# Patient Record
Sex: FEMALE | Race: WHITE | NOT HISPANIC OR LATINO | Employment: OTHER | URBAN - METROPOLITAN AREA
[De-identification: names, ages, dates, MRNs, and addresses within clinical notes are randomized per-mention and may not be internally consistent; named-entity substitution may affect disease eponyms.]

---

## 2017-01-02 ENCOUNTER — MYC MEDICAL ADVICE (OUTPATIENT)
Dept: FAMILY MEDICINE | Facility: CLINIC | Age: 77
End: 2017-01-02

## 2017-02-20 DIAGNOSIS — E11.59 TYPE 2 DIABETES MELLITUS WITH OTHER CIRCULATORY COMPLICATIONS (H): ICD-10-CM

## 2017-02-20 LAB
ALBUMIN SERPL-MCNC: 3.3 G/DL (ref 3.4–5)
ALP SERPL-CCNC: 112 U/L (ref 40–150)
ALT SERPL W P-5'-P-CCNC: 20 U/L (ref 0–50)
ANION GAP SERPL CALCULATED.3IONS-SCNC: 10 MMOL/L (ref 3–14)
AST SERPL W P-5'-P-CCNC: 12 U/L (ref 0–45)
BILIRUB SERPL-MCNC: 0.5 MG/DL (ref 0.2–1.3)
BUN SERPL-MCNC: 26 MG/DL (ref 7–30)
CALCIUM SERPL-MCNC: 8.9 MG/DL (ref 8.5–10.1)
CHLORIDE SERPL-SCNC: 107 MMOL/L (ref 94–109)
CO2 SERPL-SCNC: 28 MMOL/L (ref 20–32)
CREAT SERPL-MCNC: 1.32 MG/DL (ref 0.52–1.04)
GFR SERPL CREATININE-BSD FRML MDRD: 39 ML/MIN/1.7M2
GLUCOSE SERPL-MCNC: 141 MG/DL (ref 70–99)
HBA1C MFR BLD: 6.8 % (ref 4.3–6)
POTASSIUM SERPL-SCNC: 4.3 MMOL/L (ref 3.4–5.3)
PROT SERPL-MCNC: 7 G/DL (ref 6.8–8.8)
SODIUM SERPL-SCNC: 145 MMOL/L (ref 133–144)

## 2017-02-20 PROCEDURE — 36415 COLL VENOUS BLD VENIPUNCTURE: CPT | Performed by: FAMILY MEDICINE

## 2017-02-20 PROCEDURE — 80053 COMPREHEN METABOLIC PANEL: CPT | Performed by: FAMILY MEDICINE

## 2017-02-20 PROCEDURE — 83036 HEMOGLOBIN GLYCOSYLATED A1C: CPT | Mod: QW | Performed by: FAMILY MEDICINE

## 2017-03-01 DIAGNOSIS — N18.30 CHRONIC KIDNEY DISEASE, STAGE III (MODERATE) (H): Primary | ICD-10-CM

## 2017-03-01 LAB
ALBUMIN SERPL-MCNC: 3.6 G/DL (ref 3.4–5)
ANION GAP SERPL CALCULATED.3IONS-SCNC: 6 MMOL/L (ref 3–14)
BUN SERPL-MCNC: 30 MG/DL (ref 7–30)
CALCIUM SERPL-MCNC: 9.2 MG/DL (ref 8.5–10.1)
CHLORIDE SERPL-SCNC: 108 MMOL/L (ref 94–109)
CO2 SERPL-SCNC: 31 MMOL/L (ref 20–32)
CREAT SERPL-MCNC: 1.23 MG/DL (ref 0.52–1.04)
CREAT UR-MCNC: 151 MG/DL
GFR SERPL CREATININE-BSD FRML MDRD: 42 ML/MIN/1.7M2
HGB BLD-MCNC: 13.8 G/DL (ref 11.7–15.7)
MICROALBUMIN UR-MCNC: 44 MG/L
MICROALBUMIN/CREAT UR: 29.27 MG/G CR (ref 0–25)
PHOSPHATE SERPL-MCNC: 3.8 MG/DL (ref 2.5–4.5)
POTASSIUM SERPL-SCNC: 4.3 MMOL/L (ref 3.4–5.3)
PROT UR-MCNC: 0.2 G/L
PROT/CREAT 24H UR: 0.13 G/G CR (ref 0–0.2)
PTH-INTACT SERPL-MCNC: 67 PG/ML (ref 12–72)
SODIUM SERPL-SCNC: 145 MMOL/L (ref 133–144)

## 2017-03-01 PROCEDURE — 82040 ASSAY OF SERUM ALBUMIN: CPT | Performed by: INTERNAL MEDICINE

## 2017-03-01 PROCEDURE — 82310 ASSAY OF CALCIUM: CPT | Performed by: INTERNAL MEDICINE

## 2017-03-01 PROCEDURE — 82043 UR ALBUMIN QUANTITATIVE: CPT | Performed by: INTERNAL MEDICINE

## 2017-03-01 PROCEDURE — 80051 ELECTROLYTE PANEL: CPT | Performed by: INTERNAL MEDICINE

## 2017-03-01 PROCEDURE — 83970 ASSAY OF PARATHORMONE: CPT | Performed by: INTERNAL MEDICINE

## 2017-03-01 PROCEDURE — 82565 ASSAY OF CREATININE: CPT | Performed by: INTERNAL MEDICINE

## 2017-03-01 PROCEDURE — 84520 ASSAY OF UREA NITROGEN: CPT | Performed by: INTERNAL MEDICINE

## 2017-03-01 PROCEDURE — 84100 ASSAY OF PHOSPHORUS: CPT | Performed by: INTERNAL MEDICINE

## 2017-03-01 PROCEDURE — 84156 ASSAY OF PROTEIN URINE: CPT | Performed by: INTERNAL MEDICINE

## 2017-03-01 PROCEDURE — 85018 HEMOGLOBIN: CPT | Performed by: INTERNAL MEDICINE

## 2017-03-01 PROCEDURE — 36415 COLL VENOUS BLD VENIPUNCTURE: CPT | Performed by: INTERNAL MEDICINE

## 2017-03-07 ENCOUNTER — TRANSFERRED RECORDS (OUTPATIENT)
Dept: HEALTH INFORMATION MANAGEMENT | Facility: CLINIC | Age: 77
End: 2017-03-07

## 2017-04-24 DIAGNOSIS — E11.59 TYPE 2 DIABETES MELLITUS WITH OTHER CIRCULATORY COMPLICATIONS (H): ICD-10-CM

## 2017-04-24 NOTE — TELEPHONE ENCOUNTER
metFORMIN (GLUCOPHAGE-XR) 500 MG 24 hr tablet      Last Written Prescription Date: 10/10/2016  Last Fill Quantity: 90, # refills: 1  Last Office Visit with G, P or [facility] prescribing provider:  9/7/2016        BP Readings from Last 3 Encounters:   10/03/16 122/82   09/07/16 118/68   04/04/16 122/56     Lab Results   Component Value Date    MICROL 44 03/01/2017     Lab Results   Component Value Date    UMALCR 29.27 03/01/2017     Creatinine   Date Value Ref Range Status   03/01/2017 1.23 (H) 0.52 - 1.04 mg/dL Final   ]  GFR Estimate   Date Value Ref Range Status   03/01/2017 42 (L) >60 mL/min/1.7m2 Final     Comment:     Non  GFR Calc   02/20/2017 39 (L) >60 mL/min/1.7m2 Final     Comment:     Non  GFR Calc   11/16/2016 40 (L) >60 mL/min/1.7m2 Final     Comment:     Non  GFR Calc     GFR Estimate If Black   Date Value Ref Range Status   03/01/2017 51 (L) >60 mL/min/1.7m2 Final     Comment:      GFR Calc   02/20/2017 47 (L) >60 mL/min/1.7m2 Final     Comment:      GFR Calc   11/16/2016 48 (L) >60 mL/min/1.7m2 Final     Comment:      GFR Calc     Lab Results   Component Value Date    CHOL 155 10/03/2016     Lab Results   Component Value Date    HDL 53 10/03/2016     Lab Results   Component Value Date    LDL 81 10/03/2016     Lab Results   Component Value Date    TRIG 104 10/03/2016     Lab Results   Component Value Date    CHOLHDLRATIO 3.5 10/02/2015     Lab Results   Component Value Date    AST 12 02/20/2017     Lab Results   Component Value Date    ALT 20 02/20/2017     Lab Results   Component Value Date    A1C 6.8 02/20/2017    A1C 7.3 10/03/2016    A1C 7.3 04/04/2016    A1C 7.4 10/02/2015    A1C 7.3 04/01/2015     Potassium   Date Value Ref Range Status   03/01/2017 4.3 3.4 - 5.3 mmol/L Final     Inga Joyce CMA

## 2017-04-26 ENCOUNTER — MYC MEDICAL ADVICE (OUTPATIENT)
Dept: FAMILY MEDICINE | Facility: CLINIC | Age: 77
End: 2017-04-26

## 2017-04-26 RX ORDER — METFORMIN HCL 500 MG
TABLET, EXTENDED RELEASE 24 HR ORAL
Qty: 90 TABLET | Refills: 0 | Status: SHIPPED | OUTPATIENT
Start: 2017-04-26 | End: 2017-07-17

## 2017-05-25 ENCOUNTER — TRANSFERRED RECORDS (OUTPATIENT)
Dept: HEALTH INFORMATION MANAGEMENT | Facility: CLINIC | Age: 77
End: 2017-05-25

## 2017-05-25 LAB — EJECTION FRACTION: 68

## 2017-06-19 ENCOUNTER — MYC MEDICAL ADVICE (OUTPATIENT)
Dept: FAMILY MEDICINE | Facility: CLINIC | Age: 77
End: 2017-06-19

## 2017-07-07 ENCOUNTER — OFFICE VISIT (OUTPATIENT)
Dept: FAMILY MEDICINE | Facility: CLINIC | Age: 77
End: 2017-07-07
Payer: COMMERCIAL

## 2017-07-07 VITALS
SYSTOLIC BLOOD PRESSURE: 124 MMHG | DIASTOLIC BLOOD PRESSURE: 60 MMHG | HEART RATE: 60 BPM | TEMPERATURE: 98 F | BODY MASS INDEX: 32.29 KG/M2 | WEIGHT: 167 LBS

## 2017-07-07 DIAGNOSIS — M18.12 OSTEOARTHRITIS OF LEFT THUMB: Primary | ICD-10-CM

## 2017-07-07 PROCEDURE — 99214 OFFICE O/P EST MOD 30 MIN: CPT | Performed by: NURSE PRACTITIONER

## 2017-07-07 NOTE — NURSING NOTE
"Chief Complaint   Patient presents with     Mouth Problem     right side mouth       Initial There were no vitals taken for this visit. Estimated body mass index is 32.54 kg/(m^2) as calculated from the following:    Height as of 12/5/16: 5' 0.3\" (1.532 m).    Weight as of 12/5/16: 168 lb 4.8 oz (76.3 kg).  Medication Reconciliation: complete  "

## 2017-07-07 NOTE — PROGRESS NOTES
SUBJECTIVE:                                                    Sandra Petit is a 77 year old female who presents to clinic today for the following health issues:      Concern -spots inside mouth     Onset: last night    Description:   Bump inside right side mouth    Intensity: 0/10    Progression of Symptoms:  improving    Accompanying Signs & Symptoms:  Better today, saw small white spots in same area    Previous history of similar problem:   none    Precipitating factors:   Worsened by: none    Alleviating factors:  Improved by: none    Therapies Tried and outcome: none      Patient states last night she felt something inside the right cheek with her tongue. When she looked, she saw a black spot. She had been eating popcorn, thought perhaps it was a call. She tried to remove it unsuccessfully. This morning she looked again, and she saw 2 very small white spots, no black spot. There is no tenderness, she was going to cancel her appointment. She kept the appointment however because she wanted to talk about pain in her left thumb. She has periodic episodes of pain at the base of the left thumb radiating through the soft tissues of the thumb. Does not radiate to any other fingers, does not radiate into the arm. It tends to flare after she has performed repetitive activity. She states she had been baking cookies, and the repetition but to severe pain, worsened she had experienced before. She has applied warm packs and that seems to have helped. She is unable to take NSAIDs because of kidney problems. Presently the pain is improved    Problem list and histories reviewed & adjusted, as indicated.  Additional history: as documented    BP Readings from Last 3 Encounters:   07/07/17 124/60   10/03/16 122/82   09/07/16 118/68    Wt Readings from Last 3 Encounters:   07/07/17 167 lb (75.8 kg)   12/05/16 168 lb 4.8 oz (76.3 kg)   11/22/16 168 lb 4.8 oz (76.3 kg)                    Reviewed and updated as needed this visit by  clinical staff       Reviewed and updated as needed this visit by Provider         ROS:  Constitutional, HEENT, cardiovascular, pulmonary, gi and gu systems are negative, except as otherwise noted.    OBJECTIVE:     /60  Pulse 60  Temp 98  F (36.7  C) (Tympanic)  Wt 167 lb (75.8 kg)  BMI 32.29 kg/m2  Body mass index is 32.29 kg/(m^2).   GENERAL: healthy, alert and no distress  HENT: Oral exam: Oropharynx appears normal. There are no lesions within the buccal mucosa, oropharynx, sublingually, or on the tongue area. It looks a little irritated on the right buccal mucosa. No white or black lesions are evident.  NECK: no adenopathy, no asymmetry, masses, or scars and thyroid normal to palpation  MS: Focused exam of left thumb: No deformity, edema, or discoloration. Full range of motion, normal strength. Mild tenderness to palpation at the radiocarpal joint and through the first metacarpal. Some surrounding tenderness in the soft tissues adjacent to the first metacarpal.        ASSESSMENT/PLAN:     Problem List Items Addressed This Visit        Medium    Osteoarthritis of left thumb - Primary    Relevant Orders    WRIST COCK-UP NON-MOLDED (Completed)           She is reassured at this time I see no oral lesion. Suspect there may have been something stuck that she had eaten, that was quite adherent, but has now been removed  Suspect osteoarthritis of the thumb, she does have a history of osteoarthritis of her knees. Recommend rest when it flares. Alternate applications of heat and cold. She was provided with a splint to isolate the thumb, wear this when performing repetitive activities that would put strain on the affected joints    AMARJIT Park CNP  [facility]

## 2017-07-07 NOTE — MR AVS SNAPSHOT
After Visit Summary   7/7/2017    Sandra Petit    MRN: [id number]           Patient Information     Date Of Birth          1940        Visit Information        Provider Department      7/7/2017 2:45 PM Simona Izquierdo APRN CNP [facility]         Follow-ups after your visit        Who to contact     If you have questions or need follow up information about today's clinic visit or your schedule please contact [facility] directly at [phone].  Normal or non-critical lab and imaging results will be communicated to you by Image[employer], letter or phone within 4 business days after the clinic has received the results. If you do not hear from us within 7 days, please contact the clinic through Image[employer] or phone. If you have a critical or abnormal lab result, we will notify you by phone as soon as possible.  Submit refill requests through [employer] or call your pharmacy and they will forward the refill request to us. Please allow 3 business days for your refill to be completed.          Additional Information About Your Visit        MyChart Information     [employer] gives you secure access to your electronic health record. If you see a primary care provider, you can also send messages to your care team and make appointments. If you have questions, please call your primary care clinic.  If you do not have a primary care provider, please call [phone] and they will assist you.        Care EveryWhere ID     This is your Care EveryWhere ID. This could be used by other organizations to access your [place] medical records  [id number]        Your Vitals Were     Pulse Temperature BMI (Body Mass Index)             60 98  F (36.7  C) (Tympanic) 32.29 kg/m2          Blood Pressure from Last 3 Encounters:   07/07/17 124/60   10/03/16 122/82   09/07/16 118/68    Weight from Last 3 Encounters:   07/07/17 167 lb (75.8 kg)   12/05/16 168 lb 4.8 oz (76.3 kg)   11/22/16 168 lb 4.8 oz  (76.3 kg)              Today, you had the following     No orders found for display       Primary Care Provider Office Phone # Fax #    Geovany Landry [phone]       [facility] [address] [place] [address]        Equal Access to Services     : Hadii aad ku hadasho Soomaali, waaxda luqadaha, qaybta kaalmada adeegyada, waxay idiin hayaan adeeg khayosh la'darrenn ah. So [facility] [phone].    ATENCIÓN: Si habla español, tiene a lacy disposición servicios gratuitos de asistencia lingüística. Bryanna al [phone].    We comply with applicable federal civil rights laws and [place] laws. We do not discriminate on the basis of race, color, national origin, age, disability sex, sexual orientation or gender identity.            Thank you!     Thank you for choosing [facility]  for your care. Our goal is always to provide you with excellent care. Hearing back from our patients is one way we can continue to improve our services. Please take a few minutes to complete the written survey that you may receive in the mail after your visit with us. Thank you!             Your Updated Medication List - Protect others around you: Learn how to safely use, store and throw away your medicines at www.disposemymeds.org.          This list is accurate as of: 7/7/17  3:45 PM.  Always use your most recent med list.                   Brand Name Dispense Instructions for use Diagnosis    ACIDOPHILUS PROBIOTIC BLEND Caps      Take 1 capsule by mouth 2 times daily        aspirin 81 MG tablet      Take 81 mg by mouth daily        blood glucose monitoring lancets     1 Box    Use to test blood sugars 1 times daily or as directed.    Diabetic vasculopathy (H)       blood glucose monitoring test strip    ONE TOUCH VERIO IQ    50 each    Use to test blood sugars 1 times daily or as directed.    Diabetic vasculopathy (H)       cloNIDine 0.2 MG tablet    CATAPRES    180 tablet     Take 1 tablet (0.2 mg) by mouth 2 times daily    Essential hypertension, benign       levofloxacin 750 MG tablet    LEVAQUIN    2 tablet    Take one tablet 2 hours prior to procedure    History of total left knee replacement       levothyroxine 50 MCG tablet    SYNTHROID    90 tablet    Take 1 tablet (50 mcg) by mouth daily    Hypothyroidism, unspecified type       losartan 50 MG tablet    COZAAR    90 tablet    Take 1 tablet (50 mg) by mouth daily    Essential hypertension, benign       metFORMIN 500 MG 24 hr tablet    GLUCOPHAGE-XR    90 tablet    TAKE ONE TABLET BY MOUTH ONCE DAILY WITH  DINNER    Type 2 diabetes mellitus with other circulatory complications (H)       nitroGLYcerin 0.4 MG sublingual tablet    NITROSTAT    25 tablet    Place 1 tablet (0.4 mg) under the tongue every 5 minutes as needed for chest pain    Coronary artery disease involving native coronary artery without angina pectoris       simvastatin 40 MG tablet    ZOCOR    90 tablet    Take 1 tablet (40 mg) by mouth At Bedtime    Hyperlipidemia LDL goal <100       vitamin D 400 UNITS capsule     30 capsule    Take 1 capsule by mouth 2 times daily

## 2017-07-17 DIAGNOSIS — E11.59 TYPE 2 DIABETES MELLITUS WITH OTHER CIRCULATORY COMPLICATIONS (H): ICD-10-CM

## 2017-07-17 NOTE — TELEPHONE ENCOUNTER
metFORMIN (GLUCOPHAGE-XR) 500 MG 24 hr tablet         Last Written Prescription Date: 4/26/17  Last Fill Quantity: 90, # refills: 0  Last Office Visit with G, [facility] or [facility] prescribing provider:  7/7/17        BP Readings from Last 3 Encounters:   07/07/17 124/60   10/03/16 122/82   09/07/16 118/68     Lab Results   Component Value Date    MICROL 44 03/01/2017     Lab Results   Component Value Date    UMALCR 29.27 03/01/2017     Creatinine   Date Value Ref Range Status   03/01/2017 1.23 (H) 0.52 - 1.04 mg/dL Final   ]  GFR Estimate   Date Value Ref Range Status   03/01/2017 42 (L) >60 mL/min/1.7m2 Final     Comment:     Non  GFR Calc   02/20/2017 39 (L) >60 mL/min/1.7m2 Final     Comment:     Non  GFR Calc   11/16/2016 40 (L) >60 mL/min/1.7m2 Final     Comment:     Non  GFR Calc     GFR Estimate If Black   Date Value Ref Range Status   03/01/2017 51 (L) >60 mL/min/1.7m2 Final     Comment:      GFR Calc   02/20/2017 47 (L) >60 mL/min/1.7m2 Final     Comment:      GFR Calc   11/16/2016 48 (L) >60 mL/min/1.7m2 Final     Comment:      GFR Calc     Lab Results   Component Value Date    CHOL 155 10/03/2016     Lab Results   Component Value Date    HDL 53 10/03/2016     Lab Results   Component Value Date    LDL 81 10/03/2016     Lab Results   Component Value Date    TRIG 104 10/03/2016     Lab Results   Component Value Date    CHOLHDLRATIO 3.5 10/02/2015     Lab Results   Component Value Date    AST 12 02/20/2017     Lab Results   Component Value Date    ALT 20 02/20/2017     Lab Results   Component Value Date    A1C 6.8 02/20/2017    A1C 7.3 10/03/2016    A1C 7.3 04/04/2016    A1C 7.4 10/02/2015    A1C 7.3 04/01/2015     Potassium   Date Value Ref Range Status   03/01/2017 4.3 3.4 - 5.3 mmol/L Final

## 2017-07-18 NOTE — TELEPHONE ENCOUNTER
Routing refill request to provider for review/approval because:  Labs out of range:  Creatinine.     Mahnaz Daugherty RN

## 2017-07-19 RX ORDER — METFORMIN HCL 500 MG
TABLET, EXTENDED RELEASE 24 HR ORAL
Qty: 90 TABLET | Refills: 0 | Status: SHIPPED | OUTPATIENT
Start: 2017-07-19 | End: 2017-10-11

## 2017-07-30 ENCOUNTER — ALLIED HEALTH/NURSE VISIT (OUTPATIENT)
Dept: URGENT CARE | Facility: RETAIL CLINIC | Age: 77
End: 2017-07-30
Payer: COMMERCIAL

## 2017-07-30 VITALS
TEMPERATURE: 97.4 F | SYSTOLIC BLOOD PRESSURE: 159 MMHG | HEART RATE: 54 BPM | OXYGEN SATURATION: 97 % | DIASTOLIC BLOOD PRESSURE: 75 MMHG

## 2017-07-30 DIAGNOSIS — H57.8A9 SENSATION OF FOREIGN BODY IN EYE: Primary | ICD-10-CM

## 2017-07-30 PROCEDURE — 99207 ZZC NO BILLABLE SERVICE THIS VISIT: CPT | Performed by: PHYSICIAN ASSISTANT

## 2017-07-30 NOTE — NURSING NOTE
"Chief Complaint   Patient presents with     Eye Problem     woke with feeling like something in eye, eye is pink she feels like a film is covering it       Initial /75  Pulse 54  Temp 97.4  F (36.3  C) (Tympanic)  SpO2 97% Estimated body mass index is 32.29 kg/(m^2) as calculated from the following:    Height as of 12/5/16: 5' 0.3\" (1.532 m).    Weight as of 7/7/17: 167 lb (75.8 kg).  Medication Reconciliation: complete     Jessica Sundet      "

## 2017-07-30 NOTE — MR AVS SNAPSHOT
After Visit Summary   7/30/2017    Sandra Petit    MRN: [id number]           Patient Information     Date Of Birth          1940        Visit Information        Provider Department      7/30/2017 11:50 AM Justa Argueta PA-C [facility]        Today's Diagnoses     Sensation of foreign body in eye    -  1      Care Instructions    Your blood pressure is elevated at today's visit.  You should follow up with your primary provider regarding possible hypertension if your recheck are greater than 140/90.  Check your blood pressure several times in the next week or so. You can do this at local pharmacies, grocery stores or with the float nurse at the [facility]. Record your readings and take them with you to your appointment.  Goal BP <140/90  Do not take decongestants - they can raise your BP.  If you have chest pain, unusual headaches, vision changes or any sign or symptoms of stroke seek prompt medical attention.    /75  Pulse 54  Temp 97.4  F (36.3  C) (Tympanic)  SpO2 97%    ..........................................      Please FOLLOW UP at primary care clinic if not improving, new symptoms, worse or this does not resolve.  [facility]  [phone]            Follow-ups after your visit        Who to contact     You can reach your care team any time of the day by calling [phone].  Notification of test results:  If you have an abnormal lab result, we will notify you by phone as soon as possible.         Additional Information About Your Visit        [employer] Information     [employer] gives you secure access to your electronic health record. If you see a primary care provider, you can also send messages to your care team and make appointments. If you have questions, please call your primary care clinic.  If you do not have a primary care provider, please call [phone] and they will assist you.        Care EveryWhere ID     This is your Care  EveryWhere ID. This could be used by other organizations to access your [place] medical records  [id number]        Your Vitals Were     Pulse Temperature Pulse Oximetry             54 97.4  F (36.3  C) (Tympanic) 97%          Blood Pressure from Last 3 Encounters:   07/30/17 159/75   07/07/17 124/60   10/03/16 122/82    Weight from Last 3 Encounters:   07/07/17 167 lb (75.8 kg)   12/05/16 168 lb 4.8 oz (76.3 kg)   11/22/16 168 lb 4.8 oz (76.3 kg)              Today, you had the following     No orders found for display       Primary Care Provider Office Phone # Fax #    Geovany Landry [phone]       [facility] [address] [place] [address]        Equal Access to Services     [facility] : Hadii aad ku hadasho Soomaali, waaxda luqadaha, qaybta kaalmada adeegyada, veronica dudleyin hayaan kevin mcneil . So [facility] [phone].    ATENCIÓN: Si habla español, tiene a lacy disposición servicios gratuitos de asistencia lingüística. Llame al [phone].    We comply with applicable federal civil rights laws and [place] laws. We do not discriminate on the basis of race, color, national origin, age, disability sex, sexual orientation or gender identity.            Thank you!     Thank you for choosing [facility]  for your care. Our goal is always to provide you with excellent care. Hearing back from our patients is one way we can continue to improve our services. Please take a few minutes to complete the written survey that you may receive in the mail after your visit with us. Thank you!             Your Updated Medication List - Protect others around you: Learn how to safely use, store and throw away your medicines at www.disposemymeds.org.          This list is accurate as of: 7/30/17 11:52 AM.  Always use your most recent med list.                   Brand Name Dispense Instructions for use Diagnosis    ACIDOPHILUS PROBIOTIC BLEND Caps      Take  1 capsule by mouth 2 times daily        aspirin 81 MG tablet      Take 81 mg by mouth daily        blood glucose monitoring lancets     1 Box    Use to test blood sugars 1 times daily or as directed.    Diabetic vasculopathy (H)       blood glucose monitoring test strip    ONE TOUCH VERIO IQ    50 each    Use to test blood sugars 1 times daily or as directed.    Diabetic vasculopathy (H)       cloNIDine 0.2 MG tablet    CATAPRES    180 tablet    Take 1 tablet (0.2 mg) by mouth 2 times daily    Essential hypertension, benign       levofloxacin 750 MG tablet    LEVAQUIN    2 tablet    Take one tablet 2 hours prior to procedure    History of total left knee replacement       levothyroxine 50 MCG tablet    SYNTHROID    90 tablet    Take 1 tablet (50 mcg) by mouth daily    Hypothyroidism, unspecified type       losartan 50 MG tablet    COZAAR    90 tablet    Take 1 tablet (50 mg) by mouth daily    Essential hypertension, benign       metFORMIN 500 MG 24 hr tablet    GLUCOPHAGE-XR    90 tablet    TAKE ONE TABLET BY MOUTH ONCE DAILY WITH  DINNER    Type 2 diabetes mellitus with other circulatory complications (H)       nitroGLYcerin 0.4 MG sublingual tablet    NITROSTAT    25 tablet    Place 1 tablet (0.4 mg) under the tongue every 5 minutes as needed for chest pain    Coronary artery disease involving native coronary artery without angina pectoris       simvastatin 40 MG tablet    ZOCOR    90 tablet    Take 1 tablet (40 mg) by mouth At Bedtime    Hyperlipidemia LDL goal <100       vitamin D 400 UNITS capsule     30 capsule    Take 1 capsule by mouth 2 times daily

## 2017-07-30 NOTE — PATIENT INSTRUCTIONS
Your blood pressure is elevated at today's visit.  You should follow up with your primary provider regarding possible hypertension if your recheck are greater than 140/90.  Check your blood pressure several times in the next week or so. You can do this at local pharmacies, grocery stores or with the float nurse at the [facility]. Record your readings and take them with you to your appointment.  Goal BP <140/90  Do not take decongestants - they can raise your BP.  If you have chest pain, unusual headaches, vision changes or any sign or symptoms of stroke seek prompt medical attention.    /75  Pulse 54  Temp 97.4  F (36.3  C) (Tympanic)  SpO2 97%    ..........................................      Please FOLLOW UP at primary care clinic if not improving, new symptoms, worse or this does not resolve.  [facility]  [phone]

## 2017-07-30 NOTE — PROGRESS NOTES
S: Pt present to [facility] concerned of eye problem.  She has FB sensation since this am left eye and feels vision is 'filmy'. No discharge.  She needs further eval than [facility] can offer [facility].  To ED to  today.    Patient is comfortable with this plan.  Electronically signed,  LEXIS Argueta, PAC

## 2017-09-15 DIAGNOSIS — E78.5 HYPERLIPIDEMIA LDL GOAL <100: ICD-10-CM

## 2017-09-15 NOTE — TELEPHONE ENCOUNTER
simvastatin (ZOCOR) 40 MG tablet7/7/17     Last Written Prescription Date: 10/3/16  Last Fill Quantity: 90, # refills: 3  Last Office Visit with G, P or [facility] prescribing provider: 7/7/17  Next 5 appointments (look out 90 days)     Sep 28, 2017 10:20 AM CDT   Office Visit with Dudley Swan MD   [facility] ([facility])    [address] [place] [id number] [phone]                   Lab Results   Component Value Date    CHOL 155 10/03/2016     Lab Results   Component Value Date    HDL 53 10/03/2016     Lab Results   Component Value Date    LDL 81 10/03/2016     Lab Results   Component Value Date    TRIG 104 10/03/2016     Lab Results   Component Value Date    CHOLHDLRATIO 3.5 10/02/2015

## 2017-09-18 ENCOUNTER — HOSPITAL ENCOUNTER (OUTPATIENT)
Dept: MAMMOGRAPHY | Facility: CLINIC | Age: 77
Discharge: HOME | End: 2017-09-18
Attending: FAMILY MEDICINE | Admitting: FAMILY MEDICINE
Payer: MEDICARE

## 2017-09-18 DIAGNOSIS — Z12.31 VISIT FOR SCREENING MAMMOGRAM: ICD-10-CM

## 2017-09-18 PROCEDURE — G0202 SCR MAMMO BI INCL CAD: HCPCS

## 2017-09-18 RX ORDER — SIMVASTATIN 40 MG
TABLET ORAL
Qty: 90 TABLET | Refills: 0 | Status: SHIPPED | OUTPATIENT
Start: 2017-09-18 | End: 2017-10-11

## 2017-09-28 ENCOUNTER — OFFICE VISIT (OUTPATIENT)
Dept: FAMILY MEDICINE | Facility: CLINIC | Age: 77
End: 2017-09-28
Payer: COMMERCIAL

## 2017-09-28 VITALS
DIASTOLIC BLOOD PRESSURE: 72 MMHG | RESPIRATION RATE: 12 BRPM | TEMPERATURE: 98.5 F | WEIGHT: 164 LBS | HEIGHT: 59 IN | SYSTOLIC BLOOD PRESSURE: 116 MMHG | OXYGEN SATURATION: 99 % | BODY MASS INDEX: 33.06 KG/M2 | HEART RATE: 59 BPM

## 2017-09-28 DIAGNOSIS — D17.30 LIPOMA OF SKIN AND SUBCUTANEOUS TISSUE: ICD-10-CM

## 2017-09-28 DIAGNOSIS — M79.662 BILATERAL CALF PAIN: Primary | ICD-10-CM

## 2017-09-28 DIAGNOSIS — M79.661 BILATERAL CALF PAIN: Primary | ICD-10-CM

## 2017-09-28 PROCEDURE — 99213 OFFICE O/P EST LOW 20 MIN: CPT | Performed by: FAMILY MEDICINE

## 2017-09-28 ASSESSMENT — PAIN SCALES - GENERAL: PAINLEVEL: EXTREME PAIN (8)

## 2017-09-28 NOTE — MR AVS SNAPSHOT
After Visit Summary   9/28/2017    Sandra Petit    MRN: [id number]           Patient Information     Date Of Birth          1940        Visit Information        Provider Department      9/28/2017 10:20 AM Dudley Swan MD [facility]        Today's Diagnoses     Bilateral calf pain    -  1    Lipoma of skin and subcutaneous tissue           Follow-ups after your visit        Your next 10 appointments already scheduled     Oct 11, 2017  4:30 PM CDT   PHYSICAL with Teddy Wray MD   [facility] ([facility])    [address] [place] [id number] [phone]              Who to contact     If you have questions or need follow up information about today's clinic visit or your schedule please contact [facility] directly at [phone].  Normal or non-critical lab and imaging results will be communicated to you by MyChart, letter or phone within 4 business days after the clinic has received the results. If you do not hear from us within 7 days, please contact the clinic through MyChart or phone. If you have a critical or abnormal lab result, we will notify you by phone as soon as possible.  Submit refill requests through Voltea or call your pharmacy and they will forward the refill request to us. Please allow 3 business days for your refill to be completed.          Additional Information About Your Visit        MyChart Information     Voltea gives you secure access to your electronic health record. If you see a primary care provider, you can also send messages to your care team and make appointments. If you have questions, please call your primary care clinic.  If you do not have a primary care provider, please call [phone] and they will assist you.        Care EveryWhere ID     This is your Care EveryWhere ID. This could be used by other organizations to access your [facility] medical records  [id number]       "  Your Vitals Were     Pulse Temperature Respirations Height Pulse Oximetry BMI (Body Mass Index)    59 98.5  F (36.9  C) (Tympanic) 12 4' 11.4\" (1.509 m) 99% 32.68 kg/m2       Blood Pressure from Last 3 Encounters:   09/28/17 116/72   07/30/17 159/75   07/07/17 124/60    Weight from Last 3 Encounters:   09/28/17 164 lb (74.4 kg)   07/07/17 167 lb (75.8 kg)   12/05/16 168 lb 4.8 oz (76.3 kg)              Today, you had the following     No orders found for display       Primary Care Provider Office Phone # Fax #    Geovany Landry [phone]       [facility] [address] [place] [address]        Equal Access to Services     [place] [place] campus[facility]ANE : Hadii chris toribio hadasho Soomaali, waaxda luqadaha, qaybta kaalmada adeegyada, veronica mahan hayaafang mcneil . So [facility] [phone].    ATENCIÓN: Si habla español, tiene a lacy disposición servicios gratuitos de asistencia lingüística. Llame al [phone].    We comply with applicable federal civil rights laws and [place] laws. We do not discriminate on the basis of race, color, national origin, age, disability sex, sexual orientation or gender identity.            Thank you!     Thank you for choosing [facility]  for your care. Our goal is always to provide you with excellent care. Hearing back from our patients is one way we can continue to improve our services. Please take a few minutes to complete the written survey that you may receive in the mail after your visit with us. Thank you!             Your Updated Medication List - Protect others around you: Learn how to safely use, store and throw away your medicines at www.disposemymeds.org.          This list is accurate as of: 9/28/17 11:50 AM.  Always use your most recent med list.                   Brand Name Dispense Instructions for use Diagnosis    ACIDOPHILUS PROBIOTIC BLEND Caps      Take 1 capsule by mouth 2 times daily        aspirin 81 MG tablet     "  Take 81 mg by mouth daily        blood glucose monitoring lancets     1 Box    Use to test blood sugars 1 times daily or as directed.    Diabetic vasculopathy (H)       blood glucose monitoring test strip    ONE TOUCH VERIO IQ    50 each    Use to test blood sugars 1 times daily or as directed.    Diabetic vasculopathy (H)       cloNIDine 0.2 MG tablet    CATAPRES    180 tablet    Take 1 tablet (0.2 mg) by mouth 2 times daily    Essential hypertension, benign       levofloxacin 750 MG tablet    LEVAQUIN    2 tablet    Take one tablet 2 hours prior to procedure    History of total left knee replacement       levothyroxine 50 MCG tablet    SYNTHROID    90 tablet    Take 1 tablet (50 mcg) by mouth daily    Hypothyroidism, unspecified type       losartan 50 MG tablet    COZAAR    90 tablet    Take 1 tablet (50 mg) by mouth daily    Essential hypertension, benign       metFORMIN 500 MG 24 hr tablet    GLUCOPHAGE-XR    90 tablet    TAKE ONE TABLET BY MOUTH ONCE DAILY WITH  DINNER    Type 2 diabetes mellitus with other circulatory complications (H)       nitroGLYcerin 0.4 MG sublingual tablet    NITROSTAT    25 tablet    Place 1 tablet (0.4 mg) under the tongue every 5 minutes as needed for chest pain    Coronary artery disease involving native coronary artery without angina pectoris       simvastatin 40 MG tablet    ZOCOR    90 tablet    TAKE ONE TABLET BY MOUTH AT BEDTIME    Hyperlipidemia LDL goal <100       vitamin D 400 UNITS capsule     30 capsule    Take 1 capsule by mouth 2 times daily

## 2017-09-28 NOTE — NURSING NOTE
"Chief Complaint   Patient presents with     Mass     lump on waistline of her back. Long and thin, not round approx 2\" long. She has had for many years starting to get painful     Musculoskeletal Problem     burning in both calves for 5-6 months       Initial /72 (BP Location: Left arm, Patient Position: Chair, Cuff Size: Adult Regular)  Pulse 59  Temp 98.5  F (36.9  C) (Tympanic)  Resp 12  Ht 4' 11.4\" (1.509 m)  Wt 164 lb (74.4 kg)  SpO2 99%  BMI 32.68 kg/m2 Estimated body mass index is 32.68 kg/(m^2) as calculated from the following:    Height as of this encounter: 4' 11.4\" (1.509 m).    Weight as of this encounter: 164 lb (74.4 kg).  Medication Reconciliation: complete   Health Maintenance Due   Topic Date Due     PNEUMOCOCCAL (1 of 2 - PCV13) 03/22/2006     FOOT EXAM Q1 YEAR  07/27/2016     FALL RISK ASSESSMENT  10/02/2016     A1C Q6 MO  08/20/2017     TSH Q1 YEAR  10/03/2017     LIPID MONITORING Q1 YEAR  10/03/2017     ANAID QUESTIONNAIRE 1 YEAR  10/04/2017     PHQ-9 Q1YR  10/04/2017     EYE EXAM Q1 YEAR  10/10/2017     Health Maintenance reviewed at today's visit patient asked to schedule/complete:   will set up appt for physical to get all this done      "

## 2017-09-28 NOTE — PROGRESS NOTES
Subjective:  Patient is here today quite some pain in the back. She also has a small lump in her back that she wants evaluated today. Stasis In [place] started after she was doing a lot of climbing up and down off a footstool or she was painting her kitchen cabinets. But the pain feels more like a muscle aching. She has been on high dose of simvastatin and wondered if that may be causing it. I did tell her that since she's been on the medication for some time most likely not in it most likely was increased activity level. I did review her last LDL level and stated that if she wanted she might cut back to 20 mg of simvastatin daily and see if her lipid profile stays the same. I told her would not hurt her to drop back on dosage for a couple of months but she may need to go back to 40 if her lipid panel changes. She also has the lump on her back which is been there for many years Dr. Lackey told her not to worry about it but is starting to bother her after she was doing all this lifting and painting and just wanted it checked. No other complaints    Objective:  Lower extremities: No swelling of either calf. Slight tender patient in both calves.  Back: Patient does have a small lipoma just above her buttocks on the left measuring about 2 to 3 cm. She states is not changed in size not appreciably tender to touch. No induration surrounding it.    Assessment:  Muscle aching in both calves  Probable lipoma    Plan:  Patient is some intermittent Tylenol for her discomfort. Will use heating pads on her calves at night. We'll cut her simvastatin back to 20 mg a day and follow up with Dr. Wray for  her regularly scheduled appointment coming up. He can reevaluate her lipid profile and determine if further workup is indicated for her calf discomfort no evidence of DVT at this time.       Dudley Swan MD

## 2017-10-06 ENCOUNTER — TELEPHONE (OUTPATIENT)
Dept: FAMILY MEDICINE | Facility: CLINIC | Age: 77
End: 2017-10-06

## 2017-10-06 DIAGNOSIS — E78.5 HYPERLIPIDEMIA LDL GOAL <100: ICD-10-CM

## 2017-10-06 DIAGNOSIS — I25.10 ATHEROSCLEROSIS OF NATIVE CORONARY ARTERY OF NATIVE HEART WITHOUT ANGINA PECTORIS: ICD-10-CM

## 2017-10-06 DIAGNOSIS — E03.9 HYPOTHYROIDISM, UNSPECIFIED TYPE: Primary | ICD-10-CM

## 2017-10-06 DIAGNOSIS — I25.10 CORONARY ARTERY DISEASE INVOLVING NATIVE CORONARY ARTERY OF NATIVE HEART WITHOUT ANGINA PECTORIS: ICD-10-CM

## 2017-10-06 DIAGNOSIS — E79.0 HYPERURICEMIA: ICD-10-CM

## 2017-10-06 DIAGNOSIS — Z79.899 ENCOUNTER FOR LONG-TERM CURRENT USE OF MEDICATION: ICD-10-CM

## 2017-10-06 DIAGNOSIS — E11.21 TYPE 2 DIABETES MELLITUS WITH DIABETIC NEPHROPATHY, WITHOUT LONG-TERM CURRENT USE OF INSULIN (H): ICD-10-CM

## 2017-10-06 DIAGNOSIS — N18.30 CKD (CHRONIC KIDNEY DISEASE) STAGE 3, GFR 30-59 ML/MIN (H): ICD-10-CM

## 2017-10-06 DIAGNOSIS — I10 HYPERTENSION GOAL BP (BLOOD PRESSURE) < 140/90: ICD-10-CM

## 2017-10-06 NOTE — TELEPHONE ENCOUNTER
Reason for Call: Request for an order or referral:    Order or referral being requested: physical lab orders    Date needed: as soon as possible    Has the patient been seen by the PCP for this problem? NO    Additional comments: Pt has an appt with Bue on 10/11. Can you place orders so you have results at physical.     Phone number Patient can be reached at:  Home number on file [phone] (home)    Best Time:  anytime    Can we leave a detailed message on this number?  YES    Call taken on 10/6/2017 at 10:57 AM by Janna Hanson

## 2017-10-06 NOTE — TELEPHONE ENCOUNTER
Patient notified of MD message.  Lab apt made.  No questions or concerns.  Regino Christianson MA

## 2017-10-09 DIAGNOSIS — Z79.899 ENCOUNTER FOR LONG-TERM CURRENT USE OF MEDICATION: ICD-10-CM

## 2017-10-09 DIAGNOSIS — E79.0 HYPERURICEMIA: ICD-10-CM

## 2017-10-09 DIAGNOSIS — E11.21 TYPE 2 DIABETES MELLITUS WITH DIABETIC NEPHROPATHY, WITHOUT LONG-TERM CURRENT USE OF INSULIN (H): ICD-10-CM

## 2017-10-09 DIAGNOSIS — E78.5 HYPERLIPIDEMIA LDL GOAL <100: ICD-10-CM

## 2017-10-09 DIAGNOSIS — E03.9 HYPOTHYROIDISM, UNSPECIFIED TYPE: ICD-10-CM

## 2017-10-09 LAB
ALBUMIN SERPL-MCNC: 3.2 G/DL (ref 3.4–5)
ALBUMIN UR-MCNC: NEGATIVE MG/DL
ALP SERPL-CCNC: 103 U/L (ref 40–150)
ALT SERPL W P-5'-P-CCNC: 15 U/L (ref 0–50)
APPEARANCE UR: CLEAR
AST SERPL W P-5'-P-CCNC: 12 U/L (ref 0–45)
BASOPHILS # BLD AUTO: 0 10E9/L (ref 0–0.2)
BASOPHILS NFR BLD AUTO: 0.6 %
BILIRUB DIRECT SERPL-MCNC: 0.1 MG/DL (ref 0–0.2)
BILIRUB SERPL-MCNC: 0.5 MG/DL (ref 0.2–1.3)
BILIRUB UR QL STRIP: NEGATIVE
CHOLEST SERPL-MCNC: 154 MG/DL
COLOR UR AUTO: YELLOW
DIFFERENTIAL METHOD BLD: NORMAL
EOSINOPHIL # BLD AUTO: 0.3 10E9/L (ref 0–0.7)
EOSINOPHIL NFR BLD AUTO: 4.2 %
ERYTHROCYTE [DISTWIDTH] IN BLOOD BY AUTOMATED COUNT: 13.4 % (ref 10–15)
GLUCOSE UR STRIP-MCNC: NEGATIVE MG/DL
HBA1C MFR BLD: 6.5 % (ref 4.3–6)
HCT VFR BLD AUTO: 40.7 % (ref 35–47)
HDLC SERPL-MCNC: 53 MG/DL
HGB BLD-MCNC: 12.9 G/DL (ref 11.7–15.7)
HGB UR QL STRIP: NEGATIVE
IMM GRANULOCYTES # BLD: 0 10E9/L (ref 0–0.4)
IMM GRANULOCYTES NFR BLD: 0.1 %
KETONES UR STRIP-MCNC: NEGATIVE MG/DL
LDLC SERPL CALC-MCNC: 79 MG/DL
LEUKOCYTE ESTERASE UR QL STRIP: ABNORMAL
LYMPHOCYTES # BLD AUTO: 1.4 10E9/L (ref 0.8–5.3)
LYMPHOCYTES NFR BLD AUTO: 21.2 %
MCH RBC QN AUTO: 29.8 PG (ref 26.5–33)
MCHC RBC AUTO-ENTMCNC: 31.7 G/DL (ref 31.5–36.5)
MCV RBC AUTO: 94 FL (ref 78–100)
MONOCYTES # BLD AUTO: 0.4 10E9/L (ref 0–1.3)
MONOCYTES NFR BLD AUTO: 6.5 %
NEUTROPHILS # BLD AUTO: 4.5 10E9/L (ref 1.6–8.3)
NEUTROPHILS NFR BLD AUTO: 67.4 %
NITRATE UR QL: NEGATIVE
NONHDLC SERPL-MCNC: 101 MG/DL
PH UR STRIP: 5 PH (ref 5–7)
PLATELET # BLD AUTO: 252 10E9/L (ref 150–450)
PROT SERPL-MCNC: 7.1 G/DL (ref 6.8–8.8)
RBC # BLD AUTO: 4.33 10E12/L (ref 3.8–5.2)
SOURCE: ABNORMAL
SP GR UR STRIP: 1.02 (ref 1–1.03)
T4 FREE SERPL-MCNC: 1.12 NG/DL (ref 0.76–1.46)
TRIGL SERPL-MCNC: 109 MG/DL
TSH SERPL DL<=0.005 MIU/L-ACNC: 3.36 MU/L (ref 0.4–4)
URATE SERPL-MCNC: 6.7 MG/DL (ref 2.6–6)
UROBILINOGEN UR STRIP-MCNC: 0 MG/DL (ref 0–2)
WBC # BLD AUTO: 6.7 10E9/L (ref 4–11)

## 2017-10-09 PROCEDURE — 82550 ASSAY OF CK (CPK): CPT | Performed by: FAMILY MEDICINE

## 2017-10-09 PROCEDURE — 84550 ASSAY OF BLOOD/URIC ACID: CPT | Performed by: FAMILY MEDICINE

## 2017-10-09 PROCEDURE — 80061 LIPID PANEL: CPT | Performed by: FAMILY MEDICINE

## 2017-10-09 PROCEDURE — 83036 HEMOGLOBIN GLYCOSYLATED A1C: CPT | Mod: QW | Performed by: FAMILY MEDICINE

## 2017-10-09 PROCEDURE — 80076 HEPATIC FUNCTION PANEL: CPT | Performed by: FAMILY MEDICINE

## 2017-10-09 PROCEDURE — 85025 COMPLETE CBC W/AUTO DIFF WBC: CPT | Performed by: FAMILY MEDICINE

## 2017-10-09 PROCEDURE — 84443 ASSAY THYROID STIM HORMONE: CPT | Performed by: FAMILY MEDICINE

## 2017-10-09 PROCEDURE — 36415 COLL VENOUS BLD VENIPUNCTURE: CPT | Performed by: FAMILY MEDICINE

## 2017-10-09 PROCEDURE — 84439 ASSAY OF FREE THYROXINE: CPT | Performed by: FAMILY MEDICINE

## 2017-10-09 PROCEDURE — 81003 URINALYSIS AUTO W/O SCOPE: CPT | Performed by: FAMILY MEDICINE

## 2017-10-11 ENCOUNTER — OFFICE VISIT (OUTPATIENT)
Dept: FAMILY MEDICINE | Facility: CLINIC | Age: 77
End: 2017-10-11
Payer: COMMERCIAL

## 2017-10-11 VITALS
TEMPERATURE: 96 F | WEIGHT: 165.2 LBS | RESPIRATION RATE: 16 BRPM | SYSTOLIC BLOOD PRESSURE: 186 MMHG | BODY MASS INDEX: 32.92 KG/M2 | DIASTOLIC BLOOD PRESSURE: 72 MMHG | HEART RATE: 76 BPM

## 2017-10-11 DIAGNOSIS — Z12.11 SPECIAL SCREENING FOR MALIGNANT NEOPLASMS, COLON: ICD-10-CM

## 2017-10-11 DIAGNOSIS — Z78.0 POST-MENOPAUSAL: ICD-10-CM

## 2017-10-11 DIAGNOSIS — E11.21 TYPE 2 DIABETES MELLITUS WITH DIABETIC NEPHROPATHY, WITHOUT LONG-TERM CURRENT USE OF INSULIN (H): ICD-10-CM

## 2017-10-11 DIAGNOSIS — E03.9 HYPOTHYROIDISM, UNSPECIFIED TYPE: ICD-10-CM

## 2017-10-11 DIAGNOSIS — E78.5 HYPERLIPIDEMIA LDL GOAL <100: ICD-10-CM

## 2017-10-11 DIAGNOSIS — Z00.00 ROUTINE GENERAL MEDICAL EXAMINATION AT A HEALTH CARE FACILITY: Primary | ICD-10-CM

## 2017-10-11 DIAGNOSIS — I10 ESSENTIAL HYPERTENSION, BENIGN: ICD-10-CM

## 2017-10-11 DIAGNOSIS — M79.10 MYALGIA: ICD-10-CM

## 2017-10-11 LAB — CK SERPL-CCNC: 37 U/L (ref 30–225)

## 2017-10-11 PROCEDURE — 99397 PER PM REEVAL EST PAT 65+ YR: CPT | Performed by: FAMILY MEDICINE

## 2017-10-11 RX ORDER — METFORMIN HCL 500 MG
TABLET, EXTENDED RELEASE 24 HR ORAL
Qty: 90 TABLET | Refills: 3 | Status: SHIPPED | OUTPATIENT
Start: 2017-10-11 | End: 2018-10-15

## 2017-10-11 RX ORDER — SIMVASTATIN 40 MG
20 TABLET ORAL AT BEDTIME
Qty: 90 TABLET | Refills: 0 | COMMUNITY
Start: 2017-10-11 | End: 2017-10-11

## 2017-10-11 RX ORDER — LOSARTAN POTASSIUM 50 MG/1
50 TABLET ORAL DAILY
Qty: 90 TABLET | Refills: 3 | Status: SHIPPED | OUTPATIENT
Start: 2017-10-11 | End: 2017-12-06

## 2017-10-11 RX ORDER — CLONIDINE HYDROCHLORIDE 0.2 MG/1
0.2 TABLET ORAL 2 TIMES DAILY
Qty: 180 TABLET | Refills: 3 | Status: SHIPPED | OUTPATIENT
Start: 2017-10-11 | End: 2018-10-19

## 2017-10-11 RX ORDER — SIMVASTATIN 40 MG
40 TABLET ORAL AT BEDTIME
Qty: 90 TABLET | Refills: 3 | Status: SHIPPED | OUTPATIENT
Start: 2017-10-11 | End: 2018-04-11

## 2017-10-11 RX ORDER — LEVOTHYROXINE SODIUM 50 UG/1
50 TABLET ORAL DAILY
Qty: 90 TABLET | Refills: 3 | Status: SHIPPED | OUTPATIENT
Start: 2017-10-11 | End: 2018-12-10

## 2017-10-11 ASSESSMENT — PAIN SCALES - GENERAL: PAINLEVEL: SEVERE PAIN (6)

## 2017-10-11 NOTE — NURSING NOTE
"Chief Complaint   Patient presents with     Wellness Visit     Medicare Annual Wellness       Initial /72 (BP Location: Left arm, Patient Position: Chair, Cuff Size: Adult Regular)  Pulse 76  Temp 96  F (35.6  C)  Resp 16  Wt 165 lb 3.2 oz (74.9 kg)  BMI 32.92 kg/m2 Estimated body mass index is 32.92 kg/(m^2) as calculated from the following:    Height as of 9/28/17: 4' 11.4\" (1.509 m).    Weight as of this encounter: 165 lb 3.2 oz (74.9 kg).  Medication Reconciliation: complete  "

## 2017-10-11 NOTE — MR AVS SNAPSHOT
After Visit Summary   10/11/2017    Sandra Petit    MRN: [id number]           Patient Information     Date Of Birth          1940        Visit Information        Provider Department      10/11/2017 4:30 PM Teddy Wray MD [facility]        Today's Diagnoses     Routine general medical examination at a health care facility    -  1    Hyperlipidemia LDL goal <100        Special screening for malignant neoplasms, colon        Myalgia        Essential hypertension, benign        Hypothyroidism, unspecified type        Type 2 diabetes mellitus with diabetic nephropathy, without long-term current use of insulin (H)          Care Instructions      Preventive Health Recommendations    Female Ages 65 +    Yearly exam:     See your health care provider every year in order to  o Review health changes.   o Discuss preventive care.    o Review your medicines if your doctor has prescribed any.      You no longer need a yearly Pap test unless you've had an abnormal Pap test in the past 10 years. If you have vaginal symptoms, such as bleeding or discharge, be sure to talk with your provider about a Pap test.      Every 1 to 2 years, have a mammogram.  If you are over 69, talk with your health care provider about whether or not you want to continue having screening mammograms.      Every 10 years, have a colonoscopy. Or, have a yearly FIT test (stool test). These exams will check for colon cancer.       Have a cholesterol test every 5 years, or more often if your doctor advises it.       Have a diabetes test (fasting glucose) every three years. If you are at risk for diabetes, you should have this test more often.       At age 65, have a bone density scan (DEXA) to check for osteoporosis (brittle bone disease).    Shots:    Get a flu shot each year.    Get a tetanus shot every 10 years.    Talk to your doctor about your pneumonia vaccines. There are now two you should receive - Pneumovax (PPSV  23) and Prevnar (PCV 13).    Talk to your doctor about the shingles vaccine.    Talk to your doctor about the hepatitis B vaccine.    Nutrition:     Eat at least 5 servings of fruits and vegetables each day.      Eat whole-grain bread, whole-wheat pasta and brown rice instead of white grains and rice.      Talk to your provider about Calcium and Vitamin D.     Lifestyle    Exercise at least 150 minutes a week (30 minutes a day, 5 days a week). This will help you control your weight and prevent disease.      Limit alcohol to one drink per day.      No smoking.       Wear sunscreen to prevent skin cancer.       See your dentist twice a year for an exam and cleaning.      See your eye doctor every 1 to 2 years to screen for conditions such as glaucoma, macular degeneration and cataracts.          Follow-ups after your visit        Future tests that were ordered for you today     Open Future Orders        Priority Expected Expires Ordered    Fecal colorectal cancer screen (FIT) Routine 11/1/2017 1/3/2018 10/11/2017            Who to contact     If you have questions or need follow up information about today's clinic visit or your schedule please contact [facility] directly at [phone].  Normal or non-critical lab and imaging results will be communicated to you by Cytosorbentshart, letter or phone within 4 business days after the clinic has received the results. If you do not hear from us within 7 days, please contact the clinic through ChinaNetCentert or phone. If you have a critical or abnormal lab result, we will notify you by phone as soon as possible.  Submit refill requests through TidbitDotCo or call your pharmacy and they will forward the refill request to us. Please allow 3 business days for your refill to be completed.          Additional Information About Your Visit        TidbitDotCo Information     T[employer]DotCo gives you secure access to your electronic health record. If you see a primary care provider, you can also  send messages to your care team and make appointments. If you have questions, please call your primary care clinic.  If you do not have a primary care provider, please call [phone] and they will assist you.        Care EveryWhere ID     This is your Care EveryWhere ID. This could be used by other organizations to access your [facility] medical records  [id number]        Your Vitals Were     Pulse Temperature Respirations BMI (Body Mass Index)          76 96  F (35.6  C) 16 32.92 kg/m2         Blood Pressure from Last 3 Encounters:   10/11/17 186/72   09/28/17 116/72   07/30/17 159/75    Weight from Last 3 Encounters:   10/11/17 165 lb 3.2 oz (74.9 kg)   09/28/17 164 lb (74.4 kg)   07/07/17 167 lb (75.8 kg)              We Performed the Following     CK total          Today's Medication Changes          These changes are accurate as of: 10/11/17  6:24 PM.  If you have any questions, ask your nurse or doctor.               Start taking these medicines.        Dose/Directions    simvastatin 40 MG tablet   Commonly known as:  ZOCOR   Used for:  Hyperlipidemia LDL goal <100   Started by:  Teddy Wray MD        Dose:  40 mg   Take 1 tablet (40 mg) by mouth At Bedtime   Quantity:  90 tablet   Refills:  3         These medicines have changed or have updated prescriptions.        Dose/Directions    metFORMIN 500 MG 24 hr tablet   Commonly known as:  GLUCOPHAGE-XR   This may have changed:  See the new instructions.   Used for:  Type 2 diabetes mellitus with diabetic nephropathy, without long-term current use of insulin (H)   Changed by:  Teddy Wray MD        TAKE ONE TABLET BY MOUTH ONCE DAILY WITH  DINNER   Quantity:  90 tablet   Refills:  3            Where to get your medicines      These medications were sent to [facility] [address] [place] [address] - [address]  [address] [facility] [address]     Phone:  [phone]     cloNIDine 0.2 MG tablet    levothyroxine 50 MCG tablet    losartan 50 MG  tablet    metFORMIN 500 MG 24 hr tablet    simvastatin 40 MG tablet                Primary Care Provider Office Phone # Fax #    Geovany Landry [phone]       [facility] [address] [place] [address]        Equal Access to Services     RYAN LOWERY : Hadii aad ku hadasho Soomaali, waaxda luqadaha, qaybta kaalmada adeegyada, waxay idiin hayaan adeeg kharash laKyawdelmer gen. So [facility] [phone].    ATENCIÓN: Si habla español, tiene a lacy disposición servicios gratuitos de asistencia lingüística. Llame al [phone].    We comply with applicable federal civil rights laws and [place] laws. We do not discriminate on the basis of race, color, national origin, age, disability, sex, sexual orientation, or gender identity.            Thank you!     Thank you for choosing [facility]  for your care. Our goal is always to provide you with excellent care. Hearing back from our patients is one way we can continue to improve our services. Please take a few minutes to complete the written survey that you may receive in the mail after your visit with us. Thank you!             Your Updated Medication List - Protect others around you: Learn how to safely use, store and throw away your medicines at www.disposemymeds.org.          This list is accurate as of: 10/11/17  6:24 PM.  Always use your most recent med list.                   Brand Name Dispense Instructions for use Diagnosis    ACIDOPHILUS PROBIOTIC BLEND Caps      Take 1 capsule by mouth 2 times daily        aspirin 81 MG tablet      Take 81 mg by mouth daily        blood glucose monitoring lancets     1 Box    Use to test blood sugars 1 times daily or as directed.    Diabetic vasculopathy (H)       blood glucose monitoring test strip    ONE TOUCH VERIO IQ    50 each    Use to test blood sugars 1 times daily or as directed.    Diabetic vasculopathy (H)       cloNIDine 0.2 MG tablet    CATAPRES    180 tablet     Take 1 tablet (0.2 mg) by mouth 2 times daily    Essential hypertension, benign       levofloxacin 750 MG tablet    LEVAQUIN    2 tablet    Take one tablet 2 hours prior to procedure    History of total left knee replacement       levothyroxine 50 MCG tablet    SYNTHROID    90 tablet    Take 1 tablet (50 mcg) by mouth daily    Hypothyroidism, unspecified type       losartan 50 MG tablet    COZAAR    90 tablet    Take 1 tablet (50 mg) by mouth daily    Essential hypertension, benign       metFORMIN 500 MG 24 hr tablet    GLUCOPHAGE-XR    90 tablet    TAKE ONE TABLET BY MOUTH ONCE DAILY WITH  DINNER    Type 2 diabetes mellitus with diabetic nephropathy, without long-term current use of insulin (H)       nitroGLYcerin 0.4 MG sublingual tablet    NITROSTAT    25 tablet    Place 1 tablet (0.4 mg) under the tongue every 5 minutes as needed for chest pain    Coronary artery disease involving native coronary artery without angina pectoris       simvastatin 40 MG tablet    ZOCOR    90 tablet    Take 1 tablet (40 mg) by mouth At Bedtime    Hyperlipidemia LDL goal <100       vitamin D 400 UNITS capsule     30 capsule    Take 1 capsule by mouth 2 times daily

## 2017-10-11 NOTE — PROGRESS NOTES
SUBJECTIVE:   Sandra Petit is a 77 year old female who presents for Preventive Visit.      Are you in the first 12 months of your Medicare Part B coverage?  No    Healthy Habits:    Do you get at least three servings of calcium containing foods daily (dairy, green leafy vegetables, etc.)? no    Amount of exercise or daily activities, outside of work: none    Problems taking medications regularly No    Medication side effects: No    Have you had an eye exam in the past two years? yes    Do you see a dentist twice per year? yes    Do you have sleep apnea, excessive snoring or daytime drowsiness?no    COGNITIVE SCREEN  1) Repeat 3 items (Banana, Sunrise, Chair)    2) Clock draw: NORMAL  3) 3 item recall: Recalls 2 objects   Results: NORMAL clock, 1-2 items recalled: COGNITIVE IMPAIRMENT LESS LIKELY    Mini-CogTM Copyright S Toan. Licensed by the author for use in [place] [employer]; reprinted with permission ([email or web address]@[email or web address]). All rights reserved.              Diabetes Follow-up      Patient is checking blood sugars: one times a week    Diabetic concerns: None     Symptoms of hypoglycemia (low blood sugar): none     Paresthesias (numbness or burning in feet) or sores: No     Date of last diabetic eye exam: 10/2016    Hyperlipidemia Follow-Up      Rate your low fat/cholesterol diet?: fair    Taking statin?  Yes, no muscle aches from statin    Other lipid medications/supplements?:  none    Hypertension Follow-up      Outpatient blood pressures are not being checked.    Low Salt Diet: no added salt    Hypothyroidism Follow-up      Since last visit, patient describes the following symptoms: Weight stable, no hair loss, no skin changes, no constipation, no loose stools              Reviewed and updated as needed this visit by clinical staffTobacco  Allergies  Meds  Soc Hx        Reviewed and updated as needed this visit by Provider        Social History   Substance Use Topics     Smoking status: Former Smoker      Smokeless tobacco: Never Used      Comment: quit  1987     Alcohol use No       The patient does not drink >3 drinks per day nor >7 drinks per week.    Today's PHQ-2 Score:   PHQ-2 ( 1999 [employer]) 10/11/2017 10/11/2017   Q1: Little interest or pleasure in doing things 0 0   Q2: Feeling down, depressed or hopeless 0 0   PHQ-2 Score 0 0   Q1: Little interest or pleasure in doing things Not at all -   Q2: Feeling down, depressed or hopeless Not at all -   PHQ-2 Score 0 -       Do you feel safe in your environment - Yes    Do you have a Health Care Directive?: Yes: Advance Directive has been received and scanned.    Current providers sharing in care for this patient include:   Patient Care Team:  Geovany Landry MD as PCP - General (Family Practice)      Hearing impairment: No    Ability to successfully perform activities of daily living: Yes, no assistance needed     Fall risk:  Fallen 2 or more times in the past year?: No  Any fall with injury in the past year?: No      Home safety:  none identified      The following health maintenance items are reviewed in Epic and correct as of today:Health Maintenance   Topic Date Due     PNEUMOCOCCAL (1 of 2 - PCV13) 03/22/2006     FOOT EXAM Q1 YEAR  07/27/2016     FALL RISK ASSESSMENT  10/02/2016     EYE EXAM Q1 YEAR  10/10/2017     ANAID QUESTIONNAIRE 1 YEAR  10/04/2017     PHQ-9 Q1YR  10/04/2017     BMP Q1 YR  02/20/2018     CREATININE Q1 YEAR  03/01/2018     MICROALBUMIN Q1 YEAR  03/01/2018     A1C Q6 MO  04/09/2018     TSH Q1 YEAR  10/09/2018     HEMOGLOBIN Q1 YR  10/09/2018     LIPID MONITORING Q1 YEAR  10/09/2018     TSH W/ FREE T4 REFLEX Q2 YEAR  10/09/2019     ADVANCE DIRECTIVE PLANNING Q5 YRS  08/25/2020     TETANUS IMMUNIZATION (SYSTEM ASSIGNED)  04/27/2026     INFLUENZA VACCINE (SYSTEM ASSIGNED)  Completed     DEXA SCAN SCREENING (SYSTEM ASSIGNED)  Completed     BP Readings from Last 3 Encounters:   10/11/17 186/72   09/28/17 116/72   07/30/17 159/75    Wt Readings  from Last 3 Encounters:   10/11/17 165 lb 3.2 oz (74.9 kg)   09/28/17 164 lb (74.4 kg)   07/07/17 167 lb (75.8 kg)                  Patient Active Problem List   Diagnosis     Cardiac dysrhythmia     Myalgia and myositis     Coronary atherosclerosis      MONONUCLEOSIS ebv inf 7/05     CKD (chronic kidney disease) stage 3, GFR 30-59 ml/min     Encounter for long-term current use of medication     HYPERLIPIDEMIA LDL GOAL <100     Hypertension goal BP (blood pressure) < 140/90     OA (osteoarthritis) of knee - bilateral     Advance Care Planning     History of total left knee replacement     Bunion     Acute-on-chronic kidney injury (H)     Coronary atherosclerosis of native coronary artery     Chest pain     Hypothyroidism, unspecified hypothyroidism type     Coronary artery disease involving native coronary artery without angina pectoris     Type 2 diabetes mellitus with diabetic nephropathy (H)     Type 2 diabetes mellitus with other circulatory complications     Hyperuricemia     Herpes zoster without complication     Osteoarthritis of left thumb     Past Surgical History:   Procedure Laterality Date     ARTHROPLASTY KNEE  4/29/2013    Procedure: ARTHROPLASTY KNEE;  left total knee arthroplasty;  Surgeon: Teddy Grimes MD;  Location: [facility] OR     C APPENDECTOMY       C NONSPECIFIC PROCEDURE      Knee ligament surgery     C NONSPECIFIC PROCEDURE      Kidney surgery for mechanical obstruction     C OPEN CORONARY ENDARTERECTOMY  june 2005    Angioplasty w stenting     C TOTAL KNEE ARTHROPLASTY  4/29/13    Left     COMBINED CYSTOSCOPY, INSERT CATHETER URETER Bilateral 8/10/2015    Procedure: COMBINED CYSTOSCOPY, INSERT CATHETER URETER;  Surgeon: Johnnie Madera MD;  Location: [facility]     DILATION AND CURETTAGE, HYSTEROSCOPY DIAGNOSTIC, COMBINED N/A 11/6/2014    Procedure: COMBINED DILATION AND CURETTAGE, HYSTEROSCOPY DIAGNOSTIC;  Surgeon: Edward Pardo MD;  Location: [facility]     ESOPHAGOSCOPY,  GASTROSCOPY, DUODENOSCOPY (EGD), COMBINED N/A 2015    Procedure: COMBINED ESOPHAGOSCOPY, GASTROSCOPY, DUODENOSCOPY (EGD), BIOPSY SINGLE OR MULTIPLE;  Surgeon: Carmelo Rosario MD;  Location: [facility] GI     HC REMOVAL GALLBLADDER      Cholecystectomy     HC REMOVE TONSILS/ADENOIDS,<13 Y/O      unsure of age     LAPAROSCOPIC HYSTERECTOMY TOTAL N/A 8/10/2015    Procedure: LAPAROSCOPIC HYSTERECTOMY TOTAL;  Surgeon: Edward Pardo MD;  Location: [facility]     LAPAROSCOPIC LYSIS ADHESIONS N/A 8/10/2015    Procedure: LAPAROSCOPIC LYSIS ADHESIONS;  Surgeon: Edward Pardo MD;  Location: [facility] OR       Social History   Substance Use Topics     Smoking status: Former Smoker     Smokeless tobacco: Never Used      Comment: quit       Alcohol use No     Family History   Problem Relation Age of Onset     HEART DISEASE Mother       coronary     HEART DISEASE Father       coronary     Allergies Sister      unknown     Allergies Brother      unknown     Allergies Daughter      unknown     Allergies Son      unknown     HEART DISEASE Sister      by pass surg     HEART DISEASE Brother      on medication     Hypertension Sister      Hypertension Brother      Lipids Sister      Lipids Brother      CEREBROVASCULAR DISEASE Brother      Obesity Sister      DIABETES Sister      DIABETES Sister      C.A.D. Brother      quad by pass     Neurologic Disorder Sister      parkinsons     CANCER Sister      skin cancer     Alcohol/Drug No family hx of      Alzheimer Disease No family hx of          Current Outpatient Prescriptions   Medication Sig Dispense Refill     simvastatin (ZOCOR) 40 MG tablet Take 1 tablet (40 mg) by mouth At Bedtime 90 tablet 3     metFORMIN (GLUCOPHAGE-XR) 500 MG 24 hr tablet TAKE ONE TABLET BY MOUTH ONCE DAILY WITH  DINNER 90 tablet 3     losartan (COZAAR) 50 MG tablet Take 1 tablet (50 mg) by mouth daily 90 tablet 3     levothyroxine (SYNTHROID) 50 MCG tablet Take 1 tablet (50 mcg) by  "mouth daily 90 tablet 3     cloNIDine (CATAPRES) 0.2 MG tablet Take 1 tablet (0.2 mg) by mouth 2 times daily 180 tablet 3     levofloxacin (LEVAQUIN) 750 MG tablet Take one tablet 2 hours prior to procedure 2 tablet 3     blood glucose monitoring (ONE TOUCH VERIO IQ) test strip Use to test blood sugars 1 times daily or as directed. 50 each 3     blood glucose monitoring (ONE TOUCH DELICA) lancets Use to test blood sugars 1 times daily or as directed. 1 Box 11     aspirin 81 MG tablet Take 81 mg by mouth daily       nitroglycerin (NITROSTAT) 0.4 MG SL tablet Place 1 tablet (0.4 mg) under the tongue every 5 minutes as needed for chest pain 25 tablet 0     Probiotic Product (ACIDOPHILUS PROBIOTIC BLEND) CAPS Take 1 capsule by mouth 2 times daily       Cholecalciferol (VITAMIN D) 400 UNITS capsule Take 1 capsule by mouth 2 times daily 30 capsule      [DISCONTINUED] simvastatin (ZOCOR) 40 MG tablet Take 0.5 tablets (20 mg) by mouth At Bedtime 90 tablet 0     [DISCONTINUED] simvastatin (ZOCOR) 40 MG tablet TAKE ONE TABLET BY MOUTH AT BEDTIME 90 tablet 0     [DISCONTINUED] metFORMIN (GLUCOPHAGE-XR) 500 MG 24 hr tablet TAKE ONE TABLET BY MOUTH ONCE DAILY WITH  DINNER 90 tablet 0     [DISCONTINUED] losartan (COZAAR) 50 MG tablet Take 1 tablet (50 mg) by mouth daily 90 tablet 3     [DISCONTINUED] levothyroxine (SYNTHROID) 50 MCG tablet Take 1 tablet (50 mcg) by mouth daily 90 tablet 3     [DISCONTINUED] cloNIDine (CATAPRES) 0.2 MG tablet Take 1 tablet (0.2 mg) by mouth 2 times daily 180 tablet 3     Allergies   Allergen Reactions     Penicillins      \"throat started swelling\"     Vitamin B Complex Hives     Vitamin B12 Hives     all vitamin B's     Clindamycin Hcl Rash               ROS:  Constitutional, HEENT, cardiovascular, pulmonary, gi and gu systems are negative, except as otherwise noted.      OBJECTIVE:   /72 (BP Location: Left arm, Patient Position: Chair, Cuff Size: Adult Regular)  Pulse 76  Temp 96  F (35.6 " " C)  Resp 16  Wt 165 lb 3.2 oz (74.9 kg)  BMI 32.92 kg/m2 Estimated body mass index is 32.92 kg/(m^2) as calculated from the following:    Height as of 9/28/17: 4' 11.4\" (1.509 m).    Weight as of this encounter: 165 lb 3.2 oz (74.9 kg).   Rep bp 160/76  Home bp's consistently run in the 120 range  EXAM:   GENERAL APPEARANCE: healthy, alert and no distress  EYES: Eyes grossly normal to inspection, PERRL and conjunctivae and sclerae normal  HENT: ear canals and TM's normal, nose and mouth without ulcers or lesions, oropharynx clear and oral mucous membranes moist  NECK: no adenopathy, no asymmetry, masses, or scars and thyroid normal to palpation  RESP: lungs clear to auscultation - no rales, rhonchi or wheezes  BREAST: normal without masses, tenderness or nipple discharge and no palpable axillary masses or adenopathy  CV: regular rate and rhythm, normal S1 S2, no S3 or S4, no murmur, click or rub, no peripheral edema and peripheral pulses strong  ABDOMEN: soft, nontender, no hepatosplenomegaly, no masses and bowel sounds normal  MS: no musculoskeletal defects are noted and gait is age appropriate without ataxia  SKIN: no suspicious lesions or rashes  NEURO: Normal strength and tone, sensory exam grossly normal, mentation intact and speech normal  PSYCH: mentation appears normal and affect normal    ASSESSMENT / PLAN:       ICD-10-CM    1. Routine general medical examination at a health care facility Z00.00 Fecal colorectal cancer screen (FIT)   2. Hyperlipidemia LDL goal <100 E78.5 simvastatin (ZOCOR) 40 MG tablet     DISCONTINUED: simvastatin (ZOCOR) 40 MG tablet   3. Special screening for malignant neoplasms, colon Z12.11 Fecal colorectal cancer screen (FIT)   4. Myalgia M79.1 CK total   5. Essential hypertension, benign I10 losartan (COZAAR) 50 MG tablet     cloNIDine (CATAPRES) 0.2 MG tablet   6. Hypothyroidism, unspecified type E03.9 levothyroxine (SYNTHROID) 50 MCG tablet   7. Type 2 diabetes mellitus with " "diabetic nephropathy, without long-term current use of insulin (H) E11.21 metFORMIN (GLUCOPHAGE-XR) 500 MG 24 hr tablet       End of Life Planning:  Patient currently has an advanced directive: Yes.  Practitioner is supportive of decision.    COUNSELING:  Reviewed preventive health counseling, as reflected in patient instructions       Regular exercise       Vision screening       Osteoporosis Prevention/Bone Health       Colon cancer screening       Immunizations current        Estimated body mass index is 32.92 kg/(m^2) as calculated from the following:    Height as of 9/28/17: 4' 11.4\" (1.509 m).    Weight as of this encounter: 165 lb 3.2 oz (74.9 kg).     reports that she has quit smoking. She has never used smokeless tobacco.        Appropriate preventive services were discussed with this patient, including applicable screening as appropriate for cardiovascular disease, diabetes, osteopenia/osteoporosis, and glaucoma.  As appropriate for age/gender, discussed screening for colorectal cancer, prostate cancer, breast cancer, and cervical cancer. Checklist reviewing preventive services available has been given to the patient.    Reviewed patients plan of care and provided an AVS. The Basic Care Plan (routine screening as documented in Health Maintenance) for Sandra meets the Care Plan requirement. This Care Plan has been established and reviewed with the Patient    dexa scheduled  FIT  Recent labs reviewed all okay  meds refilled  Pt to cb re rmyalgias.    Counseling Resources:  ATP IV Guidelines  Pooled Cohorts Equation Calculator  Breast Cancer Risk Calculator  FRAX Risk Assessment  ICSI Preventive Guidelines  Dietary Guidelines for Americans, 2010  USDA's MyPlate  ASA Prophylaxis  Lung CA Screening    Teddy Wray MD  [facility]  "

## 2017-10-12 PROCEDURE — G0328 FECAL BLOOD SCRN IMMUNOASSAY: HCPCS | Performed by: FAMILY MEDICINE

## 2017-10-13 DIAGNOSIS — Z12.11 SPECIAL SCREENING FOR MALIGNANT NEOPLASMS, COLON: ICD-10-CM

## 2017-10-13 DIAGNOSIS — Z00.00 ROUTINE GENERAL MEDICAL EXAMINATION AT A HEALTH CARE FACILITY: ICD-10-CM

## 2017-10-15 LAB — HEMOCCULT STL QL IA: NEGATIVE

## 2017-10-17 ENCOUNTER — HOSPITAL ENCOUNTER (OUTPATIENT)
Dept: BONE DENSITY | Facility: CLINIC | Age: 77
Discharge: HOME | End: 2017-10-17
Attending: FAMILY MEDICINE | Admitting: FAMILY MEDICINE
Payer: MEDICARE

## 2017-10-17 DIAGNOSIS — Z78.0 POST-MENOPAUSAL: ICD-10-CM

## 2017-10-17 PROCEDURE — 77080 DXA BONE DENSITY AXIAL: CPT

## 2017-10-18 ENCOUNTER — TELEPHONE (OUTPATIENT)
Dept: FAMILY MEDICINE | Facility: CLINIC | Age: 77
End: 2017-10-18

## 2017-10-18 DIAGNOSIS — E11.59 DIABETIC VASCULOPATHY (H): ICD-10-CM

## 2017-10-18 DIAGNOSIS — E78.5 HYPERLIPIDEMIA LDL GOAL <100: Primary | ICD-10-CM

## 2017-10-18 NOTE — TELEPHONE ENCOUNTER
Patient called and is wondering since her cholesterol medication was lowered, how will she know if her levels are okay, or if they are going back up? Do you want her to come in for lab work? How long should she wait? Please advise.

## 2017-10-20 DIAGNOSIS — E11.59 DIABETIC VASCULOPATHY (H): ICD-10-CM

## 2017-10-24 RX ORDER — BLOOD SUGAR DIAGNOSTIC
STRIP MISCELLANEOUS
Qty: 50 STRIP | Refills: 3 | OUTPATIENT
Start: 2017-10-24

## 2017-10-24 NOTE — TELEPHONE ENCOUNTER
Test strips and lancets  Prescription approved per [facility] Refill Protocol.    Dangelo Kemp RN, BSN

## 2017-10-25 ENCOUNTER — TRANSFERRED RECORDS (OUTPATIENT)
Dept: HEALTH INFORMATION MANAGEMENT | Facility: CLINIC | Age: 77
End: 2017-10-25

## 2017-11-02 ENCOUNTER — TELEPHONE (OUTPATIENT)
Dept: FAMILY MEDICINE | Facility: OTHER | Age: 77
End: 2017-11-02

## 2017-11-02 DIAGNOSIS — E78.5 HYPERLIPIDEMIA LDL GOAL <100: ICD-10-CM

## 2017-11-02 LAB
CHOLEST SERPL-MCNC: 216 MG/DL
HDLC SERPL-MCNC: 59 MG/DL
LDLC SERPL CALC-MCNC: 130 MG/DL
NONHDLC SERPL-MCNC: 157 MG/DL
TRIGL SERPL-MCNC: 136 MG/DL

## 2017-11-02 PROCEDURE — 36415 COLL VENOUS BLD VENIPUNCTURE: CPT | Performed by: FAMILY MEDICINE

## 2017-11-02 PROCEDURE — 80061 LIPID PANEL: CPT | Performed by: FAMILY MEDICINE

## 2017-11-02 RX ORDER — SIMVASTATIN 40 MG
40 TABLET ORAL AT BEDTIME
Qty: 90 TABLET | Refills: 3 | COMMUNITY
Start: 2017-11-02 | End: 2019-11-06

## 2017-11-02 NOTE — TELEPHONE ENCOUNTER
Called   her ldl is now 139 off statins  Her muscle pains are gone  Will have resume the simva at 20 mg and linus lipids in 2 months

## 2017-11-28 ENCOUNTER — TELEPHONE (OUTPATIENT)
Dept: FAMILY MEDICINE | Facility: CLINIC | Age: 77
End: 2017-11-28

## 2017-11-28 NOTE — TELEPHONE ENCOUNTER
Spoke to the patient. She said her BP has been running high and she is not sure what the cause is. She was hoping to get in to see Dr. Wray in [place] but his is not in until next Wednesday and does not have any appointments to schedule at this time.     Patient said last week her BP was 201/something. She said Monday morning it was 184/70. Patient said this morning it was 139/66 and now this afternoon 176/82. Patient said 82 has been the highest bottom number. Patient is wondering if something is going on or maybe it is her BP cuff. Patient takes Clonidine around 5 AM and then again after supper. She takes her Losartan around 9 AM everyday.     Patient denies: chest pain, HC thoracic/abdominal dissection, headache, blurred vision, nausea, vomiting, drowsiness, confusion, numbness, tingling, coughing up blood, trouble breathing, nosebleeds, diastolic pressure >110, weakness, BP >160/100, dizziness/lightheaded, or recent change in medication.     Patient was hoping to get some advise from Dr. Wray. She is aware he is not in clinic much. She said Dr. Wray told her he checks his messages often so if she ever needed anything to just call and get a message sent. Patient said message could also be sent to Dr. Swan (she said she has seen him once and would prefer it to go to him if Dr. Wray is not available).     Patient is scheduled with the float nurse tomorrow to come in for a BP check. She will bring her home cuff with her so it can be checked as well. She would like message sent to Dr. Wray to see if he can see her next week in [place] and what he would advise she do. She would like message sent to Dr. Swan as a back up.     Will route to providers to advise.     Daniela Villafuerte RN  [facility]

## 2017-11-29 ENCOUNTER — ALLIED HEALTH/NURSE VISIT (OUTPATIENT)
Dept: FAMILY MEDICINE | Facility: CLINIC | Age: 77
End: 2017-11-29
Payer: COMMERCIAL

## 2017-11-29 VITALS — SYSTOLIC BLOOD PRESSURE: 162 MMHG | DIASTOLIC BLOOD PRESSURE: 78 MMHG

## 2017-11-29 DIAGNOSIS — I10 HTN (HYPERTENSION): Primary | ICD-10-CM

## 2017-11-29 PROCEDURE — 99207 ZZC NO CHARGE NURSE ONLY: CPT

## 2017-11-29 NOTE — NURSING NOTE
Pt was here to BP check. This morning BP was it was 162/78. Pt was then advise dof Dr. Wray recommendations:    Per Dr. Wray:  If mikala's bp is still high tomorrow she shd increase her losartan to 50 mg bid and I will call her Friday.      Pt verbalized understanding. Pt also had concerns about a fall she had on Monday which she hit the left side of her head. RN Triage spoke with pt.

## 2017-11-29 NOTE — NURSING NOTE
RN was asked to visit with patient due to a recent fall.  Two days ago Sandra fell and hit the left side of her head. Sandra had a left knee replacement some time ago and it has been causing difficulty with balance issues.     She denies loss of consciousness, headache, vision changes. Just states that her head feels like cotton at the area.            RECOMMENDED DISPOSITION:  Home care advice - Rn advised Sandra to ice area 4 x a day for the next couple days.  She will also take tylenol for the pain.  Will report any changes.   Will comply with recommendation: Yes  If further questions/concerns or if symptoms do not improve, worsen or new symptoms develop, call your PCP or [facility] as soon as possible.      Guideline used:  Telephone Triage Protocols for Nurses, Fifth Edition, Elaine Landry RN

## 2017-11-29 NOTE — TELEPHONE ENCOUNTER
If mikala's bp is still high tomorrow she shd increase her losartan to 50 mg bid and I will call her friday

## 2017-11-29 NOTE — TELEPHONE ENCOUNTER
BP check 162/78. Pt was advised of the recommendations from DR. Wray and verbalized understanding. Routed to Dr. Wray to update and contact the pt.

## 2017-11-29 NOTE — MR AVS SNAPSHOT
After Visit Summary   11/29/2017    Sandra Petit    MRN: [id number]           Patient Information     Date Of Birth          1940        Visit Information        Provider Department      11/29/2017 10:00 AM NL FLOAT TEAM D [facility]        Today's Diagnoses     HTN (hypertension)    -  1       Follow-ups after your visit        Who to contact     If you have questions or need follow up information about today's clinic visit or your schedule please contact [facility] directly at [phone].  Normal or non-critical lab and imaging results will be communicated to you by MotorExchangehart, letter or phone within 4 business days after the clinic has received the results. If you do not hear from us within 7 days, please contact the clinic through [employer] or phone. If you have a critical or abnormal lab result, we will notify you by phone as soon as possible.  Submit refill requests through [employer]Up or call your pharmacy and they will forward the refill request to us. Please allow 3 business days for your refill to be completed.          Additional Information About Your Visit        MyChart Information     RigUp gives you secure access to your electronic health record. If you see a primary care provider, you can also send messages to your care team and make appointments. If you have questions, please call your primary care clinic.  If you do not have a primary care provider, please call [phone] and they will assist you.        Care EveryWhere ID     This is your Care EveryWhere ID. This could be used by other organizations to access your [place] medical records  [id number]         Blood Pressure from Last 3 Encounters:   11/29/17 162/78   10/11/17 186/72   09/28/17 116/72    Weight from Last 3 Encounters:   10/11/17 165 lb 3.2 oz (74.9 kg)   09/28/17 164 lb (74.4 kg)   07/07/17 167 lb (75.8 kg)              Today, you had the following     No orders found for  display       Primary Care Provider Office Phone # Fax #    Geovany Landry [phone]       [facility] [address] [place] [address]        Equal Access to Services     RYAN LOWREY : Hadmateo chris toribio carloso Somateuszali, waaxda luqadaha, qaybta kaalmada adeegyada, waxphani turnern kevin martinez tarun blevins. So [facility] [phone].    ATENCIÓN: Si habla español, tiene a lacy disposición servicios gratuitos de asistencia lingüística. Llame al [phone].    We comply with applicable federal civil rights laws and [place] laws. We do not discriminate on the basis of race, color, national origin, age, disability, sex, sexual orientation, or gender identity.            Thank you!     Thank you for choosing [facility]  for your care. Our goal is always to provide you with excellent care. Hearing back from our patients is one way we can continue to improve our services. Please take a few minutes to complete the written survey that you may receive in the mail after your visit with us. Thank you!             Your Updated Medication List - Protect others around you: Learn how to safely use, store and throw away your medicines at www.disposemymeds.org.          This list is accurate as of: 11/29/17 11:16 AM.  Always use your most recent med list.                   Brand Name Dispense Instructions for use Diagnosis    ACIDOPHILUS PROBIOTIC BLEND Caps      Take 1 capsule by mouth 2 times daily        aspirin 81 MG tablet      Take 81 mg by mouth daily        blood glucose monitoring lancets     1 each    Use to test blood sugars 1 times daily or as directed.    Diabetic vasculopathy (H)       blood glucose monitoring test strip    ONETOUCH VERIO IQ    50 each    Use to test blood sugars 1 times daily or as directed.    Diabetic vasculopathy (H)       cloNIDine 0.2 MG tablet    CATAPRES    180 tablet    Take 1 tablet (0.2 mg) by mouth 2 times daily    Essential hypertension,  benign       levofloxacin 750 MG tablet    LEVAQUIN    2 tablet    Take one tablet 2 hours prior to procedure    History of total left knee replacement       levothyroxine 50 MCG tablet    SYNTHROID    90 tablet    Take 1 tablet (50 mcg) by mouth daily    Hypothyroidism, unspecified type       losartan 50 MG tablet    COZAAR    90 tablet    Take 1 tablet (50 mg) by mouth daily    Essential hypertension, benign       metFORMIN 500 MG 24 hr tablet    GLUCOPHAGE-XR    90 tablet    TAKE ONE TABLET BY MOUTH ONCE DAILY WITH  DINNER    Type 2 diabetes mellitus with diabetic nephropathy, without long-term current use of insulin (H)       nitroGLYcerin 0.4 MG sublingual tablet    NITROSTAT    25 tablet    Place 1 tablet (0.4 mg) under the tongue every 5 minutes as needed for chest pain    Coronary artery disease involving native coronary artery without angina pectoris       * simvastatin 40 MG tablet    ZOCOR    90 tablet    Take 1 tablet (40 mg) by mouth At Bedtime    Hyperlipidemia LDL goal <100       * simvastatin 40 MG tablet    ZOCOR    90 tablet    Take 0.5 tablets (20 mg) by mouth At Bedtime    Hyperlipidemia LDL goal <100       vitamin D 400 UNITS capsule     30 capsule    Take 1 capsule by mouth 2 times daily        * Notice:  This list has 2 medication(s) that are the same as other medications prescribed for you. Read the directions carefully, and ask your doctor or other care provider to review them with you.

## 2017-12-01 ENCOUNTER — TELEPHONE (OUTPATIENT)
Dept: FAMILY MEDICINE | Facility: OTHER | Age: 77
End: 2017-12-01

## 2017-12-01 DIAGNOSIS — S29.8XXA BLUNT TRAUMA OF RIB, INITIAL ENCOUNTER: Primary | ICD-10-CM

## 2017-12-02 ENCOUNTER — HOSPITAL ENCOUNTER (OUTPATIENT)
Dept: GENERAL RADIOLOGY | Facility: CLINIC | Age: 77
Discharge: HOME | End: 2017-12-02
Attending: FAMILY MEDICINE | Admitting: FAMILY MEDICINE
Payer: COMMERCIAL

## 2017-12-02 DIAGNOSIS — S29.8XXA BLUNT TRAUMA OF RIB, INITIAL ENCOUNTER: ICD-10-CM

## 2017-12-02 PROCEDURE — 71101 X-RAY EXAM UNILAT RIBS/CHEST: CPT | Mod: TC

## 2017-12-02 NOTE — PROGRESS NOTES
Appropriate assistive devices provided during their visit. yes (Yes, No, N/A) NA (list device)    Exam table and/or cart  placed in the lowest position. NA (Yes, No, N/A)    Brakes on tables/carts/wheelchairs used at all times. NA (Yes, No, N/A)    Non slip footwear applied. NA (Yes, No, NA)    Patient was accompanied by staff throughout visit. Yes (Yes, No, N/A)    Equipment safety straps used. NA (Yes, No, N/A)    Assist with toileting. NA (Yes, No, N/A)

## 2017-12-06 ENCOUNTER — OFFICE VISIT (OUTPATIENT)
Dept: FAMILY MEDICINE | Facility: CLINIC | Age: 77
End: 2017-12-06
Payer: COMMERCIAL

## 2017-12-06 VITALS
SYSTOLIC BLOOD PRESSURE: 186 MMHG | BODY MASS INDEX: 32.82 KG/M2 | DIASTOLIC BLOOD PRESSURE: 78 MMHG | WEIGHT: 164.7 LBS | RESPIRATION RATE: 20 BRPM | TEMPERATURE: 97.1 F | HEART RATE: 72 BPM

## 2017-12-06 DIAGNOSIS — M17.0 OSTEOARTHRITIS OF BOTH KNEES, UNSPECIFIED OSTEOARTHRITIS TYPE: Primary | ICD-10-CM

## 2017-12-06 DIAGNOSIS — S20.212D CONTUSION OF LEFT CHEST WALL, SUBSEQUENT ENCOUNTER: ICD-10-CM

## 2017-12-06 DIAGNOSIS — I10 ESSENTIAL HYPERTENSION, BENIGN: ICD-10-CM

## 2017-12-06 PROCEDURE — 99213 OFFICE O/P EST LOW 20 MIN: CPT | Mod: 25 | Performed by: FAMILY MEDICINE

## 2017-12-06 PROCEDURE — 20610 DRAIN/INJ JOINT/BURSA W/O US: CPT | Performed by: FAMILY MEDICINE

## 2017-12-06 RX ORDER — LOSARTAN POTASSIUM 50 MG/1
50 TABLET ORAL
Qty: 90 TABLET | Refills: 3 | COMMUNITY
Start: 2017-12-06 | End: 2018-01-12

## 2017-12-06 ASSESSMENT — PAIN SCALES - GENERAL: PAINLEVEL: SEVERE PAIN (6)

## 2017-12-06 NOTE — MR AVS SNAPSHOT
After Visit Summary   12/6/2017    Sandra Petit    MRN: [id number]           Patient Information     Date Of Birth          1940        Visit Information        Provider Department      12/6/2017 4:00 PM Teddy Wray MD [facility]        Today's Diagnoses     Essential hypertension, benign           Follow-ups after your visit        Who to contact     If you have questions or need follow up information about today's clinic visit or your schedule please contact [facility] directly at [phone].  Normal or non-critical lab and imaging results will be communicated to you by [employer], letter or phone within 4 business days after the clinic has received the results. If you do not hear from us within 7 days, please contact the clinic through [employer] or phone. If you have a critical or abnormal lab result, we will notify you by phone as soon as possible.  Submit refill requests through [employer] or call your pharmacy and they will forward the refill request to us. Please allow 3 business days for your refill to be completed.          Additional Information About Your Visit        MyChart Information     [employer] gives you secure access to your electronic health record. If you see a primary care provider, you can also send messages to your care team and make appointments. If you have questions, please call your primary care clinic.  If you do not have a primary care provider, please call [phone] and they will assist you.        Care EveryWhere ID     This is your Care EveryWhere ID. This could be used by other organizations to access your [place] medical records  [id number]        Your Vitals Were     Pulse Temperature Respirations BMI (Body Mass Index)          72 97.1  F (36.2  C) (Temporal) 20 32.82 kg/m2         Blood Pressure from Last 3 Encounters:   12/06/17 186/78   11/29/17 162/78   10/11/17 186/72    Weight from Last 3 Encounters:   12/06/17 164 lb  11.2 oz (74.7 kg)   10/11/17 165 lb 3.2 oz (74.9 kg)   09/28/17 164 lb (74.4 kg)              Today, you had the following     No orders found for display         Today's Medication Changes          These changes are accurate as of: 12/6/17  5:35 PM.  If you have any questions, ask your nurse or doctor.               These medicines have changed or have updated prescriptions.        Dose/Directions    COZAAR 50 MG tablet   This may have changed:  when to take this   Used for:  Essential hypertension, benign   Generic drug:  losartan   Changed by:  Teddy Wray MD        Dose:  50 mg   Take 1 tablet (50 mg) by mouth 2 times daily   Quantity:  90 tablet   Refills:  3                Primary Care Provider Office Phone # Fax #    Geovany Landry [phone]       [facility] [address] [place] [address]        Equal Access to Services     [facility] AH: Hadii chris toribio hadasho Soomaali, waaxda luqadaha, qaybta kaalmada adeegyada, waxay negrito haydelmer mcneil . So [facility] [phone].    ATENCIÓN: Si habla español, tiene a lacy disposición servicios gratuitos de asistencia lingüística. Llame al [phone].    We comply with applicable federal civil rights laws and [place] laws. We do not discriminate on the basis of race, color, national origin, age, disability, sex, sexual orientation, or gender identity.            Thank you!     Thank you for choosing [facility]  for your care. Our goal is always to provide you with excellent care. Hearing back from our patients is one way we can continue to improve our services. Please take a few minutes to complete the written survey that you may receive in the mail after your visit with us. Thank you!             Your Updated Medication List - Protect others around you: Learn how to safely use, store and throw away your medicines at www.disposemymeds.org.          This list is accurate as of: 12/6/17  5:35  PM.  Always use your most recent med list.                   Brand Name Dispense Instructions for use Diagnosis    ACIDOPHILUS PROBIOTIC BLEND Caps      Take 1 capsule by mouth 2 times daily        aspirin 81 MG tablet      Take 81 mg by mouth daily        blood glucose monitoring lancets     1 each    Use to test blood sugars 1 times daily or as directed.    Diabetic vasculopathy (H)       blood glucose monitoring test strip    ONETOUCH VERIO IQ    50 each    Use to test blood sugars 1 times daily or as directed.    Diabetic vasculopathy (H)       cloNIDine 0.2 MG tablet    CATAPRES    180 tablet    Take 1 tablet (0.2 mg) by mouth 2 times daily    Essential hypertension, benign       COZAAR 50 MG tablet   Generic drug:  losartan     90 tablet    Take 1 tablet (50 mg) by mouth 2 times daily    Essential hypertension, benign       levofloxacin 750 MG tablet    LEVAQUIN    2 tablet    Take one tablet 2 hours prior to procedure    History of total left knee replacement       levothyroxine 50 MCG tablet    SYNTHROID    90 tablet    Take 1 tablet (50 mcg) by mouth daily    Hypothyroidism, unspecified type       metFORMIN 500 MG 24 hr tablet    GLUCOPHAGE-XR    90 tablet    TAKE ONE TABLET BY MOUTH ONCE DAILY WITH  DINNER    Type 2 diabetes mellitus with diabetic nephropathy, without long-term current use of insulin (H)       nitroGLYcerin 0.4 MG sublingual tablet    NITROSTAT    25 tablet    Place 1 tablet (0.4 mg) under the tongue every 5 minutes as needed for chest pain    Coronary artery disease involving native coronary artery without angina pectoris       * simvastatin 40 MG tablet    ZOCOR    90 tablet    Take 1 tablet (40 mg) by mouth At Bedtime    Hyperlipidemia LDL goal <100       * simvastatin 40 MG tablet    ZOCOR    90 tablet    Take 0.5 tablets (20 mg) by mouth At Bedtime    Hyperlipidemia LDL goal <100       vitamin D 400 UNITS capsule     30 capsule    Take 1 capsule by mouth 2 times daily        * Notice:   This list has 2 medication(s) that are the same as other medications prescribed for you. Read the directions carefully, and ask your doctor or other care provider to review them with you.

## 2017-12-06 NOTE — PROGRESS NOTES
SUBJECTIVE:   Sandra Petit is a 77 year old female who presents to clinic today for the following health issues:  Chief Complaint   Patient presents with     Knee Pain     Knee pain:she had a twisting type injury recently. It is better, did have somes welling earlier, but this seems to have resolved now. She has had previous knee issues, possible due to a less than ideal result of  Her left TKA.  EXAM:   /78 (BP Location: Left arm, Patient Position: Chair, Cuff Size: Adult Regular)  Pulse 72  Temp 97.1  F (36.2  C) (Temporal)  Resp 20  Wt 164 lb 11.2 oz (74.7 kg)  BMI 32.82 kg/m2     Rep bp 160/ 82    Knee exam shows no effusion or redness. rom near nl. Sl tenderness medially. Joint is stable.  IMP: DJD rt knee  PL:    discussed options. She wishes to try a steroid injection.  Thus:   Rightknee injected with 3 cc xylocaine and 40 mg kenalog through an anterio/medial approach under sterile conditions. Slightly difficult due to narrow joint space. Patient warned of possible complications and instructed to call back if any problems or questions.        ADD: her left upper chest is still painful p a fall one week ago.   cxr and rib detail was nl earlier  Lung and heart exam now is nl  IMP: chest contusion  PL: continue symtomatic treatment    HTN: bp remains elevated, but her home readings are satisfactory, will continue same regimen   unfortunately her spousal relationship is causing her much stress and she may need to divorce and he may need to go to a 's facility as he wd not be able to care for himself.  I do not perceive she is in any danger.    dbue

## 2017-12-07 RX ORDER — TRIAMCINOLONE ACETONIDE 40 MG/ML
40 INJECTION, SUSPENSION INTRA-ARTICULAR; INTRAMUSCULAR ONCE
Qty: 1 ML | Refills: 0 | OUTPATIENT
Start: 2017-12-07 | End: 2017-12-07

## 2017-12-20 ENCOUNTER — HOSPITAL ENCOUNTER (OUTPATIENT)
Dept: CT IMAGING | Facility: CLINIC | Age: 77
Discharge: HOME | End: 2017-12-20
Attending: FAMILY MEDICINE | Admitting: FAMILY MEDICINE
Payer: MEDICARE

## 2017-12-20 ENCOUNTER — TELEPHONE (OUTPATIENT)
Dept: FAMILY MEDICINE | Facility: OTHER | Age: 77
End: 2017-12-20

## 2017-12-20 ENCOUNTER — TELEPHONE (OUTPATIENT)
Dept: FAMILY MEDICINE | Facility: CLINIC | Age: 77
End: 2017-12-20

## 2017-12-20 DIAGNOSIS — R51.9 HEADACHE: ICD-10-CM

## 2017-12-20 DIAGNOSIS — R42 DIZZINESS: Primary | ICD-10-CM

## 2017-12-20 DIAGNOSIS — R42 DIZZINESS: ICD-10-CM
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
## 2017-12-20 DIAGNOSIS — E78.5 HYPERLIPIDEMIA LDL GOAL <100: ICD-10-CM

## 2017-12-20 LAB
CHOLEST SERPL-MCNC: 147 MG/DL
HDLC SERPL-MCNC: 61 MG/DL
LDLC SERPL CALC-MCNC: 65 MG/DL
NONHDLC SERPL-MCNC: 86 MG/DL
TRIGL SERPL-MCNC: 103 MG/DL

## 2017-12-20 PROCEDURE — 80061 LIPID PANEL: CPT | Performed by: FAMILY MEDICINE

## 2017-12-20 PROCEDURE — 36415 COLL VENOUS BLD VENIPUNCTURE: CPT | Performed by: FAMILY MEDICINE

## 2017-12-20 PROCEDURE — 70450 CT HEAD/BRAIN W/O DYE: CPT

## 2017-12-20 NOTE — PROGRESS NOTES
Appropriate assistive devices provided during their visit. y  (Yes, No, N/A) cane (list device)    Exam table and/or cart  placed in the lowest position. y (Yes, No, N/A)    Brakes on tables/carts/wheelchairs used at all times. na (Yes, No, N/A)    Non slip footwear applied. y (Yes, No, NA)    Patient was accompanied by staff throughout visit. y (Yes, No, N/A)    Equipment safety straps used. na (Yes, No, N/A)    Assist with toileting. na (Yes, No, N/A)

## 2017-12-20 NOTE — TELEPHONE ENCOUNTER
Patient returned call. Was unable to get a hold of Joyce but transferred the patient to radiology to schedule a CT.     Thank you  Toño Raymond  Patient Representative

## 2017-12-20 NOTE — TELEPHONE ENCOUNTER
----- Message from Lisa Tom CMA sent at 12/20/2017 11:07 AM CST -----      ----- Message -----     From: Teddy Wray MD     Sent: 12/20/2017  11:05 AM       To: Jose sprague schedule head CT w/o contrast  Dx HA and dizziness after a fall  [facility] dbbb

## 2017-12-20 NOTE — TELEPHONE ENCOUNTER
Called with nl /satisfactory lab results; linus chol in 6 months  Fall: she may have had a concussion   this happened a couple of weeks ago, still has some dizziness and HA  will get head CT

## 2018-01-12 DIAGNOSIS — I10 ESSENTIAL HYPERTENSION, BENIGN: ICD-10-CM

## 2018-01-12 RX ORDER — LOSARTAN POTASSIUM 50 MG/1
50 TABLET ORAL
Qty: 180 TABLET | Refills: 1 | Status: SHIPPED | OUTPATIENT
Start: 2018-01-12 | End: 2018-08-22

## 2018-01-12 NOTE — TELEPHONE ENCOUNTER
Reason for Call:  Medication or medication refill:    Do you use a [facility]?  Name of the pharmacy and phone number for the current request:  [facility] - [phone]    Name of the medication requested: Losartan    Other request: Pt states Dr. Wray has upped this med to 2. She needs a new script sent to pharmacy stating to take 2 a day with 90 day supply. Please call and advise.     Can we leave a detailed message on this number? YES    Phone number patient can be reached at: Home number on file [phone] (home)    Best Time: anytime    Call taken on 1/12/2018 at 9:20 AM by Janna Hanson

## 2018-03-07 ENCOUNTER — TELEPHONE (OUTPATIENT)
Dept: FAMILY MEDICINE | Facility: CLINIC | Age: 78
End: 2018-03-07

## 2018-03-07 DIAGNOSIS — M25.562 LEFT KNEE PAIN: Primary | ICD-10-CM

## 2018-03-07 NOTE — TELEPHONE ENCOUNTER
Reason for Call:  Other FYI    Detailed comments: she was supposed to let you know what the orthopedic place in [place] used when they last injected her knee.  It was 16 ml Synvisc.  She states it was supposed to be a series of three but they didn't tell her that and she wasn't called.  However, the one injection did last a long time.      Phone Number Patient can be reached at: Home number on file [phone] (home)    Best Time: any    Can we leave a detailed message on this number? YES    Call taken on 3/7/2018 at 12:17 PM by Jas Lemons

## 2018-03-12 DIAGNOSIS — N18.30 CHRONIC KIDNEY DISEASE, STAGE III (MODERATE) (H): Primary | ICD-10-CM

## 2018-03-12 LAB
ALBUMIN SERPL-MCNC: 3.4 G/DL (ref 3.4–5)
ANION GAP SERPL CALCULATED.3IONS-SCNC: 7 MMOL/L (ref 3–14)
BUN SERPL-MCNC: 29 MG/DL (ref 7–30)
CALCIUM SERPL-MCNC: 9.2 MG/DL (ref 8.5–10.1)
CHLORIDE SERPL-SCNC: 106 MMOL/L (ref 94–109)
CO2 SERPL-SCNC: 29 MMOL/L (ref 20–32)
CREAT SERPL-MCNC: 1.11 MG/DL (ref 0.52–1.04)
CREAT UR-MCNC: 161 MG/DL
GFR SERPL CREATININE-BSD FRML MDRD: 48 ML/MIN/1.7M2
HGB BLD-MCNC: 13 G/DL (ref 11.7–15.7)
MICROALBUMIN UR-MCNC: 42 MG/L
MICROALBUMIN/CREAT UR: 26.34 MG/G CR (ref 0–25)
PHOSPHATE SERPL-MCNC: 3.2 MG/DL (ref 2.5–4.5)
POTASSIUM SERPL-SCNC: 4.1 MMOL/L (ref 3.4–5.3)
PROT UR-MCNC: 0.2 G/L
PROT/CREAT 24H UR: 0.12 G/G CR (ref 0–0.2)
PTH-INTACT SERPL-MCNC: 26 PG/ML (ref 18–80)
SODIUM SERPL-SCNC: 142 MMOL/L (ref 133–144)

## 2018-03-12 PROCEDURE — 83970 ASSAY OF PARATHORMONE: CPT | Performed by: INTERNAL MEDICINE

## 2018-03-12 PROCEDURE — 82310 ASSAY OF CALCIUM: CPT | Performed by: INTERNAL MEDICINE

## 2018-03-12 PROCEDURE — 84100 ASSAY OF PHOSPHORUS: CPT | Performed by: INTERNAL MEDICINE

## 2018-03-12 PROCEDURE — 82043 UR ALBUMIN QUANTITATIVE: CPT | Performed by: INTERNAL MEDICINE

## 2018-03-12 PROCEDURE — 84156 ASSAY OF PROTEIN URINE: CPT | Performed by: INTERNAL MEDICINE

## 2018-03-12 PROCEDURE — 36415 COLL VENOUS BLD VENIPUNCTURE: CPT | Performed by: INTERNAL MEDICINE

## 2018-03-12 PROCEDURE — 82040 ASSAY OF SERUM ALBUMIN: CPT | Performed by: INTERNAL MEDICINE

## 2018-03-12 PROCEDURE — 82565 ASSAY OF CREATININE: CPT | Performed by: INTERNAL MEDICINE

## 2018-03-12 PROCEDURE — 80051 ELECTROLYTE PANEL: CPT | Performed by: INTERNAL MEDICINE

## 2018-03-12 PROCEDURE — 84520 ASSAY OF UREA NITROGEN: CPT | Performed by: INTERNAL MEDICINE

## 2018-03-12 PROCEDURE — 85018 HEMOGLOBIN: CPT | Performed by: INTERNAL MEDICINE

## 2018-03-14 ENCOUNTER — OFFICE VISIT (OUTPATIENT)
Dept: ORTHOPEDICS | Facility: CLINIC | Age: 78
End: 2018-03-14
Payer: MEDICARE

## 2018-03-14 ENCOUNTER — RADIANT APPOINTMENT (OUTPATIENT)
Dept: GENERAL RADIOLOGY | Facility: CLINIC | Age: 78
End: 2018-03-14
Attending: ORTHOPAEDIC SURGERY
Payer: COMMERCIAL

## 2018-03-14 VITALS — HEIGHT: 61 IN | BODY MASS INDEX: 31.72 KG/M2 | TEMPERATURE: 97.8 F | WEIGHT: 168 LBS

## 2018-03-14 DIAGNOSIS — M25.562 CHRONIC PAIN OF LEFT KNEE: ICD-10-CM

## 2018-03-14 DIAGNOSIS — Z96.652 S/P TOTAL KNEE ARTHROPLASTY, LEFT: ICD-10-CM

## 2018-03-14 DIAGNOSIS — G89.29 CHRONIC PAIN OF LEFT KNEE: ICD-10-CM

## 2018-03-14 DIAGNOSIS — M17.11 PRIMARY OSTEOARTHRITIS OF RIGHT KNEE: Primary | ICD-10-CM

## 2018-03-14 PROCEDURE — 99203 OFFICE O/P NEW LOW 30 MIN: CPT | Mod: 25 | Performed by: ORTHOPAEDIC SURGERY

## 2018-03-14 PROCEDURE — 20610 DRAIN/INJ JOINT/BURSA W/O US: CPT | Mod: RT | Performed by: ORTHOPAEDIC SURGERY

## 2018-03-14 PROCEDURE — 73564 X-RAY EXAM KNEE 4 OR MORE: CPT | Mod: TC

## 2018-03-14 ASSESSMENT — PAIN SCALES - GENERAL: PAINLEVEL: WORST PAIN (10)

## 2018-03-14 NOTE — MR AVS SNAPSHOT
"              After Visit Summary   3/14/2018    Sandra Petit    MRN: [id number]           Patient Information     Date Of Birth          1940        Visit Information        Provider Department      3/14/2018 9:40 AM Johnny Anaya MD [facility]        Today's Diagnoses     Primary osteoarthritis of right knee    -  1    Chronic pain of left knee        S/P total knee arthroplasty, left           Follow-ups after your visit        Who to contact     If you have questions or need follow up information about today's clinic visit or your schedule please contact [facility] directly at [phone].  Normal or non-critical lab and imaging results will be communicated to you by Jare[employer]art, letter or phone within 4 business days after the clinic has received the results. If you do not hear from us within 7 days, please contact the clinic through [employer] or phone. If you have a critical or abnormal lab result, we will notify you by phone as soon as possible.  Submit refill requests through [employer] or call your pharmacy and they will forward the refill request to us. Please allow 3 business days for your refill to be completed.          Additional Information About Your Visit        MyChart Information     [employer] gives you secure access to your electronic health record. If you see a primary care provider, you can also send messages to your care team and make appointments. If you have questions, please call your primary care clinic.  If you do not have a primary care provider, please call [phone] and they will assist you.        Care EveryWhere ID     This is your Care EveryWhere ID. This could be used by other organizations to access your [place] medical records  [id number]        Your Vitals Were     Temperature Height BMI (Body Mass Index)             97.8  F (36.6  C) (Temporal) 1.537 m (5' 0.5\") 32.27 kg/m2          Blood Pressure from Last 3 Encounters:   12/06/17 186/78 "   11/29/17 162/78   10/11/17 186/72    Weight from Last 3 Encounters:   03/14/18 76.2 kg (168 lb)   12/06/17 74.7 kg (164 lb 11.2 oz)   10/11/17 74.9 kg (165 lb 3.2 oz)              We Performed the Following     HC ARTHROCENTESIS ASPIR&/INJ MAJOR JT/BURSA W/US        Primary Care Provider Office Phone # Fax #    Geovany Landry [phone]       [facility] [address] [place] [address]        Equal Access to Services     [facility]: Hadii aad ku hadasho Soomaali, waaxda luqadaha, qaybta kaalmada adeegyada, waxphani mahan haydarrenn kevin mcneil . So [facility] [phone].    ATENCIÓN: Si habla español, tiene a lacy disposición servicios gratuitos de asistencia lingüística. Llame al [phone].    We comply with applicable federal civil rights laws and [place] laws. We do not discriminate on the basis of race, color, national origin, age, disability, sex, sexual orientation, or gender identity.            Thank you!     Thank you for choosing [facility]  for your care. Our goal is always to provide you with excellent care. Hearing back from our patients is one way we can continue to improve our services. Please take a few minutes to complete the written survey that you may receive in the mail after your visit with us. Thank you!             Your Updated Medication List - Protect others around you: Learn how to safely use, store and throw away your medicines at www.disposemymeds.org.          This list is accurate as of 3/14/18 11:35 AM.  Always use your most recent med list.                   Brand Name Dispense Instructions for use Diagnosis    ACIDOPHILUS PROBIOTIC BLEND Caps      Take 1 capsule by mouth 2 times daily        aspirin 81 MG tablet      Take 81 mg by mouth daily        blood glucose monitoring lancets     1 each    Use to test blood sugars 1 times daily or as directed.    Diabetic vasculopathy (H)       blood glucose monitoring test  strip    ONETOUCH VERIO IQ    50 each    Use to test blood sugars 1 times daily or as directed.    Diabetic vasculopathy (H)       cloNIDine 0.2 MG tablet    CATAPRES    180 tablet    Take 1 tablet (0.2 mg) by mouth 2 times daily    Essential hypertension, benign       levofloxacin 750 MG tablet    LEVAQUIN    2 tablet    Take one tablet 2 hours prior to procedure    History of total left knee replacement       levothyroxine 50 MCG tablet    SYNTHROID    90 tablet    Take 1 tablet (50 mcg) by mouth daily    Hypothyroidism, unspecified type       losartan 50 MG tablet    COZAAR    180 tablet    Take 1 tablet (50 mg) by mouth 2 times daily    Essential hypertension, benign       metFORMIN 500 MG 24 hr tablet    GLUCOPHAGE-XR    90 tablet    TAKE ONE TABLET BY MOUTH ONCE DAILY WITH  DINNER    Type 2 diabetes mellitus with diabetic nephropathy, without long-term current use of insulin (H)       nitroGLYcerin 0.4 MG sublingual tablet    NITROSTAT    25 tablet    Place 1 tablet (0.4 mg) under the tongue every 5 minutes as needed for chest pain    Coronary artery disease involving native coronary artery without angina pectoris       * simvastatin 40 MG tablet    ZOCOR    90 tablet    Take 1 tablet (40 mg) by mouth At Bedtime    Hyperlipidemia LDL goal <100       * simvastatin 40 MG tablet    ZOCOR    90 tablet    Take 0.5 tablets (20 mg) by mouth At Bedtime    Hyperlipidemia LDL goal <100       vitamin D 400 UNITS capsule     30 capsule    Take 1 capsule by mouth 2 times daily        * Notice:  This list has 2 medication(s) that are the same as other medications prescribed for you. Read the directions carefully, and ask your doctor or other care provider to review them with you.

## 2018-03-14 NOTE — PROGRESS NOTES
Appropriate assistive devices provided during their visit. yes (Yes, No, N/A) cane (list device)    Exam table and/or cart  placed in the lowest position. yes (Yes, No, N/A)    Brakes on tables/carts/wheelchairs used at all times. yes (Yes, No, N/A)    Non slip footwear applied. na (Yes, No, NA)    Patient was accompanied by staff throughout visit. yes (Yes, No, N/A)    Equipment safety straps used. na (Yes, No, N/A)    Assist with toileting. na (Yes, No, N/A)

## 2018-03-14 NOTE — LETTER
3/14/2018         RE: Sandra Petit  [id number] [facility] [id number]        Dear Colleague,    Thank you for referring your patient, Sandra Petit, to the [facility]. Please see a copy of my visit note below.    ORTHOPEDIC CLINIC CONSULT      Sandra Petit is a 77 year old female who is seen in consultation at the request of Self.  History of Present illness:  Sandra presents for evaluation of:   1.) Right knee pain and chronic left knee pain s/p left knee replacement.    Onset:  ongoing for a long time    Symptoms brought on by constant pain, nothing helps with pain.      Character:  Burning-left knee pinching.    Progression of symptoms:  worse.    Previous similar pain: YES.   Pain Level:  10/10.   Previous treatments:  HX of knee left knee replacement 2013-Dr. Grimes.  Patient has been told by Dr. harrison that to readdress or revise the left knee would not be feasible.  Patient states a [employer] holistic medicine that has fish oil she is taking has been helpful for her left knee pain.  Patient reports her range of motion improved with taking this medication.  Patient also had previous cortisone and right knee.  Right knee patient had a Synvisc injection done by Dr. harrison 2-3 years ago which did last about 18 months.  She was on the understanding that she was supposed to be getting a three-part Synvisc series and had only obtained 1 injection due to difficulty getting another appointment and eventual alf of the physician.  This was very effective however for her right knee.  Currently on Blood thinners? Yes, ASA 81 mg  Diagnosis of Diabetes? Yes, Type 2    Patient's past medical, surgical, social and family histories reviewed.       Past Medical History:   Diagnosis Date     Diabetic eye exam (H) 08/05/13     Endometriosis, site unspecified      Fever and other physiologic disturbances of temperature regulation 07/07/2005    Admit.  Discharged 07/13/2005.  Cause undetermined.      Myalgia and myositis, unspecified     fibromyalgia     Pure hypercholesterolemia      Unspecified essential hypertension      Unspecified hypothyroidism        Past Surgical History:   Procedure Laterality Date     ARTHROPLASTY KNEE  4/29/2013    Procedure: ARTHROPLASTY KNEE;  left total knee arthroplasty;  Surgeon: Teddy Grimes MD;  [facility]     C APPENDECTOMY       C NONSPECIFIC PROCEDURE      Knee ligament surgery     C NONSPECIFIC PROCEDURE      Kidney surgery for mechanical obstruction     C OPEN CORONARY ENDARTERECTOMY  june 2005    Angioplasty w stenting     C TOTAL KNEE ARTHROPLASTY  4/29/13    Left     COMBINED CYSTOSCOPY, INSERT CATHETER URETER Bilateral 8/10/2015    Procedure: COMBINED CYSTOSCOPY, INSERT CATHETER URETER;  Surgeon: Johnnie Madera MD;  [facility]     DILATION AND CURETTAGE, HYSTEROSCOPY DIAGNOSTIC, COMBINED N/A 11/6/2014    Procedure: COMBINED DILATION AND CURETTAGE, HYSTEROSCOPY DIAGNOSTIC;  Surgeon: Edward Pardo MD;  [facility]     ESOPHAGOSCOPY, GASTROSCOPY, DUODENOSCOPY (EGD), COMBINED N/A 1/27/2015    Procedure: COMBINED ESOPHAGOSCOPY, GASTROSCOPY, DUODENOSCOPY (EGD), BIOPSY SINGLE OR MULTIPLE;  Surgeon: Carmelo Rosario MD;  [facility]     HC REMOVAL GALLBLADDER      Cholecystectomy     HC REMOVE TONSILS/ADENOIDS,<11 Y/O      unsure of age     LAPAROSCOPIC HYSTERECTOMY TOTAL N/A 8/10/2015    Procedure: LAPAROSCOPIC HYSTERECTOMY TOTAL;  Surgeon: Edward Pardo MD;  [facility]     LAPAROSCOPIC LYSIS ADHESIONS N/A 8/10/2015    Procedure: LAPAROSCOPIC LYSIS ADHESIONS;  Surgeon: Edward Pardo MD;  [facility]       Home Medications:  Prior to Admission medications    Medication Sig Start Date End Date Taking? Authorizing Provider   losartan (COZAAR) 50 MG tablet Take 1 tablet (50 mg) by mouth 2 times daily 1/12/18  Yes Geovany Landry MD   simvastatin (ZOCOR) 40 MG tablet Take 0.5 tablets (20  "mg) by mouth At Bedtime 11/2/17  Yes Teddy Wray MD   blood glucose monitoring (ONE TOUCH DELICA) lancets Use to test blood sugars 1 times daily or as directed. 10/24/17  Yes Geovany Landry MD   blood glucose monitoring (ONE TOUCH VERIO IQ) test strip Use to test blood sugars 1 times daily or as directed. 10/24/17  Yes Geovany Landry MD   simvastatin (ZOCOR) 40 MG tablet Take 1 tablet (40 mg) by mouth At Bedtime 10/11/17  Yes Teddy Wray MD   metFORMIN (GLUCOPHAGE-XR) 500 MG 24 hr tablet TAKE ONE TABLET BY MOUTH ONCE DAILY WITH  DINNER 10/11/17  Yes Teddy Wray MD   levothyroxine (SYNTHROID) 50 MCG tablet Take 1 tablet (50 mcg) by mouth daily 10/11/17  Yes Teddy Wray MD   cloNIDine (CATAPRES) 0.2 MG tablet Take 1 tablet (0.2 mg) by mouth 2 times daily 10/11/17  Yes Teddy Wray MD   levofloxacin (LEVAQUIN) 750 MG tablet Take one tablet 2 hours prior to procedure 2/7/17  Yes Geovany Landry MD   aspirin 81 MG tablet Take 81 mg by mouth daily   Yes Reported, Patient   Probiotic Product (ACIDOPHILUS PROBIOTIC BLEND) CAPS Take 1 capsule by mouth 2 times daily 7/27/15  Yes Teddy Wray MD   Cholecalciferol (VITAMIN D) 400 UNITS capsule Take 1 capsule by mouth 2 times daily 10/6/14  Yes Teddy Wray MD   nitroglycerin (NITROSTAT) 0.4 MG SL tablet Place 1 tablet (0.4 mg) under the tongue every 5 minutes as needed for chest pain  Patient not taking: Reported on 3/14/2018 10/2/15   Teddy Wray MD       Allergies   Allergen Reactions     Penicillins      \"throat started swelling\"     Vitamin B Complex Hives     Vitamin B12 Hives     all vitamin B's     Clindamycin Hcl Rash       Social History     Occupational History     Not on file.     Social History Main Topics     Smoking status: Former Smoker     Smokeless tobacco: Never Used      Comment: quit  1987     Alcohol use No     Drug use: No     Sexual activity: Not Currently       Family History   Problem Relation Age of Onset " "    HEART DISEASE Mother       coronary     HEART DISEASE Father       coronary     Allergies Sister      unknown     Allergies Brother      unknown     Allergies Daughter      unknown     Allergies Son      unknown     HEART DISEASE Sister      by pass surg     HEART DISEASE Brother      on medication     Hypertension Sister      Hypertension Brother      Lipids Sister      Lipids Brother      CEREBROVASCULAR DISEASE Brother      Obesity Sister      DIABETES Sister      DIABETES Sister      C.A.D. Brother      quad by pass     Neurologic Disorder Sister      parkinsons     CANCER Sister      skin cancer     Alcohol/Drug No family hx of      Alzheimer Disease No family hx of        REVIEW OF SYSTEMS    General: Negative for fever or fatigue    Psych:  negative anxiety or depression     Ophthalmic:  Corrective lenses?  No    ENT:  Hearing difficulty? No    CV: negative for chest pain, venous insufficiency, no history of MI or stroke    Endocrine:  negative diabetes     Urology:  negative kidney disease    Resp:  Normal respiratory effort, no history of pulmonary disease or asthma    Skin: negative for cuts/sores or redness    Musculoskeletal: as above    Neurologic:negative for numbness/tingling    Hematologic: negative for bleeding disorder, does not use of prescription anticoagulants     Physical Exam:    Vitals: Temp 97.8  F (36.6  C) (Temporal)  Ht 1.537 m (5' 0.5\")  Wt 76.2 kg (168 lb)  BMI 32.27 kg/m2  BMI= Body mass index is 32.27 kg/(m^2).    GENERAL APPEARANCE: Healthy, alert, no distress    SKIN:  negative for suspicious lesions or rashes    NEURO: Normal strength and tone, mentation intact and speech normal    PSYCH:   Mentation appears Normal and affect normal/bright    RESPIRATORY: negative for respiratory distress.    EYES: negative for Conjunctivitis    Cardiovascular: no dependent Edema      GAIT: antalgic    JOINT/EXTREMITIES:  Right knee: Patient with significant pain and tenderness at " the medial joint line of the right knee.  She also has some tenderness at the inferior lateral pole of the patella but to a lesser degree.  Left knee: Parapatellar tenderness  Patient with full extension of bilateral knees, left knee with full extension and flexion to approximately 110 .  Right knee with full extension and flexion to 120     Radiographs: X-ray images reveal previous left knee total knee arthroplasty.  The patella does appear tight within the femoral groove.  Right knee with significant medial compartment narrowing.  Patient also with lateral compression at the patellofemoral compartment is near bone-on-bone independent visualization of the films was made.       Impression:      ICD-10-CM    1. Primary osteoarthritis of right knee M17.11 HC ARTHROCENTESIS ASPIR&/INJ MAJOR JT/BURSA W/US   2. Chronic pain of left knee M25.562 CANCELED: XR Knee Left G/E 4 Views    G89.29    3. S/P total knee arthroplasty, left Z96.652      Patient with continued pain of left knee status post total knee arthroplasty.    Patient also with right knee osteoarthritis of the significant degree.    Plan:  Patient is seeking relief for right knee at today's visit.  Synvisc 1 versus Synvisc 3 options were explained.  Patient had good results with one dose of Synvisc in her past and therefore Synvisc 1 is recommended.  Patient is agreeable.  Patient advised it may take 4-6 weeks for medication to take effect.  If injection is not effective may need to consider total joint replacement    After risks and benefits were explained and questions answered, the patient agreed to undergo the recommended procedure .     Using aseptic technique the skin was prepped with betadine swabs, then sprayed with ethyl chloride for topical anesthesia.  The right  Knee  lateral infrapatellar: joint space was then infiltrated with 5cc of 1% Lidocaine without epinephrine and 6ml of Synvisc One.  There were no complications and the patient tolerated the  procedure well.    Patient is evaluated with and plan developed in conjunction with Dr. Anaya    Scribed by  Rosanne Perdomo PA-C   3/14/2018  10:23 AM        I attest I have seen and evaluated the patient.  I agree with above impression and plan.    Johnny Anaya MD          Again, thank you for allowing me to participate in the care of your patient.        Sincerely,        Johnny Anaya MD

## 2018-03-14 NOTE — PROGRESS NOTES
ORTHOPEDIC CLINIC CONSULT      Sandra Petit is a 77 year old female who is seen in consultation at the request of Self.  History of Present illness:  Sandra presents for evaluation of:   1.) Right knee pain and chronic left knee pain s/p left knee replacement.    Onset:  ongoing for a long time    Symptoms brought on by constant pain, nothing helps with pain.      Character:  Burning-left knee pinching.    Progression of symptoms:  worse.    Previous similar pain: YES.   Pain Level:  10/10.   Previous treatments:  HX of knee left knee replacement 2013-Dr. Grimes.  Patient has been told by Dr. harrison that to readdress or revise the left knee would not be feasible.  Patient states a [employer] holistic medicine that has fish oil she is taking has been helpful for her left knee pain.  Patient reports her range of motion improved with taking this medication.  Patient also had previous cortisone and right knee.  Right knee patient had a Synvisc injection done by Dr. harrison 2-3 years ago which did last about 18 months.  She was on the understanding that she was supposed to be getting a three-part Synvisc series and had only obtained 1 injection due to difficulty getting another appointment and eventual FDC of the physician.  This was very effective however for her right knee.  Currently on Blood thinners? Yes, ASA 81 mg  Diagnosis of Diabetes? Yes, Type 2    Patient's past medical, surgical, social and family histories reviewed.       Past Medical History:   Diagnosis Date     Diabetic eye exam (H) 08/05/13     Endometriosis, site unspecified      Fever and other physiologic disturbances of temperature regulation 07/07/2005    Admit.  Discharged 07/13/2005.  Cause undetermined.     Myalgia and myositis, unspecified     fibromyalgia     Pure hypercholesterolemia      Unspecified essential hypertension      Unspecified hypothyroidism        Past Surgical History:   Procedure Laterality Date     ARTHROPLASTY KNEE   4/29/2013    Procedure: ARTHROPLASTY KNEE;  left total knee arthroplasty;  Surgeon: Teddy Grimes MD;  [facility]     C APPENDECTOMY       C NONSPECIFIC PROCEDURE      Knee ligament surgery     C NONSPECIFIC PROCEDURE      Kidney surgery for mechanical obstruction     C OPEN CORONARY ENDARTERECTOMY  june 2005    Angioplasty w stenting     C TOTAL KNEE ARTHROPLASTY  4/29/13    Left     COMBINED CYSTOSCOPY, INSERT CATHETER URETER Bilateral 8/10/2015    Procedure: COMBINED CYSTOSCOPY, INSERT CATHETER URETER;  Surgeon: Johnnie Madera MD;  [facility]     DILATION AND CURETTAGE, HYSTEROSCOPY DIAGNOSTIC, COMBINED N/A 11/6/2014    Procedure: COMBINED DILATION AND CURETTAGE, HYSTEROSCOPY DIAGNOSTIC;  Surgeon: Edward Pardo MD;  [facility]     ESOPHAGOSCOPY, GASTROSCOPY, DUODENOSCOPY (EGD), COMBINED N/A 1/27/2015    Procedure: COMBINED ESOPHAGOSCOPY, GASTROSCOPY, DUODENOSCOPY (EGD), BIOPSY SINGLE OR MULTIPLE;  Surgeon: Carmelo Rosario MD;  [facility]     HC REMOVAL GALLBLADDER      Cholecystectomy     HC REMOVE TONSILS/ADENOIDS,<11 Y/O      unsure of age     LAPAROSCOPIC HYSTERECTOMY TOTAL N/A 8/10/2015    Procedure: LAPAROSCOPIC HYSTERECTOMY TOTAL;  Surgeon: Edward Pardo MD;  [facility]     LAPAROSCOPIC LYSIS ADHESIONS N/A 8/10/2015    Procedure: LAPAROSCOPIC LYSIS ADHESIONS;  Surgeon: Edward Pardo MD;  [facility]       Home Medications:  Prior to Admission medications    Medication Sig Start Date End Date Taking? Authorizing Provider   losartan (COZAAR) 50 MG tablet Take 1 tablet (50 mg) by mouth 2 times daily 1/12/18  Yes Geovany Landry MD   simvastatin (ZOCOR) 40 MG tablet Take 0.5 tablets (20 mg) by mouth At Bedtime 11/2/17  Yes Teddy Wray MD   blood glucose monitoring (ONE TOUCH DELICA) lancets Use to test blood sugars 1 times daily or as directed. 10/24/17  Yes Geovany Landry MD   blood glucose monitoring (ONE  "TOUCH VERIO IQ) test strip Use to test blood sugars 1 times daily or as directed. 10/24/17  Yes Geovany Landry MD   simvastatin (ZOCOR) 40 MG tablet Take 1 tablet (40 mg) by mouth At Bedtime 10/11/17  Yes Teddy Wray MD   metFORMIN (GLUCOPHAGE-XR) 500 MG 24 hr tablet TAKE ONE TABLET BY MOUTH ONCE DAILY WITH  DINNER 10/11/17  Yes Teddy Wray MD   levothyroxine (SYNTHROID) 50 MCG tablet Take 1 tablet (50 mcg) by mouth daily 10/11/17  Yes Teddy Wray MD   cloNIDine (CATAPRES) 0.2 MG tablet Take 1 tablet (0.2 mg) by mouth 2 times daily 10/11/17  Yes Teddy Wray MD   levofloxacin (LEVAQUIN) 750 MG tablet Take one tablet 2 hours prior to procedure 17  Yes Geovany Landry MD   aspirin 81 MG tablet Take 81 mg by mouth daily   Yes Reported, Patient   Probiotic Product (ACIDOPHILUS PROBIOTIC BLEND) CAPS Take 1 capsule by mouth 2 times daily 7/27/15  Yes Teddy Wray MD   Cholecalciferol (VITAMIN D) 400 UNITS capsule Take 1 capsule by mouth 2 times daily 10/6/14  Yes Teddy Wray MD   nitroglycerin (NITROSTAT) 0.4 MG SL tablet Place 1 tablet (0.4 mg) under the tongue every 5 minutes as needed for chest pain  Patient not taking: Reported on 3/14/2018 10/2/15   Teddy Wray MD       Allergies   Allergen Reactions     Penicillins      \"throat started swelling\"     Vitamin B Complex Hives     Vitamin B12 Hives     all vitamin B's     Clindamycin Hcl Rash       Social History     Occupational History     Not on file.     Social History Main Topics     Smoking status: Former Smoker     Smokeless tobacco: Never Used      Comment: quit       Alcohol use No     Drug use: No     Sexual activity: Not Currently       Family History   Problem Relation Age of Onset     HEART DISEASE Mother       coronary     HEART DISEASE Father       coronary     Allergies Sister      unknown     Allergies Brother      unknown     Allergies Daughter      unknown     Allergies Son      unknown     HEART " "DISEASE Sister      by pass surg     HEART DISEASE Brother      on medication     Hypertension Sister      Hypertension Brother      Lipids Sister      Lipids Brother      CEREBROVASCULAR DISEASE Brother      Obesity Sister      DIABETES Sister      DIABETES Sister      C.A.D. Brother      quad by pass     Neurologic Disorder Sister      parkinsons     CANCER Sister      skin cancer     Alcohol/Drug No family hx of      Alzheimer Disease No family hx of        REVIEW OF SYSTEMS    General: Negative for fever or fatigue    Psych:  negative anxiety or depression     Ophthalmic:  Corrective lenses?  No    ENT:  Hearing difficulty? No    CV: negative for chest pain, venous insufficiency, no history of MI or stroke    Endocrine:  negative diabetes     Urology:  negative kidney disease    Resp:  Normal respiratory effort, no history of pulmonary disease or asthma    Skin: negative for cuts/sores or redness    Musculoskeletal: as above    Neurologic:negative for numbness/tingling    Hematologic: negative for bleeding disorder, does not use of prescription anticoagulants     Physical Exam:    Vitals: Temp 97.8  F (36.6  C) (Temporal)  Ht 1.537 m (5' 0.5\")  Wt 76.2 kg (168 lb)  BMI 32.27 kg/m2  BMI= Body mass index is 32.27 kg/(m^2).    GENERAL APPEARANCE: Healthy, alert, no distress    SKIN:  negative for suspicious lesions or rashes    NEURO: Normal strength and tone, mentation intact and speech normal    PSYCH:   Mentation appears Normal and affect normal/bright    RESPIRATORY: negative for respiratory distress.    EYES: negative for Conjunctivitis    Cardiovascular: no dependent Edema      GAIT: antalgic    JOINT/EXTREMITIES:  Right knee: Patient with significant pain and tenderness at the medial joint line of the right knee.  She also has some tenderness at the inferior lateral pole of the patella but to a lesser degree.  Left knee: Parapatellar tenderness  Patient with full extension of bilateral knees, left knee " with full extension and flexion to approximately 110 .  Right knee with full extension and flexion to 120     Radiographs: X-ray images reveal previous left knee total knee arthroplasty.  The patella does appear tight within the femoral groove.  Right knee with significant medial compartment narrowing.  Patient also with lateral compression at the patellofemoral compartment is near bone-on-bone independent visualization of the films was made.       Impression:      ICD-10-CM    1. Primary osteoarthritis of right knee M17.11 HC ARTHROCENTESIS ASPIR&/INJ MAJOR JT/BURSA W/US   2. Chronic pain of left knee M25.562 CANCELED: XR Knee Left G/E 4 Views    G89.29    3. S/P total knee arthroplasty, left Z96.652      Patient with continued pain of left knee status post total knee arthroplasty.    Patient also with right knee osteoarthritis of the significant degree.    Plan:  Patient is seeking relief for right knee at today's visit.  Synvisc 1 versus Synvisc 3 options were explained.  Patient had good results with one dose of Synvisc in her past and therefore Synvisc 1 is recommended.  Patient is agreeable.  Patient advised it may take 4-6 weeks for medication to take effect.  If injection is not effective may need to consider total joint replacement    After risks and benefits were explained and questions answered, the patient agreed to undergo the recommended procedure .     Using aseptic technique the skin was prepped with betadine swabs, then sprayed with ethyl chloride for topical anesthesia.  The right  Knee  lateral infrapatellar: joint space was then infiltrated with 5cc of 1% Lidocaine without epinephrine and 6ml of Synvisc One.  There were no complications and the patient tolerated the procedure well.    Patient is evaluated with and plan developed in conjunction with Dr. Anaya    Scribed by  Rosanne Perdomo PA-C   3/14/2018  10:23 AM        I attest I have seen and evaluated the patient.  I agree with above impression  and plan.    Johnny Anaya MD

## 2018-03-21 ENCOUNTER — TRANSFERRED RECORDS (OUTPATIENT)
Dept: HEALTH INFORMATION MANAGEMENT | Facility: CLINIC | Age: 78
End: 2018-03-21

## 2018-04-04 ENCOUNTER — TELEPHONE (OUTPATIENT)
Dept: FAMILY MEDICINE | Facility: CLINIC | Age: 78
End: 2018-04-04

## 2018-04-04 DIAGNOSIS — E78.5 HYPERLIPIDEMIA LDL GOAL <100: Primary | ICD-10-CM

## 2018-04-04 RX ORDER — ATORVASTATIN CALCIUM 40 MG/1
TABLET, FILM COATED ORAL
Qty: 90 TABLET | Refills: 3 | Status: SHIPPED | OUTPATIENT
Start: 2018-04-04 | End: 2018-04-10

## 2018-04-04 NOTE — TELEPHONE ENCOUNTER
Reason for Call:  Patient question and blood pressure update    Detailed comments: Patient calling and states she is supposed to have her cholesterol and A1C checked soon but cannot remember if she was supposed to do it in April or May. Also Dr Wray told her to let him know if her blood pressure changes and patient states her blood pressure gets really high around 4pm every day. Last week on 3.27 it was 190/103 and today 4.4 it was 163/72. Please advise if she should change the time she takes her BP medication.     Phone Number Patient can be reached at: Home number on file [phone] (home)    Best Time: any    Can we leave a detailed message on this number? YES    Call taken on 4/4/2018 at 4:40 PM by Fadumo Pereyra

## 2018-04-10 RX ORDER — ATORVASTATIN CALCIUM 40 MG/1
40 TABLET, FILM COATED ORAL DAILY
Qty: 90 TABLET | Refills: 3 | Status: SHIPPED | OUTPATIENT
Start: 2018-04-10 | End: 2018-04-11 | Stop reason: SINTOL

## 2018-04-10 NOTE — TELEPHONE ENCOUNTER
Received a fax that you have to have how many tablets a day the patient is taking in order to fill the Atorvastatin.

## 2018-04-11 ENCOUNTER — OFFICE VISIT (OUTPATIENT)
Dept: FAMILY MEDICINE | Facility: CLINIC | Age: 78
End: 2018-04-11
Payer: COMMERCIAL

## 2018-04-11 VITALS
TEMPERATURE: 96.6 F | DIASTOLIC BLOOD PRESSURE: 58 MMHG | SYSTOLIC BLOOD PRESSURE: 138 MMHG | RESPIRATION RATE: 24 BRPM | HEART RATE: 68 BPM | WEIGHT: 170.6 LBS | BODY MASS INDEX: 32.77 KG/M2

## 2018-04-11 DIAGNOSIS — I10 HYPERTENSION GOAL BP (BLOOD PRESSURE) < 140/90: ICD-10-CM

## 2018-04-11 DIAGNOSIS — E78.5 HYPERLIPIDEMIA LDL GOAL <100: ICD-10-CM

## 2018-04-11 DIAGNOSIS — E11.21 TYPE 2 DIABETES MELLITUS WITH DIABETIC NEPHROPATHY, WITHOUT LONG-TERM CURRENT USE OF INSULIN (H): ICD-10-CM

## 2018-04-11 DIAGNOSIS — Z79.899 ENCOUNTER FOR LONG-TERM CURRENT USE OF MEDICATION: Primary | ICD-10-CM

## 2018-04-11 PROCEDURE — 99214 OFFICE O/P EST MOD 30 MIN: CPT | Performed by: FAMILY MEDICINE

## 2018-04-11 ASSESSMENT — ANXIETY QUESTIONNAIRES
GAD7 TOTAL SCORE: 4
6. BECOMING EASILY ANNOYED OR IRRITABLE: MORE THAN HALF THE DAYS
IF YOU CHECKED OFF ANY PROBLEMS ON THIS QUESTIONNAIRE, HOW DIFFICULT HAVE THESE PROBLEMS MADE IT FOR YOU TO DO YOUR WORK, TAKE CARE OF THINGS AT HOME, OR GET ALONG WITH OTHER PEOPLE: NOT DIFFICULT AT ALL
1. FEELING NERVOUS, ANXIOUS, OR ON EDGE: NOT AT ALL
2. NOT BEING ABLE TO STOP OR CONTROL WORRYING: NOT AT ALL
5. BEING SO RESTLESS THAT IT IS HARD TO SIT STILL: NOT AT ALL
3. WORRYING TOO MUCH ABOUT DIFFERENT THINGS: NOT AT ALL
7. FEELING AFRAID AS IF SOMETHING AWFUL MIGHT HAPPEN: NOT AT ALL

## 2018-04-11 ASSESSMENT — PAIN SCALES - GENERAL: PAINLEVEL: MODERATE PAIN (4)

## 2018-04-11 ASSESSMENT — PATIENT HEALTH QUESTIONNAIRE - PHQ9: 5. POOR APPETITE OR OVEREATING: MORE THAN HALF THE DAYS

## 2018-04-11 NOTE — MR AVS SNAPSHOT
After Visit Summary   4/11/2018    Sandra Petit    MRN: [id number]           Patient Information     Date Of Birth          1940        Visit Information        Provider Department      4/11/2018 4:00 PM Teddy Wray MD [facility]         Follow-ups after your visit        Who to contact     If you have questions or need follow up information about today's clinic visit or your schedule please contact [facility] directly at [phone].  Normal or non-critical lab and imaging results will be communicated to you by [facility], letter or phone within 4 business days after the clinic has received the results. If you do not hear from us within 7 days, please contact the clinic through [employer] or phone. If you have a critical or abnormal lab result, we will notify you by phone as soon as possible.  Submit refill requests through [employer] or call your pharmacy and they will forward the refill request to us. Please allow 3 business days for your refill to be completed.          Additional Information About Your Visit        MyChart Information     [employer] gives you secure access to your electronic health record. If you see a primary care provider, you can also send messages to your care team and make appointments. If you have questions, please call your primary care clinic.  If you do not have a primary care provider, please call [phone] and they will assist you.        Care EveryWhere ID     This is your Care EveryWhere ID. This could be used by other organizations to access your [place] medical records  [id number]        Your Vitals Were     Pulse Temperature Respirations BMI (Body Mass Index)          68 96.6  F (35.9  C) (Temporal) 24 32.77 kg/m2         Blood Pressure from Last 3 Encounters:   04/11/18 138/58   12/06/17 186/78   11/29/17 162/78    Weight from Last 3 Encounters:   04/11/18 170 lb 9.6 oz (77.4 kg)   03/14/18 168 lb (76.2 kg)   12/06/17 164 lb  11.2 oz (74.7 kg)              Today, you had the following     No orders found for display         Today's Medication Changes          These changes are accurate as of 4/11/18  6:24 PM.  If you have any questions, ask your nurse or doctor.               Stop taking these medicines if you haven't already. Please contact your care team if you have questions.     atorvastatin 40 MG tablet   Commonly known as:  LIPITOR   Stopped by:  Teddy Wray MD                    Primary Care Provider Office Phone # Fax #    Geovany Landry [phone]       [address] [facility] [address]        Equal Access to Services     [facility]: Hadii aad ku hadasho Soomaali, waaxda luqadaha, qaybta kaalmada adeegyada, veronica mcneil . So [facility] [phone].    ATENCIÓN: Si habla español, tiene a lacy disposición servicios gratuitos de asistencia lingüística. [facility] [phone].    We comply with applicable federal civil rights laws and [place] laws. We do not discriminate on the basis of race, color, national origin, age, disability, sex, sexual orientation, or gender identity.            Thank you!     Thank you for choosing [facility]  for your care. Our goal is always to provide you with excellent care. Hearing back from our patients is one way we can continue to improve our services. Please take a few minutes to complete the written survey that you may receive in the mail after your visit with us. Thank you!             Your Updated Medication List - Protect others around you: Learn how to safely use, store and throw away your medicines at www.disposemymeds.org.          This list is accurate as of 4/11/18  6:24 PM.  Always use your most recent med list.                   Brand Name Dispense Instructions for use Diagnosis    ACIDOPHILUS PROBIOTIC BLEND Caps      Take 1 capsule by mouth 2 times daily        aspirin 81 MG tablet      Take 81 mg by mouth  daily        blood glucose monitoring lancets     1 each    Use to test blood sugars 1 times daily or as directed.    Diabetic vasculopathy (H)       blood glucose monitoring test strip    ONETOUCH VERIO IQ    50 each    Use to test blood sugars 1 times daily or as directed.    Diabetic vasculopathy (H)       cloNIDine 0.2 MG tablet    CATAPRES    180 tablet    Take 1 tablet (0.2 mg) by mouth 2 times daily    Essential hypertension, benign       levofloxacin 750 MG tablet    LEVAQUIN    2 tablet    Take one tablet 2 hours prior to procedure    History of total left knee replacement       levothyroxine 50 MCG tablet    SYNTHROID    90 tablet    Take 1 tablet (50 mcg) by mouth daily    Hypothyroidism, unspecified type       losartan 50 MG tablet    COZAAR    180 tablet    Take 1 tablet (50 mg) by mouth 2 times daily    Essential hypertension, benign       metFORMIN 500 MG 24 hr tablet    GLUCOPHAGE-XR    90 tablet    TAKE ONE TABLET BY MOUTH ONCE DAILY WITH  DINNER    Type 2 diabetes mellitus with diabetic nephropathy, without long-term current use of insulin (H)       nitroGLYcerin 0.4 MG sublingual tablet    NITROSTAT    25 tablet    Place 1 tablet (0.4 mg) under the tongue every 5 minutes as needed for chest pain    Coronary artery disease involving native coronary artery without angina pectoris       simvastatin 40 MG tablet    ZOCOR    90 tablet    Take 0.5 tablets (20 mg) by mouth At Bedtime    Hyperlipidemia LDL goal <100       vitamin D 400 units capsule     30 capsule    Take 1 capsule by mouth 2 times daily

## 2018-04-11 NOTE — NURSING NOTE
"Chief Complaint   Patient presents with     Hypertension     BP check       Initial /64  Pulse 68  Temp 96.6  F (35.9  C) (Temporal)  Resp 24  Wt 170 lb 9.6 oz (77.4 kg)  BMI 32.77 kg/m2 Estimated body mass index is 32.77 kg/(m^2) as calculated from the following:    Height as of 3/14/18: 5' 0.5\" (1.537 m).    Weight as of this encounter: 170 lb 9.6 oz (77.4 kg).  Medication Reconciliation: complete  "

## 2018-04-11 NOTE — PROGRESS NOTES
SUBJECTIVE:   Sandra Petit is a 78 year old female who presents to clinic today for the following health issues:      Diabetes Follow-up    Patient is checking blood sugars: once daily.  Results are as follows:83    Diabetic concerns: None     Symptoms of hypoglycemia (low blood sugar): none     Paresthesias (numbness or burning in feet) or sores: No     Date of last diabetic eye exam: 10/2017    Hyperlipidemia Follow-Up      Rate your low fat/cholesterol diet?: fair    Taking statin?  Yes, no muscle aches from statin    Other lipid medications/supplements?:  none    Hypertension Follow-up      Outpatient blood pressures are being checked at home.  Results are 134/64.    Low Salt Diet: no added salt    BP Readings from Last 2 Encounters:   04/11/18 158/64   12/06/17 186/78     Hemoglobin A1C (%)   Date Value   10/09/2017 6.5 (H)   02/20/2017 6.8 (H)     LDL Cholesterol Calculated (mg/dL)   Date Value   12/20/2017 65   11/02/2017 130 (H)       Amount of exercise or physical activity: None    Problems taking medications regularly: No    Medication side effects: none    Diet: low salt, low fat/cholesterol and diabetic

## 2018-04-12 ASSESSMENT — PATIENT HEALTH QUESTIONNAIRE - PHQ9: SUM OF ALL RESPONSES TO PHQ QUESTIONS 1-9: 6

## 2018-04-12 ASSESSMENT — ANXIETY QUESTIONNAIRES: GAD7 TOTAL SCORE: 4

## 2018-04-12 NOTE — PROGRESS NOTES
Visit Date:   2018      Blood pressure recheck.        SUBJECTIVE:  She brings in a number of blood pressure readings from home, perhaps 30-40 readings that she has taken several times daily.  For the most part they are quite satisfactory.  She does have a couple of readings in the 180s range, however, and a few in the low 100s.  The majority of the readings are in the satisfactory range.  She does not have any obvious side effects from her blood pressure meds per her blood pressure readings and we reviewed  her blood pressure meds and I recommended that she continue with her current regimen.      Hyperlipidemia.  She is on simvastatin and is not having any side effects regarding that.  We reviewed her lipid profile and since we had recently decreased her simvastatin dosage, we decided that she should return for another lipid profile and will do that in the next day or two.      Type 2 diabetes.  She seems to be doing satisfactorily regarding that, has no side effects from meds and no hypoglycemic episodes.  She is due for an A1c and this is ordered.  She will return for that in the next day or two and I will call her with results subsequently.      OBJECTIVE:     VITAL SIGNS:  All quite satisfactory. /58  Pulse 68  Temp 96.6  F (35.9  C) (Temporal)  Resp 24  Wt 170 lb 9.6 oz (77.4 kg)  BMI 32.77 kg/m2    NECK, HEART AND LUNGS:  All clear.   ABDOMEN:  Negative.   EXTREMITIES:  Without edema.      IMPRESSION:  Hypertension, satisfactory control; hyperlipidemia; diabetes with a satisfactory control.      PLAN:  As mentioned above, blood work will be drawn.  I will call her on that subsequently.  Return in 4-6 months, but call sooner with any questions or other difficulties.         MEAGHAN VIDES MD             D: 2018   T: 2018   MT: KAREN      Name:     LINDA MORILLO   MRN:      [id number]        Account:      [id number]   :      1940           Visit Date:   2018       Document: [id number]

## 2018-04-13 DIAGNOSIS — E11.21 TYPE 2 DIABETES MELLITUS WITH DIABETIC NEPHROPATHY, WITHOUT LONG-TERM CURRENT USE OF INSULIN (H): ICD-10-CM

## 2018-04-13 DIAGNOSIS — E78.5 HYPERLIPIDEMIA LDL GOAL <100: ICD-10-CM

## 2018-04-13 LAB
CHOLEST SERPL-MCNC: 158 MG/DL
HBA1C MFR BLD: 6.6 % (ref 0–6.4)
HDLC SERPL-MCNC: 55 MG/DL
LDLC SERPL CALC-MCNC: 77 MG/DL
NONHDLC SERPL-MCNC: 103 MG/DL
TRIGL SERPL-MCNC: 129 MG/DL

## 2018-04-13 PROCEDURE — 80061 LIPID PANEL: CPT | Performed by: FAMILY MEDICINE

## 2018-04-13 PROCEDURE — 36415 COLL VENOUS BLD VENIPUNCTURE: CPT | Performed by: FAMILY MEDICINE

## 2018-04-13 PROCEDURE — 83036 HEMOGLOBIN GLYCOSYLATED A1C: CPT | Mod: QW | Performed by: FAMILY MEDICINE

## 2018-06-04 ENCOUNTER — TRANSFERRED RECORDS (OUTPATIENT)
Dept: HEALTH INFORMATION MANAGEMENT | Facility: CLINIC | Age: 78
End: 2018-06-04

## 2018-06-06 ENCOUNTER — TELEPHONE (OUTPATIENT)
Dept: FAMILY MEDICINE | Facility: CLINIC | Age: 78
End: 2018-06-06

## 2018-06-06 DIAGNOSIS — Z96.652 HISTORY OF TOTAL LEFT KNEE REPLACEMENT: ICD-10-CM

## 2018-06-06 RX ORDER — LEVOFLOXACIN 750 MG/1
TABLET, FILM COATED ORAL
Qty: 2 TABLET | Refills: 3 | Status: SHIPPED | OUTPATIENT
Start: 2018-06-06 | End: 2019-03-04

## 2018-06-06 NOTE — TELEPHONE ENCOUNTER
Reason for Call:  Medication or medication refill:    Do you use a [facility]?  Name of the pharmacy and phone number for the current request:  [facility] - [phone] (Fax [phone])    Name of the medication requested: antibiotic for dental appointments     Other request: for dental appointment on 6/14. She will have another one after that, but she does not know the date yet.     Can we leave a detailed message on this number? YES    Phone number patient can be reached at: Home number on file [phone] (home)    Best Time: any     Call taken on 6/6/2018 at 2:43 PM by Radha Goins

## 2018-07-18 ENCOUNTER — OFFICE VISIT (OUTPATIENT)
Dept: FAMILY MEDICINE | Facility: CLINIC | Age: 78
End: 2018-07-18
Payer: COMMERCIAL

## 2018-07-18 VITALS
WEIGHT: 169.4 LBS | RESPIRATION RATE: 20 BRPM | TEMPERATURE: 96 F | BODY MASS INDEX: 32.54 KG/M2 | DIASTOLIC BLOOD PRESSURE: 82 MMHG | HEART RATE: 72 BPM | SYSTOLIC BLOOD PRESSURE: 178 MMHG

## 2018-07-18 DIAGNOSIS — M17.11 PRIMARY OSTEOARTHRITIS OF RIGHT KNEE: Primary | ICD-10-CM

## 2018-07-18 DIAGNOSIS — I10 HYPERTENSION GOAL BP (BLOOD PRESSURE) < 140/90: ICD-10-CM

## 2018-07-18 PROCEDURE — 99214 OFFICE O/P EST MOD 30 MIN: CPT | Performed by: FAMILY MEDICINE

## 2018-07-18 ASSESSMENT — PAIN SCALES - GENERAL: PAINLEVEL: SEVERE PAIN (6)

## 2018-07-18 NOTE — MR AVS SNAPSHOT
After Visit Summary   7/18/2018    Sandra Petit    MRN: [id number]           Patient Information     Date Of Birth          1940        Visit Information        Provider Department      7/18/2018 5:30 PM Teddy Wray MD [facility]        Today's Diagnoses     Type 2 diabetes mellitus with diabetic nephropathy (H)    -  1       Follow-ups after your visit        Who to contact     If you have questions or need follow up information about today's clinic visit or your schedule please contact [facility] directly at [phone].  Normal or non-critical lab and imaging results will be communicated to you by [employer], letter or phone within 4 business days after the clinic has received the results. If you do not hear from us within 7 days, please contact the clinic through [employer] or phone. If you have a critical or abnormal lab result, we will notify you by phone as soon as possible.  Submit refill requests through [employer] or call your pharmacy and they will forward the refill request to us. Please allow 3 business days for your refill to be completed.          Additional Information About Your Visit        MyChart Information     [employer] gives you secure access to your electronic health record. If you see a primary care provider, you can also send messages to your care team and make appointments. If you have questions, please call your primary care clinic.  If you do not have a primary care provider, please call [phone] and they will assist you.        Care EveryWhere ID     This is your Care EveryWhere ID. This could be used by other organizations to access your [place] medical records  [id number]        Your Vitals Were     Pulse Temperature Respirations BMI (Body Mass Index)          72 96  F (35.6  C) (Temporal) 20 32.54 kg/m2         Blood Pressure from Last 3 Encounters:   07/18/18 178/82   04/11/18 138/58   12/06/17 186/78    Weight from Last 3  Encounters:   07/18/18 169 lb 6.4 oz (76.8 kg)   04/11/18 170 lb 9.6 oz (77.4 kg)   03/14/18 168 lb (76.2 kg)              We Performed the Following     FOOT EXAM        Primary Care Provider Office Phone # Fax #    Geovany Landry [phone]       [address] [facility] [address]        Equal Access to Services     [facility] : Hadii aad ku hadasho Soomaali, waaxda luqadaha, qaybta kaalmada adeegyada, waxay idiin hayaan adeeg kharash la'delmer ah. So [facility] [phone].    ATENCIÓN: Si habla español, tiene a lacy disposición servicios gratuitos de asistencia lingüística. Llame al [phone].    We comply with applicable federal civil rights laws and [place] laws. We do not discriminate on the basis of race, color, national origin, age, disability, sex, sexual orientation, or gender identity.            Thank you!     Thank you for choosing [facility]  for your care. Our goal is always to provide you with excellent care. Hearing back from our patients is one way we can continue to improve our services. Please take a few minutes to complete the written survey that you may receive in the mail after your visit with us. Thank you!             Your Updated Medication List - Protect others around you: Learn how to safely use, store and throw away your medicines at www.disposemymeds.org.          This list is accurate as of 7/18/18  6:52 PM.  Always use your most recent med list.                   Brand Name Dispense Instructions for use Diagnosis    ACIDOPHILUS PROBIOTIC BLEND Caps      Take 1 capsule by mouth 2 times daily        aspirin 81 MG tablet      Take 81 mg by mouth daily        blood glucose monitoring lancets     1 each    Use to test blood sugars 1 times daily or as directed.    Diabetic vasculopathy (H)       blood glucose monitoring test strip    ONETOUCH VERIO IQ    50 each    Use to test blood sugars 1 times daily or as directed.    Diabetic vasculopathy (H)        cloNIDine 0.2 MG tablet    CATAPRES    180 tablet    Take 1 tablet (0.2 mg) by mouth 2 times daily    Essential hypertension, benign       levofloxacin 750 MG tablet    LEVAQUIN    2 tablet    Take one tablet 2 hours prior to procedure    History of total left knee replacement       levothyroxine 50 MCG tablet    SYNTHROID    90 tablet    Take 1 tablet (50 mcg) by mouth daily    Hypothyroidism, unspecified type       losartan 50 MG tablet    COZAAR    180 tablet    Take 1 tablet (50 mg) by mouth 2 times daily    Essential hypertension, benign       metFORMIN 500 MG 24 hr tablet    GLUCOPHAGE-XR    90 tablet    TAKE ONE TABLET BY MOUTH ONCE DAILY WITH  DINNER    Type 2 diabetes mellitus with diabetic nephropathy, without long-term current use of insulin (H)       nitroGLYcerin 0.4 MG sublingual tablet    NITROSTAT    25 tablet    Place 1 tablet (0.4 mg) under the tongue every 5 minutes as needed for chest pain    Coronary artery disease involving native coronary artery without angina pectoris       simvastatin 40 MG tablet    ZOCOR    90 tablet    Take 0.5 tablets (20 mg) by mouth At Bedtime    Hyperlipidemia LDL goal <100       vitamin D 400 units capsule     30 capsule    Take 1 capsule by mouth 2 times daily

## 2018-07-18 NOTE — PROGRESS NOTES
SUBJECTIVE:   Sandra Petit is a 78 year old female who presents to clinic today for the following health issues:      Diabetes Follow-up      Patient is checking blood sugars: rarely.  Results range from 121 to 90    Diabetic concerns: None     Symptoms of hypoglycemia (low blood sugar): none     Paresthesias (numbness or burning in feet) or sores: No     Date of last diabetic eye exam: 11/2017    Diabetes Management Resources    Hyperlipidemia Follow-Up      Rate your low fat/cholesterol diet?: good    Taking statin?  Yes, no muscle aches from statin    Other lipid medications/supplements?:  none    Hypertension Follow-up      Outpatient blood pressures are being checked at home.  Results are 153/70.    Low Salt Diet: no added salt    BP Readings from Last 2 Encounters:   07/18/18 178/82   04/11/18 138/58     Hemoglobin A1C (%)   Date Value   04/13/2018 6.6 (H)   10/09/2017 6.5 (H)     LDL Cholesterol Calculated (mg/dL)   Date Value   04/13/2018 77   12/20/2017 65     Hypothyroidism Follow-up      Since last visit, patient describes the following symptoms: hair loss      Amount of exercise or physical activity: None    Problems taking medications regularly: No    Medication side effects: none    Diet: low salt, low fat/cholesterol and diabetic      Sandra Petit is a 78 year old female who presents to the clinic today for a recheck on hypertension.    She currently has no symptoms referable to HTN or HTN medications.     Review of the patient's recent home blood pressure readings: she has been noting some elevated readings in the late afternoon.        Patient Active Problem List   Diagnosis     Cardiac dysrhythmia     Myalgia and myositis     Coronary atherosclerosis      MONONUCLEOSIS ebv inf 7/05     CKD (chronic kidney disease) stage 3, GFR 30-59 ml/min     Encounter for long-term current use of medication     HYPERLIPIDEMIA LDL GOAL <100     Hypertension goal BP (blood pressure) < 140/90     OA  (osteoarthritis) of knee - bilateral     Advance Care Planning     History of total left knee replacement     Bunion     Acute-on-chronic kidney injury (H)     Coronary atherosclerosis of native coronary artery     Chest pain     Hypothyroidism, unspecified hypothyroidism type     Coronary artery disease involving native coronary artery without angina pectoris     Type 2 diabetes mellitus with diabetic nephropathy (H)     Type 2 diabetes mellitus with other circulatory complications     Hyperuricemia     Herpes zoster without complication     Osteoarthritis of left thumb     Primary osteoarthritis of right knee     S/P total knee arthroplasty, left       Social History   Substance Use Topics     Smoking status: Former Smoker     Smokeless tobacco: Never Used      Comment: quit  1987     Alcohol use No       Current Outpatient Prescriptions   Medication Sig Dispense Refill     aspirin 81 MG tablet Take 81 mg by mouth daily       blood glucose monitoring (ONE TOUCH DELICA) lancets Use to test blood sugars 1 times daily or as directed. 1 each 1     blood glucose monitoring (ONE TOUCH VERIO IQ) test strip Use to test blood sugars 1 times daily or as directed. 50 each 2     Cholecalciferol (VITAMIN D) 400 UNITS capsule Take 1 capsule by mouth 2 times daily 30 capsule      cloNIDine (CATAPRES) 0.2 MG tablet Take 1 tablet (0.2 mg) by mouth 2 times daily 180 tablet 3     levothyroxine (SYNTHROID) 50 MCG tablet Take 1 tablet (50 mcg) by mouth daily 90 tablet 3     losartan (COZAAR) 50 MG tablet Take 1 tablet (50 mg) by mouth 2 times daily 180 tablet 1     metFORMIN (GLUCOPHAGE-XR) 500 MG 24 hr tablet TAKE ONE TABLET BY MOUTH ONCE DAILY WITH  DINNER 90 tablet 3     nitroglycerin (NITROSTAT) 0.4 MG SL tablet Place 1 tablet (0.4 mg) under the tongue every 5 minutes as needed for chest pain 25 tablet 0     simvastatin (ZOCOR) 40 MG tablet Take 0.5 tablets (20 mg) by mouth At Bedtime 90 tablet 3     levofloxacin (LEVAQUIN) 750 MG  tablet Take one tablet 2 hours prior to procedure 2 tablet 3     Probiotic Product (ACIDOPHILUS PROBIOTIC BLEND) CAPS Take 1 capsule by mouth 2 times daily           EXAM: Appears comfortable and in no apparent distress  VITALS: /82  Pulse 72  Temp 96  F (35.6  C) (Temporal)  Resp 20  Wt 169 lb 6.4 oz (76.8 kg)  BMI 32.54 kg/m2  RESP:  Normal - CTA without rales, rhonchi, or wheezing.  CARDIAC:  NORMAL - regular rate and rhythm without murmur.  EXTREMITIES:  no edema    Assessment:  Essential Hypertension, with some signs of less than adequate control    PLAN: we will have her adjust the timing of her afternoon jac andzakiya will send me her readings in 2 weeks      Orders Placed This Encounter          ORTHOPEDICS ADULT REFERRAL: regarding her left knee pain       dbue

## 2018-07-23 ENCOUNTER — OFFICE VISIT (OUTPATIENT)
Dept: ORTHOPEDICS | Facility: CLINIC | Age: 78
End: 2018-07-23
Payer: COMMERCIAL

## 2018-07-23 ENCOUNTER — TELEPHONE (OUTPATIENT)
Dept: ORTHOPEDICS | Facility: CLINIC | Age: 78
End: 2018-07-23

## 2018-07-23 VITALS
HEIGHT: 61 IN | TEMPERATURE: 96.7 F | SYSTOLIC BLOOD PRESSURE: 138 MMHG | DIASTOLIC BLOOD PRESSURE: 54 MMHG | BODY MASS INDEX: 32.47 KG/M2 | WEIGHT: 172 LBS

## 2018-07-23 DIAGNOSIS — M17.11 PRIMARY OSTEOARTHRITIS OF RIGHT KNEE: Primary | ICD-10-CM

## 2018-07-23 DIAGNOSIS — Z01.818 PRE-OP EXAM: ICD-10-CM

## 2018-07-23 PROCEDURE — 99213 OFFICE O/P EST LOW 20 MIN: CPT | Performed by: ORTHOPAEDIC SURGERY

## 2018-07-23 NOTE — LETTER
"    7/23/2018         RE: Sandra Petit  [address] [facility] [address]        Dear Colleague,    Thank you for referring your patient, Sandra Petit, to the [facility]. Please see a copy of my visit note below.    Office Visit-Follow up  HISTORY OF PRESENT ILLNESS:    Sandra Petit is a 78 year old female who is seen in follow up for   Chief Complaint   Patient presents with     RECHECK     Right knee pain       Patient presents with:  RECHECK: Right knee pain      She reports that the recent Synvisc injection done several months ago was not beneficial.  Present symptoms: She continues to primarily complain of medial joint pain with ambulation.  Treatments tried to this point: Right knee has been treated with several Synvisc injections at distant intervals.  It now appears this is not working for her.  She had a left total knee replacement done in the distant past by Dr. Grimes    REVIEW OF SYSTEMS    General: negative for, night sweats, dizziness, fatigue  Resp: No shortness of breath and no cough  CV: negative for chest pain, syncope or near-syncope  GI: negative for nausea, vomiting and diarrhea  : negative for dysuria and hematuria  Musculoskeletal: as above  Neurologic: negative for syncope   Hematologic: negative for bleeding disorder    Physical Exam:    Vitals: /54  Temp 96.7  F (35.9  C) (Temporal)  Ht 1.537 m (5' 0.5\")  Wt 78 kg (172 lb)  BMI 33.04 kg/m2  BMI= Body mass index is 33.04 kg/(m^2).  Constitutional: healthy, alert and no acute distress   Psychiatric: mentation appears normal and affect normal/bright  NEURO: no focal deficits  SKIN: no excoriation or erythema. No signs of infection.    GAIT: antalgic    JOINT/EXTREMITIES:     Right knee examination shows very easily reproducible medial joint line tenderness.  Right knee arc of motion today is essentially full extension to easily 120 .  She has pseudolaxity and valgus testing.    IMAGING INTERPRETATION: I " reviewed previous x-rays of her right knee.  She has medial joint space narrowing on the standing views although it is not bone-on-bone.  Her merchant x-ray shows that the medial patellofemoral joint appears to be extremely narrowed.       Impression:      ICD-10-CM    1. Primary osteoarthritis of right knee M17.11 Jackie-Operative Worksheet     CBC with platelets differential     MRSA MSSA Presurgical Screen     *UA reflex to Microscopic and Culture ([facility]; [facility]; [facility]; [facility]; [place]; [place])   2. Pre-op exam Z01.818 CBC with platelets differential     MRSA MSSA Presurgical Screen     *UA reflex to Microscopic and Culture ([facility]; [facility]; [facility]; [facility]; [place]; [place])       Patient has progressive arthritis of her right knee.  It appears to be involving the medial and patellofemoral joint.  Injection therapies no longer beneficial.    Plan:   The above was reviewed with Sandra and she recognizes that her only real option at this point is to proceed with joint replacement..     After reviewing some of the details of joint replacement of which she is quite familiar with she requests to proceed with scheduling.      Return to clinic 1 week prior to her surgery.    Johnny Anaya MD    Again, thank you for allowing me to participate in the care of your patient.        Sincerely,        Johnny Anaya MD

## 2018-07-23 NOTE — TELEPHONE ENCOUNTER
Type of surgery: Right total knee arthroplasty  Location of surgery: [facility]   Date of surgery: 8/27/18  Surgeon: Dr. Anaya  Pre-Op Appt Date: 8/15/18  Post-Op Appt Date: 9/10/18   Packet sent out: Surgery packet was given to patient in clinic.   Pre-cert/Authorization completed: NA  Date: 7/23/2018    *soap given  *labs ordered  *Class 8/16/18    Sharmin Henderson  Surgery Scheduler

## 2018-07-23 NOTE — MR AVS SNAPSHOT
After Visit Summary   7/23/2018    Sandra Petit    MRN: [id number]           Patient Information     Date Of Birth          1940        Visit Information        Provider Department      7/23/2018 3:10 PM Johnny Anaya MD [facility]        Today's Diagnoses     Primary osteoarthritis of right knee    -  1    Pre-op exam           Follow-ups after your visit        Your next 10 appointments already scheduled     Aug 15, 2018  4:30 PM CDT   Pre-Op physical with Teddy Wray MD   [facility]    [address] [place] [id number]   [phone]            Aug 16, 2018  9:30 AM CDT   [facility] Visit with Johnny Anaya MD   [facility]    [address] [place] [id number]   [phone]            Aug 27, 2018   Procedure with Johnny Anaya MD   [facility] ([facility])    [address] [place] [address]   [phone]           From [address]: Exit at [address] on south side of [place]. Turn right on [address]. Turn left at stoplight on [address]. [facility] will be in view two blocks ahead            Sep 10, 2018  1:10 PM CDT   Return Visit with Johnny Anaya MD   [facility]    [address] [place] [address]   [phone]              Future tests that were ordered for you today     Open Future Orders        Priority Expected Expires Ordered    CBC with platelets differential Routine  7/23/2019 7/23/2018    MRSA MSSA Presurgical Screen Routine  7/24/2019 7/23/2018    *UA reflex to Microscopic and Culture ([place]; [facility]; [facility]; [facility]; [facility]; [facility]; [place]; [place]) Routine  7/23/2019 7/23/2018            Who to contact     If you have questions or need follow up information  "about today's clinic visit or your schedule please contact [facility] directly at [phone].  Normal or non-critical lab and imaging results will be communicated to you by MyChart, letter or phone within 4 business days after the clinic has received the results. If you do not hear from us within 7 days, please contact the clinic through [employer] or phone. If you have a critical or abnormal lab result, we will notify you by phone as soon as possible.  Submit refill requests through [employer] or call your pharmacy and they will forward the refill request to us. Please allow 3 business days for your refill to be completed.          Additional Information About Your Visit        [employer] Information     [employer] gives you secure access to your electronic health record. If you see a primary care provider, you can also send messages to your care team and make appointments. If you have questions, please call your primary care clinic.  If you do not have a primary care provider, please call [phone] and they will assist you.        Care EveryWhere ID     This is your Care EveryWhere ID. This could be used by other organizations to access your [facility] medical records  [id number]        Your Vitals Were     Temperature Height BMI (Body Mass Index)             96.7  F (35.9  C) (Temporal) 1.537 m (5' 0.5\") 33.04 kg/m2          Blood Pressure from Last 3 Encounters:   07/23/18 138/54   07/18/18 178/82   04/11/18 138/58    Weight from Last 3 Encounters:   07/23/18 78 kg (172 lb)   07/18/18 76.8 kg (169 lb 6.4 oz)   04/11/18 77.4 kg (170 lb 9.6 oz)              We Performed the Following     Jackie-Operative Worksheet        Primary Care Provider Office Phone # Fax #    Geovany Landry [phone]       [address] [facility] [address]        Equal Access to Services     RYAN LOWERY AH: Krystal Retana, sae guerra, veronica patiño " keivn juanayojudit myers'aan ah. So [facility] [phone].    ATENCIÓN: Si shannen bryan, tiene a lacy disposición servicios gratuitos de asistencia lingüística. Bryanna hernandez [phone].    We comply with applicable federal civil rights laws and [place] laws. We do not discriminate on the basis of race, color, national origin, age, disability, sex, sexual orientation, or gender identity.            Thank you!     Thank you for choosing [facility]  for your care. Our goal is always to provide you with excellent care. Hearing back from our patients is one way we can continue to improve our services. Please take a few minutes to complete the written survey that you may receive in the mail after your visit with us. Thank you!             Your Updated Medication List - Protect others around you: Learn how to safely use, store and throw away your medicines at www.disposemymeds.org.          This list is accurate as of 7/23/18  3:59 PM.  Always use your most recent med list.                   Brand Name Dispense Instructions for use Diagnosis    ACIDOPHILUS PROBIOTIC BLEND Caps      Take 1 capsule by mouth 2 times daily        aspirin 81 MG tablet      Take 81 mg by mouth daily        blood glucose monitoring lancets     1 each    Use to test blood sugars 1 times daily or as directed.    Diabetic vasculopathy (H)       blood glucose monitoring test strip    ONETOUCH VERIO IQ    50 each    Use to test blood sugars 1 times daily or as directed.    Diabetic vasculopathy (H)       cloNIDine 0.2 MG tablet    CATAPRES    180 tablet    Take 1 tablet (0.2 mg) by mouth 2 times daily    Essential hypertension, benign       levofloxacin 750 MG tablet    LEVAQUIN    2 tablet    Take one tablet 2 hours prior to procedure    History of total left knee replacement       levothyroxine 50 MCG tablet    SYNTHROID    90 tablet    Take 1 tablet (50 mcg) by mouth daily    Hypothyroidism, unspecified type       losartan 50 MG tablet    COZAAR     180 tablet    Take 1 tablet (50 mg) by mouth 2 times daily    Essential hypertension, benign       metFORMIN 500 MG 24 hr tablet    GLUCOPHAGE-XR    90 tablet    TAKE ONE TABLET BY MOUTH ONCE DAILY WITH  DINNER    Type 2 diabetes mellitus with diabetic nephropathy, without long-term current use of insulin (H)       nitroGLYcerin 0.4 MG sublingual tablet    NITROSTAT    25 tablet    Place 1 tablet (0.4 mg) under the tongue every 5 minutes as needed for chest pain    Coronary artery disease involving native coronary artery without angina pectoris       simvastatin 40 MG tablet    ZOCOR    90 tablet    Take 0.5 tablets (20 mg) by mouth At Bedtime    Hyperlipidemia LDL goal <100       vitamin D 400 units capsule     30 capsule    Take 1 capsule by mouth 2 times daily

## 2018-07-23 NOTE — PROGRESS NOTES
"Office Visit-Follow up  HISTORY OF PRESENT ILLNESS:    Sandra Petit is a 78 year old female who is seen in follow up for   Chief Complaint   Patient presents with     RECHECK     Right knee pain       Patient presents with:  RECHECK: Right knee pain      She reports that the recent Synvisc injection done several months ago was not beneficial.  Present symptoms: She continues to primarily complain of medial joint pain with ambulation.  Treatments tried to this point: Right knee has been treated with several Synvisc injections at distant intervals.  It now appears this is not working for her.  She had a left total knee replacement done in the distant past by Dr. Grimes    REVIEW OF SYSTEMS    General: negative for, night sweats, dizziness, fatigue  Resp: No shortness of breath and no cough  CV: negative for chest pain, syncope or near-syncope  GI: negative for nausea, vomiting and diarrhea  : negative for dysuria and hematuria  Musculoskeletal: as above  Neurologic: negative for syncope   Hematologic: negative for bleeding disorder    Physical Exam:    Vitals: /54  Temp 96.7  F (35.9  C) (Temporal)  Ht 1.537 m (5' 0.5\")  Wt 78 kg (172 lb)  BMI 33.04 kg/m2  BMI= Body mass index is 33.04 kg/(m^2).  Constitutional: healthy, alert and no acute distress   Psychiatric: mentation appears normal and affect normal/bright  NEURO: no focal deficits  SKIN: no excoriation or erythema. No signs of infection.    GAIT: antalgic    JOINT/EXTREMITIES:     Right knee examination shows very easily reproducible medial joint line tenderness.  Right knee arc of motion today is essentially full extension to easily 120 .  She has pseudolaxity and valgus testing.    IMAGING INTERPRETATION: I reviewed previous x-rays of her right knee.  She has medial joint space narrowing on the standing views although it is not bone-on-bone.  Her merchant x-ray shows that the medial patellofemoral joint appears to be extremely narrowed.     "   Impression:      ICD-10-CM    1. Primary osteoarthritis of right knee M17.11 Jackie-Operative Worksheet     CBC with platelets differential     MRSA MSSA Presurgical Screen     *UA reflex to Microscopic and Culture ([facility]; [facility]; [facility]; [facility]; [facility]; [place]; [place])   2. Pre-op exam Z01.818 CBC with platelets differential     MRSA MSSA Presurgical Screen     *UA reflex to Microscopic and Culture ([facility]; [facility]; [facility]; [facility]; [facility]; [place]; [place])       Patient has progressive arthritis of her right knee.  It appears to be involving the medial and patellofemoral joint.  Injection therapies no longer beneficial.    Plan:   The above was reviewed with Sandra and she recognizes that her only real option at this point is to proceed with joint replacement..     After reviewing some of the details of joint replacement of which she is quite familiar with she requests to proceed with scheduling.      Return to clinic 1 week prior to her surgery.    Johnny Anaya MD

## 2018-07-24 ENCOUNTER — TELEPHONE (OUTPATIENT)
Dept: ORTHOPEDICS | Facility: CLINIC | Age: 78
End: 2018-07-24

## 2018-07-24 NOTE — TELEPHONE ENCOUNTER
Reason for Call:  Other call back    Detailed comments: patient is having knee replacement surgery on 8/27.  She is unsure if Dr. Anaya wants her to have home health care after or in home physical therapy after surgery.  [employer] states that it is not covered if it within [place].    Phone Number Patient can be reached at: Home number on file [phone] (home)    Best Time: anytime    Can we leave a detailed message on this number? YES    Call taken on 7/24/2018 at 2:33 PM by Lilian Lara

## 2018-07-24 NOTE — TELEPHONE ENCOUNTER
PT was unclear about therapy. [employer] told her they would pay if home care was from . ANURADHA Rodriguez

## 2018-08-15 ENCOUNTER — OFFICE VISIT (OUTPATIENT)
Dept: FAMILY MEDICINE | Facility: CLINIC | Age: 78
End: 2018-08-15
Payer: COMMERCIAL

## 2018-08-15 VITALS
HEART RATE: 60 BPM | TEMPERATURE: 97.9 F | SYSTOLIC BLOOD PRESSURE: 160 MMHG | BODY MASS INDEX: 32.92 KG/M2 | DIASTOLIC BLOOD PRESSURE: 72 MMHG | WEIGHT: 171.4 LBS | RESPIRATION RATE: 16 BRPM

## 2018-08-15 DIAGNOSIS — Z01.818 PREOP GENERAL PHYSICAL EXAM: Primary | ICD-10-CM

## 2018-08-15 DIAGNOSIS — E11.21 TYPE 2 DIABETES MELLITUS WITH DIABETIC NEPHROPATHY, WITHOUT LONG-TERM CURRENT USE OF INSULIN (H): ICD-10-CM

## 2018-08-15 DIAGNOSIS — Z01.818 PRE-OP EXAM: ICD-10-CM

## 2018-08-15 DIAGNOSIS — M17.11 PRIMARY OSTEOARTHRITIS OF RIGHT KNEE: ICD-10-CM

## 2018-08-15 LAB
ALBUMIN UR-MCNC: NEGATIVE MG/DL
ANION GAP SERPL CALCULATED.3IONS-SCNC: 6 MMOL/L (ref 3–14)
APPEARANCE UR: CLEAR
BASOPHILS # BLD AUTO: 0.1 10E9/L (ref 0–0.2)
BASOPHILS NFR BLD AUTO: 0.6 %
BILIRUB UR QL STRIP: NEGATIVE
BUN SERPL-MCNC: 28 MG/DL (ref 7–30)
CALCIUM SERPL-MCNC: 9.1 MG/DL (ref 8.5–10.1)
CHLORIDE SERPL-SCNC: 107 MMOL/L (ref 94–109)
CO2 SERPL-SCNC: 28 MMOL/L (ref 20–32)
COLOR UR AUTO: YELLOW
CREAT SERPL-MCNC: 1.31 MG/DL (ref 0.52–1.04)
DIFFERENTIAL METHOD BLD: NORMAL
EOSINOPHIL NFR BLD AUTO: 3 %
ERYTHROCYTE [DISTWIDTH] IN BLOOD BY AUTOMATED COUNT: 13 % (ref 10–15)
GFR SERPL CREATININE-BSD FRML MDRD: 39 ML/MIN/1.7M2
GLUCOSE SERPL-MCNC: 91 MG/DL (ref 70–99)
GLUCOSE UR STRIP-MCNC: NEGATIVE MG/DL
HBA1C MFR BLD: 6.8 % (ref 0–5.6)
HCT VFR BLD AUTO: 40.7 % (ref 35–47)
HGB BLD-MCNC: 13 G/DL (ref 11.7–15.7)
HGB UR QL STRIP: NEGATIVE
IMM GRANULOCYTES # BLD: 0 10E9/L (ref 0–0.4)
IMM GRANULOCYTES NFR BLD: 0.5 %
KETONES UR STRIP-MCNC: NEGATIVE MG/DL
LEUKOCYTE ESTERASE UR QL STRIP: ABNORMAL
LYMPHOCYTES # BLD AUTO: 2 10E9/L (ref 0.8–5.3)
LYMPHOCYTES NFR BLD AUTO: 25.4 %
MCH RBC QN AUTO: 30.2 PG (ref 26.5–33)
MCHC RBC AUTO-ENTMCNC: 31.9 G/DL (ref 31.5–36.5)
MCV RBC AUTO: 94 FL (ref 78–100)
MONOCYTES # BLD AUTO: 0.8 10E9/L (ref 0–1.3)
MONOCYTES NFR BLD AUTO: 9.7 %
NEUTROPHILS # BLD AUTO: 4.7 10E9/L (ref 1.6–8.3)
NEUTROPHILS NFR BLD AUTO: 60.8 %
NITRATE UR QL: NEGATIVE
NRBC # BLD AUTO: 0 10*3/UL
NRBC BLD AUTO-RTO: 0 /100
PH UR STRIP: 5 PH (ref 5–7)
PLATELET # BLD AUTO: 291 10E9/L (ref 150–450)
POTASSIUM SERPL-SCNC: 4.3 MMOL/L (ref 3.4–5.3)
RBC # BLD AUTO: 4.31 10E12/L (ref 3.8–5.2)
RBC #/AREA URNS AUTO: <1 /HPF (ref 0–2)
SODIUM SERPL-SCNC: 141 MMOL/L (ref 133–144)
SOURCE: ABNORMAL
SP GR UR STRIP: 1.01 (ref 1–1.03)
SQUAMOUS #/AREA URNS AUTO: <1 /HPF (ref 0–1)
UROBILINOGEN UR STRIP-MCNC: 0 MG/DL (ref 0–2)
WBC # BLD AUTO: 7.7 10E9/L (ref 4–11)
WBC #/AREA URNS AUTO: 1 /HPF (ref 0–5)

## 2018-08-15 PROCEDURE — 40000868 ZZHCL STATISTIC MRSA/MSSA PRESURGICAL SCREEN ID: Performed by: ORTHOPAEDIC SURGERY

## 2018-08-15 PROCEDURE — 80048 BASIC METABOLIC PNL TOTAL CA: CPT | Performed by: FAMILY MEDICINE

## 2018-08-15 PROCEDURE — 93000 ELECTROCARDIOGRAM COMPLETE: CPT | Performed by: FAMILY MEDICINE

## 2018-08-15 PROCEDURE — 99215 OFFICE O/P EST HI 40 MIN: CPT | Performed by: FAMILY MEDICINE

## 2018-08-15 PROCEDURE — 36415 COLL VENOUS BLD VENIPUNCTURE: CPT | Performed by: FAMILY MEDICINE

## 2018-08-15 PROCEDURE — 81001 URINALYSIS AUTO W/SCOPE: CPT | Performed by: ORTHOPAEDIC SURGERY

## 2018-08-15 PROCEDURE — 40000869 ZZHCL STATISTIC MRSA/MSSA PRESURGICAL SCREEN CULTURE: Performed by: ORTHOPAEDIC SURGERY

## 2018-08-15 PROCEDURE — 85025 COMPLETE CBC W/AUTO DIFF WBC: CPT | Performed by: FAMILY MEDICINE

## 2018-08-15 PROCEDURE — 83036 HEMOGLOBIN GLYCOSYLATED A1C: CPT | Performed by: FAMILY MEDICINE

## 2018-08-15 ASSESSMENT — PAIN SCALES - GENERAL: PAINLEVEL: SEVERE PAIN (6)

## 2018-08-15 NOTE — PROGRESS NOTES
[address] [address]  [phone]  Dept: [phone]    PRE-OP EVALUATION:  Today's date: 8/15/2018    Sandra Petit (: 1940) presents for pre-operative evaluation assessment as requested by Dr. Anaya.  She requires evaluation and anesthesia risk assessment prior to undergoing surgery/procedure for treatment of painful rt  knee.  Johnny Anaya MD Primary   Rosanne Perdomo PA-C Assisting    Procedure Laterality Anesthesia   ARTHROPLASTY KNEE Right Combined General with Block   right total knee arthroplasty          Primary Physician: Teddy Wray  Type of Anesthesia Anticipated: Combined General with Block     Patient has a Health Care Directive or Living Will:  YES     Preop Questions 8/15/2018   Who is doing your surgery? -Dr Anaya   What are you having done? -Rt knee replacement   Date of Surgery/Procedure: 18   Facility or Hospital where procedure/surgery will be performed: [facility]   1.  Do you have a history of Heart attack, stroke, stent, coronary bypass surgery, or other heart surgery? YES  - stents x's 2  No symptoms now   2.  Do you ever have any pain or discomfort in your chest? YES - but does not think it is heart related, likely msk   3.  Do you have a history of  Heart Failure? No   4.   Are you troubled by shortness of breath when:  walking on a level surface, or up a slight hill, or at night? No   5.  Do you currently have a cold, bronchitis or other respiratory infection? No   6.  Do you have a cough, shortness of breath, or wheezing? No   7.  Do you sometimes get pains in the calves of your legs when you walk? No   8. Do you or anyone in your family have previous history of blood clots? YES - mother years ago   9.  Do you or does anyone in your family have a serious bleeding problem such as prolonged bleeding following surgeries or cuts? No   10. Have you ever had problems with anemia or been told to take iron  pills? No   11. Have you had any abnormal blood loss such as black, tarry or bloody stools, or abnormal vaginal bleeding? No   12. Have you ever had a blood transfusion? No   13. Have you or any of your relatives ever had problems with anesthesia? No   14. Do you have sleep apnea, excessive snoring or daytime drowsiness? No   15. Do you have any prosthetic heart valves? No   16. Do you have prosthetic joints? YES - lt knee   17. Is there any chance that you may be pregnant? No         HPI:     HPI related to upcoming procedure: right TKA for djd      See problem list for active medical problems.  Problems all longstanding and stable, except as noted/documented.  See ROS for pertinent symptoms related to these conditions.                                                                                                                                                          .    MEDICAL HISTORY:     Patient Active Problem List    Diagnosis Date Noted     Primary osteoarthritis of right knee 03/14/2018     Priority: Medium     S/P total knee arthroplasty, left 03/14/2018     Priority: Medium     Osteoarthritis of left thumb 07/07/2017     Priority: Medium     Herpes zoster without complication 09/07/2016     Priority: Medium     Type 2 diabetes mellitus with other circulatory complications 04/04/2016     Priority: Medium     Hyperuricemia 04/04/2016     Priority: Medium     Hypothyroidism, unspecified hypothyroidism type 10/02/2015     Priority: Medium     Coronary artery disease involving native coronary artery without angina pectoris 10/02/2015     Priority: Medium     Type 2 diabetes mellitus with diabetic nephropathy (H) 10/02/2015     Priority: Medium     Acute-on-chronic kidney injury (H) 01/19/2015     Priority: Medium     Coronary atherosclerosis of native coronary artery 01/19/2015     Priority: Medium     Chest pain 01/19/2015     Priority: Medium     Bunion 07/18/2013     Priority: Medium     History of total  left knee replacement 04/29/2013     Priority: Medium     Advance Care Planning 04/15/2013     Priority: Medium     Advance Care Planning 8/11/2015: Receipt of ACP document:  Received: Health Care Directive which was witnessed or notarized on 8/4/15.  Document not previously scanned.  Validation form completed and sent with document to be scanned.  Code Status reflects choices in most recent ACP document.  Confirmed/documented designated decision maker(s).  Added by Lindsey Flor  Advance Care Planning: Receipt of ACP document:  Received: Resuscitation Guidelines order which was witnessed and signed by provider on 5-2-13.  Document previously scanned on 5-7-13.  Order reviewed and found to be valid.  Code Status needs to be updated to reflect choices in most recent ACP document-Order states DNR  Confirmed/documented designated decision maker(s). See permanent comments section of demographics in clinical tab. View document(s) and details by clicking on code status. Added by Miguelina Baez RN System ACP Coordinator on 5/23/2013.    Information given.       OA (osteoarthritis) of knee - bilateral 10/29/2012     Priority: Medium     Hypertension goal BP (blood pressure) < 140/90 09/26/2011     Priority: Medium     HYPERLIPIDEMIA LDL GOAL <100 10/31/2010     Priority: Medium     Encounter for long-term current use of medication 09/22/2010     Priority: Medium     Problem list name updated by automated process. Provider to review       CKD (chronic kidney disease) stage 3, GFR 30-59 ml/min 03/17/2009     Priority: Medium      MONONUCLEOSIS ebv inf 7/05 08/05/2005     Priority: Medium     Coronary atherosclerosis 06/06/2005     Priority: Medium     Problem list name updated by automated process. Provider to review       Cardiac dysrhythmia 07/18/2001     Priority: Medium     Problem list name updated by automated process. Provider to review       Myalgia and myositis 07/18/2001     Priority: Medium     Problem list  name updated by automated process. Provider to review        Past Medical History:   Diagnosis Date     Diabetic eye exam (H) 08/05/13     Endometriosis, site unspecified      Fever and other physiologic disturbances of temperature regulation 07/07/2005    Admit.  Discharged 07/13/2005.  Cause undetermined.     Myalgia and myositis, unspecified     fibromyalgia     Pure hypercholesterolemia      Unspecified essential hypertension      Unspecified hypothyroidism      Past Surgical History:   Procedure Laterality Date     ARTHROPLASTY KNEE  4/29/2013    Procedure: ARTHROPLASTY KNEE;  left total knee arthroplasty;  Surgeon: Teddy Grimes MD;  Location: [facility]     C APPENDECTOMY       C NONSPECIFIC PROCEDURE      Knee ligament surgery     C NONSPECIFIC PROCEDURE      Kidney surgery for mechanical obstruction     C OPEN CORONARY ENDARTERECTOMY  june 2005    Angioplasty w stenting     C TOTAL KNEE ARTHROPLASTY  4/29/13    Left     COMBINED CYSTOSCOPY, INSERT CATHETER URETER Bilateral 8/10/2015    Procedure: COMBINED CYSTOSCOPY, INSERT CATHETER URETER;  Surgeon: Johnnie Madera MD;  Location: [facility]     DILATION AND CURETTAGE, HYSTEROSCOPY DIAGNOSTIC, COMBINED N/A 11/6/2014    Procedure: COMBINED DILATION AND CURETTAGE, HYSTEROSCOPY DIAGNOSTIC;  Surgeon: Edward Pardo MD;  Location: [facility]     ESOPHAGOSCOPY, GASTROSCOPY, DUODENOSCOPY (EGD), COMBINED N/A 1/27/2015    Procedure: COMBINED ESOPHAGOSCOPY, GASTROSCOPY, DUODENOSCOPY (EGD), BIOPSY SINGLE OR MULTIPLE;  Surgeon: Carmelo Rosario MD;  Location: [facility] GI     HC REMOVAL GALLBLADDER      Cholecystectomy     HC REMOVE TONSILS/ADENOIDS,<13 Y/O      unsure of age     LAPAROSCOPIC HYSTERECTOMY TOTAL N/A 8/10/2015    Procedure: LAPAROSCOPIC HYSTERECTOMY TOTAL;  Surgeon: Edward Pardo MD;  Location: [facility]     LAPAROSCOPIC LYSIS ADHESIONS N/A 8/10/2015    Procedure: LAPAROSCOPIC LYSIS ADHESIONS;  Surgeon: Edward Pardo MD;   "Location: [facility]     Current Outpatient Prescriptions   Medication Sig Dispense Refill     aspirin 81 MG tablet Take 81 mg by mouth daily       blood glucose monitoring (ONE TOUCH DELICA) lancets Use to test blood sugars 1 times daily or as directed. 1 each 1     blood glucose monitoring (ONE TOUCH VERIO IQ) test strip Use to test blood sugars 1 times daily or as directed. 50 each 2     Cholecalciferol (VITAMIN D) 400 UNITS capsule Take 1 capsule by mouth 2 times daily 30 capsule      cloNIDine (CATAPRES) 0.2 MG tablet Take 1 tablet (0.2 mg) by mouth 2 times daily 180 tablet 3     levofloxacin (LEVAQUIN) 750 MG tablet Take one tablet 2 hours prior to procedure 2 tablet 3     levothyroxine (SYNTHROID) 50 MCG tablet Take 1 tablet (50 mcg) by mouth daily 90 tablet 3     losartan (COZAAR) 50 MG tablet Take 1 tablet (50 mg) by mouth 2 times daily 180 tablet 1     metFORMIN (GLUCOPHAGE-XR) 500 MG 24 hr tablet TAKE ONE TABLET BY MOUTH ONCE DAILY WITH  DINNER 90 tablet 3     nitroglycerin (NITROSTAT) 0.4 MG SL tablet Place 1 tablet (0.4 mg) under the tongue every 5 minutes as needed for chest pain 25 tablet 0     Probiotic Product (ACIDOPHILUS PROBIOTIC BLEND) CAPS Take 1 capsule by mouth 2 times daily       simvastatin (ZOCOR) 40 MG tablet Take 0.5 tablets (20 mg) by mouth At Bedtime 90 tablet 3     OTC products: None, except as noted above    Allergies   Allergen Reactions     Penicillins      \"throat started swelling\"     Vitamin B Complex Hives     Vitamin B12 Hives     all vitamin B's     Clindamycin Hcl Rash      Latex Allergy: NO    Social History   Substance Use Topics     Smoking status: Former Smoker     Smokeless tobacco: Never Used      Comment: quit  1987     Alcohol use No     History   Drug Use No       REVIEW OF SYSTEMS:   Constitutional, neuro, ENT, endocrine, pulmonary, cardiac, gastrointestinal, genitourinary, musculoskeletal, integument and psychiatric systems are negative, except as otherwise " noted.    EXAM:   /78  Pulse 60  Temp 97.9  F (36.6  C) (Temporal)  Resp 16  Wt 171 lb 6.4 oz (77.7 kg)  BMI 32.92 kg/m2   Rep bp 150/76    GENERAL APPEARANCE: healthy, alert and no distress     EYES: EOMI, PERRL     HENT: ear canals and TM's normal and nose and mouth without ulcers or lesions     NECK: no adenopathy, no asymmetry, masses, or scars and thyroid normal to palpation     RESP: lungs clear to auscultation - no rales, rhonchi or wheezes     CV: regular rates and rhythm, normal S1 S2, no S3 or S4 and no murmur, click or rub     ABDOMEN:  soft, nontender, no HSM or masses and bowel sounds normal     MS: extremities normal- no gross deformities noted, no evidence of inflammation in joints     SKIN: no suspicious lesions or rashes     NEURO: Normal strength and tone, sensory exam grossly normal, mentation intact and speech normal     PSYCH: mentation appears normal. and affect normal     LYMPHATICS: No cervical adenopathy    DIAGNOSTICS:   EKG: appears normal, NSR, normal axis, normal intervals, no acute ST/T changes c/w ischemia, no LVH by voltage criteria, unchanged from previous tracings    Labs drawn today all okay except creatinine has increased slightly    Recent Labs   Lab Test  04/13/18   0923  03/12/18   1002  10/09/17   0900  03/01/17   1236   12/14/15   1443   05/17/13   0700   05/10/13   0700   HGB   --   13.0  12.9  13.8   < >  12.9   < >   --    --   10.0*   PLT   --    --   252   --    --   291   < >   --    --    --    INR   --    --    --    --    --    --    --   2.46*   --   2.18*   NA   --   142   --   145*   < >  140   < >   --    --   140   POTASSIUM   --   4.1   --   4.3   < >  3.7   < >   --    --   4.1   CR   --   1.11*   --   1.23*   < >  1.32*   < >   --    < >  1.96*   A1C  6.6*   --   6.5*   --    < >   --    < >   --    --    --     < > = values in this interval not displayed.        IMPRESSION:   Reason for surgery/procedure: DJD right knee  Diagnosis/reason for  consult: anesth clearance    The proposed surgical procedure is considered INTERMEDIATE risk.    REVISED CARDIAC RISK INDEX  The patient has the following serious cardiovascular risks for perioperative complications: hx CAD with stenting,now stable without sx        No serious cardiac risks  INTERPRETATION: 0 risks: Class I (very low risk - 0.4% complication rate)    The patient has the following additional risks for perioperative complications:     T2 DM, stable, well controlled on oral meds      ICD-10-CM    1. Preop general physical exam Z01.818        RECOMMENDATIONS:         --Patient is to take all scheduled medications on the day of surgery EXCEPT for modifications listed below.    Diabetes Medication Use    -----Hold usual oral and non-insulin diabetic meds (e.g. Metformin, Actos, Glipizide) while NPO.       Anticoagulant or Antiplatelet Medication Use  ASPIRIN: Discontinue ASA 5-7 days prior to procedure to reduce bleeding risk.  It should be resumed post-operatively.        ACE Inhibitor or Angiotensin Receptor Blocker (ARB) Use  Ace inhibitor or Angiotensin Receptor Blocker (ARB) and should HOLD this medication for the 24 hours prior to surgery.      APPROVAL GIVEN to proceed with proposed procedure, without further diagnostic evaluation       Signed Electronically by: Teddy Wray MD    Copy of this evaluation report is provided to requesting physician.    [facility] Preop Guidelines    Revised Cardiac Risk Index

## 2018-08-15 NOTE — MR AVS SNAPSHOT
After Visit Summary   8/15/2018    Sandra Petit    MRN: [id number]           Patient Information     Date Of Birth          1940        Visit Information        Provider Department      8/15/2018 4:30 PM Teddy Wray MD [facility]        Today's Diagnoses     Preop general physical exam    -  1    Type 2 diabetes mellitus with diabetic nephropathy, without long-term current use of insulin (H)        Primary osteoarthritis of right knee        Pre-op exam          Care Instructions      Before Your Surgery      Call your surgeon if there is any change in your health. This includes signs of a cold or flu (such as a sore throat, runny nose, cough, rash or fever).    Do not smoke, drink alcohol or take over the counter medicine (unless your surgeon or primary care doctor tells you to) for the 24 hours before and after surgery.    If you take prescribed drugs: Follow your doctor s orders about which medicines to take and which to stop until after surgery.    Eating and drinking prior to surgery: follow the instructions from your surgeon    Take a shower or bath the night before surgery. Use the soap your surgeon gave you to gently clean your skin. If you do not have soap from your surgeon, use your regular soap. Do not shave or scrub the surgery site.  Wear clean pajamas and have clean sheets on your bed.           Follow-ups after your visit        Your next 10 appointments already scheduled     Aug 16, 2018  9:15 AM CDT   XR LEG LENGTH EVALUATION with [id number]   [facility] ([facility])    [address] [place] [address]              Please bring a list of your current medicines to your exam. (Include vitamins, minerals and over-thecounter medicines.) Leave your valuables at home.  Tell your doctor if there is a chance you may be pregnant.  You do not need to do anything special for this exam.            Aug 16, 2018  9:30 AM CDT    Return Visit with Johnny Anaya MD   [facility] ([facility])    [address] [address] [address] [phone]            Aug 27, 2018   Procedure with Johnny Anaya MD   [facility] ([facility])    [address] [place] [address]  [place] [address]   [phone]           From [address]: Exit at [address] [employer] on south side of [place]. Turn right on [address] [employer]. Turn left at stoplight on [address]. [facility] will be in view two blocks ahead            Sep 10, 2018  1:10 PM CDT   Return Visit with Johnny Anaya MD   [facility] ([facility])    [address] [place] [id number] [phone]              Future tests that were ordered for you today     Open Future Orders        Priority Expected Expires Ordered    XR Leg Length Evaluation Routine 8/7/2018 8/7/2019 8/7/2018            Who to contact     If you have questions or need follow up information about today's clinic visit or your schedule please contact [facility] directly at [phone].  Normal or non-critical lab and imaging results will be communicated to you by MyChart, letter or phone within 4 business days after the clinic has received the results. If you do not hear from us within 7 days, please contact the clinic through [employer] or phone. If you have a critical or abnormal lab result, we will notify you by phone as soon as possible.  Submit refill requests through [employer] or call your pharmacy and they will forward the refill request to us. Please allow 3 business days for your refill to be completed.          Additional Information About Your Visit        [employer] Information     [employer] gives you secure access to your electronic health record. If you see a primary care provider, you can also send messages to your care team and make appointments. If you have questions, please  call your primary care clinic.  If you do not have a primary care provider, please call [phone] and they will assist you.        Care EveryWhere ID     This is your Care EveryWhere ID. This could be used by other organizations to access your [facility] medical records  [id number]        Your Vitals Were     Pulse Temperature Respirations BMI (Body Mass Index)          60 97.9  F (36.6  C) (Temporal) 16 32.92 kg/m2         Blood Pressure from Last 3 Encounters:   08/15/18 160/72   07/23/18 138/54   07/18/18 178/82    Weight from Last 3 Encounters:   08/15/18 171 lb 6.4 oz (77.7 kg)   07/23/18 172 lb (78 kg)   07/18/18 169 lb 6.4 oz (76.8 kg)              We Performed the Following     *UA reflex to Microscopic and Culture ([place]; [facility]; [facility]; [facility]; [facility]; [facility]; [place]; [place])     Basic metabolic panel     CBC with platelets differential     EKG 12-lead complete w/read - Clinics     Hemoglobin A1c     MRSA MSSA Presurgical Screen        Primary Care Provider Office Phone # Fax #    Teddy Wray [phone]       [address] [facility] [address]        Equal Access to Services     RYAN LOWERY : Hadii aad ku hadasho Soomaali, waaxda luqadaha, qaybta kaalmada adeegyada, waxphani dudleyin haydarrenn kevin blevins. So [facility] [phone].    ATENCIÓN: Si habla español, tiene a lacy disposición servicios gratuitos de asistencia lingüística. Llame al [phone].    We comply with applicable federal civil rights laws and [place] laws. We do not discriminate on the basis of race, color, national origin, age, disability, sex, sexual orientation, or gender identity.            Thank you!     Thank you for choosing [facility]  for your care. Our goal is always to provide you with excellent care. Hearing back from our patients is one way we can continue to improve our services. Please take a few minutes to complete the  written survey that you may receive in the mail after your visit with us. Thank you!             Your Updated Medication List - Protect others around you: Learn how to safely use, store and throw away your medicines at www.disposemymeds.org.          This list is accurate as of 8/15/18  6:45 PM.  Always use your most recent med list.                   Brand Name Dispense Instructions for use Diagnosis    ACIDOPHILUS PROBIOTIC BLEND Caps      Take 1 capsule by mouth 2 times daily        aspirin 81 MG tablet      Take 81 mg by mouth daily        blood glucose monitoring lancets     1 each    Use to test blood sugars 1 times daily or as directed.    Diabetic vasculopathy (H)       blood glucose monitoring test strip    ONETOUCH VERIO IQ    50 each    Use to test blood sugars 1 times daily or as directed.    Diabetic vasculopathy (H)       cloNIDine 0.2 MG tablet    CATAPRES    180 tablet    Take 1 tablet (0.2 mg) by mouth 2 times daily    Essential hypertension, benign       levofloxacin 750 MG tablet    LEVAQUIN    2 tablet    Take one tablet 2 hours prior to procedure    History of total left knee replacement       levothyroxine 50 MCG tablet    SYNTHROID    90 tablet    Take 1 tablet (50 mcg) by mouth daily    Hypothyroidism, unspecified type       losartan 50 MG tablet    COZAAR    180 tablet    Take 1 tablet (50 mg) by mouth 2 times daily    Essential hypertension, benign       metFORMIN 500 MG 24 hr tablet    GLUCOPHAGE-XR    90 tablet    TAKE ONE TABLET BY MOUTH ONCE DAILY WITH  DINNER    Type 2 diabetes mellitus with diabetic nephropathy, without long-term current use of insulin (H)       nitroGLYcerin 0.4 MG sublingual tablet    NITROSTAT    25 tablet    Place 1 tablet (0.4 mg) under the tongue every 5 minutes as needed for chest pain    Coronary artery disease involving native coronary artery without angina pectoris       simvastatin 40 MG tablet    ZOCOR    90 tablet    Take 0.5 tablets (20 mg) by mouth At  Bedtime    Hyperlipidemia LDL goal <100       vitamin D 400 units capsule     30 capsule    Take 1 capsule by mouth 2 times daily

## 2018-08-16 ENCOUNTER — RADIANT APPOINTMENT (OUTPATIENT)
Dept: GENERAL RADIOLOGY | Facility: CLINIC | Age: 78
End: 2018-08-16
Attending: ORTHOPAEDIC SURGERY
Payer: COMMERCIAL

## 2018-08-16 ENCOUNTER — OFFICE VISIT (OUTPATIENT)
Dept: ORTHOPEDICS | Facility: CLINIC | Age: 78
End: 2018-08-16
Payer: COMMERCIAL

## 2018-08-16 VITALS
WEIGHT: 171.4 LBS | HEIGHT: 61 IN | DIASTOLIC BLOOD PRESSURE: 67 MMHG | BODY MASS INDEX: 32.36 KG/M2 | HEART RATE: 66 BPM | SYSTOLIC BLOOD PRESSURE: 142 MMHG

## 2018-08-16 DIAGNOSIS — R82.71 BACTERIA IN URINE: ICD-10-CM

## 2018-08-16 DIAGNOSIS — M17.0 PRIMARY OSTEOARTHRITIS OF BOTH KNEES: ICD-10-CM

## 2018-08-16 DIAGNOSIS — M17.0 PRIMARY OSTEOARTHRITIS OF BOTH KNEES: Primary | ICD-10-CM

## 2018-08-16 DIAGNOSIS — M17.11 PRIMARY OSTEOARTHRITIS OF RIGHT KNEE: ICD-10-CM

## 2018-08-16 LAB
BACTERIA SPEC CULT: NORMAL
SPECIMEN SOURCE: NORMAL

## 2018-08-16 PROCEDURE — 77073 BONE LENGTH STUDIES: CPT | Mod: TC

## 2018-08-16 PROCEDURE — 99207 ZZC PREOP VISIT IN GLOBAL PKG: CPT | Performed by: PHYSICIAN ASSISTANT

## 2018-08-16 RX ORDER — GABAPENTIN 100 MG/1
100 CAPSULE ORAL AT BEDTIME
Qty: 30 CAPSULE | Refills: 1 | Status: SHIPPED | OUTPATIENT
Start: 2018-08-16 | End: 2018-10-02

## 2018-08-16 RX ORDER — GABAPENTIN 300 MG/1
CAPSULE ORAL
Qty: 30 CAPSULE | Refills: 1 | Status: SHIPPED | OUTPATIENT
Start: 2018-08-16 | End: 2018-08-16

## 2018-08-16 RX ORDER — CEPHALEXIN 500 MG/1
500 CAPSULE ORAL 3 TIMES DAILY
Qty: 9 CAPSULE | Refills: 0 | Status: SHIPPED | OUTPATIENT
Start: 2018-08-16 | End: 2018-08-19

## 2018-08-16 ASSESSMENT — PAIN SCALES - GENERAL: PAINLEVEL: SEVERE PAIN (6)

## 2018-08-16 NOTE — PROGRESS NOTES
"HISTORY OF PRESENT ILLNESS:    Sandra Petit is a 78 year old female who is seen in follow up for   Chief Complaint   Patient presents with     RECHECK     1 week prior to total Right knee   Present symptoms: Patient here for visit prior to total joint.  Patient states she is due to attend her class tonight.  Patient plans to return home with her  posthospitalization after discharge.  Patient states sensitivities with many medications including aspirin.  Patient states this is the only surgery she has look forward to  Treatments tried to this point: Previous Synvisc injection, Tylenol.  Unable to take NSAIDs due to kidney disease  Patient evaluation done with Dr. Anaya    Physical Exam:  Vitals: /67 (BP Location: Left arm, Patient Position: Chair, Cuff Size: Adult Large)  Pulse 66  Ht 1.537 m (5' 0.5\")  Wt 77.7 kg (171 lb 6.4 oz)  BMI 32.92 kg/m2  BMI= Body mass index is 32.92 kg/(m^2).  Constitutional: healthy, alert and no acute distress   Psychiatric: mentation appears normal and affect normal/bright  NEURO: no focal deficits  SKIN: no excoriation or erythema. No signs of infection.  JOINT/EXTREMITIES:  Affected extremity pulses are easily palpable.  Right knee knee Exam: Inspection: No visible lesions  Patient reports her left knee does not have full range of motion.  She is unable to flex to 90  to place her foot down while sitting in a chair  GAIT: antalgic    IMAGING INTERPRETATION: Long leg x-rays are available.  Patient with significant medial joint space narrowing/loss  Independent visualization of the images was performed.     LAB: Blood, urine are free of bacteria, naris testing is currently being processed    ASSESSMENT:    Chief Complaint   Patient presents with     RECHECK     1 week prior to total Right knee       ICD-10-CM    1. Primary osteoarthritis of both knees M17.0 XR Leg Length Evaluation   2. Bacteria in urine R82.71 cephALEXin (KEFLEX) 500 MG capsule   3. Primary " osteoarthritis of right knee M17.11 gabapentin (NEURONTIN) 100 MG capsule     DISCONTINUED: gabapentin (NEURONTIN) 300 MG capsule     Patient is 1 week prior to right total knee arthroplasty.  She has completed the requirements necessary to proceed.    Plan:   Patient will still need to obtain: Will need follow-up on nares results as they were unavailable at the time of this writing.  Patient will be attending joint education class tonight  Medications needed prior to surgery include: Gabapentin 100 mg due to patient's sensitivity to medicines.  She is advised this will cause drowsiness.  Meloxicam not ordered due to kidney issues  DVT prophylaxis will be:  aspirin 325mg Only 1 time per day as patient has had previous stomach ulcers.  Patient has been informed of therapy at home for 2 weeks following hospital discharge, than will attend outpatient therapy.   Patient plans for discharge to:  Home with support      Patient was given opportunities for questions.  Next office follow up will be 10-14 days from discharge from hospital.    Rosanne Perdomo PA-C   8/16/2018  10:30 AM      I attest I have seen and evaluated the patient.  I agree with above impression and plan.    Johnny Anaya MD

## 2018-08-16 NOTE — LETTER
"    8/16/2018         RE: Sandra Petit  [address] [facility] [address]        Dear Colleague,    Thank you for referring your patient, Sandra Petit, to the [facility]. Please see a copy of my visit note below.    HISTORY OF PRESENT ILLNESS:    Sandra Petit is a 78 year old female who is seen in follow up for   Chief Complaint   Patient presents with     RECHECK     1 week prior to total Right knee   Present symptoms: Patient here for visit prior to total joint.  Patient states she is due to attend her class ton[employer].  Patient plans to return home with her  posthospitalization after discharge.  Patient states sensitivities with many medications including aspirin.  Patient states this is the only surgery she has look forward to  Treatments tried to this point: Previous Synvisc injection, Tylenol.  Unable to take NSAIDs due to kidney disease  Patient evaluation done with Dr. Anaya    Physical Exam:  Vitals: /67 (BP Location: Left arm, Patient Position: Chair, Cuff Size: Adult Large)  Pulse 66  Ht 1.537 m (5' 0.5\")  Wt 77.7 kg (171 lb 6.4 oz)  BMI 32.92 kg/m2  BMI= Body mass index is 32.92 kg/(m^2).  Constitutional: healthy, alert and no acute distress   Psychiatric: mentation appears normal and affect normal/bright  NEURO: no focal deficits  SKIN: no excoriation or erythema. No signs of infection.  JOINT/EXTREMITIES:  Affected extremity pulses are easily palpable.  Right knee knee Exam: Inspection: No visible lesions  Patient reports her left knee does not have full range of motion.  She is unable to flex to 90  to place her foot down while sitting in a chair  GAIT: antalgic    IMAGING INTERPRETATION: Long leg x-rays are available.  Patient with significant medial joint space narrowing/loss  Independent visualization of the images was performed.     LAB: Blood, urine are free of bacteria, naris testing is currently being processed    ASSESSMENT:    Chief Complaint   Patient " presents with     RECHECK     1 week prior to total Right knee       ICD-10-CM    1. Primary osteoarthritis of both knees M17.0 XR Leg Length Evaluation   2. Bacteria in urine R82.71 cephALEXin (KEFLEX) 500 MG capsule   3. Primary osteoarthritis of right knee M17.11 gabapentin (NEURONTIN) 100 MG capsule     DISCONTINUED: gabapentin (NEURONTIN) 300 MG capsule     Patient is 1 week prior to right total knee arthroplasty.  She has completed the requirements necessary to proceed.    Plan:   Patient will still need to obtain: Will need follow-up on nares results as they were unavailable at the time of this writing.  Patient will be attending joint education class tonight  Medications needed prior to surgery include: Gabapentin 100 mg due to patient's sensitivity to medicines.  She is advised this will cause drowsiness.  Meloxicam not ordered due to kidney issues  DVT prophylaxis will be:  aspirin 325mg Only 1 time per day as patient has had previous stomach ulcers.  Patient has been informed of therapy at home for 2 weeks following hospital discharge, than will attend outpatient therapy.   Patient plans for discharge to:  Home with support      Patient was given opportunities for questions.  Next office follow up will be 10-14 days from discharge from hospital.    Rosanne Perdomo PA-C   8/16/2018  10:30 AM      I attest I have seen and evaluated the patient.  I agree with above impression and plan.    Johnny Anaya MD    Again, thank you for allowing me to participate in the care of your patient.        Sincerely,        Johnny Anaya MD

## 2018-08-16 NOTE — MR AVS SNAPSHOT
After Visit Summary   8/16/2018    Sandra Petit    MRN: [id number]           Patient Information     Date Of Birth          1940        Visit Information        Provider Department      8/16/2018 9:30 AM Johnny Anaya MD [facility]        Today's Diagnoses     Primary osteoarthritis of both knees    -  1    Bacteria in urine        Primary osteoarthritis of right knee           Follow-ups after your visit        Your next 10 appointments already scheduled     Aug 27, 2018   Procedure with Johnny Anaya MD   [facility] ([facility])    [address] [facility] [id number] [phone]           From [address]: Exit at [place] on south side of [place]. Turn right on [address]. Turn left at stoplight on [address]. [facility] will be in view two blocks ahead            Sep 10, 2018  1:10 PM CDT   Return Visit with Johnny Anaya MD   [facility] ([facility])    [address] [facility] [id number] [phone]              Who to contact     If you have questions or need follow up information about today's clinic visit or your schedule please contact [facility] directly at [phone].  Normal or non-critical lab and imaging results will be communicated to you by MyChart, letter or phone within 4 business days after the clinic has received the results. If you do not hear from us within 7 days, please contact the clinic through [employer] or phone. If you have a critical or abnormal lab result, we will notify you by phone as soon as possible.  Submit refill requests through [email or web address] or call your pharmacy and they will forward the refill request to us. Please allow 3 business days for your refill to be completed.          Additional Information About Your Visit        [email or web address] Information     [email or web address] gives you secure access to your electronic  "health record. If you see a primary care provider, you can also send messages to your care team and make appointments. If you have questions, please call your primary care clinic.  If you do not have a primary care provider, please call [phone] and they will assist you.        Care EveryWhere ID     This is your Care EveryWhere ID. This could be used by other organizations to access your [facility] medical records  [id number]        Your Vitals Were     Pulse Height BMI (Body Mass Index)             66 1.537 m (5' 0.5\") 32.92 kg/m2          Blood Pressure from Last 3 Encounters:   08/16/18 142/67   08/15/18 160/72   07/23/18 138/54    Weight from Last 3 Encounters:   08/16/18 77.7 kg (171 lb 6.4 oz)   08/15/18 77.7 kg (171 lb 6.4 oz)   07/23/18 78 kg (172 lb)                 Today's Medication Changes          These changes are accurate as of 8/16/18 11:38 AM.  If you have any questions, ask your nurse or doctor.               Start taking these medicines.        Dose/Directions    cephALEXin 500 MG capsule   Commonly known as:  KEFLEX   Used for:  Bacteria in urine   Started by:  Johnny Anaya MD        Dose:  500 mg   Take 1 capsule (500 mg) by mouth 3 times daily for 3 days   Quantity:  9 capsule   Refills:  0       gabapentin 100 MG capsule   Commonly known as:  NEURONTIN   Used for:  Primary osteoarthritis of right knee   Started by:  Johnny Anaya MD        Dose:  100 mg   Take 1 capsule (100 mg) by mouth At Bedtime Begin 3-4 days prior to surgery   Quantity:  30 capsule   Refills:  1            Where to get your medicines      These medications were sent to [facility] [address] [place] [address] - [address]  [address], [facility] [address]     Phone:  [phone]     cephALEXin 500 MG capsule    gabapentin 100 MG capsule                Primary Care Provider Office Phone # Fax #    Teddy Wray [phone] [address] [facility] [address]      "   Equal Access to Services     [facility] : Hadii aad ku hadmariselabinta Mickyali, waaxda luqadaha, qaybta kaalmaquentin rivers, veronica blevins. So [facility] [phone].    ATENCIÓN: Si habla español, tiene a lacy disposición servicios gratuitos de asistencia lingüística. Llame al [phone].    We comply with applicable federal civil rights laws and [place] laws. We do not discriminate on the basis of race, color, national origin, age, disability, sex, sexual orientation, or gender identity.            Thank you!     Thank you for choosing [facility]  for your care. Our goal is always to provide you with excellent care. Hearing back from our patients is one way we can continue to improve our services. Please take a few minutes to complete the written survey that you may receive in the mail after your visit with us. Thank you!             Your Updated Medication List - Protect others around you: Learn how to safely use, store and throw away your medicines at www.disposemymeds.org.          This list is accurate as of 8/16/18 11:38 AM.  Always use your most recent med list.                   Brand Name Dispense Instructions for use Diagnosis    ACIDOPHILUS PROBIOTIC BLEND Caps      Take 1 capsule by mouth 2 times daily        aspirin 81 MG tablet      Take 81 mg by mouth daily        blood glucose monitoring lancets     1 each    Use to test blood sugars 1 times daily or as directed.    Diabetic vasculopathy (H)       blood glucose monitoring test strip    ONETOUCH VERIO IQ    50 each    Use to test blood sugars 1 times daily or as directed.    Diabetic vasculopathy (H)       cephALEXin 500 MG capsule    KEFLEX    9 capsule    Take 1 capsule (500 mg) by mouth 3 times daily for 3 days    Bacteria in urine       cloNIDine 0.2 MG tablet    CATAPRES    180 tablet    Take 1 tablet (0.2 mg) by mouth 2 times daily    Essential hypertension, benign       gabapentin 100 MG capsule     NEURONTIN    30 capsule    Take 1 capsule (100 mg) by mouth At Bedtime Begin 3-4 days prior to surgery    Primary osteoarthritis of right knee       levofloxacin 750 MG tablet    LEVAQUIN    2 tablet    Take one tablet 2 hours prior to procedure    History of total left knee replacement       levothyroxine 50 MCG tablet    SYNTHROID    90 tablet    Take 1 tablet (50 mcg) by mouth daily    Hypothyroidism, unspecified type       losartan 50 MG tablet    COZAAR    180 tablet    Take 1 tablet (50 mg) by mouth 2 times daily    Essential hypertension, benign       metFORMIN 500 MG 24 hr tablet    GLUCOPHAGE-XR    90 tablet    TAKE ONE TABLET BY MOUTH ONCE DAILY WITH  DINNER    Type 2 diabetes mellitus with diabetic nephropathy, without long-term current use of insulin (H)       nitroGLYcerin 0.4 MG sublingual tablet    NITROSTAT    25 tablet    Place 1 tablet (0.4 mg) under the tongue every 5 minutes as needed for chest pain    Coronary artery disease involving native coronary artery without angina pectoris       simvastatin 40 MG tablet    ZOCOR    90 tablet    Take 0.5 tablets (20 mg) by mouth At Bedtime    Hyperlipidemia LDL goal <100       vitamin D 400 units capsule     30 capsule    Take 1 capsule by mouth 2 times daily

## 2018-08-22 DIAGNOSIS — I10 ESSENTIAL HYPERTENSION, BENIGN: ICD-10-CM

## 2018-08-22 NOTE — TELEPHONE ENCOUNTER
"Requested Prescriptions   Pending Prescriptions Disp Refills     losartan (COZAAR) 50 MG tablet [Pharmacy Med Name: LOSARTAN 50MG TAB] 180 tablet 1    Last Written Prescription Date:  01/12/2018  Last Fill Quantity: 180,  # refills: 1   Last office visit: 8/15/2018 with prescribing provider:   Future Office Visit:   Next 5 appointments (look out 90 days)     Sep 10, 2018  1:10 PM CDT   [facility] Visit with Johnny An[facility] MD   [facility]    [address] [place] [id number] [phone]                  Sig: TAKE ONE TABLET BY MOUTH TWICE DAILY    Angiotensin-II Receptors Failed    8/22/2018 10:45 AM       Failed - Blood pressure under 140/90 in past 12 months    BP Readings from Last 3 Encounters:   08/16/18 142/67   08/15/18 160/72   07/23/18 138/54                Failed - Normal serum creatinine on file in past 12 months    Recent Labs   Lab Test  08/15/18   1718   CR  1.31*            Passed - Recent (12 mo) or future (30 days) visit within the authorizing provider's specialty    Patient had office visit in the last 12 months or has a visit in the next 30 days with authorizing provider or within the authorizing provider's specialty.  See \"Patient Info\" tab in inbasket, or \"Choose Columns\" in Meds & Orders section of the refill encounter.           Passed - Patient is age 18 or older       Passed - No active pregnancy on record       Passed - Normal serum potassium on file in past 12 months    Recent Labs   Lab Test  08/15/18   1718   POTASSIUM  4.3                   Passed - No positive pregnancy test in past 12 months          "

## 2018-08-24 RX ORDER — LOSARTAN POTASSIUM 50 MG/1
TABLET ORAL
Qty: 180 TABLET | Refills: 1 | Status: SHIPPED | OUTPATIENT
Start: 2018-08-24 | End: 2019-03-05

## 2018-08-24 NOTE — H&P (VIEW-ONLY)
[address] [address]  [phone]  Dept: [phone]    PRE-OP EVALUATION:  Today's date: 8/15/2018    Sandra Petit (: 1940) presents for pre-operative evaluation assessment as requested by Dr. Anaya.  She requires evaluation and anesthesia risk assessment prior to undergoing surgery/procedure for treatment of painful rt  knee.  Johnny Anaya MD Primary   Rosanne Perdomo PA-C Assisting    Procedure Laterality Anesthesia   ARTHROPLASTY KNEE Right Combined General with Block   right total knee arthroplasty          Primary Physician: Teddy Wray  Type of Anesthesia Anticipated: Combined General with Block     Patient has a Health Care Directive or Living Will:  YES     Preop Questions 8/15/2018   Who is doing your surgery? -Dr Anaya   What are you having done? -Rt knee replacement   Date of Surgery/Procedure: 18   Facility or Hospital where procedure/surgery will be performed: [facility]   1.  Do you have a history of Heart attack, stroke, stent, coronary bypass surgery, or other heart surgery? YES  - stents x's 2  No symptoms now   2.  Do you ever have any pain or discomfort in your chest? YES - but does not think it is heart related, likely msk   3.  Do you have a history of  Heart Failure? No   4.   Are you troubled by shortness of breath when:  walking on a level surface, or up a slight hill, or at night? No   5.  Do you currently have a cold, bronchitis or other respiratory infection? No   6.  Do you have a cough, shortness of breath, or wheezing? No   7.  Do you sometimes get pains in the calves of your legs when you walk? No   8. Do you or anyone in your family have previous history of blood clots? YES - mother years ago   9.  Do you or does anyone in your family have a serious bleeding problem such as prolonged bleeding following surgeries or cuts? No   10. Have you ever had problems with anemia or been told to take iron  pills? No   11. Have you had any abnormal blood loss such as black, tarry or bloody stools, or abnormal vaginal bleeding? No   12. Have you ever had a blood transfusion? No   13. Have you or any of your relatives ever had problems with anesthesia? No   14. Do you have sleep apnea, excessive snoring or daytime drowsiness? No   15. Do you have any prosthetic heart valves? No   16. Do you have prosthetic joints? YES - lt knee   17. Is there any chance that you may be pregnant? No         HPI:     HPI related to upcoming procedure: right TKA for djd      See problem list for active medical problems.  Problems all longstanding and stable, except as noted/documented.  See ROS for pertinent symptoms related to these conditions.                                                                                                                                                          .    MEDICAL HISTORY:     Patient Active Problem List    Diagnosis Date Noted     Primary osteoarthritis of right knee 03/14/2018     Priority: Medium     S/P total knee arthroplasty, left 03/14/2018     Priority: Medium     Osteoarthritis of left thumb 07/07/2017     Priority: Medium     Herpes zoster without complication 09/07/2016     Priority: Medium     Type 2 diabetes mellitus with other circulatory complications 04/04/2016     Priority: Medium     Hyperuricemia 04/04/2016     Priority: Medium     Hypothyroidism, unspecified hypothyroidism type 10/02/2015     Priority: Medium     Coronary artery disease involving native coronary artery without angina pectoris 10/02/2015     Priority: Medium     Type 2 diabetes mellitus with diabetic nephropathy (H) 10/02/2015     Priority: Medium     Acute-on-chronic kidney injury (H) 01/19/2015     Priority: Medium     Coronary atherosclerosis of native coronary artery 01/19/2015     Priority: Medium     Chest pain 01/19/2015     Priority: Medium     Bunion 07/18/2013     Priority: Medium     History of total  left knee replacement 04/29/2013     Priority: Medium     Advance Care Planning 04/15/2013     Priority: Medium     Advance Care Planning 8/11/2015: Receipt of ACP document:  Received: Health Care Directive which was witnessed or notarized on 8/4/15.  Document not previously scanned.  Validation form completed and sent with document to be scanned.  Code Status reflects choices in most recent ACP document.  Confirmed/documented designated decision maker(s).  Added by Lindsey Flor  Advance Care Planning: Receipt of ACP document:  Received: Resuscitation Guidelines order which was witnessed and signed by provider on 5-2-13.  Document previously scanned on 5-7-13.  Order reviewed and found to be valid.  Code Status needs to be updated to reflect choices in most recent ACP document-Order states DNR  Confirmed/documented designated decision maker(s). See permanent comments section of demographics in clinical tab. View document(s) and details by clicking on code status. Added by Miguelina Baez RN System ACP Coordinator on 5/23/2013.    Information given.       OA (osteoarthritis) of knee - bilateral 10/29/2012     Priority: Medium     Hypertension goal BP (blood pressure) < 140/90 09/26/2011     Priority: Medium     HYPERLIPIDEMIA LDL GOAL <100 10/31/2010     Priority: Medium     Encounter for long-term current use of medication 09/22/2010     Priority: Medium     Problem list name updated by automated process. Provider to review       CKD (chronic kidney disease) stage 3, GFR 30-59 ml/min 03/17/2009     Priority: Medium      MONONUCLEOSIS ebv inf 7/05 08/05/2005     Priority: Medium     Coronary atherosclerosis 06/06/2005     Priority: Medium     Problem list name updated by automated process. Provider to review       Cardiac dysrhythmia 07/18/2001     Priority: Medium     Problem list name updated by automated process. Provider to review       Myalgia and myositis 07/18/2001     Priority: Medium     Problem list  name updated by automated process. Provider to review        Past Medical History:   Diagnosis Date     Diabetic eye exam (H) 08/05/13     Endometriosis, site unspecified      Fever and other physiologic disturbances of temperature regulation 07/07/2005    Admit.  Discharged 07/13/2005.  Cause undetermined.     Myalgia and myositis, unspecified     fibromyalgia     Pure hypercholesterolemia      Unspecified essential hypertension      Unspecified hypothyroidism      Past Surgical History:   Procedure Laterality Date     ARTHROPLASTY KNEE  4/29/2013    Procedure: ARTHROPLASTY KNEE;  left total knee arthroplasty;  Surgeon: Teddy Grimes MD;  Location: [facility]     C APPENDECTOMY       C NONSPECIFIC PROCEDURE      Knee ligament surgery     C NONSPECIFIC PROCEDURE      Kidney surgery for mechanical obstruction     C OPEN CORONARY ENDARTERECTOMY  june 2005    Angioplasty w stenting     C TOTAL KNEE ARTHROPLASTY  4/29/13    Left     COMBINED CYSTOSCOPY, INSERT CATHETER URETER Bilateral 8/10/2015    Procedure: COMBINED CYSTOSCOPY, INSERT CATHETER URETER;  Surgeon: Johnnie Madera MD;  Location: [facility]     DILATION AND CURETTAGE, HYSTEROSCOPY DIAGNOSTIC, COMBINED N/A 11/6/2014    Procedure: COMBINED DILATION AND CURETTAGE, HYSTEROSCOPY DIAGNOSTIC;  Surgeon: Edward Pardo MD;  Location: [facility]     ESOPHAGOSCOPY, GASTROSCOPY, DUODENOSCOPY (EGD), COMBINED N/A 1/27/2015    Procedure: COMBINED ESOPHAGOSCOPY, GASTROSCOPY, DUODENOSCOPY (EGD), BIOPSY SINGLE OR MULTIPLE;  Surgeon: Carmelo Rosario MD;  Location: [facility]     HC REMOVAL GALLBLADDER      Cholecystectomy     HC REMOVE TONSILS/ADENOIDS,<13 Y/O      unsure of age     LAPAROSCOPIC HYSTERECTOMY TOTAL N/A 8/10/2015    Procedure: LAPAROSCOPIC HYSTERECTOMY TOTAL;  Surgeon: Edward Pardo MD;  Location: [facility]     LAPAROSCOPIC LYSIS ADHESIONS N/A 8/10/2015    Procedure: LAPAROSCOPIC LYSIS ADHESIONS;  Surgeon: Edward Pardo MD;   "Location: [facility]     Current Outpatient Prescriptions   Medication Sig Dispense Refill     aspirin 81 MG tablet Take 81 mg by mouth daily       blood glucose monitoring (ONE TOUCH DELICA) lancets Use to test blood sugars 1 times daily or as directed. 1 each 1     blood glucose monitoring (ONE TOUCH VERIO IQ) test strip Use to test blood sugars 1 times daily or as directed. 50 each 2     Cholecalciferol (VITAMIN D) 400 UNITS capsule Take 1 capsule by mouth 2 times daily 30 capsule      cloNIDine (CATAPRES) 0.2 MG tablet Take 1 tablet (0.2 mg) by mouth 2 times daily 180 tablet 3     levofloxacin (LEVAQUIN) 750 MG tablet Take one tablet 2 hours prior to procedure 2 tablet 3     levothyroxine (SYNTHROID) 50 MCG tablet Take 1 tablet (50 mcg) by mouth daily 90 tablet 3     losartan (COZAAR) 50 MG tablet Take 1 tablet (50 mg) by mouth 2 times daily 180 tablet 1     metFORMIN (GLUCOPHAGE-XR) 500 MG 24 hr tablet TAKE ONE TABLET BY MOUTH ONCE DAILY WITH  DINNER 90 tablet 3     nitroglycerin (NITROSTAT) 0.4 MG SL tablet Place 1 tablet (0.4 mg) under the tongue every 5 minutes as needed for chest pain 25 tablet 0     Probiotic Product (ACIDOPHILUS PROBIOTIC BLEND) CAPS Take 1 capsule by mouth 2 times daily       simvastatin (ZOCOR) 40 MG tablet Take 0.5 tablets (20 mg) by mouth At Bedtime 90 tablet 3     OTC products: None, except as noted above    Allergies   Allergen Reactions     Penicillins      \"throat started swelling\"     Vitamin B Complex Hives     Vitamin B12 Hives     all vitamin B's     Clindamycin Hcl Rash      Latex Allergy: NO    Social History   Substance Use Topics     Smoking status: Former Smoker     Smokeless tobacco: Never Used      Comment: quit  1987     Alcohol use No     History   Drug Use No       REVIEW OF SYSTEMS:   Constitutional, neuro, ENT, endocrine, pulmonary, cardiac, gastrointestinal, genitourinary, musculoskeletal, integument and psychiatric systems are negative, except as otherwise " noted.    EXAM:   /78  Pulse 60  Temp 97.9  F (36.6  C) (Temporal)  Resp 16  Wt 171 lb 6.4 oz (77.7 kg)  BMI 32.92 kg/m2   Rep bp 150/76    GENERAL APPEARANCE: healthy, alert and no distress     EYES: EOMI, PERRL     HENT: ear canals and TM's normal and nose and mouth without ulcers or lesions     NECK: no adenopathy, no asymmetry, masses, or scars and thyroid normal to palpation     RESP: lungs clear to auscultation - no rales, rhonchi or wheezes     CV: regular rates and rhythm, normal S1 S2, no S3 or S4 and no murmur, click or rub     ABDOMEN:  soft, nontender, no HSM or masses and bowel sounds normal     MS: extremities normal- no gross deformities noted, no evidence of inflammation in joints     SKIN: no suspicious lesions or rashes     NEURO: Normal strength and tone, sensory exam grossly normal, mentation intact and speech normal     PSYCH: mentation appears normal. and affect normal     LYMPHATICS: No cervical adenopathy    DIAGNOSTICS:   EKG: appears normal, NSR, normal axis, normal intervals, no acute ST/T changes c/w ischemia, no LVH by voltage criteria, unchanged from previous tracings    Labs drawn today all okay except creatinine has increased slightly    Recent Labs   Lab Test  04/13/18   0923  03/12/18   1002  10/09/17   0900  03/01/17   1236   12/14/15   1443   05/17/13   0700   05/10/13   0700   HGB   --   13.0  12.9  13.8   < >  12.9   < >   --    --   10.0*   PLT   --    --   252   --    --   291   < >   --    --    --    INR   --    --    --    --    --    --    --   2.46*   --   2.18*   NA   --   142   --   145*   < >  140   < >   --    --   140   POTASSIUM   --   4.1   --   4.3   < >  3.7   < >   --    --   4.1   CR   --   1.11*   --   1.23*   < >  1.32*   < >   --    < >  1.96*   A1C  6.6*   --   6.5*   --    < >   --    < >   --    --    --     < > = values in this interval not displayed.        IMPRESSION:   Reason for surgery/procedure: DJD right knee  Diagnosis/reason for  consult: anesth clearance    The proposed surgical procedure is considered INTERMEDIATE risk.    REVISED CARDIAC RISK INDEX  The patient has the following serious cardiovascular risks for perioperative complications: hx CAD with stenting,now stable without sx        No serious cardiac risks  INTERPRETATION: 0 risks: Class I (very low risk - 0.4% complication rate)    The patient has the following additional risks for perioperative complications:     T2 DM, stable, well controlled on oral meds      ICD-10-CM    1. Preop general physical exam Z01.818        RECOMMENDATIONS:         --Patient is to take all scheduled medications on the day of surgery EXCEPT for modifications listed below.    Diabetes Medication Use    -----Hold usual oral and non-insulin diabetic meds (e.g. Metformin, Actos, Glipizide) while NPO.       Anticoagulant or Antiplatelet Medication Use  ASPIRIN: Discontinue ASA 5-7 days prior to procedure to reduce bleeding risk.  It should be resumed post-operatively.        ACE Inhibitor or Angiotensin Receptor Blocker (ARB) Use  Ace inhibitor or Angiotensin Receptor Blocker (ARB) and should HOLD this medication for the 24 hours prior to surgery.      APPROVAL GIVEN to proceed with proposed procedure, without further diagnostic evaluation       Signed Electronically by: Teddy Wray MD    Copy of this evaluation report is provided to requesting physician.    [place] Preop Guidelines    Revised Cardiac Risk Index

## 2018-08-24 NOTE — TELEPHONE ENCOUNTER
Routing refill request to provider for review/approval because:  Labs out of range:  Shaw Tapia, RN, BSN

## 2018-08-27 ENCOUNTER — APPOINTMENT (OUTPATIENT)
Dept: PHYSICAL THERAPY | Facility: CLINIC | Age: 78
DRG: 470 | End: 2018-08-27
Attending: ORTHOPAEDIC SURGERY
Payer: MEDICARE

## 2018-08-27 ENCOUNTER — ANESTHESIA (OUTPATIENT)
Dept: SURGERY | Facility: CLINIC | Age: 78
DRG: 470 | End: 2018-08-27
Payer: MEDICARE

## 2018-08-27 ENCOUNTER — APPOINTMENT (OUTPATIENT)
Dept: GENERAL RADIOLOGY | Facility: CLINIC | Age: 78
DRG: 470 | End: 2018-08-27
Attending: NURSE PRACTITIONER
Payer: MEDICARE

## 2018-08-27 ENCOUNTER — HOSPITAL ENCOUNTER (INPATIENT)
Facility: CLINIC | Age: 78
LOS: 2 days | Discharge: HOME HEALTH | DRG: 470 | End: 2018-08-29
Attending: ORTHOPAEDIC SURGERY | Admitting: ORTHOPAEDIC SURGERY
Payer: MEDICARE

## 2018-08-27 ENCOUNTER — ANESTHESIA EVENT (OUTPATIENT)
Dept: SURGERY | Facility: CLINIC | Age: 78
DRG: 470 | End: 2018-08-27
Payer: MEDICARE

## 2018-08-27 ENCOUNTER — APPOINTMENT (OUTPATIENT)
Dept: GENERAL RADIOLOGY | Facility: CLINIC | Age: 78
DRG: 470 | End: 2018-08-27
Attending: ORTHOPAEDIC SURGERY
Payer: MEDICARE

## 2018-08-27 DIAGNOSIS — Z96.651 S/P TOTAL KNEE ARTHROPLASTY, RIGHT: Primary | ICD-10-CM

## 2018-08-27 LAB
CREAT SERPL-MCNC: 1.29 MG/DL (ref 0.52–1.04)
GFR SERPL CREATININE-BSD FRML MDRD: 40 ML/MIN/1.7M2
GLUCOSE BLDC GLUCOMTR-MCNC: 146 MG/DL (ref 70–99)
GLUCOSE BLDC GLUCOMTR-MCNC: 156 MG/DL (ref 70–99)
GLUCOSE BLDC GLUCOMTR-MCNC: 251 MG/DL (ref 70–99)
GLUCOSE BLDC GLUCOMTR-MCNC: 354 MG/DL (ref 70–99)
URATE SERPL-MCNC: 6.8 MG/DL (ref 2.6–6)

## 2018-08-27 PROCEDURE — 25000128 H RX IP 250 OP 636: Performed by: PHYSICIAN ASSISTANT

## 2018-08-27 PROCEDURE — A9270 NON-COVERED ITEM OR SERVICE: HCPCS | Mod: GY | Performed by: PHYSICIAN ASSISTANT

## 2018-08-27 PROCEDURE — 36415 COLL VENOUS BLD VENIPUNCTURE: CPT | Performed by: NURSE PRACTITIONER

## 2018-08-27 PROCEDURE — A9270 NON-COVERED ITEM OR SERVICE: HCPCS | Mod: GY | Performed by: NURSE PRACTITIONER

## 2018-08-27 PROCEDURE — A9270 NON-COVERED ITEM OR SERVICE: HCPCS | Mod: GY | Performed by: ORTHOPAEDIC SURGERY

## 2018-08-27 PROCEDURE — 25000132 ZZH RX MED GY IP 250 OP 250 PS 637: Mod: GY | Performed by: ORTHOPAEDIC SURGERY

## 2018-08-27 PROCEDURE — 84550 ASSAY OF BLOOD/URIC ACID: CPT | Performed by: NURSE PRACTITIONER

## 2018-08-27 PROCEDURE — 25000128 H RX IP 250 OP 636: Performed by: ORTHOPAEDIC SURGERY

## 2018-08-27 PROCEDURE — 27210995 ZZH RX 272: Performed by: ORTHOPAEDIC SURGERY

## 2018-08-27 PROCEDURE — 25000125 ZZHC RX 250: Performed by: NURSE ANESTHETIST, CERTIFIED REGISTERED

## 2018-08-27 PROCEDURE — 97110 THERAPEUTIC EXERCISES: CPT | Mod: GP | Performed by: PHYSICAL THERAPIST

## 2018-08-27 PROCEDURE — 25000128 H RX IP 250 OP 636: Performed by: NURSE ANESTHETIST, CERTIFIED REGISTERED

## 2018-08-27 PROCEDURE — 0SRC0J9 REPLACEMENT OF RIGHT KNEE JOINT WITH SYNTHETIC SUBSTITUTE, CEMENTED, OPEN APPROACH: ICD-10-PCS | Performed by: ORTHOPAEDIC SURGERY

## 2018-08-27 PROCEDURE — 99232 SBSQ HOSP IP/OBS MODERATE 35: CPT | Performed by: FAMILY MEDICINE

## 2018-08-27 PROCEDURE — 0SBC0ZZ EXCISION OF RIGHT KNEE JOINT, OPEN APPROACH: ICD-10-PCS | Performed by: ORTHOPAEDIC SURGERY

## 2018-08-27 PROCEDURE — 25000132 ZZH RX MED GY IP 250 OP 250 PS 637: Mod: GY | Performed by: NURSE PRACTITIONER

## 2018-08-27 PROCEDURE — 27447 TOTAL KNEE ARTHROPLASTY: CPT | Mod: AS | Performed by: PHYSICIAN ASSISTANT

## 2018-08-27 PROCEDURE — C1776 JOINT DEVICE (IMPLANTABLE): HCPCS | Performed by: ORTHOPAEDIC SURGERY

## 2018-08-27 PROCEDURE — 00000146 ZZHCL STATISTIC GLUCOSE BY METER IP

## 2018-08-27 PROCEDURE — 37000008 ZZH ANESTHESIA TECHNICAL FEE, 1ST 30 MIN: Performed by: ORTHOPAEDIC SURGERY

## 2018-08-27 PROCEDURE — 71000014 ZZH RECOVERY PHASE 1 LEVEL 2 FIRST HR: Performed by: ORTHOPAEDIC SURGERY

## 2018-08-27 PROCEDURE — 73660 X-RAY EXAM OF TOE(S): CPT | Mod: TC,LT

## 2018-08-27 PROCEDURE — 12000007 ZZH R&B INTERMEDIATE

## 2018-08-27 PROCEDURE — 36000063 ZZH SURGERY LEVEL 4 EA 15 ADDTL MIN: Performed by: ORTHOPAEDIC SURGERY

## 2018-08-27 PROCEDURE — 27210794 ZZH OR GENERAL SUPPLY STERILE: Performed by: ORTHOPAEDIC SURGERY

## 2018-08-27 PROCEDURE — 97530 THERAPEUTIC ACTIVITIES: CPT | Mod: GP | Performed by: PHYSICAL THERAPIST

## 2018-08-27 PROCEDURE — 27447 TOTAL KNEE ARTHROPLASTY: CPT | Mod: RT | Performed by: ORTHOPAEDIC SURGERY

## 2018-08-27 PROCEDURE — 99207 ZZC CONSULT E&M CHANGED TO SUBSEQUENT LEVEL: CPT | Performed by: FAMILY MEDICINE

## 2018-08-27 PROCEDURE — 25000132 ZZH RX MED GY IP 250 OP 250 PS 637: Mod: GY | Performed by: PHYSICIAN ASSISTANT

## 2018-08-27 PROCEDURE — 37000009 ZZH ANESTHESIA TECHNICAL FEE, EACH ADDTL 15 MIN: Performed by: ORTHOPAEDIC SURGERY

## 2018-08-27 PROCEDURE — 25000131 ZZH RX MED GY IP 250 OP 636 PS 637: Mod: GY | Performed by: NURSE PRACTITIONER

## 2018-08-27 PROCEDURE — 40000193 ZZH STATISTIC PT WARD VISIT: Performed by: PHYSICAL THERAPIST

## 2018-08-27 PROCEDURE — 36000093 ZZH SURGERY LEVEL 4 1ST 30 MIN: Performed by: ORTHOPAEDIC SURGERY

## 2018-08-27 PROCEDURE — 40000986 XR KNEE PORT RT 1/2 VW: Mod: RT

## 2018-08-27 PROCEDURE — 97161 PT EVAL LOW COMPLEX 20 MIN: CPT | Mod: GP | Performed by: PHYSICAL THERAPIST

## 2018-08-27 PROCEDURE — 40000306 ZZH STATISTIC PRE PROC ASSESS II: Performed by: ORTHOPAEDIC SURGERY

## 2018-08-27 PROCEDURE — 82565 ASSAY OF CREATININE: CPT | Performed by: NURSE PRACTITIONER

## 2018-08-27 PROCEDURE — 27810169 ZZH OR IMPLANT GENERAL: Performed by: ORTHOPAEDIC SURGERY

## 2018-08-27 DEVICE — IMP INSERT TIBIAL HOWM TRI 3X11MM 5531-G-311: Type: IMPLANTABLE DEVICE | Site: KNEE | Status: FUNCTIONAL

## 2018-08-27 DEVICE — IMP COMP FEM STRK TRIATHLN CR RT 3 5510-F-302: Type: IMPLANTABLE DEVICE | Site: KNEE | Status: FUNCTIONAL

## 2018-08-27 DEVICE — IMP COMP PATELLA HOWM ASYM TRI 32X10MM 5551-L-320: Type: IMPLANTABLE DEVICE | Site: KNEE | Status: FUNCTIONAL

## 2018-08-27 DEVICE — IMP BASEPLATE TIBIAL HOWM TRI 3 5520-B-300: Type: IMPLANTABLE DEVICE | Site: KNEE | Status: FUNCTIONAL

## 2018-08-27 DEVICE — BONE CEMENT SIMPLEX W/TOBRAMYCIN 6197-9-001: Type: IMPLANTABLE DEVICE | Site: KNEE | Status: FUNCTIONAL

## 2018-08-27 RX ORDER — ONDANSETRON 4 MG/1
4 TABLET, ORALLY DISINTEGRATING ORAL EVERY 6 HOURS PRN
Status: DISCONTINUED | OUTPATIENT
Start: 2018-08-27 | End: 2018-08-29 | Stop reason: HOSPADM

## 2018-08-27 RX ORDER — BUPIVACAINE HYDROCHLORIDE 7.5 MG/ML
INJECTION, SOLUTION INTRASPINAL PRN
Status: DISCONTINUED | OUTPATIENT
Start: 2018-08-27 | End: 2018-08-27

## 2018-08-27 RX ORDER — CLONIDINE HYDROCHLORIDE 0.1 MG/1
0.2 TABLET ORAL 2 TIMES DAILY
Status: DISCONTINUED | OUTPATIENT
Start: 2018-08-27 | End: 2018-08-27

## 2018-08-27 RX ORDER — HYDRALAZINE HYDROCHLORIDE 20 MG/ML
2.5-5 INJECTION INTRAMUSCULAR; INTRAVENOUS EVERY 10 MIN PRN
Status: DISCONTINUED | OUTPATIENT
Start: 2018-08-27 | End: 2018-08-27 | Stop reason: HOSPADM

## 2018-08-27 RX ORDER — LOSARTAN POTASSIUM 50 MG/1
50 TABLET ORAL 2 TIMES DAILY
Status: DISCONTINUED | OUTPATIENT
Start: 2018-08-28 | End: 2018-08-28

## 2018-08-27 RX ORDER — SODIUM CHLORIDE 9 MG/ML
INJECTION, SOLUTION INTRAVENOUS CONTINUOUS
Status: DISCONTINUED | OUTPATIENT
Start: 2018-08-27 | End: 2018-08-28

## 2018-08-27 RX ORDER — DEXTROSE MONOHYDRATE 25 G/50ML
25-50 INJECTION, SOLUTION INTRAVENOUS
Status: DISCONTINUED | OUTPATIENT
Start: 2018-08-27 | End: 2018-08-29 | Stop reason: HOSPADM

## 2018-08-27 RX ORDER — SODIUM CHLORIDE, SODIUM LACTATE, POTASSIUM CHLORIDE, CALCIUM CHLORIDE 600; 310; 30; 20 MG/100ML; MG/100ML; MG/100ML; MG/100ML
INJECTION, SOLUTION INTRAVENOUS CONTINUOUS
Status: DISCONTINUED | OUTPATIENT
Start: 2018-08-27 | End: 2018-08-27 | Stop reason: HOSPADM

## 2018-08-27 RX ORDER — HYDROMORPHONE HYDROCHLORIDE 1 MG/ML
.3-.5 INJECTION, SOLUTION INTRAMUSCULAR; INTRAVENOUS; SUBCUTANEOUS EVERY 5 MIN PRN
Status: DISCONTINUED | OUTPATIENT
Start: 2018-08-27 | End: 2018-08-27 | Stop reason: HOSPADM

## 2018-08-27 RX ORDER — ONDANSETRON 2 MG/ML
4 INJECTION INTRAMUSCULAR; INTRAVENOUS EVERY 6 HOURS PRN
Status: DISCONTINUED | OUTPATIENT
Start: 2018-08-27 | End: 2018-08-29 | Stop reason: HOSPADM

## 2018-08-27 RX ORDER — SIMVASTATIN 20 MG
20 TABLET ORAL AT BEDTIME
Status: DISCONTINUED | OUTPATIENT
Start: 2018-08-27 | End: 2018-08-29 | Stop reason: HOSPADM

## 2018-08-27 RX ORDER — METFORMIN HCL 500 MG
500 TABLET, EXTENDED RELEASE 24 HR ORAL
Status: DISCONTINUED | OUTPATIENT
Start: 2018-08-27 | End: 2018-08-29 | Stop reason: HOSPADM

## 2018-08-27 RX ORDER — ALUMINA, MAGNESIA, AND SIMETHICONE 2400; 2400; 240 MG/30ML; MG/30ML; MG/30ML
30 SUSPENSION ORAL EVERY 4 HOURS PRN
Status: DISCONTINUED | OUTPATIENT
Start: 2018-08-27 | End: 2018-08-29 | Stop reason: HOSPADM

## 2018-08-27 RX ORDER — LABETALOL HYDROCHLORIDE 5 MG/ML
10 INJECTION, SOLUTION INTRAVENOUS
Status: DISCONTINUED | OUTPATIENT
Start: 2018-08-27 | End: 2018-08-27 | Stop reason: HOSPADM

## 2018-08-27 RX ORDER — EPHEDRINE SULFATE 50 MG/ML
INJECTION, SOLUTION INTRAVENOUS PRN
Status: DISCONTINUED | OUTPATIENT
Start: 2018-08-27 | End: 2018-08-27

## 2018-08-27 RX ORDER — CLONIDINE HYDROCHLORIDE 0.1 MG/1
0.2 TABLET ORAL 2 TIMES DAILY
Status: DISCONTINUED | OUTPATIENT
Start: 2018-08-28 | End: 2018-08-28

## 2018-08-27 RX ORDER — CEFAZOLIN SODIUM 2 G/100ML
2 INJECTION, SOLUTION INTRAVENOUS
Status: COMPLETED | OUTPATIENT
Start: 2018-08-27 | End: 2018-08-27

## 2018-08-27 RX ORDER — OXYCODONE HYDROCHLORIDE 5 MG/1
5 TABLET ORAL
Status: DISCONTINUED | OUTPATIENT
Start: 2018-08-27 | End: 2018-08-29 | Stop reason: HOSPADM

## 2018-08-27 RX ORDER — NALOXONE HYDROCHLORIDE 0.4 MG/ML
.1-.4 INJECTION, SOLUTION INTRAMUSCULAR; INTRAVENOUS; SUBCUTANEOUS
Status: DISCONTINUED | OUTPATIENT
Start: 2018-08-27 | End: 2018-08-27

## 2018-08-27 RX ORDER — CELECOXIB 200 MG/1
400 CAPSULE ORAL
Status: COMPLETED | OUTPATIENT
Start: 2018-08-27 | End: 2018-08-27

## 2018-08-27 RX ORDER — NALOXONE HYDROCHLORIDE 0.4 MG/ML
.1-.4 INJECTION, SOLUTION INTRAMUSCULAR; INTRAVENOUS; SUBCUTANEOUS
Status: ACTIVE | OUTPATIENT
Start: 2018-08-27 | End: 2018-08-28

## 2018-08-27 RX ORDER — MEPERIDINE HYDROCHLORIDE 25 MG/ML
12.5 INJECTION INTRAMUSCULAR; INTRAVENOUS; SUBCUTANEOUS EVERY 5 MIN PRN
Status: DISCONTINUED | OUTPATIENT
Start: 2018-08-27 | End: 2018-08-27 | Stop reason: HOSPADM

## 2018-08-27 RX ORDER — CEFAZOLIN SODIUM 1 G/3ML
1 INJECTION, POWDER, FOR SOLUTION INTRAMUSCULAR; INTRAVENOUS SEE ADMIN INSTRUCTIONS
Status: DISCONTINUED | OUTPATIENT
Start: 2018-08-27 | End: 2018-08-27 | Stop reason: HOSPADM

## 2018-08-27 RX ORDER — FENTANYL CITRATE 50 UG/ML
25-50 INJECTION, SOLUTION INTRAMUSCULAR; INTRAVENOUS
Status: DISCONTINUED | OUTPATIENT
Start: 2018-08-27 | End: 2018-08-27 | Stop reason: HOSPADM

## 2018-08-27 RX ORDER — LIDOCAINE 40 MG/G
CREAM TOPICAL
Status: DISCONTINUED | OUTPATIENT
Start: 2018-08-27 | End: 2018-08-29 | Stop reason: HOSPADM

## 2018-08-27 RX ORDER — ACETAMINOPHEN 325 MG/1
975 TABLET ORAL EVERY 8 HOURS
Status: DISCONTINUED | OUTPATIENT
Start: 2018-08-27 | End: 2018-08-29 | Stop reason: HOSPADM

## 2018-08-27 RX ORDER — LOSARTAN POTASSIUM 50 MG/1
50 TABLET ORAL 2 TIMES DAILY
Status: DISCONTINUED | OUTPATIENT
Start: 2018-08-27 | End: 2018-08-27

## 2018-08-27 RX ORDER — LEVOTHYROXINE SODIUM 50 UG/1
50 TABLET ORAL DAILY
Status: DISCONTINUED | OUTPATIENT
Start: 2018-08-27 | End: 2018-08-29 | Stop reason: HOSPADM

## 2018-08-27 RX ORDER — NICOTINE POLACRILEX 4 MG
15-30 LOZENGE BUCCAL
Status: DISCONTINUED | OUTPATIENT
Start: 2018-08-27 | End: 2018-08-29 | Stop reason: HOSPADM

## 2018-08-27 RX ORDER — DIMENHYDRINATE 50 MG/ML
25 INJECTION, SOLUTION INTRAMUSCULAR; INTRAVENOUS
Status: DISCONTINUED | OUTPATIENT
Start: 2018-08-27 | End: 2018-08-27 | Stop reason: HOSPADM

## 2018-08-27 RX ORDER — ONDANSETRON 4 MG/1
4 TABLET, ORALLY DISINTEGRATING ORAL EVERY 30 MIN PRN
Status: DISCONTINUED | OUTPATIENT
Start: 2018-08-27 | End: 2018-08-27 | Stop reason: HOSPADM

## 2018-08-27 RX ORDER — ACETAMINOPHEN 500 MG
1000 TABLET ORAL ONCE
Status: COMPLETED | OUTPATIENT
Start: 2018-08-27 | End: 2018-08-27

## 2018-08-27 RX ORDER — HYDROXYZINE HYDROCHLORIDE 10 MG/1
10 TABLET, FILM COATED ORAL EVERY 6 HOURS PRN
Status: DISCONTINUED | OUTPATIENT
Start: 2018-08-27 | End: 2018-08-29 | Stop reason: HOSPADM

## 2018-08-27 RX ORDER — PROPOFOL 10 MG/ML
INJECTION, EMULSION INTRAVENOUS CONTINUOUS PRN
Status: DISCONTINUED | OUTPATIENT
Start: 2018-08-27 | End: 2018-08-27

## 2018-08-27 RX ORDER — GABAPENTIN 100 MG/1
100 CAPSULE ORAL
Status: COMPLETED | OUTPATIENT
Start: 2018-08-27 | End: 2018-08-27

## 2018-08-27 RX ORDER — LIDOCAINE 40 MG/G
CREAM TOPICAL
Status: DISCONTINUED | OUTPATIENT
Start: 2018-08-27 | End: 2018-08-27 | Stop reason: HOSPADM

## 2018-08-27 RX ORDER — BUPIVACAINE HYDROCHLORIDE AND EPINEPHRINE 2.5; 5 MG/ML; UG/ML
INJECTION, SOLUTION INFILTRATION; PERINEURAL PRN
Status: DISCONTINUED | OUTPATIENT
Start: 2018-08-27 | End: 2018-08-27

## 2018-08-27 RX ORDER — GABAPENTIN 100 MG/1
100 CAPSULE ORAL AT BEDTIME
Status: DISCONTINUED | OUTPATIENT
Start: 2018-08-27 | End: 2018-08-29 | Stop reason: HOSPADM

## 2018-08-27 RX ORDER — ALBUTEROL SULFATE 0.83 MG/ML
2.5 SOLUTION RESPIRATORY (INHALATION) EVERY 4 HOURS PRN
Status: DISCONTINUED | OUTPATIENT
Start: 2018-08-27 | End: 2018-08-27 | Stop reason: HOSPADM

## 2018-08-27 RX ORDER — PROPOFOL 10 MG/ML
INJECTION, EMULSION INTRAVENOUS PRN
Status: DISCONTINUED | OUTPATIENT
Start: 2018-08-27 | End: 2018-08-27

## 2018-08-27 RX ORDER — DEXAMETHASONE SODIUM PHOSPHATE 10 MG/ML
INJECTION, SOLUTION INTRAMUSCULAR; INTRAVENOUS PRN
Status: DISCONTINUED | OUTPATIENT
Start: 2018-08-27 | End: 2018-08-27

## 2018-08-27 RX ORDER — ACETAMINOPHEN 325 MG/1
650 TABLET ORAL EVERY 4 HOURS PRN
Status: DISCONTINUED | OUTPATIENT
Start: 2018-08-30 | End: 2018-08-29 | Stop reason: HOSPADM

## 2018-08-27 RX ORDER — HYDROMORPHONE HYDROCHLORIDE 1 MG/ML
0.2 INJECTION, SOLUTION INTRAMUSCULAR; INTRAVENOUS; SUBCUTANEOUS
Status: DISCONTINUED | OUTPATIENT
Start: 2018-08-27 | End: 2018-08-29 | Stop reason: HOSPADM

## 2018-08-27 RX ORDER — ONDANSETRON 2 MG/ML
4 INJECTION INTRAMUSCULAR; INTRAVENOUS EVERY 30 MIN PRN
Status: DISCONTINUED | OUTPATIENT
Start: 2018-08-27 | End: 2018-08-27 | Stop reason: HOSPADM

## 2018-08-27 RX ADMIN — LIDOCAINE HYDROCHLORIDE 1 ML: 10 INJECTION, SOLUTION EPIDURAL; INFILTRATION; INTRACAUDAL; PERINEURAL at 06:52

## 2018-08-27 RX ADMIN — PROPOFOL 50 MG: 10 INJECTION, EMULSION INTRAVENOUS at 08:06

## 2018-08-27 RX ADMIN — CELECOXIB 400 MG: 200 CAPSULE ORAL at 06:33

## 2018-08-27 RX ADMIN — GABAPENTIN 100 MG: 100 CAPSULE ORAL at 06:32

## 2018-08-27 RX ADMIN — SODIUM CHLORIDE: 9 INJECTION, SOLUTION INTRAVENOUS at 11:17

## 2018-08-27 RX ADMIN — ACETAMINOPHEN 975 MG: 325 TABLET ORAL at 22:41

## 2018-08-27 RX ADMIN — MIDAZOLAM 1 MG: 1 INJECTION INTRAMUSCULAR; INTRAVENOUS at 07:45

## 2018-08-27 RX ADMIN — RANITIDINE 150 MG: 150 TABLET ORAL at 20:22

## 2018-08-27 RX ADMIN — EPHEDRINE SULFATE 5 MG: 50 INJECTION, SOLUTION INTRAVENOUS at 08:32

## 2018-08-27 RX ADMIN — SODIUM CHLORIDE: 9 INJECTION, SOLUTION INTRAVENOUS at 20:31

## 2018-08-27 RX ADMIN — PHENYLEPHRINE HYDROCHLORIDE 100 MCG: 10 INJECTION, SOLUTION INTRAMUSCULAR; INTRAVENOUS; SUBCUTANEOUS at 08:34

## 2018-08-27 RX ADMIN — ACETAMINOPHEN 1000 MG: 500 TABLET ORAL at 06:30

## 2018-08-27 RX ADMIN — CEFAZOLIN SODIUM 1 G: 1 SOLUTION INTRAVENOUS at 15:59

## 2018-08-27 RX ADMIN — OXYCODONE HYDROCHLORIDE 5 MG: 5 TABLET ORAL at 20:22

## 2018-08-27 RX ADMIN — INSULIN ASPART 3 UNITS: 100 INJECTION, SOLUTION INTRAVENOUS; SUBCUTANEOUS at 17:44

## 2018-08-27 RX ADMIN — SODIUM CHLORIDE, POTASSIUM CHLORIDE, SODIUM LACTATE AND CALCIUM CHLORIDE: 600; 310; 30; 20 INJECTION, SOLUTION INTRAVENOUS at 06:52

## 2018-08-27 RX ADMIN — MIDAZOLAM 2 MG: 1 INJECTION INTRAMUSCULAR; INTRAVENOUS at 08:07

## 2018-08-27 RX ADMIN — EPHEDRINE SULFATE 5 MG: 50 INJECTION, SOLUTION INTRAVENOUS at 08:27

## 2018-08-27 RX ADMIN — CEFAZOLIN SODIUM 2 G: 2 INJECTION, SOLUTION INTRAVENOUS at 07:54

## 2018-08-27 RX ADMIN — METFORMIN HYDROCHLORIDE 500 MG: 500 TABLET, EXTENDED RELEASE ORAL at 16:10

## 2018-08-27 RX ADMIN — EPHEDRINE SULFATE 5 MG: 50 INJECTION, SOLUTION INTRAVENOUS at 08:15

## 2018-08-27 RX ADMIN — CEFAZOLIN SODIUM 1 G: 1 SOLUTION INTRAVENOUS at 22:43

## 2018-08-27 RX ADMIN — DEXAMETHASONE SODIUM PHOSPHATE 10 MG: 10 INJECTION, SOLUTION INTRAMUSCULAR; INTRAVENOUS at 09:49

## 2018-08-27 RX ADMIN — LOSARTAN POTASSIUM 50 MG: 50 TABLET, FILM COATED ORAL at 12:38

## 2018-08-27 RX ADMIN — PHENYLEPHRINE HYDROCHLORIDE 50 MCG: 10 INJECTION, SOLUTION INTRAMUSCULAR; INTRAVENOUS; SUBCUTANEOUS at 09:11

## 2018-08-27 RX ADMIN — MIDAZOLAM 1 MG: 1 INJECTION INTRAMUSCULAR; INTRAVENOUS at 07:39

## 2018-08-27 RX ADMIN — SODIUM CHLORIDE, POTASSIUM CHLORIDE, SODIUM LACTATE AND CALCIUM CHLORIDE: 600; 310; 30; 20 INJECTION, SOLUTION INTRAVENOUS at 07:38

## 2018-08-27 RX ADMIN — PROPOFOL 150 MCG/KG/MIN: 10 INJECTION, EMULSION INTRAVENOUS at 07:50

## 2018-08-27 RX ADMIN — PHENYLEPHRINE HYDROCHLORIDE 50 MCG: 10 INJECTION, SOLUTION INTRAMUSCULAR; INTRAVENOUS; SUBCUTANEOUS at 09:06

## 2018-08-27 RX ADMIN — SODIUM CHLORIDE, POTASSIUM CHLORIDE, SODIUM LACTATE AND CALCIUM CHLORIDE: 600; 310; 30; 20 INJECTION, SOLUTION INTRAVENOUS at 08:13

## 2018-08-27 RX ADMIN — REGULAR STRENGTH 325 MG: 325 TABLET ORAL at 12:13

## 2018-08-27 RX ADMIN — PHENYLEPHRINE HYDROCHLORIDE 50 MCG: 10 INJECTION, SOLUTION INTRAMUSCULAR; INTRAVENOUS; SUBCUTANEOUS at 08:51

## 2018-08-27 RX ADMIN — GABAPENTIN 100 MG: 100 CAPSULE ORAL at 20:21

## 2018-08-27 RX ADMIN — PROPOFOL 50 MG: 10 INJECTION, EMULSION INTRAVENOUS at 07:51

## 2018-08-27 RX ADMIN — ACETAMINOPHEN 975 MG: 325 TABLET ORAL at 14:38

## 2018-08-27 RX ADMIN — CLONIDINE HYDROCHLORIDE 0.2 MG: 0.1 TABLET ORAL at 12:38

## 2018-08-27 RX ADMIN — BUPIVACAINE HYDROCHLORIDE IN DEXTROSE 1.6 ML: 7.5 INJECTION, SOLUTION SUBARACHNOID at 07:49

## 2018-08-27 RX ADMIN — EPHEDRINE SULFATE 5 MG: 50 INJECTION, SOLUTION INTRAVENOUS at 08:21

## 2018-08-27 RX ADMIN — DEXAMETHASONE SODIUM PHOSPHATE 20 MG: 10 INJECTION, SOLUTION INTRAMUSCULAR; INTRAVENOUS at 07:54

## 2018-08-27 RX ADMIN — SIMVASTATIN 20 MG: 20 TABLET, FILM COATED ORAL at 20:22

## 2018-08-27 RX ADMIN — BUPIVACAINE HYDROCHLORIDE AND EPINEPHRINE BITARTRATE 20 ML: 2.5; .005 INJECTION, SOLUTION INFILTRATION; PERINEURAL at 09:50

## 2018-08-27 ASSESSMENT — ACTIVITIES OF DAILY LIVING (ADL)
ADLS_ACUITY_SCORE: 10
ADLS_ACUITY_SCORE: 9
ADLS_ACUITY_SCORE: 10

## 2018-08-27 ASSESSMENT — ENCOUNTER SYMPTOMS: DYSRHYTHMIAS: 1

## 2018-08-27 ASSESSMENT — LIFESTYLE VARIABLES: TOBACCO_USE: 1

## 2018-08-27 NOTE — IP AVS SNAPSHOT
MRN:[id number]                      After Visit Summary   8/27/2018    Sandra Petit    MRN: [id number]           Thank you!     Thank you for choosing [facility] for your care. Our goal is always to provide you with excellent care. Hearing back from our patients is one way we can continue to improve our services. Please take a few minutes to complete the written survey that you may receive in the mail after you visit with us. Thank you!        Patient Information     Date Of Birth          1940        Designated Caregiver       Most Recent Value    Caregiver    Will someone help with your care after discharge? yes    Name of designated caregiver Mahesh    Phone number of caregiver [phone]    Caregiver address [place]      About your hospital stay     You were admitted on:  August 27, 2018 You last received care in the:  [address] [facility]    You were discharged on:  August 29, 2018        Reason for your hospital stay       S/p joint replacement                  Who to Call     For medical emergencies, please call 911.  For non-urgent questions about your medical care, please call your primary care provider or clinic, [phone]  For questions related to your surgery, please call your surgery clinic        Attending Provider     Provider Specialty    Johnny Anaya MD Orthopedics       Primary Care Provider Office Phone # Fax #    Teddy Wray [phone]      After Care Instructions     Activity       Your activity upon discharge: activity as tolerated            Diet       Follow this diet upon discharge: pre-operative diet            Wound care and dressings       Instructions to care for your wound at home: may get dressing wet in shower but do not soak or scrub.  Keep aquacell in place till follow up.  Place compression (TEDs) after hygiene.  Can remove compression at night.                  Follow-up Appointments     Follow-up and recommended  "labs and tests       Follow up with Dr. Anaya in 10-14 days.  Appointment was already made pre-operatively                  Your next 10 appointments already scheduled     Sep 10, 2018  1:10 PM CDT   [facility]    [address] [place] [id number] [phone]              Additional Services     Home Care PT Referral for Hospital Discharge       PT to eval and treat  Based on eval OK for up to 2-3 times weekly for next 2-3 weeks    Your provider has ordered home care - physical therapy. If you have not been contacted within 2 days of your discharge please call the department phone number listed on the top of this document.            Physical Therapy Referral       *This therapy referral will be filtered to a centralized scheduling office at [facility] and the patient will receive a call to schedule an appointment at a [facility] location most convenient for them. *     [facility] provides Physical Therapy evaluation and treatment and many specialty services across the [facility] system.  If requesting a specialty program, please choose from the list below.    If you have not heard from the scheduling office within 2 business days, please call [phone] for all locations, with the exception of [place], please call [phone].  Treatment: Evaluation & Treatment  Special Instructions/Modalities: Begin immediately post op based on eval - If home program, begin after that.  Special Programs: s/p joint replacement    Please be aware that coverage of these services is subject to the terms and limitations of your health insurance plan.  Call member services at your health plan with any benefit or coverage questions.      **Note to Provider:  If you are referring outside of [facility] for the therapy appointment, please list the name of the location in the \"special instructions\" " above, print the referral and give to the patient to schedule the appointment.                  Further instructions from your care team       Total Knee Replacement Discharge Instructions    [phone]  Bone and Joint Service Line for issues or concerns    Discharge prescriptions:  Oxycodone 5 mg tablets:  Take one or two tablets every 4 hours as needed for pain.  Gabapentin 100 mg tablet:  Take one tablet nightly for pain (30 days)  Mobic 7.5 mg tablet:  Take one tablet daily for pain (30 days)  Stool Softener:    You may use any stool softener you are familiar with. We have suggested over the counter Sennakot as a fall back.  Tylenol :  You may take one or two tablets (325mg) every six hours as needed.  Aspirin 325 mg:  Take one tablet twice daily for blood thinning and prevention of blood clots (30 days)    General Care:  After surgery you may feel tired/sleepy. This is normal. If you have any question along the way please contact the office. If you feel it is an issue cannot wait for normal office hours, contact the on-call physician. You should not drive or operate machines/equipment until released by your physician to do so.     Bandages:   Leave your bandage from the hospital on. It is OK to shower over this aquacell dressing.  Dressing will be removed at follow up visit in 10-14 days along with your staples.  It s normal to have some blood-tinged fluid on your bandages, this may occur for a few days after being sent home from the hospital. You may also notice a few drops of blood from your incision as you begin to bend your knee more this is normal.  You should not have any significant bleeding or drainage after a few days, call the office if you do or if your incision was dry and starts to drain.     Bathing:  Do not submerge your incision in water such as a bath or pool. It is ok to shower when you get home over the special aquacell dressing. You may find in comfortable to get a shower chair for the  first month while you continue to heal and get stronger.     Follow up:  Your follow up appointment should already be scheduled. If its not, please call the office to verify an appointment 10-14 days after surgery.     Diet:  Start with non-alcoholic liquids at first, particularly water or sports drinks after surgery. Progress to bland foods such as crackers and bread and finally to your normal diet if you have no problems. Avoid alcohol when taking narcotic pain medications.      Pain control:  Take your pain medications as prescribed. These medications may make you sleepy. Do not drive, operate equipment, or drink alcohol when taking these.  You may take Tylenol (Generic name is acetaminophen) as directed on the bottle for additional relief or in place of the prescribed pain medications as your pain gets better. Do not take any other NSAIDs (Motrin, Ibuprofen, Aleve, Naproxen) while taking Aspirin. If the medications cause a reaction such as nausea or skin rash, stop taking them and contact your doctor. Please plan accordingly, pain medications will not be re-filled on the weekends or at night. Call the office during the day if you need more medications.    Blood thinner:  It is very important to take the Aspirin as prescribed. It is a medication to help prevent blood clots in your legs or lungs. No medication is perfect, so if you notice a sudden onset of pain/swelling in your calf area call your doctor. If you notice a sudden onset of troubles breathing and/or chest pain call 911.     Icing:  It is common for some swelling, aching and stiffness to occur for up to 6-9 months after a knee replacement. If you knee swells, get off your feet, elevate your leg and apply some ice packs. Apply for 20 minutes at a time. For the first 1-2 weeks apply ice 2-3 x day or more after therapy.    Walker/crutches:  Use a walker/crutches when you go home. You will transition to the use of a cane and finally to no additional  support.     Physical Therapy:  The success of your knee replacement is based on doing physical therapy. You will have some pain and discomfort along the way. If you feel your pain is limiting your progress make sure to take some pain medication prior to your therapy session. If you pain medications are not working talk to your surgeon.   For the first 2 weeks after going home you will have in-home physical therapy. The goal is to work on range of motion. While it is important to working on bending your knee, it is equally important to make sure you knee comes out all the way straight. To assist in this do not place any bumps, pillows and or blankets under your knee when you are lying down. You should have an outpatient physical therapy appointment scheduled for about 2 weeks after surgery.     Activity:  Unless otherwise instructed, you can weight bear as tolerated. While laying or sitting down you should straighten your knee all the way out and then gently work on bending the knee back. Do not worry at first if your knee feels stiff and will not bend like normal, this will get better. Never put a pillow or bump under you knee, instead put a pillow under your ankle so your knee will straighten out all the way.     Normal findings after surgery:  Numbness and tenderness around the incisions and to the outside of the incision is normal.  You may have bruising around the incisions and down the lower leg.   Your knee will be swollen for months after surgery. It will feel  tight  to move.   Low grade fevers less than 100.5 degrees Fahrenheit are normal.   You may have some minor swelling in the leg/calf area.  You will have some increased pain after your therapy sessions.     When to call the Office:  Temperature greater than 101.5 degrees Fahreheit.  Fever, chills, and increasing pain in the knee.  Excessive drainage from the incisions that include bright red blood.  Drainage from the incisions sites that appear  yellow, pus-like, or foul smelling.  Increasing pain the knee not relieved by the prescribed pain medications or ice.  Persistent nausea or vomiting not helped by the Zofran.  Increased pain or swelling in your calf area (in back above your ankle) that wasn t there when in the hospital.  Any other effects you feel are significant.  Call 911 if you experience any chest pain and/or shortness or breath.                                                               EDEMA CONTROL  (Do not let your leg swell!!)    Swelling in your injured, or recently operated on, extremity is one of the worst things YOU can let happen.   Swelling will:   1)  Lead to more pain and stiffness  2)  Causes tissues to be unhealthy.  Bacteria like this.  3)  Puts you at risk for deadly blood clots.    Things YOU must do to control the EDEMA (swelling) are as follows:      Compress the extremity in a uniform way.  Use a compressive wrap or compressive hosiery from toes to your groin.  Not needed at night.    Icing of the injured area soothes the angry tissues.    YOU MUST ELEVATE THE INJURED EXTREMITY ABOVE YOUR HEART AS MUCH AS POSSIBLE!!   A foot stool is not enough.  Edema is water and water flows down hill.  Your heart is the pump so you are getting the water back to the pump.  I cannot do this for you.  Only you and your help at home can get this done.  THANKS!!                    Pending Results     No orders found from 8/25/2018 to 8/28/2018.            Statement of Approval     Ordered          08/29/18 1329  I have reviewed and agree with all the recommendations and orders detailed in this document.  EFFECTIVE NOW     Approved and electronically signed by:  Rosanne Perdomo PA-C             Admission Information     Date & Time Provider Department Dept. Phone    8/27/2018 Johnny Anaya MD [address] [facility] [phone]      Your Vitals Were     Blood Pressure Pulse Temperature Respirations Height Weight     "162/47 59 97.1  F (36.2  C) (Oral) 18 1.537 m (5' 0.51\") 77.7 kg (171 lb 6.4 oz)    Pulse Oximetry BMI (Body Mass Index)                95% 32.91 kg/m2          MyChart Information     [facility] gives you secure access to your electronic health record. If you see a primary care provider, you can also send messages to your care team and make appointments. If you have questions, please call your primary care clinic.  If you do not have a primary care provider, please call [phone] and they will assist you.        Care EveryWhere ID     This is your Care EveryWhere ID. This could be used by other organizations to access your [facility] medical records  [id number]        Equal Access to Services     RYAN LOWERY : Krystal Retana, sae guerra, jason rivers, veronica blevins. So [facility] [phone].    ATENCIÓN: Si habla español, tiene a lacy disposición servicios gratuitos de asistencia lingüística. Llame al [phone].    We comply with applicable federal civil rights laws and [place] laws. We do not discriminate on the basis of race, color, national origin, age, disability, sex, sexual orientation, or gender identity.               Review of your medicines      START taking        Dose / Directions    acetaminophen 325 MG tablet   Commonly known as:  TYLENOL   Used for:  S/P total knee arthroplasty, right        Dose:  650 mg   Start taking on:  8/30/2018   Take 2 tablets (650 mg) by mouth every 4 hours as needed for other (multimodal surgical pain management along with NSAIDS and opioid medication as indicated based on pain control and physical function.)   Quantity:  100 tablet   Refills:  0       aspirin 325 MG EC tablet   Indication:  VTE Prophylaxis   Used for:  S/P total knee arthroplasty, right   Replaces:  aspirin 81 MG tablet        Dose:  325 mg   Take 1 tablet (325 mg) by mouth daily   Quantity:  30 tablet   Refills:  0       order for DME   Used " for:  S/P total knee arthroplasty, right        Equipment being ordered: Walker () Treatment Diagnosis: s/p joint replacement   Quantity:  1 Device   Refills:  0       oxyCODONE IR 5 MG tablet   Commonly known as:  ROXICODONE   Used for:  S/P total knee arthroplasty, right        Dose:  5 mg   Take 1 tablet (5 mg) by mouth every 3 hours as needed for other (pain control or improvement in physical function. Hold dose for analgesic side effects.)   Quantity:  6 tablet   Refills:  0       senna-docusate 8.6-50 MG per tablet   Commonly known as:  SENOKOT-S;PERICOLACE   Used for:  S/P total knee arthroplasty, right        Dose:  1-2 tablet   Take 1-2 tablets by mouth 2 times daily as needed for constipation   Quantity:  100 tablet   Refills:  1         CONTINUE these medicines which have NOT CHANGED        Dose / Directions    ACIDOPHILUS PROBIOTIC BLEND Caps        Dose:  1 capsule   Take 1 capsule by mouth 2 times daily   Refills:  0       blood glucose monitoring lancets   Used for:  Diabetic vasculopathy (H)        Use to test blood sugars 1 times daily or as directed.   Quantity:  1 each   Refills:  1       blood glucose monitoring test strip   Commonly known as:  ONETOUCH VERIO IQ   Used for:  Diabetic vasculopathy (H)        Use to test blood sugars 1 times daily or as directed.   Quantity:  50 each   Refills:  2       cloNIDine 0.2 MG tablet   Commonly known as:  CATAPRES   Used for:  Essential hypertension, benign        Dose:  0.2 mg   Take 1 tablet (0.2 mg) by mouth 2 times daily   Quantity:  180 tablet   Refills:  3       gabapentin 100 MG capsule   Commonly known as:  NEURONTIN   Used for:  Primary osteoarthritis of right knee        Dose:  100 mg   Take 1 capsule (100 mg) by mouth At Bedtime Begin 3-4 days prior to surgery   Quantity:  30 capsule   Refills:  1       levofloxacin 750 MG tablet   Commonly known as:  LEVAQUIN   Used for:  History of total left knee replacement        Take one tablet 2  hours prior to procedure   Quantity:  2 tablet   Refills:  3       levothyroxine 50 MCG tablet   Commonly known as:  SYNTHROID   Used for:  Hypothyroidism, unspecified type        Dose:  50 mcg   Take 1 tablet (50 mcg) by mouth daily   Quantity:  90 tablet   Refills:  3       losartan 50 MG tablet   Commonly known as:  COZAAR   Used for:  Essential hypertension, benign        TAKE ONE TABLET BY MOUTH TWICE DAILY   Quantity:  180 tablet   Refills:  1       metFORMIN 500 MG 24 hr tablet   Commonly known as:  GLUCOPHAGE-XR   Used for:  Type 2 diabetes mellitus with diabetic nephropathy, without long-term current use of insulin (H)        TAKE ONE TABLET BY MOUTH ONCE DAILY WITH  DINNER   Quantity:  90 tablet   Refills:  3       nitroGLYcerin 0.4 MG sublingual tablet   Commonly known as:  NITROSTAT   Used for:  Coronary artery disease involving native coronary artery without angina pectoris        Dose:  0.4 mg   Place 1 tablet (0.4 mg) under the tongue every 5 minutes as needed for chest pain   Quantity:  25 tablet   Refills:  0       simvastatin 40 MG tablet   Commonly known as:  ZOCOR   Used for:  Hyperlipidemia LDL goal <100        Dose:  20 mg   Take 0.5 tablets (20 mg) by mouth At Bedtime   Quantity:  90 tablet   Refills:  3       vitamin D 400 units capsule        Dose:  1 capsule   Take 1 capsule by mouth 2 times daily   Quantity:  30 capsule   Refills:  0         STOP taking     aspirin 81 MG tablet   Replaced by:  aspirin 325 MG EC tablet                Where to get your medicines      These medications were sent to [facility] [address] [place] [address] [address]  [address] [facility] [address]     Phone:  [phone]     aspirin 325 MG EC tablet    senna-docusate 8.6-50 MG per tablet         Some of these will need a paper prescription and others can be bought over the counter. Ask your nurse if you have questions.     Bring a paper prescription for each of these medications     acetaminophen 325  MG tablet    order for DME    oxyCODONE IR 5 MG tablet                Protect others around you: Learn how to safely use, store and throw away your medicines at www.disposemymeds.org.        Information about OPIOIDS     PRESCRIPTION OPIOIDS: WHAT YOU NEED TO KNOW   We gave you an opioid (narcotic) pain medicine. It is important to manage your pain, but opioids are not always the best choice. You should first try all the other options your care team gave you. Take this medicine for as short a time (and as few doses) as possible.    Some activities can increase your pain, such as bandage changes or therapy sessions. It may help to take your pain medicine 30 to 60 minutes before these activities. Reduce your stress by getting enough sleep, working on hobbies you enjoy and practicing relaxation or meditation. Talk to your care team about ways to manage your pain beyond prescription opioids.    These medicines have risks:    DO NOT drive when on new or higher doses of pain medicine. These medicines can affect your alertness and reaction times, and you could be arrested for driving under the influence (DUI). If you need to use opioids long-term, talk to your care team about driving.    DO NOT operate heavy machinery    DO NOT do any other dangerous activities while taking these medicines.    DO NOT drink any alcohol while taking these medicines.     If the opioid prescribed includes acetaminophen, DO NOT take with any other medicines that contain acetaminophen. Read all labels carefully. Look for the word  acetaminophen  or  Tylenol.  Ask your pharmacist if you have questions or are unsure.    You can get addicted to pain medicines, especially if you have a history of addiction (chemical, alcohol or substance dependence). Talk to your care team about ways to reduce this risk.    All opioids tend to cause constipation. Drink plenty of water and eat foods that have a lot of fiber, such as fruits, vegetables, prune juice,  apple juice and high-fiber cereal. Take a laxative (Miralax, milk of magnesia, Colace, Senna) if you don t move your bowels at least every other day. Other side effects include upset stomach, sleepiness, dizziness, throwing up, tolerance (needing more of the medicine to have the same effect), physical dependence and slowed breathing.    Store your pills in a secure place, locked if possible. We will not replace any lost or stolen medicine. If you don t finish your medicine, please throw away (dispose) as directed by your pharmacist. The [place] Pollution Control Agency has more information about safe disposal: https://www.pca.[facility].[place].us/living-green/managing-unwanted-medications             Medication List: This is a list of all your medications and when to take them. Check marks below indicate your daily home schedule. Keep this list as a reference.      Medications           Morning Afternoon Evening Bedtime As Needed    acetaminophen 325 MG tablet   Commonly known as:  TYLENOL   Take 2 tablets (650 mg) by mouth every 4 hours as needed for other (multimodal surgical pain management along with NSAIDS and opioid medication as indicated based on pain control and physical function.)   Start taking on:  8/30/2018   Last time this was given:  975 mg on 8/29/2018  1:29 PM                                   ACIDOPHILUS PROBIOTIC BLEND Caps   Take 1 capsule by mouth 2 times daily   Next Dose Due:  None given in hospital, return to home routine.                                       aspirin 325 MG EC tablet   Take 1 tablet (325 mg) by mouth daily   Last time this was given:  325 mg on 8/29/2018  8:26 AM   Next Dose Due:  Thursday morning, 8/30/18                                   blood glucose monitoring lancets   Use to test blood sugars 1 times daily or as directed.                                blood glucose monitoring test strip   Commonly known as:  ONETOUCH VERIO IQ   Use to test blood sugars 1 times daily or as  directed.                                cloNIDine 0.2 MG tablet   Commonly known as:  CATAPRES   Take 1 tablet (0.2 mg) by mouth 2 times daily   Last time this was given:  0.2 mg on 8/29/2018  8:27 AM   Next Dose Due:  Wednesday evening, 8/29/18                                      gabapentin 100 MG capsule   Commonly known as:  NEURONTIN   Take 1 capsule (100 mg) by mouth At Bedtime Begin 3-4 days prior to surgery   Last time this was given:  100 mg on 8/28/2018  8:05 PM   Next Dose Due:  Wednesday evening, 8/29/18                                   levofloxacin 750 MG tablet   Commonly known as:  LEVAQUIN   Take one tablet 2 hours prior to procedure                                levothyroxine 50 MCG tablet   Commonly known as:  SYNTHROID   Take 1 tablet (50 mcg) by mouth daily   Last time this was given:  50 mcg on 8/29/2018  6:40 AM   Next Dose Due:  Thursday morning, 8/30/18                                   losartan 50 MG tablet   Commonly known as:  COZAAR   TAKE ONE TABLET BY MOUTH TWICE DAILY   Last time this was given:  50 mg on 8/29/2018  8:26 AM   Next Dose Due:  Wednesday evening, 8/29/18                                      metFORMIN 500 MG 24 hr tablet   Commonly known as:  GLUCOPHAGE-XR   TAKE ONE TABLET BY MOUTH ONCE DAILY WITH  DINNER   Last time this was given:  500 mg on 8/28/2018  5:25 PM   Next Dose Due:  Wednesday evening, 8/29/18                                   nitroGLYcerin 0.4 MG sublingual tablet   Commonly known as:  NITROSTAT   Place 1 tablet (0.4 mg) under the tongue every 5 minutes as needed for chest pain                                   order for DME   Equipment being ordered: Walker () Treatment Diagnosis: s/p joint replacement                                oxyCODONE IR 5 MG tablet   Commonly known as:  ROXICODONE   Take 1 tablet (5 mg) by mouth every 3 hours as needed for other (pain control or improvement in physical function. Hold dose for analgesic side effects.)   Last  time this was given:  5 mg on 8/29/2018  1:29 PM                                   senna-docusate 8.6-50 MG per tablet   Commonly known as:  SENOKOT-S;PERICOLACE   Take 1-2 tablets by mouth 2 times daily as needed for constipation   Last time this was given:  1 tablet on 8/29/2018  8:26 AM                                   simvastatin 40 MG tablet   Commonly known as:  ZOCOR   Take 0.5 tablets (20 mg) by mouth At Bedtime   Last time this was given:  20 mg on 8/28/2018  8:06 PM   Next Dose Due:  Wednesday evening, 8/29/18                                   vitamin D 400 units capsule   Take 1 capsule by mouth 2 times daily   Next Dose Due:  None given in hospital, return to home routine.                                                 More Information        Oxycodone tablets or capsules  Brand Names: Dazidox, Endocodone, Oxaydo, OxyIR, Percolone, Roxicodone, ROXYBOND  What is this medicine?  OXYCODONE (ox i KOE done) is a pain reliever. It is used to treat moderate to severe pain.  How should I use this medicine?  Take this medicine by mouth with a glass of water. Follow the directions on the prescription label. You can take it with or without food. If it upsets your stomach, take it with food. Take your medicine at regular intervals. Do not take it more often than directed. Do not stop taking except on your doctor's advice.  Some brands of this medicine, like Oxecta, have special instructions. Ask your doctor or pharmacist if these directions are for you: Do not cut, crush or chew this medicine. Swallow only one tablet at a time. Do not wet, soak, or lick the tablet before you take it.  A special MedGuide will be given to you by the pharmacist with each prescription and refill. Be sure to read this information carefully each time.  Talk to your pediatrician regarding the use of this medicine in children. Special care may be needed.  What side effects may I notice from receiving this medicine?  Side effects that you  should report to your doctor or health care professional as soon as possible:    allergic reactions like skin rash, itching or hives, swelling of the face, lips, or tongue    breathing problems    confusion    signs and symptoms of low blood pressure like dizziness; feeling faint or lightheaded, falls; unusually weak or tired    trouble passing urine or change in the amount of urine    trouble swallowing  Side effects that usually do not require medical attention (report to your doctor or health care professional if they continue or are bothersome):    constipation    dry mouth    nausea, vomiting    tiredness  What may interact with this medicine?  This medicine may interact with the following medications:    alcohol    antihistamines for allergy, cough and cold    antiviral medicines for HIV or AIDS    atropine    certain antibiotics like clarithromycin, erythromycin, linezolid, rifampin    certain medicines for anxiety or sleep    certain medicines for bladder problems like oxybutynin, tolterodine    certain medicines for depression like amitriptyline, fluoxetine, sertraline    certain medicines for fungal infections like ketoconazole, itraconazole, voriconazole    certain medicines for migraine headache like almotriptan, eletriptan, frovatriptan, naratriptan, rizatriptan, sumatriptan, zolmitriptan    certain medicines for nausea or vomiting like dolasetron, ondansetron, palonosetron    certain medicines for Parkinson's disease like benztropine, trihexyphenidyl    certain medicines for seizures like phenobarbital, phenytoin, primidone    certain medicines for stomach problems like dicyclomine, hyoscyamine    certain medicines for travel sickness like scopolamine    diuretics    general anesthetics like halothane, isoflurane, methoxyflurane, propofol    ipratropium    local anesthetics like lidocaine, pramoxine, tetracaine    MAOIs like Carbex, Eldepryl, Marplan, Nardil, and Parnate    medicines that relax muscles  for surgery    methylene blue    nilotinib    other narcotic medicines for pain or cough    phenothiazines like chlorpromazine, mesoridazine, prochlorperazine, thioridazine  What if I miss a dose?  If you miss a dose, take it as soon as you can. If it is almost time for your next dose, take only that dose. Do not take double or extra doses.  Where should I keep my medicine?  Keep out of the reach of children. This medicine can be abused. Keep your medicine in a safe place to protect it from theft. Do not share this medicine with anyone. Selling or giving away this medicine is dangerous and against the law.  Store at room temperature between 15 and 30 degrees C (59 and 86 degrees F). Protect from light. Keep container tightly closed.  This medicine may cause accidental overdose and death if it is taken by other adults, children, or pets. Flush any unused medicine down the toilet to reduce the chance of harm. Do not use the medicine after the expiration date.  What should I tell my health care provider before I take this medicine?  They need to know if you have any of these conditions:    [place]'s disease    brain tumor    head injury    heart disease    history of drug or alcohol abuse problem    if you often drink alcohol    kidney disease    liver disease    lung or breathing disease, like asthma    mental illness    pancreatic disease    seizures    thyroid disease    an unusual or allergic reaction to oxycodone, codeine, hydrocodone, morphine, other medicines, foods, dyes, or preservatives    pregnant or trying to get pregnant    breast-feeding  What should I watch for while using this medicine?  Tell your doctor or health care professional if your pain does not go away, if it gets worse, or if you have new or a different type of pain. You may develop tolerance to the medicine. Tolerance means that you will need a higher dose of the medicine for pain relief. Tolerance is normal and is expected if you take this  medicine for a long time.  Do not suddenly stop taking your medicine because you may develop a severe reaction. Your body becomes used to the medicine. This does NOT mean you are addicted. Addiction is a behavior related to getting and using a drug for a non-medical reason. If you have pain, you have a medical reason to take pain medicine. Your doctor will tell you how much medicine to take. If your doctor wants you to stop the medicine, the dose will be slowly lowered over time to avoid any side effects.  There are different types of narcotic medicines (opiates). If you take more than one type at the same time or if you are taking another medicine that also causes drowsiness, you may have more side effects. Give your health care provider a list of all medicines you use. Your doctor will tell you how much medicine to take. Do not take more medicine than directed. Call emergency for help if you have problems breathing or unusual sleepiness.  You may get drowsy or dizzy. Do not drive, use machinery, or do anything that needs mental alertness until you know how the medicine affects you. Do not stand or sit up quickly, especially if you are an older patient. This reduces the risk of dizzy or fainting spells. Alcohol may interfere with the effect of this medicine. Avoid alcoholic drinks.  This medicine will cause constipation. Try to have a bowel movement at least every 2 to 3 days. If you do not have a bowel movement for 3 days, call your doctor or health care professional.  Your mouth may get dry. Chewing sugarless gum or sucking hard candy, and drinking plenty of water may help. Contact your doctor if the problem does not go away or is severe.  NOTE:This sheet is a summary. It may not cover all possible information. If you have questions about this medicine, talk to your doctor, pharmacist, or health care provider. Copyright  2018 [employer]

## 2018-08-27 NOTE — ANESTHESIA PROCEDURE NOTES
Peripheral nerve/Neuraxial procedure note : intrathecal  Pre-Procedure    Location: OR      Pre-Anesthestic Checklist: patient identified, IV checked, risks and benefits discussed, informed consent, monitors and equipment checked and pre-op evaluation    Timeout  Correct Patient: Yes   Correct Procedure: Yes   Correct Site: Yes   Correct Laterality: N/A   Correct Position: Yes   Site Marked: N/A   .   Procedure Documentation  ASA 3  .    Procedure:    Intrathecal.  Insertion Site:L3-4  (midline approach)      Patient Prep;mask, sterile gloves, povidone-iodine 7.5% surgical scrub, patient draped.  .  Needle: Rg tip Spinal Needle (gauge): 25  Spinal/LP Needle Length (inches): 3.5 # of attempts: 1 and # of redirects:  Introducer used Introducer: 20 G .       Assessment/Narrative  Paresthesias: No.  .  .  clear CSF fluid removed while sitting   . Comments:  Pt tolerated procedure well, no paresthesias

## 2018-08-27 NOTE — CONSULTS
[facility]    Hospitalist Consultation    Date of Admission:  8/27/2018    Assessment & Plan   Sandra Petit is a 78 year old female who was admitted on 8/27/2018. I was asked to see the patient in consult for hypertension, chronic renal failure, Type 2 Diabetes.    Principal Problem:    S/P total knee replacement using cement, right    Assessment: Surgery today, 8/27/18.     Plan: Per Ortho    Active Problems:    CKD (chronic kidney disease) stage 3, GFR 30-59 ml/min    Assessment: Patient with chronic kidney disease, creatinine ranging from 1.1 - 1.3. No creatinine today.  Current Cr CL 33.     Plan: Monitor kidney function, creat today.  CMP tomorrow.       Hypertension goal BP (blood pressure) < 140/90    Assessment: Has been high at home and difficult to manage. She is currently on Cozaar 50 mg twice a day and Catapres 0.2 mg twice a day. She states she takes her Catapres at 1600 due to her blood pressure rising at this time almost every day. She is currently hypertensive systolic 180, and plan to restart today X one dose. Re evaluate as needed and restart tomorrow.     Plan: Continue home medications.      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 She is on Glucophage  mg at home. Patient states she has had good control of her blood sugar, she normally only checks her blood sugar in the morning and it is between . She does not strictly follow the diabetic diet, but does try to limit her sugar.     Plan: Restart Glucophage XR. Advance diet. Diabetic diet ordered. Blood Glucose monitor three times a day and at bedtime. Hypoglycemia protocol ordered. sliding scale insulin ordered.       Hyperuricemia    Assessment: Patient with a history of gout, right great toe, about 2 years ago. Patient states she developed pain in her left great toe last night and it was hard to walk on it. Patient reports swelling and pain  to touch. She has not noted erythema or warmth.     Plan: Left Toe XR today. Will check a Uric Acid level. Difficult to treat as she has a decreased renal function and had surgery today. Patient also has sensitivities to NSAIDs and a history of stomach ulcer.  May be able to try Colchicine ( renal dose) or Prednisone ( monitor for post surgical complications) .       Hyperlipidemia LDL goal <100    Assessment: Stable    Plan: Continue Simvastatin 20 mg at night    DVT Prophylaxis: Anti-embolisim stockings (TEDs)  Code Status: Full Code    Disposition: Expected discharge in 2-4 days once medically stable, post operative per orthopedics.    Norma Tamez    Reason for Consult   Reason for consult: I was asked by Dr Anaya to evaluate this patient for medical management of hypertension, chronic renal disease, and Type 2 Diabetes Mellitis.     Primary Care Physician   Teddy Wray    Chief Complaint     Total knee replacement using cement, right     History is obtained from the patient and electronic health record    History of Present Illness   Sandra Petit is a 78 year old female who presents with right knee osteoarthritis, post operatively following a right total knee arthroplasty 8/27/18.         Past Medical History    I have reviewed this patient's medical history and updated it with pertinent information if needed.   Past Medical History:   Diagnosis Date     Diabetic eye exam (H) 08/05/13     Endometriosis, site unspecified      Fever and other physiologic disturbances of temperature regulation 07/07/2005    Admit.  Discharged 07/13/2005.  Cause undetermined.     Myalgia and myositis, unspecified     fibromyalgia     Pure hypercholesterolemia      Unspecified essential hypertension      Unspecified hypothyroidism        Past Surgical History   I have reviewed this patient's surgical history and updated it with pertinent information if needed.  Past Surgical History:   Procedure Laterality Date      ARTHROPLASTY KNEE  4/29/2013    Procedure: ARTHROPLASTY KNEE;  left total knee arthroplasty;  Surgeon: Teddy Grimes MD;  Location: [facility] OR     C APPENDECTOMY       C NONSPECIFIC PROCEDURE      Knee ligament surgery     C NONSPECIFIC PROCEDURE      Kidney surgery for mechanical obstruction     C OPEN CORONARY ENDARTERECTOMY  june 2005    Angioplasty w stenting     C TOTAL KNEE ARTHROPLASTY  4/29/13    Left     COMBINED CYSTOSCOPY, INSERT CATHETER URETER Bilateral 8/10/2015    Procedure: COMBINED CYSTOSCOPY, INSERT CATHETER URETER;  Surgeon: Johnnie Madera MD;  Location: [facility] OR     DILATION AND CURETTAGE, HYSTEROSCOPY DIAGNOSTIC, COMBINED N/A 11/6/2014    Procedure: COMBINED DILATION AND CURETTAGE, HYSTEROSCOPY DIAGNOSTIC;  Surgeon: Edward Pardo MD;  Location: [facility] OR     ESOPHAGOSCOPY, GASTROSCOPY, DUODENOSCOPY (EGD), COMBINED N/A 1/27/2015    Procedure: COMBINED ESOPHAGOSCOPY, GASTROSCOPY, DUODENOSCOPY (EGD), BIOPSY SINGLE OR MULTIPLE;  Surgeon: Carmelo Rosario MD;  Location: [facility] GI     HC REMOVAL GALLBLADDER      Cholecystectomy     HC REMOVE TONSILS/ADENOIDS,<13 Y/O      unsure of age     LAPAROSCOPIC HYSTERECTOMY TOTAL N/A 8/10/2015    Procedure: LAPAROSCOPIC HYSTERECTOMY TOTAL;  Surgeon: Edward Pardo MD;  Location: [facility]     LAPAROSCOPIC LYSIS ADHESIONS N/A 8/10/2015    Procedure: LAPAROSCOPIC LYSIS ADHESIONS;  Surgeon: Edward Pardo MD;  Location: [facility] OR       Prior to Admission Medications   Prior to Admission Medications   Prescriptions Last Dose Informant Patient Reported? Taking?   Cholecalciferol (VITAMIN D) 400 UNITS capsule 8/26/2018 at Unknown time  Yes Yes   Sig: Take 1 capsule by mouth 2 times daily   Probiotic Product (ACIDOPHILUS PROBIOTIC BLEND) CAPS Past Week at Unknown time  Yes Yes   Sig: Take 1 capsule by mouth 2 times daily   aspirin 81 MG tablet Past Week at Unknown time  Yes Yes   Sig: Take 81 mg by mouth daily   blood glucose  "monitoring (ONE TOUCH DELICA) lancets Past Month at Unknown time  No Yes   Sig: Use to test blood sugars 1 times daily or as directed.   blood glucose monitoring (ONE TOUCH VERIO IQ) test strip Past Month at Unknown time  No Yes   Sig: Use to test blood sugars 1 times daily or as directed.   cloNIDine (CATAPRES) 0.2 MG tablet 8/27/2018 at 2000  No Yes   Sig: Take 1 tablet (0.2 mg) by mouth 2 times daily   gabapentin (NEURONTIN) 100 MG capsule 8/26/2018 at 2230  No Yes   Sig: Take 1 capsule (100 mg) by mouth At Bedtime Begin 3-4 days prior to surgery   levofloxacin (LEVAQUIN) 750 MG tablet Unknown at Unknown time  No No   Sig: Take one tablet 2 hours prior to procedure   Patient not taking: Reported on 8/16/2018   levothyroxine (SYNTHROID) 50 MCG tablet 8/27/2018 at 0500  No Yes   Sig: Take 1 tablet (50 mcg) by mouth daily   losartan (COZAAR) 50 MG tablet 8/26/2018 at 0900  No Yes   Sig: TAKE ONE TABLET BY MOUTH TWICE DAILY   metFORMIN (GLUCOPHAGE-XR) 500 MG 24 hr tablet 8/26/2018 at 1800  No Yes   Sig: TAKE ONE TABLET BY MOUTH ONCE DAILY WITH  DINNER   nitroglycerin (NITROSTAT) 0.4 MG SL tablet Unknown at Unknown time  No No   Sig: Place 1 tablet (0.4 mg) under the tongue every 5 minutes as needed for chest pain   Patient not taking: Reported on 8/16/2018   simvastatin (ZOCOR) 40 MG tablet 8/26/2018 at 1800  Yes Yes   Sig: Take 0.5 tablets (20 mg) by mouth At Bedtime      Facility-Administered Medications: None     Allergies   Allergies   Allergen Reactions     Penicillins Swelling     \"throat started swelling\"     Vitamin B Complex Hives     Vitamin B12 Hives     all vitamin B's     Clindamycin Hcl Rash       Social History   I have reviewed this patient's social history and updated it with pertinent information if needed. Sandra OAKLEY Marisabel  reports that she has quit smoking. She has never used smokeless tobacco. She reports that she does not drink alcohol or use illicit drugs.    Family History   I have reviewed this " patient's family history and updated it with pertinent information if needed.   Family History   Problem Relation Age of Onset     HEART DISEASE Mother       coronary     HEART DISEASE Father       coronary     Allergies Sister      unknown     Allergies Brother      unknown     Allergies Daughter      unknown     Allergies Son      unknown     HEART DISEASE Sister      by pass surg     HEART DISEASE Brother      on medication     Hypertension Sister      Hypertension Brother      Lipids Sister      Lipids Brother      Cerebrovascular Disease Brother      Obesity Sister      Diabetes Sister      Diabetes Sister      C.A.D. Brother      quad by pass     Neurologic Disorder Sister      parkinsons     Cancer Sister      skin cancer     Alcohol/Drug No family hx of      Alzheimer Disease No family hx of        Review of Systems   CONSTITUTIONAL:  negative for  fevers, chills and sweats  HEENT:  negative for  nasal congestion  RESPIRATORY:  negative for  dry cough, dyspnea and wheezing  CARDIOVASCULAR:  + Palpitations. negative for  chest pain, dyspnea, orthopnea  GASTROINTESTINAL:  negative for nausea, vomiting, change in bowel habits, diarrhea and constipation  GENITOURINARY:  negative for frequency and dysuria  HEMATOLOGIC/LYMPHATIC:  negative for easy bruising and bleeding  MUSCULOSKELETAL:  positive for  myalgias, stiff joints and left great toe pain and swelling.   NEUROLOGICAL:  negative for headaches, dizziness and numbness      Physical Exam   Temp: 96.3  F (35.7  C) Temp src: Oral BP: 188/69 Pulse: 79 Heart Rate: 70 Resp: 20 SpO2: 98 % O2 Device: None (Room air)    Vital Signs with Ranges  Temp:  [95.4  F (35.2  C)-98.1  F (36.7  C)] 96.3  F (35.7  C)  Pulse:  [59-79] 79  Heart Rate:  [70-82] 70  Resp:  [15-29] 20  BP: (123-196)/(31-78) 188/69  SpO2:  [95 %-100 %] 98 %  171 lbs 6.42 oz    Exam:  Constitutional: healthy, alert and no distress  Head: Normocephalic.    Neck: negative findings: no jugular  venous distention, trachea midline  Cardiovascular: PMI normal. No lifts, heaves, or thrills. RRR. No murmurs, clicks gallops or rub, No edema or JVD.  Respiratory: Good diaphragmatic excursion. Lungs clear  Gastrointestinal: Abdomen soft, non-tender. BS normal.   : Deferred  Musculoskeletal: normal muscle tone, peripheral pulses normal and tender to palpation left great toe   Skin: no suspicious lesions or rashes  Neurologic: Reflexes normal and symmetric. Sensation grossly WNL. Mild diminished sensation post op in the right toes. + CMS.   Psychiatric: mentation appears normal and affect normal/bright  Hematologic/Lymphatic/Immunologic: no bleeding noted      Data   -Data reviewed today: All pertinent laboratory and imaging results from this encounter were reviewed. I personally reviewed no images or EKG's today.      Recent Results (from the past 24 hour(s))   XR Knee Port Right 1/2 Views    Narrative    KNEE PORTABLE RIGHT ONE TO TWO VIEWS August 27, 2018 10:15 AM     HISTORY: Postoperative.    COMPARISON: 3/14/2018.    FINDINGS: There has been a total knee arthroplasty. Alignment is  anatomic. Hardware components are well seated and demonstrate no  evidence for periprosthetic fracture or hardware failure. Gas and  fluid are seen in the joint space and in the soft tissues. There are  midline skin staples.        Impression    IMPRESSION: Expected findings status post right knee arthroplasty.

## 2018-08-27 NOTE — PROGRESS NOTES
08/27/18 1500   Quick Adds   Type of Visit Initial PT Evaluation       Present no   Living Environment   Lives With spouse   Living Arrangements house   Home Accessibility bed and bath on same level;stairs to enter home   Number of Stairs to Enter Home (flight to the basement, not necessary)   Number of Stairs Within Home 2   Stair Railings at Home none   Transportation Available family or friend will provide;car   Living Environment Comment  available 24/7, grandson willing to assist with patient's care needs   Self-Care   Dominant Hand right   Usual Activity Tolerance fair   Current Activity Tolerance fair   Regular Exercise no   Equipment Currently Used at Home tub bench;raised toilet;walker, rolling;cane, straight  (2WW, foot grabber)   Functional Level Prior   Ambulation 1-->assistive equipment  (SPC)   Transferring 0-->independent   Toileting 0-->independent   Bathing 0-->independent   Dressing 0-->independent   Eating 0-->independent   Communication 0-->understands/communicates without difficulty   Swallowing 0-->swallows foods/liquids without difficulty   Cognition 0 - no cognition issues reported   Fall history within last six months no   Which of the above functional risks had a recent onset or change? ambulation;transferring   General Information   Onset of Illness/Injury or Date of Surgery - Date 08/27/18   Referring Physician Dr. Anaya   Patient/Family Goals Statement Discharge home with  and grandson's assistance   Pertinent History of Current Problem (include personal factors and/or comorbidities that impact the POC) Patient is a 78 year old female, day 0 status post right total knee arthroplasty. Patient has a previous medical history of CAD, CKD, diabetes and chest pain.   Precautions/Limitations fall precautions   Weight-Bearing Status - LUE full weight-bearing   Weight-Bearing Status - RUE full weight-bearing   Weight-Bearing Status - LLE full weight-bearing    Weight-Bearing Status - RLE weight-bearing as tolerated   General Observations PT orders: eval and treat S/P R TKA   Cognitive Status Examination   Orientation orientation to person, place and time   Level of Consciousness alert   Follows Commands and Answers Questions 100% of the time   Personal Safety and Judgment intact   Memory intact   Pain Assessment   Patient Currently in Pain No  (Spinal block and post operative local cocktail in place)   Integumentary/Edema   Integumentary/Edema Comments Bilat PCDs.    Posture    Posture Forward head position   Range of Motion (ROM)   ROM Comment Left knee historical TKA with poor outcomes and poor tolerance of mobilization following 2013 surgery. Right knee flexion 35 degrees active range of motion. Bilat UE and LE WFL for bed mobility.   Strength   Strength Comments Bilat UE and LE WFL for bed mobility, not formally assessed.    Bed Mobility   Bed Mobility Bed mobility skill: Rolling/Turning;Bed mobility skill: Scooting/Bridging;Bed mobility skill: Sit to supine;Bed mobility skill: Supine to sit   Bed Mobility Skill: Rolling/Turning   Level of [place]: Rolling/Turning independent   Physical/Nonphysical Assist: Rolling/Turning supervision   Assistive Device: Rolling/Turning bed rails   Bed Mobility Skill: Scooting/Bridging   Level of [place]: Scooting/Bridging independent   Physical/Nonphysical Assist: Scooting/Bridging supervision   Assistive Device: Scooting/Bridging bed rails   Bed Mobility Skill: Sit to Supine   Level of [place]: Sit/Supine moderate assist (50% patients effort)  (LE management)   Physical Assist/Nonphysical Assist: Sit/Supine 1 person assist   Assistive Device: Sit/Supine bed rails   Bed Mobility Skill: Supine to Sit   Level of [place]: Supine/Sit minimum assist (75% patients effort)   Physical Assist/Nonphysical Assist: Supine/Sit 1 person assist   Assistive Device: Supine/Sit bed rails   Transfer Skills   Transfer Transfer  Skill: Sit to Stand;Transfer Skill:  Stand to Sit;Transfer Safety Analysis Stand/Sit   Transfer Skill:  Sit to Stand   Level of [place]: Sit/Stand contact guard   Physical Assist/Nonphysical Assist: Sit/Stand verbal cues;supervision   Weightbearing Restrictions: Sit/Stand full weight-bearing   Assistive Device for Transfer: Sit/Stand rolling walker   Transfer Skill: Stand to Sit   Level of [place]: Stand/Sit contact guard   Physical Assist/Nonphysical Assist: Stand/Sit verbal cues;supervision   Weight-Bearing Restrictions: Stand/Sit full weight-bearing   Assistive Device: Stand to Sit rolling walker   Transfer Safety Analysis Stand To Sit   Transfer Safety Concerns Noted: Stand to Sit losing balance backward;decreased weight-shifting ability   Impairments Contributing to Impaired Transfers: Stand to Sit decreased strength;decreased ROM;decreased sensation   Gait   Gait Comments Performed pregait tasks, due to right knee instability x 1  deferred gait to future sessions.   Sensory Examination   Sensory Perception Comments Decreased sensation bilaterally due to surgical nerve block   Coordination   Coordination no deficits were identified   Muscle Tone   Muscle Tone no deficits were identified   Modality Interventions   Planned Modality Interventions Cryotherapy   Planned Modality Interventions Comments PRN   General Therapy Interventions   Planned Therapy Interventions gait training;transfer training;bed mobility training;home program guidelines;ROM;strengthening   Clinical Impression   Criteria for Skilled Therapeutic Intervention yes, treatment indicated   PT Diagnosis joint stiffness, muscle weakness, antalgic gait, pain   Influenced by the following impairments Day 0 status post right total knee arthroplasty   Functional limitations due to impairments impaired functional mobility, decreased safety with transitional movements, impaired gait   Clinical Presentation Evolving/Changing   Clinical  "Presentation Rationale Patient is day 0 status post surgery, presents with decreased sensation and impaired motor function   Clinical Decision Making (Complexity) Low complexity   Therapy Frequency` 2 times/day   Predicted Duration of Therapy Intervention (days/wks) 3-4 days   Anticipated Equipment Needs at Discharge (None)   Anticipated Discharge Disposition Home with Home Therapy   Risk & Benefits of therapy have been explained Yes   Patient, Family & other staff in agreement with plan of care Yes   [facility] TM \"6 Clicks\"   2016, Trustees of [facility], under license to [employer]  All rights reserved.   6 Clicks Short Forms Basic Mobility Inpatient Short Form   [facility] AM-PAC  \"6 Clicks\" V.2 Basic Mobility Inpatient Short Form   1. Turning from your back to your side while in a flat bed without using bedrails? 3 - A Little   2. Moving from lying on your back to sitting on the side of a flat bed without using bedrails? 3 - A Little   3. Moving to and from a bed to a chair (including a wheelchair)? 2 - A Lot   4. Standing up from a chair using your arms (e.g., wheelchair, or bedside chair)? 3 - A Little   5. To walk in hospital room? 2 - A Lot   6. Climbing 3-5 steps with a railing? 1 - Total   Basic Mobility Raw Score (Score out of 24.Lower scores equate to lower levels of function) 14   Total Evaluation Time   Total Evaluation Time (Minutes) 25     Thank you for your referral.    Miri Skaggs, PT, DPT, ATC    [facility]    O: [phone]  E: [email or web address]    "

## 2018-08-27 NOTE — ANESTHESIA CARE TRANSFER NOTE
Patient: Sandra Petit    Procedure(s):  right total knee arthroplasty - Wound Class: I-Clean    Diagnosis: primary osteoarthritis of right knee  Diagnosis Additional Information: No value filed.    Anesthesia Type:   ETT, Spinal     Note:  Airway :Face Mask  Patient transferred to:PACU  Handoff Report: Identifed the Patient, Identified the Reponsible Provider, Reviewed the pertinent medical history, Discussed the surgical course, Reviewed Intra-OP anesthesia mangement and issues during anesthesia, Set expectations for post-procedure period and Allowed opportunity for questions and acknowledgement of understanding      Vitals: (Last set prior to Anesthesia Care Transfer)    CRNA VITALS  8/27/2018 0906 - 8/27/2018 0941      8/27/2018             SpO2: 99 %                Electronically Signed By: AMARJIT Huerta CRNA  August 27, 2018  9:41 AM

## 2018-08-27 NOTE — OR NURSING
Transfer from  PACU to Room med surg   Transferred to bed via glyder mat (Glyder Mat,Transfer Boar,Slider Sheet)    S: 79 y/o Female  S/P knee arthroscopy        Anesthesia Type:  Spinal        Surgeon:  Dr. hooks       Allergies:  See Medication Reconciliation Record       DNR:   (Yes,No)    B:  Pertinent Medical History:   Past Medical History:   Diagnosis Date     Diabetic eye exam (H) 08/05/13     Endometriosis, site unspecified      Fever and other physiologic disturbances of temperature regulation 07/07/2005    Admit.  Discharged 07/13/2005.  Cause undetermined.     Myalgia and myositis, unspecified     fibromyalgia     Pure hypercholesterolemia      Unspecified essential hypertension      Unspecified hypothyroidism      Past Surgical History:   Procedure Laterality Date     ARTHROPLASTY KNEE  4/29/2013    Procedure: ARTHROPLASTY KNEE;  left total knee arthroplasty;  Surgeon: Teddy Grimes MD;  Location: [facility] OR     C APPENDECTOMY       C NONSPECIFIC PROCEDURE      Knee ligament surgery     C NONSPECIFIC PROCEDURE      Kidney surgery for mechanical obstruction     C OPEN CORONARY ENDARTERECTOMY  june 2005    Angioplasty w stenting     C TOTAL KNEE ARTHROPLASTY  4/29/13    Left     COMBINED CYSTOSCOPY, INSERT CATHETER URETER Bilateral 8/10/2015    Procedure: COMBINED CYSTOSCOPY, INSERT CATHETER URETER;  Surgeon: Johnnie Madera MD;  Location: [facility] OR     DILATION AND CURETTAGE, HYSTEROSCOPY DIAGNOSTIC, COMBINED N/A 11/6/2014    Procedure: COMBINED DILATION AND CURETTAGE, HYSTEROSCOPY DIAGNOSTIC;  Surgeon: Edward Pardo MD;  Location: [facility] OR     ESOPHAGOSCOPY, GASTROSCOPY, DUODENOSCOPY (EGD), COMBINED N/A 1/27/2015    Procedure: COMBINED ESOPHAGOSCOPY, GASTROSCOPY, DUODENOSCOPY (EGD), BIOPSY SINGLE OR MULTIPLE;  Surgeon: Carmelo Rosario MD;  Location: [facility] GI     HC REMOVAL GALLBLADDER      Cholecystectomy     HC REMOVE TONSILS/ADENOIDS,<11 Y/O      unsure of age     LAPAROSCOPIC  HYSTERECTOMY TOTAL N/A 8/10/2015    Procedure: LAPAROSCOPIC HYSTERECTOMY TOTAL;  Surgeon: Edward Pardo MD;  Location: [facility] OR     LAPAROSCOPIC LYSIS ADHESIONS N/A 8/10/2015    Procedure: LAPAROSCOPIC LYSIS ADHESIONS;  Surgeon: Edward Pardo MD;  Location: [facility] OR        (CHF; Heart Disease; Lung Disease; Chronic Pain; Diabetes; Other (Comment)          Surgical History:      A:  EBL: 150        IVF:  1400        UOP:  150        NPO:  _No         Vomiting:  _No         Drainage: none        Skin Integrity: CDI (Normal; Pressure Ulcer (Location)        RFO: ___Yes___No (identify item if present)        SSI Patient?  ___Yes___No (if yes, see checklist for actions)        Brace/sling/equipment:  ___Yes___No (identify item if present)         See PACU record for ongoing assessment, vital signs and pain assessment.    R: Post-Op vitals and assessments as ordered/indicated per patient's condition.       Follow Post-Op orders and notify Physician prn.       Continue to involve patient/family in plan of care and discharge planning.       Reinforce Pre-Operative education.       Implement skin safety interventions as appropriate.    Name: Madison LING        PACU blood sugar  156

## 2018-08-27 NOTE — OP NOTE
PREOPERATIVE DIAGNOSIS: RIGHT knee osteoarthritis with varus and patella femoral disease.   POSTOPERATIVE DIAGNOSIS: RIGHT knee osteoarthritis with Samet.   PROCEDURE: RIGHTtotal knee arthroplasty using Rani Triathlon series #3 femur, cruciate retaining, #3 tibia, 11mm spacer and a 32 mm asymmetric patella. All components were cemented in. Antibiotic cement was used    Collateral ligament release was Not Done.   A lateral release was Not Done      SURGEON: Johnny Anaya MD.   ASSISTANT: Rosanne Perdomo PA-C, was used for the complexity of this surgical procedure and to assist with proper exposure and wound management.   ANESTHESIA: General.   INDICATIONS: Sandra Petit is a  78 year old  female who has end stage degenerative arthritis of the RIGHT knee.  This patient has x-ray evidence of bone on bone or near bone on bone changes of the Medial compartments,   As well as significant changes of the patella femoral joint on X-ray.   They have given up normal activities of daily living and have begun to use assistive devices to function.  They were using oral medication for pain management and injection therapies had failed or were felt to have low potential for benefit.  This is further documented in my office notes.  After reviewing treatment options in the office and discussing risks benefits and anticipated recovery times in the office the patient wished to proceed with this surgery.  They have attended our Knee Replacement education class.  They were interviewed in the pre op hold area, where the proper extremity was marked and consent was obtained.     DETAILS OF PROCEDURE: The patient was brought to the operating room, placed upon the operating table and general anesthesia induced. Garza catheter was placed. An upper thigh tourniquet was applied. A bump was placed on the buttock, and a positioning device was used on the table.   The leg was prepped and draped in the usual fashion for total joint replacement.  All operating personnel utilized exhaust systems.   Timeout was accomplished using our standard protocol.   The leg was exsanguinated with an Esmarch, and the tourniquet was inflated.   Midline longitudinal incision was created through skin and subcutaneous tissue. The retinaculum was identified along with the quadriceps tendon. A medial arthrotomy was accomplished beginning at the superior medial edge of the patella and then carried proximally with quadriceps splitting as close to the VMO was possible. The arthrotomy was then carried distally around the patella and parallel to the patella tendon. We partially resected the fat pads. We released the lateral capsule to zamzam the patella. This Did notrequire a lateral release . The patellae was everted. The knee could now be flexed.  Erin retractors were placed.  The osteophytes were removed from the distal femur. The distal femoral entrance hole was created. The intramedullary juan was placed. The distal femoral cutting guide was used. We set the guide angle to remove the appropriate amount of bone medially/ laterally based on the pre operative long standing X-ray. This removed equal bone from both sides.   The epicondylar axis was marked and the medial lateral sizing was noted. The rotational holes were created parallel to the axis line and the stylus was used on the anterior cortex of the distal femur to estimate sizing as well.  The distal femur was sized at a 3.   The 4-in-1 cutting guide was used and the cut were completed. The femoral component fit well.   The proximal tibia was then presented. This required debridement of the menisci. Medial and lateral retractors were placed.   The proximal tibia was cut using the following landmarks for reference.   We took into consideration the width of the fibula distally. We rotationally aligned with respect to the tibial tubercle, the patient's proximal tibial normal anatomy.  We determined posterior slope based on the  posterior inclination off the medial proximal tibia and the appearance of the cutting guide to the anterior tibial crest. We referenced 9 mm off the lateral tibial plateau, this also correlated with the long standing X-ray.       The cutting guide was fixed to the tibia.  All soft tissues were carefully protected and the proximal tibia was cut, sparing the posterior cruciate ligament.       The posterior femoral condyles were cleaned using curved osteotome.     Tensor  device was used in flexion and extension.  releases was not  needed  in order to balance the knee.  We did not need to resect more bone  From the tibia in order to create an acceptable gap.  We did not need to resect more bone from the distal femur in order to create a symmetric acceptable gap.      The proximal tibia was sized.   The femoral trial and the tibial trial with a 9mm spacer was placed.  The knee was put through an arc of motion. Excellent varus valgus stability throughout the entire arc was noted.  Full extension was verified and flexion to at least 130 degrees was obtained.      The patella was measured for thickness and a patella resection was accomplished.  We removed 8 mm of bone based on the original thickness, the known replacement thickness' that were available, as well as attempting to leave at least 12-14 mm of residual patella bone. We then sized the patella and prepared it for the prosthesis.  A trial button was placed    The patelladid appear to track well.  So a lateral release was not needed.     At this point, we cleaned all redundant soft tissues and residual bone. The distal femoral entrance hole was bone grafted. The wound was irrigated with pulsatile lavage and dried carefully.   We injected our pain cocktail around the femoral and tibial periosteum.  All 3 components of the knee were cemented simultaneously, and the knee was held in locked full extension as the cement hardened.   Once the cement was hard the  knee was flexed and carefully inspected for any redundant cement,  This was removed.  The tourniquet was now released.   Bleeding was controlled with pressure and electrocautery. There was no unusual bleeding. The wound was irrigated with antibiotic solution.      We now trialed and an 11 spacer was chosen.  We then placed the definitive liner. Range of motion and stability parameters were unchanged at this point.     We then infiltrated the arthrotomy incision line with a pain cocktail.     Closure was accomplished with figure-of-eight #1 Ethibond on the quadriceps and medial retinaculum. Subcutaneous tissues were approximated in layers with 0 and 2-0 Vicryl.  The skin was closed with staples.     Dressings of Xeroform, 4x4, ABDs and sterile Webril were applied, followed by [id number] bulky dressing. The patient was awakened and taken to Recovery Room, where stable vital signs were noted.     VERONICA MICHELLE MD

## 2018-08-27 NOTE — PLAN OF CARE
Problem: Patient Care Overview  Goal: Plan of Care/Patient Progress Review  Discharge Planner PT   Patient plan for discharge: Home with family assistance  Current status: Patient is a 78 year old female, day 0 status post right total knee arthroplasty. Patient has a previous medical history of CAD, CKD, diabetes and chest pain. Prior to surgery patient living with her  in a single family home with 2 stairs to enter and no stairs within that she needs to mobilize. Previously ambulating with SPC due to left knee instability (poor outcomes of TKA in 2013), no falls in the past 6 months and IND in transfers. She reports her  is home, however unable to physically assist her at discharge. Her grandson is willing to stay with her at discharge and assist as needed. Currently, patient demonstrated intact spinal nerve block, min assist for supine to sitting EOB and CGA for sit to stand with 2WW. Demonstrated moderate control of right knee with 1 episode of instability while performing pregait marching. Moderate assist for LE management sit to supine, MOD IND scooting up in bed.   Barriers to return to prior living situation: Medical status, pain, impaired gait, reduced functional mobility  Recommendations for discharge: Home with 24/7 assistance, home health therapies  Rationale for recommendations: Patient demonstrates fair mobilization for day 0 and indicates support in the home environment for safe discharge home. She would benefit from continued skilled therapeutic intervention to address post operative impairments and assist in progressing her towards her independent goals in accordance with her surgeon's protocol.       Entered by: Miri Skaggs 08/27/2018 4:55 PM

## 2018-08-27 NOTE — LETTER
Honoring DrDoctor Advance Care Planning    Sandra Petit  [id number] [facility] [id number]    Dear Sandra     We have reviewed the Health Care Directive dated 8-4-15 which you presented for addition to   your medical record. Unfortunately, our review indicates the document is not legally valid for   the following reason(s): The notary did not name you they put the date in the place your name should be.  In order to create a legal document you will need to Have the document re-notarized or have it witnessed by two people.  Neither the witnesses nor the notary can be named as health care agents in your document. Also, only one of our health care system employees can act as a witness.  When the document has been updated, please send a copy of the entire document to our mailing or e-mail address listed below.     We greatly value the opportunity to assist you in documenting your choices and to honor your   wishes. We apologize for any inconvenience. We have several options to assist you in updating   your document so it meets legal requirements.       Health Care Directives and Advance Care Planning resources can be viewed and printed   for free at our web site:[email or web address].       Free group classes on Advance Care Planning and completing a Health Care Directive are  available at multiple locations and times. These classes are led by trained staff who will   provide information and guide you through a Health Care Directive. They can also review, notarize and add your Health Care Directive to your medical record. [place] for a class at [email or web address] or by calling [employer] at [phone] or toll free [phone].      COPIES of completed Health Care Directives can be brought or mailed to any of our   locations, including the address listed below. You can also email a copy to [email or web address] .       For additional assistance or questions you can email us at  [email or web address] or call [phone]      Sincerely,     Vijay Valero Advance Care Planning  [facility], [facility] and [facility] [address]   Email us: [email or web address] Call us: [phone]  Visit at: [email or web address]

## 2018-08-27 NOTE — ANESTHESIA PREPROCEDURE EVALUATION
Anesthesia Evaluation     . Pt has had prior anesthetic. Type: General           ROS/MED HX    ENT/Pulmonary:     (+)tobacco use, Past use , . .    Neurologic:  - neg neurologic ROS     Cardiovascular:     (+) Dyslipidemia, hypertension--CAD, --stent,6/2005  2 . Taking blood thinners Pt has not received instructions: . . . :. dysrhythmias PVCs, . Previous cardiac testing Echodate:2/12/15results:EF 60-65%date: results:ECG reviewed date:8/15/18 results:Sinus leticia date: results:          METS/Exercise Tolerance:     Hematologic:  - neg hematologic  ROS       Musculoskeletal:  - neg musculoskeletal ROS       GI/Hepatic:     (+) appendicitis, cholecystitis/cholelithiasis,       Renal/Genitourinary:     (+) chronic renal disease, type: CRI, Other Renal/ Genitourinary, ureteral stent      Endo:     (+) type II DM Last HgA1c: 6.6 date: 4/13/18 Not using insulin not previously admitted for DM/DKA thyroid problem .      Psychiatric:  - neg psychiatric ROS       Infectious Disease:  - neg infectious disease ROS       Malignancy:      - no malignancy   Other:    - neg other ROS                 Physical Exam  Normal systems: cardiovascular, pulmonary and dental    Airway   Mallampati: II  TM distance: >3 FB  Neck ROM: full    Dental     Cardiovascular   Rhythm and rate: regular and normal      Pulmonary    breath sounds clear to auscultation                    Anesthesia Plan      History & Physical Review  History and physical reviewed and following examination; no interval change.    ASA Status:  3 .    NPO Status:  > 6 hours    Plan for Spinal and Periph. Nerve Block for postop pain with Other induction. Maintenance will be Other.      Pt states that she does not tolerate GETA anesthesia well and would prefer a spinal if possible.  Discussed possibility of recall as we will run sedation lightly for case to ensure appropriate wake up.  Pt verbalizes understanding of anesthetic plan, spinal with light sedation.         Postoperative Care  Postoperative pain management:  IV analgesics and Peripheral nerve block (Continuous).      Consents  Anesthetic plan, risks, benefits and alternatives discussed with:  Patient.  Use of blood products discussed: No .   .                          .

## 2018-08-27 NOTE — PROGRESS NOTES
S-(situation): Patient arrives to room 268 via cart from PACU    B-(background): right total knee arthroplasty    A-(assessment): Pt is alert and oriented. Vitals stable. Numbness present to right lower extremity. Right lower extremitiy wrapped with ACE, clean dry and intact. CPM in place at 45%.    R-(recommendations): Orders reviewed with pt. Will monitor patient per MD orders.     Inpatient nursing criteria listed below were met:    Health care directives status obtained and documented: Yes  SCD's Documented: Yes  Skin issues/needs documented:Yes  Isolation needs addressed, if appropriate: NA  Fall Prevention: Care plan updated, Education given and documented Yes  MRSA swab completed for patient 55 years and older (exclude MAINE and TKA): NA  Care Plan initiated: Yes  Education Assessment documented:Yes  Education Documented (Pre-existing chronic infection such as, MRSA/VRE need education on admission): Yes  Admission Medication Reconciliation completed: Yes  New medication patient education completed and documented (Possible Side Effects of Common Medications handout): Yes  Home medications if not able to send immediately home with family stored here: NA  Reminder note placed in discharge instructions: NA  Discharge planning review completed (admission navigator) Yes

## 2018-08-27 NOTE — ANESTHESIA PROCEDURE NOTES
Peripheral nerve/Neuraxial procedure note : Adductor canal  Pre-Procedure    Location: post-op      Pre-Anesthestic Checklist: patient identified, IV checked, site marked, risks and benefits discussed, informed consent, monitors and equipment checked, pre-op evaluation, at physician/surgeon's request and post-op pain management    Timeout  Correct Patient: Yes   Correct Procedure: Yes   Correct Site: Yes   Correct Laterality: Yes   Correct Position: Yes   Site Marked: Yes   .   Procedure Documentation    .    Procedure:  right  Adductor canal.     Ultrasound used to identify targeted nerve, plexus, or vascular marker and placed a needle adjacent to it., Ultrasound was used to visualize the spread of the anesthetic in close proximity to the above stated nerve.   Patient Prep;mask, sterile gloves, chlorhexidine gluconate and isopropyl alcohol.  .  Needle: insulated Needle Gauge: 22.  Needle Length (millimeters) 100  Insertion Method: Single Shot.       Assessment/Narrative  Injection made incrementally with aspirations every 5 mL..  The placement was negative for: blood aspirated, painful injection and site bleeding.  Bolus given via needle..   Secured via.   Complications: none. Test dose of mL at. Test dose negative for signs of intravascular, subdural or intrathecal injection. Comments:  Pt tolerated procedure well.  She was having no pain as the spinal from her surgery was still working.  No complications noted.  Will follow if needed.

## 2018-08-27 NOTE — IP AVS SNAPSHOT
[address] [facility]    [address] [place] [address]    Phone:  [phone]                                       After Visit Summary   8/27/2018    Sandra Petit    MRN: [id number]           After Visit Summary Signature Page     I have received my discharge instructions, and my questions have been answered. I have discussed any challenges I see with this plan with the nurse or doctor.    ..........................................................................................................................................  Patient/Patient Representative Signature      ..........................................................................................................................................  Patient Representative Print Name and Relationship to Patient    ..................................................               ................................................  Date                                            Time    ..........................................................................................................................................  Reviewed by Signature/Title    ...................................................              ..............................................  Date                                                            Time          22EPIC Rev 08/18

## 2018-08-28 ENCOUNTER — APPOINTMENT (OUTPATIENT)
Dept: PHYSICAL THERAPY | Facility: CLINIC | Age: 78
DRG: 470 | End: 2018-08-28
Attending: ORTHOPAEDIC SURGERY
Payer: MEDICARE

## 2018-08-28 ENCOUNTER — APPOINTMENT (OUTPATIENT)
Dept: OCCUPATIONAL THERAPY | Facility: CLINIC | Age: 78
DRG: 470 | End: 2018-08-28
Attending: ORTHOPAEDIC SURGERY
Payer: MEDICARE

## 2018-08-28 PROBLEM — M79.675 GREAT TOE PAIN, LEFT: Status: ACTIVE | Noted: 2018-08-28

## 2018-08-28 LAB
ALBUMIN SERPL-MCNC: 2.5 G/DL (ref 3.4–5)
ALP SERPL-CCNC: 70 U/L (ref 40–150)
ALT SERPL W P-5'-P-CCNC: 14 U/L (ref 0–50)
ANION GAP SERPL CALCULATED.3IONS-SCNC: 9 MMOL/L (ref 3–14)
AST SERPL W P-5'-P-CCNC: 13 U/L (ref 0–45)
BILIRUB SERPL-MCNC: 0.1 MG/DL (ref 0.2–1.3)
BUN SERPL-MCNC: 34 MG/DL (ref 7–30)
CALCIUM SERPL-MCNC: 8 MG/DL (ref 8.5–10.1)
CHLORIDE SERPL-SCNC: 109 MMOL/L (ref 94–109)
CO2 SERPL-SCNC: 23 MMOL/L (ref 20–32)
CREAT SERPL-MCNC: 1.3 MG/DL (ref 0.52–1.04)
GFR SERPL CREATININE-BSD FRML MDRD: 40 ML/MIN/1.7M2
GLUCOSE BLDC GLUCOMTR-MCNC: 113 MG/DL (ref 70–99)
GLUCOSE BLDC GLUCOMTR-MCNC: 134 MG/DL (ref 70–99)
GLUCOSE BLDC GLUCOMTR-MCNC: 134 MG/DL (ref 70–99)
GLUCOSE BLDC GLUCOMTR-MCNC: 151 MG/DL (ref 70–99)
GLUCOSE SERPL-MCNC: 175 MG/DL (ref 70–99)
HGB BLD-MCNC: 8.8 G/DL (ref 11.7–15.7)
HGB BLD-MCNC: 9.4 G/DL (ref 11.7–15.7)
POTASSIUM SERPL-SCNC: 5 MMOL/L (ref 3.4–5.3)
PROT SERPL-MCNC: 5.5 G/DL (ref 6.8–8.8)
SODIUM SERPL-SCNC: 141 MMOL/L (ref 133–144)
URATE SERPL-MCNC: 6.4 MG/DL (ref 2.6–6)

## 2018-08-28 PROCEDURE — 97535 SELF CARE MNGMENT TRAINING: CPT | Mod: GO

## 2018-08-28 PROCEDURE — 40000133 ZZH STATISTIC OT WARD VISIT

## 2018-08-28 PROCEDURE — 85018 HEMOGLOBIN: CPT | Performed by: NURSE PRACTITIONER

## 2018-08-28 PROCEDURE — 12000000 ZZH R&B MED SURG/OB

## 2018-08-28 PROCEDURE — 97110 THERAPEUTIC EXERCISES: CPT | Mod: GP | Performed by: PHYSICAL THERAPIST

## 2018-08-28 PROCEDURE — 36415 COLL VENOUS BLD VENIPUNCTURE: CPT | Performed by: ORTHOPAEDIC SURGERY

## 2018-08-28 PROCEDURE — 00000146 ZZHCL STATISTIC GLUCOSE BY METER IP

## 2018-08-28 PROCEDURE — 40000193 ZZH STATISTIC PT WARD VISIT: Performed by: PHYSICAL THERAPIST

## 2018-08-28 PROCEDURE — 36415 COLL VENOUS BLD VENIPUNCTURE: CPT | Performed by: NURSE PRACTITIONER

## 2018-08-28 PROCEDURE — 97530 THERAPEUTIC ACTIVITIES: CPT | Mod: GP | Performed by: PHYSICAL THERAPIST

## 2018-08-28 PROCEDURE — 25000132 ZZH RX MED GY IP 250 OP 250 PS 637: Mod: GY | Performed by: ORTHOPAEDIC SURGERY

## 2018-08-28 PROCEDURE — 97116 GAIT TRAINING THERAPY: CPT | Mod: GP | Performed by: PHYSICAL THERAPIST

## 2018-08-28 PROCEDURE — A9270 NON-COVERED ITEM OR SERVICE: HCPCS | Mod: GY | Performed by: NURSE PRACTITIONER

## 2018-08-28 PROCEDURE — 97113 AQUATIC THERAPY/EXERCISES: CPT | Mod: GP | Performed by: PHYSICAL THERAPIST

## 2018-08-28 PROCEDURE — 80053 COMPREHEN METABOLIC PANEL: CPT | Performed by: ORTHOPAEDIC SURGERY

## 2018-08-28 PROCEDURE — 84550 ASSAY OF BLOOD/URIC ACID: CPT | Performed by: ORTHOPAEDIC SURGERY

## 2018-08-28 PROCEDURE — 97165 OT EVAL LOW COMPLEX 30 MIN: CPT | Mod: GO

## 2018-08-28 PROCEDURE — 85018 HEMOGLOBIN: CPT | Performed by: ORTHOPAEDIC SURGERY

## 2018-08-28 PROCEDURE — 25000132 ZZH RX MED GY IP 250 OP 250 PS 637: Mod: GY | Performed by: NURSE PRACTITIONER

## 2018-08-28 PROCEDURE — 99232 SBSQ HOSP IP/OBS MODERATE 35: CPT | Performed by: FAMILY MEDICINE

## 2018-08-28 PROCEDURE — A9270 NON-COVERED ITEM OR SERVICE: HCPCS | Mod: GY | Performed by: ORTHOPAEDIC SURGERY

## 2018-08-28 RX ORDER — LOSARTAN POTASSIUM 50 MG/1
50 TABLET ORAL 2 TIMES DAILY
Status: DISCONTINUED | OUTPATIENT
Start: 2018-08-28 | End: 2018-08-29 | Stop reason: HOSPADM

## 2018-08-28 RX ORDER — CLONIDINE HYDROCHLORIDE 0.1 MG/1
0.2 TABLET ORAL 2 TIMES DAILY
Status: DISCONTINUED | OUTPATIENT
Start: 2018-08-28 | End: 2018-08-29 | Stop reason: HOSPADM

## 2018-08-28 RX ORDER — POLYETHYLENE GLYCOL 3350 17 G/17G
17 POWDER, FOR SOLUTION ORAL DAILY PRN
Status: DISCONTINUED | OUTPATIENT
Start: 2018-08-28 | End: 2018-08-29 | Stop reason: HOSPADM

## 2018-08-28 RX ORDER — BISACODYL 10 MG
10 SUPPOSITORY, RECTAL RECTAL DAILY PRN
Status: DISCONTINUED | OUTPATIENT
Start: 2018-08-28 | End: 2018-08-29 | Stop reason: HOSPADM

## 2018-08-28 RX ORDER — AMOXICILLIN 250 MG
1 CAPSULE ORAL 2 TIMES DAILY
Status: DISCONTINUED | OUTPATIENT
Start: 2018-08-28 | End: 2018-08-29 | Stop reason: HOSPADM

## 2018-08-28 RX ORDER — AMOXICILLIN 250 MG
2 CAPSULE ORAL 2 TIMES DAILY
Status: DISCONTINUED | OUTPATIENT
Start: 2018-08-28 | End: 2018-08-29 | Stop reason: HOSPADM

## 2018-08-28 RX ADMIN — SENNOSIDES AND DOCUSATE SODIUM 1 TABLET: 8.6; 5 TABLET ORAL at 12:30

## 2018-08-28 RX ADMIN — LEVOTHYROXINE SODIUM 50 MCG: 50 TABLET ORAL at 09:03

## 2018-08-28 RX ADMIN — LOSARTAN POTASSIUM 50 MG: 50 TABLET, FILM COATED ORAL at 00:51

## 2018-08-28 RX ADMIN — LOSARTAN POTASSIUM 50 MG: 50 TABLET, FILM COATED ORAL at 20:05

## 2018-08-28 RX ADMIN — INSULIN ASPART 1 UNITS: 100 INJECTION, SOLUTION INTRAVENOUS; SUBCUTANEOUS at 08:57

## 2018-08-28 RX ADMIN — OXYCODONE HYDROCHLORIDE 5 MG: 5 TABLET ORAL at 09:02

## 2018-08-28 RX ADMIN — OXYCODONE HYDROCHLORIDE 5 MG: 5 TABLET ORAL at 20:05

## 2018-08-28 RX ADMIN — METFORMIN HYDROCHLORIDE 500 MG: 500 TABLET, EXTENDED RELEASE ORAL at 17:25

## 2018-08-28 RX ADMIN — CLONIDINE HYDROCHLORIDE 0.2 MG: 0.1 TABLET ORAL at 20:05

## 2018-08-28 RX ADMIN — OXYCODONE HYDROCHLORIDE 5 MG: 5 TABLET ORAL at 23:24

## 2018-08-28 RX ADMIN — SENNOSIDES AND DOCUSATE SODIUM 1 TABLET: 8.6; 5 TABLET ORAL at 20:05

## 2018-08-28 RX ADMIN — CLONIDINE HYDROCHLORIDE 0.2 MG: 0.1 TABLET ORAL at 09:02

## 2018-08-28 RX ADMIN — LOSARTAN POTASSIUM 50 MG: 50 TABLET, FILM COATED ORAL at 09:03

## 2018-08-28 RX ADMIN — ACETAMINOPHEN 975 MG: 325 TABLET ORAL at 21:45

## 2018-08-28 RX ADMIN — REGULAR STRENGTH 325 MG: 325 TABLET ORAL at 09:03

## 2018-08-28 RX ADMIN — GABAPENTIN 100 MG: 100 CAPSULE ORAL at 20:05

## 2018-08-28 RX ADMIN — CLONIDINE HYDROCHLORIDE 0.2 MG: 0.1 TABLET ORAL at 00:52

## 2018-08-28 RX ADMIN — SIMVASTATIN 20 MG: 20 TABLET, FILM COATED ORAL at 20:06

## 2018-08-28 RX ADMIN — RANITIDINE 150 MG: 150 TABLET ORAL at 09:04

## 2018-08-28 RX ADMIN — ACETAMINOPHEN 975 MG: 325 TABLET ORAL at 14:34

## 2018-08-28 RX ADMIN — ACETAMINOPHEN 975 MG: 325 TABLET ORAL at 05:57

## 2018-08-28 ASSESSMENT — ACTIVITIES OF DAILY LIVING (ADL)
ADLS_ACUITY_SCORE: 10

## 2018-08-28 NOTE — PLAN OF CARE
Problem: Patient Care Overview  Goal: Plan of Care/Patient Progress Review  Outcome: Improving  Vss. Pt has denied pain t/o night and has declined pain medications. Scheduled tylenol given. BP elevated at beginning of the night and pt noted that second dose of Clonidine and Cozaar had not been given. Reviewed with hospitalist and pharmacy and dose adjusted and given. BP improved, see vs f/s. CMS to RLE is intact. Dressing to RLE is intact. Has good bed mobility. Good UOP in wagner catheter. Will continue with plan of care, monitor CMS, PT/OT, monitor for pain.

## 2018-08-28 NOTE — CONSULTS
Care Transition Initial Assessment - RN  Reason For Consult: discharge planning   Met with: Patient.    DATA   Principal Problem:    S/P total knee replacement using cement, right  Active Problems:    CKD (chronic kidney disease) stage 3, GFR 30-59 ml/min    Hyperlipidemia LDL goal <100    Hypertension goal BP (blood pressure) < 140/90    OA (osteoarthritis) of knee - bilateral    Type 2 diabetes mellitus with diabetic nephropathy (H)    Hyperuricemia    S/P total knee arthroplasty, left    Great toe pain, left       [facility]: [facility]  Primary Care MD Name: Dr Wray  Contact information and PCP information verified: Yes      ASSESSMENT  Cognitive Status: awake, alert and oriented.       Resources List: Home Care     Lives With: spouse  Living Arrangements: house  Quality Of Family Relationships: supportive, involved  Description of Support System: Supportive, Involved   Who is your support system?:    Support Assessment: Adequate family and caregiver support   Insurance Concerns: No Insurance issues identified          This writer met with pt in her room, introduced self and role. Patient currently lives at home with her  and plans to return there at discharge. Patient is independent at home and would like Home PT at discharge Pt was provided with Medicare certified home care list. [facility] [phone]. Family will transport.    PLAN    Home with  and [facility] PT with clinic follow up      Alda Rosario CTS RN [facility] [phone] [facility] [phone]    Discharge Planner   Discharge Plans in progress: Home with  Home PT  Barriers to discharge plan: none  Follow up plan:  Clinic follow up with [facility] PT and then out patient PT to follow.        Entered by: Alda Rosario 08/28/2018 10:52 AM

## 2018-08-28 NOTE — ANESTHESIA POSTPROCEDURE EVALUATION
Patient: Sandra Petit    Procedure(s):  right total knee arthroplasty - Wound Class: I-Clean    Diagnosis:primary osteoarthritis of right knee  Diagnosis Additional Information: No value filed.    Anesthesia Type:  ETT, Spinal    Note:  Anesthesia Post Evaluation    Patient location during evaluation: Floor and Bedside  Patient participation: Able to fully participate in evaluation  Level of consciousness: awake  Pain management: adequate  Airway patency: patent  Cardiovascular status: acceptable and hemodynamically stable  Respiratory status: acceptable, room air and nonlabored ventilation  Hydration status: stable  PONV: none     Anesthetic complications: None    Comments: Patient was happy with the anesthesia care received and no anesthesia related complications were noted.  I will follow up with the patient again if it is needed.        Last vitals:  Vitals:    08/28/18 0050 08/28/18 0300 08/28/18 0742   BP: 167/63 159/47 169/60   Pulse: 77 65 61   Resp:  16 18   Temp:   96.8  F (36  C)   SpO2:  99% 99%         Electronically Signed By: AMARJIT Huerta CRNA  August 28, 2018  8:25 AM

## 2018-08-28 NOTE — PLAN OF CARE
"Problem: Patient Care Overview  Goal: Plan of Care/Patient Progress Review  Discharge Planner PT   Patient plan for discharge: Home with assistance   Current status: CPM progressed to 60 degrees. Active knee flexion 0-55 degrees. Completed LE strengthening with no recall of previous teachings. \"I don't know, I am just so confused right now\". MOD IND supine to/from sitting EOB with use of leg  assistive device. SBA sit to/from stand with 2WW. Ambulated 75 ft with 2WW and CGA for safety, verbal cues to maintain walker close and perform step to gait, two episodes of instability due to step through pattern and exaggerated step length.   Barriers to return to prior living situation: Medical status, activity tolerance, reduced functional mobility, pain  Recommendations for discharge: home with 24/7 assistance and home health therapies   Rationale for recommendations: She would benefit from continued skilled therapeutic intervention to address post operative impairments and assist in progressing her towards her independent goals in accordance with her surgeon's protocol.       Entered by: Miri Skaggs 08/28/2018 11:47 AM           "

## 2018-08-28 NOTE — PROGRESS NOTES
08/28/18 0824   Quick Adds   Type of Visit Initial Occupational Therapy Evaluation   Living Environment   Lives With spouse   Living Arrangements house   Home Accessibility bed and bath on same level;stairs to enter home;tub/shower is not walk in;grab bars present (toilet)   Transportation Available car;family or friend will provide   Living Environment Comment Patient lives with  in a rambler style home.  She has laundry in the basement but has assistance with that.  She has a raised toilet seat with safety rails and a tub bench.     Self-Care   Dominant Hand right   Functional Level Prior   Ambulation 1-->assistive equipment   Transferring 0-->independent   Toileting 0-->independent   Bathing 0-->independent   Dressing 0-->independent   Eating 0-->independent   Communication 0-->understands/communicates without difficulty   Swallowing 0-->swallows foods/liquids without difficulty   Cognition 0 - no cognition issues reported   Prior Functional Level Comment Patient was able to complete BADL without assistance prior to surgery.     General Information   Onset of Illness/Injury or Date of Surgery - Date 08/27/18   Referring Physician Dr. Johnny Anaya   Patient/Family Goals Statement Patient would like to be able to walk.     Additional Occupational Profile Info/Pertinent History of Current Problem Patient is a 78 year old female s/p Right TKA.  Patient does like to spend time with her grandchildren.  She does like to walk although can not walk long distances.     Weight-Bearing Status - RLE weight-bearing as tolerated   Cognitive Status Examination   Orientation orientation to person, place and time   Level of Consciousness alert   Able to Follow Commands WNL/WFL   Personal Safety (Cognitive) WNL/WFL   Memory intact   Attention No deficits were identified   Organization/Problem Solving No deficits were identified   Executive Function No deficits were identified   Visual Perception   Visual Perception Wears  glasses  (reading glasses)   Range of Motion (ROM)   ROM Comment BUE WFL   Strength   Strength Comments BUE WFL    Bathing   Level of [place] minimum assist (75% patients effort)   Physical Assist/Nonphysical Assist supervision;set-up required;1 person assist   Upper Body Dressing   Level of [place]: Dress Upper Body stand-by assist   Physical Assist/Nonphysical Assist: Dress Upper Body set-up required   Lower Body Dressing   Level of [place]: Dress Lower Body minimum assist (75% patients effort)   Physical Assist/Nonphysical Assist: Dress Lower Body set-up required;supervision;1 person assist   Toileting   Level of [place]: Toilet stand-by assist   Physical Assist/Nonphysical Assist: Toilet set-up required;supervision;1 person assist   Grooming   Level of [place]: Grooming stand-by assist   Physical Assist/Nonphysical Assist: Grooming supervision;verbal cues   Eating/Self Feeding   Level of [place]: Eating independent   Instrumental Activities of Daily Living (IADL)   Previous Responsibilities housekeeping;meal prep;shopping;medication management;finances;driving   Activities of Daily Living Analysis   Impairments Contributing to Impaired Activities of Daily Living flexibility decreased;post surgical precautions   General Therapy Interventions   Planned Therapy Interventions ADL retraining   Clinical Impression   Criteria for Skilled Therapeutic Interventions Met yes, treatment indicated   OT Diagnosis Decreased ability to complete BADL    Influenced by the following impairments decreased flexibility    Assessment of Occupational Performance 1-3 Performance Deficits   Identified Performance Deficits dressing, bathing   Clinical Decision Making (Complexity) Low complexity   Therapy Frequency daily   Predicted Duration of Therapy Intervention (days/wks) 2 days   Anticipated Discharge Disposition Home with Assist   Risks and Benefits of Treatment have been explained. Yes   Patient, Family  "& other staff in agreement with plan of care Yes   [facility] AM-PAC TM \"6 Clicks\"   2016, Trustees of [facility], under license to [employer].  All rights reserved.   6 Clicks Short Forms Daily Activity Inpatient Short Form   [facility] AM-PAC  \"6 Clicks\" Daily Activity Inpatient Short Form   1. Putting on and taking off regular lower body clothing? 3 - A Little   2. Bathing (including washing, rinsing, drying)? 3 - A Little   3. Toileting, which includes using toilet, bedpan or urinal? 4 - None   4. Putting on and taking off regular upper body clothing? 4 - None   5. Taking care of personal grooming such as brushing teeth? 4 - None   6. Eating meals? 4 - None   Daily Activity Raw Score (Score out of 24.Lower scores equate to lower levels of function) 22   Total Evaluation Time   Total Evaluation Time (Minutes) 16     SHANNAN Ding  [facility]  [phone]      "

## 2018-08-28 NOTE — PROGRESS NOTES
SPIRITUAL HEALTH SERVICES  SPIRITUAL ASSESSMENT Progress Note  [facility]      Guerda Martinez, Eucharistic  from [facility] [employer], provided prayer & communion to Giovanni Petit.    Klaus Randolph M.Div., [facility]  Staff   Office tel: [phone]

## 2018-08-28 NOTE — PLAN OF CARE
Problem: Patient Care Overview  Goal: Plan of Care/Patient Progress Review  Outcome: Improving  Pt's pain controlled with scheduled Tylenol. Pt received one dose of Oxycodone today before therapy. Pt stated pain controlled. CPM on between therapy sessions. Pt sat up in chair for meals. Up with 1 assist, walker and gait belt. Dressing over right leg dry and intact. Pt's hgb recheck was 8.8. Pt asymptomatic. Garza removed this morning and pt voiding with no difficulties. Using Incentive spirometer 10 times an hour with encouragement. Ongoing explanation provided to pt and questions answered.

## 2018-08-28 NOTE — PLAN OF CARE
Discharge Planner OT   Patient plan for discharge:  Patient would like to return home with assistance from family and use of AE.    Current status: Patient complete LB dressing with min A.  She is aware of her needs and did share the AE she has purchased in preparation for her return home.    Barriers to return to prior living situation: Increased need for assistance with BADL.   Recommendations for discharge: Home with use of AE and family assistance as needed.    Rationale for recommendations: Patient has raised toilet seat with safety rails and tub bench to increase safety and independence with BADL.  She reports that her  does assist with IADL such as cooking and laundry and he will do the driving while she is not able.        Entered by: Bridgett Patton 08/28/2018 11:27 AM

## 2018-08-28 NOTE — PLAN OF CARE
Problem: Patient Care Overview  Goal: Plan of Care/Patient Progress Review  Outcome: Improving  States pain is  2-3/10 oxycodone given, effective.  CPM  0/60 tolerated well.  Garza 250 ml output, tolerated Mod CHO diet, VSS, sat's mid 90's on RA.  Capnography on for the night.  IVF @ 100 Ancef as scheduled.    Blood glucose 354 and 251 sliding scale coverage.

## 2018-08-28 NOTE — PROGRESS NOTES
[facility]    Hospitalist Progress Note    SUMMARY:   Sandra Petit is a 78 year old female who presented for a scheduled right total knee replacement on 8/27/2018.     Assessment & Plan     Principal Problem:    S/P total knee replacement using cement, right    Assessment: Surgery 8/27/18.  IV Fluids stopped.  Tolerating oral intake.  Patient taking  daily. On SCD. Patient normally uses ASA 81 mg. (hx of stomach ulcer) She was also started on Neurontin 100 mg at night prior to surgery, she was very worried about this and making sure her dose was adjusted for her kidney function. Discussed with patient this dose is safe for her current kidney function.  No BM since admission. Bowel program initiated.     Plan: Per Ortho     Active Problems:    CKD (chronic kidney disease) stage 3, GFR 30-59 ml/min    Assessment: Patient with chronic kidney disease, creatinine ranging from 1.1 - 1.3.  Creat 1.3 today, Cr CL 33.     Plan: Continue to monitor kidney function as needed       Hypertension goal BP (blood pressure) < 140/90    Assessment:   She is currently on Cozaar 50 mg twice a day and Catapres 0.2 mg twice a day. Patient states her blood pressure has been increasing she she has been needing to titrate her medications at home.       Plan: Continue home medications, will need follow up with primary care on discharge.       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 She is on Glucophage  mg at home. Patient states she has had good control of her blood sugar, she normally only checks her blood sugar in the morning and it is between . She does not strictly follow the diabetic diet, but does try to limit her sugar.  Restarted Glucophage XR. Advanced diet. (Diabetic diet).  Blood sugar has been from 146-354 post operatively.     Plan: Continue to monitor blood glucose monitoring three times a day and at bedtime.  Continue hypoglycemia protocol and sliding scale insulin coverage    Anemia  Assessment: Hgb today 9.4 which is a decrease from 13 in her pre op 2 weeks ago. Patient has gotten IV Fluids.   Plan: Re check hgb at 1400.        Hyperuricemia    Pain, Left great toe    Assessment: Uric acid level stable. XR completed yesterday. Patient  Toe pain and ROM improved today.     Plan:   Monitor for now.  Given CKD at baseline and recent surgery, if treatment is needed, would consider colchicine 1.2 mg x1 and monitor closely for results.       Hyperlipidemia LDL goal <100    Assessment: Stable, chronic    Plan: Continue Simvastatin 20 mg at night      # Pain Assessment:  Current Pain Score 8/28/2018   Patient currently in pain? -   Pain score (0-10) 0   Pain location -   Pain descriptors -   - Sandra is experiencing pain due to knee replacement surgery. Pain management was discussed and the plan was created in a collaborative fashion.  Sandra's response to the current recommendations: engaged  compliant  - Opioid regimen: Oxycodone PRN  - Response to opioid medications: Reduction of symptoms    - Bowel regimen: senna  - Pharmacologic adjuvants: Acetaminophen  - Non-pharmacologic adjuvants: Ice and Physical Therapy          DVT Prophylaxis: Pneumatic Compression Devices  Code Status: Full Code    Disposition: Expected discharge in 1-2 days once medically stable postoperatively per Ortho .    Norma Tamez    Interval History   Patient is feeling better today. Pain managed. Eating and drinking well. Left great toe pain has improved.  No fevers. VSS, hypertension noted. At baseline per patient.      -Data reviewed today: I reviewed all new labs and imaging results over the last 24 hours. I personally reviewed no images or EKG's today.    Review Of Systems  Skin: negative  Eyes: negative  Ears/Nose/Throat: negative  Respiratory: No shortness of breath, dyspnea on exertion, cough, or hemoptysis  Cardiovascular: negative for,  chest pain, dyspnea on exertion, orthopnea and lower extremity edema  Gastrointestinal: negative for, nausea, reflux and abdominal pain  Genitourinary: + UO  Musculoskeletal: foot pain resolved. ROM in right knee limited  Neurologic: negative  Psychiatric: negative  Hematologic/Lymphatic/Immunologic: negative  Endocrine:  diabetes    10 point ROS neg other than the symptoms noted above.        Physical Exam   Temp: 96.8  F (36  C) Temp src: Oral BP: 169/60 Pulse: 61 Heart Rate: 61 Resp: 18 SpO2: 99 % O2 Device: None (Room air)    Vitals:    08/27/18 0706   Weight: 77.7 kg (171 lb 6.4 oz)     Vital Signs with Ranges  Temp:  [95.4  F (35.2  C)-97.5  F (36.4  C)] 96.8  F (36  C)  Pulse:  [61-80] 61  Heart Rate:  [60-74] 61  Resp:  [15-29] 18  BP: (123-188)/(31-69) 169/60  SpO2:  [95 %-99 %] 99 %  I/O last 3 completed shifts:  In: 6124.58 [P.O.:1760; I.V.:4364.58]  Out: 1285 [Urine:1135; Blood:150]    Exam:  Constitutional: healthy, alert and no distress  Head: Normocephalic.    Neck: Neck supple.    Cardiovascular: RRR.    Respiratory:  Good diaphragmatic excursion. Lungs clear  Gastrointestinal: Abdomen soft, non-tender. BS normal.  No BM documented.   Musculoskeletal: extremities normal- no gross deformities noted and normal muscle tone  Skin: no suspicious lesions or rashes  Neurologic:  Sensation grossly WNL.  Psychiatric: mentation appears normal, affect normal/bright and anxious          Medications     sodium chloride Stopped (08/28/18 0449)       acetaminophen  975 mg Oral Q8H     aspirin  325 mg Oral Daily     cloNIDine  0.2 mg Oral BID     ranitidine  150 mg Oral Q12H    Or     famotidine  20 mg Intravenous Q12H     gabapentin  100 mg Oral At Bedtime     insulin aspart  1-3 Units Subcutaneous TID AC     insulin aspart  1-3 Units Subcutaneous At Bedtime     levothyroxine  50 mcg Oral Daily     losartan  50 mg Oral BID     metFORMIN  500 mg Oral Daily with supper     simvastatin  20 mg Oral At Bedtime      sodium chloride (PF)  3 mL Intracatheter Q8H       Data     Recent Labs  Lab 08/28/18  0623 08/27/18  1342   HGB 9.4*  --      --    POTASSIUM 5.0  --    CHLORIDE 109  --    CO2 23  --    BUN 34*  --    CR 1.30* 1.29*   ANIONGAP 9  --    SHANE 8.0*  --    *  --    ALBUMIN 2.5*  --    PROTTOTAL 5.5*  --    BILITOTAL 0.1*  --    ALKPHOS 70  --    ALT 14  --    AST 13  --        Recent Results (from the past 24 hour(s))   XR Knee Port Right 1/2 Views    Narrative    KNEE PORTABLE RIGHT ONE TO TWO VIEWS August 27, 2018 10:15 AM     HISTORY: Postoperative.    COMPARISON: 3/14/2018.    FINDINGS: There has been a total knee arthroplasty. Alignment is  anatomic. Hardware components are well seated and demonstrate no  evidence for periprosthetic fracture or hardware failure. Gas and  fluid are seen in the joint space and in the soft tissues. There are  midline skin staples.        Impression    IMPRESSION: Expected findings status post right knee arthroplasty.     RAIZA JOEL MD   XR Toe Port Left G/E 2 Views    Narrative    LEFT TOE PORTABLE TWO OR MORE VIEWS   8/27/2018 2:07 PM     HISTORY: Sudden onset left great toe pain, history of gout.     COMPARISON: None.    FINDINGS:  No acute fracture, malalignment, joint space loss, or  erosion. There is mild prominence of the overlying soft tissues in the  region of the first metatarsophalangeal joint.      Impression    IMPRESSION:   1. Mild prominence of soft tissues overlying the first  metatarsophalangeal joint could represent early inflammatory changes  of gout, however, no evidence for gouty erosions or tophi are  identified.   2. Otherwise negative left great toe x-rays.    RAIZA JOEL MD

## 2018-08-28 NOTE — PROGRESS NOTES
SPIRITUAL HEALTH SERVICES  SPIRITUAL ASSESSMENT Progress Note  [facility]      During Rounding,  introduced himself to Giovanni Petit and informed her of his availability.    Klaus Randolph M.Div., [facility]  Staff   Office tel: [phone]

## 2018-08-29 ENCOUNTER — APPOINTMENT (OUTPATIENT)
Dept: OCCUPATIONAL THERAPY | Facility: CLINIC | Age: 78
DRG: 470 | End: 2018-08-29
Attending: ORTHOPAEDIC SURGERY
Payer: MEDICARE

## 2018-08-29 ENCOUNTER — APPOINTMENT (OUTPATIENT)
Dept: PHYSICAL THERAPY | Facility: CLINIC | Age: 78
DRG: 470 | End: 2018-08-29
Attending: ORTHOPAEDIC SURGERY
Payer: MEDICARE

## 2018-08-29 VITALS
WEIGHT: 171.4 LBS | HEIGHT: 61 IN | HEART RATE: 59 BPM | BODY MASS INDEX: 32.36 KG/M2 | RESPIRATION RATE: 18 BRPM | TEMPERATURE: 97.1 F | OXYGEN SATURATION: 95 % | SYSTOLIC BLOOD PRESSURE: 162 MMHG | DIASTOLIC BLOOD PRESSURE: 47 MMHG

## 2018-08-29 LAB
GLUCOSE BLDC GLUCOMTR-MCNC: 109 MG/DL (ref 70–99)
GLUCOSE BLDC GLUCOMTR-MCNC: 157 MG/DL (ref 70–99)
GLUCOSE SERPL-MCNC: 120 MG/DL (ref 70–99)
HGB BLD-MCNC: 10 G/DL (ref 11.7–15.7)

## 2018-08-29 PROCEDURE — 25000132 ZZH RX MED GY IP 250 OP 250 PS 637: Mod: GY | Performed by: NURSE PRACTITIONER

## 2018-08-29 PROCEDURE — 97110 THERAPEUTIC EXERCISES: CPT | Mod: GP | Performed by: PHYSICAL THERAPIST

## 2018-08-29 PROCEDURE — 97535 SELF CARE MNGMENT TRAINING: CPT | Mod: GO

## 2018-08-29 PROCEDURE — 97116 GAIT TRAINING THERAPY: CPT | Mod: GP | Performed by: PHYSICAL THERAPIST

## 2018-08-29 PROCEDURE — 85018 HEMOGLOBIN: CPT | Performed by: ORTHOPAEDIC SURGERY

## 2018-08-29 PROCEDURE — 25000132 ZZH RX MED GY IP 250 OP 250 PS 637: Mod: GY | Performed by: ORTHOPAEDIC SURGERY

## 2018-08-29 PROCEDURE — 40000193 ZZH STATISTIC PT WARD VISIT: Performed by: PHYSICAL THERAPIST

## 2018-08-29 PROCEDURE — 82947 ASSAY GLUCOSE BLOOD QUANT: CPT | Performed by: ORTHOPAEDIC SURGERY

## 2018-08-29 PROCEDURE — 99232 SBSQ HOSP IP/OBS MODERATE 35: CPT | Performed by: FAMILY MEDICINE

## 2018-08-29 PROCEDURE — 97530 THERAPEUTIC ACTIVITIES: CPT | Mod: GP | Performed by: PHYSICAL THERAPIST

## 2018-08-29 PROCEDURE — 40000133 ZZH STATISTIC OT WARD VISIT

## 2018-08-29 PROCEDURE — A9270 NON-COVERED ITEM OR SERVICE: HCPCS | Mod: GY | Performed by: ORTHOPAEDIC SURGERY

## 2018-08-29 PROCEDURE — A9270 NON-COVERED ITEM OR SERVICE: HCPCS | Mod: GY | Performed by: NURSE PRACTITIONER

## 2018-08-29 PROCEDURE — 00000146 ZZHCL STATISTIC GLUCOSE BY METER IP

## 2018-08-29 PROCEDURE — 36415 COLL VENOUS BLD VENIPUNCTURE: CPT | Performed by: ORTHOPAEDIC SURGERY

## 2018-08-29 RX ORDER — AMOXICILLIN 250 MG
1-2 CAPSULE ORAL 2 TIMES DAILY PRN
Qty: 100 TABLET | Refills: 1 | Status: SHIPPED | OUTPATIENT
Start: 2018-08-29 | End: 2018-10-17

## 2018-08-29 RX ORDER — ASPIRIN 325 MG
325 TABLET, DELAYED RELEASE (ENTERIC COATED) ORAL DAILY
Qty: 30 TABLET | Refills: 0 | Status: SHIPPED | OUTPATIENT
Start: 2018-08-29 | End: 2019-02-19

## 2018-08-29 RX ORDER — ACETAMINOPHEN 325 MG/1
650 TABLET ORAL EVERY 4 HOURS PRN
Qty: 100 TABLET | Refills: 0 | Status: SHIPPED | OUTPATIENT
Start: 2018-08-30 | End: 2018-10-02

## 2018-08-29 RX ORDER — OXYCODONE HYDROCHLORIDE 5 MG/1
5 TABLET ORAL
Qty: 6 TABLET | Refills: 0 | Status: SHIPPED | OUTPATIENT
Start: 2018-08-29 | End: 2018-08-30

## 2018-08-29 RX ADMIN — SENNOSIDES AND DOCUSATE SODIUM 1 TABLET: 8.6; 5 TABLET ORAL at 08:26

## 2018-08-29 RX ADMIN — OXYCODONE HYDROCHLORIDE 5 MG: 5 TABLET ORAL at 13:29

## 2018-08-29 RX ADMIN — CLONIDINE HYDROCHLORIDE 0.2 MG: 0.1 TABLET ORAL at 08:27

## 2018-08-29 RX ADMIN — LOSARTAN POTASSIUM 50 MG: 50 TABLET, FILM COATED ORAL at 08:26

## 2018-08-29 RX ADMIN — OXYCODONE HYDROCHLORIDE 5 MG: 5 TABLET ORAL at 06:39

## 2018-08-29 RX ADMIN — OXYCODONE HYDROCHLORIDE 5 MG: 5 TABLET ORAL at 09:20

## 2018-08-29 RX ADMIN — INSULIN ASPART 1 UNITS: 100 INJECTION, SOLUTION INTRAVENOUS; SUBCUTANEOUS at 12:19

## 2018-08-29 RX ADMIN — RANITIDINE 150 MG: 150 TABLET ORAL at 08:26

## 2018-08-29 RX ADMIN — OXYCODONE HYDROCHLORIDE 5 MG: 5 TABLET ORAL at 03:35

## 2018-08-29 RX ADMIN — REGULAR STRENGTH 325 MG: 325 TABLET ORAL at 08:26

## 2018-08-29 RX ADMIN — ACETAMINOPHEN 975 MG: 325 TABLET ORAL at 06:39

## 2018-08-29 RX ADMIN — ACETAMINOPHEN 975 MG: 325 TABLET ORAL at 13:29

## 2018-08-29 RX ADMIN — LEVOTHYROXINE SODIUM 50 MCG: 50 TABLET ORAL at 06:40

## 2018-08-29 ASSESSMENT — ACTIVITIES OF DAILY LIVING (ADL)
ADLS_ACUITY_SCORE: 10

## 2018-08-29 NOTE — PLAN OF CARE
"Problem: Knee Arthroplasty (Total, Partial) (Adult)  Goal: Signs and Symptoms of Listed Potential Problems Will be Absent, Minimized or Managed (Knee Arthroplasty)  Signs and symptoms of listed potential problems will be absent, minimized or managed by discharge/transition of care (reference Knee Arthroplasty (Total, Partial) (Adult) CPG).   Outcome: Improving  Ace wrap CDI, ice pack to the knee.    Problem: Patient Care Overview  Goal: Plan of Care/Patient Progress Review  Outcome: Improving  Up with minimal assist, walker, she is able to get herself up and back into bed w/o help.  Ambulating in halls, good oral intake, voiding.  CPM 0/75, tolerated well.  Oxycodone given once, effective.  VSS, afebrile, face has been flushed this evening.  Pt states she wiped her face with bath wipes and feels that is the cause of the flushed face, it is warm to touch,but not painful,  states her face is \"sensitive.\"  Blood sugar 119 and 134.  Anticipates discharge to home in a.m with home PT.      "

## 2018-08-29 NOTE — DISCHARGE SUMMARY
[facility] Discharge Summary    Sandra Petit MRN# [id number]   Age: 78 year old YOB: 1940     Date of Admission:  8/27/2018  Date of Discharge::  8/29/2018  2:10 PM  Admitting Physician:  Johnny Anaya MD  Discharge Physician:  Rosanne Perdomo PA-C     Home clinic: [facility]          Admission Diagnoses:   primary osteoarthritis of right knee  S/P total knee replacement using cement, right          Discharge Diagnosis:   primary osteoarthritis of right knee  S/P total knee replacement using cement, right          Procedures:   Procedure(s): Procedure(s):  ARTHROPLASTY KNEE right        No other procedures performed during this admission           Medications Prior to Admission:     Prior to Admission Medications   Prescriptions Last Dose Informant Patient Reported? Taking?   Cholecalciferol (VITAMIN D) 400 UNITS capsule 8/26/2018 at Unknown time  Yes Yes   Sig: Take 1 capsule by mouth 2 times daily   Probiotic Product (ACIDOPHILUS PROBIOTIC BLEND) CAPS Past Week at Unknown time  Yes Yes   Sig: Take 1 capsule by mouth 2 times daily   aspirin 81 MG tablet Past Week at Unknown time  Yes No   Sig: Take 81 mg by mouth daily   blood glucose monitoring (ONE TOUCH DELICA) lancets Past Month at Unknown time  No Yes   Sig: Use to test blood sugars 1 times daily or as directed.   blood glucose monitoring (ONE TOUCH VERIO IQ) test strip Past Month at Unknown time  No Yes   Sig: Use to test blood sugars 1 times daily or as directed.   cloNIDine (CATAPRES) 0.2 MG tablet 8/27/2018 at 2000  No Yes   Sig: Take 1 tablet (0.2 mg) by mouth 2 times daily   gabapentin (NEURONTIN) 100 MG capsule 8/26/2018 at 2230  No Yes   Sig: Take 1 capsule (100 mg) by mouth At Bedtime Begin 3-4 days prior to surgery   levofloxacin (LEVAQUIN) 750 MG tablet Unknown at Unknown time  No No   Sig: Take one tablet 2 hours prior to procedure   Patient not taking: Reported on 8/16/2018   levothyroxine  (SYNTHROID) 50 MCG tablet 8/27/2018 at 0500  No Yes   Sig: Take 1 tablet (50 mcg) by mouth daily   losartan (COZAAR) 50 MG tablet 8/26/2018 at 0900  No Yes   Sig: TAKE ONE TABLET BY MOUTH TWICE DAILY   metFORMIN (GLUCOPHAGE-XR) 500 MG 24 hr tablet 8/26/2018 at 1800  No Yes   Sig: TAKE ONE TABLET BY MOUTH ONCE DAILY WITH  DINNER   nitroglycerin (NITROSTAT) 0.4 MG SL tablet Unknown at Unknown time  No No   Sig: Place 1 tablet (0.4 mg) under the tongue every 5 minutes as needed for chest pain   Patient not taking: Reported on 8/16/2018   simvastatin (ZOCOR) 40 MG tablet 8/26/2018 at 1800  Yes Yes   Sig: Take 0.5 tablets (20 mg) by mouth At Bedtime      Facility-Administered Medications: None            Discharge Medications:     Discharge Medication List as of 8/29/2018  1:45 PM      START taking these medications    Details   acetaminophen (TYLENOL) 325 MG tablet Take 2 tablets (650 mg) by mouth every 4 hours as needed for other (multimodal surgical pain management along with NSAIDS and opioid medication as indicated based on pain control and physical function.), Disp-100 tablet, R-0, Local Print      aspirin 325 MG EC tablet Take 1 tablet (325 mg) by mouth daily, Disp-30 tablet, R-0, E-Prescribe      order for DME Equipment being ordered: Walker ()  Treatment Diagnosis: s/p joint replacementDisp-1 Device, R-0, Local Print      oxyCODONE IR (ROXICODONE) 5 MG tablet Take 1 tablet (5 mg) by mouth every 3 hours as needed for other (pain control or improvement in physical function. Hold dose for analgesic side effects.), Disp-6 tablet, R-0, Local Print      senna-docusate (SENOKOT-S;PERICOLACE) 8.6-50 MG per tablet Take 1-2 tablets by mouth 2 times daily as needed for constipation, Disp-100 tablet, R-1, E-PrescribeMay want to take over the counter stool softener (any brand is fine) while using narcotic pain medication as they can cause constipation.         CONTINUE these medications which have NOT CHANGED    Details    blood glucose monitoring (ONE TOUCH DELICA) lancets Use to test blood sugars 1 times daily or as directed., Disp-1 each, R-1, E-Prescribe      blood glucose monitoring (ONE TOUCH VERIO IQ) test strip Use to test blood sugars 1 times daily or as directed., Disp-50 each, R-2, E-Prescribe      Cholecalciferol (VITAMIN D) 400 UNITS capsule Take 1 capsule by mouth 2 times daily, Disp-30 capsule, Historical      cloNIDine (CATAPRES) 0.2 MG tablet Take 1 tablet (0.2 mg) by mouth 2 times daily, Disp-180 tablet, R-3, E-Prescribe      gabapentin (NEURONTIN) 100 MG capsule Take 1 capsule (100 mg) by mouth At Bedtime Begin 3-4 days prior to surgery, Disp-30 capsule, R-1, E-Prescribe      levofloxacin (LEVAQUIN) 750 MG tablet Take one tablet 2 hours prior to procedure, Disp-2 tablet, R-3, E-Prescribe      levothyroxine (SYNTHROID) 50 MCG tablet Take 1 tablet (50 mcg) by mouth daily, Disp-90 tablet, R-3, E-Prescribe      losartan (COZAAR) 50 MG tablet TAKE ONE TABLET BY MOUTH TWICE DAILY, Disp-180 tablet, R-1, E-Prescribe      metFORMIN (GLUCOPHAGE-XR) 500 MG 24 hr tablet TAKE ONE TABLET BY MOUTH ONCE DAILY WITH  DINNER, Disp-90 tablet, R-3, E-Prescribe      nitroglycerin (NITROSTAT) 0.4 MG SL tablet Place 1 tablet (0.4 mg) under the tongue every 5 minutes as needed for chest pain, Disp-25 tablet, R-0, E-Prescribe      Probiotic Product (ACIDOPHILUS PROBIOTIC BLEND) CAPS Take 1 capsule by mouth 2 times daily, Historical      simvastatin (ZOCOR) 40 MG tablet Take 0.5 tablets (20 mg) by mouth At Bedtime, Disp-90 tablet, R-3, Historical         STOP taking these medications       aspirin 81 MG tablet Comments:   Reason for Stopping:                  Consultations:   Consultation during this admission received from internal medicine          Brief History of Illness:   Reason for admission requiring a surgical or invasive procedure:   primary osteoarthritis of right knee  S/P total knee replacement using cement, right   The patient  underwent the following procedure(s):   Procedure(s):  ARTHROPLASTY KNEE   There were no immediate complications during this procedure.    Please refer to the full operative summary for details.           Hospital Course:   The patient's hospital course was unremarkable.  She recovered as anticipated and experienced no post-operative complications. Hgb remained stable.  Lowest Hgb was 10.0.  Patient tolerating oral pain medications.  CPM at 0-90.  Tolerating therapy.  Dressing changed to aquacell today.  Incision with no drainage but + bruising.  TEDs placed.      - During her hospitalization, Sandra experienced pain due to Right Total Knee Arthroplasty .  The pain plan for discharge was discussed with Sandra and the plan was created in a collaborative fashion.    - Opioids prescribed on discharge: oxycodone  - Duration of opioids after discharge: per rehab needs  - Bowel regimen: senna           Discharge Instructions and Follow-Up:   Discharge diet: Pre-procedure diet or regular   Discharge activity: Activity as tolerated  Driving restrictions: no driving while taking narcotic analgesics  Weight bearing status: Weight bearing as tolerated   Discharge follow-up: Follow up with Dr. Anaya in 10-14 days.  Patient should already have an appointment scheduled   Wound care: Aquacell dressing in place - OK to shower over this  Aquacell and staples to be removed at follow up           Discharge Disposition:   Discharged to home with home physical therapy services.      Attestation:  I have reviewed today's vital signs, notes, medications, labs and imaging.    Rosanne Perdomo PA-C

## 2018-08-29 NOTE — PROGRESS NOTES
[facility]    Hospitalist Progress Note      Assessment & Plan   Sandra Petit is a 78 year old female who was admitted on 8/27/2018.  Postop day #2 after right total knee arthroplasty.  Doing well, working with staff and physical therapy on ambulation, planning to go home with help.  Diabetes under control with oral metformin, blood pressure controlled with home dosing.  No new concerns.  Likely home today if okay with surgery    S/P total knee replacement using cement, right  Postop day #2  Having minimal discomfort  Per orthopedic surgery, probable discharge today    Hypertension goal BP (blood pressure) < 140/90  Controlled with home regimen of Cozaar and clonidine  No change    Type 2 diabetes mellitus with diabetic nephropathy (H)  Blood sugars back to better control, back on home regimen of metformin twice daily  Continue home medicines, no change    Great toe pain, left  No current symptoms  Follow    CKD (chronic kidney disease) stage 3, GFR 30-59 ml/min  Stable, creatinine pending for today    Hyperlipidemia LDL goal <100  No current symptoms  Back home regimen with Zocor    Hyperuricemia  No current symptoms  No treatment necessary      # Pain Assessment:  Current Pain Score 8/29/2018   Patient currently in pain? sleeping: patient not able to self report   Pain score (0-10) -   Pain location -   Pain descriptors -   - Sandra is experiencing pain due to knee surgery. Pain management was discussed and the plan was created in a collaborative fashion.  Sandra's response to the current recommendations: engaged  - Opioid regimen: Taking oxycodone  - Response to opioid medications: Reduction of symptoms   - Bowel regimen: senna and miralax  - Pharmacologic adjuvants: Acetaminophen        DVT Prophylaxis: Pneumatic Compression Devices  Code Status: Full Code    Disposition: Expected discharge probably today    Julio Pelletier    Interval History   More comfortable, postop day #2  after total knee surgery.  Getting up easier but asking for help appropriately.  Pain controlled with use of oral oxycodone.  Eating well.  No other complaints    -Data reviewed today: I reviewed all new labs and imaging results over the last 24 hours. I personally reviewed no images or EKG's today.    Physical Exam   Temp: 97.1  F (36.2  C) Temp src: Oral BP: 146/44 Pulse: 60 Heart Rate: 58 Resp: 18 SpO2: 95 % O2 Device: None (Room air)    Vitals:    08/27/18 0706   Weight: 77.7 kg (171 lb 6.4 oz)     Vital Signs with Ranges  Temp:  [96.8  F (36  C)-98.7  F (37.1  C)] 97.1  F (36.2  C)  Pulse:  [58-61] 60  Heart Rate:  [58-61] 58  Resp:  [18] 18  BP: (130-169)/(44-60) 146/44  SpO2:  [95 %-99 %] 95 %  I/O last 3 completed shifts:  In: 2745.42 [P.O.:1260; I.V.:1485.42]  Out: 800 [Urine:800]    Constitutional: Alert, oriented in no obvious distress  Respiratory: Lungs are clear  Cardiovascular: Regular rate rhythm, no murmur heard  GI: Belly soft, nontender  Skin/Integumen: No issues  Other: Knee with no signs of active bleeding    Medications       acetaminophen  975 mg Oral Q8H     aspirin  325 mg Oral Daily     cloNIDine  0.2 mg Oral BID     ranitidine  150 mg Oral Daily    Or     famotidine  20 mg Intravenous Daily     gabapentin  100 mg Oral At Bedtime     insulin aspart  1-3 Units Subcutaneous TID AC     insulin aspart  1-3 Units Subcutaneous At Bedtime     levothyroxine  50 mcg Oral Daily     losartan  50 mg Oral BID     metFORMIN  500 mg Oral Daily with supper     senna-docusate  1 tablet Oral BID    Or     senna-docusate  2 tablet Oral BID     simvastatin  20 mg Oral At Bedtime     sodium chloride (PF)  3 mL Intracatheter Q8H       Data     Recent Labs  Lab 08/29/18  0620 08/28/18  1408 08/28/18  0623 08/27/18  1342   HGB 10.0* 8.8* 9.4*  --    NA  --   --  141  --    POTASSIUM  --   --  5.0  --    CHLORIDE  --   --  109  --    CO2  --   --  23  --    BUN  --   --  34*  --    CR  --   --  1.30* 1.29*    ANIONGAP  --   --  9  --    SHANE  --   --  8.0*  --    GLC  --   --  175*  --    ALBUMIN  --   --  2.5*  --    PROTTOTAL  --   --  5.5*  --    BILITOTAL  --   --  0.1*  --    ALKPHOS  --   --  70  --    ALT  --   --  14  --    AST  --   --  13  --        No results found for this or any previous visit (from the past 24 hour(s)).

## 2018-08-29 NOTE — PROGRESS NOTES
Name: Sandra Petit    MRN#: [id number]    Reason for Hospitalization: primary osteoarthritis of right knee  S/P total knee replacement using cement, right    Discharge Date: 8/29/2018    Patient / Family response to discharge plan: In agreement  will transport    Follow-Up Appt: Future Appointments  Date Time Provider Department Center   8/29/2018 1:00 PM Miri Skaggs, PT PHPT [place] [facility]   8/30/2018 9:30 AM Miri Skaggs, PT [facility]   9/10/2018 1:10 PM Johnny Anaya MD [facility]       Other Providers (Care Coordinator, County Services, PCA services etc): No    [facility] Phone: [phone]    Alda Rosario

## 2018-08-29 NOTE — PLAN OF CARE
Problem: Patient Care Overview  Goal: Plan of Care/Patient Progress Review  Outcome: Improving  Vss. Oxycodone given for R knee pain and has been effective. CMS to RLE is intact. Dressing to RLE is clean dry and intact. Pt has been up to the bathroom with minimal assist and walker and has tolerated activity well. Will continue with plan of care, pain control, PT.

## 2018-08-29 NOTE — ASSESSMENT & PLAN NOTE
Blood sugars back to better control, back on home regimen of metformin twice daily  Continue home medicines, no change

## 2018-08-29 NOTE — PLAN OF CARE
Discharge Planner OT   Patient plan for discharge: Home  Current status: Pt currently requires min assist for LB dressing and SBA for all other ADL tasks, including toileting, grooming, and bed mobility. Pt demonstrates increased tolerance for daily tasks and demonstrates safety during ambulation and during ADLs  Barriers to return to prior living situation: Need for assist during ADLs  Recommendations for discharge: Home with assist from family as needed  Rationale for recommendations: Pt has necessary AE needed to increase safety with ADLs at home. Pt's  assists with higher level tasks and will be able to assist as needed upon returning home       Entered by: Cynthia Mueller 08/29/2018 1:21 PM          Occupational Therapy Discharge Summary    Reason for therapy discharge:    Discharged to home.    Progress towards therapy goal(s). See [facility] on [facility] Plan in [facility] record for goal details.  Goals met    Therapy recommendation(s):    No further therapy is recommended.     BRIAN Brito/L  [facility]  [phone]

## 2018-08-29 NOTE — PLAN OF CARE
Problem: Patient Care Overview  Goal: Plan of Care/Patient Progress Review  Discharge Planner PT   Patient plan for discharge: Home with  and grandson's assistance  Current status: On CPM with settings 0-80, demonstrated knee flexion at rest. AROM right knee: 5-80 degrees with discomfort. IND bed mobility supine to sitting EOB, MOD IND sit <> stand with 2WW, MOD IND bed to chair. Completed short sitting LE exercises, required lift assistance with LAQ and demonstrated diminished quad activation. Ambulated 220 ft with 2WW and SBA for safety. Verbal cues provided to decrease UE stress, encouraged knee extension at heel contact and increase knee flexion during swing, minimal carry over. Ascend/descend 4 stairs bilat handrails with step to pattern and IND recall of stepping pattern, no LOB or increase in pain. Educated on car transfer, home activity guidelines, knee extension stretching, elevation principle, positioning in recliner and bed and quad activation.   Barriers to return to prior living situation: None  Recommendations for discharge: Home with 24/7 assistance, home health PT, family transport  Rationale for recommendations: Patient demonstrates safe mobilization sufficient for mobilization into and within the home environment. She would benefit from continued skilled physical therapy intervention to address post operative impairments in order to return her to her desired level of function in accordance with her surgeon's protocol.       Entered by: Miri Skaggs 08/29/2018 11:12 AM         Physical Therapy Discharge Summary    Reason for therapy discharge:    Discharged to home with home therapy.    Progress towards therapy goal(s). See goals on Care Plan in [facility] electronic health record for goal details.  Goals met    Therapy recommendation(s):    Continued therapy is recommended.  Rationale/Recommendations:  see above.       Mother  Still living? No  Acute myocardial infarction, Age at diagnosis: Age Unknown

## 2018-08-29 NOTE — DISCHARGE INSTRUCTIONS
Total Knee Replacement Discharge Instructions    [phone]  Bone and Joint Service Line for issues or concerns    Discharge prescriptions:  Oxycodone 5 mg tablets:  Take one or two tablets every 4 hours as needed for pain.  Gabapentin 100 mg tablet:  Take one tablet nightly for pain (30 days)  Mobic 7.5 mg tablet:  Take one tablet daily for pain (30 days)  Stool Softener:    You may use any stool softener you are familiar with. We have suggested over the counter Sennakot as a fall back.  Tylenol :  You may take one or two tablets (325mg) every six hours as needed.  Aspirin 325 mg:  Take one tablet twice daily for blood thinning and prevention of blood clots (30 days)    General Care:  After surgery you may feel tired/sleepy. This is normal. If you have any question along the way please contact the office. If you feel it is an issue cannot wait for normal office hours, contact the on-call physician. You should not drive or operate machines/equipment until released by your physician to do so.     Bandages:   Leave your bandage from the hospital on. It is OK to shower over this aquacell dressing.  Dressing will be removed at follow up visit in 10-14 days along with your staples.  It s normal to have some blood-tinged fluid on your bandages, this may occur for a few days after being sent home from the hospital. You may also notice a few drops of blood from your incision as you begin to bend your knee more this is normal.  You should not have any significant bleeding or drainage after a few days, call the office if you do or if your incision was dry and starts to drain.     Bathing:  Do not submerge your incision in water such as a bath or pool. It is ok to shower when you get home over the special aquacell dressing. You may find in comfortable to get a shower chair for the first month while you continue to heal and get stronger.     Follow up:  Your follow up appointment should already be scheduled. If its not,  please call the office to verify an appointment 10-14 days after surgery.     Diet:  Start with non-alcoholic liquids at first, particularly water or sports drinks after surgery. Progress to bland foods such as crackers and bread and finally to your normal diet if you have no problems. Avoid alcohol when taking narcotic pain medications.      Pain control:  Take your pain medications as prescribed. These medications may make you sleepy. Do not drive, operate equipment, or drink alcohol when taking these.  You may take Tylenol (Generic name is acetaminophen) as directed on the bottle for additional relief or in place of the prescribed pain medications as your pain gets better. Do not take any other NSAIDs (Motrin, Ibuprofen, Aleve, Naproxen) while taking Aspirin. If the medications cause a reaction such as nausea or skin rash, stop taking them and contact your doctor. Please plan accordingly, pain medications will not be re-filled on the weekends or at night. Call the office during the day if you need more medications.    Blood thinner:  It is very important to take the Aspirin as prescribed. It is a medication to help prevent blood clots in your legs or lungs. No medication is perfect, so if you notice a sudden onset of pain/swelling in your calf area call your doctor. If you notice a sudden onset of troubles breathing and/or chest pain call 911.     Icing:  It is common for some swelling, aching and stiffness to occur for up to 6-9 months after a knee replacement. If you knee swells, get off your feet, elevate your leg and apply some ice packs. Apply for 20 minutes at a time. For the first 1-2 weeks apply ice 2-3 x day or more after therapy.    Walker/crutches:  Use a walker/crutches when you go home. You will transition to the use of a cane and finally to no additional support.     Physical Therapy:  The success of your knee replacement is based on doing physical therapy. You will have some pain and discomfort  along the way. If you feel your pain is limiting your progress make sure to take some pain medication prior to your therapy session. If you pain medications are not working talk to your surgeon.   For the first 2 weeks after going home you will have in-home physical therapy. The goal is to work on range of motion. While it is important to working on bending your knee, it is equally important to make sure you knee comes out all the way straight. To assist in this do not place any bumps, pillows and or blankets under your knee when you are lying down. You should have an outpatient physical therapy appointment scheduled for about 2 weeks after surgery.     Activity:  Unless otherwise instructed, you can weight bear as tolerated. While laying or sitting down you should straighten your knee all the way out and then gently work on bending the knee back. Do not worry at first if your knee feels stiff and will not bend like normal, this will get better. Never put a pillow or bump under you knee, instead put a pillow under your ankle so your knee will straighten out all the way.     Normal findings after surgery:  Numbness and tenderness around the incisions and to the outside of the incision is normal.  You may have bruising around the incisions and down the lower leg.   Your knee will be swollen for months after surgery. It will feel  tight  to move.   Low grade fevers less than 100.5 degrees Fahrenheit are normal.   You may have some minor swelling in the leg/calf area.  You will have some increased pain after your therapy sessions.     When to call the Office:  Temperature greater than 101.5 degrees Fahreheit.  Fever, chills, and increasing pain in the knee.  Excessive drainage from the incisions that include bright red blood.  Drainage from the incisions sites that appear yellow, pus-like, or foul smelling.  Increasing pain the knee not relieved by the prescribed pain medications or ice.  Persistent nausea or vomiting  not helped by the Zofran.  Increased pain or swelling in your calf area (in back above your ankle) that wasn t there when in the hospital.  Any other effects you feel are significant.  Call 911 if you experience any chest pain and/or shortness or breath.                                                               EDEMA CONTROL  (Do not let your leg swell!!)    Swelling in your injured, or recently operated on, extremity is one of the worst things YOU can let happen.   Swelling will:   1)  Lead to more pain and stiffness  2)  Causes tissues to be unhealthy.  Bacteria like this.  3)  Puts you at risk for deadly blood clots.    Things YOU must do to control the EDEMA (swelling) are as follows:      Compress the extremity in a uniform way.  Use a compressive wrap or compressive hosiery from toes to your groin.  Not needed at night.    Icing of the injured area soothes the angry tissues.    YOU MUST ELEVATE THE INJURED EXTREMITY ABOVE YOUR HEART AS MUCH AS POSSIBLE!!   A foot stool is not enough.  Edema is water and water flows down hill.  Your heart is the pump so you are getting the water back to the pump.  I cannot do this for you.  Only you and your help at home can get this done.  THANKS!!

## 2018-08-30 ENCOUNTER — TELEPHONE (OUTPATIENT)
Dept: ORTHOPEDICS | Facility: CLINIC | Age: 78
End: 2018-08-30

## 2018-08-30 DIAGNOSIS — Z96.651 S/P TOTAL KNEE ARTHROPLASTY, RIGHT: ICD-10-CM

## 2018-08-30 RX ORDER — OXYCODONE HYDROCHLORIDE 5 MG/1
5-10 TABLET ORAL EVERY 4 HOURS PRN
Qty: 60 TABLET | Refills: 0 | Status: SHIPPED | OUTPATIENT
Start: 2018-08-30 | End: 2018-09-11

## 2018-08-30 NOTE — TELEPHONE ENCOUNTER
Home care is calling to state that patient was only given 6 tablets of oxycodone. Is this right?? She was just released from the hospital yesterday? Please call [phone]

## 2018-08-30 NOTE — TELEPHONE ENCOUNTER
No that should have been 60.  Because I forgot the zero.  I will have to recheck the orders.  Can place a prescription for her with more tablets .

## 2018-09-07 NOTE — PROGRESS NOTES
"Sandra Petit  Gender: female  : 1940  [address]  [facility] [id number] [phone] (home)     Medical Record: [id number]  Pharmacy:    [facility] - [place], [place] - [address]  Primary Care Provider: Teddy Wray    Parent's names are: Data Unavailable (mother) and Data Unavailable (father).      [facility]  2018     Discharge Phone Call:  Key Words/Key Times      Introduction - AIDET (Acknowledge, Introduce, Duration, Explanation)      Empathy-   We are calling to see how you are since your recent stay in the hospital?     Call back COMMENTS: I'm doing good, but I thought I had an infection the last 2 days. I had a fever of 99.0 and some warmth on my knee. I took tylenol and the fever went away and my knee isn't warm anymore. I have a f/u appt on Monday.      Clinical Questions -  (f/u appts, medication side effects/purpose, ability to care for self at home) \"For your safety, it is important to us that you understand the purpose and side effects of your medications, can you tell me what your new medications are?\"     Call back COMMENTS: I have been taking the aspirin every morning after breakfast and only take the oxycodone at night.      Staff Recognition -  We like to recognize staff and physicians who have done an excellent job.  Do you remember any people from your care team that you would like recognize?     Call back COMMENTS: I can't remember any names, but everyone was great. Everyone was very kind and gentle.      Very Good Care -  We want to provide very good care to all patients.  How was your care?     Call back COMMENTS: It was great. Much better this time compared to when I was there 5 years ago.       Opportunities for Improvement -  Our goal is to be the best.  Do you have any suggestions for things that we could improve upon?     Call back COMMENTS: Explain better about having to stay 3 " days to qualify for TCU. I thought about going to a TCU after I discharged, but was told I didn't stay 3 nights, only 2 nights and 3 days. I would have stayed another night to be able to have gotten into a TCU.      Thank You           Anne Colmenares, RN, BSN

## 2018-09-10 ENCOUNTER — OFFICE VISIT (OUTPATIENT)
Dept: ORTHOPEDICS | Facility: CLINIC | Age: 78
End: 2018-09-10
Payer: MEDICARE

## 2018-09-10 VITALS
SYSTOLIC BLOOD PRESSURE: 144 MMHG | HEIGHT: 61 IN | DIASTOLIC BLOOD PRESSURE: 46 MMHG | WEIGHT: 171.4 LBS | HEART RATE: 72 BPM | BODY MASS INDEX: 32.36 KG/M2

## 2018-09-10 DIAGNOSIS — Z96.651 STATUS POST TOTAL RIGHT KNEE REPLACEMENT: Primary | ICD-10-CM

## 2018-09-10 ASSESSMENT — PAIN SCALES - GENERAL: PAINLEVEL: MODERATE PAIN (4)

## 2018-09-10 NOTE — PROGRESS NOTES
"HISTORY OF PRESENT ILLNESS:    Sandra Petit is a 78 year old female who is seen in follow up for   Chief Complaint   Patient presents with     RECHECK     Right total knee arthroplasty.  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 POSTOPERATIVE DIAGNOSIS: RIGHT knee osteoarthritis with Samet.PROCEDURE: RIGHTtotal knee arthroplasty using [place] Triathlon series #3 femur, cruciate retaining, #3 tibia, 11mm spacer and a 32 mm asymmetric patella. All components were cemented in. Antibiotic cement was used    Collateral ligament release was Not Done.   A lateral release was Not Done    Interval: Patient reports she woke up this morning with limited range of motion and significant pain of both the right and left knee.  She states she had to take a pain pill.  Patient reports she has not been utilizing pain medications on a regular basis.  Patient evaluation done with Dr. Anaya    Physical Exam:  Vitals: /46 (BP Location: Left arm, Patient Position: Chair, Cuff Size: Adult Large)  Pulse 72  Ht 1.537 m (5' 0.5\")  Wt 77.7 kg (171 lb 6.4 oz)  BMI 32.92 kg/m2  BMI= Body mass index is 32.92 kg/(m^2).  Constitutional: healthy, alert and no acute distress   Psychiatric: mentation appears normal and affect normal/bright  NEURO: no focal deficits  Location of surgery: Right knee  Incision sites:  Staples removed today.  Mary Ann the incision is closed.  There is no surrounding redness.  Swelling is mild.  Patient has been utilizing a Chi sock   Range of motion: Patient is able to fully extend her right knee, her flexion is approximately 80  today.  Patient's left knee obtains 90  of flexion     ASSESSMENT:    Chief Complaint   Patient presents with     RECHECK     Right total knee arthroplasty.  DOS: 8/27/18 (14 days postop)      Surgical Followup     PREOPERATIVE DIAGNOSIS: RIGHT knee osteoarthritis with varus and patella " femoral disease. POSTOPERATIVE DIAGNOSIS: RIGHT knee osteoarthritis with Samet.PROCEDURE: RIGHTtotal knee arthroplasty using [place] Triathlon series #3 femur, cruciate retaining, #3 tibia, 11mm spacer and a 32 mm asymmetric patella. All components were cemented in. Antibiotic cement was used    Collateral ligament release was Not Done.   A lateral release was Not Done        ICD-10-CM    1. Status post total right knee replacement Z96.651      Patient is 14 days status post right total knee arthroplasty.  Patient with some deficit of range of motion.  She has a similar finding with the left knee that was done many years ago.    Plan:   Staples removed this visit.   A bandage was placed due to some bleeding from staple removal.  Patient may remove this bandage once home and shower directly over the incision.  Refill pain medication: Patient is encouraged to utilize her pain medicine prior to therapy sessions.  This will allow for the therapist to push a little further.  Patient states she has 1 more week of in-home therapy and then she should transition to the outpatient therapy.  Patient denies a need for refill of medication.  Continue elevation above the heart as needed to reduce swelling..  Continue compression  Return to clinic 3-4 weeks at which time we will obtain AP, lateral and merchant views of the right knee    BP Readings from Last 1 Encounters:   09/10/18 144/46       BP noted to be well controlled today in office.     Rosanne Perdomo PA-C   9/10/2018  2:08 PM      I attest I have seen and evaluated the patient.  I agree with above impression and plan.    Johnny Anaya MD

## 2018-09-10 NOTE — LETTER
"    9/10/2018         RE: Sandra Petit  [address] [place] [facility] [address]        Dear Colleague,    Thank you for referring your patient, Sandra Petit, to the [facility]. Please see a copy of my visit note below.    HISTORY OF PRESENT ILLNESS:    Sandra Petit is a 78 year old female who is seen in follow up for   Chief Complaint   Patient presents with     RECHECK     Right total knee arthroplasty.  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 POSTOPERATIVE DIAGNOSIS: RIGHT knee osteoarthritis with Samet.PROCEDURE: RIGHTtotal knee arthroplasty using [place] Triathlon series #3 femur, cruciate retaining, #3 tibia, 11mm spacer and a 32 mm asymmetric patella. All components were cemented in. Antibiotic cement was used    Collateral ligament release was Not Done.   A lateral release was Not Done    Interval: Patient reports she woke up this morning with limited range of motion and significant pain of both the right and left knee.  She states she had to take a pain pill.  Patient reports she has not been utilizing pain medications on a regular basis.  Patient evaluation done with Dr. Anaya    Physical Exam:  Vitals: /46 (BP Location: Left arm, Patient Position: Chair, Cuff Size: Adult Large)  Pulse 72  Ht 1.537 m (5' 0.5\")  Wt 77.7 kg (171 lb 6.4 oz)  BMI 32.92 kg/m2  BMI= Body mass index is 32.92 kg/(m^2).  Constitutional: healthy, alert and no acute distress   Psychiatric: mentation appears normal and affect normal/bright  NEURO: no focal deficits  Location of surgery: Right knee  Incision sites:  Staples removed today.  Mary Ann the incision is closed.  There is no surrounding redness.  Swelling is mild.  Patient has been utilizing a Chi sock   Range of motion: Patient is able to fully extend her right knee, her flexion is approximately 80  today.  Patient's left knee obtains 90  of " flexion     ASSESSMENT:    Chief Complaint   Patient presents with     RECHECK     Right total knee arthroplasty.  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 POSTOPERATIVE DIAGNOSIS: RIGHT knee osteoarthritis with Samet.PROCEDURE: RIGHTtotal knee arthroplasty using [place] Triathlon series #3 femur, cruciate retaining, #3 tibia, 11mm spacer and a 32 mm asymmetric patella. All components were cemented in. Antibiotic cement was used    Collateral ligament release was Not Done.   A lateral release was Not Done        ICD-10-CM    1. Status post total right knee replacement Z96.651      Patient is 14 days status post right total knee arthroplasty.  Patient with some deficit of range of motion.  She has a similar finding with the left knee that was done many years ago.    Plan:   Staples removed this visit.   A bandage was placed due to some bleeding from staple removal.  Patient may remove this bandage once home and shower directly over the incision.  Refill pain medication: Patient is encouraged to utilize her pain medicine prior to therapy sessions.  This will allow for the therapist to push a little further.  Patient states she has 1 more week of in-home therapy and then she should transition to the outpatient therapy.  Patient denies a need for refill of medication.  Continue elevation above the heart as needed to reduce swelling..  Continue compression  Return to clinic 3-4 weeks at which time we will obtain AP, lateral and merchant views of the right knee    BP Readings from Last 1 Encounters:   09/10/18 144/46       BP noted to be well controlled today in office.     Rosanne Perdomo PA-C   9/10/2018  2:08 PM      I attest I have seen and evaluated the patient.  I agree with above impression and plan.    Johnny Anaya MD    Again, thank you for allowing me to participate in the care of your patient.         Sincerely,        Johnny Anaya MD

## 2018-09-10 NOTE — MR AVS SNAPSHOT
After Visit Summary   9/10/2018    Sandra Petit    MRN: [id number]           Patient Information     Date Of Birth          1940        Visit Information        Provider Department      9/10/2018 1:10 PM Johnny Anaya MD [facility]        Today's Diagnoses     Status post total right knee replacement    -  1       Follow-ups after your visit        Your next 10 appointments already scheduled     Oct 02, 2018 10:40 AM CDT   [facility] with [facility]   [facility]    [address] [place] [id number] [phone]              Who to contact     If you have questions or need follow up information about today's clinic visit or your schedule please contact [facility] directly at [phone].  Normal or non-critical lab and imaging results will be communicated to you by MyChart, letter or phone within 4 business days after the clinic has received the results. If you do not hear from us within 7 days, please contact the clinic through MyChart or phone. If you have a critical or abnormal lab result, we will notify you by phone as soon as possible.  Submit refill requests through Njini or call your pharmacy and they will forward the refill request to us. Please allow 3 business days for your refill to be completed.          Additional Information About Your Visit        MyChart Information     Njini gives you secure access to your electronic health record. If you see a primary care provider, you can also send messages to your care team and make appointments. If you have questions, please call your primary care clinic.  If you do not have a primary care provider, please call [phone] and they will assist you.        Care EveryWhere ID     This is your Care EveryWhere ID. This could be used by other organizations to access your [facility] medical records  [id number]        Your Vitals Were   "   Pulse Height BMI (Body Mass Index)             72 1.537 m (5' 0.5\") 32.92 kg/m2          Blood Pressure from Last 3 Encounters:   09/10/18 144/46   08/29/18 162/47   08/16/18 142/67    Weight from Last 3 Encounters:   09/10/18 77.7 kg (171 lb 6.4 oz)   08/27/18 77.7 kg (171 lb 6.4 oz)   08/16/18 77.7 kg (171 lb 6.4 oz)              Today, you had the following     No orders found for display       Primary Care Provider Office Phone # Fax #    Teddy Wray [phone] [address] [facility] [place] [address]        Equal Access to Services     RYAN LOWERY : Krystal valdes Sobilly, waaxda luqadaha, qaybta kaalmada adeegyada, veronica blevins. So [facility] [phone].    ATENCIÓN: Si habla español, tiene a lacy disposición servicios gratuitos de asistencia lingüística. LlGood [facility] [phone].    We comply with applicable federal civil rights laws and [place] laws. We do not discriminate on the basis of race, color, national origin, age, disability, sex, sexual orientation, or gender identity.            Thank you!     Thank you for choosing [facility]  for your care. Our goal is always to provide you with excellent care. Hearing back from our patients is one way we can continue to improve our services. Please take a few minutes to complete the written survey that you may receive in the mail after your visit with us. Thank you!             Your Updated Medication List - Protect others around you: Learn how to safely use, store and throw away your medicines at www.disposemymeds.org.          This list is accurate as of 9/10/18 11:59 PM.  Always use your most recent med list.                   Brand Name Dispense Instructions for use Diagnosis    acetaminophen 325 MG tablet    TYLENOL    100 tablet    Take 2 tablets (650 mg) by mouth every 4 hours as needed for other (multimodal surgical pain management along with NSAIDS and opioid medication as " indicated based on pain control and physical function.)    S/P total knee arthroplasty, right       ACIDOPHILUS PROBIOTIC BLEND Caps      Take 1 capsule by mouth 2 times daily        aspirin 325 MG EC tablet     30 tablet    Take 1 tablet (325 mg) by mouth daily    S/P total knee arthroplasty, right       blood glucose monitoring lancets     1 each    Use to test blood sugars 1 times daily or as directed.    Diabetic vasculopathy (H)       blood glucose monitoring test strip    ONETOUCH VERIO IQ    50 each    Use to test blood sugars 1 times daily or as directed.    Diabetic vasculopathy (H)       cloNIDine 0.2 MG tablet    CATAPRES    180 tablet    Take 1 tablet (0.2 mg) by mouth 2 times daily    Essential hypertension, benign       gabapentin 100 MG capsule    NEURONTIN    30 capsule    Take 1 capsule (100 mg) by mouth At Bedtime Begin 3-4 days prior to surgery    Primary osteoarthritis of right knee       levofloxacin 750 MG tablet    LEVAQUIN    2 tablet    Take one tablet 2 hours prior to procedure    History of total left knee replacement       levothyroxine 50 MCG tablet    SYNTHROID    90 tablet    Take 1 tablet (50 mcg) by mouth daily    Hypothyroidism, unspecified type       losartan 50 MG tablet    COZAAR    180 tablet    TAKE ONE TABLET BY MOUTH TWICE DAILY    Essential hypertension, benign       metFORMIN 500 MG 24 hr tablet    GLUCOPHAGE-XR    90 tablet    TAKE ONE TABLET BY MOUTH ONCE DAILY WITH  DINNER    Type 2 diabetes mellitus with diabetic nephropathy, without long-term current use of insulin (H)       nitroGLYcerin 0.4 MG sublingual tablet    NITROSTAT    25 tablet    Place 1 tablet (0.4 mg) under the tongue every 5 minutes as needed for chest pain    Coronary artery disease involving native coronary artery without angina pectoris       order for DME     1 Device    Equipment being ordered: Walker () Treatment Diagnosis: s/p joint replacement    S/P total knee arthroplasty, right        oxyCODONE IR 5 MG tablet    ROXICODONE    60 tablet    Take 1-2 tablets (5-10 mg) by mouth every 4 hours as needed for other (pain control or improvement in physical function. Hold dose for analgesic side effects.)    S/P total knee arthroplasty, right       senna-docusate 8.6-50 MG per tablet    SENOKOT-S;PERICOLACE    100 tablet    Take 1-2 tablets by mouth 2 times daily as needed for constipation    S/P total knee arthroplasty, right       simvastatin 40 MG tablet    ZOCOR    90 tablet    Take 0.5 tablets (20 mg) by mouth At Bedtime    Hyperlipidemia LDL goal <100       vitamin D 400 units capsule     30 capsule    Take 1 capsule by mouth 2 times daily

## 2018-09-11 DIAGNOSIS — Z96.651 S/P TOTAL KNEE ARTHROPLASTY, RIGHT: ICD-10-CM

## 2018-09-11 RX ORDER — OXYCODONE HYDROCHLORIDE 5 MG/1
5-10 TABLET ORAL EVERY 4 HOURS PRN
Qty: 40 TABLET | Refills: 0 | Status: SHIPPED | OUTPATIENT
Start: 2018-09-11 | End: 2018-09-17

## 2018-09-11 NOTE — TELEPHONE ENCOUNTER
Reason for Call:  Medication or medication refill:    Do you use a [facility]?  Name of the pharmacy and phone number for the current request:  [facility] [place] - [phone]     Name of the medication requested: oxyCODONE IR (ROXICODONE) 5 MG tablet    Other request: pt stated she will  RX at our     Can we leave a detailed message on this number? YES    Phone number patient can be reached at: Home number on file [phone] (home)    Best Time: any     Call taken on 9/11/2018 at 10:20 AM by Sarina Sorenson

## 2018-09-17 ENCOUNTER — TELEPHONE (OUTPATIENT)
Dept: ORTHOPEDICS | Facility: CLINIC | Age: 78
End: 2018-09-17

## 2018-09-17 DIAGNOSIS — Z96.651 S/P TOTAL KNEE ARTHROPLASTY, RIGHT: ICD-10-CM

## 2018-09-17 RX ORDER — OXYCODONE HYDROCHLORIDE 5 MG/1
5-10 TABLET ORAL EVERY 4 HOURS PRN
Qty: 42 TABLET | Refills: 0 | Status: SHIPPED | OUTPATIENT
Start: 2018-09-17 | End: 2018-10-16

## 2018-09-17 NOTE — TELEPHONE ENCOUNTER
Called [facility] and discussed with them.  She was give # 40 on 9/12/18, this would equal a 7 day supply per the directions written.

## 2018-09-17 NOTE — TELEPHONE ENCOUNTER
Yes she did but the pharmacy is not filling # 40 because of the new regulations.  She said last fill she only got 20 or so.

## 2018-09-17 NOTE — TELEPHONE ENCOUNTER
#42 (6 tabs per day max) were refilled today.  Patient's  came to  to obtain prescription although the request to fill was not yet discussed with patient.  He insists she was not given 40 tablets from [facility] last week.   was told she reports she is taking these prior to rehab and before bed.  This is what  states she is doing. Patient reports she is taking only before bed.  They are asked to review the bottle from last fill.  Patient's  is advised that she needs to continue to wean medication down and only to take prior to rehab or before bed.  He denies patient is taking 6 tablets per day on her behalf.

## 2018-09-21 ENCOUNTER — HOSPITAL ENCOUNTER (OUTPATIENT)
Dept: PHYSICAL THERAPY | Facility: CLINIC | Age: 78
Setting detail: THERAPIES SERIES
End: 2018-09-21
Attending: PHYSICIAN ASSISTANT
Payer: MEDICARE

## 2018-09-21 PROCEDURE — G8979 MOBILITY GOAL STATUS: HCPCS | Mod: GP,CI | Performed by: PHYSICAL THERAPIST

## 2018-09-21 PROCEDURE — G8978 MOBILITY CURRENT STATUS: HCPCS | Mod: GP,CL | Performed by: PHYSICAL THERAPIST

## 2018-09-21 PROCEDURE — 40000718 ZZHC STATISTIC PT DEPARTMENT ORTHO VISIT: Performed by: PHYSICAL THERAPIST

## 2018-09-21 PROCEDURE — 97161 PT EVAL LOW COMPLEX 20 MIN: CPT | Mod: GP | Performed by: PHYSICAL THERAPIST

## 2018-09-21 PROCEDURE — 97530 THERAPEUTIC ACTIVITIES: CPT | Mod: GP | Performed by: PHYSICAL THERAPIST

## 2018-09-21 NOTE — PROGRESS NOTES
[facility]          OUTPATIENT PHYSICAL THERAPY ORTHOPEDIC EVALUATION  PLAN OF TREATMENT FOR OUTPATIENT REHABILITATION  (COMPLETE FOR INITIAL CLAIMS ONLY)  Patient's Last Name, First Name, M.I.  YOB: 1940  Sandra Petit    Provider s Name:  [facility]   Medical Record No.  [id number]   Start of Care Date:  09/21/18   Onset Date:  08/27/18   Type:     _X__PT   ___OT   ___SLP Medical Diagnosis:  s/p R TKA     PT Diagnosis:  s/p TKA    Visits from SOC:  1      _________________________________________________________________________________  Plan of Treatment/Functional Goals:  balance training, gait training, strengthening, stretching, ROM           Goals  Goal Identifier: no walker  Goal Description: Giovanni will increase safety with mobiilty in order to walk with a cane safely on TUG in under 15 seconds.  Target Date: 12/20/18    Goal Identifier: perferred gait speed  Goal Description: Giovanni will show increased stability with mobility to be able to have preferred gait speed increase to be able to complete 20 ft walk in less than 7 sec with cane.  Target Date: 12/20/18    Goal Identifier: ROM  Goal Description: Giovanni will gain right knee ROM (0-120 degrees) to be able to complete ADLs in normal fashion.  Target Date: 12/20/18            Therapy Frequency:  2 times/Week  Predicted Duration of Therapy Intervention:  3 months    Carolin Miller PT                 I CERTIFY THE NEED FOR THESE SERVICES FURNISHED UNDER        THIS PLAN OF TREATMENT AND WHILE UNDER MY CARE     (Physician co-signature of this document indicates review and certification of the therapy plan).                       Certification Date From:  09/21/18   Certification Date To:  12/20/18    Referring Provider:  Dr Anaya/Rosanne Perdomo PA-C    Initial Assessment        See Epic Evaluation Start of Care Date: 09/21/18

## 2018-09-21 NOTE — PROGRESS NOTES
09/21/18 1030   General Information   Type of Visit Initial OP Ortho PT Evaluation   Start of Care Date 09/21/18   Referring Physician Dr Anaya/Rosanne Perdomo PA-C   Patient/Family Goals Statement Getting rid of walker and use right leg (recent operative leg) more than the left leg (because of old surgery problem.)   Orders Evaluate and Treat   Orders Comment start immediately post op based on eval   Date of Order 08/29/18   Insurance Type Medicare;Blue Cross   Insurance Comments/Visits Authorized Primary: MEDICARE  Secondary: [employer]   Medical Diagnosis s/p TKA R   Surgical/Medical history reviewed Yes   Precautions/Limitations no known precautions/limitations   General Information Comments PMH: 14 operations--ruptured ovariam cyst, exploratory srugery found sewn to bladder, kidney turned and surgery to twist it back, gall bladder, both knee in 1976-something new on knees.   Body Part(s)   Body Part(s) Knee   Presentation and Etiology   Pertinent history of current problem (include personal factors and/or comorbidities that impact the POC) Right knee TKA done 8/27/18 d/t arthritis.  She had home care until 9/13/18 (about 3 times.)  Using a walker now, but used cane outside prior to surgery and nothing inside.   Impairments A. Pain;B. Decreased WB tolerance;E. Decreased flexibility;F. Decreased strength and endurance;H. Impaired gait;G. Impaired balance   Functional Limitations perform activities of daily living;perform desired leisure / sports activities   Symptom Location right knee   How/Where did it occur From insidious onset   Onset date of current episode/exacerbation 08/27/18   Chronicity New   Pain rating (0-10 point scale) Worst (/10);Best (/10);Other   Best (/10) 2   Worst (/10) 15   Pain rating comment current: 7-8/10   Pain quality C. Aching   Pain/symptoms exacerbated by M. Other   Pain exacerbation comment twisting it at night and having pressure on it from other knee; walking around   Pain/symptoms  eased by C. Rest;H. Cold   Prior Level of Function   Prior Level of Function-Mobility used cane outside, nothing inside   Prior Level of Function-ADLs used cane outside, nothing inside   Current Level of Function   Current Community Support Family/friend caregiver   Patient role/employment history F. Retired  ()   Fall Risk Screen   Fall screen completed by PT   Have you fallen 2 or more times in the past year? No   Have you fallen and had an injury in the past year? No   Is patient a fall risk? No   Abuse Risk Screen   QUESTION TO PATIENT:  Has a member of your family or a partner(now or in the past) intimidated, hurt, manipulated, or controlled you in any way? no   QUESTION TO PATIENT: Do you feel safe going back to the place where you are living? yes   OBSERVATION: Is there reason to believe there has been maltreatment of a vulnerable adult (ie. Physical/Sexual/Emotional abuse, self neglect, lack of adequate food, shelter, medical care, or financial exploitation)? no   Functional Scales   Functional Scales Other   Other Scales  15/80   Knee Objective Findings   Side (if bilateral, select both right and left) Right   Observation comes in with walker   Integumentary  edema moderate to mild in fullness of knee and lower leg with bruising along shin.  Edema noted to be mildlyu pitting on anterior shin slightly medial to tibial tuberosity   Gait/Locomotion 11.33 sec / 20 ft with walker;  TUG with walker 22.33 seconds   Knee/Hip Strength Comments Unable to lift right leg off floor while it is extended at knww with heel resting on ground, but can do LAQ to strighten knee to minus 22 degrees.   Palpation tender over medial-low knee jint line area and around tibial tuberosity   Right Knee Extension AROM minus 12 degrees in chair with foot on floor and minus 22 lifting leg off ground in chair.   Right Knee Flexion AROM 84 degrees   Planned Therapy Interventions   Planned Therapy Interventions balance  training;gait training;strengthening;stretching;ROM   Clinical Impression   Criteria for Skilled Therapeutic Interventions Met yes, treatment indicated   PT Diagnosis s/p TKA    Influenced by the following impairments weakness, decreased ROM   Functional limitations due to impairments walking, standing, general mobility, reactive balance   Clinical Presentation Stable/Uncomplicated   Clinical Decision Making (Complexity) Low complexity   Therapy Frequency 2 times/Week   Predicted Duration of Therapy Intervention (days/wks) 3 months   Risk & Benefits of therapy have been explained Yes   Patient, Family & other staff in agreement with plan of care Yes   Education Assessment   Preferred Learning Style Listening;Demonstration;Pictures/video   Barriers to Learning No barriers   ORTHO GOALS   PT Ortho Eval Goals 1;2;3   Ortho Goal 1   Goal Identifier no walker   Goal Description Giovanni will increase safety with mobiilty in order to walk with a cane safely on TUG in under 15 seconds.   Target Date 12/20/18   Ortho Goal 2   Goal Identifier perferred gait speed   Goal Description Giovanni will show increased stability with mobility to be able to have preferred gait speed increase to be able to complete 20 ft walk in less than 7 sec with cane.   Target Date 12/20/18   Ortho Goal 3   Goal Identifier ROM   Goal Description Giovanni will gain right knee ROM (0-120 degrees) to be able to complete ADLs in normal fashion.   Target Date 12/20/18   Total Evaluation Time   Total Evaluation Time 35   Therapy Certification   Certification date from 09/21/18   Certification date to 12/20/18   Medical Diagnosis s/p R TKA

## 2018-09-25 ENCOUNTER — HOSPITAL ENCOUNTER (OUTPATIENT)
Dept: PHYSICAL THERAPY | Facility: CLINIC | Age: 78
Setting detail: THERAPIES SERIES
End: 2018-09-25
Attending: PHYSICIAN ASSISTANT
Payer: MEDICARE

## 2018-09-25 PROCEDURE — 97110 THERAPEUTIC EXERCISES: CPT | Mod: GP | Performed by: PHYSICAL THERAPIST

## 2018-09-25 PROCEDURE — 40000718 ZZHC STATISTIC PT DEPARTMENT ORTHO VISIT: Performed by: PHYSICAL THERAPIST

## 2018-09-27 ENCOUNTER — HOSPITAL ENCOUNTER (OUTPATIENT)
Dept: PHYSICAL THERAPY | Facility: CLINIC | Age: 78
Setting detail: THERAPIES SERIES
End: 2018-09-27
Attending: PHYSICIAN ASSISTANT
Payer: MEDICARE

## 2018-09-27 PROCEDURE — 40000718 ZZHC STATISTIC PT DEPARTMENT ORTHO VISIT: Performed by: PHYSICAL THERAPIST

## 2018-09-27 PROCEDURE — 97140 MANUAL THERAPY 1/> REGIONS: CPT | Mod: GP | Performed by: PHYSICAL THERAPIST

## 2018-09-27 PROCEDURE — 97110 THERAPEUTIC EXERCISES: CPT | Mod: GP | Performed by: PHYSICAL THERAPIST

## 2018-10-01 ENCOUNTER — HOSPITAL ENCOUNTER (OUTPATIENT)
Dept: PHYSICAL THERAPY | Facility: CLINIC | Age: 78
Setting detail: THERAPIES SERIES
End: 2018-10-01
Attending: PHYSICIAN ASSISTANT
Payer: MEDICARE

## 2018-10-01 PROCEDURE — 97140 MANUAL THERAPY 1/> REGIONS: CPT | Mod: GP | Performed by: PHYSICAL THERAPIST

## 2018-10-01 PROCEDURE — 40000718 ZZHC STATISTIC PT DEPARTMENT ORTHO VISIT: Performed by: PHYSICAL THERAPIST

## 2018-10-01 PROCEDURE — 97110 THERAPEUTIC EXERCISES: CPT | Mod: GP | Performed by: PHYSICAL THERAPIST

## 2018-10-01 NOTE — PROGRESS NOTES
Outpatient Physical Therapy Progress Note     Patient: Sandra Petit  : 1940    Beginning/End Dates of Reporting Period:  18 to 10/1/2018    Referring Provider: Dr Anaya    Therapy Diagnosis: TKA, right       Client Self Report: OK.  I couldn't do the bike last week.  I'm not ready for it yet.    Objective Measurements:     Objective Measure: ROM  Details: AROM/AAROM L knee: 3-95 degrees   Objective Measure: pain  Details: 6/10 across front knee joint line and some medial/inferior to patella           Goals:  Goal Identifier no walker   Goal Description Giovanni will increase safety with mobiilty in order to walk with a cane safely on TUG in under 15 seconds.   Target Date 18   Date Met      Progress:     Goal Identifier perferred gait speed   Goal Description Giovanni will show increased stability with mobility to be able to have preferred gait speed increase to be able to complete 20 ft walk in less than 7 sec with cane.   Target Date 18   Date Met      Progress:     Goal Identifier ROM   Goal Description Giovanni will gain right knee ROM (0-120 degrees) to be able to complete ADLs in normal fashion.   Target Date 18   Date Met      Progress:       Progress Toward Goals:   Progress this reporting period: Pt is showing some progress with ROM of right knee.  She limits herself with use of left knee as it has caused many problems in the past and we are taking care to not overdo it for what she can tolerate.  She is walking with walker well with little compensation considering her lack of ROM.  She was able to do some WB ex today--TKE.  Her quad activation is still lacking, but is improving.          Plan:  Continue therapy per current plan of care.    Discharge:  No

## 2018-10-02 ENCOUNTER — RADIANT APPOINTMENT (OUTPATIENT)
Dept: GENERAL RADIOLOGY | Facility: CLINIC | Age: 78
End: 2018-10-02
Attending: ORTHOPAEDIC SURGERY
Payer: COMMERCIAL

## 2018-10-02 ENCOUNTER — OFFICE VISIT (OUTPATIENT)
Dept: ORTHOPEDICS | Facility: CLINIC | Age: 78
End: 2018-10-02
Payer: COMMERCIAL

## 2018-10-02 VITALS
SYSTOLIC BLOOD PRESSURE: 108 MMHG | TEMPERATURE: 97.6 F | BODY MASS INDEX: 31.42 KG/M2 | DIASTOLIC BLOOD PRESSURE: 69 MMHG | WEIGHT: 166.4 LBS | HEIGHT: 61 IN

## 2018-10-02 DIAGNOSIS — M70.61 TROCHANTERIC BURSITIS OF BOTH HIPS: ICD-10-CM

## 2018-10-02 DIAGNOSIS — Z96.651 S/P TOTAL KNEE ARTHROPLASTY, RIGHT: Primary | ICD-10-CM

## 2018-10-02 DIAGNOSIS — Z96.651 S/P TOTAL KNEE ARTHROPLASTY, RIGHT: ICD-10-CM

## 2018-10-02 DIAGNOSIS — M70.62 TROCHANTERIC BURSITIS OF BOTH HIPS: ICD-10-CM

## 2018-10-02 PROCEDURE — 73562 X-RAY EXAM OF KNEE 3: CPT | Mod: TC

## 2018-10-02 PROCEDURE — 99024 POSTOP FOLLOW-UP VISIT: CPT | Performed by: PHYSICIAN ASSISTANT

## 2018-10-02 ASSESSMENT — PAIN SCALES - GENERAL: PAINLEVEL: EXTREME PAIN (8)

## 2018-10-02 NOTE — PROGRESS NOTES
"HISTORY OF PRESENT ILLNESS:    Sandra Petit is a 78 year old female who is seen in follow up for   Chief Complaint   Patient presents with     RECHECK     Right total knee arthroplasty.  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 POSTOPERATIVE DIAGNOSIS: RIGHT knee osteoarthritis with Samet.PROCEDURE: RIGHTtotal knee arthroplasty using [place] Triathlon series #3 femur, cruciate retaining, #3 tibia, 11mm spacer and a 32 mm asymmetric patella. All components were cemented in. Antibiotic cement was used    Collateral ligament release was Not Done.   A lateral release was Not Done    Interval: Patient reports she does not have a considerable amount of pain in her right knee.  She does have some pain anterior.  She states most of her pain is primarily in the right hip and to a lesser degree the lateral left hip.  She has had previous left hip trochanteric injection done by another orthopedic provider with no significant help.  She is finding sleeping difficult and typically has only been getting about 1 hour of sleep.  Patient also clarified mishap with pain medications early in her recovery.  She had filled the prescription and put it away in the covered not thinking that she had filled this prescription.  She has plenty left over but does utilize one pill prior to bedtime especially after physical therapy days.  Patient also has been utilizing one pill prior to therapy.  Patient still is quite satisfied with the surgery as the amount of improvement in pain control in her right knee is considerably more than what she was dealing with prior to surgery and is also recovering faster than her left knee did  Pain control: As above  Family present: None  Patient evaluation done with Dr. Anaya    Physical Exam:  Vitals: /69  Temp 97.6  F (36.4  C) (Temporal)  Ht 1.537 m (5' 0.5\")  Wt 75.5 kg (166 lb 6.4 oz)  BMI " 31.96 kg/m2  BMI= Body mass index is 31.96 kg/(m^2).  Constitutional: healthy, alert and no acute distress   Psychiatric: mentation appears normal and affect normal/bright  NEURO: no focal deficits  Location of surgery: Right knee  Incision sites: Incision is well-healed.  Patient continues to utilize FRENCH stockings.  She does not have much for dependent edema  Range of motion: Patient is approximately 5 degrees short of full extension.  Her flexion is around 100 degrees.  Patient's gait is improved.  Patient does utilize a walker still.  Patient is experiencing pain on palpation bilateral greater trochanter right greater than left    IMAGING INTERPRETATION: Patient's components are well-positioned.  There is no calcifications or signs of loosening.  Independent visualization of the images was performed.     ASSESSMENT:    Chief Complaint   Patient presents with     RECHECK     Right total knee arthroplasty.  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 POSTOPERATIVE DIAGNOSIS: RIGHT knee osteoarthritis with Samet.PROCEDURE: RIGHTtotal knee arthroplasty using [place] Triathlon series #3 femur, cruciate retaining, #3 tibia, 11mm spacer and a 32 mm asymmetric patella. All components were cemented in. Antibiotic cement was used    Collateral ligament release was Not Done.   A lateral release was Not Done        ICD-10-CM    1. S/P total knee arthroplasty, right Z96.651 XR Knee Right 3 Views     Patient is 5 weeks status post right total knee arthroplasty.  Patient is progressing as expected.  She does have some needs for continued rehabilitation to improve range of motion  Patient is experiencing bilateral trochanteric bursitis right greater than left    Plan:   Refill pain medication: Not needed  Physical Therapy: Continue.  Will add suggestions for trochanteric IT band stretching to her current plan.  Patient also  needs to continue to work on full extension and increasing flexion abilities.  Patient may discontinue use of the FRENCH stockings however she should be mindful of dependent swelling.  Continue elevation of affected extremity.  Return to clinic 6 weeks.  If patient is still struggling with trochanteric bursitis then we will consider injection of 1 of the hips.    BP Readings from Last 1 Encounters:   10/02/18 108/69       BP noted to be well controlled today in office.     Rosanne Perdomo PA-C   10/2/2018  12:33 PM      I attest I have seen and evaluated the patient.  I agree with above impression and plan.    Johnny Anaya MD

## 2018-10-02 NOTE — MR AVS SNAPSHOT
After Visit Summary   10/2/2018    Sandra Petit    MRN: [id number]           Patient Information     Date Of Birth          1940        Visit Information        Provider Department      10/2/2018 10:40 AM Johnny Anaya MD [facility]        Today's Diagnoses     S/P total knee arthroplasty, right    -  1    Trochanteric bursitis of both hips           Follow-ups after your visit        Additional Services     PHYSICAL THERAPY REFERRAL       If you have not heard from the scheduling office within 2 business days, please call [phone] for all locations, with the exception of [place], please call [phone] and [place], please call [phone].    Please be aware that coverage of these services is subject to the terms and limitations of your health insurance plan.  Call member services at your health plan with any benefit or coverage questions.                  Your next 10 appointments already scheduled     Oct 04, 2018  1:00 PM CDT   Ortho Treatment with Carolin Juju, PT   [facility] ([facility])    [address] [place] [address]   [phone]            Oct 08, 2018 12:00 PM CDT   Ortho Treatment with Carolin Juju, PT   [facility] ([facility])    [address] [place] [address]   [phone]            Oct 11, 2018 11:45 AM CDT   Ortho Treatment with Carolin Juju, PT   [facility] ([facility])    [address] [place] [address]   [phone]            Oct 16, 2018  9:00 AM CDT   Ortho Treatment with Carolinjeanine Matute, PT   [facility] ([facility])    [address] [place] [address]   [phone]            Oct 17, 2018  4:30 PM CDT   PHYSICAL with Teddy Wray MD   [facility] ([facility])    [address] [place] [address]  [place] [address]   [phone]            Oct 23, 2018 10:00 AM CDT   Ortho Treatment with Carolin Matute PT   [facility] ([facility])    [address] [place] [address]   [phone]            Nov 13, 2018 10:10 AM CST   [facility]   [facility]    [address] [place]  [facility] [address]   [phone]              Future tests that were ordered for you today     Open Future Orders        Priority Expected Expires Ordered    PHYSICAL THERAPY REFERRAL Routine  10/2/2019 10/2/2018            Who to contact     If you have questions or need follow up information about today's clinic visit or your schedule please contact [facility] directly at [phone].  Normal or non-critical lab and imaging results will be communicated to you by MyChart, letter or phone within 4 business days after the clinic has received the results. If you do not hear from us within 7 days, please contact the clinic through [email or web address] or phone. If you have a critical or abnormal lab result, we will notify you by phone as soon as possible.  Submit refill requests through [employer] or call your pharmacy and they will forward the refill request to us. Please allow 3 business days for your refill to be completed.          Additional Information About Your Visit        MyChart Information     Fio gives you secure access to your electronic health record. If you see a primary care provider, you can also send messages to your care team and make appointments. If you have questions, please call your primary care clinic.  If you do not have a primary care provider, please call [phone] and they will assist you.        Care EveryWhere ID     This is your Care EveryWhere ID. This could be used by other organizations to access your [facility] medical records  [id number]        Your Vitals  "Were     Temperature Height BMI (Body Mass Index)             97.6  F (36.4  C) (Temporal) 1.537 m (5' 0.5\") 31.96 kg/m2          Blood Pressure from Last 3 Encounters:   10/02/18 108/69   09/10/18 144/46   08/29/18 162/47    Weight from Last 3 Encounters:   10/02/18 75.5 kg (166 lb 6.4 oz)   09/10/18 77.7 kg (171 lb 6.4 oz)   08/27/18 77.7 kg (171 lb 6.4 oz)               Primary Care Provider Fax #    Physician No Ref-Primary [phone]       No address on file        Equal Access to Services     RYAN LOWERY : Krystal Retana, sae guerra, jason rivers, veronica blevins. So [facility] [phone].    ATENCIÓN: Si habla español, tiene a lacy disposición servicios gratuitos de asistencia lingüística. Llame al [phone].    We comply with applicable federal civil rights laws and [place] laws. We do not discriminate on the basis of race, color, national origin, age, disability, sex, sexual orientation, or gender identity.            Thank you!     Thank you for choosing [facility]  for your care. Our goal is always to provide you with excellent care. Hearing back from our patients is one way we can continue to improve our services. Please take a few minutes to complete the written survey that you may receive in the mail after your visit with us. Thank you!             Your Updated Medication List - Protect others around you: Learn how to safely use, store and throw away your medicines at www.disposemymeds.org.          This list is accurate as of 10/2/18  4:18 PM.  Always use your most recent med list.                   Brand Name Dispense Instructions for use Diagnosis    ACIDOPHILUS PROBIOTIC BLEND Caps      Take 1 capsule by mouth 2 times daily        aspirin 325 MG EC tablet     30 tablet    Take 1 tablet (325 mg) by mouth daily    S/P total knee arthroplasty, right       blood glucose monitoring lancets     1 each    Use to test blood sugars " 1 times daily or as directed.    Diabetic vasculopathy (H)       blood glucose monitoring test strip    ONETOUCH VERIO IQ    50 each    Use to test blood sugars 1 times daily or as directed.    Diabetic vasculopathy (H)       cloNIDine 0.2 MG tablet    CATAPRES    180 tablet    Take 1 tablet (0.2 mg) by mouth 2 times daily    Essential hypertension, benign       levofloxacin 750 MG tablet    LEVAQUIN    2 tablet    Take one tablet 2 hours prior to procedure    History of total left knee replacement       levothyroxine 50 MCG tablet    SYNTHROID    90 tablet    Take 1 tablet (50 mcg) by mouth daily    Hypothyroidism, unspecified type       losartan 50 MG tablet    COZAAR    180 tablet    TAKE ONE TABLET BY MOUTH TWICE DAILY    Essential hypertension, benign       metFORMIN 500 MG 24 hr tablet    GLUCOPHAGE-XR    90 tablet    TAKE ONE TABLET BY MOUTH ONCE DAILY WITH  DINNER    Type 2 diabetes mellitus with diabetic nephropathy, without long-term current use of insulin (H)       nitroGLYcerin 0.4 MG sublingual tablet    NITROSTAT    25 tablet    Place 1 tablet (0.4 mg) under the tongue every 5 minutes as needed for chest pain    Coronary artery disease involving native coronary artery without angina pectoris       order for DME     1 Device    Equipment being ordered: Walker () Treatment Diagnosis: s/p joint replacement    S/P total knee arthroplasty, right       senna-docusate 8.6-50 MG per tablet    SENOKOT-S;PERICOLACE    100 tablet    Take 1-2 tablets by mouth 2 times daily as needed for constipation    S/P total knee arthroplasty, right       simvastatin 40 MG tablet    ZOCOR    90 tablet    Take 0.5 tablets (20 mg) by mouth At Bedtime    Hyperlipidemia LDL goal <100       vitamin D 400 units capsule     30 capsule    Take 1 capsule by mouth 2 times daily

## 2018-10-02 NOTE — LETTER
10/2/2018         RE: Sandra Petit  [address] [facility] [address]        Dear Colleague,    Thank you for referring your patient, Sandra Petit, to the [facility]. Please see a copy of my visit note below.    HISTORY OF PRESENT ILLNESS:    Sandra Petit is a 78 year old female who is seen in follow up for   Chief Complaint   Patient presents with     RECHECK     Right total knee arthroplasty.  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 POSTOPERATIVE DIAGNOSIS: RIGHT knee osteoarthritis with Samet.PROCEDURE: RIGHTtotal knee arthroplasty using [place] Triathlon series #3 femur, cruciate retaining, #3 tibia, 11mm spacer and a 32 mm asymmetric patella. All components were cemented in. Antibiotic cement was used    Collateral ligament release was Not Done.   A lateral release was Not Done    Interval: Patient reports she does not have a considerable amount of pain in her right knee.  She does have some pain anterior.  She states most of her pain is primarily in the right hip and to a lesser degree the lateral left hip.  She has had previous left hip trochanteric injection done by another orthopedic provider with no significant help.  She is finding sleeping difficult and typically has only been getting about 1 hour of sleep.  Patient also clarified mishap with pain medications early in her recovery.  She had filled the prescription and put it away in the covered not thinking that she had filled this prescription.  She has plenty left over but does utilize one pill prior to bedtime especially after physical therapy days.  Patient also has been utilizing one pill prior to therapy.  Patient still is quite satisfied with the surgery as the amount of improvement in pain control in her right knee is considerably more than what she was dealing with prior to surgery and is also recovering faster  "than her left knee did  Pain control: As above  Family present: None  Patient evaluation done with Dr. Anaya    Physical Exam:  Vitals: /69  Temp 97.6  F (36.4  C) (Temporal)  Ht 1.537 m (5' 0.5\")  Wt 75.5 kg (166 lb 6.4 oz)  BMI 31.96 kg/m2  BMI= Body mass index is 31.96 kg/(m^2).  Constitutional: healthy, alert and no acute distress   Psychiatric: mentation appears normal and affect normal/bright  NEURO: no focal deficits  Location of surgery: Right knee  Incision sites: Incision is well-healed.  Patient continues to utilize FRENCH stockings.  She does not have much for dependent edema  Range of motion: Patient is approximately 5 degrees short of full extension.  Her flexion is around 100 degrees.  Patient's gait is improved.  Patient does utilize a walker still.  Patient is experiencing pain on palpation bilateral greater trochanter right greater than left    IMAGING INTERPRETATION: Patient's components are well-positioned.  There is no calcifications or signs of loosening.  Independent visualization of the images was performed.     ASSESSMENT:    Chief Complaint   Patient presents with     RECHECK     Right total knee arthroplasty.  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 POSTOPERATIVE DIAGNOSIS: RIGHT knee osteoarthritis with Samet.PROCEDURE: RIGHTtotal knee arthroplasty using [place] Triathlon series #3 femur, cruciate retaining, #3 tibia, 11mm spacer and a 32 mm asymmetric patella. All components were cemented in. Antibiotic cement was used    Collateral ligament release was Not Done.   A lateral release was Not Done        ICD-10-CM    1. S/P total knee arthroplasty, right Z96.651 XR Knee Right 3 Views     Patient is 5 weeks status post right total knee arthroplasty.  Patient is progressing as expected.  She does have some needs for continued rehabilitation to improve range of motion  Patient is " experiencing bilateral trochanteric bursitis right greater than left    Plan:   Refill pain medication: Not needed  Physical Therapy: Continue.  Will add suggestions for trochanteric IT band stretching to her current plan.  Patient also needs to continue to work on full extension and increasing flexion abilities.  Patient may discontinue use of the FRENCH stockings however she should be mindful of dependent swelling.  Continue elevation of affected extremity.  Return to clinic 6 weeks.  If patient is still struggling with trochanteric bursitis then we will consider injection of 1 of the hips.    BP Readings from Last 1 Encounters:   10/02/18 108/69       BP noted to be well controlled today in office.     Rosanne Perdomo PA-C   10/2/2018  12:33 PM      I attest I have seen and evaluated the patient.  I agree with above impression and plan.    Johnny nAaya MD    Again, thank you for allowing me to participate in the care of your patient.        Sincerely,        Johnny Anaya MD

## 2018-10-04 ENCOUNTER — HOSPITAL ENCOUNTER (OUTPATIENT)
Dept: PHYSICAL THERAPY | Facility: CLINIC | Age: 78
Setting detail: THERAPIES SERIES
End: 2018-10-04
Attending: PHYSICIAN ASSISTANT
Payer: MEDICARE

## 2018-10-04 PROCEDURE — 40000718 ZZHC STATISTIC PT DEPARTMENT ORTHO VISIT: Performed by: PHYSICAL THERAPIST

## 2018-10-04 PROCEDURE — 97110 THERAPEUTIC EXERCISES: CPT | Mod: GP | Performed by: PHYSICAL THERAPIST

## 2018-10-08 ENCOUNTER — HOSPITAL ENCOUNTER (OUTPATIENT)
Dept: PHYSICAL THERAPY | Facility: CLINIC | Age: 78
Setting detail: THERAPIES SERIES
End: 2018-10-08
Attending: PHYSICIAN ASSISTANT
Payer: MEDICARE

## 2018-10-08 PROCEDURE — 97110 THERAPEUTIC EXERCISES: CPT | Mod: GP | Performed by: PHYSICAL THERAPIST

## 2018-10-08 PROCEDURE — 40000718 ZZHC STATISTIC PT DEPARTMENT ORTHO VISIT: Performed by: PHYSICAL THERAPIST

## 2018-10-11 ENCOUNTER — HOSPITAL ENCOUNTER (OUTPATIENT)
Dept: MAMMOGRAPHY | Facility: CLINIC | Age: 78
Discharge: HOME | End: 2018-10-11
Attending: FAMILY MEDICINE | Admitting: FAMILY MEDICINE
Payer: MEDICARE

## 2018-10-11 ENCOUNTER — HOSPITAL ENCOUNTER (OUTPATIENT)
Dept: PHYSICAL THERAPY | Facility: CLINIC | Age: 78
Setting detail: THERAPIES SERIES
End: 2018-10-11
Attending: PHYSICIAN ASSISTANT
Payer: MEDICARE

## 2018-10-11 DIAGNOSIS — Z12.31 VISIT FOR SCREENING MAMMOGRAM: ICD-10-CM

## 2018-10-11 PROCEDURE — 97110 THERAPEUTIC EXERCISES: CPT | Mod: GP | Performed by: PHYSICAL THERAPIST

## 2018-10-11 PROCEDURE — 97140 MANUAL THERAPY 1/> REGIONS: CPT | Mod: GP | Performed by: PHYSICAL THERAPIST

## 2018-10-11 PROCEDURE — 77067 SCR MAMMO BI INCL CAD: CPT

## 2018-10-11 PROCEDURE — 40000718 ZZHC STATISTIC PT DEPARTMENT ORTHO VISIT: Performed by: PHYSICAL THERAPIST

## 2018-10-11 NOTE — PROGRESS NOTES
Appropriate assistive devices provided during their visit.yes(Yes, No, N/A) walker (list device)    Exam table and/or cart  placed in the lowest position. na (Yes, No, N/A)    Brakes on tables/carts/wheelchairs used at all times. na (Yes, No, N/A)    Non slip footwear applied. na (Yes, No, NA)    Patient was accompanied by staff throughout visit. yes (Yes, No, N/A)    Equipment safety straps used. na (Yes, No, N/A)    Assist with toileting. na (Yes, No, N/A)

## 2018-10-14 ASSESSMENT — ENCOUNTER SYMPTOMS
MYALGIAS: 0
NAUSEA: 0
BREAST MASS: 0
PARESTHESIAS: 0
FREQUENCY: 0
HEARTBURN: 0
COUGH: 0
NERVOUS/ANXIOUS: 0
EYE PAIN: 0
HEMATURIA: 0
PALPITATIONS: 0
JOINT SWELLING: 1
ARTHRALGIAS: 1
CHILLS: 0
HEADACHES: 0
SORE THROAT: 0
ABDOMINAL PAIN: 0
WEAKNESS: 0
DIZZINESS: 0
CONSTIPATION: 0
HEMATOCHEZIA: 0
DIARRHEA: 0
DYSURIA: 0
FEVER: 0

## 2018-10-14 ASSESSMENT — ACTIVITIES OF DAILY LIVING (ADL)
CURRENT_FUNCTION: NO ASSISTANCE NEEDED
I_NEED_ASSISTANCE_FOR_THE_FOLLOWING_DAILY_ACTIVITIES:: NO ASSISTANCE IS NEEDED

## 2018-10-15 DIAGNOSIS — E11.21 TYPE 2 DIABETES MELLITUS WITH DIABETIC NEPHROPATHY, WITHOUT LONG-TERM CURRENT USE OF INSULIN (H): ICD-10-CM

## 2018-10-16 ENCOUNTER — HOSPITAL ENCOUNTER (OUTPATIENT)
Dept: PHYSICAL THERAPY | Facility: CLINIC | Age: 78
Setting detail: THERAPIES SERIES
End: 2018-10-16
Attending: PHYSICIAN ASSISTANT
Payer: MEDICARE

## 2018-10-16 DIAGNOSIS — Z96.651 S/P TOTAL KNEE ARTHROPLASTY, RIGHT: ICD-10-CM

## 2018-10-16 PROCEDURE — 97110 THERAPEUTIC EXERCISES: CPT | Mod: GP | Performed by: PHYSICAL THERAPIST

## 2018-10-16 PROCEDURE — 40000718 ZZHC STATISTIC PT DEPARTMENT ORTHO VISIT: Performed by: PHYSICAL THERAPIST

## 2018-10-16 PROCEDURE — 97530 THERAPEUTIC ACTIVITIES: CPT | Mod: GP | Performed by: PHYSICAL THERAPIST

## 2018-10-16 RX ORDER — OXYCODONE HYDROCHLORIDE 5 MG/1
5-10 TABLET ORAL EVERY 4 HOURS PRN
Qty: 20 TABLET | Refills: 0 | Status: SHIPPED | OUTPATIENT
Start: 2018-10-16 | End: 2018-10-30

## 2018-10-16 NOTE — TELEPHONE ENCOUNTER
Pt notified via phone.  Also that we are decreasing the qty. She understands.      She would like to  the RX for Oxycodone. I left at the  in specialty     Vito/BRADY

## 2018-10-17 ENCOUNTER — OFFICE VISIT (OUTPATIENT)
Dept: FAMILY MEDICINE | Facility: CLINIC | Age: 78
End: 2018-10-17
Payer: COMMERCIAL

## 2018-10-17 VITALS
WEIGHT: 166 LBS | RESPIRATION RATE: 16 BRPM | DIASTOLIC BLOOD PRESSURE: 64 MMHG | HEART RATE: 72 BPM | SYSTOLIC BLOOD PRESSURE: 158 MMHG | BODY MASS INDEX: 31.89 KG/M2 | TEMPERATURE: 97.1 F

## 2018-10-17 DIAGNOSIS — D50.8 OTHER IRON DEFICIENCY ANEMIA: ICD-10-CM

## 2018-10-17 DIAGNOSIS — Z79.899 ENCOUNTER FOR LONG-TERM CURRENT USE OF MEDICATION: ICD-10-CM

## 2018-10-17 DIAGNOSIS — Z00.00 ROUTINE GENERAL MEDICAL EXAMINATION AT A HEALTH CARE FACILITY: Primary | ICD-10-CM

## 2018-10-17 DIAGNOSIS — I10 HYPERTENSION GOAL BP (BLOOD PRESSURE) < 140/90: ICD-10-CM

## 2018-10-17 DIAGNOSIS — N18.30 CKD (CHRONIC KIDNEY DISEASE) STAGE 3, GFR 30-59 ML/MIN (H): ICD-10-CM

## 2018-10-17 DIAGNOSIS — E03.8 OTHER SPECIFIED HYPOTHYROIDISM: ICD-10-CM

## 2018-10-17 LAB
CREAT SERPL-MCNC: 1.34 MG/DL (ref 0.52–1.04)
GFR SERPL CREATININE-BSD FRML MDRD: 38 ML/MIN/1.7M2
HGB BLD-MCNC: 12.1 G/DL (ref 11.7–15.7)
POTASSIUM SERPL-SCNC: 4.4 MMOL/L (ref 3.4–5.3)
T4 FREE SERPL-MCNC: 1.11 NG/DL (ref 0.76–1.46)
TSH SERPL DL<=0.005 MIU/L-ACNC: 2.56 MU/L (ref 0.4–4)

## 2018-10-17 PROCEDURE — 84443 ASSAY THYROID STIM HORMONE: CPT | Performed by: FAMILY MEDICINE

## 2018-10-17 PROCEDURE — 84132 ASSAY OF SERUM POTASSIUM: CPT | Performed by: FAMILY MEDICINE

## 2018-10-17 PROCEDURE — 36415 COLL VENOUS BLD VENIPUNCTURE: CPT | Performed by: FAMILY MEDICINE

## 2018-10-17 PROCEDURE — 85018 HEMOGLOBIN: CPT | Performed by: FAMILY MEDICINE

## 2018-10-17 PROCEDURE — 84439 ASSAY OF FREE THYROXINE: CPT | Performed by: FAMILY MEDICINE

## 2018-10-17 PROCEDURE — G0439 PPPS, SUBSEQ VISIT: HCPCS | Performed by: FAMILY MEDICINE

## 2018-10-17 PROCEDURE — 82565 ASSAY OF CREATININE: CPT | Performed by: FAMILY MEDICINE

## 2018-10-17 RX ORDER — METFORMIN HCL 500 MG
TABLET, EXTENDED RELEASE 24 HR ORAL
Qty: 90 TABLET | Refills: 3 | Status: SHIPPED | OUTPATIENT
Start: 2018-10-17 | End: 2019-11-06

## 2018-10-17 ASSESSMENT — ENCOUNTER SYMPTOMS
BREAST MASS: 0
HEMATOCHEZIA: 0
SORE THROAT: 0
PALPITATIONS: 0
HEARTBURN: 0
WEAKNESS: 0
PARESTHESIAS: 0
NAUSEA: 0
ABDOMINAL PAIN: 0
DIZZINESS: 0
JOINT SWELLING: 1
MYALGIAS: 0
CHILLS: 0
NERVOUS/ANXIOUS: 0
DIARRHEA: 0
EYE PAIN: 0
DYSURIA: 0
FREQUENCY: 0
ARTHRALGIAS: 1
FEVER: 0
HEMATURIA: 0
CONSTIPATION: 0
HEADACHES: 0
COUGH: 0

## 2018-10-17 ASSESSMENT — PAIN SCALES - GENERAL: PAINLEVEL: EXTREME PAIN (8)

## 2018-10-17 ASSESSMENT — ACTIVITIES OF DAILY LIVING (ADL): CURRENT_FUNCTION: NO ASSISTANCE NEEDED

## 2018-10-17 NOTE — TELEPHONE ENCOUNTER
Last Written Prescription Date:  10/11/17  Last Fill Quantity: 90,  # refills: 3   Last office visit: 8/15/2018 with prescribing provider:  Teddy Wray   Future Office Visit:   Next 5 appointments (look out 90 days)     Oct 17, 2018  4:30 PM CDT   PHYSICAL with Teddy Wray MD   [facility]    [address] [place] [address]   [phone]            Nov 13, 2018 10:10 AM CST   Return Visit with Johnny Anaya MD   [facility] ([facility])    [address] [place] [address]   [phone]                   Requested Prescriptions   Pending Prescriptions Disp Refills     metFORMIN (GLUCOPHAGE-XR) 500 MG 24 hr tablet [Pharmacy Med Name: MetFORMIN ER 500MG  TAB] 90 tablet 3     Sig: TAKE ONE TABLET BY MOUTH ONCE DAILY WITH SUPPER    Biguanide Agents Failed    10/15/2018 12:03 PM       Failed - Patient's CR is NOT>1.4 OR Patient's EGFR is NOT<45 within past 12 mos.    Recent Labs   Lab Test  08/28/18   0623   GFRESTIMATED  40*   GFRESTBLACK  48*       Recent Labs   Lab Test  08/28/18   0623   CR  1.30*            Passed - Blood pressure less than 140/90 in past 6 months    BP Readings from Last 3 Encounters:   10/02/18 108/69   09/10/18 144/46   08/29/18 162/47                Passed - Patient has documented LDL within the past 12 mos.    Recent Labs   Lab Test  04/13/18   0923   LDL  77            Passed - Patient has had a Microalbumin in the past 15 mos.    Recent Labs   Lab Test  03/12/18   1010   MICROL  42   UMALCR  26.34*            Passed - Patient is age 10 or older       Passed - Patient has documented A1c within the specified period of time.    If HgbA1C is 8 or greater, it needs to be on file within the past 3 months.  If less than 8, must be on file within the past 6 months.     Recent Labs   Lab Test  08/15/18   1718   A1C  6.8*            Passed - Patient does NOT have a diagnosis of CHF.       Passed  "- Patient is not pregnant       Passed - Patient has not had a positive pregnancy test within the past 12 mos.        Passed - Recent (6 mo) or future (30 days) visit within the authorizing provider's specialty    Patient had office visit in the last 6 months or has a visit in the next 30 days with authorizing provider or within the authorizing provider's specialty.  See \"Patient Info\" tab in inbasket, or \"Choose Columns\" in Meds & Orders section of the refill encounter.            Routing refill request to provider for review/approval because:  Labs out of range:  CR, EGFR    Danni Tamez RN        "

## 2018-10-17 NOTE — PROGRESS NOTES
Physical   Annual:     Getting at least 3 servings of Calcium per day:  NO    Bi-annual eye exam:  Yes    Dental care twice a year:  Yes    Sleep apnea or symptoms of sleep apnea:  None    Diet:  Low salt, Low fat/cholesterol and Diabetic    Frequency of exercise:  2-3 days/week    Duration of exercise:  Less than 15 minutes    Taking medications regularly:  Yes    Medication side effects:  None    Additional concerns today:  No    Ability to successfully perform activities of daily living: no assistance needed    Home Safety:  Lack of grab bars in the bathroom    Hearing Impairment: no hearing concerns      BP Readings from Last 2 Encounters:   10/17/18 158/64   10/02/18 108/69             PHQ-2 ( 1999 Pfizer) 10/14/2018   Q1: Little interest or pleasure in doing things 0   Q2: Feeling down, depressed or hopeless 0   PHQ-2 Score 0   Q1: Little interest or pleasure in doing things Not at all   Q2: Feeling down, depressed or hopeless Not at all   PHQ-2 Score 0                        Review of Systems   Constitutional: Negative for chills and fever.   HENT: Negative for congestion, ear pain, hearing loss and sore throat.    Eyes: Negative for pain and visual disturbance.   Respiratory: Negative for cough.    Cardiovascular: Negative for chest pain and palpitations.   Gastrointestinal: Negative for abdominal pain, constipation, diarrhea, heartburn, hematochezia and nausea.   Breasts:  Negative for breast mass and discharge.   Genitourinary: Negative for dysuria, frequency, genital sores, hematuria, pelvic pain, urgency, vaginal bleeding and vaginal discharge.   Musculoskeletal: Positive for arthralgias and joint swelling. Negative for myalgias.   Skin: Negative for rash.   Neurological: Negative for dizziness, weakness, headaches and paresthesias.   Psychiatric/Behavioral: Negative for mood changes. The patient is not nervous/anxious.           OBJECTIVE:   /64  Pulse 72  Temp 97.1  F (36.2  C) (Temporal)   Resp 16  Wt 166 lb (75.3 kg)  BMI 31.89 kg/m2  Physical Exam  ASSESSMENT/PLAN:         SUBJECTIVE:   Sandra Petit is a 78 year old female who presents for Preventive Visit.      Are you in the first 12 months of your Medicare Part B coverage?  No  Physical   Annual:     Getting at least 3 servings of Calcium per day:  NO    Bi-annual eye exam:  Yes    Dental care twice a year:  Yes    Sleep apnea or symptoms of sleep apnea:  None    Diet:  Low salt, Low fat/cholesterol and Diabetic    Frequency of exercise:  2-3 days/week    Duration of exercise:  Less than 15 minutes    Taking medications regularly:  Yes    Medication side effects:  None    Additional concerns today:  No    Ability to successfully perform activities of daily living: no assistance needed    Home Safety:  Lack of grab bars in the bathroom    Hearing Impairment: no hearing concerns  Fall risk:  Fallen 2 or more times in the past year?: Yes  Any fall with injury in the past year?: Yes        COGNITIVE SCREEN  1) Repeat 3 items (Leader, Season, Table)    2) Clock draw: NORMAL  3) 3 item recall: Recalls 3 objects  Results: 3 items recalled: COGNITIVE IMPAIRMENT LESS LIKELY    Mini-CogTM Copyright S Toan. Licensed by the author for use in [facility] [employer]; reprinted with permission (soconnie@[email or web address]). All rights reserved.            Diabetes Follow-up    Patient is checking blood sugars: once daily.  Results are as follows:         120    Diabetic concerns: None     Symptoms of hypoglycemia (low blood sugar): none     Paresthesias (numbness or burning in feet) or sores: No     Date of last diabetic eye exam: Nov 2017    Diabetes Management Resources    Hyperlipidemia Follow-Up      Rate your low fat/cholesterol diet?: fair    Taking statin?  Yes, no muscle aches from statin    Other lipid medications/supplements?:  none    Hypertension Follow-up      Outpatient blood pressures are being checked at home.  Results are 120/58.    Low Salt Diet: low  salt    BP Readings from Last 2 Encounters:   10/17/18 158/64   10/02/18 108/69     Hemoglobin A1C (%)   Date Value   08/15/2018 6.8 (H)   04/13/2018 6.6 (H)     LDL Cholesterol Calculated (mg/dL)   Date Value   04/13/2018 77   12/20/2017 65       Reviewed and updated as needed this visit by clinical staff  Tobacco  Allergies  Meds  Soc Hx        Reviewed and updated as needed this visit by Provider        Social History   Substance Use Topics     Smoking status: Former Smoker     Smokeless tobacco: Never Used      Comment: quit  1987     Alcohol use No                           Today's PHQ-2 Score:   PHQ-2 ( 1999 Pfizer) 10/14/2018 4/11/2018   Q1: Little interest or pleasure in doing things 0 0   Q2: Feeling down, depressed or hopeless 0 0   PHQ-2 Score 0 0   Q1: Little interest or pleasure in doing things Not at all -   Q2: Feeling down, depressed or hopeless Not at all -   PHQ-2 Score 0 -       Do you feel safe in your environment - Yes    Do you have a Health Care Directive?: No: Advance care planning was reviewed with patient; patient declined at this time.    Current providers sharing in care for this patient include:   Patient Care Team:  No Ref-Primary, Physician as PCP - General    The following health maintenance items are reviewed in Epic and correct as of today:  Health Maintenance   Topic Date Due     INFLUENZA VACCINE (1) 09/01/2018     TSH Q1 YEAR  10/09/2018     FALL RISK ASSESSMENT  10/11/2018     EYE EXAM Q1 YEAR  10/25/2018     A1C Q6 MO  02/15/2019     MICROALBUMIN Q1 YEAR  03/12/2019     ANAID QUESTIONNAIRE 1 YEAR  04/11/2019     PHQ-9 Q1YR  04/11/2019     LIPID MONITORING Q1 YEAR  04/13/2019     PNEUMOCOCCAL (2 of 2 - PPSV23) 05/11/2019     FOOT EXAM Q1 YEAR  07/18/2019     CREATININE Q1 YEAR  08/28/2019     BMP Q1 YR  08/28/2019     HEMOGLOBIN Q1 YR  08/29/2019     TSH W/ FREE T4 REFLEX Q2 YEAR  10/09/2019     ADVANCE DIRECTIVE PLANNING Q5 YRS  08/28/2023     TETANUS IMMUNIZATION (SYSTEM  "ASSIGNED)  04/27/2026     DEXA SCAN SCREENING (SYSTEM ASSIGNED)  Completed             ROS:  Review of Systems   Constitutional: Negative for chills and fever.   HENT: Negative for congestion, ear pain, hearing loss and sore throat.    Eyes: Negative for pain and visual disturbance.   Respiratory: Negative for cough.    Cardiovascular: Negative for chest pain and palpitations.   Gastrointestinal: Negative for abdominal pain, constipation, diarrhea, heartburn, hematochezia and nausea.   Breasts:  Negative for breast mass and discharge.   Genitourinary: Negative for dysuria, frequency, genital sores, hematuria, pelvic pain, urgency, vaginal bleeding and vaginal discharge.   Musculoskeletal: Positive for arthralgias and joint swelling. Negative for myalgias.   Skin: Negative for rash.   Neurological: Negative for dizziness, weakness, headaches and paresthesias.   Psychiatric/Behavioral: Negative for mood changes. The patient is not nervous/anxious.        OBJECTIVE:   /64  Pulse 72  Temp 97.1  F (36.2  C) (Temporal)  Resp 16  Wt 166 lb (75.3 kg)  BMI 31.89 kg/m2 Estimated body mass index is 31.89 kg/(m^2) as calculated from the following:    Height as of 10/2/18: 5' 0.5\" (1.537 m).    Weight as of this encounter: 166 lb (75.3 kg).      ASSESSMENT / PLAN:       BP Readings from Last 1 Encounters:   10/17/18 158/64     Estimated body mass index is 31.89 kg/(m^2) as calculated from the following:    Height as of 10/2/18: 5' 0.5\" (1.537 m).    Weight as of this encounter: 166 lb (75.3 kg).           reports that she has quit smoking. She has never used smokeless tobacco.    "

## 2018-10-17 NOTE — PROGRESS NOTES
SUBJECTIVE:   CC: Sandra Petit is an 78 year old woman who presents for preventive health visit.     Physical   Annual:     Getting at least 3 servings of Calcium per day:  NO    Bi-annual eye exam:  Yes    Dental care twice a year:  Yes    Sleep apnea or symptoms of sleep apnea:  None    Diet:  Low salt, Low fat/cholesterol and Diabetic    Frequency of exercise:  2-3 days/week    Duration of exercise:  Less than 15 minutes    Taking medications regularly:  Yes    Medication side effects:  None    Additional concerns today:  No    Ability to successfully perform activities of daily living: no assistance needed    Home Safety:  Lack of grab bars in the bathroom    Hearing Impairment: no hearing concerns            Today's PHQ-2 Score:   PHQ-2 ( 1999 [employer]) 10/14/2018   Q1: Little interest or pleasure in doing things 0   Q2: Feeling down, depressed or hopeless 0   PHQ-2 Score 0   Q1: Little interest or pleasure in doing things Not at all   Q2: Feeling down, depressed or hopeless Not at all   PHQ-2 Score 0       Abuse: Current or Past(Physical, Sexual or Emotional)- No  Do you feel safe in your environment - Yes    Social History   Substance Use Topics     Smoking status: Former Smoker     Smokeless tobacco: Never Used      Comment: quit  1987     Alcohol use No     Alcohol Use 10/14/2018   If you drink alcohol do you typically have greater than 3 drinks per day OR greater than 7 drinks per week? Not Applicable       Reviewed orders with patient.  Reviewed health maintenance and updated orders accordingly - Yes  BP Readings from Last 3 Encounters:   10/17/18 158/64   10/02/18 108/69   09/10/18 144/46    Wt Readings from Last 3 Encounters:   10/17/18 166 lb (75.3 kg)   10/02/18 166 lb 6.4 oz (75.5 kg)   09/10/18 171 lb 6.4 oz (77.7 kg)                  Patient Active Problem List   Diagnosis     Cardiac dysrhythmia     Myalgia and myositis     Coronary atherosclerosis      MONONUCLEOSIS ebv inf 7/05     CKD (chronic  kidney disease) stage 3, GFR 30-59 ml/min (H)     Encounter for long-term current use of medication     Hyperlipidemia LDL goal <100     Hypertension goal BP (blood pressure) < 140/90     OA (osteoarthritis) of knee - bilateral     History of total left knee replacement     Bunion     Acute-on-chronic kidney injury (H)     Coronary atherosclerosis of native coronary artery     Chest pain     Other specified hypothyroidism     Coronary artery disease involving native coronary artery without angina pectoris     Type 2 diabetes mellitus with diabetic nephropathy (H)     Type 2 diabetes mellitus with other circulatory complications (H)     Hyperuricemia     Herpes zoster without complication     Osteoarthritis of left thumb     Primary osteoarthritis of right knee     S/P total knee arthroplasty, left     S/P total knee replacement using cement, right     Great toe pain, left     Past Surgical History:   Procedure Laterality Date     ARTHROPLASTY KNEE  4/29/2013    Procedure: ARTHROPLASTY KNEE;  left total knee arthroplasty;  Surgeon: Teddy Grimes MD;  [facility]     ARTHROPLASTY KNEE Right 8/27/2018    Procedure: ARTHROPLASTY KNEE;  right total knee arthroplasty;  Surgeon: Johnny Anaya MD;  Location: [facility]     C APPENDECTOMY       C NONSPECIFIC PROCEDURE      Knee ligament surgery     C NONSPECIFIC PROCEDURE      Kidney surgery for mechanical obstruction     C OPEN CORONARY ENDARTERECTOMY  june 2005    Angioplasty w stenting     C TOTAL KNEE ARTHROPLASTY  4/29/13    Left     COMBINED CYSTOSCOPY, INSERT CATHETER URETER Bilateral 8/10/2015    Procedure: COMBINED CYSTOSCOPY, INSERT CATHETER URETER;  Surgeon: Johnnie Madera MD;  Location[facility]     DILATION AND CURETTAGE, HYSTEROSCOPY DIAGNOSTIC, COMBINED N/A 11/6/2014    Procedure: COMBINED DILATION AND CURETTAGE, HYSTEROSCOPY DIAGNOSTIC;  Surgeon: Edward Pardo MD;  Location[facility]     ESOPHAGOSCOPY, GASTROSCOPY, DUODENOSCOPY  (EGD), COMBINED N/A 2015    Procedure: COMBINED ESOPHAGOSCOPY, GASTROSCOPY, DUODENOSCOPY (EGD), BIOPSY SINGLE OR MULTIPLE;  Surgeon: Carmelo Rosario MD;  Location: [facility]     HC REMOVAL GALLBLADDER      Cholecystectomy     HC REMOVE TONSILS/ADENOIDS,<11 Y/O      unsure of age     LAPAROSCOPIC HYSTERECTOMY TOTAL N/A 8/10/2015    Procedure: LAPAROSCOPIC HYSTERECTOMY TOTAL;  Surgeon: Edward Pardo MD;  [facility]     LAPAROSCOPIC LYSIS ADHESIONS N/A 8/10/2015    Procedure: LAPAROSCOPIC LYSIS ADHESIONS;  Surgeon: Edward Pardo MD;  Location: [facility]       Social History   Substance Use Topics     Smoking status: Former Smoker     Smokeless tobacco: Never Used      Comment: quit       Alcohol use No     Family History   Problem Relation Age of Onset     HEART DISEASE Mother       coronary     HEART DISEASE Father       coronary     Allergies Sister      unknown     Allergies Brother      unknown     Allergies Daughter      unknown     Allergies Son      unknown     HEART DISEASE Sister      by pass surg     HEART DISEASE Brother      on medication     Hypertension Sister      Hypertension Brother      Lipids Sister      Lipids Brother      Cerebrovascular Disease Brother      Obesity Sister      Diabetes Sister      Diabetes Sister      C.A.D. Brother      quad by pass     Neurologic Disorder Sister      parkinsons     Cancer Sister      skin cancer     Alcohol/Drug No family hx of      Alzheimer Disease No family hx of          Current Outpatient Prescriptions   Medication Sig Dispense Refill     blood glucose monitoring (ONE TOUCH DELICA) lancets Use to test blood sugars 1 times daily or as directed. 1 each 1     blood glucose monitoring (ONE TOUCH VERIO IQ) test strip Use to test blood sugars 1 times daily or as directed. 50 each 2     Cholecalciferol (VITAMIN D) 400 UNITS capsule Take 1 capsule by mouth 2 times daily 30 capsule      cloNIDine (CATAPRES) 0.2 MG tablet  "Take 1 tablet (0.2 mg) by mouth 2 times daily 180 tablet 3     levothyroxine (SYNTHROID) 50 MCG tablet Take 1 tablet (50 mcg) by mouth daily 90 tablet 3     losartan (COZAAR) 50 MG tablet TAKE ONE TABLET BY MOUTH TWICE DAILY 180 tablet 1     metFORMIN (GLUCOPHAGE-XR) 500 MG 24 hr tablet TAKE ONE TABLET BY MOUTH ONCE DAILY WITH SUPPER 90 tablet 3     nitroglycerin (NITROSTAT) 0.4 MG SL tablet Place 1 tablet (0.4 mg) under the tongue every 5 minutes as needed for chest pain 25 tablet 0     order for DME Equipment being ordered: Walker ()  Treatment Diagnosis: s/p joint replacement 1 Device 0     oxyCODONE IR (ROXICODONE) 5 MG tablet Take 1-2 tablets (5-10 mg) by mouth every 4 hours as needed 20 tablet 0     Probiotic Product (ACIDOPHILUS PROBIOTIC BLEND) CAPS Take 1 capsule by mouth 2 times daily       simvastatin (ZOCOR) 40 MG tablet Take 0.5 tablets (20 mg) by mouth At Bedtime 90 tablet 3     levofloxacin (LEVAQUIN) 750 MG tablet Take one tablet 2 hours prior to procedure 2 tablet 3     Allergies   Allergen Reactions     Penicillins Swelling     \"throat started swelling\"     Vitamin B Complex Hives     Vitamin B12 Hives     all vitamin B's     Clindamycin Hcl Rash       Mammogram currnet    Pertinent mammograms are reviewed under the imaging tab.  History of abnormal Pap smear: NO - age 66 -   PAP / HPV 10/1/2014 8/9/2006 9/23/2005   PAP NIL NIL NIL     Reviewed and updated as needed this visit by clinical staff         Reviewed and updated as needed this visit by Provider            Review of Systems       OBJECTIVE:   /64  Pulse 72  Temp 97.1  F (36.2  C) (Temporal)  Resp 16  Wt 166 lb (75.3 kg)  BMI 31.89 kg/m2    Physical Exam  GENERAL: healthy, alert and no distress  EYES: Eyes grossly normal to inspection, PERRL and conjunctivae and sclerae normal  HENT: ear canals and TM's normal, nose and mouth without ulcers or lesions  NECK: no adenopathy, no asymmetry, masses, or scars and thyroid normal " "to palpation  RESP: lungs clear to auscultation - no rales, rhonchi or wheezes  BREAST: NE  CV: regular rate and rhythm, normal S1 S2, no S3 or S4, no murmur, click or rub, no peripheral edema and peripheral pulses strong  ABDOMEN: soft, nontender, no hepatosplenomegaly, no masses and bowel sounds normal  MS: no gross musculoskeletal defects noted, no edema  SKIN: no suspicious lesions or rashes; knee wound healing well  NEURO: Normal strength and tone, mentation intact and speech normal  PSYCH: mentation appears normal, affect normal        ASSESSMENT/PLAN:       ICD-10-CM    1. Routine general medical examination at a health care facility Z00.00    2. CKD (chronic kidney disease) stage 3, GFR 30-59 ml/min (H) N18.3 Creatinine   3. Encounter for long-term current use of medication Z79.899 Potassium   4. Hypertension goal BP (blood pressure) < 140/90 I10    5. Other specified hypothyroidism E03.8 TSH     T4 free   6. Other iron deficiency anemia D50.8 Hemoglobin       COUNSELING:  Reviewed preventive health counseling, as reflected in patient instructions       Regular exercise       Healthy diet/nutrition       Vision screening       Immuniz: current       Colon cancer screening current    BP Readings from Last 1 Encounters:   10/02/18 108/69     Estimated body mass index is 31.96 kg/(m^2) as calculated from the following:    Height as of 10/2/18: 5' 0.5\" (1.537 m).    Weight as of 10/2/18: 166 lb 6.4 oz (75.5 kg).      Weight management plan: self directed diet/ex, tho recovering from TKA now.     reports that she has quit smoking. She has never used smokeless tobacco.    Labs drawn, meds refilled  Ret 6 months    dbue      "

## 2018-10-17 NOTE — MR AVS SNAPSHOT
After Visit Summary   10/17/2018    Sandra Petit    MRN: [id number]           Patient Information     Date Of Birth          1940        Visit Information        Provider Department      10/17/2018 4:30 PM Teddy Wray MD [facility]        Today's Diagnoses     Routine general medical examination at a health care facility    -  1    CKD (chronic kidney disease) stage 3, GFR 30-59 ml/min (H)        Encounter for long-term current use of medication        Hypertension goal BP (blood pressure) < 140/90        Other specified hypothyroidism        Other iron deficiency anemia          Care Instructions      Preventive Health Recommendations    Female Ages 65 +    Yearly exam:     See your health care provider every year in order to  o Review health changes.   o Discuss preventive care.    o Review your medicines if your doctor has prescribed any.      You no longer need a yearly Pap test unless you've had an abnormal Pap test in the past 10 years. If you have vaginal symptoms, such as bleeding or discharge, be sure to talk with your provider about a Pap test.      Every 1 to 2 years, have a mammogram.  If you are over 69, talk with your health care provider about whether or not you want to continue having screening mammograms.      Every 10 years, have a colonoscopy. Or, have a yearly FIT test (stool test). These exams will check for colon cancer.       Have a cholesterol test every 5 years, or more often if your doctor advises it.       Have a diabetes test (fasting glucose) every three years. If you are at risk for diabetes, you should have this test more often.       At age 65, have a bone density scan (DEXA) to check for osteoporosis (brittle bone disease).    Shots:    Get a flu shot each year.    Get a tetanus shot every 10 years.    Talk to your doctor about your pneumonia vaccines. There are now two you should receive - Pneumovax (PPSV 23) and Prevnar (PCV 13).    Talk to  your pharmacist about the shingles vaccine.    Talk to your doctor about the hepatitis B vaccine.    Nutrition:     Eat at least 5 servings of fruits and vegetables each day.      Eat whole-grain bread, whole-wheat pasta and brown rice instead of white grains and rice.      Get adequate Calcium and Vitamin D.     Lifestyle    Exercise at least 150 minutes a week (30 minutes a day, 5 days a week). This will help you control your weight and prevent disease.      Limit alcohol to one drink per day.      No smoking.       Wear sunscreen to prevent skin cancer.       See your dentist twice a year for an exam and cleaning.      See your eye doctor every 1 to 2 years to screen for conditions such as glaucoma, macular degeneration and cataracts.    Preventive Health Recommendations    Female Ages 65 +    Yearly exam:     See your health care provider every year in order to  o Review health changes.   o Discuss preventive care.    o Review your medicines if your doctor has prescribed any.      You no longer need a yearly Pap test unless you've had an abnormal Pap test in the past 10 years. If you have vaginal symptoms, such as bleeding or discharge, be sure to talk with your provider about a Pap test.      Every 1 to 2 years, have a mammogram.  If you are over 69, talk with your health care provider about whether or not you want to continue having screening mammograms.      Every 10 years, have a colonoscopy. Or, have a yearly FIT test (stool test). These exams will check for colon cancer.       Have a cholesterol test every 5 years, or more often if your doctor advises it.       Have a diabetes test (fasting glucose) every three years. If you are at risk for diabetes, you should have this test more often.       At age 65, have a bone density scan (DEXA) to check for osteoporosis (brittle bone disease).    Shots:    Get a flu shot each year.    Get a tetanus shot every 10 years.    Talk to your doctor about your pneumonia  vaccines. There are now two you should receive - Pneumovax (PPSV 23) and Prevnar (PCV 13).    Talk to your pharmacist about the shingles vaccine.    Talk to your doctor about the hepatitis B vaccine.    Nutrition:     Eat at least 5 servings of fruits and vegetables each day.      Eat whole-grain bread, whole-wheat pasta and brown rice instead of white grains and rice.      Get adequate Calcium and Vitamin D.     Lifestyle    Exercise at least 150 minutes a week (30 minutes a day, 5 days a week). This will help you control your weight and prevent disease.      Limit alcohol to one drink per day.      No smoking.       Wear sunscreen to prevent skin cancer.       See your dentist twice a year for an exam and cleaning.      See your eye doctor every 1 to 2 years to screen for conditions such as glaucoma, macular degeneration and cataracts.    Preventive Health Recommendations    Female Ages 65 +    Yearly exam:     See your health care provider every year in order to  o Review health changes.   o Discuss preventive care.    o Review your medicines if your doctor has prescribed any.      You no longer need a yearly Pap test unless you've had an abnormal Pap test in the past 10 years. If you have vaginal symptoms, such as bleeding or discharge, be sure to talk with your provider about a Pap test.      Every 1 to 2 years, have a mammogram.  If you are over 69, talk with your health care provider about whether or not you want to continue having screening mammograms.      Every 10 years, have a colonoscopy. Or, have a yearly FIT test (stool test). These exams will check for colon cancer.       Have a cholesterol test every 5 years, or more often if your doctor advises it.       Have a diabetes test (fasting glucose) every three years. If you are at risk for diabetes, you should have this test more often.       At age 65, have a bone density scan (DEXA) to check for osteoporosis (brittle bone disease).    Shots:    Get a  flu shot each year.    Get a tetanus shot every 10 years.    Talk to your doctor about your pneumonia vaccines. There are now two you should receive - Pneumovax (PPSV 23) and Prevnar (PCV 13).    Talk to your pharmacist about the shingles vaccine.    Talk to your doctor about the hepatitis B vaccine.    Nutrition:     Eat at least 5 servings of fruits and vegetables each day.      Eat whole-grain bread, whole-wheat pasta and brown rice instead of white grains and rice.      Get adequate Calcium and Vitamin D.     Lifestyle    Exercise at least 150 minutes a week (30 minutes a day, 5 days a week). This will help you control your weight and prevent disease.      Limit alcohol to one drink per day.      No smoking.       Wear sunscreen to prevent skin cancer.       See your dentist twice a year for an exam and cleaning.      See your eye doctor every 1 to 2 years to screen for conditions such as glaucoma, macular degeneration and cataracts.    Preventive Health Recommendations    Female Ages 65 +    Yearly exam:     See your health care provider every year in order to  o Review health changes.   o Discuss preventive care.    o Review your medicines if your doctor has prescribed any.      You no longer need a yearly Pap test unless you've had an abnormal Pap test in the past 10 years. If you have vaginal symptoms, such as bleeding or discharge, be sure to talk with your provider about a Pap test.      Every 1 to 2 years, have a mammogram.  If you are over 69, talk with your health care provider about whether or not you want to continue having screening mammograms.      Every 10 years, have a colonoscopy. Or, have a yearly FIT test (stool test). These exams will check for colon cancer.       Have a cholesterol test every 5 years, or more often if your doctor advises it.       Have a diabetes test (fasting glucose) every three years. If you are at risk for diabetes, you should have this test more often.       At age 65,  have a bone density scan (DEXA) to check for osteoporosis (brittle bone disease).    Shots:    Get a flu shot each year.    Get a tetanus shot every 10 years.    Talk to your doctor about your pneumonia vaccines. There are now two you should receive - Pneumovax (PPSV 23) and Prevnar (PCV 13).    Talk to your pharmacist about the shingles vaccine.    Talk to your doctor about the hepatitis B vaccine.    Nutrition:     Eat at least 5 servings of fruits and vegetables each day.      Eat whole-grain bread, whole-wheat pasta and brown rice instead of white grains and rice.      Get adequate Calcium and Vitamin D.     Lifestyle    Exercise at least 150 minutes a week (30 minutes a day, 5 days a week). This will help you control your weight and prevent disease.      Limit alcohol to one drink per day.      No smoking.       Wear sunscreen to prevent skin cancer.       See your dentist twice a year for an exam and cleaning.      See your eye doctor every 1 to 2 years to screen for conditions such as glaucoma, macular degeneration and cataracts.          Follow-ups after your visit        Your next 10 appointments already scheduled     Oct 23, 2018 10:00 AM CDT   Ortho Treatment with Carolin Matute PT   [facility] ([facility])    [address] [place] [address]  [place] [address] [phone]            Nov 13, 2018 10:10 AM CST   Return Visit with Johnny Anaya MD   [facility] ([facility])    [address] [place] [id number] [phone]              Who to contact     If you have questions or need follow up information about today's clinic visit or your schedule please contact [facility] directly at [phone].  Normal or non-critical lab and imaging results will be communicated to you by MyChart, letter or phone within 4 business days after the clinic has received the results. If you do not hear from us within 7  days, please contact the clinic through [employer] or phone. If you have a critical or abnormal lab result, we will notify you by phone as soon as possible.  Submit refill requests through [employer] or call your pharmacy and they will forward the refill request to us. Please allow 3 business days for your refill to be completed.          Additional Information About Your Visit        [email or web address] Information     [employer] gives you secure access to your electronic health record. If you see a primary care provider, you can also send messages to your care team and make appointments. If you have questions, please call your primary care clinic.  If you do not have a primary care provider, please call [phone] and they will assist you.        Care EveryWhere ID     This is your Care EveryWhere ID. This could be used by other organizations to access your [place] medical records  [id number]        Your Vitals Were     Pulse Temperature Respirations BMI (Body Mass Index)          72 97.1  F (36.2  C) (Temporal) 16 31.89 kg/m2         Blood Pressure from Last 3 Encounters:   10/17/18 158/64   10/02/18 108/69   09/10/18 144/46    Weight from Last 3 Encounters:   10/17/18 166 lb (75.3 kg)   10/02/18 166 lb 6.4 oz (75.5 kg)   09/10/18 171 lb 6.4 oz (77.7 kg)              We Performed the Following     Creatinine     Hemoglobin     Potassium     T4 free     TSH        Primary Care Provider Fax #    Physician No Ref-Primary [phone]       No address on file        Equal Access to Services     RYAN LOWERY : Hadii chris gonzalezo Sobilly, waaxda luqadaha, qaybta kaalmada adetarshayada, veronica mcneil . So [facility] [phone].    ATENCIÓN: Si habla español, tiene a lacy disposición servicios gratuitos de asistencia lingüística. Llame al [phone].    We comply with applicable federal civil rights laws and [place] laws. We do not discriminate on the basis of race, color, national origin, age, disability, sex,  sexual orientation, or gender identity.            Thank you!     Thank you for choosing [facility]  for your care. Our goal is always to provide you with excellent care. Hearing back from our patients is one way we can continue to improve our services. Please take a few minutes to complete the written survey that you may receive in the mail after your visit with us. Thank you!             Your Updated Medication List - Protect others around you: Learn how to safely use, store and throw away your medicines at www.disposemymeds.org.          This list is accurate as of 10/17/18  6:24 PM.  Always use your most recent med list.                   Brand Name Dispense Instructions for use Diagnosis    ACIDOPHILUS PROBIOTIC BLEND Caps      Take 1 capsule by mouth 2 times daily        blood glucose monitoring lancets     1 each    Use to test blood sugars 1 times daily or as directed.    Diabetic vasculopathy (H)       blood glucose monitoring test strip    ONETOUCH VERIO IQ    50 each    Use to test blood sugars 1 times daily or as directed.    Diabetic vasculopathy (H)       cloNIDine 0.2 MG tablet    CATAPRES    180 tablet    Take 1 tablet (0.2 mg) by mouth 2 times daily    Essential hypertension, benign       levofloxacin 750 MG tablet    LEVAQUIN    2 tablet    Take one tablet 2 hours prior to procedure    History of total left knee replacement       levothyroxine 50 MCG tablet    SYNTHROID    90 tablet    Take 1 tablet (50 mcg) by mouth daily    Hypothyroidism, unspecified type       losartan 50 MG tablet    COZAAR    180 tablet    TAKE ONE TABLET BY MOUTH TWICE DAILY    Essential hypertension, benign       metFORMIN 500 MG 24 hr tablet    GLUCOPHAGE-XR    90 tablet    TAKE ONE TABLET BY MOUTH ONCE DAILY WITH SUPPER    Type 2 diabetes mellitus with diabetic nephropathy, without long-term current use of insulin (H)       nitroGLYcerin 0.4 MG sublingual tablet    NITROSTAT    25 tablet    Place 1 tablet  (0.4 mg) under the tongue every 5 minutes as needed for chest pain    Coronary artery disease involving native coronary artery without angina pectoris       order for DME     1 Device    Equipment being ordered: Walker () Treatment Diagnosis: s/p joint replacement    S/P total knee arthroplasty, right       oxyCODONE IR 5 MG tablet    ROXICODONE    20 tablet    Take 1-2 tablets (5-10 mg) by mouth every 4 hours as needed    S/P total knee arthroplasty, right       simvastatin 40 MG tablet    ZOCOR    90 tablet    Take 0.5 tablets (20 mg) by mouth At Bedtime    Hyperlipidemia LDL goal <100       vitamin D 400 units capsule     30 capsule    Take 1 capsule by mouth 2 times daily

## 2018-10-17 NOTE — PATIENT INSTRUCTIONS

## 2018-10-19 DIAGNOSIS — I10 ESSENTIAL HYPERTENSION, BENIGN: ICD-10-CM

## 2018-10-23 ENCOUNTER — HOSPITAL ENCOUNTER (OUTPATIENT)
Dept: PHYSICAL THERAPY | Facility: CLINIC | Age: 78
Setting detail: THERAPIES SERIES
End: 2018-10-23
Attending: PHYSICIAN ASSISTANT
Payer: MEDICARE

## 2018-10-23 PROCEDURE — 40000718 ZZHC STATISTIC PT DEPARTMENT ORTHO VISIT: Performed by: PHYSICAL THERAPIST

## 2018-10-23 PROCEDURE — 97110 THERAPEUTIC EXERCISES: CPT | Mod: GP | Performed by: PHYSICAL THERAPIST

## 2018-10-23 PROCEDURE — 97140 MANUAL THERAPY 1/> REGIONS: CPT | Mod: GP | Performed by: PHYSICAL THERAPIST

## 2018-10-23 PROCEDURE — 97530 THERAPEUTIC ACTIVITIES: CPT | Mod: GP | Performed by: PHYSICAL THERAPIST

## 2018-10-23 RX ORDER — CLONIDINE HYDROCHLORIDE 0.2 MG/1
TABLET ORAL
Qty: 180 TABLET | Refills: 3 | Status: SHIPPED | OUTPATIENT
Start: 2018-10-23 | End: 2019-02-19

## 2018-10-23 NOTE — PROGRESS NOTES
Daily note brought forward today for ease of PA being able to find information as pt will be calling her.  Pt suggests the pt continue PT, we are working gently and the pt is tightening up in right leg causing her symptoms--releases nicely with session. Also, pt is showing better symptom control with use of prone for lwo back referred possible symptoms bilaterally and right knee extension improves.  Pt needs encouragement from all providers to continue to work both legs, especially quads, to be able to get more relief.       10/23/18 1000   Signing Clinician's Name / Credentials   Signing clinician's name / credentials Carolin Miller PT   Session Number   Session Number 9  --started at 0958   Authorization status Primary: MEDICARE  Secondary: [employer]   Progress Note/Recertification   Progress Note Completed Date 10/01/18   Recertification Due Date 12/20/18   Adult Goals   PT Ortho Eval Goals 1;2;3   Ortho Goal 1   Goal Identifier no walker   Goal Description Giovanni will increase safety with mobiilty in order to walk with a cane safely on TUG in under 15 seconds.   Target Date 12/20/18   Ortho Goal 2   Goal Identifier perferred gait speed   Goal Description Giovanni will show increased stability with mobility to be able to have preferred gait speed increase to be able to complete 20 ft walk in less than 7 sec with cane.   Target Date 12/20/18   Ortho Goal 3   Goal Identifier ROM   Goal Description Giovanni will gain right knee ROM (0-120 degrees) to be able to complete ADLs in normal fashion.   Target Date 12/20/18   Subjective Report   Subjective Report Sore.  I didn't sleep last night.  My left knee is hurting.  I woke after an hour of sleep with both knees hurting.     Objective Measure 2   Objective Measure ROM   Details Ext minus 8 degrees supine, then 0 degrees in prone.  Flexion: active to 95 after a few reps sitting and passive to 98 then 100 degrees.   Objective Measure 3   Objective Measure pain-status   Details  right knee pain on medial side and shin.  Left side is whole anterior knee and hip today.   Treatment Interventions   Interventions Therapeutic Procedure/Exercise;Therapeutic Activity;Manual Therapy   Therapeutic Procedure/exercise   Minutes 24   Skilled Intervention training on ex for ROM and strength to gain stability and mobility   Patient Response tolerates with some pain during some stretches, but not on pain meds today   Treatment Detail LAQs in chair x 10 right and x 10 ham curls against light manual resistance.  Working in supine for quad sets--but pt using ham and glut sets instead.  Workign SLR to activate quad but still unable.  SAQs then used with some minimal quad activation seen and palable--but fatigues easily, x 10.  Knee extension stretch--minus 8 degrees.  Prone SLR for hip extension 10 x 2.  Prone ham curls x 10.  Prone knee extension straightening x 5.   Therapeutic Activity   Minutes 10   Skilled Intervention training and education   Patient Response able to progress from minus 8 to 0 degrees in prone knee extension   Treatment Detail prone resting for POR for all her aches and also for knee stretching into extension.     Manual Therapy   Minutes 12   Skilled Intervention soft tissue mobilization   Patient Response tolerates well   Treatment Detail Working knee quad and ITB mm in sitting then in supine.  Prone release to ham mm.     Education   Learner Patient   Readiness Acceptance   Method Explanation;Demonstration   Response Verbalizes Understanding;Demonstrates Understanding   Education Comments treatment   Communication with other professionals   Communication with other professionals Pt is planning to call MASOUD Guzman today to see about continuing PT since left knee is more aggravated.  PT suggests she is encouraged to continue to do gentle ex with both legs so she does not decline.  Use of prone for position of relief (POR) is also suggested.   Plan   Home program cont all same    Updates to plan of care pt does not have more appts scheduled and is waiting to schedule until she talks to MASOUD Guzman today.  Discussed with pt that we are scheduling out 3 weeks or more, so she needs to schedule more as soon as she can.   Plan for next session We could possibly use e-stim for pain relief to muscles, also pt asked aobut starting heat--told her to try it and put ice on it if she gets edema.  (all knee ex, use prone again)   Comments   Comments pain today with right ITB tightness pulling on medial knee and bilat general joint soreness.  Prone helps with knee extension and general low back to leg symptoms.  Able to get to 0 degrees prone.  Pt needs to continue to activate quads on both legs and continue to use prone for symptom management/range assist.   Total Session Time   Timed Code Treatment Minutes 46   Total Treatment Time (sum of timed and untimed services) 46

## 2018-10-23 NOTE — TELEPHONE ENCOUNTER
"Last Written Prescription Date:  10/11/17  Last Fill Quantity: 180,  # refills: 3   Last office visit: 10/17/2018 with prescribing provider:  Teddy Wray   Future Office Visit:   Next 5 appointments (look out 90 days)     Nov 13, 2018 10:10 AM CST   Return Visit with Johnny Anaya MD   [facility] ([facility])    [address] [place] [id number] [phone]                   Requested Prescriptions   Pending Prescriptions Disp Refills     cloNIDine (CATAPRES) 0.2 MG tablet [Pharmacy Med Name: CLONIDINE 0.2MG     TAB] 180 tablet 3     Sig: TAKE ONE TABLET BY MOUTH TWICE DAILY    Central Acting Antiadrenergic Agents Failed    10/19/2018 10:42 AM       Failed - Blood pressure under 140/90 in past 12 months    BP Readings from Last 3 Encounters:   10/17/18 158/64   10/02/18 108/69   09/10/18 144/46                Failed - Normal serum creatinine on file within past 12 months    Recent Labs   Lab Test  10/17/18   1709   CR  1.34*            Passed - Patient is 6 years of age or older       Passed - Recent (12 mo) or future (30 days) visit within the authorizing provider's specialty    Patient had office visit in the last 12 months or has a visit in the next 30 days with authorizing provider or within the authorizing provider's specialty.  See \"Patient Info\" tab in inbasket, or \"Choose Columns\" in Meds & Orders section of the refill encounter.             Passed - Patient not pregnant       Passed - No positive pregnancy test on file in past 12 months        Routing refill request to provider for review/approval because:  Labs out of range:  BP, CR    Danni Tamez RN        "

## 2018-10-24 ENCOUNTER — TELEPHONE (OUTPATIENT)
Dept: ORTHOPEDICS | Facility: CLINIC | Age: 78
End: 2018-10-24

## 2018-10-24 ENCOUNTER — TELEPHONE (OUTPATIENT)
Dept: FAMILY MEDICINE | Facility: CLINIC | Age: 78
End: 2018-10-24

## 2018-10-24 DIAGNOSIS — Z12.11 SPECIAL SCREENING FOR MALIGNANT NEOPLASMS, COLON: Primary | ICD-10-CM

## 2018-10-24 NOTE — TELEPHONE ENCOUNTER
"Patient and I talked.  She feels that the initial injury from the garbage can incident has improved.  Her pain is primarily later in the day.  She states she feels she has more pain than usual in physical therapy as she feels she \"tightens up\" at therapy due to her own personal anxiety.  She feels she can get further at home on her own cause she is more relaxed.  I did let her know she should have some discomfort with therapy exercises as this is stretching and building strength. She is understanding of this.  We did agree that she can stop formal therapy right now.  She should be working on her therapy exercises hard every other day and to expect soreness the following day.  With an increase of pain by end of day we both agree she may be doing too much and back off on home activity.  She can discontinue TEDs but if swelling noticed should be resumed.  Good conversation with the patient she is agreeable to plan.    In Summary:  Back off on activity levels  Follow home Physical Therapy exercises every other day.  Stop formal physical therapy at this time.  Discontinue TEDs.  Follow up in 2 weeks to see if changes helped.  (She has an appointment 11/13) which is fine.  "

## 2018-10-24 NOTE — TELEPHONE ENCOUNTER
Spoke with patient who states that she has had right knee pain ever since her replacement and is concerned that it is not improving.      She notes that 2 weeks ago she slid on her leg after she put the garbage cans in the back of the truck.  After she slid she developed a small red area just the left of her incision that went away within a week.      She denies any swelling, redness or heat currently.  She notes that she is up and moving about 50% of the day and resting with her leg elevated the other 50%.  She feels that the pain is better first thing in the morning and gets worse with activity. Rates this about a 6/10.      She is using ice and occasional left-over oxycodone.  She is doing her stretches as directed.     She was last seen on 10/2 and told to follow up in 6 weeks.     Will have PCP team address this message and advise when they are able. Patient understands she may not received a response today and will continue to rest/elevate/ice in the mean time.     Jessica Manuel RN. . .  10/24/2018, 10:47 AM

## 2018-10-24 NOTE — TELEPHONE ENCOUNTER
Pt is having problems with her R knee that she had surgery on. Can you please call her and talk to her about this. She would also like to talk to you about PT

## 2018-10-29 DIAGNOSIS — E11.59 DIABETIC VASCULOPATHY (H): ICD-10-CM

## 2018-10-30 ENCOUNTER — OFFICE VISIT (OUTPATIENT)
Dept: ORTHOPEDICS | Facility: CLINIC | Age: 78
End: 2018-10-30
Payer: COMMERCIAL

## 2018-10-30 VITALS
HEIGHT: 61 IN | TEMPERATURE: 95.7 F | WEIGHT: 166 LBS | SYSTOLIC BLOOD PRESSURE: 144 MMHG | BODY MASS INDEX: 31.34 KG/M2 | DIASTOLIC BLOOD PRESSURE: 77 MMHG

## 2018-10-30 DIAGNOSIS — Z96.651 S/P TOTAL KNEE ARTHROPLASTY, RIGHT: ICD-10-CM

## 2018-10-30 PROCEDURE — 99024 POSTOP FOLLOW-UP VISIT: CPT | Performed by: ORTHOPAEDIC SURGERY

## 2018-10-30 RX ORDER — CYCLOBENZAPRINE HCL 10 MG
5-10 TABLET ORAL 3 TIMES DAILY PRN
Qty: 40 TABLET | Refills: 1 | Status: SHIPPED | OUTPATIENT
Start: 2018-10-30 | End: 2019-02-19

## 2018-10-30 RX ORDER — OXYCODONE HYDROCHLORIDE 5 MG/1
5-10 TABLET ORAL EVERY 4 HOURS PRN
Qty: 40 TABLET | Refills: 0 | Status: SHIPPED | OUTPATIENT
Start: 2018-10-30 | End: 2019-02-19

## 2018-10-30 RX ORDER — GABAPENTIN 300 MG/1
300 CAPSULE ORAL AT BEDTIME
Qty: 30 CAPSULE | Refills: 0 | Status: SHIPPED | OUTPATIENT
Start: 2018-10-30 | End: 2018-10-30

## 2018-10-30 RX ORDER — GABAPENTIN 300 MG/1
300 CAPSULE ORAL AT BEDTIME
Qty: 30 CAPSULE | Refills: 0 | Status: SHIPPED | OUTPATIENT
Start: 2018-10-30 | End: 2019-02-19

## 2018-10-30 ASSESSMENT — PAIN SCALES - GENERAL: PAINLEVEL: SEVERE PAIN (6)

## 2018-10-30 NOTE — MR AVS SNAPSHOT
After Visit Summary   10/30/2018    Sandra Petit    MRN: [id number]           Patient Information     Date Of Birth          1940        Visit Information        Provider Department      10/30/2018 8:10 AM Johnny Anaya MD [facility]        Today's Diagnoses     S/P total knee arthroplasty, right           Follow-ups after your visit        Additional Services     PHYSICAL THERAPY REFERRAL       If you have not heard from the scheduling office within 2 business days, please call [phone] for all locations, with the exception of [place], please call [phone] and [facility] [place], please call [phone].    Please be aware that coverage of these services is subject to the terms and limitations of your health insurance plan.  Call member services at your health plan with any benefit or coverage questions.                  Future tests that were ordered for you today     Open Future Orders        Priority Expected Expires Ordered    PHYSICAL THERAPY REFERRAL Routine  10/30/2019 10/30/2018            Who to contact     If you have questions or need follow up information about today's clinic visit or your schedule please contact [facility] directly at [phone].  Normal or non-critical lab and imaging results will be communicated to you by MyChart, letter or phone within 4 business days after the clinic has received the results. If you do not hear from us within 7 days, please contact the clinic through [facility] or phone. If you have a critical or abnormal lab result, we will notify you by phone as soon as possible.  Submit refill requests through [facility] or call your pharmacy and they will forward the refill request to us. Please allow 3 business days for your refill to be completed.          Additional Information About Your Visit        MyChart Information     [facility] gives you secure access to your electronic health record. If you see a primary care  "provider, you can also send messages to your care team and make appointments. If you have questions, please call your primary care clinic.  If you do not have a primary care provider, please call [phone] and they will assist you.        Care EveryWhere ID     This is your Care EveryWhere ID. This could be used by other organizations to access your [facility] medical records  [id number]        Your Vitals Were     Temperature Height BMI (Body Mass Index)             95.7  F (35.4  C) (Temporal) 1.537 m (5' 0.5\") 31.89 kg/m2          Blood Pressure from Last 3 Encounters:   10/30/18 144/77   10/17/18 158/64   10/02/18 108/69    Weight from Last 3 Encounters:   10/30/18 75.3 kg (166 lb)   10/17/18 75.3 kg (166 lb)   10/02/18 75.5 kg (166 lb 6.4 oz)                 Today's Medication Changes          These changes are accurate as of 10/30/18  9:31 AM.  If you have any questions, ask your nurse or doctor.               Start taking these medicines.        Dose/Directions    cyclobenzaprine 10 MG tablet   Commonly known as:  FLEXERIL   Used for:  S/P total knee arthroplasty, right        Dose:  5-10 mg   Take 0.5-1 tablets (5-10 mg) by mouth 3 times daily as needed for muscle spasms   Quantity:  40 tablet   Refills:  1       gabapentin 300 MG capsule   Commonly known as:  NEURONTIN   Used for:  S/P total knee arthroplasty, right        Dose:  300 mg   Take 1 capsule (300 mg) by mouth At Bedtime Take 1 tablet (300 mg) every night Starting 3 days before surgery   Quantity:  30 capsule   Refills:  0         These medicines have changed or have updated prescriptions.        Dose/Directions    oxyCODONE IR 5 MG tablet   Commonly known as:  ROXICODONE   This may have changed:  reasons to take this   Used for:  S/P total knee arthroplasty, right        Dose:  5-10 mg   Take 1-2 tablets (5-10 mg) by mouth every 4 hours as needed for pain   Quantity:  40 tablet   Refills:  0            Where to get your medicines      Some of " these will need a paper prescription and others can be bought over the counter.  Ask your nurse if you have questions.     Bring a paper prescription for each of these medications     cyclobenzaprine 10 MG tablet    gabapentin 300 MG capsule    oxyCODONE IR 5 MG tablet               Information about OPIOIDS     PRESCRIPTION OPIOIDS: WHAT YOU NEED TO KNOW   We gave you an opioid (narcotic) pain medicine. It is important to manage your pain, but opioids are not always the best choice. You should first try all the other options your care team gave you. Take this medicine for as short a time (and as few doses) as possible.    Some activities can increase your pain, such as bandage changes or therapy sessions. It may help to take your pain medicine 30 to 60 minutes before these activities. Reduce your stress by getting enough sleep, working on hobbies you enjoy and practicing relaxation or meditation. Talk to your care team about ways to manage your pain beyond prescription opioids.    These medicines have risks:    DO NOT drive when on new or higher doses of pain medicine. These medicines can affect your alertness and reaction times, and you could be arrested for driving under the influence (DUI). If you need to use opioids long-term, talk to your care team about driving.    DO NOT operate heavy machinery    DO NOT do any other dangerous activities while taking these medicines.    DO NOT drink any alcohol while taking these medicines.     If the opioid prescribed includes acetaminophen, DO NOT take with any other medicines that contain acetaminophen. Read all labels carefully. Look for the word  acetaminophen  or  Tylenol.  Ask your pharmacist if you have questions or are unsure.    You can get addicted to pain medicines, especially if you have a history of addiction (chemical, alcohol or substance dependence). Talk to your care team about ways to reduce this risk.    All opioids tend to cause constipation. Drink  plenty of water and eat foods that have a lot of fiber, such as fruits, vegetables, prune juice, apple juice and high-fiber cereal. Take a laxative (Miralax, milk of magnesia, Colace, Senna) if you don t move your bowels at least every other day. Other side effects include upset stomach, sleepiness, dizziness, throwing up, tolerance (needing more of the medicine to have the same effect), physical dependence and slowed breathing.    Store your pills in a secure place, locked if possible. We will not replace any lost or stolen medicine. If you don t finish your medicine, please throw away (dispose) as directed by your pharmacist. The [place] Pollution Control Agency has more information about safe disposal: [email or web address]-[email or web address]-medications         Primary Care Provider Fax #    Physician No Ref-Primary [phone]       No address on file        Equal Access to Services     RYAN LOWERY : Krystal Retana, waakin guerra, qaybcheco kaalmada silvestre, veronica mcneil . So [facility] [phone].    ATENCIÓN: Si habla español, tiene a lacy disposición servicios gratuitos de asistencia lingüística. Llame al [phone].    We comply with applicable federal civil rights laws and [place] laws. We do not discriminate on the basis of race, color, national origin, age, disability, sex, sexual orientation, or gender identity.            Thank you!     Thank you for choosing [facility]  for your care. Our goal is always to provide you with excellent care. Hearing back from our patients is one way we can continue to improve our services. Please take a few minutes to complete the written survey that you may receive in the mail after your visit with us. Thank you!             Your Updated Medication List - Protect others around you: Learn how to safely use, store and throw away your medicines at www.disposemymeds.org.          This list is  accurate as of 10/30/18  9:31 AM.  Always use your most recent med list.                   Brand Name Dispense Instructions for use Diagnosis    ACIDOPHILUS PROBIOTIC BLEND Caps      Take 1 capsule by mouth 2 times daily        blood glucose monitoring lancets     1 each    Use to test blood sugars 1 times daily or as directed.    Diabetic vasculopathy (H)       blood glucose monitoring test strip    ONETOUCH VERIO IQ    50 each    Use to test blood sugars 1 times daily or as directed.    Diabetic vasculopathy (H)       cloNIDine 0.2 MG tablet    CATAPRES    180 tablet    TAKE ONE TABLET BY MOUTH TWICE DAILY    Essential hypertension, benign       cyclobenzaprine 10 MG tablet    FLEXERIL    40 tablet    Take 0.5-1 tablets (5-10 mg) by mouth 3 times daily as needed for muscle spasms    S/P total knee arthroplasty, right       gabapentin 300 MG capsule    NEURONTIN    30 capsule    Take 1 capsule (300 mg) by mouth At Bedtime Take 1 tablet (300 mg) every night Starting 3 days before surgery    S/P total knee arthroplasty, right       levofloxacin 750 MG tablet    LEVAQUIN    2 tablet    Take one tablet 2 hours prior to procedure    History of total left knee replacement       levothyroxine 50 MCG tablet    SYNTHROID    90 tablet    Take 1 tablet (50 mcg) by mouth daily    Hypothyroidism, unspecified type       losartan 50 MG tablet    COZAAR    180 tablet    TAKE ONE TABLET BY MOUTH TWICE DAILY    Essential hypertension, benign       metFORMIN 500 MG 24 hr tablet    GLUCOPHAGE-XR    90 tablet    TAKE ONE TABLET BY MOUTH ONCE DAILY WITH SUPPER    Type 2 diabetes mellitus with diabetic nephropathy, without long-term current use of insulin (H)       nitroGLYcerin 0.4 MG sublingual tablet    NITROSTAT    25 tablet    Place 1 tablet (0.4 mg) under the tongue every 5 minutes as needed for chest pain    Coronary artery disease involving native coronary artery without angina pectoris       order for DME     1 Device     Equipment being ordered: Walker () Treatment Diagnosis: s/p joint replacement    S/P total knee arthroplasty, right       oxyCODONE IR 5 MG tablet    ROXICODONE    40 tablet    Take 1-2 tablets (5-10 mg) by mouth every 4 hours as needed for pain    S/P total knee arthroplasty, right       simvastatin 40 MG tablet    ZOCOR    90 tablet    Take 0.5 tablets (20 mg) by mouth At Bedtime    Hyperlipidemia LDL goal <100       vitamin D 400 units capsule     30 capsule    Take 1 capsule by mouth 2 times daily

## 2018-10-30 NOTE — TELEPHONE ENCOUNTER
"Test strips  Last Written Prescription Date:  10/24/2017  Last Fill Quantity: 50,  # refills: 2   Last office visit: 10/17/2018 with prescribing provider:      Future Office Visit:      Requested Prescriptions   Pending Prescriptions Disp Refills     blood glucose monitoring (ONETOUCH VERIO IQ) test strip 50 each 2     Sig: Use to test blood sugars 1 times daily or as directed.    Diabetic Supplies Protocol Passed    10/29/2018 12:24 PM       Passed - Patient is 18 years of age or older       Passed - Recent (6 mo) or future (30 days) visit within the authorizing provider's specialty    Patient had office visit in the last 6 months or has a visit in the next 30 days with authorizing provider.  See \"Patient Info\" tab in inbasket, or \"Choose Columns\" in Meds & Orders section of the refill encounter.              Prescription approved per [facility] Refill Protocol.  Shelbi Landry RN on 10/30/2018 at 12:30 PM    "

## 2018-10-30 NOTE — LETTER
10/30/2018         RE: Sandra Petit  [address] [facility] [address]        Dear Colleague,    Thank you for referring your patient, Sandra Petit, to the [facility]. Please see a copy of my visit note below.    Office Visit-Follow up  HISTORY OF PRESENT ILLNESS:    Sandra Petit is a 78 year old female who is seen in follow up for   Chief Complaint   Patient presents with     RECHECK     Right total knee arthroplasty.  DOS: 8/27/18 (9 weeks postop)      Surgical Followup     PREOPERATIVE DIAGNOSIS: RIGHT knee osteoarthritis with varus and patella femoral disease. POSTOPERATIVE DIAGNOSIS: RIGHT knee osteoarthritis with Samet.PROCEDURE: RIGHTtotal knee arthroplasty using [place] Triathlon series #3 femur, cruciate retaining, #3 tibia, 11mm spacer and a 32 mm asymmetric patella. All components were cemented in. Antibiotic cement was used    Collateral ligament release was Not Done.   A lateral release was Not Done        Patient presents with:  RECHECK: Right total knee arthroplasty.  DOS: 8/27/18 (9 weeks postop)   Surgical Followup: PREOPERATIVE DIAGNOSIS: RIGHT knee osteoarthritis with varus and patella femoral disease. POSTOPERATIVE DIAGNOSIS: RIGHT knee osteoarthritis with Samet.PROCEDURE: RIGHTtotal knee arthroplasty using [place] Triathlon series #3 femur, cruciate retaining, #3 tibia, 11mm spacer and a 32 mm asymmetric patella. All components were cemented in. Antibiotic cement was used    Collateral ligament release was Not Done.   A lateral release was Not Done         Present symptoms: Patient continues to complain primarily of knee pain.  Her hip pain she says is intermittent and tolerable.  Her knee pain is predominantly along the medial aspect of the patella but she now has posterior lateral discomfort.  Treatments tried to this point: On last visit we stopped her physical therapy to see if this would help her.  This obviously has not.  She has been using oxycodone alone for  "pain control.  Part of this is because she has kidney disease and she says she does not tolerate gabapentin, she does not tolerate anti-inflammatories.    REVIEW OF SYSTEMS    General: negative for, night sweats, dizziness, fatigue  Resp: No shortness of breath and no cough  CV: negative for chest pain, syncope or near-syncope  GI: negative for nausea, vomiting and diarrhea  : negative for dysuria and hematuria  Musculoskeletal: as above  Neurologic: negative for syncope   Hematologic: negative for bleeding disorder    Physical Exam:    Vitals: /77  Temp 95.7  F (35.4  C) (Temporal)  Ht 1.537 m (5' 0.5\")  Wt 75.3 kg (166 lb)  BMI 31.89 kg/m2  BMI= Body mass index is 31.89 kg/(m^2).  Constitutional: healthy, alert and no acute distress   Psychiatric: mentation appears normal and affect normal/bright  NEURO: no focal deficits  SKIN: no excoriation or erythema. No signs of infection.    GAIT: Still has a limp.    JOINT/EXTREMITIES:     Right knee examination shows very little swelling at this point in time.    She still lacks 5 degrees of extension.  She claims a therapist that she had full extension on therapy visits.  She appears to have lost that.  I can flex her knee to about 100 degrees today but if I try and go further she has severe discomfort.  Stability testing is completely unremarkable.      IMAGING INTERPRETATION: Reviewed his x-rays were reviewed.    I did spend time reviewing her chart seeing whether or not she really is allergic to gabapentin.  I cannot find this in the chart.  She did take gabapentin in 2018.       Impression:      ICD-10-CM    1. S/P total knee arthroplasty, right Z96.651 cyclobenzaprine (FLEXERIL) 10 MG tablet     oxyCODONE IR (ROXICODONE) 5 MG tablet     PHYSICAL THERAPY REFERRAL     gabapentin (NEURONTIN) 300 MG capsule     DISCONTINUED: gabapentin (NEURONTIN) 300 MG capsule       Patient clinical appearance to her knee at about 8 weeks postop is actually quite good.  " She is struggling with pain.  I am very suspicious that she does not tolerate pain very well.    Plan:   The above was reviewed with Sandra and I carefully explained all the above.     I am going to add in Flexeril which is a muscle relaxer.  She says she is having trouble sleeping.  This may help her sleep at night.  I am going to add the gabapentin as an adjunct pain medicine.  She will continue with the oxycodone so this is refilled.  She is going back to physical therapy with instruction to the therapist that she has to help push her through this discomfort.  And I also informed the therapist that he started different pain medicines.        Return to clinic 4, weeks    Johnny Anaya MD    Again, thank you for allowing me to participate in the care of your patient.        Sincerely,        Johnny Anaya MD

## 2018-10-30 NOTE — PROGRESS NOTES
Office Visit-Follow up  HISTORY OF PRESENT ILLNESS:    Sandra Petit is a 78 year old female who is seen in follow up for   Chief Complaint   Patient presents with     RECHECK     Right total knee arthroplasty.  DOS: 8/27/18 (9 weeks postop)      Surgical Followup     PREOPERATIVE DIAGNOSIS: RIGHT knee osteoarthritis with varus and patella femoral disease. POSTOPERATIVE DIAGNOSIS: RIGHT knee osteoarthritis with Samet.PROCEDURE: RIGHTtotal knee arthroplasty using [place] Triathlon series #3 femur, cruciate retaining, #3 tibia, 11mm spacer and a 32 mm asymmetric patella. All components were cemented in. Antibiotic cement was used    Collateral ligament release was Not Done.   A lateral release was Not Done        Patient presents with:  RECHECK: Right total knee arthroplasty.  DOS: 8/27/18 (9 weeks postop)   Surgical Followup: PREOPERATIVE DIAGNOSIS: RIGHT knee osteoarthritis with varus and patella femoral disease. POSTOPERATIVE DIAGNOSIS: RIGHT knee osteoarthritis with Samet.PROCEDURE: RIGHTtotal knee arthroplasty using [place] Triathlon series #3 femur, cruciate retaining, #3 tibia, 11mm spacer and a 32 mm asymmetric patella. All components were cemented in. Antibiotic cement was used    Collateral ligament release was Not Done.   A lateral release was Not Done         Present symptoms: Patient continues to complain primarily of knee pain.  Her hip pain she says is intermittent and tolerable.  Her knee pain is predominantly along the medial aspect of the patella but she now has posterior lateral discomfort.  Treatments tried to this point: On last visit we stopped her physical therapy to see if this would help her.  This obviously has not.  She has been using oxycodone alone for pain control.  Part of this is because she has kidney disease and she says she does not tolerate gabapentin, she does not tolerate anti-inflammatories.    REVIEW OF SYSTEMS    General: negative for, night sweats, dizziness, fatigue  Resp: No  "shortness of breath and no cough  CV: negative for chest pain, syncope or near-syncope  GI: negative for nausea, vomiting and diarrhea  : negative for dysuria and hematuria  Musculoskeletal: as above  Neurologic: negative for syncope   Hematologic: negative for bleeding disorder    Physical Exam:    Vitals: /77  Temp 95.7  F (35.4  C) (Temporal)  Ht 1.537 m (5' 0.5\")  Wt 75.3 kg (166 lb)  BMI 31.89 kg/m2  BMI= Body mass index is 31.89 kg/(m^2).  Constitutional: healthy, alert and no acute distress   Psychiatric: mentation appears normal and affect normal/bright  NEURO: no focal deficits  SKIN: no excoriation or erythema. No signs of infection.    GAIT: Still has a limp.    JOINT/EXTREMITIES:     Right knee examination shows very little swelling at this point in time.    She still lacks 5 degrees of extension.  She claims a therapist that she had full extension on therapy visits.  She appears to have lost that.  I can flex her knee to about 100 degrees today but if I try and go further she has severe discomfort.  Stability testing is completely unremarkable.      IMAGING INTERPRETATION: Reviewed his x-rays were reviewed.    I did spend time reviewing her chart seeing whether or not she really is allergic to gabapentin.  I cannot find this in the chart.  She did take gabapentin in 2018.       Impression:      ICD-10-CM    1. S/P total knee arthroplasty, right Z96.651 cyclobenzaprine (FLEXERIL) 10 MG tablet     oxyCODONE IR (ROXICODONE) 5 MG tablet     PHYSICAL THERAPY REFERRAL     gabapentin (NEURONTIN) 300 MG capsule     DISCONTINUED: gabapentin (NEURONTIN) 300 MG capsule       Patient clinical appearance to her knee at about 8 weeks postop is actually quite good.  She is struggling with pain.  I am very suspicious that she does not tolerate pain very well.    Plan:   The above was reviewed with Sandra and I carefully explained all the above.     I am going to add in Flexeril which is a muscle relaxer.  " She says she is having trouble sleeping.  This may help her sleep at night.  I am going to add the gabapentin as an adjunct pain medicine.  She will continue with the oxycodone so this is refilled.  She is going back to physical therapy with instruction to the therapist that she has to help push her through this discomfort.  And I also informed the therapist that he started different pain medicines.        Return to clinic 4, weeks    Johnny Anaya MD

## 2018-11-01 DIAGNOSIS — Z12.11 SPECIAL SCREENING FOR MALIGNANT NEOPLASMS, COLON: ICD-10-CM

## 2018-11-01 LAB — HEMOCCULT STL QL IA: NEGATIVE

## 2018-11-01 PROCEDURE — G0328 FECAL BLOOD SCRN IMMUNOASSAY: HCPCS | Performed by: FAMILY MEDICINE

## 2018-11-06 ENCOUNTER — HOSPITAL ENCOUNTER (OUTPATIENT)
Dept: PHYSICAL THERAPY | Facility: CLINIC | Age: 78
Setting detail: THERAPIES SERIES
End: 2018-11-06
Attending: PHYSICIAN ASSISTANT
Payer: MEDICARE

## 2018-11-06 PROCEDURE — 97110 THERAPEUTIC EXERCISES: CPT | Mod: GP | Performed by: PHYSICAL THERAPIST

## 2018-11-06 PROCEDURE — G8978 MOBILITY CURRENT STATUS: HCPCS | Mod: GP,CL | Performed by: PHYSICAL THERAPIST

## 2018-11-06 PROCEDURE — 97112 NEUROMUSCULAR REEDUCATION: CPT | Mod: GP | Performed by: PHYSICAL THERAPIST

## 2018-11-06 PROCEDURE — G8979 MOBILITY GOAL STATUS: HCPCS | Mod: GP,CJ | Performed by: PHYSICAL THERAPIST

## 2018-11-06 PROCEDURE — 40000718 ZZHC STATISTIC PT DEPARTMENT ORTHO VISIT: Performed by: PHYSICAL THERAPIST

## 2018-11-06 NOTE — PROGRESS NOTES
Outpatient Physical Therapy Progress Note     Patient: Sandra Petit  : 1940    Beginning/End Dates of Reporting Period:  18 to 2018    Referring Provider: Rosanne Perdomo PA-C, Dr Anaya    Therapy Diagnosis: s/p R TKA     Client Self Report: I was walking fine without the walker last week, then I don't know what happened.  Dr Anaya never explained why it is still painful over the (medial knee area--medial quad.)  My chart sent me a message that there is still patellar tendon inflammation (but doctor never told me that.)    Objective Measurements:   Gait--pt is walking with walker--can walk upright but does tend to bend forward over time.  Sewallace reports she was walking that upright without walker last week but then felt ppipa may fall a couple times and did not want to chance it.     Objective Measure: ROM  Details: minus 6 degrees from ext in standing and seated flex 98 degrees--all at beg of session.  Supine: minus 5 degrees extension active and passive, 98 degrees flex active and passive.  Prone active to minus 3 degrees and passive to 0 degrees with treatment.  Objective Measure: pain-status  Details: ROM is limited above by pain.  Still c/o same pain area of medial knee.        Goals:  Goal Identifier no walker   Goal Description Giovanni will increase safety with mobiilty in order to walk with a cane safely on TUG in under 15 seconds.   Target Date 18   Date Met      Progress:     Goal Identifier perferred gait speed   Goal Description Giovanni will show increased stability with mobility to be able to have preferred gait speed increase to be able to complete 20 ft walk in less than 7 sec with cane.   Target Date 18   Date Met      Progress:     Goal Identifier ROM   Goal Description Giovanni will gain right knee ROM (0-120 degrees) to be able to complete ADLs in normal fashion.   Target Date 18   Date Met      Progress:       Progress Toward Goals:   Progress limited due to pt has been very  conservative in her want to rehab the knee, tila with the left side problems from old surgery.  She has not been pushing/able to push right knee ROM to get great gains, but does show continued, slow progress with both ext and flex ROM overall.  She comes back in today after taking a break herself to go back to the doctor, fearing she maybe should not continue with PT due to the pain/irritation.  She comes back, not convinced with any explanation as to why she still has the medial quad pain.  We will continue to educate every session as we have been.  I am also working to convince her to allow some bilateral quad activation as right responded better after a few reps of left side quad activation.            Plan:  Continue therapy per current plan of care.    Discharge:  No

## 2018-11-09 ENCOUNTER — HOSPITAL ENCOUNTER (OUTPATIENT)
Dept: PHYSICAL THERAPY | Facility: CLINIC | Age: 78
Setting detail: THERAPIES SERIES
End: 2018-11-09
Attending: PHYSICIAN ASSISTANT
Payer: MEDICARE

## 2018-11-09 PROCEDURE — 40000718 ZZHC STATISTIC PT DEPARTMENT ORTHO VISIT: Performed by: PHYSICAL THERAPIST

## 2018-11-09 PROCEDURE — 97110 THERAPEUTIC EXERCISES: CPT | Mod: GP | Performed by: PHYSICAL THERAPIST

## 2018-11-12 ENCOUNTER — HOSPITAL ENCOUNTER (OUTPATIENT)
Dept: PHYSICAL THERAPY | Facility: CLINIC | Age: 78
Setting detail: THERAPIES SERIES
End: 2018-11-12
Attending: PHYSICIAN ASSISTANT
Payer: MEDICARE

## 2018-11-12 PROCEDURE — 40000718 ZZHC STATISTIC PT DEPARTMENT ORTHO VISIT: Performed by: PHYSICAL THERAPIST

## 2018-11-12 PROCEDURE — 97110 THERAPEUTIC EXERCISES: CPT | Mod: GP | Performed by: PHYSICAL THERAPIST

## 2018-11-12 PROCEDURE — 97032 APPL MODALITY 1+ESTIM EA 15: CPT | Mod: GP | Performed by: PHYSICAL THERAPIST

## 2018-11-13 NOTE — ADDENDUM NOTE
Encounter addended by: Carolin Matute PT on: 11/13/2018  3:42 PM<BR>     Actions taken: Flowsheet data copied forward, Flowsheet accepted, Charge Capture section accepted

## 2018-11-15 ENCOUNTER — HOSPITAL ENCOUNTER (OUTPATIENT)
Dept: PHYSICAL THERAPY | Facility: CLINIC | Age: 78
Setting detail: THERAPIES SERIES
End: 2018-11-15
Attending: PHYSICIAN ASSISTANT
Payer: MEDICARE

## 2018-11-15 PROCEDURE — 97110 THERAPEUTIC EXERCISES: CPT | Mod: GP | Performed by: PHYSICAL THERAPIST

## 2018-11-15 PROCEDURE — 40000718 ZZHC STATISTIC PT DEPARTMENT ORTHO VISIT: Performed by: PHYSICAL THERAPIST

## 2018-11-15 PROCEDURE — 97140 MANUAL THERAPY 1/> REGIONS: CPT | Mod: GP | Performed by: PHYSICAL THERAPIST

## 2018-11-20 ENCOUNTER — HOSPITAL ENCOUNTER (OUTPATIENT)
Dept: PHYSICAL THERAPY | Facility: CLINIC | Age: 78
Setting detail: THERAPIES SERIES
End: 2018-11-20
Attending: PHYSICIAN ASSISTANT
Payer: MEDICARE

## 2018-11-20 PROCEDURE — 40000718 ZZHC STATISTIC PT DEPARTMENT ORTHO VISIT: Performed by: PHYSICAL THERAPIST

## 2018-11-20 PROCEDURE — 97110 THERAPEUTIC EXERCISES: CPT | Mod: GP | Performed by: PHYSICAL THERAPIST

## 2018-11-20 PROCEDURE — 97140 MANUAL THERAPY 1/> REGIONS: CPT | Mod: GP | Performed by: PHYSICAL THERAPIST

## 2018-11-27 ENCOUNTER — HOSPITAL ENCOUNTER (OUTPATIENT)
Dept: PHYSICAL THERAPY | Facility: CLINIC | Age: 78
Setting detail: THERAPIES SERIES
End: 2018-11-27
Attending: PHYSICIAN ASSISTANT
Payer: MEDICARE

## 2018-11-27 PROCEDURE — 97140 MANUAL THERAPY 1/> REGIONS: CPT | Mod: GP | Performed by: PHYSICAL THERAPIST

## 2018-11-27 PROCEDURE — 40000718 ZZHC STATISTIC PT DEPARTMENT ORTHO VISIT: Performed by: PHYSICAL THERAPIST

## 2018-11-27 PROCEDURE — 97530 THERAPEUTIC ACTIVITIES: CPT | Mod: GP,59 | Performed by: PHYSICAL THERAPIST

## 2018-11-27 PROCEDURE — 97110 THERAPEUTIC EXERCISES: CPT | Mod: GP | Performed by: PHYSICAL THERAPIST

## 2018-11-29 ENCOUNTER — HOSPITAL ENCOUNTER (OUTPATIENT)
Dept: PHYSICAL THERAPY | Facility: CLINIC | Age: 78
Setting detail: THERAPIES SERIES
End: 2018-11-29
Attending: PHYSICIAN ASSISTANT
Payer: MEDICARE

## 2018-11-29 PROCEDURE — 97140 MANUAL THERAPY 1/> REGIONS: CPT | Mod: GP | Performed by: PHYSICAL THERAPIST

## 2018-11-29 PROCEDURE — 40000718 ZZHC STATISTIC PT DEPARTMENT ORTHO VISIT: Performed by: PHYSICAL THERAPIST

## 2018-11-29 PROCEDURE — G8979 MOBILITY GOAL STATUS: HCPCS | Mod: GP,CI | Performed by: PHYSICAL THERAPIST

## 2018-11-29 PROCEDURE — 97530 THERAPEUTIC ACTIVITIES: CPT | Mod: GP | Performed by: PHYSICAL THERAPIST

## 2018-11-29 PROCEDURE — 97110 THERAPEUTIC EXERCISES: CPT | Mod: GP | Performed by: PHYSICAL THERAPIST

## 2018-11-29 PROCEDURE — G8980 MOBILITY D/C STATUS: HCPCS | Mod: GP,CI | Performed by: PHYSICAL THERAPIST

## 2018-12-05 ENCOUNTER — TELEPHONE (OUTPATIENT)
Dept: FAMILY MEDICINE | Facility: CLINIC | Age: 78
End: 2018-12-05

## 2018-12-10 DIAGNOSIS — E03.9 HYPOTHYROIDISM, UNSPECIFIED TYPE: ICD-10-CM

## 2018-12-11 RX ORDER — LEVOTHYROXINE SODIUM 50 UG/1
TABLET ORAL
Qty: 90 TABLET | Refills: 3 | Status: SHIPPED | OUTPATIENT
Start: 2018-12-11 | End: 2019-11-06

## 2018-12-11 NOTE — TELEPHONE ENCOUNTER
"levothyroxine  Last Written Prescription Date:  10/11/2017  Last Fill Quantity: 90,  # refills: 3   Last office visit: 10/17/2018 with prescribing provider:      Future Office Visit:      Requested Prescriptions   Pending Prescriptions Disp Refills     levothyroxine (SYNTHROID/LEVOTHROID) 50 MCG tablet [Pharmacy Med Name: LEVOTHYROXIN 50MCG  TAB] 90 tablet 3     Sig: TAKE ONE TABLET BY MOUTH ONCE DAILY    Thyroid Protocol Passed - 12/10/2018  9:22 AM       Passed - Patient is 12 years or older       Passed - Recent (12 mo) or future (30 days) visit within the authorizing provider's specialty    Patient had office visit in the last 12 months or has a visit in the next 30 days with authorizing provider or within the authorizing provider's specialty.  See \"Patient Info\" tab in inbasket, or \"Choose Columns\" in Meds & Orders section of the refill encounter.             Passed - Normal TSH on file in past 12 months    Recent Labs   Lab Test 10/17/18  1709   TSH 2.56             Passed - No active pregnancy on record    If patient is pregnant or has had a positive pregnancy test, please check TSH.         Passed - No positive pregnancy test in past 12 months    If patient is pregnant or has had a positive pregnancy test, please check TSH.            Prescription approved per [facility] Refill Protocol.      Shelbi Landry RN on 12/11/2018 at 12:31 PM    "

## 2018-12-14 NOTE — PROGRESS NOTES
Outpatient Physical Therapy Discharge Note     Patient: Sandra Petit  : 1940    Beginning/End Dates of Reporting Period:  18 to 2018    Referring Provider: Dr Anaya    Therapy Diagnosis: TKA, right     Client Self Report:      Objective Measurements:        Objective Measure: ROM  Details: 0-101 degrees  Objective Measure: pain-status  Details: pain across knee joint 8-9/10  Objective Measure: TUG  Details: today 18:  12.56 seconds with cane, even on a day she is feeling pressure in knee joint and pain in tendons--feels slow today.  18: with cane, 12.07 sec (she starts with 19.97 sec and over 21 sec as she gets distracted, but then able to have her focus and just do the test.)  Objective Measure: preferred gait speed  Details: from 18:  6.97 seconds  (also able to get 6.88 sec once and other around 7 seconds.)  Took several attempts to get pt to follow directions again for keeping pace and not distracted during test--so each knee became tired, but still able to do at under 7 seconds.            Goals:  Goal Identifier no walker   Goal Description Giovanni will increase safety with mobiilty in order to walk with a cane safely on TUG in under 15 seconds.   Target Date 18   Date Met  18   Progress:     Goal Identifier perferred gait speed   Goal Description Giovanni will show increased stability with mobility to be able to have preferred gait speed increase to be able to complete 20 ft walk in less than 7 sec with cane.   Target Date 18   Date Met  18   Progress:     Goal Identifier ROM   Goal Description Giovanni will gain right knee ROM (0-120 degrees) to be able to complete ADLs in normal fashion.   Target Date 18   Date Met  (Not met, but progressing at her own slower pace.)   Progress:       Progress Toward Goals:   Progress this reporting period: Sandra has done well with slow progress, as expected, and has tolerated therapy better than she thought she would.   She continues to show improvement in ROM at a slow pace, but is showing continued improvement at 1-2 degrees per week.  ,        Plan:  Discharge from therapy.    Discharge:    Reason for Discharge: Pt has met goals except ROM which shows continued progress from her home program and should be able to continue with only home ex at this time.  Pt has also not called in with any further concerns in past 2 weeks since last visit.    Equipment Issued: none    Discharge Plan: Patient to continue home program.

## 2018-12-14 NOTE — ADDENDUM NOTE
Encounter addended by: Carolin Matute, PT on: 12/14/2018 8:43 AM   Actions taken: Flowsheet data copied forward, Pend clinical note, Flowsheet accepted, Sign clinical note

## 2018-12-20 ENCOUNTER — OFFICE VISIT (OUTPATIENT)
Dept: FAMILY MEDICINE | Facility: OTHER | Age: 78
End: 2018-12-20
Payer: COMMERCIAL

## 2018-12-20 VITALS
WEIGHT: 163 LBS | HEART RATE: 68 BPM | TEMPERATURE: 98.1 F | DIASTOLIC BLOOD PRESSURE: 70 MMHG | SYSTOLIC BLOOD PRESSURE: 138 MMHG | RESPIRATION RATE: 16 BRPM | BODY MASS INDEX: 31.31 KG/M2

## 2018-12-20 DIAGNOSIS — Z96.651 S/P TOTAL KNEE REPLACEMENT USING CEMENT, RIGHT: ICD-10-CM

## 2018-12-20 DIAGNOSIS — N18.30 CKD (CHRONIC KIDNEY DISEASE) STAGE 3, GFR 30-59 ML/MIN (H): ICD-10-CM

## 2018-12-20 DIAGNOSIS — I10 HYPERTENSION GOAL BP (BLOOD PRESSURE) < 140/90: Primary | ICD-10-CM

## 2018-12-20 DIAGNOSIS — E11.59 TYPE 2 DIABETES MELLITUS WITH OTHER CIRCULATORY COMPLICATIONS (H): ICD-10-CM

## 2018-12-20 DIAGNOSIS — M54.5 LOW BACK PAIN, UNSPECIFIED BACK PAIN LATERALITY, UNSPECIFIED CHRONICITY, WITH SCIATICA PRESENCE UNSPECIFIED: ICD-10-CM

## 2018-12-20 PROCEDURE — 99214 OFFICE O/P EST MOD 30 MIN: CPT | Performed by: FAMILY MEDICINE

## 2018-12-20 ASSESSMENT — PAIN SCALES - GENERAL: PAINLEVEL: MODERATE PAIN (4)

## 2018-12-20 NOTE — PROGRESS NOTES
SUBJECTIVE:   Sandra Petit is a 78 year old female who presents to clinic today for the following health issues:      Chief Complaint   Patient presents with     Hypertension     Back Pain     SUBJECTIVE:  Sandra Petit, a 78 year old female scheduled an appointment to discuss the following issues:     Hypertension goal BP (blood pressure) < 140/90: bp fluctuating  Type 2 diabetes mellitus with other circulatory complications (H): sugars have been okay  S/P total knee replacement using cement, right: pain rt knee p some minor trauma  CKD (chronic kidney disease) stage 3, GFR 30-59 ml/min (H): has f/u with neph soon  Low back pain, unspecified back pain laterality, unspecified chronicity, with sciatica presence unspecified: waxing and waning LBP, no radiation      Past Medical History:   Diagnosis Date     Diabetic eye exam (H) 08/05/13     Endometriosis, site unspecified      Fever and other physiologic disturbances of temperature regulation 07/07/2005    Admit.  Discharged 07/13/2005.  Cause undetermined.     Myalgia and myositis, unspecified     fibromyalgia     Pure hypercholesterolemia      Unspecified essential hypertension      Unspecified hypothyroidism      Current Outpatient Medications   Medication Sig Dispense Refill     blood glucose monitoring (ONE TOUCH DELICA) lancets Use to test blood sugars 1 times daily or as directed. 1 each 1     blood glucose monitoring (ONETOUCH VERIO IQ) test strip Use to test blood sugars 1 times daily or as directed. 50 each 2     Cholecalciferol (VITAMIN D) 400 UNITS capsule Take 1 capsule by mouth 2 times daily 30 capsule      cloNIDine (CATAPRES) 0.2 MG tablet TAKE ONE TABLET BY MOUTH TWICE DAILY 180 tablet 3     cyclobenzaprine (FLEXERIL) 10 MG tablet Take 0.5-1 tablets (5-10 mg) by mouth 3 times daily as needed for muscle spasms 40 tablet 1     gabapentin (NEURONTIN) 300 MG capsule Take 1 capsule (300 mg) by mouth At Bedtime Take 1 tablet (300 mg) every night Starting  3 days before surgery 30 capsule 0     levofloxacin (LEVAQUIN) 750 MG tablet Take one tablet 2 hours prior to procedure 2 tablet 3     levothyroxine (SYNTHROID/LEVOTHROID) 50 MCG tablet TAKE ONE TABLET BY MOUTH ONCE DAILY 90 tablet 3     losartan (COZAAR) 50 MG tablet TAKE ONE TABLET BY MOUTH TWICE DAILY 180 tablet 1     metFORMIN (GLUCOPHAGE-XR) 500 MG 24 hr tablet TAKE ONE TABLET BY MOUTH ONCE DAILY WITH SUPPER 90 tablet 3     nitroglycerin (NITROSTAT) 0.4 MG SL tablet Place 1 tablet (0.4 mg) under the tongue every 5 minutes as needed for chest pain 25 tablet 0     order for DME Equipment being ordered: Walker ()  Treatment Diagnosis: s/p joint replacement 1 Device 0     oxyCODONE IR (ROXICODONE) 5 MG tablet Take 1-2 tablets (5-10 mg) by mouth every 4 hours as needed for pain 40 tablet 0     Probiotic Product (ACIDOPHILUS PROBIOTIC BLEND) CAPS Take 1 capsule by mouth 2 times daily       simvastatin (ZOCOR) 40 MG tablet Take 0.5 tablets (20 mg) by mouth At Bedtime 90 tablet 3         EXAM:  /70   Pulse 68   Temp 98.1  F (36.7  C) (Temporal)   Resp 16   Wt 73.9 kg (163 lb)   BMI 31.31 kg/m    LUNGS:  Clear to auscultation.   HEART:  Regular rhythm, no murmurs.   EXTR:tenderness about the left knee. Nol redness or effusion. The joint is stable     IMPRESSION:  Encounter Diagnoses   Name Primary?     Hypertension goal BP (blood pressure) < 140/90      Type 2 diabetes mellitus with other circulatory complications (H)      S/P total knee replacement using cement, right      CKD (chronic kidney disease) stage 3, GFR 30-59 ml/min (H)      Low back pain, unspecified back pain laterality, unspecified chronicity, with sciatica presence unspecified      PLAN:    Continue with grad increase in activity, heat, ice, tylenol     Reviewed recent labs re her ckd and T2 DM    Adjust timing of bp meds    Gentle back exercises  F/u in 3-4 months, sooner prn    kelsy Wray

## 2019-01-22 NOTE — ADDENDUM NOTE
Encounter addended by: Carolin Matute, PT on: 1/22/2019 2:08 PM   Actions taken: Flowsheet data copied forward, Flowsheet accepted

## 2019-02-19 ENCOUNTER — OFFICE VISIT (OUTPATIENT)
Dept: FAMILY MEDICINE | Facility: OTHER | Age: 79
End: 2019-02-19
Payer: MEDICARE

## 2019-02-19 VITALS
RESPIRATION RATE: 20 BRPM | SYSTOLIC BLOOD PRESSURE: 134 MMHG | HEART RATE: 76 BPM | HEIGHT: 61 IN | OXYGEN SATURATION: 97 % | WEIGHT: 166.01 LBS | BODY MASS INDEX: 31.34 KG/M2 | TEMPERATURE: 97.2 F | DIASTOLIC BLOOD PRESSURE: 62 MMHG

## 2019-02-19 DIAGNOSIS — I10 ESSENTIAL HYPERTENSION, BENIGN: ICD-10-CM

## 2019-02-19 DIAGNOSIS — M54.40 CHRONIC RIGHT-SIDED LOW BACK PAIN WITH SCIATICA, SCIATICA LATERALITY UNSPECIFIED: ICD-10-CM

## 2019-02-19 DIAGNOSIS — I10 HYPERTENSION GOAL BP (BLOOD PRESSURE) < 140/90: ICD-10-CM

## 2019-02-19 DIAGNOSIS — R22.2 MASS ON BACK: Primary | ICD-10-CM

## 2019-02-19 DIAGNOSIS — R19.7 DIARRHEA, UNSPECIFIED TYPE: ICD-10-CM

## 2019-02-19 DIAGNOSIS — D17.30 LIPOMA OF SKIN AND SUBCUTANEOUS TISSUE: ICD-10-CM

## 2019-02-19 DIAGNOSIS — Z96.651 S/P TOTAL KNEE ARTHROPLASTY, RIGHT: ICD-10-CM

## 2019-02-19 DIAGNOSIS — G89.29 CHRONIC RIGHT-SIDED LOW BACK PAIN WITH SCIATICA, SCIATICA LATERALITY UNSPECIFIED: ICD-10-CM

## 2019-02-19 PROCEDURE — 99214 OFFICE O/P EST MOD 30 MIN: CPT | Performed by: FAMILY MEDICINE

## 2019-02-19 RX ORDER — ASPIRIN 325 MG
325 TABLET, DELAYED RELEASE (ENTERIC COATED) ORAL DAILY
Qty: 30 TABLET | Refills: 0 | Status: SHIPPED | OUTPATIENT
Start: 2019-02-19 | End: 2019-03-12

## 2019-02-19 RX ORDER — CLONIDINE HYDROCHLORIDE 0.2 MG/1
0.2 TABLET ORAL 3 TIMES DAILY
Qty: 180 TABLET | Refills: 3 | COMMUNITY
Start: 2019-02-19 | End: 2019-11-06

## 2019-02-19 ASSESSMENT — PAIN SCALES - GENERAL: PAINLEVEL: SEVERE PAIN (7)

## 2019-02-19 ASSESSMENT — MIFFLIN-ST. JEOR: SCORE: 1167.44

## 2019-02-19 NOTE — PROGRESS NOTES
SUBJECTIVE:  Sandra Petit, a 78 year old female scheduled an appointment to discuss the following issues:       S/P total knee arthroplasty, right  Lipoma of skin and subcutaneous tissue: painful , no obvious reason  Chronic right-sided low back pain with sciatica, sciatica laterality unspecified  Hypertension goal BP (blood pressure) < 140/90: home bp's sometimes high  Diarrhea, unspecified type: no resolved, poss due to abx    Past Medical History:   Diagnosis Date     Diabetic eye exam (H) 08/05/13     Endometriosis, site unspecified      Fever and other physiologic disturbances of temperature regulation 07/07/2005    Admit.  Discharged 07/13/2005.  Cause undetermined.     Myalgia and myositis, unspecified     fibromyalgia     Pure hypercholesterolemia      Unspecified essential hypertension      Unspecified hypothyroidism      Current Outpatient Medications   Medication Sig Dispense Refill     aspirin (ASA) 325 MG EC tablet Take 1 tablet (325 mg) by mouth daily 30 tablet 0     blood glucose monitoring (ONE TOUCH DELICA) lancets Use to test blood sugars 1 times daily or as directed. 1 each 1     blood glucose monitoring (ONETOUCH VERIO IQ) test strip Use to test blood sugars 1 times daily or as directed. 50 each 2     Cholecalciferol (VITAMIN D) 400 UNITS capsule Take 1 capsule by mouth 2 times daily 30 capsule      cloNIDine (CATAPRES) 0.2 MG tablet Take 1 tablet (0.2 mg) by mouth 3 times daily 180 tablet 3     levofloxacin (LEVAQUIN) 750 MG tablet Take one tablet 2 hours prior to procedure 2 tablet 3     levothyroxine (SYNTHROID/LEVOTHROID) 50 MCG tablet TAKE ONE TABLET BY MOUTH ONCE DAILY 90 tablet 3     losartan (COZAAR) 50 MG tablet TAKE ONE TABLET BY MOUTH TWICE DAILY 180 tablet 1     metFORMIN (GLUCOPHAGE-XR) 500 MG 24 hr tablet TAKE ONE TABLET BY MOUTH ONCE DAILY WITH SUPPER 90 tablet 3     Probiotic Product (ACIDOPHILUS PROBIOTIC BLEND) CAPS Take 1 capsule by mouth 2 times daily       simvastatin (ZOCOR)  "40 MG tablet Take 0.5 tablets (20 mg) by mouth At Bedtime 90 tablet 3     nitroglycerin (NITROSTAT) 0.4 MG SL tablet Place 1 tablet (0.4 mg) under the tongue every 5 minutes as needed for chest pain (Patient not taking: Reported on 2/19/2019) 25 tablet 0         EXAM:  /62   Pulse 76   Temp 97.2  F (36.2  C) (Temporal)   Resp 20   Ht 1.537 m (5' 0.5\")   Wt 75.3 kg (166 lb 0.1 oz)   SpO2 97%   BMI 31.89 kg/m      4-5 cm soft mass on rt SI area, slightly tender    IMPRESSION:  Encounter Diagnoses   Name Primary?     Mass on back: now has become painful; recommended MRI, pt declined, citing fear of tight spaces, will ref to surgery for possible excision Yes     Essential hypertension, benign      S/P total knee arthroplasty, right      Lipoma of skin and subcutaneous tissue      Chronic right-sided low back pain with sciatica, sciatica laterality unspecified      Hypertension goal BP (blood pressure) < 140/90: will increase clonidine to tid      Diarrhea, unspecified type: pt to continue probiotics, to cb if recurrent      PLAN:  Orders Placed This Encounter     GENERAL SURG ADULT REFERRAL     cloNIDine (CATAPRES) 0.2 MG tablet increase to tid     aspirin (ASA) 325 MG EC tablet     Teddy Wray    "

## 2019-02-20 ENCOUNTER — MYC MEDICAL ADVICE (OUTPATIENT)
Dept: FAMILY MEDICINE | Facility: OTHER | Age: 79
End: 2019-02-20

## 2019-02-20 DIAGNOSIS — E11.21 TYPE 2 DIABETES MELLITUS WITH DIABETIC NEPHROPATHY, WITHOUT LONG-TERM CURRENT USE OF INSULIN (H): Primary | ICD-10-CM

## 2019-02-22 ENCOUNTER — OFFICE VISIT (OUTPATIENT)
Dept: SURGERY | Facility: CLINIC | Age: 79
End: 2019-02-22
Payer: MEDICARE

## 2019-02-22 ENCOUNTER — TELEPHONE (OUTPATIENT)
Dept: SURGERY | Facility: CLINIC | Age: 79
End: 2019-02-22

## 2019-02-22 VITALS
WEIGHT: 169 LBS | SYSTOLIC BLOOD PRESSURE: 136 MMHG | HEIGHT: 61 IN | DIASTOLIC BLOOD PRESSURE: 76 MMHG | TEMPERATURE: 98.2 F | BODY MASS INDEX: 31.91 KG/M2

## 2019-02-22 DIAGNOSIS — D17.1 LIPOMA OF BACK: Primary | ICD-10-CM

## 2019-02-22 DIAGNOSIS — I10 HYPERTENSION GOAL BP (BLOOD PRESSURE) < 140/90: ICD-10-CM

## 2019-02-22 DIAGNOSIS — N18.30 CKD (CHRONIC KIDNEY DISEASE) STAGE 3, GFR 30-59 ML/MIN (H): ICD-10-CM

## 2019-02-22 PROCEDURE — 99204 OFFICE O/P NEW MOD 45 MIN: CPT | Performed by: SURGERY

## 2019-02-22 ASSESSMENT — MIFFLIN-ST. JEOR: SCORE: 1181.02

## 2019-02-22 NOTE — LETTER
2/22/2019         RE: Sandra Petti  [address] [facility] [address]        Dear Colleague,    Thank you for referring your patient, Sandra Petit, to the [facility]. Please see a copy of my visit note below.    General Surgery Consultation    Sandra Petit MRN# [id number]   Age: 78 year old YOB: 1940     Reason for consult: Left back mass                        Assessment and Plan:   I was asked to see this patient at the request of Dr. Wray for evaluation of left back mass.  Sandra Petit is a 78 year old female who presented with history, exam, laboratory and imaging most consistent with:        ICD-10-CM    1. Lipoma of back D17.1 Jackie-Operative Worksheet    left lower back   2. CKD (chronic kidney disease) stage 3, GFR 30-59 ml/min (H) N18.3    3. Hypertension goal BP (blood pressure) < 140/90 I10        Recommend that this mass be excised.  Discussed risks, benefits, alternatives of the surgery including infection, bleeding, not resolving of her pain.  Talked about postoperative care of the surgery.  To the patient's age and multiple comorbidities I recommend that we do this in the OR and she will need a preoperative history and physical as when planning to do this under MAC anesthesia.  Patient agrees.    I thank Dr. Wray for the opportunity to participate in the patient's care.           Chief Complaint:   Left back mass     History is obtained from the patient         History of Present Illness:   This patient is a 78 year old  female with a significant past medical history of chronic kidney disease and hypertension who presents with tender left lower back mass; mass been there for many years.  +more pain in this area; not sure of it increasing in size.  Pain with movement, pain on palpation.  History of right-sided sciatica.  No numbness or tingling on the left side.  Pain radiates into the gluteal area and up into the back.  No radiation down her left  legs.  Denies any heart attack, denies any previous stroke.  No anticoagulation.  No history of previous skin cancer, sarcoma.  No family history of sarcoma or any skin cancer.          Past Medical History:    has a past medical history of Diabetic eye exam (H) (08/05/13), Endometriosis, site unspecified, Fever and other physiologic disturbances of temperature regulation (07/07/2005), Myalgia and myositis, unspecified, Pure hypercholesterolemia, Unspecified essential hypertension, and Unspecified hypothyroidism.          Past Surgical History:     Past Surgical History:   Procedure Laterality Date     ARTHROPLASTY KNEE  4/29/2013    Procedure: ARTHROPLASTY KNEE;  left total knee arthroplasty;  Surgeon: Teddy Grimes MD;  [facility]     ARTHROPLASTY KNEE Right 8/27/2018    Procedure: ARTHROPLASTY KNEE;  right total knee arthroplasty;  Surgeon: Johnny Anaya MD;  [facility]     C APPENDECTOMY       C NONSPECIFIC PROCEDURE      Knee ligament surgery     C NONSPECIFIC PROCEDURE      Kidney surgery for mechanical obstruction     C OPEN CORONARY ENDARTERECTOMY  june 2005    Angioplasty w stenting     C TOTAL KNEE ARTHROPLASTY  4/29/13    Left     COMBINED CYSTOSCOPY, INSERT CATHETER URETER Bilateral 8/10/2015    Procedure: COMBINED CYSTOSCOPY, INSERT CATHETER URETER;  Surgeon: Johnnie Madera MD;  [facility]     DILATION AND CURETTAGE, HYSTEROSCOPY DIAGNOSTIC, COMBINED N/A 11/6/2014    Procedure: COMBINED DILATION AND CURETTAGE, HYSTEROSCOPY DIAGNOSTIC;  Surgeon: Edward Pardo MD;  [facility]     ESOPHAGOSCOPY, GASTROSCOPY, DUODENOSCOPY (EGD), COMBINED N/A 1/27/2015    Procedure: COMBINED ESOPHAGOSCOPY, GASTROSCOPY, DUODENOSCOPY (EGD), BIOPSY SINGLE OR MULTIPLE;  Surgeon: Carmelo Rosario MD;  [facility]     HC REMOVAL GALLBLADDER      Cholecystectomy     HC REMOVE TONSILS/ADENOIDS,<13 Y/O      unsure of age     LAPAROSCOPIC HYSTERECTOMY TOTAL N/A 8/10/2015  "   Procedure: LAPAROSCOPIC HYSTERECTOMY TOTAL;  Surgeon: Edward Pardo MD;  [facility]     LAPAROSCOPIC LYSIS ADHESIONS N/A 8/10/2015    Procedure: LAPAROSCOPIC LYSIS ADHESIONS;  Surgeon: Edward Pardo MD;  [facility]           Medications:     Current Outpatient Medications on File Prior to Visit:  aspirin (ASA) 325 MG EC tablet Take 1 tablet (325 mg) by mouth daily   blood glucose monitoring (ONE TOUCH DELICA) lancets Use to test blood sugars 1 times daily or as directed.   blood glucose monitoring (ONETOUCH VERIO IQ) test strip Use to test blood sugars 1 times daily or as directed.   [] cephALEXin (KEFLEX) 500 MG capsule Take 1 capsule (500 mg) by mouth 3 times daily for 3 days   Cholecalciferol (VITAMIN D) 400 UNITS capsule Take 1 capsule by mouth 2 times daily   cloNIDine (CATAPRES) 0.2 MG tablet Take 1 tablet (0.2 mg) by mouth 3 times daily   levofloxacin (LEVAQUIN) 750 MG tablet Take one tablet 2 hours prior to procedure   levothyroxine (SYNTHROID/LEVOTHROID) 50 MCG tablet TAKE ONE TABLET BY MOUTH ONCE DAILY   losartan (COZAAR) 50 MG tablet TAKE ONE TABLET BY MOUTH TWICE DAILY   metFORMIN (GLUCOPHAGE-XR) 500 MG 24 hr tablet TAKE ONE TABLET BY MOUTH ONCE DAILY WITH SUPPER   nitroglycerin (NITROSTAT) 0.4 MG SL tablet Place 1 tablet (0.4 mg) under the tongue every 5 minutes as needed for chest pain (Patient not taking: Reported on 2019)   Probiotic Product (ACIDOPHILUS PROBIOTIC BLEND) CAPS Take 1 capsule by mouth 2 times daily   simvastatin (ZOCOR) 40 MG tablet Take 0.5 tablets (20 mg) by mouth At Bedtime     No current facility-administered medications on file prior to visit.       Allergies:      Allergies   Allergen Reactions     Penicillins Swelling     \"throat started swelling\"     Vitamin B Complex Hives     Vitamin B12 Hives     all vitamin B's     Clindamycin Hcl Rash            Social History:   Sandra Petit  reports that she has quit smoking. she has " never used smokeless tobacco. She reports that she does not drink alcohol or use drugs.          Family History:   The patient has no family history of any bleeding, clotting or anesthesia problems.          Review of Systems:     Constitutional: Denies fever or chills   Eyes: Denies change in visual acuity   HENT: Denies nasal congestion or sore throat   Respiratory: Denies cough or shortness of breath   Cardiovascular: Denies chest pain or edema   GI: Denies abdominal pain, nausea, vomiting, bloody stools or diarrhea   : Denies dysuria   Musculoskeletal: Denies back pain or joint pain   Integument: Denies rash   Neurologic: Denies headache, focal weakness or sensory changes   Endocrine: Denies polyuria or polydipsia   Lymphatic: Denies swollen glands   Psychiatric: Denies depression or anxiety          Physical Exam:     Constitutional: Awake, alert, no acute distress.  Eyes:  No scleral icterus.  Conjunctiva are without injection.  ENMT: Mucous membranes moist, dentition and gums are intact.   Neck: Soft, supple, trachea midline.    Endocrine: The thyroid is without masses and mobile with swallow.   Lymphatic: There is no cervical, submandibular, or inguinal adenopathy.  Respiratory: Lungs are clear to auscultation and percussion bilaterally.   Cardiovascular: Regular rate and rhythm. No murmurs, rubs, or gallops.    Abdomen: Non-distended, non-tender, normoactive bowel sounds present, No masses, neg hernias, or flank tenderness. No hepatosplenomegaly.   Musculoskeletal: No spinal or CVA tenderness. Full range of motion in the upper and lower extremities.    Skin: left lower back, mobile 3-5cm firm mass; tender on palpation.  No petechia.    Neurologic: Cranial nerves II through XII are grossly intact and symmetric.  Psychiatric: The patient is alert and oriented times 3.  The patient's affect is not blunted and mood is appropriate.          Data:   Vital Signs:  /76   Temp 98.2  F (36.8  C) (Temporal)   " Ht 1.537 m (5' 0.5\")   Wt 76.7 kg (169 lb)   BMI 32.46 kg/m        WBC -   WBC   Date Value Ref Range Status   08/15/2018 7.7 4.0 - 11.0 10e9/L Final   ], HgB - Hemoglobin   Date Value Ref Range Status   10/17/2018 12.1 11.7 - 15.7 g/dL Final   ]   Liver Function Studies -   Recent Labs   Lab Test 08/28/18  0623   PROTTOTAL 5.5*   ALBUMIN 2.5*   BILITOTAL 0.1*   ALKPHOS 70   AST 13   ALT 14     No results found for this or any previous visit (from the past 744 hour(s)).     CelestinoSancho Richard DO 2/22/2019 3:34 PM           Again, thank you for allowing me to participate in the care of your patient.        Sincerely,        [facility] Richard MD    "

## 2019-02-22 NOTE — PROGRESS NOTES
General Surgery Consultation    Sandra Petit MRN# [id number]   Age: 78 year old YOB: 1940     Reason for consult: Left back mass                        Assessment and Plan:   I was asked to see this patient at the request of Dr. Wray for evaluation of left back mass.  Sandra Petit is a 78 year old female who presented with history, exam, laboratory and imaging most consistent with:        ICD-10-CM    1. Lipoma of back D17.1 Jackie-Operative Worksheet    left lower back   2. CKD (chronic kidney disease) stage 3, GFR 30-59 ml/min (H) N18.3    3. Hypertension goal BP (blood pressure) < 140/90 I10        Recommend that this mass be excised.  Discussed risks, benefits, alternatives of the surgery including infection, bleeding, not resolving of her pain.  Talked about postoperative care of the surgery.  To the patient's age and multiple comorbidities I recommend that we do this in the OR and she will need a preoperative history and physical as when planning to do this under MAC anesthesia.  Patient agrees.    I thank Dr. Wray for the opportunity to participate in the patient's care.           Chief Complaint:   Left back mass     History is obtained from the patient         History of Present Illness:   This patient is a 78 year old  female with a significant past medical history of chronic kidney disease and hypertension who presents with tender left lower back mass; mass been there for many years.  +more pain in this area; not sure of it increasing in size.  Pain with movement, pain on palpation.  History of right-sided sciatica.  No numbness or tingling on the left side.  Pain radiates into the gluteal area and up into the back.  No radiation down her left legs.  Denies any heart attack, denies any previous stroke.  No anticoagulation.  No history of previous skin cancer, sarcoma.  No family history of sarcoma or any skin cancer.          Past Medical History:    has a past medical history of  Diabetic eye exam (H) (08/05/13), Endometriosis, site unspecified, Fever and other physiologic disturbances of temperature regulation (07/07/2005), Myalgia and myositis, unspecified, Pure hypercholesterolemia, Unspecified essential hypertension, and Unspecified hypothyroidism.          Past Surgical History:     Past Surgical History:   Procedure Laterality Date     ARTHROPLASTY KNEE  4/29/2013    Procedure: ARTHROPLASTY KNEE;  left total knee arthroplasty;  Surgeon: Teddy Grimes MD;  [facility]     ARTHROPLASTY KNEE Right 8/27/2018    Procedure: ARTHROPLASTY KNEE;  right total knee arthroplasty;  Surgeon: Johnny Anaya MD;  [facility]     C APPENDECTOMY       C NONSPECIFIC PROCEDURE      Knee ligament surgery     C NONSPECIFIC PROCEDURE      Kidney surgery for mechanical obstruction     C OPEN CORONARY ENDARTERECTOMY  june 2005    Angioplasty w stenting     C TOTAL KNEE ARTHROPLASTY  4/29/13    Left     COMBINED CYSTOSCOPY, INSERT CATHETER URETER Bilateral 8/10/2015    Procedure: COMBINED CYSTOSCOPY, INSERT CATHETER URETER;  Surgeon: Johnnie Madera MD;  [facility]     DILATION AND CURETTAGE, HYSTEROSCOPY DIAGNOSTIC, COMBINED N/A 11/6/2014    Procedure: COMBINED DILATION AND CURETTAGE, HYSTEROSCOPY DIAGNOSTIC;  Surgeon: Edward Pardo MD;  [facility]     ESOPHAGOSCOPY, GASTROSCOPY, DUODENOSCOPY (EGD), COMBINED N/A 1/27/2015    Procedure: COMBINED ESOPHAGOSCOPY, GASTROSCOPY, DUODENOSCOPY (EGD), BIOPSY SINGLE OR MULTIPLE;  Surgeon: Carmelo Rosario MD;  [facility]     HC REMOVAL GALLBLADDER      Cholecystectomy     HC REMOVE TONSILS/ADENOIDS,<13 Y/O      unsure of age     LAPAROSCOPIC HYSTERECTOMY TOTAL N/A 8/10/2015    Procedure: LAPAROSCOPIC HYSTERECTOMY TOTAL;  Surgeon: Edward Pardo MD;  [facility]     LAPAROSCOPIC LYSIS ADHESIONS N/A 8/10/2015    Procedure: LAPAROSCOPIC LYSIS ADHESIONS;  Surgeon: Edward Pardo MD;   "[facility]: [facility]           Medications:     Current Outpatient Medications on File Prior to Visit:  aspirin (ASA) 325 MG EC tablet Take 1 tablet (325 mg) by mouth daily   blood glucose monitoring (ONE TOUCH DELICA) lancets Use to test blood sugars 1 times daily or as directed.   blood glucose monitoring (ONETOUCH VERIO IQ) test strip Use to test blood sugars 1 times daily or as directed.   [] cephALEXin (KEFLEX) 500 MG capsule Take 1 capsule (500 mg) by mouth 3 times daily for 3 days   Cholecalciferol (VITAMIN D) 400 UNITS capsule Take 1 capsule by mouth 2 times daily   cloNIDine (CATAPRES) 0.2 MG tablet Take 1 tablet (0.2 mg) by mouth 3 times daily   levofloxacin (LEVAQUIN) 750 MG tablet Take one tablet 2 hours prior to procedure   levothyroxine (SYNTHROID/LEVOTHROID) 50 MCG tablet TAKE ONE TABLET BY MOUTH ONCE DAILY   losartan (COZAAR) 50 MG tablet TAKE ONE TABLET BY MOUTH TWICE DAILY   metFORMIN (GLUCOPHAGE-XR) 500 MG 24 hr tablet TAKE ONE TABLET BY MOUTH ONCE DAILY WITH SUPPER   nitroglycerin (NITROSTAT) 0.4 MG SL tablet Place 1 tablet (0.4 mg) under the tongue every 5 minutes as needed for chest pain (Patient not taking: Reported on 2019)   Probiotic Product (ACIDOPHILUS PROBIOTIC BLEND) CAPS Take 1 capsule by mouth 2 times daily   simvastatin (ZOCOR) 40 MG tablet Take 0.5 tablets (20 mg) by mouth At Bedtime     No current facility-administered medications on file prior to visit.       Allergies:      Allergies   Allergen Reactions     Penicillins Swelling     \"throat started swelling\"     Vitamin B Complex Hives     Vitamin B12 Hives     all vitamin B's     Clindamycin Hcl Rash            Social History:   Sandra Petit  reports that she has quit smoking. she has never used smokeless tobacco. She reports that she does not drink alcohol or use drugs.          Family History:   The patient has no family history of any bleeding, clotting or anesthesia problems.          Review of Systems: " "    Constitutional: Denies fever or chills   Eyes: Denies change in visual acuity   HENT: Denies nasal congestion or sore throat   Respiratory: Denies cough or shortness of breath   Cardiovascular: Denies chest pain or edema   GI: Denies abdominal pain, nausea, vomiting, bloody stools or diarrhea   : Denies dysuria   Musculoskeletal: Denies back pain or joint pain   Integument: Denies rash   Neurologic: Denies headache, focal weakness or sensory changes   Endocrine: Denies polyuria or polydipsia   Lymphatic: Denies swollen glands   Psychiatric: Denies depression or anxiety          Physical Exam:     Constitutional: Awake, alert, no acute distress.  Eyes:  No scleral icterus.  Conjunctiva are without injection.  ENMT: Mucous membranes moist, dentition and gums are intact.   Neck: Soft, supple, trachea midline.    Endocrine: The thyroid is without masses and mobile with swallow.   Lymphatic: There is no cervical, submandibular, or inguinal adenopathy.  Respiratory: Lungs are clear to auscultation and percussion bilaterally.   Cardiovascular: Regular rate and rhythm. No murmurs, rubs, or gallops.    Abdomen: Non-distended, non-tender, normoactive bowel sounds present, No masses, neg hernias, or flank tenderness. No hepatosplenomegaly.   Musculoskeletal: No spinal or CVA tenderness. Full range of motion in the upper and lower extremities.    Skin: left lower back, mobile 3-5cm firm mass; tender on palpation.  No petechia.    Neurologic: Cranial nerves II through XII are grossly intact and symmetric.  Psychiatric: The patient is alert and oriented times 3.  The patient's affect is not blunted and mood is appropriate.          Data:   Vital Signs:  /76   Temp 98.2  F (36.8  C) (Temporal)   Ht 1.537 m (5' 0.5\")   Wt 76.7 kg (169 lb)   BMI 32.46 kg/m       WBC -   WBC   Date Value Ref Range Status   08/15/2018 7.7 4.0 - 11.0 10e9/L Final   ], HgB - Hemoglobin   Date Value Ref Range Status   10/17/2018 12.1 11.7 " - 15.7 g/dL Final   ]   Liver Function Studies -   Recent Labs   Lab Test 08/28/18  0623   PROTTOTAL 5.5*   ALBUMIN 2.5*   BILITOTAL 0.1*   ALKPHOS 70   AST 13   ALT 14     No results found for this or any previous visit (from the past 744 hour(s)).     [facility] Richard,  2/22/2019 3:34 PM

## 2019-02-25 ENCOUNTER — HOSPITAL ENCOUNTER (OUTPATIENT)
Facility: CLINIC | Age: 79
End: 2019-02-25
Attending: SURGERY | Admitting: SURGERY
Payer: MEDICARE

## 2019-02-25 NOTE — TELEPHONE ENCOUNTER
Type of surgery: excision of left lower bacl lipoma  Location of surgery: [facility]  Date and time of surgery: 3/7  Surgeon: Hilary  Pre-Op Appt Date: checking with pcp  Post-Op Appt Date: 3/19   Packet sent out: Yes  Pre-cert/Authorization completed:  Not Applicable  Date: na

## 2019-02-25 NOTE — TELEPHONE ENCOUNTER
Called she will come wed 4:00 in [place]  She knows to come  I put her on my schedule  dbue      Patient will need a preop prior to 3/7,  She would like to see Dr. Wray. Are you able to work in ?  Please call patient with date/time if possible    Thank you

## 2019-02-27 ENCOUNTER — OFFICE VISIT (OUTPATIENT)
Dept: FAMILY MEDICINE | Facility: CLINIC | Age: 79
End: 2019-02-27
Payer: MEDICARE

## 2019-02-27 VITALS
BODY MASS INDEX: 32.46 KG/M2 | RESPIRATION RATE: 18 BRPM | WEIGHT: 169 LBS | OXYGEN SATURATION: 96 % | HEART RATE: 66 BPM | SYSTOLIC BLOOD PRESSURE: 102 MMHG | DIASTOLIC BLOOD PRESSURE: 62 MMHG | TEMPERATURE: 96 F

## 2019-02-27 DIAGNOSIS — E11.21 TYPE 2 DIABETES MELLITUS WITH DIABETIC NEPHROPATHY, WITHOUT LONG-TERM CURRENT USE OF INSULIN (H): ICD-10-CM

## 2019-02-27 DIAGNOSIS — Z01.818 PREOP GENERAL PHYSICAL EXAM: Primary | ICD-10-CM

## 2019-02-27 DIAGNOSIS — E03.8 OTHER SPECIFIED HYPOTHYROIDISM: ICD-10-CM

## 2019-02-27 DIAGNOSIS — Z96.652 HISTORY OF TOTAL LEFT KNEE REPLACEMENT: ICD-10-CM

## 2019-02-27 DIAGNOSIS — E79.0 HYPERURICEMIA: ICD-10-CM

## 2019-02-27 LAB
ALBUMIN UR-MCNC: 30 MG/DL
ANION GAP SERPL CALCULATED.3IONS-SCNC: 6 MMOL/L (ref 3–14)
APPEARANCE UR: CLEAR
BASOPHILS # BLD AUTO: 0.1 10E9/L (ref 0–0.2)
BASOPHILS NFR BLD AUTO: 0.6 %
BILIRUB UR QL STRIP: NEGATIVE
BUN SERPL-MCNC: 23 MG/DL (ref 7–30)
CALCIUM SERPL-MCNC: 8.7 MG/DL (ref 8.5–10.1)
CHLORIDE SERPL-SCNC: 108 MMOL/L (ref 94–109)
CO2 SERPL-SCNC: 28 MMOL/L (ref 20–32)
COLOR UR AUTO: YELLOW
CREAT SERPL-MCNC: 1.22 MG/DL (ref 0.52–1.04)
DIFFERENTIAL METHOD BLD: NORMAL
EOSINOPHIL NFR BLD AUTO: 28.7 %
ERYTHROCYTE [DISTWIDTH] IN BLOOD BY AUTOMATED COUNT: 15 % (ref 10–15)
GFR SERPL CREATININE-BSD FRML MDRD: 42 ML/MIN/{1.73_M2}
GLUCOSE SERPL-MCNC: 128 MG/DL (ref 70–99)
GLUCOSE UR STRIP-MCNC: NEGATIVE MG/DL
HBA1C MFR BLD: 7.3 % (ref 0–5.6)
HCT VFR BLD AUTO: 38.2 % (ref 35–47)
HGB BLD-MCNC: 12.1 G/DL (ref 11.7–15.7)
HGB UR QL STRIP: NEGATIVE
IMM GRANULOCYTES # BLD: 0 10E9/L (ref 0–0.4)
IMM GRANULOCYTES NFR BLD: 0.4 %
KETONES UR STRIP-MCNC: 5 MG/DL
LEUKOCYTE ESTERASE UR QL STRIP: ABNORMAL
LYMPHOCYTES # BLD AUTO: 1.2 10E9/L (ref 0.8–5.3)
LYMPHOCYTES NFR BLD AUTO: 13.8 %
MCH RBC QN AUTO: 29.4 PG (ref 26.5–33)
MCHC RBC AUTO-ENTMCNC: 31.7 G/DL (ref 31.5–36.5)
MCV RBC AUTO: 93 FL (ref 78–100)
MONOCYTES # BLD AUTO: 0.4 10E9/L (ref 0–1.3)
MONOCYTES NFR BLD AUTO: 5.1 %
MUCOUS THREADS #/AREA URNS LPF: PRESENT /LPF
NEUTROPHILS # BLD AUTO: 4.4 10E9/L (ref 1.6–8.3)
NEUTROPHILS NFR BLD AUTO: 51.4 %
NITRATE UR QL: NEGATIVE
NRBC # BLD AUTO: 0 10*3/UL
NRBC BLD AUTO-RTO: 0 /100
PH UR STRIP: 5 PH (ref 5–7)
PLATELET # BLD AUTO: 276 10E9/L (ref 150–450)
POTASSIUM SERPL-SCNC: 4.1 MMOL/L (ref 3.4–5.3)
RBC # BLD AUTO: 4.11 10E12/L (ref 3.8–5.2)
RBC #/AREA URNS AUTO: 2 /HPF (ref 0–2)
SODIUM SERPL-SCNC: 142 MMOL/L (ref 133–144)
SOURCE: ABNORMAL
SP GR UR STRIP: 1.02 (ref 1–1.03)
SQUAMOUS #/AREA URNS AUTO: 1 /HPF (ref 0–1)
T4 FREE SERPL-MCNC: 1.04 NG/DL (ref 0.76–1.46)
TSH SERPL DL<=0.005 MIU/L-ACNC: 4.47 MU/L (ref 0.4–4)
URATE SERPL-MCNC: 5.8 MG/DL (ref 2.6–6)
UROBILINOGEN UR STRIP-MCNC: 2 MG/DL (ref 0–2)
WBC # BLD AUTO: 8.5 10E9/L (ref 4–11)
WBC #/AREA URNS AUTO: 2 /HPF (ref 0–5)

## 2019-02-27 PROCEDURE — 85025 COMPLETE CBC W/AUTO DIFF WBC: CPT | Performed by: FAMILY MEDICINE

## 2019-02-27 PROCEDURE — 81001 URINALYSIS AUTO W/SCOPE: CPT | Performed by: FAMILY MEDICINE

## 2019-02-27 PROCEDURE — 99214 OFFICE O/P EST MOD 30 MIN: CPT | Performed by: FAMILY MEDICINE

## 2019-02-27 PROCEDURE — 84443 ASSAY THYROID STIM HORMONE: CPT | Performed by: FAMILY MEDICINE

## 2019-02-27 PROCEDURE — 84439 ASSAY OF FREE THYROXINE: CPT | Performed by: FAMILY MEDICINE

## 2019-02-27 PROCEDURE — 93000 ELECTROCARDIOGRAM COMPLETE: CPT | Performed by: FAMILY MEDICINE

## 2019-02-27 PROCEDURE — 80048 BASIC METABOLIC PNL TOTAL CA: CPT | Performed by: FAMILY MEDICINE

## 2019-02-27 PROCEDURE — 83036 HEMOGLOBIN GLYCOSYLATED A1C: CPT | Performed by: FAMILY MEDICINE

## 2019-02-27 PROCEDURE — 36415 COLL VENOUS BLD VENIPUNCTURE: CPT | Performed by: FAMILY MEDICINE

## 2019-02-27 PROCEDURE — 84550 ASSAY OF BLOOD/URIC ACID: CPT | Performed by: FAMILY MEDICINE

## 2019-02-27 ASSESSMENT — PAIN SCALES - GENERAL: PAINLEVEL: NO PAIN (0)

## 2019-02-27 NOTE — PROGRESS NOTES
[address] [address] [phone]  Dept: [phone]    PRE-OP EVALUATION:  Today's date: 2019    Sandra Petit (: 1940) presents for pre-operative evaluation assessment as requested by Dr. Richard .  She requires evaluation and anesthesia risk assessment prior to undergoing surgery/procedure for treatment of lower back Lipoma  .    Fax number for surgical facility: [facility]  Primary Physician: No Ref-Primary, Physician  Type of Anesthesia Anticipated: Combined MAC with local     Patient has a Health Care Directive or Living Will:  YES     Preop Questions 8/15/2018   Who is doing your surgery? -   What are you having done? -   Date of Surgery/Procedure: [facility] or Hospital where procedure/surgery will be performed: [facility]   1.  Do you have a history of Heart attack, stroke, stent, coronary bypass surgery, or other heart surgery? YES  - stable CAD   2.  Do you ever have any pain or discomfort in your chest? YES - likely mks   3.  Do you have a history of  Heart Failure? No   4.   Are you troubled by shortness of breath when:  walking on a level surface, or up a slight hill, or at night? No   5.  Do you currently have a cold, bronchitis or other respiratory infection? No   6.  Do you have a cough, shortness of breath, or wheezing? No   7.  Do you sometimes get pains in the calves of your legs when you walk? No   8. Do you or anyone in your family have previous history of blood clots? YES - mom , sister    9.  Do you or does anyone in your family have a serious bleeding problem such as prolonged bleeding following surgeries or cuts? No   10. Have you ever had problems with anemia or been told to take iron pills? No   11. Have you had any abnormal blood loss such as black, tarry or bloody stools, or abnormal vaginal bleeding? No   12. Have you ever had a blood transfusion? No   13. Have you or any of your relatives ever had problems with  anesthesia? No   14. Do you have sleep apnea, excessive snoring or daytime drowsiness? No   15. Do you have any prosthetic heart valves? No   16. Do you have prosthetic joints? YES - knees   17. Is there any chance that you may be pregnant? No         HPI:     HPI related to upcoming procedure: excision of lipoma, left SI area      See problem list for active medical problems.  Problems all longstanding and stable, except as noted/documented.  See ROS for pertinent symptoms related to these conditions.                                                                                                                                                          .    MEDICAL HISTORY:     Patient Active Problem List    Diagnosis Date Noted     Great toe pain, left 08/28/2018     Priority: Medium     S/P total knee replacement using cement, right 08/27/2018     Priority: Medium     Primary osteoarthritis of right knee 03/14/2018     Priority: Medium     S/P total knee arthroplasty, left 03/14/2018     Priority: Medium     Osteoarthritis of left thumb 07/07/2017     Priority: Medium     Herpes zoster without complication 09/07/2016     Priority: Medium     Type 2 diabetes mellitus with other circulatory complications (H) 04/04/2016     Priority: Medium     Hyperuricemia 04/04/2016     Priority: Medium     Other specified hypothyroidism 10/02/2015     Priority: Medium     Coronary artery disease involving native coronary artery without angina pectoris 10/02/2015     Priority: Medium     Type 2 diabetes mellitus with diabetic nephropathy (H) 10/02/2015     Priority: Medium     Acute-on-chronic kidney injury (H) 01/19/2015     Priority: Medium     Coronary atherosclerosis of native coronary artery 01/19/2015     Priority: Medium     Chest pain 01/19/2015     Priority: Medium     Bunion 07/18/2013     Priority: Medium     History of total left knee replacement 04/29/2013     Priority: Medium     OA (osteoarthritis) of knee -  bilateral 10/29/2012     Priority: Medium     Hypertension goal BP (blood pressure) < 140/90 09/26/2011     Priority: Medium     Hyperlipidemia LDL goal <100 10/31/2010     Priority: Medium     Encounter for long-term current use of medication 09/22/2010     Priority: Medium     Problem list name updated by automated process. Provider to review       CKD (chronic kidney disease) stage 3, GFR 30-59 ml/min (H) 03/17/2009     Priority: Medium                Coronary atherosclerosis 06/06/2005     Priority: Medium     Problem list name updated by automated process. Provider to review       Cardiac dysrhythmia 07/18/2001     Priority: Medium     Problem list name updated by automated process. Provider to review       Myalgia and myositis 07/18/2001     Priority: Medium     Problem list name updated by automated process. Provider to review        Past Medical History:   Diagnosis Date     Diabetic eye exam (H) 08/05/13     Endometriosis, site unspecified      Fever and other physiologic disturbances of temperature regulation 07/07/2005    Admit.  Discharged 07/13/2005.  Cause undetermined.     Myalgia and myositis, unspecified     fibromyalgia     Pure hypercholesterolemia      Unspecified essential hypertension      Unspecified hypothyroidism      Past Surgical History:   Procedure Laterality Date     ARTHROPLASTY KNEE  4/29/2013    Procedure: ARTHROPLASTY KNEE;  left total knee arthroplasty;  Surgeon: Teddy Grimes MD;  Location: [facility]     ARTHROPLASTY KNEE Right 8/27/2018    Procedure: ARTHROPLASTY KNEE;  right total knee arthroplasty;  Surgeon: Johnny Anaya MD;  Location: [facility] OR     C APPENDECTOMY       C NONSPECIFIC PROCEDURE      Knee ligament surgery     C NONSPECIFIC PROCEDURE      Kidney surgery for mechanical obstruction     C OPEN CORONARY ENDARTERECTOMY  june 2005    Angioplasty w stenting     C TOTAL KNEE ARTHROPLASTY  4/29/13    Left     COMBINED CYSTOSCOPY, INSERT CATHETER URETER Bilateral  8/10/2015    Procedure: COMBINED CYSTOSCOPY, INSERT CATHETER URETER;  Surgeon: Johnnie Madera MD;  Location: [facility]     DILATION AND CURETTAGE, HYSTEROSCOPY DIAGNOSTIC, COMBINED N/A 11/6/2014    Procedure: COMBINED DILATION AND CURETTAGE, HYSTEROSCOPY DIAGNOSTIC;  Surgeon: Edward Pardo MD;  Location: [facility]     ESOPHAGOSCOPY, GASTROSCOPY, DUODENOSCOPY (EGD), COMBINED N/A 1/27/2015    Procedure: COMBINED ESOPHAGOSCOPY, GASTROSCOPY, DUODENOSCOPY (EGD), BIOPSY SINGLE OR MULTIPLE;  Surgeon: Carmelo Rosario MD;  Location: [facility]     HC REMOVAL GALLBLADDER      Cholecystectomy     HC REMOVE TONSILS/ADENOIDS,<13 Y/O      unsure of age     LAPAROSCOPIC HYSTERECTOMY TOTAL N/A 8/10/2015    Procedure: LAPAROSCOPIC HYSTERECTOMY TOTAL;  Surgeon: Edward Pardo MD;  Location: [facility]     LAPAROSCOPIC LYSIS ADHESIONS N/A 8/10/2015    Procedure: LAPAROSCOPIC LYSIS ADHESIONS;  Surgeon: Edward Pardo MD;  Location: [facility]     Current Outpatient Medications   Medication Sig Dispense Refill     aspirin (ASA) 325 MG EC tablet Take 1 tablet (325 mg) by mouth daily 30 tablet 0     blood glucose monitoring (ONE TOUCH DELICA) lancets Use to test blood sugars 1 times daily or as directed. 1 each 1     blood glucose monitoring (ONETOUCH VERIO IQ) test strip Use to test blood sugars 1 times daily or as directed. 50 each 2     Cholecalciferol (VITAMIN D) 400 UNITS capsule Take 1 capsule by mouth 2 times daily 30 capsule      cloNIDine (CATAPRES) 0.2 MG tablet Take 1 tablet (0.2 mg) by mouth 3 times daily 180 tablet 3     levofloxacin (LEVAQUIN) 750 MG tablet Take one tablet 2 hours prior to procedure 2 tablet 3     levothyroxine (SYNTHROID/LEVOTHROID) 50 MCG tablet TAKE ONE TABLET BY MOUTH ONCE DAILY 90 tablet 3     losartan (COZAAR) 50 MG tablet TAKE ONE TABLET BY MOUTH TWICE DAILY 180 tablet 1     metFORMIN (GLUCOPHAGE-XR) 500 MG 24 hr tablet TAKE ONE TABLET BY MOUTH ONCE DAILY WITH SUPPER 90  "tablet 3     nitroglycerin (NITROSTAT) 0.4 MG SL tablet Place 1 tablet (0.4 mg) under the tongue every 5 minutes as needed for chest pain (Patient not taking: Reported on 2/19/2019) 25 tablet 0     Probiotic Product (ACIDOPHILUS PROBIOTIC BLEND) CAPS Take 1 capsule by mouth 2 times daily       simvastatin (ZOCOR) 40 MG tablet Take 0.5 tablets (20 mg) by mouth At Bedtime 90 tablet 3     OTC products:     Allergies   Allergen Reactions     Penicillins Swelling     \"throat started swelling\"     Vitamin B Complex Hives     Vitamin B12 Hives     all vitamin B's     Clindamycin Hcl Rash      Latex Allergy: NO    Social History     Tobacco Use     Smoking status: Former Smoker     Smokeless tobacco: Never Used     Tobacco comment: quit  1987   Substance Use Topics     Alcohol use: No     Alcohol/week: 0.0 oz     History   Drug Use No       REVIEW OF SYSTEMS:   Constitutional, neuro, ENT, endocrine, pulmonary, cardiac, gastrointestinal, genitourinary, musculoskeletal, integument and psychiatric systems are negative, except as otherwise noted.    EXAM:      /62   Pulse 66   Temp 96  F (35.6  C) (Temporal)   Resp 18   Wt 76.7 kg (169 lb)   SpO2 96%   BMI 32.46 kg/m        GENERAL APPEARANCE: healthy, alert and no distress     EYES: EOMI, PERRL     HENT: ear canals and TM's normal and nose and mouth without ulcers or lesions     NECK: no adenopathy, no asymmetry, masses, or scars and thyroid normal to palpation     RESP: lungs clear to auscultation - no rales, rhonchi or wheezes     CV: regular rhythm, normal S1 S2, no S3 or S4 and no murmurs     ABDOMEN:  soft, nontender, no HSM or masses and bowel sounds normal     MS: extremities normal- no gross deformities noted, no evidence of inflammation in joints,      SKIN: 6 cm mass left SI area     NEURO: Normal strength and tone, sensory exam grossly normal, mentation intact and speech normal     PSYCH: mentation appears normal. and affect normal/bright     LYMPHATICS: " No cervical adenopathy    DIAGNOSTICS:     EKG:  NSR, normal axis, normal intervals, no acute ST/T changes c/w ischemia, no LVH by voltage criteria, unchanged from previous tracings; appears to be an old anteroseptal scar  Labs Drawn today: CBC, BMP,TSH, A1C. All WNL/acceptable    Unresulted Labs Ordered in the Past 30 Days of this Admission     Date and Time Order Name Status Description    2/27/2019 1619 URIC ACID In process     2/27/2019 1619 TSH 4.47     2/27/2019 1619 T4 FREE nl     2/27/2019 1619 BASIC METABOLIC PANEL glucose 128  Cr 1.22, stable           Recent Labs   Lab Test 10/17/18  1709 08/29/18  0620  08/28/18  0623  08/15/18  1718 04/13/18  0923  10/09/17  0900  05/17/13  0700  05/10/13  0700   HGB 12.1 10.0*   < > 9.4*  --  13.0  --    < > 12.9   < >  --   --  10.0*   PLT  --   --   --   --   --  291  --   --  252   < >  --   --   --    INR  --   --   --   --   --   --   --   --   --   --  2.46*  --  2.18*   NA  --   --   --  141  --  141  --    < >  --    < >  --   --  140   POTASSIUM 4.4  --   --  5.0  --  4.3  --    < >  --    < >  --   --  4.1   CR 1.34*  --   --  1.30*   < > 1.31*  --    < >  --    < >  --    < > 1.96*   A1C  --   --   --   --   --  6.8* 6.6*  --  6.5*   < >  --   --   --     < > = values in this interval not displayed.        IMPRESSION:   Reason for surgery/procedure: painful mass, lower back  Diagnosis/reason for consult: anesth clearance    Otherwise nl exam  CAD, stable  HTN, stable   T2 DM, stable  2 DB stable    The proposed surgical procedure is considered LOW risk.    REVISED CARDIAC RISK INDEX  The patient has the following serious cardiovascular risks for perioperative complications such as (MI, PE, VFib and 3  AV Block):       No serious cardiac risks ( hx of CAD, but stable for years)  INTERPRETATION: 0 risks: Class I (very low risk - 0.4% complication rate)      The patient has the following additional risks for perioperative complications:  No identified  additional risks        RECOMMENDATIONS:         --Patient is to take all scheduled medications on the day of surgery EXCEPT for modifications listed below.     No asa 5 dd prior     For this procedure, losartan should be okay to take    APPROVAL GIVEN to proceed with proposed procedure, without further diagnostic evaluation.       Signed Electronically by: Teddy Wray MD    Copy of this evaluation report is provided to requesting physician.    [facility] Preop Guidelines    Revised Cardiac Risk Index

## 2019-03-04 RX ORDER — LEVOFLOXACIN 750 MG/1
TABLET, FILM COATED ORAL
Qty: 4 TABLET | Refills: 3 | Status: SHIPPED | OUTPATIENT
Start: 2019-03-04 | End: 2020-01-08

## 2019-03-04 NOTE — TELEPHONE ENCOUNTER
Patient said the only way to know if surgery will be covered by insurance if she calls the MD office.   Meeta Zarate RN on 3/4/2019 at 3:00 PM

## 2019-03-04 NOTE — TELEPHONE ENCOUNTER
Per Yoseph Murphy this has already been cleared through medicare. Notified the patient and she was happy to hear that.

## 2019-03-05 DIAGNOSIS — I10 ESSENTIAL HYPERTENSION, BENIGN: ICD-10-CM

## 2019-03-06 ENCOUNTER — APPOINTMENT (OUTPATIENT)
Dept: GENERAL RADIOLOGY | Facility: CLINIC | Age: 79
End: 2019-03-06
Attending: EMERGENCY MEDICINE
Payer: MEDICARE

## 2019-03-06 ENCOUNTER — APPOINTMENT (OUTPATIENT)
Dept: CT IMAGING | Facility: CLINIC | Age: 79
End: 2019-03-06
Attending: EMERGENCY MEDICINE
Payer: MEDICARE

## 2019-03-06 ENCOUNTER — HOSPITAL ENCOUNTER (EMERGENCY)
Facility: CLINIC | Age: 79
Discharge: HOME | End: 2019-03-06
Attending: EMERGENCY MEDICINE | Admitting: EMERGENCY MEDICINE
Payer: MEDICARE

## 2019-03-06 ENCOUNTER — TELEPHONE (OUTPATIENT)
Dept: FAMILY MEDICINE | Facility: OTHER | Age: 79
End: 2019-03-06

## 2019-03-06 VITALS
SYSTOLIC BLOOD PRESSURE: 179 MMHG | HEART RATE: 50 BPM | DIASTOLIC BLOOD PRESSURE: 75 MMHG | TEMPERATURE: 98.2 F | RESPIRATION RATE: 28 BRPM | OXYGEN SATURATION: 97 %

## 2019-03-06 DIAGNOSIS — R07.9 CHEST PAIN, UNSPECIFIED TYPE: ICD-10-CM

## 2019-03-06 LAB
ANION GAP SERPL CALCULATED.3IONS-SCNC: 8 MMOL/L (ref 3–14)
BASOPHILS # BLD AUTO: 0.1 10E9/L (ref 0–0.2)
BASOPHILS NFR BLD AUTO: 0.9 %
BUN SERPL-MCNC: 23 MG/DL (ref 7–30)
CALCIUM SERPL-MCNC: 8.7 MG/DL (ref 8.5–10.1)
CHLORIDE SERPL-SCNC: 107 MMOL/L (ref 94–109)
CO2 SERPL-SCNC: 28 MMOL/L (ref 20–32)
CREAT SERPL-MCNC: 1.06 MG/DL (ref 0.52–1.04)
D DIMER PPP FEU-MCNC: 0.8 UG/ML FEU (ref 0–0.5)
DIFFERENTIAL METHOD BLD: NORMAL
EOSINOPHIL NFR BLD AUTO: 19.7 %
ERYTHROCYTE [DISTWIDTH] IN BLOOD BY AUTOMATED COUNT: 14.7 % (ref 10–15)
GFR SERPL CREATININE-BSD FRML MDRD: 50 ML/MIN/{1.73_M2}
GLUCOSE SERPL-MCNC: 100 MG/DL (ref 70–99)
HCT VFR BLD AUTO: 38 % (ref 35–47)
HGB BLD-MCNC: 12.3 G/DL (ref 11.7–15.7)
IMM GRANULOCYTES # BLD: 0.1 10E9/L (ref 0–0.4)
IMM GRANULOCYTES NFR BLD: 0.7 %
LYMPHOCYTES # BLD AUTO: 1.7 10E9/L (ref 0.8–5.3)
LYMPHOCYTES NFR BLD AUTO: 19.2 %
MCH RBC QN AUTO: 29.6 PG (ref 26.5–33)
MCHC RBC AUTO-ENTMCNC: 32.4 G/DL (ref 31.5–36.5)
MCV RBC AUTO: 91 FL (ref 78–100)
MONOCYTES # BLD AUTO: 0.6 10E9/L (ref 0–1.3)
MONOCYTES NFR BLD AUTO: 7.1 %
NEUTROPHILS # BLD AUTO: 4.7 10E9/L (ref 1.6–8.3)
NEUTROPHILS NFR BLD AUTO: 52.4 %
NRBC # BLD AUTO: 0 10*3/UL
NRBC BLD AUTO-RTO: 0 /100
PLATELET # BLD AUTO: 294 10E9/L (ref 150–450)
POTASSIUM SERPL-SCNC: 4 MMOL/L (ref 3.4–5.3)
RBC # BLD AUTO: 4.16 10E12/L (ref 3.8–5.2)
SODIUM SERPL-SCNC: 143 MMOL/L (ref 133–144)
TROPONIN I SERPL-MCNC: <0.015 UG/L (ref 0–0.04)
TROPONIN I SERPL-MCNC: <0.015 UG/L (ref 0–0.04)
WBC # BLD AUTO: 8.9 10E9/L (ref 4–11)

## 2019-03-06 PROCEDURE — 99285 EMERGENCY DEPT VISIT HI MDM: CPT | Mod: 25 | Performed by: EMERGENCY MEDICINE

## 2019-03-06 PROCEDURE — 85379 FIBRIN DEGRADATION QUANT: CPT | Performed by: EMERGENCY MEDICINE

## 2019-03-06 PROCEDURE — 93010 ELECTROCARDIOGRAM REPORT: CPT | Mod: Z6 | Performed by: EMERGENCY MEDICINE

## 2019-03-06 PROCEDURE — 25000128 H RX IP 250 OP 636: Performed by: RADIOLOGY

## 2019-03-06 PROCEDURE — 25000128 H RX IP 250 OP 636: Performed by: EMERGENCY MEDICINE

## 2019-03-06 PROCEDURE — 96374 THER/PROPH/DIAG INJ IV PUSH: CPT | Mod: 59 | Performed by: EMERGENCY MEDICINE

## 2019-03-06 PROCEDURE — 71046 X-RAY EXAM CHEST 2 VIEWS: CPT | Mod: TC

## 2019-03-06 PROCEDURE — 25000132 ZZH RX MED GY IP 250 OP 250 PS 637: Mod: GY | Performed by: EMERGENCY MEDICINE

## 2019-03-06 PROCEDURE — 93005 ELECTROCARDIOGRAM TRACING: CPT | Performed by: EMERGENCY MEDICINE

## 2019-03-06 PROCEDURE — 84484 ASSAY OF TROPONIN QUANT: CPT | Performed by: EMERGENCY MEDICINE

## 2019-03-06 PROCEDURE — 36415 COLL VENOUS BLD VENIPUNCTURE: CPT | Performed by: EMERGENCY MEDICINE

## 2019-03-06 PROCEDURE — 25000125 ZZHC RX 250: Performed by: RADIOLOGY

## 2019-03-06 PROCEDURE — 85025 COMPLETE CBC W/AUTO DIFF WBC: CPT | Performed by: EMERGENCY MEDICINE

## 2019-03-06 PROCEDURE — A9270 NON-COVERED ITEM OR SERVICE: HCPCS | Mod: GY | Performed by: EMERGENCY MEDICINE

## 2019-03-06 PROCEDURE — 80048 BASIC METABOLIC PNL TOTAL CA: CPT | Performed by: EMERGENCY MEDICINE

## 2019-03-06 PROCEDURE — 71260 CT THORAX DX C+: CPT

## 2019-03-06 RX ORDER — LORAZEPAM 2 MG/ML
1 INJECTION INTRAMUSCULAR ONCE
Status: DISCONTINUED | OUTPATIENT
Start: 2019-03-06 | End: 2019-03-06 | Stop reason: HOSPADM

## 2019-03-06 RX ORDER — IOPAMIDOL 755 MG/ML
500 INJECTION, SOLUTION INTRAVASCULAR ONCE
Status: COMPLETED | OUTPATIENT
Start: 2019-03-06 | End: 2019-03-06

## 2019-03-06 RX ORDER — LOSARTAN POTASSIUM 50 MG/1
TABLET ORAL
Qty: 60 TABLET | Refills: 0 | Status: SHIPPED | OUTPATIENT
Start: 2019-03-06 | End: 2019-04-10

## 2019-03-06 RX ORDER — HYDROMORPHONE HYDROCHLORIDE 1 MG/ML
0.5 INJECTION, SOLUTION INTRAMUSCULAR; INTRAVENOUS; SUBCUTANEOUS
Status: DISCONTINUED | OUTPATIENT
Start: 2019-03-06 | End: 2019-03-06 | Stop reason: HOSPADM

## 2019-03-06 RX ORDER — NITROGLYCERIN 0.4 MG/1
0.4 TABLET SUBLINGUAL EVERY 5 MIN PRN
Status: DISCONTINUED | OUTPATIENT
Start: 2019-03-06 | End: 2019-03-06 | Stop reason: HOSPADM

## 2019-03-06 RX ORDER — HYDROCODONE BITARTRATE AND ACETAMINOPHEN 5; 325 MG/1; MG/1
1 TABLET ORAL EVERY 4 HOURS PRN
Qty: 15 TABLET | Refills: 0 | Status: SHIPPED | OUTPATIENT
Start: 2019-03-06 | End: 2019-03-06

## 2019-03-06 RX ADMIN — SODIUM CHLORIDE 80 ML: 9 INJECTION, SOLUTION INTRAVENOUS at 15:45

## 2019-03-06 RX ADMIN — IOPAMIDOL 65 ML: 755 INJECTION, SOLUTION INTRAVENOUS at 15:45

## 2019-03-06 RX ADMIN — Medication 0.5 MG: at 15:01

## 2019-03-06 RX ADMIN — NITROGLYCERIN 0.4 MG: 0.4 TABLET SUBLINGUAL at 14:22

## 2019-03-06 NOTE — TELEPHONE ENCOUNTER
"Sandra Petit is a 78 year old female who calls with chest pain.    NURSING ASSESSMENT:  Description:  Patient is calling and states that this morning she awoke to light chest pain.  She stated that at this time it has increased in pain - 4-6/10 and is constant.  Patient stated she took her blood pressure two times this afternoon, one reading 169/82 with noted \"irregular heartbeat\" on the monitor.  The second reading was 168/75 again with noted \"irregular heartbeat\" on the monitor.  Patient states she has worsening pain when taking a deep breath.  Patient denies dizziness, nausea, vomiting, pain in the neck, shoulders, jaw, teeth, back or arms, and the feeling of heart palpitation.  Advised patient to call 911 or go to ER someone else to drive.  Patient stated understanding.    Onset/duration:  1 day  Precip. factors:  unknown  Associated symptoms:  Stated above  Improves/worsens symptoms:  none  Pain scale (0-10)   4-6/10    Allergies:   Allergies   Allergen Reactions     Penicillins Swelling     \"throat started swelling\"     Vitamin B Complex Hives     Vitamin B12 Hives     all vitamin B's     Clindamycin Hcl Rash       NURSING PLAN: Nursing advice to patient to ER right away    RECOMMENDED DISPOSITION:  Call 911 - or another person to drive  Will comply with recommendation: Yes  If further questions/concerns or if symptoms do not improve, worsen or new symptoms develop, call your PCP or [facility] as soon as possible.      Guideline used:  Chest Pain  Telephone Triage Protocols for Nurses, Fifth Edition, Elaine Javier RN    "

## 2019-03-06 NOTE — ED PROVIDER NOTES
"  History     Chief Complaint   Patient presents with     Chest Pain     HPI  Sandra Petit is a 78 year old female with CAD (stents 2005 in [place]) presents to the ED with Chest pain.  She woke up and felt chest pressure at 4 am.  She took clonidine and thyroid meds and went back to sleep.  Woke up a half hour later still had chest pressure (6/10).  The pressure became worse and more in the center and worse with deep breath.  She has mild sob but no diaphoresis or nausea. She feels a little lightheaded.  No fever, cough, bodyaches, headache, abd pain, n/v/diarrhea or constipation.     She called the nurse line at 1:15.  BP was high and said irregular heartbeat which is new for her. She told her to take nitroglycerin and a clonidine which she did. No aspirin.  bp was 169/82 at home.  No change in bp. Did not take anything for the pain.  The pain is 10/10 now.     Surgery tomorrow for a lipoma removal on her lower back bc its pressing on a nerve. She is supposed to take an antibiotic prior that bc of knee replacement.     Allergies:  Allergies   Allergen Reactions     Penicillins Swelling     \"throat started swelling\"     Vitamin B Complex Hives     Vitamin B12 Hives     all vitamin B's     Clindamycin Hcl Rash       Problem List:    Patient Active Problem List    Diagnosis Date Noted     Great toe pain, left 08/28/2018     Priority: Medium     S/P total knee replacement using cement, right 08/27/2018     Priority: Medium     Primary osteoarthritis of right knee 03/14/2018     Priority: Medium     S/P total knee arthroplasty, left 03/14/2018     Priority: Medium     Osteoarthritis of left thumb 07/07/2017     Priority: Medium     Herpes zoster without complication 09/07/2016     Priority: Medium     Type 2 diabetes mellitus with other circulatory complications (H) 04/04/2016     Priority: Medium     Hyperuricemia 04/04/2016     Priority: Medium     Other specified hypothyroidism 10/02/2015     Priority: Medium     " Coronary artery disease involving native coronary artery without angina pectoris 10/02/2015     Priority: Medium     Type 2 diabetes mellitus with diabetic nephropathy (H) 10/02/2015     Priority: Medium     Acute-on-chronic kidney injury (H) 01/19/2015     Priority: Medium     Coronary atherosclerosis of native coronary artery 01/19/2015     Priority: Medium     Chest pain 01/19/2015     Priority: Medium     Bunion 07/18/2013     Priority: Medium     History of total left knee replacement 04/29/2013     Priority: Medium     OA (osteoarthritis) of knee - bilateral 10/29/2012     Priority: Medium     Hypertension goal BP (blood pressure) < 140/90 09/26/2011     Priority: Medium     Hyperlipidemia LDL goal <100 10/31/2010     Priority: Medium     Encounter for long-term current use of medication 09/22/2010     Priority: Medium     Problem list name updated by automated process. Provider to review       CKD (chronic kidney disease) stage 3, GFR 30-59 ml/min (H) 03/17/2009     Priority: Medium      MONONUCLEOSIS ebv inf 7/05 08/05/2005     Priority: Medium     Coronary atherosclerosis 06/06/2005     Priority: Medium     Problem list name updated by automated process. Provider to review       Cardiac dysrhythmia 07/18/2001     Priority: Medium     Problem list name updated by automated process. Provider to review       Myalgia and myositis 07/18/2001     Priority: Medium     Problem list name updated by automated process. Provider to review          Past Medical History:    Past Medical History:   Diagnosis Date     Diabetic eye exam (H) 08/05/13     Endometriosis, site unspecified      Fever and other physiologic disturbances of temperature regulation 07/07/2005     Myalgia and myositis, unspecified      Pure hypercholesterolemia      Unspecified essential hypertension      Unspecified hypothyroidism        Past Surgical History:    Past Surgical History:   Procedure Laterality Date     ARTHROPLASTY KNEE  4/29/2013     Procedure: ARTHROPLASTY KNEE;  left total knee arthroplasty;  Surgeon: Teddy Grimes MD;  Location: [facility]     ARTHROPLASTY KNEE Right 2018    Procedure: ARTHROPLASTY KNEE;  right total knee arthroplasty;  Surgeon: Johnny Anaya MD;  Location: [facility]     C APPENDECTOMY       C NONSPECIFIC PROCEDURE      Knee ligament surgery     C NONSPECIFIC PROCEDURE      Kidney surgery for mechanical obstruction     C OPEN CORONARY ENDARTERECTOMY  2005    Angioplasty w stenting     C TOTAL KNEE ARTHROPLASTY  13    Left     COMBINED CYSTOSCOPY, INSERT CATHETER URETER Bilateral 8/10/2015    Procedure: COMBINED CYSTOSCOPY, INSERT CATHETER URETER;  Surgeon: Johnnie Madera MD;  Location: [facility]     DILATION AND CURETTAGE, HYSTEROSCOPY DIAGNOSTIC, COMBINED N/A 2014    Procedure: COMBINED DILATION AND CURETTAGE, HYSTEROSCOPY DIAGNOSTIC;  Surgeon: Edward Pardo MD;  Location: [facility]     ESOPHAGOSCOPY, GASTROSCOPY, DUODENOSCOPY (EGD), COMBINED N/A 2015    Procedure: COMBINED ESOPHAGOSCOPY, GASTROSCOPY, DUODENOSCOPY (EGD), BIOPSY SINGLE OR MULTIPLE;  Surgeon: Carmelo Rosario MD;  Location: [facility]     HC REMOVAL GALLBLADDER      Cholecystectomy     HC REMOVE TONSILS/ADENOIDS,<11 Y/O      unsure of age     LAPAROSCOPIC HYSTERECTOMY TOTAL N/A 8/10/2015    Procedure: LAPAROSCOPIC HYSTERECTOMY TOTAL;  Surgeon: Edward Pardo MD;  Location[facility]     LAPAROSCOPIC LYSIS ADHESIONS N/A 8/10/2015    Procedure: LAPAROSCOPIC LYSIS ADHESIONS;  Surgeon: Edward Pardo MD;  Location: [facility]       Family History:    Family History   Problem Relation Age of Onset     Heart Disease Mother          coronary     Heart Disease Father          coronary     Allergies Sister         unknown     Allergies Brother         unknown     Allergies Daughter         unknown     Allergies Son         unknown     Heart Disease Sister         by pass surg     Heart Disease Brother          on medication     Hypertension Sister      Hypertension Brother      Lipids Sister      Lipids Brother      Cerebrovascular Disease Brother      Obesity Sister      Diabetes Sister      Diabetes Sister      C.A.D. Brother         quad by pass     Neurologic Disorder Sister         parkinsons     Cancer Sister         skin cancer     Alcohol/Drug No family hx of      Alzheimer Disease No family hx of        Social History:  Marital Status:   [2]  Social History     Tobacco Use     Smoking status: Former Smoker     Smokeless tobacco: Never Used     Tobacco comment: quit  1987   Substance Use Topics     Alcohol use: No     Alcohol/week: 0.0 oz     Drug use: No        Medications:      HYDROcodone-acetaminophen (NORCO) 5-325 MG tablet   aspirin (ASA) 325 MG EC tablet   blood glucose monitoring (ONE TOUCH DELICA) lancets   blood glucose monitoring (ONETOUCH VERIO IQ) test strip   Cholecalciferol (VITAMIN D) 400 UNITS capsule   cloNIDine (CATAPRES) 0.2 MG tablet   levofloxacin (LEVAQUIN) 750 MG tablet   levothyroxine (SYNTHROID/LEVOTHROID) 50 MCG tablet   losartan (COZAAR) 50 MG tablet   metFORMIN (GLUCOPHAGE-XR) 500 MG 24 hr tablet   nitroglycerin (NITROSTAT) 0.4 MG SL tablet   Probiotic Product (ACIDOPHILUS PROBIOTIC BLEND) CAPS   simvastatin (ZOCOR) 40 MG tablet         Review of Systems   All other systems reviewed and are negative.      Physical Exam   BP: (!) 201/86  Pulse: 62  Heart Rate: 53  Temp: 98.2  F (36.8  C)  Resp: 20  SpO2: 99 %      Physical Exam   Constitutional: She is oriented to person, place, and time. She appears well-developed and well-nourished. No distress.   HENT:   Head: Normocephalic and atraumatic.   Eyes: No scleral icterus.   Neck: Normal range of motion. Neck supple.   Cardiovascular: Normal rate and regular rhythm.   Pulmonary/Chest: Effort normal and breath sounds normal.   Abdominal: There is no tenderness. There is no guarding.   Neurological: She is alert and oriented to  person, place, and time.   Skin: Skin is warm and dry. No rash noted. She is not diaphoretic. No erythema. No pallor.       ED Course     ED Course as of Mar 06 2121   Wed Mar 06, 2019   1511 XR Chest 2 Views     Procedures               EKG Interpretation:      Interpreted by Tate Ronquillo  Time reviewed: 1347  Symptoms at time of EKG: chest pain   Rhythm: normal sinus   Rate: bradycardia  Axis: normal  Ectopy: none  Conduction: normal  ST Segments/ T Waves: No ST-T wave changes  Q Waves: none  Comparison to prior: Unchanged     Clinical Impression: normal EKG                 Results for orders placed or performed during the hospital encounter of 03/06/19 (from the past 24 hour(s))   CBC with platelets differential   Result Value Ref Range    WBC 8.9 4.0 - 11.0 10e9/L    RBC Count 4.16 3.8 - 5.2 10e12/L    Hemoglobin 12.3 11.7 - 15.7 g/dL    Hematocrit 38.0 35.0 - 47.0 %    MCV 91 78 - 100 fl    MCH 29.6 26.5 - 33.0 pg    MCHC 32.4 31.5 - 36.5 g/dL    RDW 14.7 10.0 - 15.0 %    Platelet Count 294 150 - 450 10e9/L    Diff Method Automated Method     % Neutrophils 52.4 %    % Lymphocytes 19.2 %    % Monocytes 7.1 %    % Eosinophils 19.7 %    % Basophils 0.9 %    % Immature Granulocytes 0.7 %    Nucleated RBCs 0 0 /100    Absolute Neutrophil 4.7 1.6 - 8.3 10e9/L    Absolute Lymphocytes 1.7 0.8 - 5.3 10e9/L    Absolute Monocytes 0.6 0.0 - 1.3 10e9/L    Absolute Basophils 0.1 0.0 - 0.2 10e9/L    Abs Immature Granulocytes 0.1 0 - 0.4 10e9/L    Absolute Nucleated RBC 0.0    Basic metabolic panel   Result Value Ref Range    Sodium 143 133 - 144 mmol/L    Potassium 4.0 3.4 - 5.3 mmol/L    Chloride 107 94 - 109 mmol/L    Carbon Dioxide 28 20 - 32 mmol/L    Anion Gap 8 3 - 14 mmol/L    Glucose 100 (H) 70 - 99 mg/dL    Urea Nitrogen 23 7 - 30 mg/dL    Creatinine 1.06 (H) 0.52 - 1.04 mg/dL    GFR Estimate 50 (L) >60 mL/min/[1.73_m2]    GFR Estimate If Black 58 (L) >60 mL/min/[1.73_m2]    Calcium 8.7 8.5 - 10.1 mg/dL   Troponin  I   Result Value Ref Range    Troponin I ES <0.015 0.000 - 0.045 ug/L   D dimer quantitative   Result Value Ref Range    D Dimer 0.8 (H) 0.0 - 0.50 ug/ml FEU   XR Chest 2 Views    Narrative    CHEST TWO VIEWS    3/6/2019 2:45 PM     HISTORY: Shortness of breath. Chest pain.    COMPARISON: 12/2/2017.      Impression    IMPRESSION: Hypoinflated lungs and presumed bibasilar atelectasis  appears stable. No new airspace disease. Medial right base airspace  disease difficult to entirely exclude. CT could provide clarification  if clinically relevant.    MAINE LARA MD   CT Chest Pulmonary Embolism w Contrast    Narrative    CT CHEST PULMONARY EMBOLISM WITH CONTRAST   3/6/2019 3:51 PM    HISTORY: Chest pain. Positive D-dimer.    TECHNIQUE: Pulmonary embolism protocol was performed. 65 mL Isovue-  370 were injected IV. After contrast injection, volumetric helical  acquisition was performed from the thoracic inlet through the upper  abdomen. Radiation dose for this scan was reduced using automated  exposure control, adjustment of the mA and/or kV according to patient  size, or iterative reconstruction technique.    COMPARISON: None.    FINDINGS: No evidence for pulmonary embolism. The thoracic aorta is of  normal caliber, without evidence for thoracic aortic aneurysm or  dissection. Atherosclerotic calcification of the thoracic aorta and  coronary arteries. No pleural or pericardial effusions. No enlarged  lymph nodes are identified in the chest. Focal nodular thickening  along the right minor fissure (series 5 image 65) measures  approximately 2.4 x 1.3 cm. 0.3 cm indeterminate pulmonary nodule  along the right major fissure (series 5 image 58). The lungs are  otherwise clear. Limited views of the upper abdomen are unremarkable.      Impression    IMPRESSION:   1. No evidence for pulmonary embolism or thoracic aortic dissection.   2. Focal area of nodular thickening along the right minor fissure  measures 2.4 cm in greatest  dimension, and is indeterminate. A  follow-up CT in 3 months is recommended to ensure stability or  resolution.     MAYUR DUMONT MD   Troponin I   Result Value Ref Range    Troponin I ES <0.015 0.000 - 0.045 ug/L     *Note: Due to a large number of results and/or encounters for the requested time period, some results have not been displayed. A complete set of results can be found in Results Review.       Medications   iopamidol (ISOVUE-370) solution 500 mL (65 mLs Intravenous Given 3/6/19 1545)   sodium chloride (PF) 0.9% PF flush 3 mL (3 mLs Intracatheter Given 3/6/19 1543)       Assessments & Plan (with Medical Decision Making)  78-year-old female who presented with chest pain of uncertain etiology.  Acute coronary syndrome has essentially been ruled out with 2 troponins, no response to nitroglycerin, normal stress test in 2017, normal EKG today.  Pulmonary embolism ruled out with CT PE scan.  The differential diagnosis, treatment options, risks and follow up discussed with a competent patient and/or competent family member who agrees with the plan.  Patient declined pain medicines for home       I have reviewed the nursing notes.    I have reviewed the findings, diagnosis, plan and need for follow up with the patient.         Medication List      Started    HYDROcodone-acetaminophen 5-325 MG tablet  Commonly known as:  NORCO  1 tablet, Oral, EVERY 4 HOURS PRN            Final diagnoses:   Chest pain, unspecified type       3/6/2019   [facility] EMERGENCY DEPARTMENT     Tate Ronquillo MD  03/06/19 4599

## 2019-03-06 NOTE — TELEPHONE ENCOUNTER
"Requested Prescriptions   Pending Prescriptions Disp Refills     losartan (COZAAR) 50 MG tablet [Pharmacy Med Name: LOSARTAN 50MG TAB] 180 tablet 1    Last Written Prescription Date:  8/24/18  Last Fill Quantity: 180,  # refills: 1   Last office visit: 2/27/2019 with prescribing provider:     Future Office Visit:     Sig: TAKE 1 TABLET BY MOUTH TWICE DAILY    Angiotensin-II Receptors Failed - 3/5/2019  2:31 PM       Failed - Normal serum creatinine on file in past 12 months    Recent Labs   Lab Test 03/06/19  1403   CR 1.06*            Passed - Blood pressure under 140/90 in past 12 months    BP Readings from Last 3 Encounters:   03/06/19 178/70   02/27/19 102/62   02/22/19 136/76          Passed - Recent (12 mo) or future (30 days) visit within the authorizing provider's specialty    Patient had office visit in the last 12 months or has a visit in the next 30 days with authorizing provider or within the authorizing provider's specialty.  See \"Patient Info\" tab in inbasket, or \"Choose Columns\" in Meds & Orders section of the refill encounter.           Passed - Medication is active on med list       Passed - Patient is age 18 or older       Passed - No active pregnancy on record       Passed - Normal serum potassium on file in past 12 months    Recent Labs   Lab Test 03/06/19  1403   POTASSIUM 4.0            Passed - No positive pregnancy test in past 12 months      Routing refill request to provider for review/approval because:  Labs out of range:  CR    T'd up 1 month for provider review.    Radha Javier RN            "

## 2019-03-06 NOTE — TELEPHONE ENCOUNTER
Patient called to cancel surgery. States that she is currently in the ER. Notified Karen in the OR.

## 2019-03-06 NOTE — ED TRIAGE NOTES
"Pt c/o Midsternal CP that stated this am.   Mild SOB and lightheaded.  PT states her BP at home was \"high\" and \"irregular heartbeat\".  Pt called nurse like and took an extra Clonidine.  HX of 2 stents.    "

## 2019-03-06 NOTE — ED AVS SNAPSHOT
[facility] Emergency Department  [address] [place] [address]  Phone:  [phone]  Fax:  [phone]                                    Sandra Petit   MRN: [id number]    Department:  [facility] Emergency Department   Date of Visit:  3/6/2019           After Visit Summary Signature Page    I have received my discharge instructions, and my questions have been answered. I have discussed any challenges I see with this plan with the nurse or doctor.    ..........................................................................................................................................  Patient/Patient Representative Signature      ..........................................................................................................................................  Patient Representative Print Name and Relationship to Patient    ..................................................               ................................................  Date                                   Time    ..........................................................................................................................................  Reviewed by Signature/Title    ...................................................              ..............................................  Date                                               Time          22EPIC Rev 08/18

## 2019-03-12 ENCOUNTER — ANCILLARY PROCEDURE (OUTPATIENT)
Dept: GENERAL RADIOLOGY | Facility: OTHER | Age: 79
End: 2019-03-12
Attending: FAMILY MEDICINE
Payer: MEDICARE

## 2019-03-12 ENCOUNTER — HOSPITAL ENCOUNTER (EMERGENCY)
Facility: CLINIC | Age: 79
Discharge: ANOTHER HOSPITAL | End: 2019-03-12
Attending: FAMILY MEDICINE | Admitting: FAMILY MEDICINE
Payer: MEDICARE

## 2019-03-12 ENCOUNTER — OFFICE VISIT (OUTPATIENT)
Dept: FAMILY MEDICINE | Facility: OTHER | Age: 79
End: 2019-03-12
Payer: MEDICARE

## 2019-03-12 ENCOUNTER — MYC MEDICAL ADVICE (OUTPATIENT)
Dept: FAMILY MEDICINE | Facility: OTHER | Age: 79
End: 2019-03-12

## 2019-03-12 VITALS
BODY MASS INDEX: 33.07 KG/M2 | OXYGEN SATURATION: 96 % | WEIGHT: 172.18 LBS | SYSTOLIC BLOOD PRESSURE: 191 MMHG | DIASTOLIC BLOOD PRESSURE: 105 MMHG | HEART RATE: 80 BPM | TEMPERATURE: 98.4 F | RESPIRATION RATE: 23 BRPM

## 2019-03-12 VITALS
BODY MASS INDEX: 33.04 KG/M2 | RESPIRATION RATE: 18 BRPM | WEIGHT: 172 LBS | TEMPERATURE: 96.6 F | DIASTOLIC BLOOD PRESSURE: 78 MMHG | HEART RATE: 83 BPM | SYSTOLIC BLOOD PRESSURE: 120 MMHG | OXYGEN SATURATION: 94 %

## 2019-03-12 DIAGNOSIS — I21.4 NSTEMI (NON-ST ELEVATED MYOCARDIAL INFARCTION) (H): ICD-10-CM

## 2019-03-12 DIAGNOSIS — R06.02 SOB (SHORTNESS OF BREATH): Primary | ICD-10-CM

## 2019-03-12 DIAGNOSIS — R07.9 CHEST PAIN, UNSPECIFIED TYPE: ICD-10-CM

## 2019-03-12 DIAGNOSIS — I24.9 ACUTE CORONARY SYNDROME (H): ICD-10-CM

## 2019-03-12 LAB
ANION GAP SERPL CALCULATED.3IONS-SCNC: 8 MMOL/L (ref 3–14)
BASOPHILS # BLD AUTO: 0.1 10E9/L (ref 0–0.2)
BASOPHILS NFR BLD AUTO: 0.7 %
BUN SERPL-MCNC: 24 MG/DL (ref 7–30)
CALCIUM SERPL-MCNC: 8.7 MG/DL (ref 8.5–10.1)
CHLORIDE SERPL-SCNC: 108 MMOL/L (ref 94–109)
CO2 SERPL-SCNC: 26 MMOL/L (ref 20–32)
CREAT SERPL-MCNC: 1.26 MG/DL (ref 0.52–1.04)
DIFFERENTIAL METHOD BLD: ABNORMAL
EOSINOPHIL NFR BLD AUTO: 9.2 %
ERYTHROCYTE [DISTWIDTH] IN BLOOD BY AUTOMATED COUNT: 15.3 % (ref 10–15)
ERYTHROCYTE [DISTWIDTH] IN BLOOD BY AUTOMATED COUNT: 15.5 % (ref 10–15)
FEF 25/75: NORMAL
FEV-1: NORMAL
FEV1/FVC: NORMAL
FVC: NORMAL
GFR SERPL CREATININE-BSD FRML MDRD: 41 ML/MIN/{1.73_M2}
GLUCOSE SERPL-MCNC: 185 MG/DL (ref 70–99)
HCT VFR BLD AUTO: 37 % (ref 35–47)
HCT VFR BLD AUTO: 38.8 % (ref 35–47)
HGB BLD-MCNC: 12.1 G/DL (ref 11.7–15.7)
HGB BLD-MCNC: 12.3 G/DL (ref 11.7–15.7)
IMM GRANULOCYTES # BLD: 0.1 10E9/L (ref 0–0.4)
IMM GRANULOCYTES NFR BLD: 0.4 %
LYMPHOCYTES # BLD AUTO: 2 10E9/L (ref 0.8–5.3)
LYMPHOCYTES NFR BLD AUTO: 18.3 %
MCH RBC QN AUTO: 29.4 PG (ref 26.5–33)
MCH RBC QN AUTO: 30.1 PG (ref 26.5–33)
MCHC RBC AUTO-ENTMCNC: 31.7 G/DL (ref 31.5–36.5)
MCHC RBC AUTO-ENTMCNC: 32.7 G/DL (ref 31.5–36.5)
MCV RBC AUTO: 92 FL (ref 78–100)
MCV RBC AUTO: 93 FL (ref 78–100)
MONOCYTES # BLD AUTO: 0.8 10E9/L (ref 0–1.3)
MONOCYTES NFR BLD AUTO: 7.4 %
NEUTROPHILS # BLD AUTO: 7.1 10E9/L (ref 1.6–8.3)
NEUTROPHILS NFR BLD AUTO: 64 %
NRBC # BLD AUTO: 0 10*3/UL
NRBC BLD AUTO-RTO: 0 /100
NT-PROBNP SERPL-MCNC: ABNORMAL PG/ML (ref 0–450)
PLATELET # BLD AUTO: 287 10E9/L (ref 150–450)
PLATELET # BLD AUTO: 317 10E9/L (ref 150–450)
POTASSIUM SERPL-SCNC: 3.8 MMOL/L (ref 3.4–5.3)
RBC # BLD AUTO: 4.02 10E12/L (ref 3.8–5.2)
RBC # BLD AUTO: 4.19 10E12/L (ref 3.8–5.2)
SODIUM SERPL-SCNC: 142 MMOL/L (ref 133–144)
TROPONIN I SERPL-MCNC: 2.09 UG/L (ref 0–0.04)
TROPONIN I SERPL-MCNC: 2.14 UG/L (ref 0–0.04)
WBC # BLD AUTO: 11.1 10E9/L (ref 4–11)
WBC # BLD AUTO: 9.9 10E9/L (ref 4–11)

## 2019-03-12 PROCEDURE — 25000125 ZZHC RX 250: Performed by: FAMILY MEDICINE

## 2019-03-12 PROCEDURE — 96368 THER/DIAG CONCURRENT INF: CPT | Performed by: FAMILY MEDICINE

## 2019-03-12 PROCEDURE — 25000128 H RX IP 250 OP 636: Performed by: FAMILY MEDICINE

## 2019-03-12 PROCEDURE — 99214 OFFICE O/P EST MOD 30 MIN: CPT | Mod: 25 | Performed by: FAMILY MEDICINE

## 2019-03-12 PROCEDURE — 83880 ASSAY OF NATRIURETIC PEPTIDE: CPT | Performed by: FAMILY MEDICINE

## 2019-03-12 PROCEDURE — 93010 ELECTROCARDIOGRAM REPORT: CPT | Mod: Z6 | Performed by: FAMILY MEDICINE

## 2019-03-12 PROCEDURE — 94010 BREATHING CAPACITY TEST: CPT | Performed by: FAMILY MEDICINE

## 2019-03-12 PROCEDURE — 93005 ELECTROCARDIOGRAM TRACING: CPT | Performed by: FAMILY MEDICINE

## 2019-03-12 PROCEDURE — 85025 COMPLETE CBC W/AUTO DIFF WBC: CPT | Performed by: FAMILY MEDICINE

## 2019-03-12 PROCEDURE — 84484 ASSAY OF TROPONIN QUANT: CPT | Performed by: FAMILY MEDICINE

## 2019-03-12 PROCEDURE — 80048 BASIC METABOLIC PNL TOTAL CA: CPT | Performed by: FAMILY MEDICINE

## 2019-03-12 PROCEDURE — A9270 NON-COVERED ITEM OR SERVICE: HCPCS | Mod: GY | Performed by: FAMILY MEDICINE

## 2019-03-12 PROCEDURE — 96366 THER/PROPH/DIAG IV INF ADDON: CPT | Performed by: FAMILY MEDICINE

## 2019-03-12 PROCEDURE — 25000132 ZZH RX MED GY IP 250 OP 250 PS 637: Mod: GY | Performed by: FAMILY MEDICINE

## 2019-03-12 PROCEDURE — 99291 CRITICAL CARE FIRST HOUR: CPT | Mod: 25 | Performed by: FAMILY MEDICINE

## 2019-03-12 PROCEDURE — 93000 ELECTROCARDIOGRAM COMPLETE: CPT | Performed by: FAMILY MEDICINE

## 2019-03-12 PROCEDURE — 71046 X-RAY EXAM CHEST 2 VIEWS: CPT | Mod: FY

## 2019-03-12 PROCEDURE — 36415 COLL VENOUS BLD VENIPUNCTURE: CPT | Performed by: FAMILY MEDICINE

## 2019-03-12 PROCEDURE — 96365 THER/PROPH/DIAG IV INF INIT: CPT | Performed by: FAMILY MEDICINE

## 2019-03-12 PROCEDURE — 96376 TX/PRO/DX INJ SAME DRUG ADON: CPT | Performed by: FAMILY MEDICINE

## 2019-03-12 PROCEDURE — 99292 CRITICAL CARE ADDL 30 MIN: CPT | Performed by: FAMILY MEDICINE

## 2019-03-12 PROCEDURE — 85027 COMPLETE CBC AUTOMATED: CPT | Performed by: FAMILY MEDICINE

## 2019-03-12 PROCEDURE — 96375 TX/PRO/DX INJ NEW DRUG ADDON: CPT | Performed by: FAMILY MEDICINE

## 2019-03-12 RX ORDER — HEPARIN SODIUM 10000 [USP'U]/100ML
0-3500 INJECTION, SOLUTION INTRAVENOUS CONTINUOUS
Status: DISCONTINUED | OUTPATIENT
Start: 2019-03-12 | End: 2019-03-13 | Stop reason: HOSPADM

## 2019-03-12 RX ORDER — FENTANYL CITRATE 50 UG/ML
25-50 INJECTION, SOLUTION INTRAMUSCULAR; INTRAVENOUS
Status: DISCONTINUED | OUTPATIENT
Start: 2019-03-12 | End: 2019-03-13 | Stop reason: HOSPADM

## 2019-03-12 RX ORDER — ASPIRIN 81 MG/1
324 TABLET, CHEWABLE ORAL ONCE
Status: COMPLETED | OUTPATIENT
Start: 2019-03-12 | End: 2019-03-12

## 2019-03-12 RX ORDER — NITROGLYCERIN 20 MG/100ML
.07-2 INJECTION INTRAVENOUS CONTINUOUS
Status: DISCONTINUED | OUTPATIENT
Start: 2019-03-12 | End: 2019-03-13 | Stop reason: HOSPADM

## 2019-03-12 RX ORDER — METOPROLOL TARTRATE 1 MG/ML
2.5 INJECTION, SOLUTION INTRAVENOUS ONCE
Status: COMPLETED | OUTPATIENT
Start: 2019-03-12 | End: 2019-03-12

## 2019-03-12 RX ORDER — NITROGLYCERIN 0.4 MG/1
0.4 TABLET SUBLINGUAL
Status: DISCONTINUED | OUTPATIENT
Start: 2019-03-12 | End: 2019-03-13 | Stop reason: HOSPADM

## 2019-03-12 RX ADMIN — METOPROLOL TARTRATE 2.5 MG: 5 INJECTION, SOLUTION INTRAVENOUS at 22:18

## 2019-03-12 RX ADMIN — NITROGLYCERIN 0.07 MCG/KG/MIN: 20 INJECTION INTRAVENOUS at 21:58

## 2019-03-12 RX ADMIN — TICAGRELOR 180 MG: 90 TABLET ORAL at 22:30

## 2019-03-12 RX ADMIN — HEPARIN SODIUM 950 UNITS/HR: 10000 INJECTION, SOLUTION INTRAVENOUS at 22:10

## 2019-03-12 RX ADMIN — FENTANYL CITRATE 50 MCG: 50 INJECTION INTRAMUSCULAR; INTRAVENOUS at 23:16

## 2019-03-12 RX ADMIN — FENTANYL CITRATE 25 MCG: 50 INJECTION INTRAMUSCULAR; INTRAVENOUS at 22:17

## 2019-03-12 RX ADMIN — ASPIRIN 81 MG 324 MG: 81 TABLET ORAL at 21:53

## 2019-03-12 ASSESSMENT — PAIN SCALES - GENERAL: PAINLEVEL: WORST PAIN (10)

## 2019-03-13 ENCOUNTER — TRANSFERRED RECORDS (OUTPATIENT)
Dept: HEALTH INFORMATION MANAGEMENT | Facility: CLINIC | Age: 79
End: 2019-03-13

## 2019-03-13 NOTE — ED PROVIDER NOTES
[facility] ED Provider Note   Patient: Sandra Petit  MRN #:  [id number]  Date of Visit: March 12, 2019    CC:     Chief Complaint   Patient presents with     Chest Pain     HPI:  Sandra Petit is a 78 year old female who presented to the emergency department with 5-day history of intermittent chest pains, worse over the last 2 days.  Patient now describes increased pain described as heaviness over the midsternal region of her chest with shortness of breath.  She has not had any nausea or diaphoresis.  She went to see Dr. Quiroz in the clinic late morning today and several blood tests were repeated.  You found out this evening that her troponin enzyme returned at 2.089.  Patient was reached by phone and told to come to the emergency department.  Upon arrival, the patient's chest pain is rated 10/10.  She has not had any increased swelling of her ankles or legs.  Patient reports that she has had chest pains on and off since last Wednesday.  She was in the emergency department on Friday after she fell and had a workup including a CT scan of the chest with no signs of PE.  Her d-dimer was elevated but her troponin enzyme was normal at the time.  She has had 2 previous stents placed at [facility] in 2005.  She states that the pain that she had been was different than what she has now.  She had a burning sensation at that time.  Patient had a Lexiscan on May 2017 that was nondiagnostic.  The cardiac cath report from 2005 revealed the following:     ANGIOGRAPHIC DATA    1) LEFT MAIN: The left main coronary artery is free of disease.    2) LAD: The left anterior descending coronary artery has a hazy    eccentric 80% proximal lesion.    3) CIRCUMFLEX: The left circumflex coronary artery has mild plaquing     with maximal stenosis of approximately 30-40%.    4) RCA: The right coronary artery has a proximal 70-80% lesion.    5) LEFT VENTRICULOGRAM:  Left ventriculogram demonstrates normal left    ventricular systolic function.    6) LEFT RENAL ARTERY AND OSTIAL: 60-70% lesion with a 35 mm gradient     across the lesion.    7) RIGHT RENAL ARTERY: The right renal artery is free of significant     disease.      CONCLUSION     1) 80% LAD stenosis.     2) 70-80% RCA stenosis.     3) Mild disease in the circumflex.     4) 60-70% ostial left renal artery stenosis with 35 mm gradient.     5) Normal right renal artery.     6) Normal left ventricular systolic function.     7) Will review for coronary intervention today.    The patient's current cardiac risk factors include labile hypertension, hyperlipidemia, diabetes mellitus, and known coronary artery disease.  She denies any tobacco use.  Her blood sugars have been running within her normal range and has not been high.    Problem List:  Patient Active Problem List    Diagnosis Date Noted     Great toe pain, left 08/28/2018     Priority: Medium     S/P total knee replacement using cement, right 08/27/2018     Priority: Medium     Primary osteoarthritis of right knee 03/14/2018     Priority: Medium     S/P total knee arthroplasty, left 03/14/2018     Priority: Medium     Osteoarthritis of left thumb 07/07/2017     Priority: Medium     Herpes zoster without complication 09/07/2016     Priority: Medium     Type 2 diabetes mellitus with other circulatory complications (H) 04/04/2016     Priority: Medium     Hyperuricemia 04/04/2016     Priority: Medium     Other specified hypothyroidism 10/02/2015     Priority: Medium     Coronary artery disease involving native coronary artery without angina pectoris 10/02/2015     Priority: Medium     Type 2 diabetes mellitus with diabetic nephropathy (H) 10/02/2015     Priority: Medium     Acute-on-chronic kidney injury (H) 01/19/2015     Priority: Medium     Coronary atherosclerosis of native coronary artery 01/19/2015     Priority: Medium     Chest pain 01/19/2015     Priority: Medium      Bunion 07/18/2013     Priority: Medium     History of total left knee replacement 04/29/2013     Priority: Medium     OA (osteoarthritis) of knee - bilateral 10/29/2012     Priority: Medium     Hypertension goal BP (blood pressure) < 140/90 09/26/2011     Priority: Medium     Hyperlipidemia LDL goal <100 10/31/2010     Priority: Medium     Encounter for long-term current use of medication 09/22/2010     Priority: Medium     Problem list name updated by automated process. Provider to review       CKD (chronic kidney disease) stage 3, GFR 30-59 ml/min (H) 03/17/2009     Priority: Medium      MONONUCLEOSIS ebv inf 7/05 08/05/2005     Priority: Medium     Coronary atherosclerosis 06/06/2005     Priority: Medium     Problem list name updated by automated process. Provider to review       Cardiac dysrhythmia 07/18/2001     Priority: Medium     Problem list name updated by automated process. Provider to review       Myalgia and myositis 07/18/2001     Priority: Medium     Problem list name updated by automated process. Provider to review         Past Medical History:   Diagnosis Date     Diabetic eye exam (H) 08/05/13     Endometriosis, site unspecified      Fever and other physiologic disturbances of temperature regulation 07/07/2005     Myalgia and myositis, unspecified      Pure hypercholesterolemia      Unspecified essential hypertension      Unspecified hypothyroidism        MEDS:     aspirin 81 MG EC tablet   blood glucose monitoring (ONE TOUCH DELICA) lancets   blood glucose monitoring (ONETOUCH VERIO IQ) test strip   Cholecalciferol (VITAMIN D) 400 UNITS capsule   cloNIDine (CATAPRES) 0.2 MG tablet   levofloxacin (LEVAQUIN) 750 MG tablet   levothyroxine (SYNTHROID/LEVOTHROID) 50 MCG tablet   losartan (COZAAR) 50 MG tablet   metFORMIN (GLUCOPHAGE-XR) 500 MG 24 hr tablet   nitroglycerin (NITROSTAT) 0.4 MG SL tablet   Probiotic Product (ACIDOPHILUS PROBIOTIC BLEND) CAPS   simvastatin (ZOCOR) 40 MG tablet  "      ALLERGIES:    Allergies   Allergen Reactions     Penicillins Swelling     \"throat started swelling\"     Vitamin B Complex Hives     Vitamin B12 Hives     all vitamin B's     Clindamycin Hcl Rash       Past Surgical History:   Procedure Laterality Date     ARTHROPLASTY KNEE  4/29/2013    Procedure: ARTHROPLASTY KNEE;  left total knee arthroplasty;  Surgeon: Teddy Grimes MD;  Location: [facility]     ARTHROPLASTY KNEE Right 8/27/2018    Procedure: ARTHROPLASTY KNEE;  right total knee arthroplasty;  Surgeon: Johnny Anaya MD;  Location: [facility]     C APPENDECTOMY       C NONSPECIFIC PROCEDURE      Knee ligament surgery     C NONSPECIFIC PROCEDURE      Kidney surgery for mechanical obstruction     C OPEN CORONARY ENDARTERECTOMY  june 2005    Angioplasty w stenting     C TOTAL KNEE ARTHROPLASTY  4/29/13    Left     COMBINED CYSTOSCOPY, INSERT CATHETER URETER Bilateral 8/10/2015    Procedure: COMBINED CYSTOSCOPY, INSERT CATHETER URETER;  Surgeon: Johnnie Madera MD;  Location[facility]     DILATION AND CURETTAGE, HYSTEROSCOPY DIAGNOSTIC, COMBINED N/A 11/6/2014    Procedure: COMBINED DILATION AND CURETTAGE, HYSTEROSCOPY DIAGNOSTIC;  Surgeon: Edward Pardo MD;  Location[facility]     ESOPHAGOSCOPY, GASTROSCOPY, DUODENOSCOPY (EGD), COMBINED N/A 1/27/2015    Procedure: COMBINED ESOPHAGOSCOPY, GASTROSCOPY, DUODENOSCOPY (EGD), BIOPSY SINGLE OR MULTIPLE;  Surgeon: Carmelo Rosario MD;  Location: [facility] GI     HC REMOVAL GALLBLADDER      Cholecystectomy     HC REMOVE TONSILS/ADENOIDS,<13 Y/O      unsure of age     LAPAROSCOPIC HYSTERECTOMY TOTAL N/A 8/10/2015    Procedure: LAPAROSCOPIC HYSTERECTOMY TOTAL;  Surgeon: Edward Pardo MD;  [facility]     LAPAROSCOPIC LYSIS ADHESIONS N/A 8/10/2015    Procedure: LAPAROSCOPIC LYSIS ADHESIONS;  Surgeon: Edward Pardo MD;  Location: [facility]       Social History     Tobacco Use     Smoking status: Former Smoker     Smokeless tobacco: " Never Used     Tobacco comment: quit  1987   Substance Use Topics     Alcohol use: No     Alcohol/week: 0.0 oz     Drug use: No         Review of Systems   Except as noted in HPI, all other systems were reviewed and are negative    Physical Exam     Vitals were reviewed  Patient Vitals for the past 12 hrs:   BP Temp Temp src Pulse Heart Rate Resp SpO2 Weight   03/12/19 2240 169/83 -- -- 76 75 27 95 % --   03/12/19 2235 157/88 -- -- 76 75 (!) 6 93 % --   03/12/19 2230 161/84 -- -- 78 75 17 94 % --   03/12/19 2225 159/79 -- -- 76 76 12 95 % --   03/12/19 2220 165/79 -- -- 80 82 9 95 % --   03/12/19 2215 180/84 -- -- 83 89 (!) 33 95 % --   03/12/19 2210 182/85 -- -- 85 87 11 93 % --   03/12/19 2205 -- -- -- 88 92 13 95 % --   03/12/19 2200 -- -- -- -- 82 20 95 % --   03/12/19 2155 -- -- -- -- -- -- -- 78.1 kg (172 lb 2.9 oz)   03/12/19 2135 (!) 198/93 98.4  F (36.9  C) Oral 83 -- 18 97 % --     GENERAL APPEARANCE: Alert, moderate distress due to pain  FACE: normal facies  EYES: Pupils are equal  HENT: normal external exam  NECK: no adenopathy or asymmetry  RESP: normal respiratory effort; clear breath sounds bilaterally  CV: regular rate and rhythm; no significant murmurs, gallops or rubs  CHEST: No Reproducible chest wall tenderness  ABD: soft, no tenderness; no rebound or guarding; bowel sounds are normal  MS: no gross deformities noted; normal muscle tone.  EXT: No calf tenderness or pitting edema  SKIN: no worrisome rash  NEURO: no facial droop; no focal deficits, speech is normal        Available Lab/Imaging Results     Results for orders placed or performed during the hospital encounter of 03/12/19 (from the past 24 hour(s))   CBC with platelets differential   Result Value Ref Range    WBC 11.1 (H) 4.0 - 11.0 10e9/L    RBC Count 4.02 3.8 - 5.2 10e12/L    Hemoglobin 12.1 11.7 - 15.7 g/dL    Hematocrit 37.0 35.0 - 47.0 %    MCV 92 78 - 100 fl    MCH 30.1 26.5 - 33.0 pg    MCHC 32.7 31.5 - 36.5 g/dL    RDW 15.3 (H)  10.0 - 15.0 %    Platelet Count 287 150 - 450 10e9/L    Diff Method Automated Method     % Neutrophils 64.0 %    % Lymphocytes 18.3 %    % Monocytes 7.4 %    % Eosinophils 9.2 %    % Basophils 0.7 %    % Immature Granulocytes 0.4 %    Nucleated RBCs 0 0 /100    Absolute Neutrophil 7.1 1.6 - 8.3 10e9/L    Absolute Lymphocytes 2.0 0.8 - 5.3 10e9/L    Absolute Monocytes 0.8 0.0 - 1.3 10e9/L    Absolute Basophils 0.1 0.0 - 0.2 10e9/L    Abs Immature Granulocytes 0.1 0 - 0.4 10e9/L    Absolute Nucleated RBC 0.0    Basic metabolic panel   Result Value Ref Range    Sodium 142 133 - 144 mmol/L    Potassium 3.8 3.4 - 5.3 mmol/L    Chloride 108 94 - 109 mmol/L    Carbon Dioxide 26 20 - 32 mmol/L    Anion Gap 8 3 - 14 mmol/L    Glucose 185 (H) 70 - 99 mg/dL    Urea Nitrogen 24 7 - 30 mg/dL    Creatinine 1.26 (H) 0.52 - 1.04 mg/dL    GFR Estimate 41 (L) >60 mL/min/[1.73_m2]    GFR Estimate If Black 47 (L) >60 mL/min/[1.73_m2]    Calcium 8.7 8.5 - 10.1 mg/dL   Troponin I   Result Value Ref Range    Troponin I ES 2.141 (HH) 0.000 - 0.045 ug/L     *Note: Due to a large number of results and/or encounters for the requested time period, some results have not been displayed. A complete set of results can be found in Results Review.     EKG reviewed by me: Normal sinus rhythm.  Normal AR, QT and QRS intervals.  Normal axis.  No acute ST-T wave changes.  Impression: No acute infarct noted.             Impression     Final diagnoses:   Acute coronary syndrome (H)   NSTEMI (non-ST elevated myocardial infarction) (H)         ED Course & Medical Decision Making   Sandra Petit is a 78 year old female who presented to the emergency department with approximately 5-day history of intermittent chest pain, worse over the last 2 days associated with shortness of breath.  Patient was seen in the emergency department on March 6 with chest pains, with a negative troponin but elevated d-dimer.  She had a workup including CT PE study with no evidence  for pulmonary embolism.  She followed up today with her primary care provider in the late morning, complaining of shortness of breath and increasing chest pain that had become more persistent.  She describes a heavy intense pain without any associated nausea or diaphoresis.  This pain is different than the pain she had with her previous coronary stent in 2005 where she had more of a burning sensation in the chest.  Patient had a elevated troponin level of 2.089 this morning, and upon repeating the troponin this evening her level is gone up slightly up to 2.141.  Patient was seen shortly after arrival.  Blood pressure is initially elevated at 198/93.  Patient received aspirin, nitroglycerin sublingually followed by nitroglycerin drip, Brilinta, dose heparin infusion without bolus, and a dose of Lopressor 2.5 mg.  She also received fentanyl for breakthrough pain.  Patient has acute coronary syndrome with non-ST elevation MI.      10:47 PM: I discussed the case with Dr. Chandler Lima at [facility] who has accepted the patient in transfer.  She will be transferred by ALS ambulance as soon as we are able to confirm bed placement and provide nurse to nurse report.      The patient's  and son are here in the emergency department.  They were updated on the plan for transfer.  He requested a transfer to [facility] as that is where they have been for her cardiology care.  Patient is in stable condition.    11:00 PM: Pain level is down to 4/10.  Patient is able to breathe more easily.  Patient did not have any further questions at this time.  We are still waiting on bed availability and a nurse to nurse report.    11:13 PM: Patient is still having some active chest pain of 4-5/10.  We will continue to increase the nitroglycerin, administer some more fentanyl, but will request a code 3 transport given her acute active chest pain in the setting of known non-ST elevation MI.    11:40 PM: Patient is leaving by  ALS ambulance.      Critical care time is 30 minutes.      This note was dictated using electronic voice recognition software and although reviewed, may contain grammatical and spelling errors.        Alicia Lopez MD  03/12/19 2343

## 2019-03-13 NOTE — ED TRIAGE NOTES
Pt seen in clinic and was called about abnormal labs, pt has elevated troponin and  Having chest pain

## 2019-03-14 LAB — TSH SERPL-ACNC: 6.68 MIU/L (ref 0.47–5)

## 2019-03-16 LAB — HEMOCCULT STL QL IA: NEGATIVE

## 2019-03-20 LAB — INR PPP: 1.2 (ref 0.9–1.1)

## 2019-03-26 LAB — EJECTION FRACTION: 78

## 2019-03-27 ENCOUNTER — TELEPHONE (OUTPATIENT)
Dept: FAMILY MEDICINE | Facility: CLINIC | Age: 79
End: 2019-03-27

## 2019-03-27 ENCOUNTER — MEDICAL CORRESPONDENCE (OUTPATIENT)
Dept: HEALTH INFORMATION MANAGEMENT | Facility: CLINIC | Age: 79
End: 2019-03-27

## 2019-03-27 DIAGNOSIS — I21.9 ACUTE MYOCARDIAL INFARCTION, INITIAL EPISODE OF CARE (H): Primary | ICD-10-CM

## 2019-03-27 LAB
CREAT SERPL-MCNC: 1.41 MG/DL (ref 0.57–1.11)
GFR SERPL CREATININE-BSD FRML MDRD: 36 ML/MIN/1.73M2
GLUCOSE SERPL-MCNC: 115 MG/DL (ref 70–100)
POTASSIUM SERPL-SCNC: 5 MMOL/L (ref 3.5–5.1)

## 2019-03-27 NOTE — TELEPHONE ENCOUNTER
Patient called to schedule an appointment for a hospital follow-up or appeared on a report showing that they were recently discharged from the hospital.    Patient was admitted to [facility]:  03/13/2019  Discharged date: 03/27/2019  Reason for hospital admission:  CHF/Patient had heart surgery   Does patient have future appointment scheduled with provider? Yes Dr. Pardo  Date of future appointment:  04/03/2019      This information will be used to help the care team plan for the patients upcoming visit.  The triage RN may determine that a follow up call is necessary and reach out to the patient via the phone number listed in the chart.     Please route this message on routine priority to the Triage RN pool.

## 2019-03-27 NOTE — TELEPHONE ENCOUNTER
Pt ws just discharged today. Will wait until this afternoon or tomorrow to call for Hospital Follow-up call........................ANURADHA Irving

## 2019-03-28 ENCOUNTER — PATIENT OUTREACH (OUTPATIENT)
Dept: CARE COORDINATION | Facility: CLINIC | Age: 79
End: 2019-03-28

## 2019-03-28 DIAGNOSIS — E11.21 TYPE 2 DIABETES MELLITUS WITH DIABETIC NEPHROPATHY, WITHOUT LONG-TERM CURRENT USE OF INSULIN (H): ICD-10-CM

## 2019-03-28 DIAGNOSIS — N18.30 CKD (CHRONIC KIDNEY DISEASE) STAGE 3, GFR 30-59 ML/MIN (H): ICD-10-CM

## 2019-03-28 DIAGNOSIS — Z95.1 S/P CABG X 3: Primary | ICD-10-CM

## 2019-03-28 PROBLEM — Z53.9 ERRONEOUS ENCOUNTER--DISREGARD: Status: ACTIVE | Noted: 2019-03-28

## 2019-03-28 NOTE — TELEPHONE ENCOUNTER
"Hospital/TCU/ED for chronic condition Discharge Protocol    \"Hi, my name is Eleni Vyas, a registered nurse, and I am calling from [facility].  I am calling to follow up and see how things are going for you after your recent emergency visit/hospital/TCU stay.\"    Tell me how you are doing now that you are home?\" \" I was discharged from the Nursing home in [place] and I couldn't take it. I decided to go home. IT was awful . I didn't sleep all night. I didn't get my meds until 2 AM and it was dirty there. I got home around 10 AM today. I checked myself out.       Discharge Instructions    \"Let's review your discharge instructions.  What is/are the follow-up recommendations?  Pt. Response: I don't know , all my papers where given to my son. HE is going to bring  them over today. [facility].     \"Has an appointment with your primary care provider been scheduled?\" \" I really don't know. Dr. Quiroz is my PCP.\"   RN informed pt sheHas appt with Dr. Pardo , 4/3/19 at 1:50 PM \"Why did I get Char?\"   RN said , I think it is because he has an opening and Dr. Quiroz will be out of the office during the time period you need to be seen.     \"When you see the provider, I would recommend that you bring your medications with you.\"    Medications    \"Tell me what changed about your medicines when you discharged?\"    Changes to chronic meds?    Her papers are with her son. \" They did change my statin to Lipitor. I don't handle that one well. I get bad muscle cramps from it. I can't remember if I told the Cardiologist that or not. I don't remember much from that stay. \"     \"What questions do you have about your medications?\"    ' Just wondering why they changed my cholesterol medication.     New diagnoses of heart failure, COPD, diabetes, or MI?    Yes - They said I had an MI. Care Coordination Referral needed              Medication reconciliation completed? " "No, due to Pt has NO IDEA what she is supposed to be taking. She is waiting for her son to bring her the medication/ hospital discharge paper work and the Cardiologist to send in new meds for her.   Was MTM referral placed (*Make sure to put transitions as reason for referral)?   No, She said she wasn't sure at this time that she wanted to do that. She will let us know if she decides or needs to. RN informed she can bring with family members who are caring for her , too, so they are aware of what meds she is on.     Call Summary    \"What questions or concerns do you have about your recent visit and your follow-up care?\"     wondering when the Cardiologist wanted to see her again?. Advice given: She will double check her papers with her son when he arrives today and will call and schedule cardiology appt if she is suposed to.     \"If you have questions or things don't continue to improve, we encourage you contact us through the main clinic number (give number).  Even if the clinic is not open, triage nurses are available 24/7 to help you.     We would like you to know that our clinic has extended hours (provide information).  We also have urgent care (provide details on closest location and hours/contact info)\"      \"Thank you for your time and take care!\"             "

## 2019-03-29 PROBLEM — Z53.9 ERRONEOUS ENCOUNTER--DISREGARD: Status: RESOLVED | Noted: 2019-03-28 | Resolved: 2019-03-29

## 2019-03-29 ASSESSMENT — ACTIVITIES OF DAILY LIVING (ADL): DEPENDENT_IADLS:: CLEANING;LAUNDRY;COOKING;SHOPPING;MEAL PREPARATION;TRANSPORTATION

## 2019-03-29 NOTE — PROGRESS NOTES
Clinic Care Coordination Contact    Clinic Care Coordination Contact  OUTREACH    Referral Information:  Referral Source: Care Team    Primary Diagnosis: Cardiovascular - other    Chief Complaint   Patient presents with     Clinic Care Coordination - Post Hospital     RN assessment     Universal Utilization:   Clinic Utilization  Difficulty keeping appointments:: No  Compliance Concerns: Yes  No-Show Concerns: No  No PCP office visit in Past Year: No  Utilization    Last refreshed: 3/28/2019  4:52 PM:  Hospital Admissions 1           Last refreshed: 3/28/2019  4:52 PM:  ED Visits 2           Last refreshed: 3/28/2019  4:52 PM:  No Show Count (past year) 0              Current as of: 3/28/2019  4:52 PM            Clinical Concerns:  Current Medical Concerns:  Called and spoke with pt, she states she is doing ok but weak and still sob. Denies any chest pain and has not had to use her nitroglycerin. States she also has swelling bilaterally in her legs which is not new.  States she does not weigh herself usually so can't tell if she has gained any weight.  Pt states she does have a scale and can start.  Pt also reports she has not been checking her blood sugars since being home. She has been eating and drinking ok.  She does use a walker while up and around the home.  States she is still pretty weak so has not been doing much since being home.  States she lives with her  and her daughter is currently there helping; they also have a son who comes and helps.  Discussed home care moving forward. Pt states she would like someone to come out and help her. Will obtain orders from Dr. Quiroz.   Current Behavioral Concerns: pt states she left the TCU because she was frustrated because she was there at 10-am and did not get her medications until 2am. States her blood pressure was really high. She also states the room and bathroom was dirty, and there was a lady yelling all night long and could not sleep.  They told her  they would not order anything for her at discharge if she left.      Education Provided to patient: advised to start weighing herself daily. If increased SOB, chest pain, call 911.       Health Maintenance Reviewed:    Clinical Pathway: None    Medication Management:  Self management- usually sets up in pill box. Has been taking new meds out of the bottles.     Functional Status:  Dependent ADLs:: Ambulation-walker  Dependent IADLs:: Cleaning, Laundry, Cooking, Shopping, Meal Preparation, Transportation  Bed or wheelchair confined:: No  Mobility Status: Independent w/Device  Fallen 2 or more times in the past year?: Yes  Any fall with injury in the past year?: Yes    Living Situation:  Current living arrangement:: I live in a private home with family    Diet/Exercise/Sleep:  Diet:: No added salt, Diabetic diet  Inadequate nutrition (GOAL):: No  Food Insecurity: No  Tube Feeding: No  Exercise:: Currently not exercising  Inadequate activity/exercise (GOAL):: No  Significant changes in sleep pattern (GOAL): No    Transportation:  Transportation means:: Regular car, Family     Psychosocial:  Mental health DX:: No  Mental health management concern (GOAL):: No  Informal Support system:: Family, Children, Spouse     Financial/Insurance concerns (GOAL):: No   Resources and Interventions:  Current Resources:     Community Resources: None  Supplies used at home:: Diabetic Supplies  Equipment Currently Used at Home: tub bench, raised toilet, walker, rolling, cane, straight    Advance Care Plan/Directive  Advanced Care Plans/Directives on file:: No  Advanced Care Plan/Directive Status: Not Applicable    Referrals Placed: Home Care     Goals:  TBD    Patient/Caregiver understanding: Pt verbalizes understanding of follow up instructions and when scheduled appt date and times.     Outreach Frequency: monthly  Future Appointments              In 4 days Ash Quiroz MD [facility]          Plan:    Pt will start weighing herself daily  Pt will use incentive spirometer  Pt will call the nurse line with any questions or concerns  RN will get home care orders from provider.   RN will continue to monitor for discharge from home care services every 2-4 weeks.     Emily CARTY, RN, PHN  Care Coordination    [facility]  [address] [place] [address]  Office: [phone]  Fax [phone]   [facility]  [address] [place] [address]  Office: [phone] Fax [phone]  [email or web address]   [email or web address]   Connect with [facility] on social media.

## 2019-03-30 ENCOUNTER — TELEPHONE (OUTPATIENT)
Dept: NURSING | Facility: CLINIC | Age: 79
End: 2019-03-30

## 2019-03-30 ENCOUNTER — DOCUMENTATION ONLY (OUTPATIENT)
Dept: CARE COORDINATION | Facility: CLINIC | Age: 79
End: 2019-03-30

## 2019-03-30 NOTE — PROGRESS NOTES
"\"[facility] now requests orders and shares plan of care/discharge summaries for some patients through [employer].  Please REPLY TO THIS MESSAGE OR ROUTE BACK TO THE AUTHOR in order to give authorization for orders when needed.  This is considered a verbal order, you will still receive a faxed copy of orders for signature.  Thank you for your assistance in improving collaboration for our patients.    ORDER for start of home care  SN  2 w 4  1 w 2  4 PRN  SN to perform assessment with focus on cardiac assessment, vitals, medication management and reconciliation, pain management, mental health status and need for referral or change in treatment plan, skin integrity, falls risk, and to notify physician for findings outside patient normal values. Instruct on disease process, signs/symptoms of complications to report to agency/physician/911, emergency/safety and falls prevention plan, medication effects/SE, new/high risk/changed medications, diet, and activity. Implement interventions to monitor and mitigate pain, prevent pressure ulcers and confirm proper foot care. 4 prn visits for  medical symptoms that would necessitate an unplanned visit, supervisory visits per agency protocol, recertification visits.    OT   1 every 14 days 1  OT to evaluate and treat for hospital bed and other equipment, ADL/IADLs, cardiac rehab and HEP    PT  1 every 14 days 1  PT to evaluate and treat for strengtening, HEP and cardiac rehab.    HHA  2 w 6  HHA for personal cares and bathing.     Jakub Bryan  [id number]      "

## 2019-03-30 NOTE — TELEPHONE ENCOUNTER
Clinic Action Needed:Yes, please return call    Reason for Call: Sandra had CABGx3 with cardiologist in [place] with [facility].  She is reporting that she is having trouble sleeping due to significant pain.  She thinks that she needs a hospital bed.    Due to level of pain she is having, transferred caller to [facility] answering service to have caller speak to on call cardiology surgeon.    Please return call to Sandra to discuss further, her home care needs.  It's not clear to me if she has home care ordered through [facility] or if she can make arrangements to have a hospital bed.  Please return call to discuss further.  Thank you.    Routed to: DYAD 1 Triage Pool    Dahlia Antonio RN  [facility]

## 2019-04-01 NOTE — TELEPHONE ENCOUNTER
Pt has [facility] and they are assessing the need.     No outreach by RN CC At this time.     Emily CARTY, RN, PHN  Care Coordination    [facility]  [address] [place] [address]  Office: [phone]  Fax [phone]   [facility]  [address] [place] [address]  Office: [phone] Fax [phone]  [email or web address]   [email or web address]   Connect with [facility] on social media.

## 2019-04-02 ENCOUNTER — OFFICE VISIT (OUTPATIENT)
Dept: FAMILY MEDICINE | Facility: OTHER | Age: 79
End: 2019-04-02
Payer: MEDICARE

## 2019-04-02 ENCOUNTER — TELEPHONE (OUTPATIENT)
Dept: FAMILY MEDICINE | Facility: CLINIC | Age: 79
End: 2019-04-02

## 2019-04-02 VITALS
HEART RATE: 60 BPM | TEMPERATURE: 97.7 F | WEIGHT: 176 LBS | RESPIRATION RATE: 18 BRPM | DIASTOLIC BLOOD PRESSURE: 68 MMHG | SYSTOLIC BLOOD PRESSURE: 100 MMHG | BODY MASS INDEX: 33.81 KG/M2 | OXYGEN SATURATION: 98 %

## 2019-04-02 DIAGNOSIS — E87.70 HYPERVOLEMIA, UNSPECIFIED HYPERVOLEMIA TYPE: ICD-10-CM

## 2019-04-02 DIAGNOSIS — N18.30 CKD (CHRONIC KIDNEY DISEASE) STAGE 3, GFR 30-59 ML/MIN (H): ICD-10-CM

## 2019-04-02 DIAGNOSIS — Z95.1 S/P CABG (CORONARY ARTERY BYPASS GRAFT): Primary | ICD-10-CM

## 2019-04-02 LAB
ANION GAP SERPL CALCULATED.3IONS-SCNC: 8 MMOL/L (ref 3–14)
BUN SERPL-MCNC: 34 MG/DL (ref 7–30)
CALCIUM SERPL-MCNC: 8.5 MG/DL (ref 8.5–10.1)
CHLORIDE SERPL-SCNC: 106 MMOL/L (ref 94–109)
CO2 SERPL-SCNC: 25 MMOL/L (ref 20–32)
CREAT SERPL-MCNC: 1.69 MG/DL (ref 0.52–1.04)
ERYTHROCYTE [DISTWIDTH] IN BLOOD BY AUTOMATED COUNT: 15.9 % (ref 10–15)
GFR SERPL CREATININE-BSD FRML MDRD: 29 ML/MIN/{1.73_M2}
GLUCOSE SERPL-MCNC: 75 MG/DL (ref 70–99)
HCT VFR BLD AUTO: 30.8 % (ref 35–47)
HGB BLD-MCNC: 9.5 G/DL (ref 11.7–15.7)
MCH RBC QN AUTO: 29.4 PG (ref 26.5–33)
MCHC RBC AUTO-ENTMCNC: 30.8 G/DL (ref 31.5–36.5)
MCV RBC AUTO: 95 FL (ref 78–100)
PLATELET # BLD AUTO: 648 10E9/L (ref 150–450)
POTASSIUM SERPL-SCNC: 4.6 MMOL/L (ref 3.4–5.3)
RBC # BLD AUTO: 3.23 10E12/L (ref 3.8–5.2)
SODIUM SERPL-SCNC: 139 MMOL/L (ref 133–144)
WBC # BLD AUTO: 17.1 10E9/L (ref 4–11)

## 2019-04-02 PROCEDURE — 80048 BASIC METABOLIC PNL TOTAL CA: CPT | Performed by: FAMILY MEDICINE

## 2019-04-02 PROCEDURE — 85027 COMPLETE CBC AUTOMATED: CPT | Performed by: FAMILY MEDICINE

## 2019-04-02 PROCEDURE — 99495 TRANSJ CARE MGMT MOD F2F 14D: CPT | Performed by: FAMILY MEDICINE

## 2019-04-02 PROCEDURE — 36415 COLL VENOUS BLD VENIPUNCTURE: CPT | Performed by: FAMILY MEDICINE

## 2019-04-02 RX ORDER — LOSARTAN POTASSIUM 25 MG/1
25 TABLET ORAL DAILY
COMMUNITY
Start: 2019-03-28 | End: 2019-11-06

## 2019-04-02 RX ORDER — FUROSEMIDE 20 MG
20 TABLET ORAL DAILY
Qty: 30 TABLET | Refills: 0 | Status: SHIPPED | OUTPATIENT
Start: 2019-04-02 | End: 2019-05-29

## 2019-04-02 RX ORDER — ATORVASTATIN CALCIUM 40 MG/1
40 TABLET, FILM COATED ORAL DAILY
COMMUNITY
Start: 2019-03-28 | End: 2019-04-10

## 2019-04-02 RX ORDER — AMLODIPINE BESYLATE 10 MG/1
10 TABLET ORAL DAILY
COMMUNITY
Start: 2019-03-28 | End: 2019-11-06

## 2019-04-02 RX ORDER — AMIODARONE HYDROCHLORIDE 200 MG/1
200 TABLET ORAL
Refills: 0 | COMMUNITY
Start: 2019-03-28 | End: 2019-05-29

## 2019-04-02 RX ORDER — METOPROLOL TARTRATE 50 MG
12.5 TABLET ORAL
COMMUNITY
Start: 2019-03-28 | End: 2020-01-08

## 2019-04-02 ASSESSMENT — PAIN SCALES - GENERAL: PAINLEVEL: NO PAIN (0)

## 2019-04-02 NOTE — PROGRESS NOTES
SUBJECTIVE:                                                      Chief Complaint   Patient presents with     Hospital F/U            Hospital Follow-up Visit:    Hospital/Nursing Home/IP Rehab Facility: [facility]   Date of Admission: 3/13/2019  Date of Discharge: 3/27/2019  Reason(s) for Admission: CABG             Problems taking medications regularly:  None       Medication changes since discharge: None       Problems adhering to non-medication therapy:  None    Summary of hospitalization:  CareEverywhere information obtained and reviewed  Diagnostic Tests/Treatments reviewed.  Follow up needed: none  Other Healthcare Providers Involved in Patient s Care:         None  Update since discharge: worsened.     Post Discharge Medication Reconciliation: discharge medications reconciled and changed, per note/orders (see AVS).  Plan of care communicated with patient     Coding guidelines for this visit:  Type of Medical   Decision Making Face-to-Face Visit       within 7 Days of discharge Face-to-Face Visit        within 14 days of discharge   Moderate Complexity 70349 [id number]   High Complexity 12025 [id number]              Sandra is a 79 year old female who comes to clinic after bypass surgery.  She is helped having trouble sleeping flat.  She has worsening dyspnea with exertion.  Feels comfortable at rest.  Hr - 50s at home  Bp 150s    ROS:  Constitutional, HEENT, cardiovascular, pulmonary, gi and gu systems are negative, except as otherwise noted.    OBJECTIVE:                                                    /68   Pulse 60   Temp 97.7  F (36.5  C) (Temporal)   Resp 18   Wt 79.8 kg (176 lb)   SpO2 98%   BMI 33.81 kg/m    Body mass index is 33.81 kg/m .    Well-appearing.  Usual health.  Next supple.  JVD present.  Heart regular.  Lungs clear bilaterally and no increased work of breathing.  Abdomen obese but benign.  Extremities with 2+ pitting edema bilaterally.    Diagnostic Test Results:  none       ASSESSMENT/PLAN:                                                        ICD-10-CM    1. S/P CABG (coronary artery bypass graft) Z95.1 order for DME     Basic metabolic panel   2. CKD (chronic kidney disease) stage 3, GFR 30-59 ml/min (H) N18.3 CBC with platelets   3. Hypervolemia, unspecified hypervolemia type E87.70 furosemide (LASIX) 20 MG tablet       Volume overloaded secondary to his surgery.  Start Lasix 20 mg daily.  Checking for anemia today.  Recheck metabolic panel make sure kidney function is stable.  See her back next week.  She also sees cardiology in the very near future.  Watch daily weights.  If worsening return to the ED.        Ash Quiroz MD  [facility]

## 2019-04-02 NOTE — PATIENT INSTRUCTIONS
Reduce metoprolol to 25 mg twice dialy  Add back aspirin 81mg daily  Take lasix 20mg daily  Continue checking daily blood pressure and weight and heart rate  See you next week

## 2019-04-03 NOTE — TELEPHONE ENCOUNTER
Dr. Quiroz called and talked with the surgeon from [facility].  Meeta Martines MA

## 2019-04-03 NOTE — PROGRESS NOTES
[facility] utilizes an encounter to take the place of a direct phone call to your office. Please take a moment to review the below request. Please reply or route message to author of this encounter.  Message will act as a verbal OK of orders requested below. Thank you.     ORDER     Face to Face visit for Hospital Bed.  Can you please include in your visit note for today that an electric hospital bed is needed and why.  Pt requires HOB to be elevated and is unable to position herself or transfer herself in a regular bed post CABG x 3 for CAD.  SOB noted when attempting to lay on flat surface.       Thank you.

## 2019-04-05 ENCOUNTER — DOCUMENTATION ONLY (OUTPATIENT)
Dept: FAMILY MEDICINE | Facility: CLINIC | Age: 79
End: 2019-04-05

## 2019-04-05 NOTE — PROGRESS NOTES
[facility] now requests orders and shares plan of care/discharge summaries for some patients through [employer].  Please REPLY TO THIS MESSAGE OR ROUTE BACK TO THE AUTHOR in order to give authorization for orders when needed.  This is considered a verbal order, you will still receive a faxed copy of orders for signature.  Thank you for your assistance in improving collaboration for our patients.    ORDER    Pt requesting to discontinue the atorvastatin she is currently prescribed, reports it is causing upset stomach. Wanting to restart simvastatin 20mg po daily.  Please confirm if this is okay?  Thank you, Chantel LING   [phone]

## 2019-04-08 NOTE — PROGRESS NOTES
Chantel is calling to speak to Dr Quiroz or his nurse regarding a possible medication change, please call her at [phone].

## 2019-04-09 ENCOUNTER — TRANSFERRED RECORDS (OUTPATIENT)
Dept: HEALTH INFORMATION MANAGEMENT | Facility: CLINIC | Age: 79
End: 2019-04-09

## 2019-04-09 NOTE — PROGRESS NOTES
Per Dr. Quiroz she can stop the atorvastatin and see if that helps her upset stomach and once it does we can restart her on a lower dose. PerCarmen Quiroz that is the recommended med for her.  Meeta Martines MA

## 2019-04-10 ENCOUNTER — HOSPITAL ENCOUNTER (OUTPATIENT)
Dept: ULTRASOUND IMAGING | Facility: CLINIC | Age: 79
Discharge: HOME | End: 2019-04-10
Attending: FAMILY MEDICINE | Admitting: FAMILY MEDICINE
Payer: MEDICARE

## 2019-04-10 ENCOUNTER — OFFICE VISIT (OUTPATIENT)
Dept: FAMILY MEDICINE | Facility: CLINIC | Age: 79
End: 2019-04-10
Payer: MEDICARE

## 2019-04-10 VITALS
WEIGHT: 170 LBS | BODY MASS INDEX: 32.65 KG/M2 | HEART RATE: 60 BPM | DIASTOLIC BLOOD PRESSURE: 78 MMHG | RESPIRATION RATE: 18 BRPM | SYSTOLIC BLOOD PRESSURE: 120 MMHG | OXYGEN SATURATION: 96 % | TEMPERATURE: 97.4 F

## 2019-04-10 DIAGNOSIS — I10 HYPERTENSION GOAL BP (BLOOD PRESSURE) < 140/90: ICD-10-CM

## 2019-04-10 DIAGNOSIS — I25.10 CORONARY ARTERY DISEASE INVOLVING NATIVE CORONARY ARTERY OF NATIVE HEART WITHOUT ANGINA PECTORIS: ICD-10-CM

## 2019-04-10 DIAGNOSIS — R60.0 BILATERAL LEG EDEMA: ICD-10-CM

## 2019-04-10 DIAGNOSIS — E11.59 TYPE 2 DIABETES MELLITUS WITH OTHER CIRCULATORY COMPLICATIONS (H): ICD-10-CM

## 2019-04-10 DIAGNOSIS — R60.0 BILATERAL LEG EDEMA: Primary | ICD-10-CM

## 2019-04-10 DIAGNOSIS — N18.30 CKD (CHRONIC KIDNEY DISEASE) STAGE 3, GFR 30-59 ML/MIN (H): ICD-10-CM

## 2019-04-10 PROBLEM — M79.675 GREAT TOE PAIN, LEFT: Status: RESOLVED | Noted: 2018-08-28 | Resolved: 2019-04-10

## 2019-04-10 PROBLEM — Z96.652 S/P TOTAL KNEE ARTHROPLASTY, LEFT: Status: RESOLVED | Noted: 2018-03-14 | Resolved: 2019-04-10

## 2019-04-10 PROBLEM — Z96.651 S/P TOTAL KNEE REPLACEMENT USING CEMENT, RIGHT: Status: RESOLVED | Noted: 2018-08-27 | Resolved: 2019-04-10

## 2019-04-10 LAB
ANION GAP SERPL CALCULATED.3IONS-SCNC: 6 MMOL/L (ref 3–14)
BUN SERPL-MCNC: 27 MG/DL (ref 7–30)
CALCIUM SERPL-MCNC: 8.9 MG/DL (ref 8.5–10.1)
CHLORIDE SERPL-SCNC: 104 MMOL/L (ref 94–109)
CO2 SERPL-SCNC: 28 MMOL/L (ref 20–32)
CREAT SERPL-MCNC: 1.62 MG/DL (ref 0.52–1.04)
GFR SERPL CREATININE-BSD FRML MDRD: 30 ML/MIN/{1.73_M2}
GLUCOSE SERPL-MCNC: 96 MG/DL (ref 70–99)
POTASSIUM SERPL-SCNC: 3.9 MMOL/L (ref 3.4–5.3)
SODIUM SERPL-SCNC: 138 MMOL/L (ref 133–144)

## 2019-04-10 PROCEDURE — 36415 COLL VENOUS BLD VENIPUNCTURE: CPT | Performed by: FAMILY MEDICINE

## 2019-04-10 PROCEDURE — 93971 EXTREMITY STUDY: CPT | Mod: LT

## 2019-04-10 PROCEDURE — 80048 BASIC METABOLIC PNL TOTAL CA: CPT | Performed by: FAMILY MEDICINE

## 2019-04-10 PROCEDURE — 99214 OFFICE O/P EST MOD 30 MIN: CPT | Performed by: FAMILY MEDICINE

## 2019-04-10 RX ORDER — FUROSEMIDE 20 MG
40 TABLET ORAL
COMMUNITY
Start: 2019-04-09 | End: 2019-04-29

## 2019-04-10 ASSESSMENT — PAIN SCALES - GENERAL: PAINLEVEL: NO PAIN (0)

## 2019-04-10 NOTE — PROGRESS NOTES
Appropriate assistive devices provided during their visit. yes (Yes, No, N/A) walker (list device)    Exam table and/or cart  placed in the lowest position. yes (Yes, No, N/A)    Brakes on tables/carts/wheelchairs used at all times. yes (Yes, No, N/A)    Non slip footwear applied. yes (Yes, No, NA)    Patient was accompanied by staff throughout visit. yes (Yes, No, N/A)    Equipment safety straps used. n/a (Yes, No, N/A)    Assist with toileting. n/a (Yes, No, N/A)

## 2019-04-10 NOTE — PROGRESS NOTES
SUBJECTIVE:                                                      Chief Complaint   Patient presents with     RECHECK        Recheck     Sandra is a 79 year old female who comes to clinic for recheck.  She is now a few weeks out from bypass surgery.  She saw cardiology yesterday.  We reviewed those notes together.  She took an extra dose of Lasix yesterday and again today.  That is 40 mg daily.  She reports her weight is now down 3 pounds even since yesterday.  Her breathing is improved.  Not able to sleep flat yet.  Continues to have left leg predominantly lower extremity edema.    ROS:  Constitutional, HEENT, cardiovascular, pulmonary, gi and gu systems are negative, except as otherwise noted.    OBJECTIVE:                                                    /78   Pulse 60   Temp 97.4  F (36.3  C) (Temporal)   Resp 18   Wt 77.1 kg (170 lb)   SpO2 96%   BMI 32.65 kg/m    Body mass index is 32.65 kg/m .    Well-appearing.  Elderly.  Neck supple without lymphadenopathy or JVD.  Heart regular.  Lungs clear bilaterally.  Abdomen benign and soft.  Extremities with 3+ pitting edema on the left, 1+ pitting edema on the right.  Palpable pulses.  Normal perfusion without ulceration.    Left lower extremity ultrasound-no clot     ASSESSMENT/PLAN:                                                        ICD-10-CM    1. Bilateral leg edema R60.0 US Lower Extremity Venous Duplex Left   2. CKD (chronic kidney disease) stage 3, GFR 30-59 ml/min (H) N18.3 Basic metabolic panel   3. Hypertension goal BP (blood pressure) < 140/90 I10    4. 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 Coronary artery disease involving native coronary artery of native heart without angina pectoris I25.10        Due to unequal edema, and recent surgical status, pursued an ultrasound which showed no clot.  Likely now this is more related to her vein grafting.  This will continue to improve with diuretics.   Check a metabolic panel again today.  Continue with Lasix 40 mg the next 4 days 3 as per cardiology instructions.  Continue to watch daily weights.  Call if worsening.  Otherwise I will see her back next week for a close interval recheck.        Ash Quiroz MD  [facility]

## 2019-04-16 ENCOUNTER — OFFICE VISIT (OUTPATIENT)
Dept: FAMILY MEDICINE | Facility: OTHER | Age: 79
End: 2019-04-16
Payer: MEDICARE

## 2019-04-16 VITALS
OXYGEN SATURATION: 97 % | SYSTOLIC BLOOD PRESSURE: 120 MMHG | RESPIRATION RATE: 18 BRPM | WEIGHT: 166 LBS | BODY MASS INDEX: 31.89 KG/M2 | TEMPERATURE: 95.9 F | DIASTOLIC BLOOD PRESSURE: 70 MMHG | HEART RATE: 50 BPM

## 2019-04-16 DIAGNOSIS — L03.116 CELLULITIS OF LEFT LOWER EXTREMITY: ICD-10-CM

## 2019-04-16 DIAGNOSIS — N18.30 CKD (CHRONIC KIDNEY DISEASE) STAGE 3, GFR 30-59 ML/MIN (H): Primary | ICD-10-CM

## 2019-04-16 LAB
ANION GAP SERPL CALCULATED.3IONS-SCNC: 6 MMOL/L (ref 3–14)
BUN SERPL-MCNC: 27 MG/DL (ref 7–30)
CALCIUM SERPL-MCNC: 8.9 MG/DL (ref 8.5–10.1)
CHLORIDE SERPL-SCNC: 105 MMOL/L (ref 94–109)
CO2 SERPL-SCNC: 28 MMOL/L (ref 20–32)
CREAT SERPL-MCNC: 1.82 MG/DL (ref 0.52–1.04)
GFR SERPL CREATININE-BSD FRML MDRD: 26 ML/MIN/{1.73_M2}
GLUCOSE SERPL-MCNC: 115 MG/DL (ref 70–99)
POTASSIUM SERPL-SCNC: 3.9 MMOL/L (ref 3.4–5.3)
SODIUM SERPL-SCNC: 139 MMOL/L (ref 133–144)

## 2019-04-16 PROCEDURE — 80048 BASIC METABOLIC PNL TOTAL CA: CPT | Performed by: FAMILY MEDICINE

## 2019-04-16 PROCEDURE — 99214 OFFICE O/P EST MOD 30 MIN: CPT | Performed by: FAMILY MEDICINE

## 2019-04-16 PROCEDURE — 36415 COLL VENOUS BLD VENIPUNCTURE: CPT | Performed by: FAMILY MEDICINE

## 2019-04-16 RX ORDER — CEPHALEXIN 500 MG/1
500 CAPSULE ORAL 2 TIMES DAILY
Qty: 14 CAPSULE | Refills: 0 | Status: SHIPPED | OUTPATIENT
Start: 2019-04-16 | End: 2019-05-29

## 2019-04-16 ASSESSMENT — PAIN SCALES - GENERAL: PAINLEVEL: MODERATE PAIN (5)

## 2019-04-16 NOTE — PROGRESS NOTES
SUBJECTIVE:                                                      Chief Complaint   Patient presents with     Cellulitis        Cellulitis left calf per home care     Sandra is a 79 year old female who comes to clinic returns for checkup.  Home health said they noticed in the last few days increased redness on the left lower extremity.  This was her bypass leg.  She has +1 pitting edema that has been stable.  Her breathing is improved.  After her 5 days of Lasix.  Her right leg is back to baseline.  No fevers    ROS:  Constitutional, HEENT, cardiovascular, pulmonary, gi and gu systems are negative, except as otherwise noted.    OBJECTIVE:                                                    /70   Pulse 50   Temp 95.9  F (35.5  C) (Temporal)   Resp 18   Wt 75.3 kg (166 lb)   SpO2 97%   BMI 31.89 kg/m    Body mass index is 31.89 kg/m .    Well-appearing.  Heart regular.  Lungs clear and unlabored.  Right lower extremity normal.  Left lower externally has +2 pitting edema up to the level of the mid thigh.  There is some very light, pinkish warmth and erythema about the left lower extremity, no skin ulcerations.  Skin intact.  Pulses intact.    Diagnostic Test Results:  none      ASSESSMENT/PLAN:                                                        ICD-10-CM    1. CKD (chronic kidney disease) stage 3, GFR 30-59 ml/min (H) N18.3 Basic metabolic panel   2. Cellulitis of left lower extremity L03.116 cephALEXin (KEFLEX) 500 MG capsule       Possible cellulitis, will treat empirically.  More likely dermatitis stasis.  She is to continue to elevate, add supportive stockings, continue Lasix 20 mg every other day for the next week.  See how he does with that.  Also increase ambulation, she agrees.  See her back in 6 weeks, sooner as needed        Ash Quiroz MD  [facility]

## 2019-04-17 ENCOUNTER — MYC MEDICAL ADVICE (OUTPATIENT)
Dept: FAMILY MEDICINE | Facility: CLINIC | Age: 79
End: 2019-04-17

## 2019-04-25 ENCOUNTER — MYC MEDICAL ADVICE (OUTPATIENT)
Dept: FAMILY MEDICINE | Facility: CLINIC | Age: 79
End: 2019-04-25

## 2019-04-26 ENCOUNTER — TELEPHONE (OUTPATIENT)
Dept: FAMILY MEDICINE | Facility: CLINIC | Age: 79
End: 2019-04-26

## 2019-04-26 DIAGNOSIS — Z48.812 AFTERCARE FOLLOWING SURGERY OF THE CIRCULATORY SYSTEM: Primary | ICD-10-CM

## 2019-04-26 DIAGNOSIS — I25.10 CORONARY ARTERY DISEASE INVOLVING NATIVE CORONARY ARTERY OF NATIVE HEART WITHOUT ANGINA PECTORIS: ICD-10-CM

## 2019-04-26 NOTE — TELEPHONE ENCOUNTER
Reason for call:  Form   Our goal is to have forms completed within 72 hours, however some forms may require a visit or additional information.     Who is the form from? Home care  Where did the form come from? form was faxed in  What clinic location was the form placed at?   Where was the form placed? Given to MA/RN  What number is listed as a contact on the form? [phone]    Phone call message - patient request for a letter, form or note:     Date needed: as soon as possible  Please fax to [phone]  Has the patient signed a consent form for release of information?     Additional comments:     Type of letter, form or note:     Phone number to reach patient:      Best Time:      Can we leave a detailed message on this number?

## 2019-04-30 ENCOUNTER — TRANSFERRED RECORDS (OUTPATIENT)
Dept: HEALTH INFORMATION MANAGEMENT | Facility: CLINIC | Age: 79
End: 2019-04-30

## 2019-05-02 DIAGNOSIS — N18.30 CHRONIC KIDNEY DISEASE, STAGE III (MODERATE) (H): Primary | ICD-10-CM

## 2019-05-03 DIAGNOSIS — N18.30 CHRONIC KIDNEY DISEASE, STAGE III (MODERATE) (H): Primary | ICD-10-CM

## 2019-05-03 LAB
ANION GAP SERPL CALCULATED.3IONS-SCNC: 8 MMOL/L (ref 3–14)
BUN SERPL-MCNC: 31 MG/DL (ref 7–30)
CALCIUM SERPL-MCNC: 9.1 MG/DL (ref 8.5–10.1)
CHLORIDE SERPL-SCNC: 106 MMOL/L (ref 94–109)
CO2 SERPL-SCNC: 24 MMOL/L (ref 20–32)
CREAT SERPL-MCNC: 1.58 MG/DL (ref 0.52–1.04)
GFR SERPL CREATININE-BSD FRML MDRD: 31 ML/MIN/{1.73_M2}
HGB BLD-MCNC: 11.8 G/DL (ref 11.7–15.7)
PHOSPHATE SERPL-MCNC: 3.7 MG/DL (ref 2.5–4.5)
POTASSIUM SERPL-SCNC: 4.3 MMOL/L (ref 3.4–5.3)
PROT UR-MCNC: 0.38 G/L
PROT/CREAT 24H UR: 0.12 G/G CR (ref 0–0.2)
PTH-INTACT SERPL-MCNC: 67 PG/ML (ref 18–80)
SODIUM SERPL-SCNC: 138 MMOL/L (ref 133–144)

## 2019-05-03 PROCEDURE — 80051 ELECTROLYTE PANEL: CPT | Performed by: INTERNAL MEDICINE

## 2019-05-03 PROCEDURE — 84100 ASSAY OF PHOSPHORUS: CPT | Performed by: INTERNAL MEDICINE

## 2019-05-03 PROCEDURE — 85018 HEMOGLOBIN: CPT | Performed by: INTERNAL MEDICINE

## 2019-05-03 PROCEDURE — 36415 COLL VENOUS BLD VENIPUNCTURE: CPT | Performed by: INTERNAL MEDICINE

## 2019-05-03 PROCEDURE — 84156 ASSAY OF PROTEIN URINE: CPT | Performed by: INTERNAL MEDICINE

## 2019-05-03 PROCEDURE — 84520 ASSAY OF UREA NITROGEN: CPT | Performed by: INTERNAL MEDICINE

## 2019-05-03 PROCEDURE — 82310 ASSAY OF CALCIUM: CPT | Performed by: INTERNAL MEDICINE

## 2019-05-03 PROCEDURE — 82565 ASSAY OF CREATININE: CPT | Performed by: INTERNAL MEDICINE

## 2019-05-03 PROCEDURE — 83970 ASSAY OF PARATHORMONE: CPT | Performed by: INTERNAL MEDICINE

## 2019-05-08 ENCOUNTER — TRANSFERRED RECORDS (OUTPATIENT)
Dept: HEALTH INFORMATION MANAGEMENT | Facility: CLINIC | Age: 79
End: 2019-05-08

## 2019-05-08 DIAGNOSIS — E78.5 HYPERLIPIDEMIA LDL GOAL <100: ICD-10-CM

## 2019-05-08 DIAGNOSIS — E11.59 DIABETIC VASCULOPATHY (H): Primary | ICD-10-CM

## 2019-05-10 NOTE — TELEPHONE ENCOUNTER
One Touch Del[employer] Lancets  Last Written Prescription Date:  10/24/17  Last Fill Quantity: 1 BOX, of 100  # refills: 1   Last office visit: 4/16/2019 with prescribing provider:     Future Office Visit:   Next 5 appointments (look out 90 days)    May 29, 2019 10:20 AM CDT  Office Visit with [facility]  [facility] [address] [place] [id number] [phone]         Prescription approved per [facility] Refill Protocol.  ANURADHA Irving

## 2019-05-10 NOTE — TELEPHONE ENCOUNTER
"ZOCOR  Last Written Prescription Date:  11/2/17  Last Fill Quantity: 90,  # refills: 3   Last office visit: 4/16/2019 with prescribing provider:     Future Office Visit:   Next 5 appointments (look out 90 days)    May 29, 2019 10:20 AM CDT  Office Visit with Ash [facility]iroz MD  [facility] [address] [place] [id number] [phone]         Requested Prescriptions   Pending Prescriptions Disp Refills     simvastatin (ZOCOR) 40 MG tablet [Pharmacy Med Name: SIMVASTATIN 40MG    TAB] 90 tablet 3     Sig: TAKE ONE TABLET BY MOUTH AT BEDTIME       Statins Protocol Failed - 5/8/2019  9:22 AM        Failed - LDL on file in past 12 months     Recent Labs   Lab Test 04/13/18  0923   LDL 77           Passed - No abnormal creatine kinase in past 12 months     Recent Labs   Lab Test 10/09/17  0900   CKT 37            Passed - Recent (12 mo) or future (30 days) visit within the authorizing provider's specialty     Patient had office visit in the last 12 months or has a visit in the next 30 days with authorizing provider or within the authorizing provider's specialty.  See \"Patient Info\" tab in inbasket, or \"Choose Columns\" in Meds & Orders section of the refill encounter.            Passed - Medication is active on med list        Passed - Patient is age 18 or older        Passed - No active pregnancy on record        Passed - No positive pregnancy test in past 12 months      Routing refill request to provider for review/approval because:  Labs not current:  See above  A break in medication  ANURADHA Irving                      "

## 2019-05-15 ENCOUNTER — HOSPITAL ENCOUNTER (OUTPATIENT)
Dept: CARDIAC REHAB | Facility: CLINIC | Age: 79
End: 2019-05-15
Attending: THORACIC SURGERY (CARDIOTHORACIC VASCULAR SURGERY)
Payer: MEDICARE

## 2019-05-15 ENCOUNTER — MYC MEDICAL ADVICE (OUTPATIENT)
Dept: FAMILY MEDICINE | Facility: CLINIC | Age: 79
End: 2019-05-15

## 2019-05-15 PROCEDURE — 93798 PHYS/QHP OP CAR RHAB W/ECG: CPT | Performed by: REHABILITATION PRACTITIONER

## 2019-05-15 PROCEDURE — 40000116 ZZH STATISTIC OP CR VISIT: Performed by: REHABILITATION PRACTITIONER

## 2019-05-16 RX ORDER — SIMVASTATIN 40 MG
TABLET ORAL
Qty: 90 TABLET | Refills: 3 | Status: SHIPPED | OUTPATIENT
Start: 2019-05-16 | End: 2019-05-29

## 2019-05-16 NOTE — TELEPHONE ENCOUNTER
Giovanni calls to check on the status of this refill request for her simvistatin.  Giovanni states she is very concerned about this getting filled because she only has 2 left and also she has had heart surgery.

## 2019-05-23 ENCOUNTER — HOSPITAL ENCOUNTER (OUTPATIENT)
Dept: CARDIAC REHAB | Facility: CLINIC | Age: 79
End: 2019-05-23
Attending: THORACIC SURGERY (CARDIOTHORACIC VASCULAR SURGERY)
Payer: MEDICARE

## 2019-05-23 ENCOUNTER — PATIENT OUTREACH (OUTPATIENT)
Dept: CARE COORDINATION | Facility: CLINIC | Age: 79
End: 2019-05-23

## 2019-05-23 PROCEDURE — 40000116 ZZH STATISTIC OP CR VISIT: Performed by: REHABILITATION PRACTITIONER

## 2019-05-23 PROCEDURE — 93798 PHYS/QHP OP CAR RHAB W/ECG: CPT | Performed by: REHABILITATION PRACTITIONER

## 2019-05-23 NOTE — PROGRESS NOTES
Clinic Care Coordination Contact    Situation: Patient chart reviewed by care coordinator.    Background: RN CC has been monitoring patient since charge from TCU 3/29.  Patient was discharged home with home care.  RN CC was monitoring for discharge from home care    Assessment: Patient was discharged from home care last month unfortunately RNCC was not notified. patient seems to be doing well according to chart notes has access to provider through my chart has been attending appointments.   Plan/Recommendations: no further outreach by our NCC at this time no barriers identified    Emily CARTY, RN, PHN  Care Coordination    [facility]  [address] [place] [address]  Office: [phone]  Fax [phone]   [facility]  [address] [place] [address]  Office: [phone] Fax [phone]  [email or web address]   [email or web address]   Connect with [facility] on social media.

## 2019-05-29 ENCOUNTER — OFFICE VISIT (OUTPATIENT)
Dept: FAMILY MEDICINE | Facility: CLINIC | Age: 79
End: 2019-05-29
Payer: MEDICARE

## 2019-05-29 ENCOUNTER — MYC MEDICAL ADVICE (OUTPATIENT)
Dept: FAMILY MEDICINE | Facility: CLINIC | Age: 79
End: 2019-05-29

## 2019-05-29 ENCOUNTER — HOSPITAL ENCOUNTER (OUTPATIENT)
Dept: CARDIAC REHAB | Facility: CLINIC | Age: 79
End: 2019-05-29
Attending: THORACIC SURGERY (CARDIOTHORACIC VASCULAR SURGERY)
Payer: MEDICARE

## 2019-05-29 VITALS
HEART RATE: 56 BPM | BODY MASS INDEX: 31.69 KG/M2 | TEMPERATURE: 96.2 F | OXYGEN SATURATION: 95 % | SYSTOLIC BLOOD PRESSURE: 114 MMHG | WEIGHT: 165 LBS | RESPIRATION RATE: 18 BRPM | DIASTOLIC BLOOD PRESSURE: 60 MMHG

## 2019-05-29 DIAGNOSIS — I25.10 CORONARY ARTERY DISEASE INVOLVING NATIVE CORONARY ARTERY OF NATIVE HEART WITHOUT ANGINA PECTORIS: Primary | ICD-10-CM

## 2019-05-29 PROBLEM — M17.11 PRIMARY OSTEOARTHRITIS OF RIGHT KNEE: Status: RESOLVED | Noted: 2018-03-14 | Resolved: 2019-05-29

## 2019-05-29 PROCEDURE — 99213 OFFICE O/P EST LOW 20 MIN: CPT | Performed by: FAMILY MEDICINE

## 2019-05-29 PROCEDURE — 93798 PHYS/QHP OP CAR RHAB W/ECG: CPT

## 2019-05-29 PROCEDURE — 40000116 ZZH STATISTIC OP CR VISIT

## 2019-05-29 ASSESSMENT — PAIN SCALES - GENERAL: PAINLEVEL: NO PAIN (0)

## 2019-05-29 NOTE — PROGRESS NOTES
SUBJECTIVE:                                                      Chief Complaint   Patient presents with     RECHECK        Recheck     Amount of exercise or physical activity: 1 day/week for an average of 45-60 minutes    Problems taking medications regularly: No    Medication side effects: none    Diet: regular (no restrictions)        Sandra Petit is a 79 year old female who comes to clinic for recheck cellulitis/dermatitis stasis.  She has had swelling in her left leg with dependent edema since her bypass surgery and vein harvesting.  She continues to have swelling in that extremity.  Faint redness.  No pain.  Overall improving.    ROS:  Constitutional, HEENT, cardiovascular, pulmonary, gi and gu systems are negative, except as otherwise noted.    OBJECTIVE:                                                    /60   Pulse 56   Temp 96.2  F (35.7  C) (Temporal)   Resp 18   Wt 74.8 kg (165 lb)   SpO2 95%   BMI 31.69 kg/m    Body mass index is 31.69 kg/m .    Well-appearing female no distress.  Heart regular.  Lungs clear and unlabored.  Left extremity has +2 pitting edema to the level of the knee, right side is normal with trace edema.  Normal pulses.  Skin intact.    Diagnostic Test Results:  none      ASSESSMENT/PLAN:                                                        ICD-10-CM    1. Coronary artery disease involving native coronary artery of native heart without angina pectoris I25.10        She has increased edema related to her vein harvesting.  Continue with supportive stockings.  Redness noted today is dermatitis stasis.  Control edema is important.  Expect that to improve over the next couple of months.  Call if worsening symptoms, otherwise see her back in 2 or 3 months        Ash Quiroz MD  [facility]

## 2019-06-03 ENCOUNTER — HOSPITAL ENCOUNTER (OUTPATIENT)
Dept: CARDIAC REHAB | Facility: CLINIC | Age: 79
End: 2019-06-03
Attending: THORACIC SURGERY (CARDIOTHORACIC VASCULAR SURGERY)
Payer: MEDICARE

## 2019-06-03 PROCEDURE — 93798 PHYS/QHP OP CAR RHAB W/ECG: CPT | Performed by: REHABILITATION PRACTITIONER

## 2019-06-03 PROCEDURE — 40000116 ZZH STATISTIC OP CR VISIT: Performed by: REHABILITATION PRACTITIONER

## 2019-06-05 ENCOUNTER — HOSPITAL ENCOUNTER (OUTPATIENT)
Dept: CARDIAC REHAB | Facility: CLINIC | Age: 79
End: 2019-06-05
Attending: THORACIC SURGERY (CARDIOTHORACIC VASCULAR SURGERY)
Payer: MEDICARE

## 2019-06-05 PROCEDURE — 40000116 ZZH STATISTIC OP CR VISIT

## 2019-06-05 PROCEDURE — 93798 PHYS/QHP OP CAR RHAB W/ECG: CPT

## 2019-06-10 ENCOUNTER — HOSPITAL ENCOUNTER (OUTPATIENT)
Dept: CARDIAC REHAB | Facility: CLINIC | Age: 79
End: 2019-06-10
Attending: THORACIC SURGERY (CARDIOTHORACIC VASCULAR SURGERY)
Payer: MEDICARE

## 2019-06-10 PROCEDURE — 93798 PHYS/QHP OP CAR RHAB W/ECG: CPT

## 2019-06-10 PROCEDURE — 40000116 ZZH STATISTIC OP CR VISIT

## 2019-06-12 ENCOUNTER — HOSPITAL ENCOUNTER (OUTPATIENT)
Dept: CARDIAC REHAB | Facility: CLINIC | Age: 79
End: 2019-06-12
Attending: THORACIC SURGERY (CARDIOTHORACIC VASCULAR SURGERY)
Payer: MEDICARE

## 2019-06-12 PROCEDURE — 93798 PHYS/QHP OP CAR RHAB W/ECG: CPT

## 2019-06-12 PROCEDURE — 40000116 ZZH STATISTIC OP CR VISIT

## 2019-06-17 ENCOUNTER — HOSPITAL ENCOUNTER (OUTPATIENT)
Dept: CARDIAC REHAB | Facility: CLINIC | Age: 79
End: 2019-06-17
Attending: THORACIC SURGERY (CARDIOTHORACIC VASCULAR SURGERY)
Payer: MEDICARE

## 2019-06-17 PROCEDURE — 40000116 ZZH STATISTIC OP CR VISIT: Performed by: REHABILITATION PRACTITIONER

## 2019-06-17 PROCEDURE — 93798 PHYS/QHP OP CAR RHAB W/ECG: CPT | Performed by: REHABILITATION PRACTITIONER

## 2019-06-19 ENCOUNTER — HOSPITAL ENCOUNTER (OUTPATIENT)
Dept: CARDIAC REHAB | Facility: CLINIC | Age: 79
End: 2019-06-19
Attending: THORACIC SURGERY (CARDIOTHORACIC VASCULAR SURGERY)
Payer: MEDICARE

## 2019-06-19 PROCEDURE — 93798 PHYS/QHP OP CAR RHAB W/ECG: CPT

## 2019-06-19 PROCEDURE — 40000116 ZZH STATISTIC OP CR VISIT

## 2019-06-26 ENCOUNTER — HOSPITAL ENCOUNTER (OUTPATIENT)
Dept: CARDIAC REHAB | Facility: CLINIC | Age: 79
End: 2019-06-26
Attending: THORACIC SURGERY (CARDIOTHORACIC VASCULAR SURGERY)
Payer: MEDICARE

## 2019-06-26 PROCEDURE — 40000116 ZZH STATISTIC OP CR VISIT

## 2019-06-26 PROCEDURE — 93798 PHYS/QHP OP CAR RHAB W/ECG: CPT

## 2019-07-18 DIAGNOSIS — E11.59 DIABETIC VASCULOPATHY (H): ICD-10-CM

## 2019-07-18 NOTE — TELEPHONE ENCOUNTER
"Requested Prescriptions   Pending Prescriptions Disp Refills     blood glucose (ONETOUCH VERIO IQ) test strip [Pharmacy Med Name: ONETOUCH VERIO JUDSON] 50 strip 2     Sig: USE 1 STRIP TO CHECK GLUCOSE ONCE DAILY AS DIRECTED   Last Written Prescription Date:  10/30*/2018  Last Fill Quantity: 50,  # refills: 2   Last office visit: 5/29/2019 with prescribing provider:     Future Office Visit:   Next 5 appointments (look out 90 days)    Jul 31, 2019 10:00 AM CDT  Office Visit with Ash Quiroz MD  [facility] [address] [place] [id number]  [phone]             Diabetic Supplies Protocol Passed - 7/18/2019  8:10 AM        Passed - Medication is active on med list        Passed - Patient is 18 years of age or older        Passed - Recent (6 mo) or future (30 days) visit within the authorizing provider's specialty     Patient had office visit in the last 6 months or has a visit in the next 30 days with authorizing provider.  See \"Patient Info\" tab in inbasket, or \"Choose Columns\" in Meds & Orders section of the refill encounter.          Prescription approved per [facility] Refill Protocol.  Radha Javier RN      "

## 2019-07-29 ENCOUNTER — MEDICAL CORRESPONDENCE (OUTPATIENT)
Dept: HEALTH INFORMATION MANAGEMENT | Facility: CLINIC | Age: 79
End: 2019-07-29

## 2019-07-31 ENCOUNTER — OFFICE VISIT (OUTPATIENT)
Dept: FAMILY MEDICINE | Facility: CLINIC | Age: 79
End: 2019-07-31
Payer: MEDICARE

## 2019-07-31 VITALS
OXYGEN SATURATION: 97 % | DIASTOLIC BLOOD PRESSURE: 50 MMHG | SYSTOLIC BLOOD PRESSURE: 118 MMHG | BODY MASS INDEX: 32.92 KG/M2 | HEART RATE: 58 BPM | WEIGHT: 171.38 LBS | RESPIRATION RATE: 20 BRPM | TEMPERATURE: 96.9 F

## 2019-07-31 DIAGNOSIS — E11.21 TYPE 2 DIABETES MELLITUS WITH DIABETIC NEPHROPATHY, WITHOUT LONG-TERM CURRENT USE OF INSULIN (H): Primary | ICD-10-CM

## 2019-07-31 PROCEDURE — 99213 OFFICE O/P EST LOW 20 MIN: CPT | Performed by: FAMILY MEDICINE

## 2019-07-31 ASSESSMENT — PAIN SCALES - GENERAL: PAINLEVEL: EXTREME PAIN (8)

## 2019-07-31 NOTE — PROGRESS NOTES
Subjective     Sandra Petit is a 79 year old female who presents to clinic today for the following health issues:    HPI   Diabetes Follow-up      How often are you checking your blood sugar? One time daily    What time of day are you checking your blood sugars (select all that apply)?  Before meals    Have you had any blood sugars above 200?  No    Have you had any blood sugars below 70?  No    What symptoms do you notice when your blood sugar is low?  None    What concerns do you have today about your diabetes?  Other: Metformin      Do you have any of these symptoms? (Select all that apply)  No numbness or tingling in feet.  No redness, sores or blisters on feet.  No complaints of excessive thirst.  No reports of blurry vision.  No significant changes to weight.     Have you had a diabetic eye exam in the last 12 months? Yes- Date of last eye exam: 11/2018    BP Readings from Last 2 Encounters:   07/31/19 118/50   05/29/19 114/60     Hemoglobin A1C (%)   Date Value   02/27/2019 7.3 (H)   08/15/2018 6.8 (H)     LDL Cholesterol Calculated (mg/dL)   Date Value   04/13/2018 77   12/20/2017 65       Diabetes Management Resources    Amount of exercise or physical activity: walking    Problems taking medications regularly: No    Medication side effects: none    Diet: diabetic      Having sporadic lightening R knee pain. No swelling. No redness. ? Instability. History of bilat tka          Reviewed and updated as needed this visit by Provider         Review of Systems   ROS COMP: Constitutional, HEENT, cardiovascular, pulmonary, gi and gu systems are negative, except as otherwise noted.      Objective    /50 (BP Location: Right arm, Patient Position: Chair, Cuff Size: Adult Large)   Pulse 58   Temp 96.9  F (36.1  C) (Temporal)   Resp 20   Wt 77.7 kg (171 lb 6 oz)   SpO2 97%   BMI 32.92 kg/m    Body mass index is 32.92 kg/m .  Physical Exam   GENERAL: healthy, alert and no distress  NECK: no adenopathy, no  "asymmetry, masses, or scars and thyroid normal to palpation  RESP: lungs clear to auscultation - no rales, rhonchi or wheezes  CV: regular rate and rhythm, normal S1 S2, no S3 or S4, no murmur, click or rub, no peripheral edema and peripheral pulses strong  ABDOMEN: soft, nontender, no hepatosplenomegaly, no masses and bowel sounds normal  MS: no gross musculoskeletal defects noted, no edema    Diagnostic Test Results:  Labs reviewed in [facility]      Lab Results   Component Value Date    A1C 7.3 02/27/2019    A1C 6.8 08/15/2018    A1C 6.6 04/13/2018    A1C 6.5 10/09/2017    A1C 6.8 02/20/2017        Assessment & Plan       ICD-10-CM    1. Type 2 diabetes mellitus with diabetic nephropathy, without long-term current use of insulin (H) E11.21 **A1C FUTURE anytime   Diabetes controlled.  No changes to regimen.  See her back in 3 to 4 months     BMI:   Estimated body mass index is 32.92 kg/m  as calculated from the following:    Height as of 2/22/19: 1.537 m (5' 0.5\").    Weight as of this encounter: 77.7 kg (171 lb 6 oz).               No follow-ups on file.    Ash Quiroz MD  [facility]      "

## 2019-08-07 ASSESSMENT — ANXIETY QUESTIONNAIRES
7. FEELING AFRAID AS IF SOMETHING AWFUL MIGHT HAPPEN: NOT AT ALL
GAD7 TOTAL SCORE: 0
2. NOT BEING ABLE TO STOP OR CONTROL WORRYING: NOT AT ALL
IF YOU CHECKED OFF ANY PROBLEMS ON THIS QUESTIONNAIRE, HOW DIFFICULT HAVE THESE PROBLEMS MADE IT FOR YOU TO DO YOUR WORK, TAKE CARE OF THINGS AT HOME, OR GET ALONG WITH OTHER PEOPLE: NOT DIFFICULT AT ALL
5. BEING SO RESTLESS THAT IT IS HARD TO SIT STILL: NOT AT ALL
3. WORRYING TOO MUCH ABOUT DIFFERENT THINGS: NOT AT ALL
1. FEELING NERVOUS, ANXIOUS, OR ON EDGE: NOT AT ALL
6. BECOMING EASILY ANNOYED OR IRRITABLE: NOT AT ALL

## 2019-08-07 ASSESSMENT — PATIENT HEALTH QUESTIONNAIRE - PHQ9
SUM OF ALL RESPONSES TO PHQ QUESTIONS 1-9: 5
5. POOR APPETITE OR OVEREATING: NOT AT ALL

## 2019-08-08 ASSESSMENT — ANXIETY QUESTIONNAIRES: GAD7 TOTAL SCORE: 0

## 2019-09-18 ENCOUNTER — IMMUNIZATION (OUTPATIENT)
Dept: FAMILY MEDICINE | Facility: CLINIC | Age: 79
End: 2019-09-18
Payer: MEDICARE

## 2019-09-18 DIAGNOSIS — Z23 NEED FOR PROPHYLACTIC VACCINATION AND INOCULATION AGAINST INFLUENZA: Primary | ICD-10-CM

## 2019-09-18 PROCEDURE — 90662 IIV NO PRSV INCREASED AG IM: CPT

## 2019-09-18 PROCEDURE — G0008 ADMIN INFLUENZA VIRUS VAC: HCPCS

## 2019-10-16 ENCOUNTER — HOSPITAL ENCOUNTER (OUTPATIENT)
Dept: MAMMOGRAPHY | Facility: CLINIC | Age: 79
Discharge: HOME | End: 2019-10-16
Attending: FAMILY MEDICINE | Admitting: FAMILY MEDICINE
Payer: MEDICARE

## 2019-10-16 DIAGNOSIS — Z12.31 VISIT FOR SCREENING MAMMOGRAM: ICD-10-CM

## 2019-10-16 PROCEDURE — 77063 BREAST TOMOSYNTHESIS BI: CPT

## 2019-10-16 NOTE — PROGRESS NOTES
Appropriate assistive devices provided during their visit. yes(Yes, No, N/A) cane (list device)    Exam table and/or cart  placed in the lowest position. na (Yes, No, N/A)    Brakes on tables/carts/wheelchairs used at all times. na (Yes, No, N/A)    Non slip footwear applied. na (Yes, No, NA)    Patient was accompanied by staff throughout visit. yes (Yes, No, N/A)    Equipment safety straps used. na (Yes, No, N/A)    Assist with toileting. na (Yes, No, N/A)

## 2019-11-03 ASSESSMENT — ENCOUNTER SYMPTOMS
PALPITATIONS: 0
CHILLS: 0
NAUSEA: 0
EYE PAIN: 0
SHORTNESS OF BREATH: 1
COUGH: 0
FEVER: 0
DYSURIA: 0
WEAKNESS: 0
HEARTBURN: 0
PARESTHESIAS: 0
FREQUENCY: 1
HEMATOCHEZIA: 0
DIZZINESS: 0
ARTHRALGIAS: 1
JOINT SWELLING: 1
SORE THROAT: 0
HEADACHES: 0
DIARRHEA: 0
ABDOMINAL PAIN: 0
HEMATURIA: 0
MYALGIAS: 1
NERVOUS/ANXIOUS: 0
CONSTIPATION: 0
BREAST MASS: 0

## 2019-11-03 ASSESSMENT — ACTIVITIES OF DAILY LIVING (ADL): CURRENT_FUNCTION: NO ASSISTANCE NEEDED

## 2019-11-06 ENCOUNTER — OFFICE VISIT (OUTPATIENT)
Dept: FAMILY MEDICINE | Facility: CLINIC | Age: 79
End: 2019-11-06
Payer: MEDICARE

## 2019-11-06 VITALS
DIASTOLIC BLOOD PRESSURE: 72 MMHG | RESPIRATION RATE: 18 BRPM | WEIGHT: 175 LBS | SYSTOLIC BLOOD PRESSURE: 122 MMHG | HEART RATE: 60 BPM | TEMPERATURE: 97 F | BODY MASS INDEX: 33.61 KG/M2 | OXYGEN SATURATION: 95 %

## 2019-11-06 DIAGNOSIS — E03.9 HYPOTHYROIDISM, UNSPECIFIED TYPE: ICD-10-CM

## 2019-11-06 DIAGNOSIS — Z00.00 ENCOUNTER FOR MEDICARE ANNUAL WELLNESS EXAM: Primary | ICD-10-CM

## 2019-11-06 DIAGNOSIS — E11.21 TYPE 2 DIABETES MELLITUS WITH DIABETIC NEPHROPATHY, WITHOUT LONG-TERM CURRENT USE OF INSULIN (H): ICD-10-CM

## 2019-11-06 DIAGNOSIS — I10 ESSENTIAL HYPERTENSION, BENIGN: ICD-10-CM

## 2019-11-06 DIAGNOSIS — E78.5 HYPERLIPIDEMIA LDL GOAL <100: ICD-10-CM

## 2019-11-06 LAB
ANION GAP SERPL CALCULATED.3IONS-SCNC: 2 MMOL/L (ref 3–14)
BUN SERPL-MCNC: 24 MG/DL (ref 7–30)
CALCIUM SERPL-MCNC: 9.4 MG/DL (ref 8.5–10.1)
CHLORIDE SERPL-SCNC: 108 MMOL/L (ref 94–109)
CO2 SERPL-SCNC: 32 MMOL/L (ref 20–32)
CREAT SERPL-MCNC: 1.33 MG/DL (ref 0.52–1.04)
GFR SERPL CREATININE-BSD FRML MDRD: 38 ML/MIN/{1.73_M2}
GLUCOSE SERPL-MCNC: 122 MG/DL (ref 70–99)
HBA1C MFR BLD: 7 % (ref 0–5.6)
LDLC SERPL DIRECT ASSAY-MCNC: 98 MG/DL
POTASSIUM SERPL-SCNC: 4.7 MMOL/L (ref 3.4–5.3)
SODIUM SERPL-SCNC: 142 MMOL/L (ref 133–144)

## 2019-11-06 PROCEDURE — 99214 OFFICE O/P EST MOD 30 MIN: CPT | Mod: 25 | Performed by: FAMILY MEDICINE

## 2019-11-06 PROCEDURE — 36415 COLL VENOUS BLD VENIPUNCTURE: CPT | Performed by: FAMILY MEDICINE

## 2019-11-06 PROCEDURE — 83036 HEMOGLOBIN GLYCOSYLATED A1C: CPT | Performed by: FAMILY MEDICINE

## 2019-11-06 PROCEDURE — 83721 ASSAY OF BLOOD LIPOPROTEIN: CPT | Performed by: FAMILY MEDICINE

## 2019-11-06 PROCEDURE — G0439 PPPS, SUBSEQ VISIT: HCPCS | Performed by: FAMILY MEDICINE

## 2019-11-06 PROCEDURE — 80048 BASIC METABOLIC PNL TOTAL CA: CPT | Performed by: FAMILY MEDICINE

## 2019-11-06 RX ORDER — METOPROLOL SUCCINATE 25 MG/1
TABLET, EXTENDED RELEASE ORAL
Qty: 90 TABLET | Refills: 3 | Status: SHIPPED | OUTPATIENT
Start: 2019-11-06 | End: 2020-11-11

## 2019-11-06 RX ORDER — LOSARTAN POTASSIUM 25 MG/1
25 TABLET ORAL DAILY
Qty: 90 TABLET | Refills: 3 | Status: SHIPPED | OUTPATIENT
Start: 2019-11-06 | End: 2020-07-08

## 2019-11-06 RX ORDER — AMLODIPINE BESYLATE 10 MG/1
10 TABLET ORAL DAILY
Qty: 90 TABLET | Refills: 3 | Status: SHIPPED | OUTPATIENT
Start: 2019-11-06 | End: 2020-11-11

## 2019-11-06 RX ORDER — SIMVASTATIN 40 MG
40 TABLET ORAL AT BEDTIME
Qty: 90 TABLET | Refills: 3 | Status: SHIPPED | OUTPATIENT
Start: 2019-11-06 | End: 2020-11-11

## 2019-11-06 RX ORDER — CLONIDINE HYDROCHLORIDE 0.2 MG/1
0.2 TABLET ORAL 3 TIMES DAILY
Qty: 180 TABLET | Refills: 3 | Status: SHIPPED | OUTPATIENT
Start: 2019-11-06 | End: 2020-08-07

## 2019-11-06 RX ORDER — LEVOTHYROXINE SODIUM 50 UG/1
50 TABLET ORAL DAILY
Qty: 90 TABLET | Refills: 3 | Status: SHIPPED | OUTPATIENT
Start: 2019-11-06 | End: 2020-11-11

## 2019-11-06 ASSESSMENT — ENCOUNTER SYMPTOMS
WEAKNESS: 0
BREAST MASS: 0
DIARRHEA: 0
MYALGIAS: 1
FREQUENCY: 1
COUGH: 0
FEVER: 0
HEMATOCHEZIA: 0
SHORTNESS OF BREATH: 1
ARTHRALGIAS: 1
SORE THROAT: 0
NERVOUS/ANXIOUS: 0
HEADACHES: 0
DYSURIA: 0
ABDOMINAL PAIN: 0
PALPITATIONS: 0
EYE PAIN: 0
PARESTHESIAS: 0
HEMATURIA: 0
JOINT SWELLING: 1
NAUSEA: 0
HEARTBURN: 0
DIZZINESS: 0
CONSTIPATION: 0
CHILLS: 0

## 2019-11-06 ASSESSMENT — ACTIVITIES OF DAILY LIVING (ADL): CURRENT_FUNCTION: NO ASSISTANCE NEEDED

## 2019-11-06 ASSESSMENT — PAIN SCALES - GENERAL: PAINLEVEL: NO PAIN (0)

## 2019-11-06 NOTE — PROGRESS NOTES
She is at risk for lack of exercise and has been provided with information to increase physical activity for the benefit of her well-being.  The patient was counseled and encouraged to consider modifying their diet and eating habits. She was provided with information on recommended healthy diet options.  Information on urinary incontinence and treatment options given to patient.

## 2019-11-06 NOTE — PATIENT INSTRUCTIONS
Patient Education   Personalized Prevention Plan  You are due for the preventive services outlined below.  Your care team is available to assist you in scheduling these services.  If you have already completed any of these items, please share that information with your care team to update in your medical record.  Health Maintenance Due   Topic Date Due     Zoster (Shingles) Vaccine (2 of 3) 11/20/2010     Eye Exam  10/25/2018     Kidney Microalbumin Urine Test  03/12/2019     Cholesterol Lab  04/13/2019     Diabetic Foot Exam  07/18/2019     A1C Lab  08/27/2019     Pneumococcal Vaccine (2 of 2 - PPSV23) 05/11/2019     Annual Wellness Visit  10/17/2019       Exercise for a Healthier Heart     Exercise with a friend. When activity is fun, you're more likely to stick with it.   You may wonder how you can improve the health of your heart. If you re thinking about exercise, you re on the right track. You don t need to become an athlete, but you do need a certain amount of brisk exercise to help strengthen your heart. If you have been diagnosed with a heart condition, your doctor may recommend exercise to help stabilize your condition. To help make exercise a habit, choose safe, fun activities.  Be sure to check with your healthcare provider before starting an exercise program.   Why exercise?  Exercising regularly offers many healthy rewards. It can help you do all of the following:    Improve your blood cholesterol level to help prevent further heart trouble    Lower your blood pressure to help prevent a stroke or heart attack    Control diabetes, or reduce your risk of getting this disease    Improve your heart and lung function    Reach and maintain a healthy weight    Make your muscles stronger and more limber so you can stay active    Prevent falls and fractures by slowing the loss of bone mass (osteoporosis)    Manage stress better    Reduce your blood pressure    Improve your sense of self and your body  image  Exercise tips  Ease into your routine. Set small goals. Then build on them.  Exercise on most days. Aim for a total of 150 or more minutes of moderate to  vigorous intensity activity each week. Consider 40 minutes, 3 to 4 times a week. For best results, activity should last for 40 minutes on average. It is OK to work up to the 40 minute period over time. Examples of moderate-intensity activity is walking 1 mile in 15 minutes or 30 to 45 minutes of yard work.  Step up your daily activity level. Along with your exercise program, try being more active throughout the day. Walk instead of drive. Do more household tasks or yard work.  Choose one or more activities you enjoy. Walking is one of the easiest things you can do. You can also try swimming, riding a bike, dancing, or taking an exercise class.  Stop exercising and call your doctor if you:    Have chest pain or feel dizzy or lightheaded    Feel burning, tightness, pressure, or heaviness in your chest, neck, shoulders, back, or arms    Have unusual shortness of breath    Have increased joint or muscle pain    Have palpitations or an irregular heartbeat   Date Last Reviewed: 5/1/2016 2000-2018 [employer]. [address] [place] [address]. All rights reserved. This information is not intended as a substitute for professional medical care. Always follow your healthcare professional's instructions.          Understanding USDA MyPlate  The USDA (U.S. Department of Agriculture) has guidelines to help you make healthy food choices. These are called MyPlate. MyPlate shows the food groups that make up healthy meals using the image of a place setting. Before you eat, think about the healthiest choices for what to put onto your plate or into your cup or bowl. To learn more about building a healthy plate, visit www.choosemyplate.gov.    The food groups    Fruits. Any fruit or 100% fruit juice counts as part of the Fruit Group. Fruits may be  fresh, canned, frozen, or dried, and may be whole, cut-up, or pureed. Make half your plate fruits and vegetables.    Vegetables. Any vegetable or 100% vegetable juice counts as a member of the Vegetable Group. Vegetables may be fresh, frozen, canned, or dried. They can be served raw or cooked and may be whole, cut-up, or mashed. Make half your plate fruits and vegetables.    Grains. All foods made from grains are part of the Grains Group. These include wheat, rice, oats, cornmeal, and barley such as bread, pasta, oatmeal, cereal, tortillas, and grits. Grains should be no more than a quarter of your plate. At least half of your grains should be whole grains.    Protein. This group includes meat, poultry, seafood, beans and peas, eggs, processed soy products (like tofu), nuts (including nut butters), and seeds. Make protein choices no more than a quarter of your plate. Meat and poultry choices should be lean or low fat.    Dairy. All fluid milk products and foods made from milk that contain calcium, like yogurt and cheese, are part of the Dairy Group. (Foods that have little calcium, such as cream, butter, and cream cheese, are not part of the group.) Most dairy choices should be low-fat or fat-free.    Oils. These are fats that are liquid at room temperature. They include canola, corn, olive, soybean, and sunflower oil. Foods that are mainly oil include mayonnaise, certain salad dressings, and soft margarines. You should have only 5 to 7 teaspoons of oils a day. You probably already get this much from the food you eat.  Date Last Reviewed: 8/1/2017 2000-2018 [employer]. [address] [place] [address], [place] [address]. All rights reserved. This information is not intended as a substitute for professional medical care. Always follow your healthcare professional's instructions.          Urinary Incontinence, Female (Adult)  Urinary incontinence means loss of control of the bladder. This problem affects many  women, especially as they get older. If you have incontinence, you may be embarrassed to ask for help. But know that this problem can be treated.  Types of Incontinence  There are different types of incontinence. Two of the main types are described here. You can have more than one type.    Stress incontinence. With this type, urine leaks when pressure (stress) is put on the bladder. This may happen when you cough, sneeze, or laugh. Stress incontinence most often occurs because the pelvic floor muscles that support the bladder and urethra are weak. This can happen after pregnancy and vaginal childbirth or a hysterectomy. It can also be due to excess body weight or hormone changes.    Urge incontinence (also called overactive bladder). With this type, a sudden urge to urinate is felt often. This may happen even though there may not be much urine in the bladder. The need to urinate often during the night is common. Urge incontinence most often occurs because of bladder spasms. This may be due to bladder irritation or infection. Damage to bladder nerves or pelvic muscles, constipation, and certain medicines can also lead to urge incontinence.  Treatment of urinary incontinence depends on the cause. Further evaluation is needed to find the type you have. This will likely include an exam and certain tests. Based on the results, you and your healthcare provider can then plan treatment. Until a diagnosis is made, the home care tips below can help relieve symptoms.  Home care    Do pelvic floor muscle exercises, if they are prescribed. The pelvic floor muscles help support the bladder and urethra. Many women find that their symptoms improve when doing special exercises that strengthen these muscles. To do the exercises contract the muscles you would use to stop your stream of urine, but do this when you re not urinating. Hold for 10 seconds, then relax. Repeat 10 to 20 times in a row, at least 3 times a day. Your provider  may give you other instructions for how to do the exercises and how often.    Keep a bladder diary. This helps track how often and how much you urinate over a set period of time. Bring this diary with you to your next visit with the provider. The information can help your provider learn more about your bladder problem.    Lose weight, if advised to by your provider. Excess weight puts pressure on the bladder. Your provider can help you create a weight-loss plan that s right for you. This may include exercising more and making certain diet changes.    Don't consume foods and drinks that may irritate the bladder. These can include alcohol and caffeinated drinks.    Quit smoking. Smoking and other tobacco use can lead to chronic cough that strains the pelvic floor muscles. Smoking may also damage the bladder and urethra. Talk with your provider about treatments or methods you can use to quit smoking.    If drinking large amounts of fluid causes you to have symptoms, you may be advised to limit your fluid intake. You may also be advised to drink most of your fluids during the day and to limit fluids at night.    If you re worried about urine leakage or accidents, you may wear absorbent pads to catch urine. Change the pads often. This helps reduce discomfort. It may also reduce the risk of skin or bladder infections.  Follow-up care  Follow up with your healthcare provider, or as directed. It may take some to find the right treatment for your problem. Your treatment plan may include special therapies or medicines. Certain procedures or surgery may also be options. Be sure to discuss any questions you have with your provider.  When to seek medical advice  Call the healthcare provider right away if any of these occur:    Fever of 100.4 F (38 C) or higher, or as directed by your provider    Bladder pain or fullness    Abdominal swelling    Nausea or vomiting    Back pain    Weakness, dizziness or fainting  Date Last  Reviewed: 10/1/2017    9981-2216 [employer]. [address] [place] [address], [place] [address]. All rights reserved. This information is not intended as a substitute for professional medical care. Always follow your healthcare professional's instructions.

## 2019-11-06 NOTE — PROGRESS NOTES
"SUBJECTIVE:   Sandra Petit is a 79 year old female who presents for Preventive Visit.    Comes in for physical.  She is been doing well.  Denies symptoms of low or high thyroid.  Blood sugars under good control.  Compliant with her medications.  Home monitoring blood pressures and they remain in the 130 systolic.  Are you in the first 12 months of your Medicare coverage?  No    Healthy Habits:     In general, how would you rate your overall health?  Good    Frequency of exercise:  1 day/week    Duration of exercise:  Less than 15 minutes    Do you usually eat at least 4 servings of fruit and vegetables a day, include whole grains    & fiber and avoid regularly eating high fat or \"junk\" foods?  No    Taking medications regularly:  Yes    Medication side effects:  None    Ability to successfully perform activities of daily living:  No assistance needed    Home Safety:  No safety concerns identified    Hearing Impairment:  No hearing concerns    In the past 6 months, have you been bothered by leaking of urine? Yes    In general, how would you rate your overall mental or emotional health?  Good      PHQ-2 Total Score: 0    Additional concerns today:  No    Do you feel safe in your environment? Yes    Have you ever done Advance Care Planning? (For example, a Health Directive, POLST, or a discussion with a medical provider or your loved ones about your wishes): Yes, patient states has an Advance Care Planning document and will bring a copy to the clinic.      Fall risk       Cognitive Screening   1) Repeat 3 items (Leader, Season, Table)    2) Clock draw: NORMAL  3) 3 item recall: Recalls 3 objects  Results: 3 items recalled: COGNITIVE IMPAIRMENT LESS LIKELY    Mini-CogTM Copyright KELLY Joya. Licensed by the author for use in [facility]; reprinted with permission (ramón@.[facility]). All rights reserved.      Do you have sleep apnea, excessive snoring or daytime drowsiness?: no    Reviewed and updated as needed " this visit by clinical staff  Tobacco  Allergies  Meds         Reviewed and updated as needed this visit by Provider        Social History     Tobacco Use     Smoking status: Former Smoker     Smokeless tobacco: Never Used     Tobacco comment: quit  1987   Substance Use Topics     Alcohol use: No     Alcohol/week: 0.0 standard drinks     If you drink alcohol do you typically have >3 drinks per day or >7 drinks per week? No    No flowsheet data found.            Current providers sharing in care for this patient include:   Patient Care Team:  Ash Quiroz MD as PCP - General (Family Practice)  Ash Quiroz MD as Assigned PCP    The following health maintenance items are reviewed in Epic and correct as of today:  Health Maintenance   Topic Date Due     ZOSTER IMMUNIZATION (2 of 3) 11/20/2010     EYE EXAM  10/25/2018     MICROALBUMIN  03/12/2019     LIPID  04/13/2019     PNEUMOCOCCAL IMMUNIZATION 65+ LOW/MEDIUM RISK (2 of 2 - PPSV23) 05/11/2019     DIABETIC FOOT EXAM  07/18/2019     MEDICARE ANNUAL WELLNESS VISIT  10/17/2019     TSH W/FREE T4 REFLEX  03/14/2020     FALL RISK ASSESSMENT  03/28/2020     A1C  05/06/2020     ANAID ASSESSMENT  07/31/2020     PHQ-9  07/31/2020     BMP  11/06/2020     ADVANCE CARE PLANNING  11/06/2024     DTAP/TDAP/TD IMMUNIZATION (4 - Td) 04/27/2026     DEXA  Completed     INFLUENZA VACCINE  Completed     IPV IMMUNIZATION  Aged Out     MENINGITIS IMMUNIZATION  Aged Out           Review of Systems   Constitutional: Negative for chills and fever.   HENT: Negative for congestion, ear pain, hearing loss and sore throat.    Eyes: Negative for pain and visual disturbance.   Respiratory: Positive for shortness of breath. Negative for cough.    Cardiovascular: Positive for peripheral edema. Negative for chest pain and palpitations.   Gastrointestinal: Negative for abdominal pain, constipation, diarrhea, heartburn, hematochezia and nausea.   Breasts:  Negative for tenderness,  "breast mass and discharge.   Genitourinary: Positive for frequency. Negative for dysuria, genital sores, hematuria, pelvic pain, urgency, vaginal bleeding and vaginal discharge.   Musculoskeletal: Positive for arthralgias, joint swelling and myalgias.   Skin: Negative for rash.   Neurological: Negative for dizziness, weakness, headaches and paresthesias.   Psychiatric/Behavioral: Negative for mood changes. The patient is not nervous/anxious.      Constitutional, HEENT, cardiovascular, pulmonary, gi and gu systems are negative, except as otherwise noted.    OBJECTIVE:   /72   Pulse 60   Temp 97  F (36.1  C) (Temporal)   Resp 18   Wt 79.4 kg (175 lb)   SpO2 95%   BMI 33.61 kg/m   Estimated body mass index is 33.61 kg/m  as calculated from the following:    Height as of 2/22/19: 1.537 m (5' 0.5\").    Weight as of this encounter: 79.4 kg (175 lb).  Physical Exam  GENERAL: healthy, alert and no distress  EYES: Eyes grossly normal to inspection, PERRL and conjunctivae and sclerae normal  HENT: ear canals and TM's normal, nose and mouth without ulcers or lesions  NECK: no adenopathy, no asymmetry, masses, or scars and thyroid normal to palpation  RESP: lungs clear to auscultation - no rales, rhonchi or wheezes  CV: regular rate and rhythm, normal S1 S2, no S3 or S4, no murmur, click or rub, no peripheral edema, and peripheral pulses strong  ABDOMEN: soft, nontender, no hepatosplenomegaly, no masses and bowel sounds normal  MS: no gross musculoskeletal defects noted, no edema  SKIN: no suspicious lesions or rashes  NEURO: Normal strength and tone, mentation intact and speech normal  PSYCH: mentation appears normal, affect normal/bright        ASSESSMENT / PLAN:       ICD-10-CM    1. Encounter for Medicare annual wellness exam Z00.00    2. Hypothyroidism, unspecified type E03.9 levothyroxine (SYNTHROID/LEVOTHROID) 50 MCG tablet   3. Hyperlipidemia LDL goal <100 E78.5 simvastatin (ZOCOR) 40 MG tablet     LDL " "cholesterol direct   4. Essential hypertension, benign I10 amLODIPine (NORVASC) 10 MG tablet     losartan (COZAAR) 25 MG tablet     cloNIDine (CATAPRES) 0.2 MG tablet     metoprolol succinate ER (TOPROL-XL) 25 MG 24 hr tablet   5. Type 2 diabetes mellitus with diabetic nephropathy, without long-term current use of insulin (H) E11.21 Hemoglobin A1c     Basic metabolic panel     Type 2 diabetes controlled.  Recheck labs today.  Hypertension controlled.  Refills provided.  Hyperlipidemia controlled.  Recheck labs.  Annual topics covered.  Immunizations updated.  Cancer screening up-to-date.  Orders as above.      OUNSELING:  Reviewed preventive health counseling, as reflected in patient instructions    Estimated body mass index is 33.61 kg/m  as calculated from the following:    Height as of 2/22/19: 1.537 m (5' 0.5\").    Weight as of this encounter: 79.4 kg (175 lb).         reports that she has quit smoking. She has never used smokeless tobacco.      Appropriate preventive services were discussed with this patient, including applicable screening as appropriate for cardiovascular disease, diabetes, osteopenia/osteoporosis, and glaucoma.  As appropriate for age/gender, discussed screening for colorectal cancer, prostate cancer, breast cancer, and cervical cancer. Checklist reviewing preventive services available has been given to the patient.    Reviewed patients plan of care and provided an AVS. The Basic Care Plan (routine screening as documented in Health Maintenance) for Sandra meets the Care Plan requirement. This Care Plan has been established and reviewed with the Patient.    Counseling Resources:  ATP IV Guidelines  Pooled Cohorts Equation Calculator  Breast Cancer Risk Calculator  FRAX Risk Assessment  ICSI Preventive Guidelines  Dietary Guidelines for Americans, 2010  USDA's MyPlate  ASA Prophylaxis  Lung CA Screening    Ash Quiroz MD  [facility]    Identified Health Risks:  "

## 2019-11-15 ENCOUNTER — MEDICAL CORRESPONDENCE (OUTPATIENT)
Dept: HEALTH INFORMATION MANAGEMENT | Facility: CLINIC | Age: 79
End: 2019-11-15

## 2019-11-21 ENCOUNTER — HOSPITAL ENCOUNTER (OUTPATIENT)
Dept: PHYSICAL THERAPY | Facility: CLINIC | Age: 79
Setting detail: THERAPIES SERIES
End: 2019-11-21
Attending: ORTHOPAEDIC SURGERY
Payer: MEDICARE

## 2019-11-21 PROCEDURE — 97110 THERAPEUTIC EXERCISES: CPT | Mod: GP

## 2019-11-21 PROCEDURE — 97161 PT EVAL LOW COMPLEX 20 MIN: CPT | Mod: GP

## 2019-11-21 NOTE — PROGRESS NOTES
[facility]          OUTPATIENT PHYSICAL THERAPY ORTHOPEDIC EVALUATION  PLAN OF TREATMENT FOR OUTPATIENT REHABILITATION  (COMPLETE FOR INITIAL CLAIMS ONLY)  Patient's Last Name, First Name, M.I.  YOB: 1940  Sandra Petit  ADIN    Provider s Name:  [facility]   Medical Record No.  [id number]   Start of Care Date:  11/21/19   Onset Date:   11/15/2019   Type:     _X__PT   ___OT   ___SLP Medical Diagnosis:  Midflexion instability of the right knee     PT Diagnosis:  Right knee pain   Visits from SOC:  1      _________________________________________________________________________________  Plan of Treatment/Functional Goals:  strengthening  Isometric strengthening program provided as instructed by referring physician     None  Goals  Goal Identifier: HEP  Goal Description: Patient will understand and be independent with her HEP.  Target Date: 11/21/19                      Therapy Frequency:  One evaluation and one same day treatment  Predicted Duration of Therapy Intervention:  One eval plus one treatment    Tyson Pineda, PT                 I CERTIFY THE NEED FOR THESE SERVICES FURNISHED UNDER        THIS PLAN OF TREATMENT AND WHILE UNDER MY CARE .             Physician Signature               Date    X_____________________________________________________                             Certification Date From:  11/21/19   Certification Date To:  11/21/19    Referring Provider:  Dr. Bunny Tom MD    Initial Assessment        See Epic Evaluation Start of Care Date: 11/21/19

## 2019-11-21 NOTE — PROGRESS NOTES
"   11/21/19 1045   General Information   Type of Visit Initial OP Ortho PT Evaluation   Start of Care Date 11/21/19   Referring Physician Dr. Bunny Tom MD   Patient/Family Goals Statement To stop her knee from giving out or \"genuflecting\"   Orders Evaluate and Treat   Orders Comment Isometric Strengthening only. 1 visit for developing home program for Right Knee Strengthening   Date of Order 11/15/19   Certification Required? Yes   Medical Diagnosis Midflexion instability of the right knee   Surgical/Medical history reviewed Yes   Precautions/Limitations no known precautions/limitations   General Information Comments Patient reports that she had a total knee replacement in August 2018. Following the knee replacement she had the pain in the knee around the anterior portion of the knee.  Progressively getting worse and now she can't sleep at night. Most comfortable position is knee extended to about 5 degrees. Patient also reports her MD stated she has instability in her knee and needs to work on isometrics. He also stated her lab results indicate possible infection and may need a knee aspiration.  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 Patient's  cancer and required care which further aggrevated her knee.       Present No   Body Part(s)   Body Part(s) Knee   Presentation and Etiology   Pertinent history of current problem (include personal factors and/or comorbidities that impact the POC) Patient is a 79 year old female referred to physical therapy for right knee instability following a right TKA one year ago. Patient reports a pmh of TKA to both knees as well as an MI with a triple bipass in march. Patient referred to PT for isometric home exercise program.   Impairments A. Pain;B. Decreased WB tolerance;D. Decreased ROM;E. Decreased flexibility;F. Decreased strength and endurance;H. Impaired gait;J. Burning   Functional Limitations perform required work activities;perform activities " of daily living;perform desired leisure / sports activities   Symptom Location Right Knee   How/Where did it occur Other   Chronicity Chronic   Pain rating (0-10 point scale) Best (/10);Worst (/10)   Best (/10) 8   Worst (/10) 10   Pain quality C. Aching;E. Shooting   Frequency of pain/symptoms C. With activity   Pain/symptoms are: Worse during the night   Pain/symptoms exacerbated by I. Bending;J. ADL;K. Home tasks;L. Work tasks;D. Carrying;C. Lifting   Pain/symptoms eased by C. Rest;F. Certain positions   Progression of symptoms since onset: Worsened   Prior Level of Function   Prior Level of Function-Mobility Modified independent with SEC    Prior Level of Function-ADLs Modified independent with SEC   Fall Risk Screen   Fall screen completed by PT   Have you fallen 2 or more times in the past year? No   Have you fallen and had an injury in the past year? No   Is patient a fall risk? Yes   Fall screen comments History of 1 fall in the past year without injury. Patient uses SEC for safety.   Knee Objective Findings   Side (if bilateral, select both right and left) Right   Knee ROM Comment Painful at end range   Accessory Motion/Joint Mobility Hypermobility in all directions (Medial, Lateral, Anterior, Posterior) of tibia/femur.  Normal mobility of patella.   Right Knee Extension AROM 0   Right Knee Extension PROM 0   Right Knee Flexion AROM 85   Right Knee Flexion PROM Did not tolerate beyound 85   Right Knee Flexion Strength 4/5   Right Knee Extension Strength 4/5   Right Hip Abduction Strength 4/5   Planned Therapy Interventions   Planned Therapy Interventions strengthening   Planned Therapy Interventions Comment Isometric strengthening program provided as instructed by referring physician   Planned Modality Interventions   Planned Modality Interventions Comments None   Clinical Impression   Criteria for Skilled Therapeutic Interventions Met yes, treatment indicated   PT Diagnosis Right knee pain   Influenced by  the following impairments Pain in the right knee. Instability in the right knee. Impaired gait. Limited ROM   Functional limitations due to impairments Patient is unable to walk, transfer or standing independently without risk of falling due to knee pain and knee giving out on her.   Clinical Presentation Stable/Uncomplicated   Clinical Presentation Rationale Based on history, chroncity, evaluation and clinical reasoning.   Clinical Decision Making (Complexity) Low complexity   Therapy Frequency other (see comments)   Predicted Duration of Therapy Intervention (days/wks) One eval plus one treatment   Risk & Benefits of therapy have been explained Yes   Patient, Family & other staff in agreement with plan of care Yes   Clinical Impression Comments Patient was referred with special instructions to provide one treatment session to go other isometric home exercise program. This was performed then the patient was discharged.   ORTHO GOALS   PT Ortho Eval Goals 1   Ortho Goal 1   Goal Identifier HEP   Goal Description Patient will understand and be independent with her HEP.   Target Date 11/21/19   Date Met 11/21/19   Total Evaluation Time   PT Andra, Low Complexity Minutes (60760) 20   Therapy Certification   Certification date from 11/21/19   Certification date to 11/21/19   Medical Diagnosis Midflexion instability of the right knee

## 2019-11-21 NOTE — PROGRESS NOTES
Outpatient Physical Therapy Discharge Note     Patient: Sandra Petit  : 1940    Beginning/End Dates of Reporting Period:  2019 to 2019    Referring Provider: Dr. Bunny Tom MD    Therapy Diagnosis: Right Knee Pain     Client Self Report: See eval    Objective Measurements:  Objective Measure: TUG  Details: 21.2 seconds                                    Outcome Measures (most recent score):  LEFS: 23/80    Goals:  Goal Identifier HEP   Goal Description Patient will understand and be independent with her HEP.   Target Date 19   Date Met  19   Progress:       Progress Toward Goals:   Progress this reporting period: Patient was provided with isometric strengthening home exercise program as instructed by her referring physician. Patient is comfortable with her home program and ready for discharge.          Plan:  Discharge from therapy.    Discharge:    Reason for Discharge: Patient has met all goals.    Equipment Issued: None    Discharge Plan: Patient to continue home program.

## 2020-01-08 ENCOUNTER — OFFICE VISIT (OUTPATIENT)
Dept: FAMILY MEDICINE | Facility: CLINIC | Age: 80
End: 2020-01-08
Payer: MEDICARE

## 2020-01-08 VITALS
WEIGHT: 180 LBS | BODY MASS INDEX: 33.99 KG/M2 | TEMPERATURE: 96.5 F | HEIGHT: 61 IN | RESPIRATION RATE: 18 BRPM | SYSTOLIC BLOOD PRESSURE: 120 MMHG | OXYGEN SATURATION: 93 % | HEART RATE: 65 BPM | DIASTOLIC BLOOD PRESSURE: 68 MMHG

## 2020-01-08 DIAGNOSIS — I10 HYPERTENSION GOAL BP (BLOOD PRESSURE) < 140/90: Primary | ICD-10-CM

## 2020-01-08 PROBLEM — I48.91 ATRIAL FIBRILLATION (H): Status: ACTIVE | Noted: 2019-04-10

## 2020-01-08 PROCEDURE — 99213 OFFICE O/P EST LOW 20 MIN: CPT | Performed by: FAMILY MEDICINE

## 2020-01-08 ASSESSMENT — PAIN SCALES - GENERAL: PAINLEVEL: SEVERE PAIN (6)

## 2020-01-08 ASSESSMENT — MIFFLIN-ST. JEOR: SCORE: 1230.67

## 2020-01-08 NOTE — PROGRESS NOTES
"Subjective     Recheck Meds       Sandra is a 79 year old female who comes to clinic   bp's in the 150s evening, tolerating meds well, no complaints of headache, blurred vision, seizure, chest pain or shortness of breath    Review of Systems   ROS COMP: Constitutional, HEENT, cardiovascular, pulmonary, gi and gu systems are negative, except as otherwise noted.      Objective    /68   Pulse 65   Temp 96.5  F (35.8  C) (Temporal)   Resp 18   Ht 1.544 m (5' 0.8\")   Wt 81.6 kg (180 lb)   SpO2 93%   BMI 34.23 kg/m       Wt Readings from Last 2 Encounters:   01/08/20 81.6 kg (180 lb)   11/06/19 79.4 kg (175 lb)       Physical Exam   Well-appearing elderly female no distress.  Heart regular.  Lungs clear and unlabored.            Assessment & Plan       ICD-10-CM    1. Hypertension goal BP (blood pressure) < 140/90 I10        In order to even out her dosing, plan to take losartan in the evening   We may tolerate blood pressures in the 140s to 150 systolic given her age  I will see her back in a month, sooner if blood pressures drift up into the 170s 180s    Ash Quiroz MD  [facility]    "

## 2020-01-15 NOTE — PROGRESS NOTES
"  SUBJECTIVE:                                                      Chief Complaint   Patient presents with     Hospital F/U     ER follow up         ED/UC Followup:    Facility:  [facility]  Date of visit: 3/6/2019  Reason for visit: chest pain / fell   Current Status: still in pain from fall          Sandra is a 78 year old female who comes to clinic in follow-up.  She was seen a week ago with complaints of a sharp pain in her chest, worse with breathing.  Thought to be pleurisy.  She then had a fall at home a few days later.  She was attempting to get an icicle off her house when her knee \"gave out\".  She spent a good 20 minutes trying to get out of the snow bank and get her bearings.  As a result she has had pain on the left side of her chest.  She is also developed a central chest pressure and a subjective shortness of breath with exertion.  She does not recall breathing issues in the past.  Although she did have stenting done in 2005.  At that time she was short of breath and developed chest pressure symptoms.  Although she tells me this time it feels completely different.  Some of her pressure is mainly epigastric.  It is constant.  Does not change with exertion.    She did have a CT PE study done for an elevated d-dimer a week ago and that was normal.    ROS:  Constitutional, HEENT, cardiovascular, pulmonary, gi and gu systems are negative, except as otherwise noted.    OBJECTIVE:                                                    /78   Pulse 83   Temp 96.6  F (35.9  C) (Temporal)   Resp 18   Wt 78 kg (172 lb)   SpO2 94%   BMI 33.04 kg/m    Body mass index is 33.04 kg/m .    Well-appearing female no distress.  Neck supple without JVD.  EOMI.  Visual fields intact.  Cranial nerves II 12 grossly intact.  Neck supple.  Heart regular.  No murmur.  Lungs clear and unlabored bilaterally.  She has some tenderness to palpation of the left lateral chest wall both no deformity or signs of contusion injury.  " Review of Systems/Medical History  Patient summary reviewed    No history of anesthetic complications     Cardiovascular  Exercise tolerance (METS): >4,  No hypertension ,    Pulmonary  Not a smoker , No asthma , No shortness of breath,        GI/Hepatic            Endo/Other     GYN  Currently pregnant ,     Comment: 30 y o   female with an SAMANTA of 2020, by Last Menstrual Period at 38w1d weeks gestation who is being admitted for repeat  section due to symptomatic polyhydramnios     Hematology   Musculoskeletal  Back pain , lumbar pain,        Neurology   Psychology   Anxiety,              Physical Exam    Airway    Mallampati score: II         Dental   No notable dental hx     Cardiovascular      Pulmonary      Other Findings        Anesthesia Plan  ASA Score- 3     Anesthesia Type- spinal with ASA Monitors  Additional Monitors:   Airway Plan:         Plan Factors-    Induction-     Postoperative Plan-     Informed Consent- Anesthetic plan and risks discussed with patient  Extremities warm well perfused no edema.  Neurologically nonfocal.    Diagnostic Test Results:  Results for orders placed or performed in visit on 03/12/19 (from the past 24 hour(s))   Spirometry, Breathing Capacity: Normal Order, Clinic Performed   Result Value Ref Range    FEV-1      FVC      FEV1/FVC      FEF 25/75     CBC with platelets   Result Value Ref Range    WBC 9.9 4.0 - 11.0 10e9/L    RBC Count 4.19 3.8 - 5.2 10e12/L    Hemoglobin 12.3 11.7 - 15.7 g/dL    Hematocrit 38.8 35.0 - 47.0 %    MCV 93 78 - 100 fl    MCH 29.4 26.5 - 33.0 pg    MCHC 31.7 31.5 - 36.5 g/dL    RDW 15.5 (H) 10.0 - 15.0 %    Platelet Count 317 150 - 450 10e9/L     *Note: Due to a large number of results and/or encounters for the requested time period, some results have not been displayed. A complete set of results can be found in Results Review.        Spirometry-normal  Chest x-ray-no acute process, no fracture seen  Labs pending BNP, troponin  EKG shows a normal sinus rhythm with a rate of 60.  Normal intervals.  No ST segment changes.  Normal axis.  Ambulatory saturation shows her O2 sats go from 94% to 99%  ASSESSMENT/PLAN:                                                        ICD-10-CM    1. SOB (shortness of breath) R06.02 Spirometry, Breathing Capacity: Normal Order, Clinic Performed     Troponin I     BNP-N terminal pro     CBC with platelets     EKG 12-lead complete w/read - Clinics   2. Chest pain, unspecified type R07.9 XR Chest 2 Views     Spirometry, Breathing Capacity: Normal Order, Clinic Performed     Troponin I     BNP-N terminal pro     CBC with platelets     EKG 12-lead complete w/read - Clinics       Long workup today for her 4 days of shortness of breath and central constant chest pressure symptom.  Not clearly cardiac based on her history.  I would certainly expect more of an exertional related component.  And also a crescendo decrescendo pattern, rather than being constant.  I would also expect some escalation  in her symptoms.  Lab work is still pending.  Based on her workup so far I do not have a clear source for her shortness of breath.  I want her to see cardiology in close follow-up within the week, then return to see me after that.  If worsening at any point seek urgent medical attention for her breathing or any escalation in her chest symptoms.  She agrees        Ash Quiroz MD  [facility]

## 2020-01-29 ENCOUNTER — OFFICE VISIT (OUTPATIENT)
Dept: FAMILY MEDICINE | Facility: CLINIC | Age: 80
End: 2020-01-29
Payer: MEDICARE

## 2020-01-29 VITALS
RESPIRATION RATE: 18 BRPM | BODY MASS INDEX: 34.62 KG/M2 | OXYGEN SATURATION: 98 % | SYSTOLIC BLOOD PRESSURE: 118 MMHG | DIASTOLIC BLOOD PRESSURE: 72 MMHG | WEIGHT: 182 LBS | TEMPERATURE: 96 F | HEART RATE: 69 BPM

## 2020-01-29 DIAGNOSIS — I10 HYPERTENSION GOAL BP (BLOOD PRESSURE) < 140/90: ICD-10-CM

## 2020-01-29 DIAGNOSIS — E11.21 TYPE 2 DIABETES MELLITUS WITH DIABETIC NEPHROPATHY, WITHOUT LONG-TERM CURRENT USE OF INSULIN (H): Primary | ICD-10-CM

## 2020-01-29 PROCEDURE — 99214 OFFICE O/P EST MOD 30 MIN: CPT | Performed by: FAMILY MEDICINE

## 2020-01-29 RX ORDER — GLIPIZIDE 5 MG/1
5 TABLET, FILM COATED, EXTENDED RELEASE ORAL DAILY
Qty: 90 TABLET | Refills: 3 | Status: SHIPPED | OUTPATIENT
Start: 2020-01-29 | End: 2020-06-30

## 2020-01-29 ASSESSMENT — PAIN SCALES - GENERAL: PAINLEVEL: NO PAIN (0)

## 2020-01-29 NOTE — PROGRESS NOTES
Subjective     Hypertension Follow-up      Do you check your blood pressure regularly outside of the clinic? Yes     Are you following a low salt diet? Yes    Are your blood pressures ever more than 140 on the top number (systolic) OR more   than 90 on the bottom number (diastolic), for example 140/90? Yes      Sandra is a 79 year old female who comes to clinic recheck blood pressure.  Feeling well.  Numbers at home are ranging from the 120s to 160s, much improved    Fasting blood sugars appear to be ranging 1 .  Eats plenty of carbohydrates, working on diet.    Review of Systems   ROS COMP: Constitutional, HEENT, cardiovascular, pulmonary, gi and gu systems are negative, except as otherwise noted.      Objective    /72   Pulse 69   Temp 96  F (35.6  C) (Temporal)   Resp 18   Wt 82.6 kg (182 lb)   SpO2 98%   BMI 34.62 kg/m       Wt Readings from Last 2 Encounters:   01/29/20 82.6 kg (182 lb)   01/08/20 81.6 kg (180 lb)       Physical Exam   GENERAL: healthy, alert and no distress  NECK: no adenopathy, no asymmetry, masses, or scars and thyroid normal to palpation  RESP: lungs clear to auscultation - no rales, rhonchi or wheezes  CV: regular rate and rhythm, normal S1 S2, no S3 or S4, no murmur, click or rub, no peripheral edema and peripheral pulses strong  ABDOMEN: soft, nontender, no hepatosplenomegaly, no masses and bowel sounds normal  MS: no gross musculoskeletal defects noted, no edema    Diagnostic Test Results:  Labs reviewed in Epic        Assessment & Plan       ICD-10-CM    1. 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 Hypertension goal BP (blood pressure) < 140/90 I10        Doing well.  Fasting sugars above goal and will add glipizide back.  [place] was stopped after her recent CABG and not resume.  Continue checking fasting blood  sugars and see each other again in 2 months with blood sugars.  Also would like to reestablish with diabetic education and review diabetes management with her.  Referral provided.    Ash Quiroz MD  [facility]

## 2020-02-12 ENCOUNTER — ALLIED HEALTH/NURSE VISIT (OUTPATIENT)
Dept: EDUCATION SERVICES | Facility: CLINIC | Age: 80
End: 2020-02-12
Payer: MEDICARE

## 2020-02-12 DIAGNOSIS — E11.21 TYPE 2 DIABETES MELLITUS WITH DIABETIC NEPHROPATHY, WITHOUT LONG-TERM CURRENT USE OF INSULIN (H): Primary | ICD-10-CM

## 2020-02-12 PROCEDURE — 95250 CONT GLUC MNTR PHYS/QHP EQP: CPT

## 2020-02-12 PROCEDURE — G0108 DIAB MANAGE TRN  PER INDIV: HCPCS

## 2020-02-12 NOTE — PROGRESS NOTES
"Diabetes Self-Management Education & Support    Presents for:  Review    SUBJECTIVE/OBJECTIVE:  Diabetes education in the past 24mo: No  Diabetes type: Type 2  Disease course: Stable  Diabetes management related comments/concerns: no  Cultural Influences/Ethnic Background:  American    Concerned about glipizide and low blood sugars.     Diabetes Symptoms & Complications:  Fatigue: Yes  Polydipsia: No  Polyphagia: No  Polyuria: No  Visual change: No  Slow healing wounds: No  Autonomic neuropathy: No  CVA: No  Heart disease: Yes  Nephropathy: Yes  Peripheral neuropathy: No  Peripheral Vascular Disease: No  Retinopathy: No  Sexual dysfunction: No    Patient Problem List and Family Medical History reviewed for relevant medical history, current medical status, and diabetes risk factors.    Vitals:  There were no vitals taken for this visit.  Estimated body mass index is 34.62 kg/m  as calculated from the following:    Height as of 1/8/20: 1.544 m (5' 0.8\").    Weight as of 1/29/20: 82.6 kg (182 lb).   Last 3 BP:   BP Readings from Last 3 Encounters:   01/29/20 118/72   01/08/20 120/68   11/06/19 122/72       History   Smoking Status     Former Smoker   Smokeless Tobacco     Never Used     Comment: quit  1987       Labs:  Lab Results   Component Value Date    A1C 7.0 11/06/2019     Lab Results   Component Value Date     11/06/2019     Lab Results   Component Value Date    LDL 98 11/06/2019    LDL 77 04/13/2018     HDL Cholesterol   Date Value Ref Range Status   04/13/2018 55 >49 mg/dL Final   ]  GFR Estimate   Date Value Ref Range Status   11/06/2019 38 (L) >60 mL/min/[1.73_m2] Final     Comment:     Non  GFR Calc  Starting 12/18/2018, serum creatinine based estimated GFR (eGFR) will be   calculated using the Chronic Kidney Disease Epidemiology Collaboration   (CKD-EPI) equation.       GFR Estimate If Black   Date Value Ref Range Status   11/06/2019 44 (L) >60 mL/min/[1.73_m2] Final     Comment:     "  GFR Calc  Starting 12/18/2018, serum creatinine based estimated GFR (eGFR) will be   calculated using the Chronic Kidney Disease Epidemiology Collaboration   (CKD-EPI) equation.       Lab Results   Component Value Date    CR 1.33 11/06/2019     No results found for: MICROALBUMIN    Healthy Eating:  Cultural/Jain diet restrictions?: No  Meal planning/habits: Avoiding sweets, Low salt  How many times a week on average do you eat food made away from home (restaurant/take-out)?: 1  Meals include: Breakfast, Lunch, Dinner, Morning Snack, Afternoon Snack  Beverages: Water, Tea, Coffee, Juice  B: cereal with fruit; past week Cheerios or oatmeal or Cornflakes. 4 oz cranberry juice.   L: maybe sandwich or Glucerna and a cracker  D: Glucerna or sandwich and stuffing  Not much appetite so eats small portions.     Being Active:  Barrier to exercise: Physical limitation    Monitoring:  Blood Glucose Meter: One Touch  Times checking blood sugar at home (number): 1  Times checking blood sugar at home (per): Day  Blood glucose trend: Fluctuating    Checking FBG and range 126-174 with almost all 150 or lower.     Taking Medications:  Diabetes Medication(s)     Sulfonylureas       glipiZIDE (GLUCOTROL XL) 5 MG 24 hr tablet    Take 1 tablet (5 mg) by mouth daily          Current Treatments: None    Problem Solving:       Reducing Risks:  CAD Risks: Diabetes Mellitus, Family history, Hypertension, Sedentary lifestyle, Stress  Has dilated eye exam at least once a year?: Yes  Sees dentist every 6 months?: Yes  Feet checked by healthcare provider in the last year?: No    Healthy Coping:  Informal Support system:: Children  Patient Activation Measure Survey Score:  VIOLA Score (Last Two) 8/4/2010 12/19/2011   VIOLA Raw Score 39 44   Activation Score 56.4 70.8   VIOLA Level 3 4       Diabetes knowledge and skills assessment:   Patient is knowledgeable in diabetes management concepts related to: Healthy Eating, Monitoring,  Taking Medication, Reducing Risks and Healthy Coping    Patient needs further education on the following diabetes management concepts: Monitoring and Taking Medication    Based on learning assessment above, most appropriate setting for further diabetes education would be: Individual setting.      INTERVENTIONS:  Inserted Sensor [id number]  EXP 5-31-20       Education provided today on:  AADE Self-Care Behaviors:  Taking Medication: side effects of prescribed medications    WRITTEN AND VERBAL INFORMATION GIVEN TO SUPPORT UNDERSTANDING OF: LibrePro CGM: Sensor insertion, intention of monitoring for 14 days. Keep records of BG, food intake, exercise, and medication dosing during wear.     Opportunities for ongoing education and support in diabetes-self management were discussed.    Pt verbalized understanding of concepts discussed and recommendations provided today.       Education Materials Provided:  No new materials provided today    ASSESSMENT:  Patient has not started glipizide for fear of low glucose. Her A1c and FBG readings indicate good control and she would be at risk of hypoglycemia. Recommended we do a 14 day CGM and then decide if additional medication would be appropriate.     Sensor was inserted with no resistance or bleeding at insertion site.  Pt verbalized understanding of concepts discussed and recommendations provided today.  Time spent in DSMT: 30 minutes   Time spent in CGM insertion: 10 minutes, in addition to time spent in DSMT      PLAN  LibrePro download and review in 2 weeks.   See Patient Instructions for co-developed, patient-stated behavior change goals.  AVS printed and provided to patient today. See Follow-Up section for recommended follow-up.    Dafne Auguste RDN, DOUG, CDE    Time Spent: 40 minutes  Encounter Type: Individual    Any diabetes medication dose changes were made via the CDE Protocol and Collaborative Practice Agreement with the patient's primary care provider.  A copy of this encounter was shared with the provider.

## 2020-02-12 NOTE — PATIENT INSTRUCTIONS
1. Plan to wear the LibrePro sensor for 14 days. It is okay to shower, bathe, and swim (up to 3 feet deep for 30 minutes)    2. Continue with your usual diabetes care plan - check blood sugars and take medicines, as prescribed.    3. Keep a log of what you eat and drink, when you take your medications and how much you take, and exercise you do while you are wearing the sensor.    3. Do not cover the sensor with extra adhesive (the small hole in the center of the sensor must remain uncovered)    4. Use a little extra care, especially when getting dressed or exercising, to avoid accidentally loosening or removing the sensor.     5. Remove the sensor if you need to have an MRI or CT scan.     If the LibrePro sensor comes off early, place it in a plastic bag or envelope and call your diabetes educator or bring it with you to your follow-up visit.       [place] [facility] for the [facility] [place] [facility]:  For Your Diabetes Education and Nutrition Appointments Call:  [phone]   For Diabetes Education or Nutrition Related Questions:   Phone: [phone]  E-mail: [email or web address]  Fax: [phone]   If you need a medication refill please contact your pharmacy. Please allow 3 business days for your refills to be completed.    Instructions for emailing the Diabetes Educators    If you need to communicate a non-urgent message to a Diabetes Educator via email, please send to [email or web address].    Please follow the following email guidelines:    Subject line: Secure: your clinic name (example: Secure: [facility])  In the email please include: First name, middle initial, last name and date of birth.    We will be in touch with you within one (1) business day.

## 2020-02-17 DIAGNOSIS — E78.5 HYPERLIPEMIA: Primary | ICD-10-CM

## 2020-02-17 DIAGNOSIS — I25.10 ATHEROSCLEROSIS OF NATIVE CORONARY ARTERY OF NATIVE HEART WITHOUT ANGINA PECTORIS: ICD-10-CM

## 2020-02-21 DIAGNOSIS — I25.10 ATHEROSCLEROSIS OF NATIVE CORONARY ARTERY OF NATIVE HEART WITHOUT ANGINA PECTORIS: ICD-10-CM

## 2020-02-21 DIAGNOSIS — E78.5 HYPERLIPEMIA: ICD-10-CM

## 2020-02-21 LAB
CHOLEST SERPL-MCNC: 153 MG/DL
HDLC SERPL-MCNC: 59 MG/DL
LDLC SERPL CALC-MCNC: 70 MG/DL
NONHDLC SERPL-MCNC: 94 MG/DL
TRIGL SERPL-MCNC: 120 MG/DL

## 2020-02-21 PROCEDURE — 36415 COLL VENOUS BLD VENIPUNCTURE: CPT | Performed by: INTERNAL MEDICINE

## 2020-02-21 PROCEDURE — 80061 LIPID PANEL: CPT | Performed by: INTERNAL MEDICINE

## 2020-02-27 ENCOUNTER — ALLIED HEALTH/NURSE VISIT (OUTPATIENT)
Dept: EDUCATION SERVICES | Facility: CLINIC | Age: 80
End: 2020-02-27
Payer: MEDICARE

## 2020-02-27 DIAGNOSIS — E11.59 TYPE 2 DIABETES MELLITUS WITH OTHER CIRCULATORY COMPLICATIONS (H): ICD-10-CM

## 2020-02-27 DIAGNOSIS — E11.21 TYPE 2 DIABETES MELLITUS WITH DIABETIC NEPHROPATHY, WITHOUT LONG-TERM CURRENT USE OF INSULIN (H): Primary | ICD-10-CM

## 2020-02-27 PROCEDURE — G0108 DIAB MANAGE TRN  PER INDIV: HCPCS

## 2020-02-27 NOTE — Clinical Note
She has not started glipizide and looks like glucose doing well without it.  Please see CDE progress note for continuous glucose monitoring (CGM) reports, assessment and recommendations. As a provider, you can bill for a non face-to-face interpretation of the sensor report. If you would like to bill for this service, please create a 'Documentation Only' encounter noting your interpretation and assessment of CGM reports, your recommended plan, and bill for this CGM interpretation using code [id number].

## 2020-02-27 NOTE — PROGRESS NOTES
"Diabetes Self-Management Education & Support    Presents for: CGM LibrePro Review    SUBJECTIVE/OBJECTIVE:  Presents for: CGM Review  Accompanied by: Self  Diabetes education in the past 24mo: No  Focus of Visit: CGM, Healthy Eating, Taking Medication  Type of CGM visit: Professional CGM  Diabetes type: Type 2  Disease course: Stable  Diabetes management related comments/concerns: no  Cultural Influences/Ethnic Background:  American      Diabetes Symptoms & Complications:  Fatigue: Yes  Polydipsia: No  Polyphagia: No  Polyuria: No  Visual change: No  Slow healing wounds: No  Complications assessed today?: No  Autonomic neuropathy: No  CVA: No  Heart disease: Yes  Nephropathy: Yes  Peripheral neuropathy: No  Peripheral Vascular Disease: No  Retinopathy: No  Sexual dysfunction: No    Patient Problem List and Family Medical History reviewed for relevant medical history, current medical status, and diabetes risk factors.    Vitals:  There were no vitals taken for this visit.  Estimated body mass index is 34.62 kg/m  as calculated from the following:    Height as of 1/8/20: 1.544 m (5' 0.8\").    Weight as of 1/29/20: 82.6 kg (182 lb).   Last 3 BP:   BP Readings from Last 3 Encounters:   01/29/20 118/72   01/08/20 120/68   11/06/19 122/72       History   Smoking Status     Former Smoker   Smokeless Tobacco     Never Used     Comment: quit  1987       Labs:  Lab Results   Component Value Date    A1C 7.0 11/06/2019     Lab Results   Component Value Date     11/06/2019     Lab Results   Component Value Date    LDL 70 02/21/2020     HDL Cholesterol   Date Value Ref Range Status   02/21/2020 59 >49 mg/dL Final   ]  GFR Estimate   Date Value Ref Range Status   11/06/2019 38 (L) >60 mL/min/[1.73_m2] Final     Comment:     Non  GFR Calc  Starting 12/18/2018, serum creatinine based estimated GFR (eGFR) will be   calculated using the Chronic Kidney Disease Epidemiology Collaboration   (CKD-EPI) equation.   "     GFR Estimate If Black   Date Value Ref Range Status   11/06/2019 44 (L) >60 mL/min/[1.73_m2] Final     Comment:      GFR Calc  Starting 12/18/2018, serum creatinine based estimated GFR (eGFR) will be   calculated using the Chronic Kidney Disease Epidemiology Collaboration   (CKD-EPI) equation.       Lab Results   Component Value Date    CR 1.33 11/06/2019     No results found for: MICROALBUMIN    Healthy Eating:  Healthy Eating Assessed Today: Yes  Cultural/Samaritan diet restrictions?: No  Meal planning/habits: Avoiding sweets, Low salt  How many times a week on average do you eat food made away from home (restaurant/take-out)?: 1  Meals include: Breakfast, Lunch, Dinner, Morning Snack, Afternoon Snack  Beverages: Water, Tea, Coffee, Juice    Being Active:  Being Active Assessed Today: Yes  Barrier to exercise: Physical limitation, None    Monitoring:  Monitoring Assessed Today: Yes  Did patient bring glucose meter to appointment? : No  Blood Glucose Meter: One Touch  Times checking blood sugar at home (number): 1  Times checking blood sugar at home (per): Day  Blood glucose trend: Fluctuating        Taking Medications:  Diabetes Medication(s)     Sulfonylureas       glipiZIDE (GLUCOTROL XL) 5 MG 24 hr tablet    Take 1 tablet (5 mg) by mouth daily     Patient not taking:  Reported on 2/12/2020          Taking Medication Assessed Today: Yes  Current Treatments: None    Problem Solving:  Problem Solving Assessed Today: Yes              Reducing Risks:  Reducing Risks Assessed Today: No  CAD Risks: Diabetes Mellitus, Family history, Hypertension, Sedentary lifestyle, Stress  Has dilated eye exam at least once a year?: Yes  Sees dentist every 6 months?: Yes  Feet checked by healthcare provider in the last year?: No    Healthy Coping:  Informal Support system:: Children  Patient Activation Measure Survey Score:  VIOLA Score (Last Two) 8/4/2010 12/19/2011   VIOLA Raw Score 39 44   Activation Score 56.4  70.8   VIOLA Level 3 4       Diabetes knowledge and skills assessment:   Patient is knowledgeable in diabetes management concepts related to: Healthy Eating, Monitoring, Reducing Risks and Healthy Coping    Patient needs further education on the following diabetes management concepts: Healthy Eating, Being Active, Problem Solving and Reducing Risks    Based on learning assessment above, most appropriate setting for further diabetes education would be: Individual setting.      INTERVENTIONS:    REPORTS:                Education provided today on:  AADE Self-Care Behaviors:  Healthy Eating: breakfast cereal/carb portion effect on glucose  Being Active: benefit for heart and weight  Taking Medication: side effects of prescribed medications  Problem Solving: low blood glucose - causes, signs/symptoms, treatment and prevention and carrying a carbohydrate source at all times  Reducing Risks: A1C - goals, relating to blood glucose levels, how often to check and lipids levels and goals    Pt verbalized understanding of concepts discussed and recommendations provided today.       Education Materials Provided:  CGM reports    ASSESSMENT:  Glucose control is doing very well and CGM indicates no need for medication at this time. GMI if 6.1% shows better control than her last A1c of 7.0. I would recommend she not take the glipizide since she has not started taking it yet. If she does need medication in future would recommend an oral medication with cardiovascular benefit rather than glipizide.     Glucose Patterns & Trends:  occasional hypoglycemia after breakfast      PLAN  Follow up with PCP as recommended.       Dafne Auguste RDN, DOUG, CDE    Time Spent: 60 minutes  Encounter Type: Individual    Any diabetes medication dose changes were made via the CDE Protocol and Collaborative Practice Agreement with the patient's primary care provider. A copy of this encounter was shared with the provider.

## 2020-03-03 ENCOUNTER — MEDICAL CORRESPONDENCE (OUTPATIENT)
Dept: HEALTH INFORMATION MANAGEMENT | Facility: CLINIC | Age: 80
End: 2020-03-03

## 2020-03-05 ENCOUNTER — HOSPITAL ENCOUNTER (OUTPATIENT)
Dept: PHYSICAL THERAPY | Facility: CLINIC | Age: 80
Setting detail: THERAPIES SERIES
End: 2020-03-05
Attending: ORTHOPAEDIC SURGERY
Payer: MEDICARE

## 2020-03-05 PROCEDURE — 97110 THERAPEUTIC EXERCISES: CPT | Mod: GP | Performed by: PHYSICAL THERAPIST

## 2020-03-05 PROCEDURE — 97162 PT EVAL MOD COMPLEX 30 MIN: CPT | Mod: GP | Performed by: PHYSICAL THERAPIST

## 2020-03-05 NOTE — PROGRESS NOTES
03/05/20 1416   General Information   Type of Visit Initial OP Ortho PT Evaluation   Start of Care Date 03/05/20   Referring Physician Dr. Bunny Tom MD   Orders Evaluate and Treat   Orders Comment Likely left shoulder rotator cuff tendonitis - one time visit for home rotator cuff program   Date of Order 03/03/20   Certification Required? Yes   Medical Diagnosis Likely left shoulder rotator cuff tendonitis   Surgical/Medical history reviewed Yes   Weight-Bearing Status - LUE full weight-bearing   Weight-Bearing Status - RUE full weight-bearing   Weight-Bearing Status - LLE full weight-bearing   Weight-Bearing Status - RLE full weight-bearing   General Information Comments PMH: Hypothyroidism, Hypertension, Pure hypercholesterolemia, Myalgia and myositis, Heart disease, Fever and other physiologic disturbances of temperature regulation, Endometriosis, Diabetic eye exam.  PSH: Laparoscopic lysis adhesion Aug 2015, Laparoscopic total hysterectomy Aug 2015, Removal of tonsils, Removal of gallbladder, Combined esophagoscopy and gastroscopy and duodenoscopy, D & C, Cystoscopy, Total knee arthroplasty April 2013, Open coronary endarterectomy June 2005, Bilateral exploratory heart surgery with bypass March 2019, Appendectomy, Knee arthroplasty April 2013, Knee arthroplasty Aug 2018       Present No   Body Part(s)   Body Part(s) Shoulder   Presentation and Etiology   Pertinent history of current problem (include personal factors and/or comorbidities that impact the POC) Patient presents with L UE pain that started about 3-4 weeks ago.  Her  recently passed away in October 2019 and she has been trying to clean out the house they lived in together for almost 30 years in order to put it up for sale.  With all of the moving and cleaning she has been doing, she slowly noticed her L arm and shoulder were hurting her more and more.  She assumed it was just tired and sore and would eventually go  "away.  She had a recent doctor's appointment about her knee pain and when she mentioned her shoulder pain as well, she was told she had rotator cuff tendonitis and was referred for physical therapy.  Patient reports pain that is always present, but with certain movements it increases in intensity.  She describes the pain as achy, never sharp or shooting.  Pain is worse with lifting (she can no longer lift a gallon of milk), reaching overhead, getting dressed, and grooming tasks.  Sometimes she can't even bring a bottle of water to her mouth.  She has tried Extra Strength Tylenol but it didn't help her pain.  She currently rates the pain at 9/10 and says it gets as high as 10/10 and as low as 4-5/10.  She remembers having a frozen shoulder back in the 1980's, but otherwise has no history of shoulder problems.  She is frustrated because she has to keep working hard at cleaning out the house, as it needs to go up for sale on 4/1/2020 and she doesn't have any family nearby to help out.     Impairments A. Pain;D. Decreased ROM;E. Decreased flexibility;B. Decreased WB tolerance;F. Decreased strength and endurance   Functional Limitations perform activities of daily living;perform desired leisure / sports activities   Symptom Location Initially felt pain in L biceps/triceps, but lately has been having more symptoms in her shoulder   How/Where did it occur Other  (Unknown )   Onset date of current episode/exacerbation 02/05/20   Chronicity New   Pain rating (0-10 point scale) Best (/10);Worst (/10)  (Currently 9/10 )   Best (/10) 4-5/10   Worst (/10) 10/10   Pain quality H. Other   Pain quality comment \"Just achy\"    Frequency of pain/symptoms D. Other   Pain frequency comment \"It's always there, but sometimes when I do certain things it gets worse\"   Pain/symptoms are: Other   Pain symptoms comment \"Better in the morning when I first get up, but then when I start doing stuff it all goes down hill\"   Pain/symptoms " exacerbated by M. Other   Pain exacerbation comment Lifting (can't lift a gallon of milk), reaching overhead, getting dressed, grooming/hygiene tasks (brushing her hair, drying her hair, etc.)   Pain/symptoms eased by K. Other   Pain eased by comment Nothing - tried Extra Strength Tylenol but it doesn't help, hasn't tried ice or heat   Progression of symptoms since onset: Worsened   Prior Level of Function   Prior Level of Function-Mobility Mod I    Prior Level of Function-ADLs Mod I    Functional Level Prior Comment Uses SEC   Current Level of Function   Patient role/employment history F. Retired   Living environment [facility]   Current equipment-Gait/Locomotion Standard cane   Fall Risk Screen   Fall screen completed by PT   Have you fallen 2 or more times in the past year? No   Have you fallen and had an injury in the past year? No   Is patient a fall risk? No   Functional Scales   Functional Scales   (SPADI score 86.92%)   Shoulder Objective Findings   Side (if bilateral, select both right and left) Left   Neer's Test Positive L   Lou-Sanjeev Test Positive L   Bursa Test Negative L   Newark's Test Negative L   Sulcus Test Negative L   Crossover Test Negative L   Shoulder Special Tests Comments Pain with resisted lift off test, pain with empty can test   Palpation With palpation of L UE: Tender along biceps with increased muscle tightness noted, tender to palpation of coracoid process, tender with palpation of teres minor and infraspinatus muscles, no tenderness with palpation of supraspinatus muscle, no tenderness noted with palpation of supraclavicular region, tenderness with palpation of upper trap, some tenderness with palpation of posterior deltoid, no tenderness with palpation of anterior or middle deltoids   Left Shoulder Flexion AROM 115 deg, painful    Left Shoulder Abduction AROM 76 deg, painful    Left Shoulder ER AROM 54 deg, painful    Left Shoulder IR AROM L1, painful    Left Shoulder  Flexion Strength 3+/5, painful    Left Shoulder Abduction Strength 3+/5, painful    Left Shoulder ER Strength 3+/5, painful   Left Shoulder IR Strength 3+/5, painful    Right Shoulder Flexion AROM 145 deg, no pain    Right Shoulder Abduction AROM 128 deg, no pain    Right Shoulder ER AROM 31 deg, no pain    Right Shoulder IR AROM T10, no pain but a little bit of tightness    Right Shoulder Flexion Strength 4/5, no pain    Right Shoulder Abduction Strength 4/5, no pain    Right Shoulder ER Strength 4/5, no pain    Right Shoulder IR Strength 4/5, no pain   Planned Therapy Interventions   Planned Therapy Interventions joint mobilization;manual therapy;neuromuscular re-education;ROM;strengthening;stretching   Planned Modality Interventions   Planned Modality Interventions Cryotherapy;Hot packs;Ultrasound   Planned Modality Interventions Comments Modalities as needed for symptom relief    Clinical Impression   Criteria for Skilled Therapeutic Interventions Met yes, treatment indicated   PT Diagnosis Decreased L shoulder AROM, L UE pain, increased muscle tightness, decreased L shoulder strength   Influenced by the following impairments Decreased L shoulder AROM, L UE pain, increased muscle tightness, decreased L shoulder strength   Functional limitations due to impairments Lifting (can't lift a gallon of milk), reaching overhead, getting dressed, grooming/hygiene tasks (brushing her hair, drying her hair, etc.)   Clinical Presentation Evolving/Changing   Clinical Presentation Rationale Based on observation, history, evaluation and clinical judgment.    Clinical Decision Making (Complexity) Moderate complexity   Therapy Frequency 1 time/week   Predicted Duration of Therapy Intervention (days/wks) 8 weeks    Risk & Benefits of therapy have been explained Yes   Patient, Family & other staff in agreement with plan of care Yes   Clinical Impression Comments Patient presents with decreased L shoulder AROM, L UE pain, increased  muscle tightness, decreased L shoulder strength.  Functionally, she has increased pain and difficulty with lifting, reaching overhead, getting dressed, and performing grooming/hygiene tasks (brushing her hair, drying her hair, etc.).  She also has the added responsibility of cleaning out her house and getting it ready to be put on the market for sale in a few weeks, without additional help from family or friends.  Patient has been given HEP to focus on rotator cuff strengthening, and she will benefit from further skilled physical therapy interventions to ensure correct performance of HEP and continue to facilitate return to functional activities.     Education Assessment   Preferred Learning Style Listening;Reading;Demonstration;Pictures/video   Barriers to Learning No barriers   ORTHO GOALS   PT Ortho Eval Goals 1;2;3;4   Ortho Goal 1   Goal Identifier 1   Goal Description Patient will improve SPADI score by at least 20% in order to indicate decreased impact of shoulder pain on everyday activities   Target Date 04/30/20   Ortho Goal 2   Goal Identifier 2   Goal Description Patient will increase L shoulder abduction AROM from 76 deg to at least 125 deg in order to facilitate return to PLOF.    Target Date 04/30/20   Ortho Goal 3   Goal Identifier 3   Goal Description Patient will increase L shoulder flexion AROM from 115 deg to at least 145 deg in order to facilitate return to PLOF.    Target Date 04/30/20   Ortho Goal 4   Goal Identifier 4   Goal Description Patient will increase L shoulder strength from 3+/5 to at least 4/5 in order to increase ability to participate in functional tasks.    Target Date 04/30/20   Total Evaluation Time   PT Eval, Moderate Complexity Minutes (77797) 25   Therapy Certification   Certification date from 03/05/20   Certification date to 06/03/20   Medical Diagnosis Likely left shoulder rotator cuff tendonitis           Lola Vazquez, PT, DPT, CLT-ANGELINE  [facility]  Therapist     Phone: [phone]  Email: [email or web address]

## 2020-03-05 NOTE — PROGRESS NOTES
[facility]          OUTPATIENT PHYSICAL THERAPY ORTHOPEDIC EVALUATION  PLAN OF TREATMENT FOR OUTPATIENT REHABILITATION  (COMPLETE FOR INITIAL CLAIMS ONLY)  Patient's Last Name, First Name, M.I.  YOB: 1940  Sandra Petti    Provider s Name:  [facility]   Medical Record No.  [id number]   Start of Care Date:  03/05/20   Onset Date:  02/05/20   Type:     _X__PT   ___OT   ___SLP Medical Diagnosis:  Likely left shoulder rotator cuff tendonitis     PT Diagnosis:  Decreased L shoulder AROM, L UE pain, increased muscle tightness, decreased L shoulder strength   Visits from SOC:  1      _________________________________________________________________________________  Plan of Treatment/Functional Goals:  joint mobilization, manual therapy, neuromuscular re-education, ROM, strengthening, stretching     Cryotherapy, Hot packs, Ultrasound  Modalities as needed for symptom relief   Goals  Goal Identifier: 1  Goal Description: Patient will improve SPADI score by at least 20% in order to indicate decreased impact of shoulder pain on everyday activities  Target Date: 04/30/20    Goal Identifier: 2  Goal Description: Patient will increase L shoulder abduction AROM from 76 deg to at least 125 deg in order to facilitate return to PLOF.   Target Date: 04/30/20    Goal Identifier: 3  Goal Description: Patient will increase L shoulder flexion AROM from 115 deg to at least 145 deg in order to facilitate return to PLOF.   Target Date: 04/30/20    Goal Identifier: 4  Goal Description: Patient will increase L shoulder strength from 3+/5 to at least 4/5 in order to increase ability to participate in functional tasks.   Target Date: 04/30/20      Therapy Frequency:  1 time/week  Predicted Duration of Therapy Intervention:  8 weeks     Lola Vazquez, PT, DPT, CLTOLINDA                 I CERTIFY THE NEED FOR THESE SERVICES FURNISHED UNDER        THIS PLAN OF TREATMENT AND WHILE UNDER MY  CARE .             Physician Signature               Date    X_____________________________________________________                             Certification Date From:  03/05/20   Certification Date To:  06/03/20    Referring Provider:  Dr. Bunny Tom MD    Initial Assessment        See Epic Evaluation Start of Care Date: 03/05/20

## 2020-03-06 ENCOUNTER — DOCUMENTATION ONLY (OUTPATIENT)
Dept: OTHER | Facility: CLINIC | Age: 80
End: 2020-03-06

## 2020-03-12 ENCOUNTER — HOSPITAL ENCOUNTER (OUTPATIENT)
Dept: PHYSICAL THERAPY | Facility: CLINIC | Age: 80
Setting detail: THERAPIES SERIES
End: 2020-03-12
Attending: ORTHOPAEDIC SURGERY
Payer: MEDICARE

## 2020-03-12 PROCEDURE — 97110 THERAPEUTIC EXERCISES: CPT | Mod: GP | Performed by: PHYSICAL THERAPIST

## 2020-03-12 PROCEDURE — 97140 MANUAL THERAPY 1/> REGIONS: CPT | Mod: GP | Performed by: PHYSICAL THERAPIST

## 2020-04-14 NOTE — PROGRESS NOTES
Outpatient Physical Therapy Discharge Note     Patient: Sandra Petit  : 1940    Beginning/End Dates of Reporting Period:  3/5/2020 to 3/12/2020    Referring Provider: Dr. Bunny Tom MD    Therapy Diagnosis: Decreased L shoulder AROM, L UE pain, increased muscle tightness, decreased L shoulder strength     Client Self Report: Patient is doing okay today, has a lot of pain in her R hip and is limping a lot.  Her L shoulder was doing okay, but has been hurting yesterday and today - thinks it's because she was carrying a laundry basket.  Has been doing most of the exercises that she was given, but wasn't able to do the theraband exercises because she wasn't given a theraband last time.      Objective Measurements:  Objective Measure: L shoulder pain   Details: 9/10       Goals:  Goal Identifier 1   Goal Description Patient will improve SPADI score by at least 20% in order to indicate decreased impact of shoulder pain on everyday activities   Target Date 20   Date Met      Progress: Not assessed - patient not seen in clinic     Goal Identifier 2   Goal Description Patient will increase L shoulder abduction AROM from 76 deg to at least 125 deg in order to facilitate return to PLOF.    Target Date 20   Date Met      Progress: Not assessed - patient not seen in clinic     Goal Identifier 3   Goal Description Patient will increase L shoulder flexion AROM from 115 deg to at least 145 deg in order to facilitate return to PLOF.    Target Date 20   Date Met      Progress: Not assessed - patient not seen in clinic     Goal Identifier 4   Goal Description Patient will increase L shoulder strength from 3+/5 to at least 4/5 in order to increase ability to participate in functional tasks.    Target Date 20   Date Met      Progress: Not assessed - patient not seen in clinic     Progress Toward Goals:   Progress this reporting period.  Patient was seen for initial evaluation and one treatment session  before remaining sessions were canceled due to covid-19 concerns.  Therapist spoke with patient again on 4/14/2020 and she reports doing much better, shoulder is not bothering her very much and she has been keeping up with her exercises.  She does not feel she needs to continue with PT.            Plan:  Discharge from therapy.    Discharge:    Reason for Discharge: Patient chooses to discontinue therapy.    Equipment Issued: N/A    Discharge Plan: Patient to continue home program.

## 2020-05-06 ENCOUNTER — MYC MEDICAL ADVICE (OUTPATIENT)
Dept: FAMILY MEDICINE | Facility: CLINIC | Age: 80
End: 2020-05-06

## 2020-05-06 DIAGNOSIS — I10 HYPERTENSION GOAL BP (BLOOD PRESSURE) < 140/90: Primary | ICD-10-CM

## 2020-05-06 NOTE — TELEPHONE ENCOUNTER
I called this patient and informed her that Dr. Quiroz stated yes she may take 2 clonidine and 2 losartan.  She was informed she will need lab appointment in 2 weeks.

## 2020-05-20 DIAGNOSIS — I10 HYPERTENSION GOAL BP (BLOOD PRESSURE) < 140/90: ICD-10-CM

## 2020-05-20 LAB
ANION GAP SERPL CALCULATED.3IONS-SCNC: 4 MMOL/L (ref 3–14)
BUN SERPL-MCNC: 33 MG/DL (ref 7–30)
CALCIUM SERPL-MCNC: 8.7 MG/DL (ref 8.5–10.1)
CHLORIDE SERPL-SCNC: 109 MMOL/L (ref 94–109)
CO2 SERPL-SCNC: 27 MMOL/L (ref 20–32)
CREAT SERPL-MCNC: 1.32 MG/DL (ref 0.52–1.04)
GFR SERPL CREATININE-BSD FRML MDRD: 38 ML/MIN/{1.73_M2}
GLUCOSE SERPL-MCNC: 172 MG/DL (ref 70–99)
POTASSIUM SERPL-SCNC: 4.1 MMOL/L (ref 3.4–5.3)
SODIUM SERPL-SCNC: 140 MMOL/L (ref 133–144)

## 2020-05-20 PROCEDURE — 80048 BASIC METABOLIC PNL TOTAL CA: CPT | Performed by: FAMILY MEDICINE

## 2020-05-20 PROCEDURE — 36415 COLL VENOUS BLD VENIPUNCTURE: CPT | Performed by: FAMILY MEDICINE

## 2020-06-29 ENCOUNTER — MYC MEDICAL ADVICE (OUTPATIENT)
Dept: FAMILY MEDICINE | Facility: CLINIC | Age: 80
End: 2020-06-29

## 2020-06-29 DIAGNOSIS — B37.2 YEAST INFECTION OF THE SKIN: Primary | ICD-10-CM

## 2020-06-29 NOTE — TELEPHONE ENCOUNTER
Per Dr. Quiroz, patient should use Nystatin Cream to clean dry area twice daily for the next week.  If no improvement she should be seen.  To help keep area clean and dry, she can use towel or Viva Paper towel under the breast to help with moisture.      Please verify if she would like sent to [facility].     Mahnaz Daugherty RN

## 2020-06-29 NOTE — TELEPHONE ENCOUNTER
Patient called back message relayed she will do this and call back in a week f it doesn't get better

## 2020-06-30 RX ORDER — NYSTATIN 100000 U/G
CREAM TOPICAL
Qty: 30 G | Refills: 2 | Status: SHIPPED | OUTPATIENT
Start: 2020-06-30 | End: 2020-08-03

## 2020-07-07 ENCOUNTER — MEDICAL CORRESPONDENCE (OUTPATIENT)
Dept: HEALTH INFORMATION MANAGEMENT | Facility: CLINIC | Age: 80
End: 2020-07-07

## 2020-07-07 ENCOUNTER — MYC MEDICAL ADVICE (OUTPATIENT)
Dept: FAMILY MEDICINE | Facility: CLINIC | Age: 80
End: 2020-07-07

## 2020-07-07 DIAGNOSIS — N18.30 CHRONIC KIDNEY DISEASE, STAGE III (MODERATE) (H): Primary | ICD-10-CM

## 2020-07-07 DIAGNOSIS — E55.9 VITAMIN D DEFICIENCY: ICD-10-CM

## 2020-07-08 ENCOUNTER — OFFICE VISIT (OUTPATIENT)
Dept: FAMILY MEDICINE | Facility: CLINIC | Age: 80
End: 2020-07-08
Payer: MEDICARE

## 2020-07-08 VITALS
BODY MASS INDEX: 34.81 KG/M2 | WEIGHT: 183 LBS | RESPIRATION RATE: 18 BRPM | OXYGEN SATURATION: 98 % | TEMPERATURE: 96.5 F | SYSTOLIC BLOOD PRESSURE: 128 MMHG | DIASTOLIC BLOOD PRESSURE: 78 MMHG | HEART RATE: 60 BPM

## 2020-07-08 DIAGNOSIS — R22.2 CHEST WALL MASS: ICD-10-CM

## 2020-07-08 DIAGNOSIS — Z96.652 HISTORY OF TOTAL LEFT KNEE REPLACEMENT: ICD-10-CM

## 2020-07-08 DIAGNOSIS — G47.9 DIFFICULTY SLEEPING: Primary | ICD-10-CM

## 2020-07-08 DIAGNOSIS — I10 ESSENTIAL HYPERTENSION, BENIGN: ICD-10-CM

## 2020-07-08 DIAGNOSIS — F41.9 ANXIETY: ICD-10-CM

## 2020-07-08 PROCEDURE — 99214 OFFICE O/P EST MOD 30 MIN: CPT | Performed by: FAMILY MEDICINE

## 2020-07-08 RX ORDER — LOSARTAN POTASSIUM 25 MG/1
50 TABLET ORAL 2 TIMES DAILY
Qty: 360 TABLET | Refills: 1 | Status: SHIPPED | OUTPATIENT
Start: 2020-07-08 | End: 2020-11-11

## 2020-07-08 ASSESSMENT — PAIN SCALES - GENERAL: PAINLEVEL: NO PAIN (0)

## 2020-07-08 NOTE — PROGRESS NOTES
Subjective     Lump under right  Breast, changed in shape and color     Sandra is a 80 year old female who comes to clinic   Home bp's a little high - 140s  Longstanding bump, changed a little under R breast   Looking at upcoming knee re-do  Lots of stress, not sleeping well      Review of Systems   Constitutional, HEENT, cardiovascular, pulmonary, gi and gu systems are negative, except as otherwise noted.      Objective    /78   Pulse 60   Temp 96.5  F (35.8  C) (Temporal)   Resp 18   Wt 83 kg (183 lb)   SpO2 98%   BMI 34.81 kg/m       Wt Readings from Last 2 Encounters:   07/08/20 83 kg (183 lb)   01/29/20 82.6 kg (182 lb)       Physical Exam   GENERAL: healthy, alert and no distress  NECK: no adenopathy, no asymmetry, masses, or scars and thyroid normal to palpation  RESP: lungs clear to auscultation - no rales, rhonchi or wheezes  CV: regular rate and rhythm, normal S1 S2, no S3 or S4, no murmur, click or rub, no peripheral edema and peripheral pulses strong  ABDOMEN: soft, nontender, no hepatosplenomegaly, no masses and bowel sounds normal  MS: no gross musculoskeletal defects noted, no edema  PSYCH: mentation appears normal, affect normal/bright  Breast exam is normal bilaterally.  Underneath the right breast is a freely mobile subcutaneous cyst, on the chest wall, not breast.  Nontender.  No overlying skin change.  A lipoma, or lymph node.    Diagnostic Test Results:  Labs reviewed in Epic        Assessment & Plan       ICD-10-CM    1. Difficulty sleeping  G47.9    2. Essential hypertension, benign  I10 losartan (COZAAR) 25 MG tablet   3. Anxiety  F41.9    4. History of total left knee replacement  Z96.652    5. Chest wall mass  R22.2        Given reassurance in terms of the benign nature of the chest wall mass  Due for refill of her stable hypertension  Experience anxiety and subsequent difficulty sleeping related to her potentially needing a redo of her left knee replacement.  We talked a bit  about the complexity of that surgery, as well as the very real possibility of worsening, not improvement.  She is hesitant and I encouraged her to continue discussing with her surgeon.  To determine the best way for her.  Maybe even a second opinion.    Ash Quiroz MD  [facility]

## 2020-07-09 DIAGNOSIS — N18.30 CHRONIC KIDNEY DISEASE, STAGE III (MODERATE) (H): ICD-10-CM

## 2020-07-09 DIAGNOSIS — E55.9 VITAMIN D DEFICIENCY: ICD-10-CM

## 2020-07-09 LAB
ALBUMIN SERPL-MCNC: 3.1 G/DL (ref 3.4–5)
ANION GAP SERPL CALCULATED.3IONS-SCNC: 6 MMOL/L (ref 3–14)
BUN SERPL-MCNC: 32 MG/DL (ref 7–30)
CALCIUM SERPL-MCNC: 8.5 MG/DL (ref 8.5–10.1)
CHLORIDE SERPL-SCNC: 110 MMOL/L (ref 94–109)
CO2 SERPL-SCNC: 25 MMOL/L (ref 20–32)
CREAT SERPL-MCNC: 1.39 MG/DL (ref 0.52–1.04)
CREAT UR-MCNC: 186 MG/DL
GFR SERPL CREATININE-BSD FRML MDRD: 36 ML/MIN/{1.73_M2}
GLUCOSE SERPL-MCNC: 220 MG/DL (ref 70–99)
HGB BLD-MCNC: 12.7 G/DL (ref 11.7–15.7)
MICROALBUMIN UR-MCNC: 75 MG/L
MICROALBUMIN/CREAT UR: 40.11 MG/G CR (ref 0–25)
PHOSPHATE SERPL-MCNC: 3.3 MG/DL (ref 2.5–4.5)
POTASSIUM SERPL-SCNC: 4 MMOL/L (ref 3.4–5.3)
PROT UR-MCNC: 0.29 G/L
PROT/CREAT 24H UR: 0.16 G/G CR (ref 0–0.2)
PTH-INTACT SERPL-MCNC: 84 PG/ML (ref 18–80)
SODIUM SERPL-SCNC: 141 MMOL/L (ref 133–144)

## 2020-07-09 PROCEDURE — 83970 ASSAY OF PARATHORMONE: CPT | Performed by: INTERNAL MEDICINE

## 2020-07-09 PROCEDURE — 84156 ASSAY OF PROTEIN URINE: CPT | Performed by: INTERNAL MEDICINE

## 2020-07-09 PROCEDURE — 80069 RENAL FUNCTION PANEL: CPT | Performed by: INTERNAL MEDICINE

## 2020-07-09 PROCEDURE — 82043 UR ALBUMIN QUANTITATIVE: CPT | Performed by: INTERNAL MEDICINE

## 2020-07-09 PROCEDURE — 85018 HEMOGLOBIN: CPT | Performed by: INTERNAL MEDICINE

## 2020-07-09 PROCEDURE — 36415 COLL VENOUS BLD VENIPUNCTURE: CPT | Performed by: INTERNAL MEDICINE

## 2020-07-09 PROCEDURE — 82306 VITAMIN D 25 HYDROXY: CPT | Performed by: INTERNAL MEDICINE

## 2020-07-14 ENCOUNTER — TRANSFERRED RECORDS (OUTPATIENT)
Dept: HEALTH INFORMATION MANAGEMENT | Facility: CLINIC | Age: 80
End: 2020-07-14

## 2020-07-14 ENCOUNTER — MEDICAL CORRESPONDENCE (OUTPATIENT)
Dept: HEALTH INFORMATION MANAGEMENT | Facility: CLINIC | Age: 80
End: 2020-07-14

## 2020-07-14 LAB
DEPRECATED CALCIDIOL+CALCIFEROL SERPL-MC: <65 UG/L (ref 20–75)
VITAMIN D2 SERPL-MCNC: <5 UG/L
VITAMIN D3 SERPL-MCNC: 60 UG/L

## 2020-08-03 ENCOUNTER — NURSE TRIAGE (OUTPATIENT)
Dept: FAMILY MEDICINE | Facility: CLINIC | Age: 80
End: 2020-08-03

## 2020-08-03 ENCOUNTER — OFFICE VISIT (OUTPATIENT)
Dept: FAMILY MEDICINE | Facility: CLINIC | Age: 80
End: 2020-08-03
Payer: MEDICARE

## 2020-08-03 ENCOUNTER — HOSPITAL ENCOUNTER (OUTPATIENT)
Dept: GENERAL RADIOLOGY | Facility: CLINIC | Age: 80
End: 2020-08-03
Attending: PHYSICIAN ASSISTANT
Payer: MEDICARE

## 2020-08-03 ENCOUNTER — TELEPHONE (OUTPATIENT)
Dept: FAMILY MEDICINE | Facility: CLINIC | Age: 80
End: 2020-08-03

## 2020-08-03 VITALS
TEMPERATURE: 98.7 F | HEART RATE: 71 BPM | OXYGEN SATURATION: 97 % | BODY MASS INDEX: 34.86 KG/M2 | DIASTOLIC BLOOD PRESSURE: 60 MMHG | SYSTOLIC BLOOD PRESSURE: 158 MMHG | RESPIRATION RATE: 17 BRPM | WEIGHT: 183.31 LBS

## 2020-08-03 DIAGNOSIS — M25.572 ACUTE LEFT ANKLE PAIN: Primary | ICD-10-CM

## 2020-08-03 DIAGNOSIS — I10 HYPERTENSION GOAL BP (BLOOD PRESSURE) < 140/90: ICD-10-CM

## 2020-08-03 LAB
BASOPHILS # BLD AUTO: 0.1 10E9/L (ref 0–0.2)
BASOPHILS NFR BLD AUTO: 0.6 %
DIFFERENTIAL METHOD BLD: NORMAL
EOSINOPHIL NFR BLD AUTO: 3.8 %
ERYTHROCYTE [DISTWIDTH] IN BLOOD BY AUTOMATED COUNT: 13.9 % (ref 10–15)
ERYTHROCYTE [SEDIMENTATION RATE] IN BLOOD BY WESTERGREN METHOD: 27 MM/H (ref 0–30)
HCT VFR BLD AUTO: 40.1 % (ref 35–47)
HGB BLD-MCNC: 12.9 G/DL (ref 11.7–15.7)
IMM GRANULOCYTES # BLD: 0 10E9/L (ref 0–0.4)
IMM GRANULOCYTES NFR BLD: 0.5 %
LYMPHOCYTES # BLD AUTO: 1.5 10E9/L (ref 0.8–5.3)
LYMPHOCYTES NFR BLD AUTO: 19.1 %
MCH RBC QN AUTO: 30.4 PG (ref 26.5–33)
MCHC RBC AUTO-ENTMCNC: 32.2 G/DL (ref 31.5–36.5)
MCV RBC AUTO: 94 FL (ref 78–100)
MONOCYTES # BLD AUTO: 0.7 10E9/L (ref 0–1.3)
MONOCYTES NFR BLD AUTO: 8.7 %
NEUTROPHILS # BLD AUTO: 5.4 10E9/L (ref 1.6–8.3)
NEUTROPHILS NFR BLD AUTO: 67.3 %
NRBC # BLD AUTO: 0 10*3/UL
NRBC BLD AUTO-RTO: 0 /100
PLATELET # BLD AUTO: 303 10E9/L (ref 150–450)
RBC # BLD AUTO: 4.25 10E12/L (ref 3.8–5.2)
URATE SERPL-MCNC: 7.2 MG/DL (ref 2.6–6)
WBC # BLD AUTO: 8 10E9/L (ref 4–11)

## 2020-08-03 PROCEDURE — 36415 COLL VENOUS BLD VENIPUNCTURE: CPT | Performed by: PHYSICIAN ASSISTANT

## 2020-08-03 PROCEDURE — 73630 X-RAY EXAM OF FOOT: CPT | Mod: TC,LT

## 2020-08-03 PROCEDURE — 85025 COMPLETE CBC W/AUTO DIFF WBC: CPT | Performed by: PHYSICIAN ASSISTANT

## 2020-08-03 PROCEDURE — 84550 ASSAY OF BLOOD/URIC ACID: CPT | Performed by: PHYSICIAN ASSISTANT

## 2020-08-03 PROCEDURE — 85652 RBC SED RATE AUTOMATED: CPT | Performed by: PHYSICIAN ASSISTANT

## 2020-08-03 PROCEDURE — 99213 OFFICE O/P EST LOW 20 MIN: CPT | Performed by: PHYSICIAN ASSISTANT

## 2020-08-03 PROCEDURE — 73610 X-RAY EXAM OF ANKLE: CPT | Mod: TC,LT

## 2020-08-03 RX ORDER — PREDNISONE 20 MG/1
20 TABLET ORAL DAILY
Qty: 7 TABLET | Refills: 0 | Status: SHIPPED | OUTPATIENT
Start: 2020-08-03 | End: 2020-08-10

## 2020-08-03 RX ORDER — OXYCODONE HYDROCHLORIDE 5 MG/1
5 TABLET ORAL EVERY 6 HOURS PRN
Qty: 10 TABLET | Refills: 0 | Status: SHIPPED | OUTPATIENT
Start: 2020-08-03 | End: 2020-08-07

## 2020-08-03 ASSESSMENT — PAIN SCALES - GENERAL: PAINLEVEL: WORST PAIN (10)

## 2020-08-03 NOTE — TELEPHONE ENCOUNTER
Per  she need to be seen in the clinic and we are full this week and unable to work her in.  Meeta Martines MA

## 2020-08-03 NOTE — TELEPHONE ENCOUNTER
Yes I can see her today or Wed F2F in [facility].  Lorena Bailey PA-C  [facility]

## 2020-08-03 NOTE — PROGRESS NOTES
Subjective     Sandra Petit is a 80 year old female who presents to clinic today for the following health issues:    HPI   Joint Pain    Onset: t-5    Description:   Location: left foot  Character: Burning constant    Intensity: severe    Progression of Symptoms: worse    Accompanying Signs & Symptoms:  Other symptoms: radiation of pain to up the front of her left calf    History:   Previous similar pain: no       Precipitating factors:   Trauma or overuse: no     Alleviating factors:  Improved by: rest/inactivity, immobilization and elevation    Therapies Tried and outcome: aspercream with lidocaine, ice and heat- nothing is helping      Sandra Petit is a 80 year old female who present with left foot/ankle pain that started 5 days ago. She has been busy packing and moving to a new place. Uses a cane to ambulate.   Denies any fall or significant trauma recently to cause foot pain. She points at the anterior ankle joint space where she feels the pain and that it radiates up into lower leg a few inches above the ankle. She calls it 'foot pain' though. She has been trying to elevate her foot/ankle, rest it, used ice and topical aspercream with lidocaine, but nothing is helping.     Pain started on 5 days ago and has increased greatly. Is it a constant pain.   No previous fracture or surgery to her L ankle/foot. Has had bilateral knee TKA: L TKA 2013 and R TKA 2018. No knee pain currently.    She is to avoid taking NSAIDs due to CKD Stage 3, sees nephrologist Dr. Clayton, last appt via telephone on 7/14/20. He stated in his note her kidney fxn has been stable. Cr was 1.39, BUN 32, eGFR 36.     She has a history of gout.  In review of her chart 9/12/15 she was seen for L great toe pain, that was thought to be potentially gout, since labs were normal except for slightly elevated uric acid level at 7.9.  On 8/27/18 she also experienced a flare of her L great toe gout while in the hospital for her R TKA.      Patient Active  Problem List   Diagnosis     CKD (chronic kidney disease) stage 3, GFR 30-59 ml/min (H)     Hyperlipidemia LDL goal <100     Hypertension goal BP (blood pressure) < 140/90     History of total left knee replacement     Other specified hypothyroidism     Coronary artery disease involving native coronary artery without angina pectoris     Type 2 diabetes mellitus with diabetic nephropathy (H)     Type 2 diabetes mellitus with other circulatory complications (H)     Osteoarthritis of left thumb     Atrial fibrillation (H)- only after bypass     Past Surgical History:   Procedure Laterality Date     ARTHROPLASTY KNEE  4/29/2013    Procedure: ARTHROPLASTY KNEE;  left total knee arthroplasty;  Surgeon: Teddy Grimes MD;  [facility]: [facility]     ARTHROPLASTY KNEE Right 8/27/2018    Procedure: ARTHROPLASTY KNEE;  right total knee arthroplasty;  Surgeon: Johnny Anaya MD;  Location: [facility]     C APPENDECTOMY       C EXPLOR HEART SURG WND W CP BYPASS Bilateral 03/20/2019     C NONSPECIFIC PROCEDURE      Knee ligament surgery     C NONSPECIFIC PROCEDURE      Kidney surgery for mechanical obstruction     C OPEN CORONARY ENDARTERECTOMY  june 2005    Angioplasty w stenting     C TOTAL KNEE ARTHROPLASTY  4/29/13    Left     COMBINED CYSTOSCOPY, INSERT CATHETER URETER Bilateral 8/10/2015    Procedure: COMBINED CYSTOSCOPY, INSERT CATHETER URETER;  Surgeon: Johnnie Madera MD;  Location[facility]     DILATION AND CURETTAGE, HYSTEROSCOPY DIAGNOSTIC, COMBINED N/A 11/6/2014    Procedure: COMBINED DILATION AND CURETTAGE, HYSTEROSCOPY DIAGNOSTIC;  Surgeon: Edward Pardo MD;  Location[facility]     ESOPHAGOSCOPY, GASTROSCOPY, DUODENOSCOPY (EGD), COMBINED N/A 1/27/2015    Procedure: COMBINED ESOPHAGOSCOPY, GASTROSCOPY, DUODENOSCOPY (EGD), BIOPSY SINGLE OR MULTIPLE;  Surgeon: Carmelo Rosario MD;  Location: [facility]     HC REMOVAL GALLBLADDER      Cholecystectomy     HC REMOVE TONSILS/ADENOIDS,<13 Y/O      unsure of  age     LAPAROSCOPIC HYSTERECTOMY TOTAL N/A 8/10/2015    Procedure: LAPAROSCOPIC HYSTERECTOMY TOTAL;  Surgeon: Edward Pardo MD;  [facility]     LAPAROSCOPIC LYSIS ADHESIONS N/A 8/10/2015    Procedure: LAPAROSCOPIC LYSIS ADHESIONS;  Surgeon: Edward Pardo MD;  Location: [facility]       Social History     Tobacco Use     Smoking status: Former Smoker     Smokeless tobacco: Never Used     Tobacco comment: quit     Substance Use Topics     Alcohol use: No     Alcohol/week: 0.0 standard drinks     Family History   Problem Relation Age of Onset     Heart Disease Mother          coronary     Heart Disease Father          coronary     Allergies Sister         unknown     Allergies Brother         unknown     Allergies Daughter         unknown     Allergies Son         unknown     Heart Disease Sister         by pass surg     Heart Disease Brother         on medication     Hypertension Sister      Hypertension Brother      Lipids Sister      Lipids Brother      Cerebrovascular Disease Brother      Obesity Sister      Diabetes Sister      Diabetes Sister      C.A.D. Brother         quad by pass     Neurologic Disorder Sister         parkinsons     Cancer Sister         skin cancer     Alcohol/Drug No family hx of      Alzheimer Disease No family hx of          Current Outpatient Medications   Medication Sig Dispense Refill     amLODIPine (NORVASC) 10 MG tablet Take 1 tablet (10 mg) by mouth daily 90 tablet 3     aspirin 81 MG EC tablet Take 81 mg by mouth daily       blood glucose (ONETOUCH VERIO IQ) test strip USE 1 STRIP TO CHECK GLUCOSE ONCE DAILY AS DIRECTED 50 strip 8     blood glucose monitoring (ONE TOUCH DELICA) lancets USE ONE  TO CHECK GLUCOSE ONCE DAILY 100 each 3     cloNIDine (CATAPRES) 0.2 MG tablet Take 1 tablet (0.2 mg) by mouth 3 times daily (Patient taking differently: Take 0.2 mg by mouth 3 times daily Taking bid) 180 tablet 3     levothyroxine (SYNTHROID/LEVOTHROID)  "50 MCG tablet Take 1 tablet (50 mcg) by mouth daily 90 tablet 3     losartan (COZAAR) 25 MG tablet Take 2 tablets (50 mg) by mouth 2 times daily 360 tablet 1     metoprolol succinate ER (TOPROL-XL) 25 MG 24 hr tablet Take half  a tab twice day 90 tablet 3     Probiotic Product (ACIDOPHILUS PROBIOTIC BLEND) CAPS Take 1 capsule by mouth 2 times daily       simvastatin (ZOCOR) 40 MG tablet Take 1 tablet (40 mg) by mouth At Bedtime 90 tablet 3     UNABLE TO FIND MEDICATION NAME: zinzino       nitroGLYcerin (NITROSTAT) 0.4 MG sublingual tablet Place 1 tablet (0.4 mg) under the tongue every 5 minutes as needed for chest pain (Patient not taking: Reported on 7/8/2020) 25 tablet 0     Allergies   Allergen Reactions     Penicillins Swelling     \"throat started swelling\"     Vitamin B Complex Hives     Vitamin B12 Hives     all vitamin B's     Clindamycin Hcl Rash       Reviewed and updated as needed this visit by Provider         Review of Systems   Constitutional, HEENT, cardiovascular, pulmonary, gi and gu systems are negative, except as otherwise noted.      Objective    BP (!) 158/60   Pulse 71   Temp 98.7  F (37.1  C) (Tympanic)   Resp 17   Wt 83.2 kg (183 lb 5 oz)   SpO2 97%   BMI 34.86 kg/m    Body mass index is 34.86 kg/m .  Physical Exam   GENERAL: mild distress while sitting in exam room due to ankle pain   MS: normal range of motion, mild edema present in lower ankle and lower leg, elevated discomfort with direct palpation on anterior region of left ankle, no pain along lateral or medial ankle non-pitting, this is her baseline she states since her heart bypass surgery. Peripheral pulses normal. Gross sensation intact. No pain with palpation along left foot or toes. No rashes, redness or warmth noted.    Diagnostic Test Results:  Labs reviewed in Epic  Results for orders placed or performed in visit on 08/03/20   CBC with platelets and differential     Status: None   Result Value Ref Range    WBC 8.0 4.0 - " 11.0 10e9/L    RBC Count 4.25 3.8 - 5.2 10e12/L    Hemoglobin 12.9 11.7 - 15.7 g/dL    Hematocrit 40.1 35.0 - 47.0 %    MCV 94 78 - 100 fl    MCH 30.4 26.5 - 33.0 pg    MCHC 32.2 31.5 - 36.5 g/dL    RDW 13.9 10.0 - 15.0 %    Platelet Count 303 150 - 450 10e9/L    Diff Method Automated Method     % Neutrophils 67.3 %    % Lymphocytes 19.1 %    % Monocytes 8.7 %    % Eosinophils 3.8 %    % Basophils 0.6 %    % Immature Granulocytes 0.5 %    Nucleated RBCs 0 0 /100    Absolute Neutrophil 5.4 1.6 - 8.3 10e9/L    Absolute Lymphocytes 1.5 0.8 - 5.3 10e9/L    Absolute Monocytes 0.7 0.0 - 1.3 10e9/L    Absolute Basophils 0.1 0.0 - 0.2 10e9/L    Abs Immature Granulocytes 0.0 0 - 0.4 10e9/L    Absolute Nucleated RBC 0.0    Uric acid     Status: Abnormal   Result Value Ref Range    Uric Acid 7.2 (H) 2.6 - 6.0 mg/dL   ESR: Erythrocyte sedimentation rate     Status: None   Result Value Ref Range    Sed Rate 27 0 - 30 mm/h           Xray L foot and L ankle 3 series: From my review, did not appreciate any fracture, dislocation, notable bony abnormality or significant degenerative changes. Potential soft tissue swelling at anterior joint line of the ankle, based on shadowing visualized.   Discussed this with patient. Radiology overread pending. Will call her with this report tomorrow.     Assessment & Plan       ICD-10-CM    1. Acute left ankle pain  M25.572 CBC with platelets and differential     Uric acid     ESR: Erythrocyte sedimentation rate     XR Ankle Left G/E 3 Views     XR Foot Left G/E 3 Views     predniSONE (DELTASONE) 20 MG tablet     oxyCODONE (ROXICODONE) 5 MG tablet     CANCELED: XR Ankle Left G/E 3 Views   2. Hypertension goal BP (blood pressure) < 140/90  I10      Differential Dx for left ankle pain  Gout vs. Arthritis vs. Ankle sprain/without injury vs. Infection  Based on WBC/diff normal, Sed rate nl, no fever/constitutional sxs and no warmth/redness to joint less likely to be an early bacterial infectious  process.   Uric acid level elevated 7.2, as this value elevated in the past to similar values with her gout attacks in her L great toe, so potential a gout attack today.  Xray appears without significant arthritis from my review, but will await Radiology's final report, but less likely arthritis cause to ankle pain.   Still potentially could be an ankle sprain without significant trauma/injury recalled.     Today's CrCl: based on her weight today (83.2kg), using Cr 1.39 from 7/9/20 lab = CrCl 42.8 ml/min     Discussed treatment for potential gout or ankle sprain/inflammation. Will avoid NSAIDs due to her CKD. Tylenol is not helping her. Will treat with Prednisone course (lower dose) at 20mg once daily for 7 days (no renal adjustment required), to help with inflammation and this would also help if gout is the cause. Prefer to not use colchine at this time, would need to use the renal dose and monitor closely. In review of her chart in 2015 Dr. Wray prescribed Medrol dosepak for her with L great toe gout in case symptomatic measures were not helpful initially.     Since she is in significant pain, will also given a very small amount of pain medication - she tolerated oxycodone in the past for her last TKA. Advised only given #10 tablets, since not expecting to use this for more than a day or 2 and that that prednisone should start to help with the inflammation process.   It is also important to continue ice, heat, rest, elevation as much as possible.   She can also continue taking tylenol too.  She is comfortable with this treatment plan.    Patient Instructions   Take oral prednisone once a day for 7 days with food  Use pain medication as needed   Ice, elevate as much as you can  Lorena Bailey PA-C will call you tomorrow with final Radiology Xray report.     We discussed rechecking BP, but due to not taking pills/due to time of the day and her pain level at high level, she stated it would be high. She will recheck at  home later today after taking her medication and She is scheduled for a BP follow up appt with Dr. Quiroz later this week.    Lorena Bailey PA-C  [facility]   Return in about 2 weeks (around 8/17/2020) for recheck at primary care clinic if not improving.    Lorena Bailey PA-C  [facility]

## 2020-08-03 NOTE — TELEPHONE ENCOUNTER
"Patient states she has no idea why her foot is in so much pain.  Protocol questions suggest possible arthritis/gout flare up.  She states she is only allowed to take tylenol due to health issues, and the maximum amount able to be taken is not helping the pain at all at this time. Advised patient to call back with any further questions or concerns.  Patient stated understanding.    Will forward to PCP for review.      Additional Information    Negative: Followed an ankle or foot injury    Negative: Ankle pain is the main symptom    Negative: Entire foot is cool or blue in comparison to other foot    Negative: Purple or black skin on foot or toe    Negative: Red area or streak and fever    Negative: Swollen foot and fever    Negative: Patient sounds very sick or weak to the triager    SEVERE pain (e.g., excruciating, unable to do any normal activities)    Patient wants to be seen    Answer Assessment - Initial Assessment Questions  1. ONSET: \"When did the pain start?\"       Last Thursday 7/30/2020    2. LOCATION: \"Where is the pain located?\"       Left foot on the outer side, now going up onto foot and ankle    3. PAIN: \"How bad is the pain?\"    (Scale 1-10; or mild, moderate, severe)    -  MILD (1-3): doesn't interfere with normal activities     -  MODERATE (4-7): interferes with normal activities (e.g., work or school) or awakens from sleep, limping     -  SEVERE (8-10): excruciating pain, unable to do any normal activities, unable to walk      10/10     4. WORK OR EXERCISE: \"Has there been any recent work or exercise that involved this part of the body?\"       No    5. CAUSE: \"What do you think is causing the foot pain?\"      No idea    6. OTHER SYMPTOMS: \"Do you have any other symptoms?\" (e.g., leg pain, rash, fever, numbness)      No    7. PREGNANCY: \"Is there any chance you are pregnant?\" \"When was your last menstrual period?\"      NA    Protocols used: FOOT PAIN-A-[place]    Radha Javier RN    "

## 2020-08-03 NOTE — TELEPHONE ENCOUNTER
Reason for call:  Patient reporting a symptom    Symptom or request: Left Foot Pain - Making her blood pressure rise    Duration (how long have symptoms been present): Started Friday    Have you been treated for this before? No    Additional comments: Giovanni is asking if Dr Quiroz would like to see her for this or should she go right to Dr Bennett    Phone Number patient can be reached at:  Home number on file [phone] (home)    Best Time:  any    Can we leave a detailed message on this number:  YES    Call taken on 8/3/2020 at 8:28 AM by Isabella Agrawal

## 2020-08-04 ENCOUNTER — TELEPHONE (OUTPATIENT)
Dept: FAMILY MEDICINE | Facility: CLINIC | Age: 80
End: 2020-08-04

## 2020-08-04 NOTE — PATIENT INSTRUCTIONS
Take oral prednisone once a day for 7 days with food  Use pain medication as needed   Ice, elevate as much as you can  Lorena Bailey PA-C will call you tomorrow with lab results and Xray report.

## 2020-08-04 NOTE — TELEPHONE ENCOUNTER
Spoke with Giovanni re: L ankle/foot xray results today, discussed overall report showing normal results, except for soft tissue swelling around ankle and lower leg and also osteopenia on foot xray.     She was unable to start prednisone and pain medication last night, since pharmacy had closed already, but she picked up the Rxs this morning and is noticing some relief already. Encouraged her to also ice, heat and elevate her ankle when possible.     Discussed this is likely a gout flare in her L ankle based on lab findings of elevated uric acid, her symptoms and history of gout in her L toe x 2 before before.    She asked about wearing her compression stockings she has at home to help with her chronic swelling in her left lower leg that has been present since her coronary bypass surgery in 2019. She mentioned Dr. Quiroz had suggested them in the past. I advised to only try for 2-3 hrs on the first day, then take them off, to be sure this doesn't aggravate/cause more discomfort in her L ankle with her current symptoms. If no trouble with a few hour trial, then she could start wearing them longer, for the daytime hours and remove before bedtime. She agrees with this.    She is scheduled for a BP recheck appt this Friday with Dr. Quiroz and can discuss wearing the compression stockings further with him.     Advised for her to contact us sooner than this Friday if any other concerns or questions re: her L ankle.     Lorena Bailey PA-C  [facility]

## 2020-08-04 NOTE — TELEPHONE ENCOUNTER
Left VM on Giovanni's number that I will trying giving her a call back later this morning. I stated on her VM since I'm off site that I will call her back today.    Calling her to discuss Radiologist report from her ankle/foot xray yesterday. We reviewed the Xrays in clinic together last night, but final report from Radiology was not complete last night.    She and I will discuss these results over the phone and I'll send these to her via [employer] afterward.    Lorena Bailey PA-C  [facility]

## 2020-08-04 NOTE — RESULT ENCOUNTER NOTE
Discussed results with patient at visit today.  -Lorena Bailey PA-C  [facility]

## 2020-08-07 ENCOUNTER — OFFICE VISIT (OUTPATIENT)
Dept: FAMILY MEDICINE | Facility: CLINIC | Age: 80
End: 2020-08-07
Payer: MEDICARE

## 2020-08-07 VITALS
BODY MASS INDEX: 35.19 KG/M2 | SYSTOLIC BLOOD PRESSURE: 122 MMHG | WEIGHT: 185 LBS | TEMPERATURE: 98 F | HEART RATE: 60 BPM | OXYGEN SATURATION: 97 % | DIASTOLIC BLOOD PRESSURE: 72 MMHG | RESPIRATION RATE: 18 BRPM

## 2020-08-07 DIAGNOSIS — I10 ESSENTIAL HYPERTENSION, BENIGN: ICD-10-CM

## 2020-08-07 DIAGNOSIS — M25.572 ACUTE LEFT ANKLE PAIN: ICD-10-CM

## 2020-08-07 PROCEDURE — 99213 OFFICE O/P EST LOW 20 MIN: CPT | Mod: 25 | Performed by: FAMILY MEDICINE

## 2020-08-07 PROCEDURE — 20605 DRAIN/INJ JOINT/BURSA W/O US: CPT | Mod: LT | Performed by: FAMILY MEDICINE

## 2020-08-07 RX ORDER — TRIAMCINOLONE ACETONIDE 40 MG/ML
40 INJECTION, SUSPENSION INTRA-ARTICULAR; INTRAMUSCULAR ONCE
Status: COMPLETED | OUTPATIENT
Start: 2020-08-07 | End: 2020-08-07

## 2020-08-07 RX ORDER — CLONIDINE HYDROCHLORIDE 0.2 MG/1
0.2 TABLET ORAL 2 TIMES DAILY
Qty: 180 TABLET | Refills: 1 | COMMUNITY
Start: 2020-08-07 | End: 2020-11-11

## 2020-08-07 RX ORDER — OXYCODONE HYDROCHLORIDE 5 MG/1
5 TABLET ORAL EVERY 6 HOURS PRN
Qty: 10 TABLET | Refills: 0 | Status: SHIPPED | OUTPATIENT
Start: 2020-08-07 | End: 2021-02-04

## 2020-08-07 RX ADMIN — TRIAMCINOLONE ACETONIDE 40 MG: 40 INJECTION, SUSPENSION INTRA-ARTICULAR; INTRAMUSCULAR at 10:00

## 2020-08-07 ASSESSMENT — PAIN SCALES - GENERAL: PAINLEVEL: EXTREME PAIN (8)

## 2020-08-07 NOTE — PROGRESS NOTES
Clinic Administered Medication Documentation      Injection Documentation     Injection was administered by provider (please see MAR for given by information). Please see MAR and medication order for additional information.     Site: Joint injection   Medication Used: Kenalog     Expiration Date:  10/21

## 2020-08-07 NOTE — PROGRESS NOTES
Subjective     Hypertension Follow-up      Do you check your blood pressure regularly outside of the clinic? Yes     Are you following a low salt diet? Yes    Are your blood pressures ever more than 140 on the top number (systolic) OR more   than 90 on the bottom number (diastolic), for example 140/90? Yes/ sometimes     Sandra is a 80 year old female who comes to clinic   3 d left of pred, still with L ankle pain, suspected to be gout, history of L great toe pain  bp looks good today, tolerating meds      Review of Systems         Objective    /72   Pulse 60   Temp 98  F (36.7  C) (Temporal)   Resp 18   Wt 83.9 kg (185 lb)   SpO2 97%   BMI 35.19 kg/m       Wt Readings from Last 2 Encounters:   08/07/20 83.9 kg (185 lb)   08/03/20 83.2 kg (183 lb 5 oz)       Physical Exam   Well-appearing female no distress.  Heart regular rate lungs clear unlabored.  The bilateral ankles appear normal, except the left have slight fullness to it but no appreciable effusion.  There is no redness or warmth.  She has tenderness just anterior to the lateral malleolus    Procedure-after informed consent I prepped the left ankle chlorhexidine and then injected 40 mg Kenalog 1 mL 1% lidocaine into the left ankle using a anterior approach just over the joint line.  Good pain relief.  Bandage applied.        Assessment & Plan       ICD-10-CM    1. Essential hypertension, benign  I10 cloNIDine (CATAPRES) 0.2 MG tablet   2. Acute left ankle pain  M25.572 oxyCODONE (ROXICODONE) 5 MG tablet     triamcinolone (KENALOG-40) injection 40 mg     ADMIN 1st VACCINE       Suspect acute gouty arthritis of the left ankle that has improved with prednisone, but not completely.  In fact her pain is getting little worse not she is been up on it more.  Trial of cortisone injection to see if that helps.  If this becomes a recurrent issue of course would consider uric acid lowering agents such as allopurinol.  She will continue on oxycodone for pain  control.  Should rest and continue the course of prednisone until its completed.  She will call if this does not work    Ash Quiroz MD  [facility]

## 2020-08-19 ENCOUNTER — TRANSFERRED RECORDS (OUTPATIENT)
Dept: HEALTH INFORMATION MANAGEMENT | Facility: CLINIC | Age: 80
End: 2020-08-19

## 2020-08-28 DIAGNOSIS — E11.59 DIABETIC VASCULOPATHY (H): ICD-10-CM

## 2020-08-28 RX ORDER — LANCETS 30 GAUGE
EACH MISCELLANEOUS
Qty: 100 EACH | Refills: 5 | Status: SHIPPED | OUTPATIENT
Start: 2020-08-28 | End: 2020-11-23

## 2020-08-28 NOTE — TELEPHONE ENCOUNTER
"  Requested Prescriptions   Pending Prescriptions Disp Refills     Lancets (ONETOUCH DELICA PLUS [id number]) MISC [Pharmacy Med Name: ONETOUCH DELICA SHEBA 30G MIS]  0     Sig: USE 1  TO CHECK GLUCOSE ONCE DAILY   Last Written Prescription Date:  5/10/2019  Last Fill Quantity: 100,  # refills: 3   Last office visit: 8/7/2020 with prescribing provider:     Future Office Visit:        Diabetic Supplies Protocol Passed - 8/28/2020 10:51 AM        Passed - Medication is active on med list        Passed - Patient is 18 years of age or older        Passed - Recent (6 mo) or future (30 days) visit within the authorizing provider's specialty     Patient had office visit in the last 6 months or has a visit in the next 30 days with authorizing provider.  See \"Patient Info\" tab in inbasket, or \"Choose Columns\" in Meds & Orders section of the refill encounter.             Prescription approved per [facility] Refill Protocol.  Radha Javier RN    "

## 2020-09-29 ENCOUNTER — IMMUNIZATION (OUTPATIENT)
Dept: FAMILY MEDICINE | Facility: OTHER | Age: 80
End: 2020-09-29
Payer: MEDICARE

## 2020-09-29 DIAGNOSIS — Z23 NEED FOR PROPHYLACTIC VACCINATION AND INOCULATION AGAINST INFLUENZA: Primary | ICD-10-CM

## 2020-09-29 PROCEDURE — 90662 IIV NO PRSV INCREASED AG IM: CPT

## 2020-09-29 PROCEDURE — G0008 ADMIN INFLUENZA VIRUS VAC: HCPCS

## 2020-09-29 PROCEDURE — 99207 ZZC NO CHARGE NURSE ONLY: CPT

## 2020-10-23 ENCOUNTER — HOSPITAL ENCOUNTER (OUTPATIENT)
Dept: MAMMOGRAPHY | Facility: CLINIC | Age: 80
Discharge: HOME | End: 2020-10-23
Attending: FAMILY MEDICINE | Admitting: FAMILY MEDICINE
Payer: MEDICARE

## 2020-10-23 DIAGNOSIS — Z12.31 VISIT FOR SCREENING MAMMOGRAM: ICD-10-CM

## 2020-10-23 PROCEDURE — 77067 SCR MAMMO BI INCL CAD: CPT

## 2020-11-07 ENCOUNTER — HOSPITAL ENCOUNTER (EMERGENCY)
Facility: CLINIC | Age: 80
Discharge: HOME | End: 2020-11-07
Attending: EMERGENCY MEDICINE | Admitting: EMERGENCY MEDICINE
Payer: MEDICARE

## 2020-11-07 VITALS
OXYGEN SATURATION: 98 % | TEMPERATURE: 96.7 F | HEART RATE: 69 BPM | RESPIRATION RATE: 16 BRPM | SYSTOLIC BLOOD PRESSURE: 160 MMHG | DIASTOLIC BLOOD PRESSURE: 67 MMHG

## 2020-11-07 DIAGNOSIS — R23.8 SKIN IRRITATION: ICD-10-CM

## 2020-11-07 PROCEDURE — 99282 EMERGENCY DEPT VISIT SF MDM: CPT | Performed by: EMERGENCY MEDICINE

## 2020-11-07 PROCEDURE — 99283 EMERGENCY DEPT VISIT LOW MDM: CPT | Performed by: EMERGENCY MEDICINE

## 2020-11-07 NOTE — DISCHARGE INSTRUCTIONS
Recommend using antibiotic ointment once to twice a day to the area as needed.  If becomes itchy or more yeastlike may use an antifungal.  For today this does not look serious.  If there is any bleeding, you may apply pressure.  If bleeding does not stop in 20 minutes return.

## 2020-11-07 NOTE — ED AVS SNAPSHOT
[facility] Emergency Dept  [address]   [place] [address]  Phone: [phone]  Fax: [phone]                                    Sandra Petit   MRN: [id number]    Department: [facility] Emergency Dept   Date of Visit: 11/7/2020           After Visit Summary Signature Page    I have received my discharge instructions, and my questions have been answered. I have discussed any challenges I see with this plan with the nurse or doctor.    ..........................................................................................................................................  Patient/Patient Representative Signature      ..........................................................................................................................................  Patient Representative Print Name and Relationship to Patient    ..................................................               ................................................  Date                                   Time    ..........................................................................................................................................  Reviewed by Signature/Title    ...................................................              ..............................................  Date                                               Time          22EPIC Rev 08/18

## 2020-11-07 NOTE — ED PROVIDER NOTES
"  History     Chief Complaint   Patient presents with     Wound Infection     HPI  Sandra Petit is a 80 year old female who presents with some irritation to an incision in her right lower abdomen.  This incision is 57 years old.  This morning she woke up and found a small amount of blood dried in the area.  She has had no fever.  No systemic symptoms.  No significant abdominal pain.  No vomiting.  No diarrhea.  6 months ago Dr. Quiroz had her used an antifungal on this.  She tried to get through to the nurse line and express care but was unable  so she came here for further evaluation.    Allergies:  Allergies   Allergen Reactions     Penicillins Swelling     \"throat started swelling\"     Vitamin B Complex Hives     Vitamin B12 Hives     all vitamin B's     Clindamycin Hcl Rash       Problem List:    Patient Active Problem List    Diagnosis Date Noted     Atrial fibrillation (H)- only after bypass 04/10/2019     Priority: Medium     Osteoarthritis of left thumb 07/07/2017     Priority: Medium     Type 2 diabetes mellitus with other circulatory complications (H) 04/04/2016     Priority: Medium     Other specified hypothyroidism 10/02/2015     Priority: Medium     Coronary artery disease involving native coronary artery without angina pectoris 10/02/2015     Priority: Medium     Type 2 diabetes mellitus with diabetic nephropathy (H) 10/02/2015     Priority: Medium     History of total left knee replacement 04/29/2013     Priority: Medium     Hypertension goal BP (blood pressure) < 140/90 09/26/2011     Priority: Medium     Hyperlipidemia LDL goal <100 10/31/2010     Priority: Medium     CKD (chronic kidney disease) stage 3, GFR 30-59 ml/min 03/17/2009     Priority: Medium        Past Medical History:    Past Medical History:   Diagnosis Date     Diabetic eye exam (H) 08/05/13     Endometriosis, site unspecified      Fever and other physiologic disturbances of temperature regulation 07/07/2005     Heart disease      " Myalgia and myositis, unspecified      Pure hypercholesterolemia      Unspecified essential hypertension      Unspecified hypothyroidism        Past Surgical History:    Past Surgical History:   Procedure Laterality Date     ARTHROPLASTY KNEE  4/29/2013    Procedure: ARTHROPLASTY KNEE;  left total knee arthroplasty;  Surgeon: Teddy Grimes MD;  Location: [facility]     ARTHROPLASTY KNEE Right 8/27/2018    Procedure: ARTHROPLASTY KNEE;  right total knee arthroplasty;  Surgeon: Johnny Anaya MD;  Location: [facility]     C APPENDECTOMY       C EXPLOR HEART SURG WND W CP BYPASS Bilateral 03/20/2019     C OPEN CORONARY ENDARTERECTOMY  june 2005    Angioplasty w stenting     C TOTAL KNEE ARTHROPLASTY  4/29/13    Left     COMBINED CYSTOSCOPY, INSERT CATHETER URETER Bilateral 8/10/2015    Procedure: COMBINED CYSTOSCOPY, INSERT CATHETER URETER;  Surgeon: Johnnie Madera MD;  Location: [facility]     DILATION AND CURETTAGE, HYSTEROSCOPY DIAGNOSTIC, COMBINED N/A 11/6/2014    Procedure: COMBINED DILATION AND CURETTAGE, HYSTEROSCOPY DIAGNOSTIC;  Surgeon: Edward Pardo MD;  Location: [facility]     ESOPHAGOSCOPY, GASTROSCOPY, DUODENOSCOPY (EGD), COMBINED N/A 1/27/2015    Procedure: COMBINED ESOPHAGOSCOPY, GASTROSCOPY, DUODENOSCOPY (EGD), BIOPSY SINGLE OR MULTIPLE;  Surgeon: Carmelo Rosario MD;  Location: [facility]     HC REMOVAL GALLBLADDER      Cholecystectomy     HC REMOVE TONSILS/ADENOIDS,<13 Y/O      unsure of age     LAPAROSCOPIC HYSTERECTOMY TOTAL N/A 8/10/2015    Procedure: LAPAROSCOPIC HYSTERECTOMY TOTAL;  Surgeon: Edward Pardo MD;  [facility]     LAPAROSCOPIC LYSIS ADHESIONS N/A 8/10/2015    Procedure: LAPAROSCOPIC LYSIS ADHESIONS;  Surgeon: Edward Pardo MD;  Location: [facility]     ZZC NONSPECIFIC PROCEDURE      Knee ligament surgery     ZZC NONSPECIFIC PROCEDURE      Kidney surgery for mechanical obstruction       Family History:    Family History   Problem Relation Age of  Onset     Heart Disease Mother          coronary     Heart Disease Father          coronary     Allergies Sister         unknown     Allergies Brother         unknown     Allergies Daughter         unknown     Allergies Son         unknown     Heart Disease Sister         by pass surg     Heart Disease Brother         on medication     Hypertension Sister      Hypertension Brother      Lipids Sister      Lipids Brother      Cerebrovascular Disease Brother      Obesity Sister      Diabetes Sister      Diabetes Sister      C.A.D. Brother         quad by pass     Neurologic Disorder Sister         parkinsons     Cancer Sister         skin cancer     Alcohol/Drug No family hx of      Alzheimer Disease No family hx of        Social History:  Marital Status:   [5]  Social History     Tobacco Use     Smoking status: Former Smoker     Smokeless tobacco: Never Used     Tobacco comment: quit     Substance Use Topics     Alcohol use: No     Alcohol/week: 0.0 standard drinks     Drug use: No        Medications:         amLODIPine (NORVASC) 10 MG tablet       aspirin 81 MG EC tablet       blood glucose (ONETOUCH VERIO IQ) test strip       cloNIDine (CATAPRES) 0.2 MG tablet       Lancets (ONETOUCH DELICA PLUS [id number]) MISC       levothyroxine (SYNTHROID/LEVOTHROID) 50 MCG tablet       losartan (COZAAR) 25 MG tablet       metoprolol succinate ER (TOPROL-XL) 25 MG 24 hr tablet       nitroGLYcerin (NITROSTAT) 0.4 MG sublingual tablet       oxyCODONE (ROXICODONE) 5 MG tablet       Probiotic Product (ACIDOPHILUS PROBIOTIC BLEND) CAPS       simvastatin (ZOCOR) 40 MG tablet       UNABLE TO FIND          Review of Systems  All other systems are reviewed and are negative    Physical Exam   BP: (!) 160/67  Pulse: 69  Temp: 96.7  F (35.9  C)  Resp: 16  SpO2: 98 %      Physical Exam  Vitals signs reviewed.   Constitutional:       General: She is not in acute distress.     Appearance: She is not diaphoretic.   HENT:       Head: Normocephalic and atraumatic.   Eyes:      General: No scleral icterus.        Right eye: No discharge.         Left eye: No discharge.      Conjunctiva/sclera: Conjunctivae normal.   Neck:      Musculoskeletal: Normal range of motion.   Pulmonary:      Effort: Pulmonary effort is normal.      Breath sounds: No stridor.   Abdominal:      Comments: Right lower abdomen over Pfannenstiel incision reveals mild linear area of erythema.  There is no significant swelling.  No tenderness.  No purulence.  No active bleeding or blood visible.   Musculoskeletal: Normal range of motion.   Skin:     General: Skin is warm and dry.      Findings: No rash.   Neurological:      Mental Status: She is alert.      Comments: Normal speech and mentation   Psychiatric:         Judgment: Judgment normal.         ED Course        Procedures               Critical Care time:  none               No results found. However, due to the size of the patient record, not all encounters were searched. Please check Results Review for a complete set of results.    Medications - No data to display    Assessments & Plan (with Medical Decision Making)  80-year-old female with some irritation to an incision in the right lower abdomen as described above.  No signs of secondary infection at this point or bleeding.  Have recommended antibiotic ointment 1-2 times a day as needed here.  May use antifungal as needed.  If any bleeding apply pressure.  If bleeding does not stop return after 20 minutes.  Appears stable for outpatient management.  Discharged home in stable condition.     I have reviewed the nursing notes.    I have reviewed the findings, diagnosis, plan and need for follow up with the patient.       New Prescriptions    No medications on file       Final diagnoses:   Skin irritation       11/7/2020   [facility] EMERGENCY DEPT     Beto Fernandes MD  11/07/20 114

## 2020-11-10 ASSESSMENT — ENCOUNTER SYMPTOMS
HEMATURIA: 0
FREQUENCY: 0
PARESTHESIAS: 0
FEVER: 0
CONSTIPATION: 0
PALPITATIONS: 0
BREAST MASS: 0
ABDOMINAL PAIN: 0
JOINT SWELLING: 1
SORE THROAT: 0
HEMATOCHEZIA: 0
COUGH: 0
HEARTBURN: 0
DYSURIA: 0
HEADACHES: 0
NAUSEA: 0
ARTHRALGIAS: 1
MYALGIAS: 1
DIARRHEA: 0
SHORTNESS OF BREATH: 0
EYE PAIN: 0
DIZZINESS: 0
CHILLS: 0
WEAKNESS: 0
NERVOUS/ANXIOUS: 0

## 2020-11-10 ASSESSMENT — ACTIVITIES OF DAILY LIVING (ADL)
CURRENT_FUNCTION: HOUSEWORK REQUIRES ASSISTANCE
CURRENT_FUNCTION: SHOPPING REQUIRES ASSISTANCE

## 2020-11-11 ENCOUNTER — OFFICE VISIT (OUTPATIENT)
Dept: FAMILY MEDICINE | Facility: CLINIC | Age: 80
End: 2020-11-11
Payer: MEDICARE

## 2020-11-11 VITALS
WEIGHT: 187 LBS | HEART RATE: 62 BPM | BODY MASS INDEX: 35.3 KG/M2 | HEIGHT: 61 IN | DIASTOLIC BLOOD PRESSURE: 82 MMHG | OXYGEN SATURATION: 97 % | RESPIRATION RATE: 18 BRPM | SYSTOLIC BLOOD PRESSURE: 122 MMHG | TEMPERATURE: 96.4 F

## 2020-11-11 DIAGNOSIS — E11.21 TYPE 2 DIABETES MELLITUS WITH DIABETIC NEPHROPATHY, WITHOUT LONG-TERM CURRENT USE OF INSULIN (H): ICD-10-CM

## 2020-11-11 DIAGNOSIS — I10 ESSENTIAL HYPERTENSION, BENIGN: ICD-10-CM

## 2020-11-11 DIAGNOSIS — Z23 NEED FOR PROPHYLACTIC VACCINATION AND INOCULATION AGAINST INFLUENZA: ICD-10-CM

## 2020-11-11 DIAGNOSIS — E03.9 HYPOTHYROIDISM, UNSPECIFIED TYPE: ICD-10-CM

## 2020-11-11 DIAGNOSIS — Z23 NEED FOR VACCINATION: ICD-10-CM

## 2020-11-11 DIAGNOSIS — Z00.00 ENCOUNTER FOR MEDICARE ANNUAL WELLNESS EXAM: Primary | ICD-10-CM

## 2020-11-11 DIAGNOSIS — E78.5 HYPERLIPIDEMIA LDL GOAL <100: ICD-10-CM

## 2020-11-11 DIAGNOSIS — E66.01 MORBID OBESITY (H): ICD-10-CM

## 2020-11-11 LAB
ANION GAP SERPL CALCULATED.3IONS-SCNC: 7 MMOL/L (ref 3–14)
BUN SERPL-MCNC: 30 MG/DL (ref 7–30)
CALCIUM SERPL-MCNC: 9.3 MG/DL (ref 8.5–10.1)
CHLORIDE SERPL-SCNC: 111 MMOL/L (ref 94–109)
CO2 SERPL-SCNC: 24 MMOL/L (ref 20–32)
CREAT SERPL-MCNC: 1.42 MG/DL (ref 0.52–1.04)
GFR SERPL CREATININE-BSD FRML MDRD: 35 ML/MIN/{1.73_M2}
GLUCOSE SERPL-MCNC: 143 MG/DL (ref 70–99)
HBA1C MFR BLD: 7 % (ref 0–5.6)
POTASSIUM SERPL-SCNC: 4.5 MMOL/L (ref 3.4–5.3)
SODIUM SERPL-SCNC: 142 MMOL/L (ref 133–144)

## 2020-11-11 PROCEDURE — 90732 PPSV23 VACC 2 YRS+ SUBQ/IM: CPT | Performed by: FAMILY MEDICINE

## 2020-11-11 PROCEDURE — 36415 COLL VENOUS BLD VENIPUNCTURE: CPT | Performed by: FAMILY MEDICINE

## 2020-11-11 PROCEDURE — 80048 BASIC METABOLIC PNL TOTAL CA: CPT | Performed by: FAMILY MEDICINE

## 2020-11-11 PROCEDURE — G0009 ADMIN PNEUMOCOCCAL VACCINE: HCPCS | Performed by: FAMILY MEDICINE

## 2020-11-11 PROCEDURE — G0439 PPPS, SUBSEQ VISIT: HCPCS | Performed by: FAMILY MEDICINE

## 2020-11-11 PROCEDURE — 99213 OFFICE O/P EST LOW 20 MIN: CPT | Mod: 25 | Performed by: FAMILY MEDICINE

## 2020-11-11 PROCEDURE — 83036 HEMOGLOBIN GLYCOSYLATED A1C: CPT | Performed by: FAMILY MEDICINE

## 2020-11-11 RX ORDER — CLONIDINE HYDROCHLORIDE 0.2 MG/1
0.2 TABLET ORAL 2 TIMES DAILY
Qty: 180 TABLET | Refills: 1 | Status: SHIPPED | OUTPATIENT
Start: 2020-11-11 | End: 2021-04-26 | Stop reason: DRUGHIGH

## 2020-11-11 RX ORDER — SIMVASTATIN 40 MG
40 TABLET ORAL AT BEDTIME
Qty: 90 TABLET | Refills: 3 | Status: SHIPPED | OUTPATIENT
Start: 2020-11-11 | End: 2021-11-15

## 2020-11-11 RX ORDER — LOSARTAN POTASSIUM 50 MG/1
50 TABLET ORAL 2 TIMES DAILY
Qty: 90 TABLET | Refills: 3 | Status: SHIPPED | OUTPATIENT
Start: 2020-11-11 | End: 2021-07-30

## 2020-11-11 RX ORDER — LEVOTHYROXINE SODIUM 50 UG/1
50 TABLET ORAL DAILY
Qty: 90 TABLET | Refills: 3 | Status: SHIPPED | OUTPATIENT
Start: 2020-11-11 | End: 2021-11-09

## 2020-11-11 RX ORDER — LOSARTAN POTASSIUM 25 MG/1
50 TABLET ORAL 2 TIMES DAILY
Qty: 360 TABLET | Refills: 1 | Status: CANCELLED | OUTPATIENT
Start: 2020-11-11

## 2020-11-11 RX ORDER — METOPROLOL SUCCINATE 25 MG/1
TABLET, EXTENDED RELEASE ORAL
Qty: 90 TABLET | Refills: 3 | Status: SHIPPED | OUTPATIENT
Start: 2020-11-11 | End: 2021-11-15

## 2020-11-11 RX ORDER — METFORMIN HCL 500 MG
500 TABLET, EXTENDED RELEASE 24 HR ORAL
Qty: 90 TABLET | Refills: 1 | Status: SHIPPED | OUTPATIENT
Start: 2020-11-11 | End: 2021-06-02

## 2020-11-11 RX ORDER — AMLODIPINE BESYLATE 10 MG/1
10 TABLET ORAL DAILY
Qty: 90 TABLET | Refills: 3 | Status: SHIPPED | OUTPATIENT
Start: 2020-11-11 | End: 2021-11-15

## 2020-11-11 ASSESSMENT — ENCOUNTER SYMPTOMS
COUGH: 0
WEAKNESS: 0
JOINT SWELLING: 1
HEADACHES: 0
SORE THROAT: 0
NAUSEA: 0
DIARRHEA: 0
HEMATOCHEZIA: 0
ARTHRALGIAS: 1
CHILLS: 0
ABDOMINAL PAIN: 0
DYSURIA: 0
SHORTNESS OF BREATH: 0
EYE PAIN: 0
HEMATURIA: 0
BREAST MASS: 0
PALPITATIONS: 0
DIZZINESS: 0
PARESTHESIAS: 0
NERVOUS/ANXIOUS: 0
CONSTIPATION: 0
FREQUENCY: 0
MYALGIAS: 1
HEARTBURN: 0
FEVER: 0

## 2020-11-11 ASSESSMENT — ACTIVITIES OF DAILY LIVING (ADL)
CURRENT_FUNCTION: HOUSEWORK REQUIRES ASSISTANCE
CURRENT_FUNCTION: SHOPPING REQUIRES ASSISTANCE

## 2020-11-11 ASSESSMENT — PAIN SCALES - GENERAL: PAINLEVEL: MODERATE PAIN (5)

## 2020-11-11 ASSESSMENT — MIFFLIN-ST. JEOR: SCORE: 1252.67

## 2020-11-11 NOTE — PROGRESS NOTES
"SUBJECTIVE:   Sandra Petit is a 80 year old female who presents for Preventive Visit.  bp up a bit at home 150-160s  Sugars also up 140s        Patient has been advised of split billing requirements and indicates understanding: No   Are you in the first 12 months of your Medicare coverage?  No    Healthy Habits:     In general, how would you rate your overall health?  Good    Frequency of exercise:  1 day/week    Duration of exercise:  Less than 15 minutes    Do you usually eat at least 4 servings of fruit and vegetables a day, include whole grains    & fiber and avoid regularly eating high fat or \"junk\" foods?  No    Taking medications regularly:  Yes    Medication side effects:  None    Ability to successfully perform activities of daily living:  Shopping requires assistance and housework requires assistance    Home Safety:  No safety concerns identified    Hearing Impairment:  No hearing concerns    In the past 6 months, have you been bothered by leaking of urine? Yes    In general, how would you rate your overall mental or emotional health?  Good      PHQ-2 Total Score: 0    Additional concerns today:  Yes    Do you feel safe in your environment? Yes    Have you ever done Advance Care Planning? (For example, a Health Directive, POLST, or a discussion with a medical provider or your loved ones about your wishes): Yes, advance care planning is on file.      Fall risk       Cognitive Screening   1) Repeat 3 items (Leader, Season, Table)    2) Clock draw: NORMAL  3) 3 item recall: Recalls 2 objects   Results: 3 items recalled: COGNITIVE IMPAIRMENT LESS LIKELY    Mini-CogTM Copyright KELLY Joya. Licensed by the author for use in [facility]; reprinted with permission (ramón@.[facility]). All rights reserved.      Do you have sleep apnea, excessive snoring or daytime drowsiness?: no    Reviewed and updated as needed this visit by clinical staff  Tobacco  Allergies  Meds              Reviewed and updated as " needed this visit by Provider                Social History     Tobacco Use     Smoking status: Former Smoker     Smokeless tobacco: Never Used     Tobacco comment: quit  1987   Substance Use Topics     Alcohol use: No     Alcohol/week: 0.0 standard drinks     If you drink alcohol do you typically have >3 drinks per day or >7 drinks per week? No    No flowsheet data found.            Current providers sharing in care for this patient include:   Patient Care Team:  Ash Quiroz MD as PCP - General (Family Practice)  Ash Quiroz MD as Assigned PCP  Dafne Auguste RD as Diabetes Educator (Dietitian, Registered)    The following health maintenance items are reviewed in Epic and correct as of today:  Health Maintenance   Topic Date Due     HEPATITIS C SCREENING  03/22/1958     ZOSTER IMMUNIZATION (2 of 3) 11/20/2010     DIABETIC FOOT EXAM  07/18/2019     TSH W/FREE T4 REFLEX  03/14/2020     LIPID  02/21/2021     A1C  05/11/2021     EYE EXAM  06/01/2021     MICROALBUMIN  07/09/2021     FALL RISK ASSESSMENT  08/03/2021     MEDICARE ANNUAL WELLNESS VISIT  11/11/2021     BMP  11/11/2021     ADVANCE CARE PLANNING  11/11/2025     DTAP/TDAP/TD IMMUNIZATION (4 - Td) 04/27/2026     DEXA  Completed     PHQ-2  Completed     INFLUENZA VACCINE  Completed     Pneumococcal Vaccine: 65+ Years  Completed     Pneumococcal Vaccine: Pediatrics (0 to 5 Years) and At-Risk Patients (6 to 64 Years)  Aged Out     IPV IMMUNIZATION  Aged Out     MENINGITIS IMMUNIZATION  Aged Out           Review of Systems   Constitutional: Negative for chills and fever.   HENT: Negative for congestion, ear pain, hearing loss and sore throat.    Eyes: Negative for pain and visual disturbance.   Respiratory: Negative for cough and shortness of breath.    Cardiovascular: Positive for peripheral edema. Negative for chest pain and palpitations.   Gastrointestinal: Negative for abdominal pain, constipation, diarrhea, heartburn, hematochezia and  "nausea.   Breasts:  Negative for tenderness, breast mass and discharge.   Genitourinary: Negative for dysuria, frequency, genital sores, hematuria, pelvic pain, urgency, vaginal bleeding and vaginal discharge.   Musculoskeletal: Positive for arthralgias, joint swelling and myalgias.   Skin: Negative for rash.   Neurological: Negative for dizziness, weakness, headaches and paresthesias.   Psychiatric/Behavioral: Negative for mood changes. The patient is not nervous/anxious.          OBJECTIVE:   /82   Pulse 62   Temp 96.4  F (35.8  C) (Temporal)   Resp 18   Ht 1.537 m (5' 0.5\")   Wt 84.8 kg (187 lb)   SpO2 97%   BMI 35.92 kg/m   Estimated body mass index is 35.92 kg/m  as calculated from the following:    Height as of this encounter: 1.537 m (5' 0.5\").    Weight as of this encounter: 84.8 kg (187 lb).  Physical Exam  GENERAL: healthy, alert and no distress  EYES: Eyes grossly normal to inspection, PERRL and conjunctivae and sclerae normal  HENT: ear canals and TM's normal, nose and mouth without ulcers or lesions  NECK: no adenopathy, no asymmetry, masses, or scars and thyroid normal to palpation  RESP: lungs clear to auscultation - no rales, rhonchi or wheezes  CV: regular rate and rhythm, normal S1 S2, no S3 or S4, no murmur, click or rub, no peripheral edema and peripheral pulses strong  ABDOMEN: soft, nontender, no hepatosplenomegaly, no masses and bowel sounds normal  MS: no gross musculoskeletal defects noted, no edema  SKIN: no suspicious lesions or rashes  NEURO: Normal strength and tone, mentation intact and speech normal  PSYCH: mentation appears normal, affect normal/bright    Diagnostic Test Results:  Labs reviewed in Epic    ASSESSMENT / PLAN:       ICD-10-CM    1. Encounter for Medicare annual wellness exam  Z00.00    2. Essential hypertension, benign  I10 amLODIPine (NORVASC) 10 MG tablet     cloNIDine (CATAPRES) 0.2 MG tablet     metoprolol succinate ER (TOPROL-XL) 25 MG 24 hr tablet     " "Basic metabolic panel     losartan (COZAAR) 50 MG tablet   3. Hypothyroidism, unspecified type  E03.9 levothyroxine (SYNTHROID/LEVOTHROID) 50 MCG tablet   4. Hyperlipidemia LDL goal <100  E78.5 simvastatin (ZOCOR) 40 MG tablet   5. Type 2 diabetes mellitus with diabetic nephropathy, without long-term current use of insulin (H)  E11.21 metFORMIN (GLUCOPHAGE-XR) 500 MG 24 hr tablet     Hemoglobin A1c   6. Morbid obesity (H)  E66.01    7. Need for prophylactic vaccination and inoculation against influenza  Z23 CANCELED: FLUZONE HIGH DOSE 65+  [55742]   8. Need for vaccination  Z23 Pneumococcal vaccine 23 valent PPSV23  (Pneumovax) [18412]   Annual topics reviewed including immunizations and cancer screening appropriate for age  Stable hypertension, but she has been taking losartan 4 times daily, and changed her back to 50 mg twice daily, due for lab work, further recommendations to follow  Stable hypothyroidism,  Stable hyperlipidemia and simvastatin refilled  Stable type 2 diabetes, due for recheck A1c and further recommendations to follow  Encouraged gradual weight loss with a carbohydrate reduction in diet.  She is going to try a dietary supplement for now and move towards reducing carbohydrates      COUNSELING:  Reviewed preventive health counseling, as reflected in patient instructions    Estimated body mass index is 35.92 kg/m  as calculated from the following:    Height as of this encounter: 1.537 m (5' 0.5\").    Weight as of this encounter: 84.8 kg (187 lb).        She reports that she has quit smoking. She has never used smokeless tobacco.      Appropriate preventive services were discussed with this patient, including applicable screening as appropriate for cardiovascular disease, diabetes, osteopenia/osteoporosis, and glaucoma.  As appropriate for age/gender, discussed screening for colorectal cancer, prostate cancer, breast cancer, and cervical cancer. Checklist reviewing preventive services available has " been given to the patient.    Reviewed patients plan of care and provided an AVS. The  lashawn Flores meets the Care Plan requirement. This Care Plan has been established and reviewed with the .    Counseling Resources:  ATP IV Guidelines  Pooled Cohorts Equation Calculator  Breast Cancer Risk Calculator  Breast Cancer: Medication to Reduce Risk  FRAX Risk Assessment  ICSI Preventive Guidelines  Dietary Guidelines for Americans, 2010  USDA's MyPlate  ASA Prophylaxis  Lung CA Screening    Ash Quiroz MD  [facility]    Identified Health Risks:

## 2020-11-11 NOTE — PATIENT INSTRUCTIONS
Patient Education   Personalized Prevention Plan  You are due for the preventive services outlined below.  Your care team is available to assist you in scheduling these services.  If you have already completed any of these items, please share that information with your care team to update in your medical record.  Health Maintenance Due   Topic Date Due     Hepatitis C Screening  03/22/1958     Zoster (Shingles) Vaccine (2 of 3) 11/20/2010     Eye Exam  10/25/2018     Pneumococcal Vaccine (2 of 2 - PPSV23) 05/11/2019     Diabetic Foot Exam  07/18/2019     Thyroid Function Lab  03/14/2020     A1C Lab  05/06/2020       Exercise for a Healthier Heart     Exercise with a friend. When activity is fun, you're more likely to stick with it.   You may wonder how you can improve the health of your heart. If you re thinking about exercise, you re on the right track. You don t need to become an athlete. But you do need a certain amount of brisk exercise to help strengthen your heart. If you have been diagnosed with a heart condition, your healthcare provider may advise exercise to help stabilize your condition. To help make exercise a habit, choose safe, fun activities.   Before you start  Check with your healthcare provider before starting an exercise program. This is especially important if you have not been active for a while. It's also important if you have a long-term (chronic) health problem such as heart disease, diabetes, or obesity. Or if you are at high risk for having these problems.   Why exercise?  Exercising regularly offers many healthy rewards. It can help you do all of the following:    Improve your blood cholesterol level to help prevent further heart trouble    Lower your blood pressure to help prevent a stroke or heart attack    Control diabetes, or reduce your risk of getting this disease    Improve your heart and lung function    Reach and stay at a healthy weight    Make your muscles stronger so you can  stay active    Prevent falls and fractures by slowing the loss of bone mass (osteoporosis)    Manage stress better    Reduce your blood pressure    Improve your sense of self and your body image  Exercise tips      Ease into your routine. Set small goals. Then build on them. If you are not sure what your activity level should be, talk with your healthcare provider first before starting an exercise routine.    Exercise on most days. Aim for a total of 150 minutes (2 hours and 30 minutes) or more of moderate-intensity aerobic activity each week. Or 75 minutes (1 hour and 15 minutes) or more of vigorous-intensity aerobic activity each week. Or try for a combination of both. Moderate activity means that you breathe heavier and your heart rate increases but you can still talk. Think about doing 40 minutes of moderate exercise, 3 to 4 times a week. For best results, activity should last for about 40 minutes to lower blood pressure and cholesterol. It's OK to work up to the 40-minute period over time. Examples of moderate-intensity activity are walking 1 mile in 15 minutes. Or doing 30 to 45 minutes of yard work.    Step up your daily activity level.  Along with your exercise program, try being more active the whole day. Walk instead of drive. Or park further away so that you take more steps each day. Do more household tasks or yard work. You may not be able to meet the advised mount of physical activity. But doing some moderate- or vigorous-intensity aerobic activity can help reduce your risk for heart disease. Your healthcare provider can help you figure out what is best for you.    Choose 1 or more activities you enjoy.  Walking is one of the easiest things you can do. You can also try swimming, riding a bike, dancing, or taking an exercise class.    When to call your healthcare provider  Call your healthcare provider if you have any of these:     Chest pain or feel dizzy or lightheaded    Burning, tightness, pressure,  or heaviness in your chest, neck, shoulders, back, or arms    Abnormal shortness of breath    More joint or muscle pain    A very fast or irregular heartbeat (palpitations)  [employer] last reviewed this educational content on 7/1/2019 2000-2020 [employer]. [address] [place] [id number]. All rights reserved. This information is not intended as a substitute for professional medical care. Always follow your healthcare professional's instructions.          Understanding USDA MyPlate  The USDA (U.S. Department of Agriculture) has guidelines to help you make healthy food choices. These are called MyPlate. MyPlate shows the food groups that make up healthy meals using the image of a place setting. Before you eat, think about the healthiest choices for what to put onto your plate or into your cup or bowl. To learn more about building a healthy plate, visit www.choosemyplate.gov.    The food groups    Fruits. Any fruit or 100% fruit juice counts as part of the Fruit Group. Fruits may be fresh, canned, frozen, or dried, and may be whole, cut-up, or pureed. Make half your plate fruits and vegetables.    Vegetables. Any vegetable or 100% vegetable juice counts as a member of the Vegetable Group. Vegetables may be fresh, frozen, canned, or dried. They can be served raw or cooked and may be whole, cut-up, or mashed. Make half your plate fruits and vegetables.    Grains. All foods made from grains are part of the Grains Group. These include wheat, rice, oats, cornmeal, and barley such as bread, pasta, oatmeal, cereal, tortillas, and grits. Grains should be no more than a quarter of your plate. At least half of your grains should be whole grains.    Protein. This group includes meat, poultry, seafood, beans and peas, eggs, processed soy products (like tofu), nuts (including nut butters), and seeds. Make protein choices no more than a quarter of your plate. Meat and poultry choices should be lean or low  fat.    Dairy. All fluid milk products and foods made from milk that contain calcium, like yogurt and cheese, are part of the Dairy Group. (Foods that have little calcium, such as cream, butter, and cream cheese, are not part of the group.) Most dairy choices should be low-fat or fat-free.    Oils. These are fats that are liquid at room temperature. They include canola, corn, olive, soybean, and sunflower oil. Foods that are mainly oil include mayonnaise, certain salad dressings, and soft margarines. You should have only 5 to 7 teaspoons of oils a day. You probably already get this much from the food you eat.  [employer] last reviewed this educational content on 8/1/2017 2000-2020 [employer], [employer]. [address] [place] [address]. All rights reserved. This information is not intended as a substitute for professional medical care. Always follow your healthcare professional's instructions.        Activities of Daily Living    Your Health Risk Assessment indicates you have difficulties with activities of daily living such as housework, bathing, preparing meals, taking medication, etc. Please make a follow up appointment for us to address this issue in more detail.    Urinary Incontinence, Female (Adult)   Urinary incontinence means loss of bladder control. This problem affects many women, especially as they get older. If you have incontinence, you may be embarrassed to ask for help. But know that this problem can be treated.   Types of Incontinence  There are different types of incontinence. Two of the main types are described here. You can have more than one type.     Stress incontinence. With this type, urine leaks when pressure (stress) is put on the bladder. This may happen when you cough, sneeze, or laugh. Stress incontinence most often occurs because the pelvic floor muscles that support the bladder and urethra are weak. This can happen after pregnancy and vaginal childbirth or a hysterectomy.  It can also be due to excess body weight or hormone changes.    Urge incontinence (also called overactive bladder). With this type, a sudden urge to urinate is felt often. This may happen even though there may not be much urine in the bladder. The need to urinate often during the night is common. Urge incontinence most often occurs because of bladder spasms. This may be due to bladder irritation or infection. Damage to bladder nerves or pelvic muscles, constipation, and certain medicines can also lead to urge incontinence.  Treatment depends on the cause. Further evaluation is needed to find the type you have. This will likely include an exam and certain tests. Based on the results, you and your healthcare provider can then plan treatment. Until a diagnosis is made, the home care tips below can help ease symptoms.   Home care    Do pelvic floor muscle exercises, if they are prescribed. The pelvic floor muscles help support the bladder and urethra. Many women find that their symptoms improve when doing special exercises that strengthen these muscles. To do the exercises, contract the muscles you would use to stop your stream of urine. But do this when you re not urinating. Hold for 10 seconds, then relax. Repeat 10 to 20 times in a row, at least 3 times a day. Your healthcare provider may give you other instructions for how to do the exercises and how often.    Keep a bladder diary. This helps track how often and how much you urinate over a set period of time. Bring this diary with you to your next visit with the provider. The information can help your provider learn more about your bladder problem.    Lose weight, if advised to by your provider. Extra weight puts pressure on the bladder. Your provider can help you create a weight-loss plan that s right for you. This may include exercising more and making certain diet changes.    Don't have foods and drinks that may irritate the bladder. These can include alcohol and  caffeinated drinks.    Quit smoking. Smoking and other tobacco use can lead to a long-term (chronic) cough that strains the pelvic floor muscles. Smoking may also damage the bladder and urethra. Talk with your provider about treatments or methods you can use to quit smoking.    If drinking large amounts of fluid makes you have symptoms, you may be advised to limit your fluid intake. You may also be advised to drink most of your fluids during the day and to limit fluids at night.    If you re worried about urine leakage or accidents, you may wear absorbent pads to catch urine. Change the pads often. This helps reduce discomfort. It may also reduce the risk of skin or bladder infections.    Follow-up care  Follow up with your healthcare provider, or as directed. It may take some to find the right treatment for your problem. But healthy lifestyle changes can be made right away. These include such things as exercising on a regular basis, eating a healthy diet, losing weight (if needed), and quitting smoking. Your treatment plan may include special therapies or medicines. Certain procedures or surgery may also be options. Talk about any questions you have with your provider.   When to seek medical advice  Call the healthcare provider right away if any of these occur:    Fever of 100.4 F (38 C) or higher, or as directed by your provider    Bladder pain or fullness    Belly swelling    Nausea or vomiting    Back pain    Weakness, dizziness, or fainting  [employer] last reviewed this educational content on 1/1/2020 2000-2020 [employer]. [address] [place] [address], [place] [address]. All rights reserved. This information is not intended as a substitute for professional medical care. Always follow your healthcare professional's instructions.

## 2020-11-11 NOTE — PROGRESS NOTES
She is at risk for lack of exercise and has been provided with information to increase physical activity for the benefit of her well-being.  The patient was counseled and encouraged to consider modifying their diet and eating habits. She was provided with information on recommended healthy diet options.  The patient reports that she has difficulty with activities of daily living. I have asked that the patient make a follow up appointment in  weeks where this issue will be further evaluated and addressed.  Information on urinary incontinence and treatment options given to patient.

## 2020-11-23 DIAGNOSIS — E11.59 DIABETIC VASCULOPATHY (H): Primary | ICD-10-CM

## 2020-11-23 NOTE — TELEPHONE ENCOUNTER
Patient needs new prescription Medicare compliant, as the last NDC was discontinued.    Onetouch Delica Plus 30 g      Last Written Prescription Date:  8/28/20  Last Fill Quantity: 100,   # refills: 5  Last Office Visit: 11/11/20  Future Office visit:       Routing refill request to provider for review/approval because:  Drug not on the [facility], [facility] or [facility] refill protocol or controlled substance

## 2020-11-24 RX ORDER — LANCETS 30 GAUGE
1 EACH MISCELLANEOUS DAILY
Qty: 100 EACH | Refills: 5 | Status: SHIPPED | OUTPATIENT
Start: 2020-11-24 | End: 2021-04-06

## 2020-12-02 ENCOUNTER — MYC MEDICAL ADVICE (OUTPATIENT)
Dept: FAMILY MEDICINE | Facility: CLINIC | Age: 80
End: 2020-12-02

## 2020-12-02 DIAGNOSIS — E11.21 TYPE 2 DIABETES MELLITUS WITH DIABETIC NEPHROPATHY, WITHOUT LONG-TERM CURRENT USE OF INSULIN (H): ICD-10-CM

## 2020-12-02 LAB
ANION GAP SERPL CALCULATED.3IONS-SCNC: 6 MMOL/L (ref 3–14)
BUN SERPL-MCNC: 38 MG/DL (ref 7–30)
CALCIUM SERPL-MCNC: 9.4 MG/DL (ref 8.5–10.1)
CHLORIDE SERPL-SCNC: 112 MMOL/L (ref 94–109)
CO2 SERPL-SCNC: 23 MMOL/L (ref 20–32)
CREAT SERPL-MCNC: 1.32 MG/DL (ref 0.52–1.04)
GFR SERPL CREATININE-BSD FRML MDRD: 38 ML/MIN/{1.73_M2}
GLUCOSE SERPL-MCNC: 123 MG/DL (ref 70–99)
POTASSIUM SERPL-SCNC: 4.6 MMOL/L (ref 3.4–5.3)
SODIUM SERPL-SCNC: 141 MMOL/L (ref 133–144)

## 2020-12-02 PROCEDURE — 36415 COLL VENOUS BLD VENIPUNCTURE: CPT | Performed by: FAMILY MEDICINE

## 2020-12-02 PROCEDURE — 80048 BASIC METABOLIC PNL TOTAL CA: CPT | Performed by: FAMILY MEDICINE

## 2021-01-03 ENCOUNTER — MYC MEDICAL ADVICE (OUTPATIENT)
Dept: FAMILY MEDICINE | Facility: CLINIC | Age: 81
End: 2021-01-03

## 2021-01-03 DIAGNOSIS — E11.59 DIABETIC VASCULOPATHY (H): ICD-10-CM

## 2021-01-04 RX ORDER — BLOOD SUGAR DIAGNOSTIC
STRIP MISCELLANEOUS
Qty: 100 STRIP | Refills: 3 | Status: SHIPPED | OUTPATIENT
Start: 2021-01-04 | End: 2021-01-12

## 2021-01-04 NOTE — TELEPHONE ENCOUNTER
Prescription approved per [facility] Refill Protocol.    Mahnaz Daugherty RN

## 2021-01-06 DIAGNOSIS — Z95.1 S/P CABG (CORONARY ARTERY BYPASS GRAFT): ICD-10-CM

## 2021-01-06 RX ORDER — NITROGLYCERIN 0.4 MG/1
0.4 TABLET SUBLINGUAL EVERY 5 MIN PRN
Qty: 25 TABLET | Refills: 1 | Status: SHIPPED | OUTPATIENT
Start: 2021-01-06 | End: 2021-11-15

## 2021-01-06 NOTE — TELEPHONE ENCOUNTER
Prescription approved per [facility] Refill Protocol.    Mahnaz Daugherty RN

## 2021-01-08 ENCOUNTER — MYC MEDICAL ADVICE (OUTPATIENT)
Dept: FAMILY MEDICINE | Facility: CLINIC | Age: 81
End: 2021-01-08

## 2021-01-08 DIAGNOSIS — E11.59 DIABETIC VASCULOPATHY (H): ICD-10-CM

## 2021-01-12 RX ORDER — BLOOD SUGAR DIAGNOSTIC
STRIP MISCELLANEOUS
Qty: 100 STRIP | Refills: 3 | Status: SHIPPED | OUTPATIENT
Start: 2021-01-12 | End: 2021-08-03

## 2021-01-12 NOTE — TELEPHONE ENCOUNTER
One Touch Verio Teset STrips approved and sent to [facility], [place].................ANURADHA Irving

## 2021-01-12 NOTE — TELEPHONE ENCOUNTER
The pharmacy needs a new rx, cause the last one said as directed and can't say that.  Meeta Martines MA

## 2021-02-03 ENCOUNTER — IMMUNIZATION (OUTPATIENT)
Dept: PEDIATRICS | Facility: CLINIC | Age: 81
End: 2021-02-03
Payer: MEDICARE

## 2021-02-03 ENCOUNTER — NURSE TRIAGE (OUTPATIENT)
Dept: NURSING | Facility: CLINIC | Age: 81
End: 2021-02-03

## 2021-02-03 PROCEDURE — 91300 PR COVID VAC PFIZER DIL RECON 30 MCG/0.3 ML IM: CPT

## 2021-02-03 PROCEDURE — 0001A PR COVID VAC PFIZER DIL RECON 30 MCG/0.3 ML IM: CPT

## 2021-02-04 ENCOUNTER — MYC MEDICAL ADVICE (OUTPATIENT)
Dept: FAMILY MEDICINE | Facility: CLINIC | Age: 81
End: 2021-02-04

## 2021-02-04 ENCOUNTER — HOSPITAL ENCOUNTER (OUTPATIENT)
Dept: GENERAL RADIOLOGY | Facility: CLINIC | Age: 81
Discharge: HOME | End: 2021-02-04
Attending: FAMILY MEDICINE | Admitting: FAMILY MEDICINE
Payer: MEDICARE

## 2021-02-04 ENCOUNTER — OFFICE VISIT (OUTPATIENT)
Dept: FAMILY MEDICINE | Facility: CLINIC | Age: 81
End: 2021-02-04
Payer: MEDICARE

## 2021-02-04 VITALS
OXYGEN SATURATION: 96 % | SYSTOLIC BLOOD PRESSURE: 120 MMHG | WEIGHT: 185 LBS | RESPIRATION RATE: 20 BRPM | TEMPERATURE: 96.4 F | HEIGHT: 60 IN | DIASTOLIC BLOOD PRESSURE: 82 MMHG | HEART RATE: 89 BPM | BODY MASS INDEX: 36.32 KG/M2

## 2021-02-04 DIAGNOSIS — M25.572 ACUTE LEFT ANKLE PAIN: ICD-10-CM

## 2021-02-04 DIAGNOSIS — M25.562 ACUTE BILATERAL KNEE PAIN: ICD-10-CM

## 2021-02-04 DIAGNOSIS — M25.561 ACUTE BILATERAL KNEE PAIN: Primary | ICD-10-CM

## 2021-02-04 DIAGNOSIS — M25.561 ACUTE BILATERAL KNEE PAIN: ICD-10-CM

## 2021-02-04 DIAGNOSIS — M25.562 ACUTE BILATERAL KNEE PAIN: Primary | ICD-10-CM

## 2021-02-04 PROCEDURE — 99213 OFFICE O/P EST LOW 20 MIN: CPT | Performed by: FAMILY MEDICINE

## 2021-02-04 PROCEDURE — 73562 X-RAY EXAM OF KNEE 3: CPT | Mod: 50

## 2021-02-04 ASSESSMENT — PAIN SCALES - GENERAL: PAINLEVEL: WORST PAIN (10)

## 2021-02-04 ASSESSMENT — MIFFLIN-ST. JEOR: SCORE: 1234.06

## 2021-02-04 NOTE — PROGRESS NOTES
"Subjective     Knee Pain , fell     Sandra is a 80 year old female who comes to clinic   Now with R>L knee pain   xrays reviewed and no fracture noted    Review of Systems         Objective    /82   Pulse 89   Temp 96.4  F (35.8  C) (Temporal)   Resp 20   Ht 1.521 m (4' 11.9\")   Wt 83.9 kg (185 lb)   SpO2 96%   BMI 36.25 kg/m       Wt Readings from Last 2 Encounters:   02/04/21 83.9 kg (185 lb)   11/11/20 84.8 kg (187 lb)       Physical Exam   Normal knee exam except for tenderness about the R patella    Xray reviewed by me          Assessment & Plan       ICD-10-CM    1. Acute left ankle pain  M25.572 oxyCODONE (ROXICODONE) 5 MG tablet       Xray nl. Exam ok. Likely contusion injury, but if pain persists would CT knee for occult patellar fx     No follow-ups on file.    Ash Quiroz MD  [facility]    "

## 2021-02-04 NOTE — PROGRESS NOTES
Appropriate assistive devices provided during their visit. yes (Yes, No, N/A) cane (list device)    Exam table and/or cart  placed in the lowest position. yes (Yes, No, N/A)    Brakes on tables/carts/wheelchairs used at all times. yes (Yes, No, N/A)    Non slip footwear applied. yes (Yes, No, NA)    Patient was accompanied by staff throughout visit. yes (Yes, No, N/A)    Equipment safety straps used. yes (Yes, No, N/A)    Assist with toileting. na (Yes, No, N/A)

## 2021-02-04 NOTE — TELEPHONE ENCOUNTER
Patient says she was told not to take OTC pain/fever reducers because she got her COVID-19 vaccination.  Patient says she fell on her knees and they are bothering so she wants to know if she can take any so can sleep tonight. Reviewed care advice with caller per RN triage protocol guideline.  FNA advised to use some tonight to relieve her pain and take sparingly.  Caller verbalized understanding and agrees with plan.        Reason for Disposition    COVID-19 vaccine, Frequently Asked Questions (FAQs)    COVID-19 vaccine, systemic reactions (e.g., fatigue, fever, muscle aches), questions about    Additional Information    Negative: [1] Difficulty breathing or swallowing AND [2] starts within 2 hours after injection    Negative: Sounds like a life-threatening emergency to the triager    Negative: Fever > 104 F (40 C)    Negative: Sounds like a severe, unusual reaction to the triager    Negative: [1] Redness or red streak around the injection site AND [2] started > 48 hours after getting vaccine AND [3] fever    Negative: [1] Fever > 101 F (38.3 C) AND [2] age > 60 AND [3] started > 48 hours after getting vaccine    Negative: [1] Fever > 100.0 F (37.8 C) AND [2] bedridden (e.g., nursing home patient, CVA, chronic illness, recovering from surgery) AND [3] started > 48 hours after getting vaccine    Negative: [1] Fever > 100.0 F (37.8 C) AND [2] diabetes mellitus or weak immune system (e.g., HIV positive, cancer chemo, splenectomy, organ transplant, chronic steroids) AND [3] started > 48 hours after getting vaccine    Negative: [1] Redness or red streak around the injection site AND [2] started > 48 hours after getting vaccine AND [3] no fever  (Exception: red area < 1 inch or 2.5 cm wide)    Negative: [1] Pain, tenderness, or swelling at the injection site AND [2] over 3 days (72 hours) since vaccine AND [3] getting worse    Negative: Fever > 100.0 F (37.8 C) present > 3 days (72 hours)    Negative: [1] Fever > 100.0 F  (37.8 C) AND [2] healthcare worker    Negative: [1] Pain, tenderness, or swelling at the injection site AND [2] lasts > 7 days    Negative: [1] Requesting COVID-19 vaccine AND [2] healthcare worker (e.g., EMS first responders, doctors, nurses)    Negative: [1] Requesting COVID-19 vaccine AND [2] resident of a long-term care facility (e.g., nursing home)    Negative: [1] Requesting COVID-19 vaccine AND [2] vaccine available in the community for this patient group    Negative: COVID-19 vaccine, injection site reaction (e.g., pain, redness, swelling), question about    Protocols used: CORONAVIRUS (COVID-19) VACCINE QUESTIONS AND [id number] 1.3

## 2021-02-08 RX ORDER — OXYCODONE HYDROCHLORIDE 5 MG/1
5 TABLET ORAL EVERY 6 HOURS PRN
Qty: 15 TABLET | Refills: 0 | Status: SHIPPED | OUTPATIENT
Start: 2021-02-08 | End: 2021-11-15

## 2021-02-24 ENCOUNTER — IMMUNIZATION (OUTPATIENT)
Dept: PEDIATRICS | Facility: CLINIC | Age: 81
End: 2021-02-24
Attending: INTERNAL MEDICINE
Payer: MEDICARE

## 2021-02-24 PROCEDURE — 91300 PR COVID VAC PFIZER DIL RECON 30 MCG/0.3 ML IM: CPT

## 2021-02-24 PROCEDURE — 0002A PR COVID VAC PFIZER DIL RECON 30 MCG/0.3 ML IM: CPT

## 2021-02-26 ENCOUNTER — OFFICE VISIT (OUTPATIENT)
Dept: FAMILY MEDICINE | Facility: CLINIC | Age: 81
End: 2021-02-26
Payer: MEDICARE

## 2021-02-26 VITALS
DIASTOLIC BLOOD PRESSURE: 74 MMHG | TEMPERATURE: 96.9 F | RESPIRATION RATE: 14 BRPM | SYSTOLIC BLOOD PRESSURE: 132 MMHG | WEIGHT: 184.2 LBS | BODY MASS INDEX: 36.09 KG/M2 | OXYGEN SATURATION: 97 % | HEART RATE: 64 BPM

## 2021-02-26 DIAGNOSIS — R23.8 PAPULE: Primary | ICD-10-CM

## 2021-02-26 PROCEDURE — 99213 OFFICE O/P EST LOW 20 MIN: CPT | Performed by: PHYSICIAN ASSISTANT

## 2021-02-26 NOTE — PROGRESS NOTES
Assessment & Plan     Papule  On dorsum of second toe of left foot.  Hydrocortisone cream twice daily to reduce inflammation.    20 minutes spent on the date of the encounter doing chart review, patient visit and documentation       Return in about 1 week (around 3/5/2021) for Recheck with primary care provider if not improved.    [facility] is a 80 year old who presents for the following health issues     HPI       Hard spot on left top of toe  Onset/Duration: since yesterday afternoon  Description  Location: left 2nd toe  Character: round, raised, painful, red  Itching: no  Intensity:  mild  Progression of Symptoms: same but the pain is intermittent  Accompanying signs and symptoms:   Fever: no  Body aches or joint pain: no  Sore throat symptoms: no  Recent cold symptoms: no  History:           Previous episodes of similar rash: None  New exposures:  None  Recent travel: no  Exposure to similar rash: no  Precipitating or alleviating factors: none  Therapies tried and outcome: ice, heat without help    Giovanni presents to the clinic today for evaluation of a painful bump on the top of her left second toe.  She noticed the pain yesterday afternoon.  She describes it as a burning throbbing sensation.  It seems to rub on her shoes and slippers when she wears them.  She tried putting ice and heat on this area and noted that neither of these were helpful.  Today the symptoms seem to be stable.  She has not gotten any new shoes recently and cannot think of anything that has been rubbing on her toe.  She notes her feet generally feel warm.  She does wear slippers because her apartment floor is cool as she lives on the lowest level.    Review of Systems   ROS negative except as stated above.        Objective    /74 (BP Location: Right arm)   Pulse 64   Temp 96.9  F (36.1  C) (Temporal)   Resp 14   Wt 83.6 kg (184 lb 3.2 oz)   SpO2 97%   BMI  36.09 kg/m    Body mass index is 36.09 kg/m .  Physical Exam   GENERAL: healthy, alert and no distress  MS: no gross musculoskeletal defects noted, no edema  SKIN: Small erythematous papule noted on the dorsal second toe on the medial aspect over the DIP joint.  Blanches with pressure, tender to palpation.            No results found for any visits on 02/26/21.    I spent a total of 15 minutes face-to-face with Sandra Petit during today's office visit.  Over 50% of this time was spent counseling the patient and/or coordinating care regarding papule on toe.  See note for details.

## 2021-03-01 ENCOUNTER — HOSPITAL ENCOUNTER (EMERGENCY)
Facility: CLINIC | Age: 81
Discharge: HOME | End: 2021-03-01
Attending: NURSE PRACTITIONER | Admitting: NURSE PRACTITIONER
Payer: MEDICARE

## 2021-03-01 VITALS
WEIGHT: 183.7 LBS | BODY MASS INDEX: 36 KG/M2 | TEMPERATURE: 98.7 F | DIASTOLIC BLOOD PRESSURE: 90 MMHG | SYSTOLIC BLOOD PRESSURE: 175 MMHG | OXYGEN SATURATION: 98 % | HEART RATE: 63 BPM | RESPIRATION RATE: 16 BRPM

## 2021-03-01 DIAGNOSIS — Z87.39 HX OF GOUT: ICD-10-CM

## 2021-03-01 DIAGNOSIS — M79.674 PAIN OF TOE OF RIGHT FOOT: ICD-10-CM

## 2021-03-01 PROCEDURE — 99282 EMERGENCY DEPT VISIT SF MDM: CPT | Performed by: NURSE PRACTITIONER

## 2021-03-01 RX ORDER — PREDNISONE 20 MG/1
TABLET ORAL
Qty: 5 TABLET | Refills: 0 | Status: SHIPPED | OUTPATIENT
Start: 2021-03-01 | End: 2021-03-11

## 2021-03-01 ASSESSMENT — ENCOUNTER SYMPTOMS
ARTHRALGIAS: 1
FEVER: 0
COUGH: 0
NAUSEA: 0
VOMITING: 0
ADENOPATHY: 0

## 2021-03-01 NOTE — ED PROVIDER NOTES
"  History     Chief Complaint   Patient presents with     Toe Pain     HPI  Sandra Petit is a 80 year old female who presents with pain and erythema to PIPJ of second left toe for the past 4 days.  She reports it has become more painful particularly with ambulation.  She denies any fever, chills, toe injury.  She reports she does have a history of gouty arthritis which was in her opposite foot at one time.  She reports she is diabetic but not on insulin.  She reports she took a pain pill last night that she had leftover from a fall a month ago of which she injured her right knee.  She reports she did get relief and was able to sleep throughout the night.    PCP: Ash Quiroz   Allergies:  Allergies   Allergen Reactions     Penicillins Swelling     \"throat started swelling\"     Vitamin B Complex Hives     Vitamin B12 Hives     all vitamin B's     Clindamycin Hcl Rash       Problem List:    Patient Active Problem List    Diagnosis Date Noted     Morbid obesity (H) 11/11/2020     Priority: Medium     Atrial fibrillation (H)- only after bypass 04/10/2019     Priority: Medium     Osteoarthritis of left thumb 07/07/2017     Priority: Medium     Type 2 diabetes mellitus with other circulatory complications (H) 04/04/2016     Priority: Medium     Other specified hypothyroidism 10/02/2015     Priority: Medium     Coronary artery disease involving native coronary artery without angina pectoris 10/02/2015     Priority: Medium     Type 2 diabetes mellitus with diabetic nephropathy (H) 10/02/2015     Priority: Medium     History of total left knee replacement 04/29/2013     Priority: Medium     Hypertension goal BP (blood pressure) < 140/90 09/26/2011     Priority: Medium     Hyperlipidemia LDL goal <100 10/31/2010     Priority: Medium     CKD (chronic kidney disease) stage 3, GFR 30-59 ml/min 03/17/2009     Priority: Medium        Past Medical History:    Past Medical History:   Diagnosis Date     Diabetic eye exam (H) " 08/05/13     Endometriosis, site unspecified      Fever and other physiologic disturbances of temperature regulation 07/07/2005     Heart disease      Myalgia and myositis, unspecified      Pure hypercholesterolemia      Unspecified essential hypertension      Unspecified hypothyroidism        Past Surgical History:    Past Surgical History:   Procedure Laterality Date     ARTHROPLASTY KNEE  4/29/2013    Procedure: ARTHROPLASTY KNEE;  left total knee arthroplasty;  Surgeon: Teddy Grimes MD;  [facility]     ARTHROPLASTY KNEE Right 8/27/2018    Procedure: ARTHROPLASTY KNEE;  right total knee arthroplasty;  Surgeon: Johnny Anaya MD;  [facility]     C APPENDECTOMY       C EXPLOR HEART SURG WND W CP BYPASS Bilateral 03/20/2019     C OPEN CORONARY ENDARTERECTOMY  june 2005    Angioplasty w stenting     C TOTAL KNEE ARTHROPLASTY  4/29/13    Left     COMBINED CYSTOSCOPY, INSERT CATHETER URETER Bilateral 8/10/2015    Procedure: COMBINED CYSTOSCOPY, INSERT CATHETER URETER;  Surgeon: Johnnie Madera MD;  [facility]     DILATION AND CURETTAGE, HYSTEROSCOPY DIAGNOSTIC, COMBINED N/A 11/6/2014    Procedure: COMBINED DILATION AND CURETTAGE, HYSTEROSCOPY DIAGNOSTIC;  Surgeon: Edward Pardo MD;  [facility]     ESOPHAGOSCOPY, GASTROSCOPY, DUODENOSCOPY (EGD), COMBINED N/A 1/27/2015    Procedure: COMBINED ESOPHAGOSCOPY, GASTROSCOPY, DUODENOSCOPY (EGD), BIOPSY SINGLE OR MULTIPLE;  Surgeon: Carmelo Rosario MD;  [facility]     HC REMOVAL GALLBLADDER      Cholecystectomy     HC REMOVE TONSILS/ADENOIDS,<13 Y/O      unsure of age     LAPAROSCOPIC HYSTERECTOMY TOTAL N/A 8/10/2015    Procedure: LAPAROSCOPIC HYSTERECTOMY TOTAL;  Surgeon: Edward Pardo MD;  [facility]     LAPAROSCOPIC LYSIS ADHESIONS N/A 8/10/2015    Procedure: LAPAROSCOPIC LYSIS ADHESIONS;  Surgeon: Edward Pardo MD;  [facility]     ZZC NONSPECIFIC PROCEDURE      Knee ligament  surgery     ZZC NONSPECIFIC PROCEDURE      Kidney surgery for mechanical obstruction       Family History:    Family History   Problem Relation Age of Onset     Heart Disease Mother          coronary     Heart Disease Father          coronary     Allergies Sister         unknown     Allergies Brother         unknown     Allergies Daughter         unknown     Allergies Son         unknown     Heart Disease Sister         by pass surg     Heart Disease Brother         on medication     Hypertension Sister      Hypertension Brother      Lipids Sister      Lipids Brother      Cerebrovascular Disease Brother      Obesity Sister      Diabetes Sister      Diabetes Sister      C.A.D. Brother         quad by pass     Neurologic Disorder Sister         parkinsons     Cancer Sister         skin cancer     Alcohol/Drug No family hx of      Alzheimer Disease No family hx of        Social History:  Marital Status:   [5]  Social History     Tobacco Use     Smoking status: Former Smoker     Smokeless tobacco: Never Used     Tobacco comment: quit     Substance Use Topics     Alcohol use: No     Alcohol/week: 0.0 standard drinks     Drug use: No        Medications:    predniSONE (DELTASONE) 20 MG tablet  amLODIPine (NORVASC) 10 MG tablet  aspirin 81 MG EC tablet  blood glucose (ONETOUCH VERIO IQ) test strip  cloNIDine (CATAPRES) 0.2 MG tablet  Lancets (ONETOUCH DELICA PLUS [id number]) MISC  levothyroxine (SYNTHROID/LEVOTHROID) 50 MCG tablet  losartan (COZAAR) 50 MG tablet  metFORMIN (GLUCOPHAGE-XR) 500 MG 24 hr tablet  metoprolol succinate ER (TOPROL-XL) 25 MG 24 hr tablet  nitroGLYcerin (NITROSTAT) 0.4 MG sublingual tablet  oxyCODONE (ROXICODONE) 5 MG tablet  Probiotic Product (ACIDOPHILUS PROBIOTIC BLEND) CAPS  simvastatin (ZOCOR) 40 MG tablet  UNABLE TO FIND          Review of Systems   Constitutional: Negative for fever.   Respiratory: Negative for cough.    Gastrointestinal: Negative for nausea and vomiting.    Musculoskeletal: Positive for arthralgias and gait problem.        Ambulates with a cane   Hematological: Negative for adenopathy.       Physical Exam   BP: (!) 175/90  Pulse: 63  Temp: 98.7  F (37.1  C)  Resp: 16  Weight: 83.3 kg (183 lb 11.2 oz)  SpO2: 98 %      Physical Exam  Vitals signs and nursing note reviewed.   Constitutional:       Appearance: Normal appearance. She is obese.   HENT:      Head: Normocephalic.   Musculoskeletal:      Right foot: Normal range of motion and normal capillary refill. Tenderness and swelling present. No crepitus or deformity.        Feet:       Comments: There is no felon or paronychia to the left second toe.  There is no tenderness to the base of the toe.  I do not appreciate any signs of injury.   Skin:     General: Skin is warm.      Findings: Erythema present.             Comments: There is mild erythema to the proximal left second toe and tenderness at the PIP joint   Neurological:      Mental Status: She is alert.   Psychiatric:         Mood and Affect: Mood normal.         ED Course  Reviewed with the patient based on her signs and symptoms and evaluation of her toe it appears she could have gouty arthritis versus pseudogout gout of her second toe left foot.  She does not have any erythematous streaking which after 4 days I would have expected if it was an infectious process.  Does not have any signs of injury.  She reports her last gout attack went away with prednisone.  She does have narcotic pain medication at home as well.  She reports she has stage III kidney disease and such we will only give her a short course of prednisone.  He is to follow-up diligently with her primary care provider for further evaluation and management.  I have expressed to the patient if she notes any signs of a felon, paronychia, or starts to have erythematous streaking to the foot then I would recommend antibiotic therapy as that would be more consistent with cellulitis.  Patient is  amenable to plan of care and outpatient treatment at this time.        Procedures               Critical Care time:  none               No results found. However, due to the size of the patient record, not all encounters were searched. Please check Results Review for a complete set of results.    Medications - No data to display    Assessments & Plan (with Medical Decision Making)     I have reviewed the nursing notes.    I have reviewed the findings, diagnosis, plan and need for follow up with the patient.       New Prescriptions    PREDNISONE (DELTASONE) 20 MG TABLET    One tablet daily for 5 days       Final diagnoses:   Pain of toe of right foot   Hx of gout       3/1/2021   [facility] EMERGENCY DEPT     Yani Garay, AMARJIT CNP  03/01/21 [id number]

## 2021-03-08 ENCOUNTER — OFFICE VISIT (OUTPATIENT)
Dept: PODIATRY | Facility: CLINIC | Age: 81
End: 2021-03-08
Payer: MEDICARE

## 2021-03-08 VITALS
SYSTOLIC BLOOD PRESSURE: 156 MMHG | TEMPERATURE: 96.5 F | DIASTOLIC BLOOD PRESSURE: 64 MMHG | HEIGHT: 59 IN | WEIGHT: 183.1 LBS | BODY MASS INDEX: 36.91 KG/M2

## 2021-03-08 DIAGNOSIS — E11.59 TYPE 2 DIABETES MELLITUS WITH OTHER CIRCULATORY COMPLICATIONS (H): ICD-10-CM

## 2021-03-08 DIAGNOSIS — M10.372 ACUTE GOUT DUE TO RENAL IMPAIRMENT INVOLVING TOE OF LEFT FOOT: Primary | ICD-10-CM

## 2021-03-08 DIAGNOSIS — E11.21 TYPE 2 DIABETES MELLITUS WITH DIABETIC NEPHROPATHY, WITHOUT LONG-TERM CURRENT USE OF INSULIN (H): ICD-10-CM

## 2021-03-08 DIAGNOSIS — N18.32 STAGE 3B CHRONIC KIDNEY DISEASE (H): ICD-10-CM

## 2021-03-08 PROCEDURE — 99203 OFFICE O/P NEW LOW 30 MIN: CPT | Performed by: PODIATRIST

## 2021-03-08 ASSESSMENT — PAIN SCALES - GENERAL: PAINLEVEL: SEVERE PAIN (7)

## 2021-03-08 ASSESSMENT — MIFFLIN-ST. JEOR: SCORE: 1211.17

## 2021-03-08 NOTE — LETTER
3/8/2021         RE: Sandra Petit  [address]  [facility] [address]        Dear Colleague,    Thank you for referring your patient, Sandra Petit, to the [facility]. Please see a copy of my visit note below.    HPI:  Sandra Petit is a 80 year old female who is seen in consultation at the request of Fadumo Navarro.    Pt presents for eval of: Pt states she has had a red lesion on 2nd toe on Left foot since 2/26/21. Pt. States that it is a 7 out of 10.     DM Type 2     Pt is retired.    Weight management plan: Patient was referred to their PCP to discuss a diet and exercise plan.   Giovanni to follow up with Primary Care provider regarding elevated blood pressure.      Review of Systems:  Patient denies fever, chills, rash, wound, stiffness, limping, numbness, weakness, heart burn, blood in stool, chest pain with activity, calf pain when walking, shortness of breath with activity, chronic cough, easy bleeding/bruising, swelling of ankles, excessive thirst, fatigue, depression, anxiety.  Patient admits only to symptoms noted in history.     Patient Active Problem List   Diagnosis     CKD (chronic kidney disease) stage 3, GFR 30-59 ml/min     Hyperlipidemia LDL goal <100     Hypertension goal BP (blood pressure) < 140/90     History of total left knee replacement     Other specified hypothyroidism     Coronary artery disease involving native coronary artery without angina pectoris     Type 2 diabetes mellitus with diabetic nephropathy (H)     Type 2 diabetes mellitus with other circulatory complications (H)     Osteoarthritis of left thumb     Atrial fibrillation (H)- only after bypass     Morbid obesity (H)     PAST MEDICAL HISTORY:   Past Medical History:   Diagnosis Date     Diabetic eye exam (H) 08/05/13     Endometriosis, site unspecified      Fever and other physiologic disturbances of temperature regulation 07/07/2005    Admit.  Discharged 07/13/2005.  Cause undetermined.      Heart disease      Myalgia and myositis, unspecified     fibromyalgia     Pure hypercholesterolemia      Unspecified essential hypertension      Unspecified hypothyroidism      PAST SURGICAL HISTORY:   Past Surgical History:   Procedure Laterality Date     ARTHROPLASTY KNEE  4/29/2013    Procedure: ARTHROPLASTY KNEE;  left total knee arthroplasty;  Surgeon: Teddy Grimes MD;  [facility]     ARTHROPLASTY KNEE Right 8/27/2018    Procedure: ARTHROPLASTY KNEE;  right total knee arthroplasty;  Surgeon: Johnny Anaya MD;  [facility]     C APPENDECTOMY       C EXPLOR HEART SURG WND W CP BYPASS Bilateral 03/20/2019     C OPEN CORONARY ENDARTERECTOMY  june 2005    Angioplasty w stenting     C TOTAL KNEE ARTHROPLASTY  4/29/13    Left     COMBINED CYSTOSCOPY, INSERT CATHETER URETER Bilateral 8/10/2015    Procedure: COMBINED CYSTOSCOPY, INSERT CATHETER URETER;  Surgeon: Johnnie Madera MD;  [facility]     DILATION AND CURETTAGE, HYSTEROSCOPY DIAGNOSTIC, COMBINED N/A 11/6/2014    Procedure: COMBINED DILATION AND CURETTAGE, HYSTEROSCOPY DIAGNOSTIC;  Surgeon: Edward Pardo MD;  [facility]     ESOPHAGOSCOPY, GASTROSCOPY, DUODENOSCOPY (EGD), COMBINED N/A 1/27/2015    Procedure: COMBINED ESOPHAGOSCOPY, GASTROSCOPY, DUODENOSCOPY (EGD), BIOPSY SINGLE OR MULTIPLE;  Surgeon: Carmelo Rosario MD;  [facility]     HC REMOVAL GALLBLADDER      Cholecystectomy     HC REMOVE TONSILS/ADENOIDS,<11 Y/O      unsure of age     LAPAROSCOPIC HYSTERECTOMY TOTAL N/A 8/10/2015    Procedure: LAPAROSCOPIC HYSTERECTOMY TOTAL;  Surgeon: Edward Pardo MD;  [facility]     LAPAROSCOPIC LYSIS ADHESIONS N/A 8/10/2015    Procedure: LAPAROSCOPIC LYSIS ADHESIONS;  Surgeon: Edward Pardo MD;  [facility]     ZZC NONSPECIFIC PROCEDURE      Knee ligament surgery     ZZC NONSPECIFIC PROCEDURE      Kidney surgery for mechanical obstruction     MEDICATIONS:   Current  Outpatient Medications:      amLODIPine (NORVASC) 10 MG tablet, Take 1 tablet (10 mg) by mouth daily, Disp: 90 tablet, Rfl: 3     aspirin 81 MG EC tablet, Take 81 mg by mouth daily, Disp: , Rfl:      blood glucose (ONETOUCH VERIO IQ) test strip, USE 1 STRIP TO CHECK GLUCOSE ONCE DAILY, Disp: 100 strip, Rfl: 3     cloNIDine (CATAPRES) 0.2 MG tablet, Take 1 tablet (0.2 mg) by mouth 2 times daily (Patient taking differently: Take 0.1 mg by mouth 2 times daily Cardiologist recommended this new dosage), Disp: 180 tablet, Rfl: 1     Lancets (ONETOUCH DELICA PLUS [id number]) MISC, 1 lancet daily Use 1 to check glucose once daily, Disp: 100 each, Rfl: 5     levothyroxine (SYNTHROID/LEVOTHROID) 50 MCG tablet, Take 1 tablet (50 mcg) by mouth daily, Disp: 90 tablet, Rfl: 3     losartan (COZAAR) 50 MG tablet, Take 1 tablet (50 mg) by mouth 2 times daily, Disp: 90 tablet, Rfl: 3     metFORMIN (GLUCOPHAGE-XR) 500 MG 24 hr tablet, Take 1 tablet (500 mg) by mouth daily (with dinner), Disp: 90 tablet, Rfl: 1     metoprolol succinate ER (TOPROL-XL) 25 MG 24 hr tablet, Take half  a tab twice day, Disp: 90 tablet, Rfl: 3     predniSONE (DELTASONE) 20 MG tablet, One tablet daily for 5 days, Disp: 5 tablet, Rfl: 0     Probiotic Product (ACIDOPHILUS PROBIOTIC BLEND) CAPS, Take 1 capsule by mouth 2 times daily, Disp: , Rfl:      simvastatin (ZOCOR) 40 MG tablet, Take 1 tablet (40 mg) by mouth At Bedtime, Disp: 90 tablet, Rfl: 3     UNABLE TO FIND, MEDICATION NAME: cindy, Disp: , Rfl:      nitroGLYcerin (NITROSTAT) 0.4 MG sublingual tablet, Place 1 tablet (0.4 mg) under the tongue every 5 minutes as needed for chest pain (Patient not taking: Reported on 2/26/2021), Disp: 25 tablet, Rfl: 1     oxyCODONE (ROXICODONE) 5 MG tablet, Take 1 tablet (5 mg) by mouth every 6 hours as needed for severe pain (Patient not taking: Reported on 2/26/2021), Disp: 15 tablet, Rfl: 0  ALLERGIES:    Allergies   Allergen Reactions     Penicillins Swelling      "\"throat started swelling\"     Vitamin B Complex Hives     Vitamin B12 Hives     all vitamin B's     Clindamycin Hcl Rash     SOCIAL HISTORY:   Social History     Socioeconomic History     Marital status:      Spouse name: Not on file     Number of children: Not on file     Years of education: Not on file     Highest education level: Not on file   Occupational History     Not on file   Social Needs     Financial resource strain: Not on file     Food insecurity     Worry: Not on file     Inability: Not on file     Transportation needs     Medical: Not on file     Non-medical: Not on file   Tobacco Use     Smoking status: Former Smoker     Smokeless tobacco: Never Used     Tobacco comment: quit  1987   Substance and Sexual Activity     Alcohol use: No     Alcohol/week: 0.0 standard drinks     Drug use: No     Sexual activity: Not Currently   Lifestyle     Physical activity     Days per week: Not on file     Minutes per session: Not on file     Stress: Not on file   Relationships     Social connections     Talks on phone: Not on file     Gets together: Not on file     Attends [employer] service: Not on file     Active member of club or organization: Not on file     Attends meetings of clubs or organizations: Not on file     Relationship status: Not on file     Intimate partner violence     Fear of current or ex partner: Not on file     Emotionally abused: Not on file     Physically abused: Not on file     Forced sexual activity: Not on file   Other Topics Concern      Service No     Blood Transfusions No     Caffeine Concern No     Occupational Exposure No     Hobby Hazards No     Sleep Concern Yes     Stress Concern Yes     Weight Concern Yes     Comment: over weight and unable to lose weight     Special Diet Yes     Comment: low fat, low sugar     Back Care No     Exercise No     Bike Helmet No     Comment: does not ride bike     Seat Belt Yes     Self-Exams Yes     Parent/sibling w/ CABG, MI or " "angioplasty before 65F 55M? Yes     Comment: sister 55yrs CABG & heart atttack   Social History Narrative     Not on file     FAMILY HISTORY:   Family History   Problem Relation Age of Onset     Heart Disease Mother          coronary     Heart Disease Father          coronary     Allergies Sister         unknown     Allergies Brother         unknown     Allergies Daughter         unknown     Allergies Son         unknown     Heart Disease Sister         by pass surg     Heart Disease Brother         on medication     Hypertension Sister      Hypertension Brother      Lipids Sister      Lipids Brother      Cerebrovascular Disease Brother      Obesity Sister      Diabetes Sister      Diabetes Sister      C.A.D. Brother         quad by pass     Neurologic Disorder Sister         parkinsons     Cancer Sister         skin cancer     Alcohol/Drug No family hx of      Alzheimer Disease No family hx of      EXAM:Vitals: BP (!) 156/64 (BP Location: Left arm, Patient Position: Sitting, Cuff Size: Adult Large)   Temp 96.5  F (35.8  C) (Temporal)   Ht 1.499 m (4' 11\")   Wt 83.1 kg (183 lb 1.6 oz)   BMI 36.98 kg/m    BMI= Body mass index is 36.98 kg/m .    General appearance: Patient is alert and fully cooperative with history & exam.  No sign of distress is noted during the visit.     Psychiatric: Affect is pleasant & appropriate.  Patient appears motivated to improve health.     Respiratory: Breathing is regular & unlabored while sitting.     HEENT: Hearing is intact to spoken word.  Speech is clear.  No gross evidence of visual impairment that would impact ambulation.     Vascular: DP 1/4 & PT 1/4 left & right.  CFT delayed with dependent rubor noted about the digits.  Diminished hair growth distal to mid tibia and no hair about the foot and toes.  Temperature changes noted, warm to cool proximal to distal.  Hemosiderin pigmentation noted with multiple varicosities legs and feet bilateral. Generalized edema " bilateral legs and feet.  Pt denies claudication history.     Neurologic: Normal plantar response bilateral.  Intact  protective threshold plus 10/10 applications of a 5.07 monofilament.  Pt admits no burning and paraesthesias about the feet and toes with palpation.     Dermatologic: Mildly diminished texture turgor tone about the integument.  Distal aspect of the left second toe DIPJ demonstrates erythema edema and exquisite discomfort even with light touch.  This is consistent with gout.     Musculoskeletal: Patient is ambulatory without assistive device or brace.  There is semi reducible contracture of the lesser digits.    Hemoglobin A1C (%)   Date Value   11/11/2020 7.0 (H)   11/06/2019 7.0 (H)   02/27/2019 7.3 (H)   08/15/2018 6.8 (H)   04/13/2018 6.6 (H)   10/09/2017 6.5 (H)   02/20/2017 6.8 (H)   10/03/2016 7.3 (H)     Creatinine (mg/dL)   Date Value   12/02/2020 1.32 (H)   11/11/2020 1.42 (H)   07/09/2020 1.39 (H)   05/20/2020 1.32 (H)   11/06/2019 1.33 (H)   05/03/2019 1.58 (H)     Labs:       ASSESSMENT:       ICD-10-CM    1. Acute gout due to renal impairment involving toe of left foot  M10.372 NUTRITION REFERRAL   2. Type 2 diabetes mellitus with other circulatory complications (H)  E11.59    3. Type 2 diabetes mellitus with diabetic nephropathy, without long-term current use of insulin (H)  E11.21    4. Stage 3b chronic kidney disease  N18.32         PLAN:    3/8/2021  Has gout and chronic elevated uric acid and stage 3 renal disesase,   Nutrition consult for gout.     We discussed risk factors and preventive measures.    We discussed appropriate hygiene, shoe gear, daily foot exam, and reinforced management of weight, diet, activity goals and HA1C goal for diabetic patients.    Dispensed written foot care instructions.    All questions were answered to their satisfaction.    RTC as needed with questions or concerns.    Discussed alternative treatment options regarding gout however at this time she  does not want an injection and she does not want further medication as the steroid did not provide much help for her and she is concerned about her renal dysfunction.  She understands these medications are available if necessary.  She would like to attempt dietary changes first.  All questions answered.  Follow-up as needed.      Tyler Bennett DPM              Again, thank you for allowing me to participate in the care of your patient.        Sincerely,        Tyler Bennett DPM

## 2021-03-08 NOTE — PROGRESS NOTES
HPI:  Sandra Pteit is a 80 year old female who is seen in consultation at the request of Fadumo Navarro.    Pt presents for eval of: Pt states she has had a red lesion on 2nd toe on Left foot since 2/26/21. Pt. States that it is a 7 out of 10.     DM Type 2     Pt is retired.    Weight management plan: Patient was referred to their PCP to discuss a diet and exercise plan.   Giovanni to follow up with Primary Care provider regarding elevated blood pressure.      Review of Systems:  Patient denies fever, chills, rash, wound, stiffness, limping, numbness, weakness, heart burn, blood in stool, chest pain with activity, calf pain when walking, shortness of breath with activity, chronic cough, easy bleeding/bruising, swelling of ankles, excessive thirst, fatigue, depression, anxiety.  Patient admits only to symptoms noted in history.     Patient Active Problem List   Diagnosis     CKD (chronic kidney disease) stage 3, GFR 30-59 ml/min     Hyperlipidemia LDL goal <100     Hypertension goal BP (blood pressure) < 140/90     History of total left knee replacement     Other specified hypothyroidism     Coronary artery disease involving native coronary artery without angina pectoris     Type 2 diabetes mellitus with diabetic nephropathy (H)     Type 2 diabetes mellitus with other circulatory complications (H)     Osteoarthritis of left thumb     Atrial fibrillation (H)- only after bypass     Morbid obesity (H)     PAST MEDICAL HISTORY:   Past Medical History:   Diagnosis Date     Diabetic eye exam (H) 08/05/13     Endometriosis, site unspecified      Fever and other physiologic disturbances of temperature regulation 07/07/2005    Admit.  Discharged 07/13/2005.  Cause undetermined.     Heart disease      Myalgia and myositis, unspecified     fibromyalgia     Pure hypercholesterolemia      Unspecified essential hypertension      Unspecified hypothyroidism      PAST SURGICAL HISTORY:   Past Surgical History:   Procedure Laterality  Date     ARTHROPLASTY KNEE  4/29/2013    Procedure: ARTHROPLASTY KNEE;  left total knee arthroplasty;  Surgeon: Teddy Grimes MD;  Location: [facility]     ARTHROPLASTY KNEE Right 8/27/2018    Procedure: ARTHROPLASTY KNEE;  right total knee arthroplasty;  Surgeon: Johnny Anaya MD;  Location: [facility]     C APPENDECTOMY       C EXPLOR HEART SURG WND W CP BYPASS Bilateral 03/20/2019     C OPEN CORONARY ENDARTERECTOMY  june 2005    Angioplasty w stenting     C TOTAL KNEE ARTHROPLASTY  4/29/13    Left     COMBINED CYSTOSCOPY, INSERT CATHETER URETER Bilateral 8/10/2015    Procedure: COMBINED CYSTOSCOPY, INSERT CATHETER URETER;  Surgeon: Johnnie Madera MD;  Location: [facility]     DILATION AND CURETTAGE, HYSTEROSCOPY DIAGNOSTIC, COMBINED N/A 11/6/2014    Procedure: COMBINED DILATION AND CURETTAGE, HYSTEROSCOPY DIAGNOSTIC;  Surgeon: Edward Pardo MD;  Location: [facility]     ESOPHAGOSCOPY, GASTROSCOPY, DUODENOSCOPY (EGD), COMBINED N/A 1/27/2015    Procedure: COMBINED ESOPHAGOSCOPY, GASTROSCOPY, DUODENOSCOPY (EGD), BIOPSY SINGLE OR MULTIPLE;  Surgeon: Carmelo Rosario MD;  Location: [facility]     HC REMOVAL GALLBLADDER      Cholecystectomy     HC REMOVE TONSILS/ADENOIDS,<13 Y/O      unsure of age     LAPAROSCOPIC HYSTERECTOMY TOTAL N/A 8/10/2015    Procedure: LAPAROSCOPIC HYSTERECTOMY TOTAL;  Surgeon: Edward Pardo MD;  Location: [facility]     LAPAROSCOPIC LYSIS ADHESIONS N/A 8/10/2015    Procedure: LAPAROSCOPIC LYSIS ADHESIONS;  Surgeon: Edward Pardo MD;  Location: [facility]     ZZC NONSPECIFIC PROCEDURE      Knee ligament surgery     ZZC NONSPECIFIC PROCEDURE      Kidney surgery for mechanical obstruction     MEDICATIONS:   Current Outpatient Medications:      amLODIPine (NORVASC) 10 MG tablet, Take 1 tablet (10 mg) by mouth daily, Disp: 90 tablet, Rfl: 3     aspirin 81 MG EC tablet, Take 81 mg by mouth daily, Disp: , Rfl:      blood glucose (ONETOUCH VERIO IQ) test strip, USE 1  "STRIP TO CHECK GLUCOSE ONCE DAILY, Disp: 100 strip, Rfl: 3     cloNIDine (CATAPRES) 0.2 MG tablet, Take 1 tablet (0.2 mg) by mouth 2 times daily (Patient taking differently: Take 0.1 mg by mouth 2 times daily Cardiologist recommended this new dosage), Disp: 180 tablet, Rfl: 1     Lancets (ONETOUCH DELICA PLUS KLORCQ50M) MISC, 1 lancet daily Use 1 to check glucose once daily, Disp: 100 each, Rfl: 5     levothyroxine (SYNTHROID/LEVOTHROID) 50 MCG tablet, Take 1 tablet (50 mcg) by mouth daily, Disp: 90 tablet, Rfl: 3     losartan (COZAAR) 50 MG tablet, Take 1 tablet (50 mg) by mouth 2 times daily, Disp: 90 tablet, Rfl: 3     metFORMIN (GLUCOPHAGE-XR) 500 MG 24 hr tablet, Take 1 tablet (500 mg) by mouth daily (with dinner), Disp: 90 tablet, Rfl: 1     metoprolol succinate ER (TOPROL-XL) 25 MG 24 hr tablet, Take half  a tab twice day, Disp: 90 tablet, Rfl: 3     predniSONE (DELTASONE) 20 MG tablet, One tablet daily for 5 days, Disp: 5 tablet, Rfl: 0     Probiotic Product (ACIDOPHILUS PROBIOTIC BLEND) CAPS, Take 1 capsule by mouth 2 times daily, Disp: , Rfl:      simvastatin (ZOCOR) 40 MG tablet, Take 1 tablet (40 mg) by mouth At Bedtime, Disp: 90 tablet, Rfl: 3     UNABLE TO FIND, MEDICATION NAME: cindy, Disp: , Rfl:      nitroGLYcerin (NITROSTAT) 0.4 MG sublingual tablet, Place 1 tablet (0.4 mg) under the tongue every 5 minutes as needed for chest pain (Patient not taking: Reported on 2/26/2021), Disp: 25 tablet, Rfl: 1     oxyCODONE (ROXICODONE) 5 MG tablet, Take 1 tablet (5 mg) by mouth every 6 hours as needed for severe pain (Patient not taking: Reported on 2/26/2021), Disp: 15 tablet, Rfl: 0  ALLERGIES:    Allergies   Allergen Reactions     Penicillins Swelling     \"throat started swelling\"     Vitamin B Complex Hives     Vitamin B12 Hives     all vitamin B's     Clindamycin Hcl Rash     SOCIAL HISTORY:   Social History     Socioeconomic History     Marital status:      Spouse name: Not on file     Number " of children: Not on file     Years of education: Not on file     Highest education level: Not on file   Occupational History     Not on file   Social Needs     Financial resource strain: Not on file     Food insecurity     Worry: Not on file     Inability: Not on file     Transportation needs     Medical: Not on file     Non-medical: Not on file   Tobacco Use     Smoking status: Former Smoker     Smokeless tobacco: Never Used     Tobacco comment: quit     Substance and Sexual Activity     Alcohol use: No     Alcohol/week: 0.0 standard drinks     Drug use: No     Sexual activity: Not Currently   Lifestyle     Physical activity     Days per week: Not on file     Minutes per session: Not on file     Stress: Not on file   Relationships     Social connections     Talks on phone: Not on file     Gets together: Not on file     Attends Denominational service: Not on file     Active member of club or organization: Not on file     Attends meetings of clubs or organizations: Not on file     Relationship status: Not on file     Intimate partner violence     Fear of current or ex partner: Not on file     Emotionally abused: Not on file     Physically abused: Not on file     Forced sexual activity: Not on file   Other Topics Concern      Service No     Blood Transfusions No     Caffeine Concern No     Occupational Exposure No     Hobby Hazards No     Sleep Concern Yes     Stress Concern Yes     Weight Concern Yes     Comment: over weight and unable to lose weight     Special Diet Yes     Comment: low fat, low sugar     Back Care No     Exercise No     Bike Helmet No     Comment: does not ride bike     Seat Belt Yes     Self-Exams Yes     Parent/sibling w/ CABG, MI or angioplasty before 65F 55M? Yes     Comment: sister 55yrs CABG & heart atttack   Social History Narrative     Not on file     FAMILY HISTORY:   Family History   Problem Relation Age of Onset     Heart Disease Mother          coronary     Heart Disease  "Father          coronary     Allergies Sister         unknown     Allergies Brother         unknown     Allergies Daughter         unknown     Allergies Son         unknown     Heart Disease Sister         by pass surg     Heart Disease Brother         on medication     Hypertension Sister      Hypertension Brother      Lipids Sister      Lipids Brother      Cerebrovascular Disease Brother      Obesity Sister      Diabetes Sister      Diabetes Sister      C.A.D. Brother         quad by pass     Neurologic Disorder Sister         parkinsons     Cancer Sister         skin cancer     Alcohol/Drug No family hx of      Alzheimer Disease No family hx of      EXAM:Vitals: BP (!) 156/64 (BP Location: Left arm, Patient Position: Sitting, Cuff Size: Adult Large)   Temp 96.5  F (35.8  C) (Temporal)   Ht 1.499 m (4' 11\")   Wt 83.1 kg (183 lb 1.6 oz)   BMI 36.98 kg/m    BMI= Body mass index is 36.98 kg/m .    General appearance: Patient is alert and fully cooperative with history & exam.  No sign of distress is noted during the visit.     Psychiatric: Affect is pleasant & appropriate.  Patient appears motivated to improve health.     Respiratory: Breathing is regular & unlabored while sitting.     HEENT: Hearing is intact to spoken word.  Speech is clear.  No gross evidence of visual impairment that would impact ambulation.     Vascular: DP 1/4 & PT 1/4 left & right.  CFT delayed with dependent rubor noted about the digits.  Diminished hair growth distal to mid tibia and no hair about the foot and toes.  Temperature changes noted, warm to cool proximal to distal.  Hemosiderin pigmentation noted with multiple varicosities legs and feet bilateral. Generalized edema bilateral legs and feet.  Pt denies claudication history.     Neurologic: Normal plantar response bilateral.  Intact  protective threshold plus 10/10 applications of a 5.07 monofilament.  Pt admits no burning and paraesthesias about the feet and toes with " palpation.     Dermatologic: Mildly diminished texture turgor tone about the integument.  Distal aspect of the left second toe DIPJ demonstrates erythema edema and exquisite discomfort even with light touch.  This is consistent with gout.     Musculoskeletal: Patient is ambulatory without assistive device or brace.  There is semi reducible contracture of the lesser digits.    Hemoglobin A1C (%)   Date Value   11/11/2020 7.0 (H)   11/06/2019 7.0 (H)   02/27/2019 7.3 (H)   08/15/2018 6.8 (H)   04/13/2018 6.6 (H)   10/09/2017 6.5 (H)   02/20/2017 6.8 (H)   10/03/2016 7.3 (H)     Creatinine (mg/dL)   Date Value   12/02/2020 1.32 (H)   11/11/2020 1.42 (H)   07/09/2020 1.39 (H)   05/20/2020 1.32 (H)   11/06/2019 1.33 (H)   05/03/2019 1.58 (H)     Labs:       ASSESSMENT:       ICD-10-CM    1. Acute gout due to renal impairment involving toe of left foot  M10.372 NUTRITION REFERRAL   2. Type 2 diabetes mellitus with other circulatory complications (H)  E11.59    3. Type 2 diabetes mellitus with diabetic nephropathy, without long-term current use of insulin (H)  E11.21    4. Stage 3b chronic kidney disease  N18.32         PLAN:    3/8/2021  Has gout and chronic elevated uric acid and stage 3 renal disesase,   Nutrition consult for gout.     We discussed risk factors and preventive measures.    We discussed appropriate hygiene, shoe gear, daily foot exam, and reinforced management of weight, diet, activity goals and HA1C goal for diabetic patients.    Dispensed written foot care instructions.    All questions were answered to their satisfaction.    RTC as needed with questions or concerns.    Discussed alternative treatment options regarding gout however at this time she does not want an injection and she does not want further medication as the steroid did not provide much help for her and she is concerned about her renal dysfunction.  She understands these medications are available if necessary.  She would like to attempt  dietary changes first.  All questions answered.  Follow-up as needed.      Tyler Bennett DPM

## 2021-03-10 DIAGNOSIS — M10.9 GOUT: ICD-10-CM

## 2021-03-10 LAB — URATE SERPL-MCNC: 7.1 MG/DL (ref 2.6–6)

## 2021-03-10 PROCEDURE — 84550 ASSAY OF BLOOD/URIC ACID: CPT | Performed by: FAMILY MEDICINE

## 2021-03-10 PROCEDURE — 36415 COLL VENOUS BLD VENIPUNCTURE: CPT | Performed by: FAMILY MEDICINE

## 2021-03-11 ENCOUNTER — OFFICE VISIT (OUTPATIENT)
Dept: PODIATRY | Facility: CLINIC | Age: 81
End: 2021-03-11
Payer: MEDICARE

## 2021-03-11 VITALS
TEMPERATURE: 96.4 F | BODY MASS INDEX: 36.91 KG/M2 | HEIGHT: 59 IN | DIASTOLIC BLOOD PRESSURE: 56 MMHG | WEIGHT: 183.1 LBS | SYSTOLIC BLOOD PRESSURE: 150 MMHG

## 2021-03-11 DIAGNOSIS — M10.372 ACUTE GOUT DUE TO RENAL IMPAIRMENT INVOLVING TOE OF LEFT FOOT: Primary | ICD-10-CM

## 2021-03-11 PROCEDURE — 20600 DRAIN/INJ JOINT/BURSA W/O US: CPT | Mod: T1 | Performed by: PODIATRIST

## 2021-03-11 RX ORDER — LIDOCAINE HYDROCHLORIDE 10 MG/ML
1 INJECTION, SOLUTION INFILTRATION; PERINEURAL ONCE
Status: ACTIVE | OUTPATIENT
Start: 2021-03-11

## 2021-03-11 ASSESSMENT — PAIN SCALES - GENERAL: PAINLEVEL: SEVERE PAIN (7)

## 2021-03-11 ASSESSMENT — MIFFLIN-ST. JEOR: SCORE: 1211.17

## 2021-03-11 NOTE — PROGRESS NOTES
Chief Complaint   Patient presents with     RECHECK     gout Left 2nd toe, onset 2/26/2021; LOV 3/8/2021     Diabetes     type 2     Results     3/10/2021 labs uric acid       Weight management plan: Patient was referred to their PCP to discuss a diet and exercise plan.     Giovanni to follow up with Primary Care provider regarding elevated blood pressure.    HPI:  Sandra Petit is a 80 year old female who is seen in consultation at the request of Fadumo Navarro.    Pt presents for eval of: Pt states she has had a red lesion on 2nd toe on Left foot since 2/26/21. Pt. States that it is a 7 out of 10.  She notes from previous visit her symptoms have improved considerably but she is requesting the joint be aspirated.    DM Type 2     Pt is retired.    Review of Systems:  Patient denies fever, chills, rash, wound, stiffness, limping, numbness, weakness, heart burn, blood in stool, chest pain with activity, calf pain when walking, shortness of breath with activity, chronic cough, easy bleeding/bruising, swelling of ankles, excessive thirst, fatigue, depression, anxiety.  Patient admits only to symptoms noted in history.     Patient Active Problem List   Diagnosis     CKD (chronic kidney disease) stage 3, GFR 30-59 ml/min     Hyperlipidemia LDL goal <100     Hypertension goal BP (blood pressure) < 140/90     History of total left knee replacement     Other specified hypothyroidism     Coronary artery disease involving native coronary artery without angina pectoris     Type 2 diabetes mellitus with diabetic nephropathy (H)     Type 2 diabetes mellitus with other circulatory complications (H)     Osteoarthritis of left thumb     Atrial fibrillation (H)- only after bypass     Morbid obesity (H)     PAST MEDICAL HISTORY:   Past Medical History:   Diagnosis Date     Diabetic eye exam (H) 08/05/13     Endometriosis, site unspecified      Fever and other physiologic disturbances of temperature regulation 07/07/2005    Admit.   Discharged 07/13/2005.  Cause undetermined.     Heart disease      Myalgia and myositis, unspecified     fibromyalgia     Pure hypercholesterolemia      Unspecified essential hypertension      Unspecified hypothyroidism      PAST SURGICAL HISTORY:   Past Surgical History:   Procedure Laterality Date     ARTHROPLASTY KNEE  4/29/2013    Procedure: ARTHROPLASTY KNEE;  left total knee arthroplasty;  Surgeon: Teddy Grimes MD;  Location: [facility]     ARTHROPLASTY KNEE Right 8/27/2018    Procedure: ARTHROPLASTY KNEE;  right total knee arthroplasty;  Surgeon: Johnny Anaya MD;  Location: [facility]     C APPENDECTOMY       C EXPLOR HEART SURG WND W CP BYPASS Bilateral 03/20/2019     C OPEN CORONARY ENDARTERECTOMY  june 2005    Angioplasty w stenting     C TOTAL KNEE ARTHROPLASTY  4/29/13    Left     COMBINED CYSTOSCOPY, INSERT CATHETER URETER Bilateral 8/10/2015    Procedure: COMBINED CYSTOSCOPY, INSERT CATHETER URETER;  Surgeon: Johnnie Madera MD;  Location: [facility]     DILATION AND CURETTAGE, HYSTEROSCOPY DIAGNOSTIC, COMBINED N/A 11/6/2014    Procedure: COMBINED DILATION AND CURETTAGE, HYSTEROSCOPY DIAGNOSTIC;  Surgeon: Edward Pardo MD;  Location[facility]     ESOPHAGOSCOPY, GASTROSCOPY, DUODENOSCOPY (EGD), COMBINED N/A 1/27/2015    Procedure: COMBINED ESOPHAGOSCOPY, GASTROSCOPY, DUODENOSCOPY (EGD), BIOPSY SINGLE OR MULTIPLE;  Surgeon: Carmelo Rosario MD;  Location: [facility]     HC REMOVAL GALLBLADDER      Cholecystectomy     HC REMOVE TONSILS/ADENOIDS,<11 Y/O      unsure of age     LAPAROSCOPIC HYSTERECTOMY TOTAL N/A 8/10/2015    Procedure: LAPAROSCOPIC HYSTERECTOMY TOTAL;  Surgeon: Edward Pardo MD;  [facility]     LAPAROSCOPIC LYSIS ADHESIONS N/A 8/10/2015    Procedure: LAPAROSCOPIC LYSIS ADHESIONS;  Surgeon: Edward Pardo MD;  Location: [facility]     ZZC NONSPECIFIC PROCEDURE      Knee ligament surgery     ZZC NONSPECIFIC PROCEDURE      Kidney surgery for  "mechanical obstruction     MEDICATIONS:   Current Outpatient Medications:      amLODIPine (NORVASC) 10 MG tablet, Take 1 tablet (10 mg) by mouth daily, Disp: 90 tablet, Rfl: 3     aspirin 81 MG EC tablet, Take 81 mg by mouth daily, Disp: , Rfl:      blood glucose (ONETOUCH VERIO IQ) test strip, USE 1 STRIP TO CHECK GLUCOSE ONCE DAILY, Disp: 100 strip, Rfl: 3     cloNIDine (CATAPRES) 0.2 MG tablet, Take 1 tablet (0.2 mg) by mouth 2 times daily (Patient taking differently: Take 0.1 mg by mouth 2 times daily Cardiologist recommended this new dosage), Disp: 180 tablet, Rfl: 1     Lancets (ONETOUCH DELICA PLUS [id number]) MISC, 1 lancet daily Use 1 to check glucose once daily, Disp: 100 each, Rfl: 5     levothyroxine (SYNTHROID/LEVOTHROID) 50 MCG tablet, Take 1 tablet (50 mcg) by mouth daily, Disp: 90 tablet, Rfl: 3     losartan (COZAAR) 50 MG tablet, Take 1 tablet (50 mg) by mouth 2 times daily, Disp: 90 tablet, Rfl: 3     metFORMIN (GLUCOPHAGE-XR) 500 MG 24 hr tablet, Take 1 tablet (500 mg) by mouth daily (with dinner), Disp: 90 tablet, Rfl: 1     metoprolol succinate ER (TOPROL-XL) 25 MG 24 hr tablet, Take half  a tab twice day, Disp: 90 tablet, Rfl: 3     oxyCODONE (ROXICODONE) 5 MG tablet, Take 1 tablet (5 mg) by mouth every 6 hours as needed for severe pain, Disp: 15 tablet, Rfl: 0     predniSONE (DELTASONE) 20 MG tablet, One tablet daily for 5 days, Disp: 5 tablet, Rfl: 0     Probiotic Product (ACIDOPHILUS PROBIOTIC BLEND) CAPS, Take 1 capsule by mouth 2 times daily, Disp: , Rfl:      simvastatin (ZOCOR) 40 MG tablet, Take 1 tablet (40 mg) by mouth At Bedtime, Disp: 90 tablet, Rfl: 3     UNABLE TO FIND, MEDICATION NAME: cindy, Disp: , Rfl:      nitroGLYcerin (NITROSTAT) 0.4 MG sublingual tablet, Place 1 tablet (0.4 mg) under the tongue every 5 minutes as needed for chest pain, Disp: 25 tablet, Rfl: 1  ALLERGIES:    Allergies   Allergen Reactions     Penicillins Swelling     \"throat started swelling\"     Vitamin B " Complex Hives     Vitamin B12 Hives     all vitamin B's     Clindamycin Hcl Rash     SOCIAL HISTORY:   Social History     Socioeconomic History     Marital status:      Spouse name: Not on file     Number of children: Not on file     Years of education: Not on file     Highest education level: Not on file   Occupational History     Not on file   Social Needs     Financial resource strain: Not on file     Food insecurity     Worry: Not on file     Inability: Not on file     Transportation needs     Medical: Not on file     Non-medical: Not on file   Tobacco Use     Smoking status: Former Smoker     Smokeless tobacco: Never Used     Tobacco comment: quit  1987   Substance and Sexual Activity     Alcohol use: No     Alcohol/week: 0.0 standard drinks     Drug use: No     Sexual activity: Not Currently   Lifestyle     Physical activity     Days per week: Not on file     Minutes per session: Not on file     Stress: Not on file   Relationships     Social connections     Talks on phone: Not on file     Gets together: Not on file     Attends Mu-ism service: Not on file     Active member of club or organization: Not on file     Attends meetings of clubs or organizations: Not on file     Relationship status: Not on file     Intimate partner violence     Fear of current or ex partner: Not on file     Emotionally abused: Not on file     Physically abused: Not on file     Forced sexual activity: Not on file   Other Topics Concern      Service No     Blood Transfusions No     Caffeine Concern No     Occupational Exposure No     Hobby Hazards No     Sleep Concern Yes     Stress Concern Yes     Weight Concern Yes     Comment: over weight and unable to lose weight     Special Diet Yes     Comment: low fat, low sugar     Back Care No     Exercise No     Bike Helmet No     Comment: does not ride bike     Seat Belt Yes     Self-Exams Yes     Parent/sibling w/ CABG, MI or angioplasty before 65F 55M? Yes     Comment:  "sister 55yrs CABG & heart atttack   Social History Narrative     Not on file     FAMILY HISTORY:   Family History   Problem Relation Age of Onset     Heart Disease Mother          coronary     Heart Disease Father          coronary     Allergies Sister         unknown     Allergies Brother         unknown     Allergies Daughter         unknown     Allergies Son         unknown     Heart Disease Sister         by pass surg     Heart Disease Brother         on medication     Hypertension Sister      Hypertension Brother      Lipids Sister      Lipids Brother      Cerebrovascular Disease Brother      Obesity Sister      Diabetes Sister      Diabetes Sister      C.A.D. Brother         quad by pass     Neurologic Disorder Sister         parkinsons     Cancer Sister         skin cancer     Alcohol/Drug No family hx of      Alzheimer Disease No family hx of      EXAM:Vitals: BP (!) 150/56 (BP Location: Left arm, Patient Position: Sitting, Cuff Size: Adult Large)   Temp 96.4  F (35.8  C) (Temporal)   Ht 1.499 m (4' 11\")   Wt 83.1 kg (183 lb 1.6 oz)   BMI 36.98 kg/m    BMI= Body mass index is 36.98 kg/m .    General appearance: Patient is alert and fully cooperative with history & exam.  No sign of distress is noted during the visit.     Psychiatric: Affect is pleasant & appropriate.  Patient appears motivated to improve health.     Respiratory: Breathing is regular & unlabored while sitting.     HEENT: Hearing is intact to spoken word.  Speech is clear.  No gross evidence of visual impairment that would impact ambulation.     Vascular: DP 1/4 & PT 1/4 left & right.  CFT delayed with dependent rubor noted about the digits.  Diminished hair growth distal to mid tibia and no hair about the foot and toes.  Temperature changes noted, warm to cool proximal to distal.  Hemosiderin pigmentation noted with multiple varicosities legs and feet bilateral. Generalized edema bilateral legs and feet.  Pt denies claudication " history.     Neurologic: Normal plantar response bilateral.  Intact  protective threshold plus 10/10 applications of a 5.07 monofilament.  Pt admits no burning and paraesthesias about the feet and toes with palpation.     Dermatologic: Mildly diminished texture turgor tone about the integument.  Distal aspect of the left second toe DIPJ demonstrates erythema edema and discomfort even with light touch.  This is consistent with gout.  The 3 mm lesion is surrounded with about 2 or 3 mm of erythema.  Upon aspirating the joint no purulence is identified.  No obvious gouty tophi can be expressed from the wound.  Blood was not obtained through the needle nor was any joint fluid as there is such a tiny amount of joint fluid in the PIPJ of the small digit.  A scant amount of did occur after removing the needle. no tophi or purulence was identified.     Musculoskeletal: Patient is ambulatory without assistive device or brace.  There is semi reducible contracture of the lesser digits.    Hemoglobin A1C (%)   Date Value   11/11/2020 7.0 (H)   11/06/2019 7.0 (H)   02/27/2019 7.3 (H)   08/15/2018 6.8 (H)   04/13/2018 6.6 (H)   10/09/2017 6.5 (H)   02/20/2017 6.8 (H)   10/03/2016 7.3 (H)     Creatinine (mg/dL)   Date Value   12/02/2020 1.32 (H)   11/11/2020 1.42 (H)   07/09/2020 1.39 (H)   05/20/2020 1.32 (H)   11/06/2019 1.33 (H)   05/03/2019 1.58 (H)     Labs:       ASSESSMENT:     No diagnosis found.     PLAN:    3/8/2021  Has gout and chronic elevated uric acid and stage 3 renal disesase,   Nutrition consult for gout.     We discussed risk factors and preventive measures.    We discussed appropriate hygiene, shoe gear, daily foot exam, and reinforced management of weight, diet, activity goals and HA1C goal for diabetic patients.    Dispensed written foot care instructions.    All questions were answered to their satisfaction.    RTC as needed with questions or concerns.    Discussed alternative treatment options regarding gout  however at this time she does not want an injection and she does not want further medication as the steroid did not provide much help for her and she is concerned about her renal dysfunction.  She understands these medications are available if necessary.  She would like to attempt dietary changes first.  All questions answered.  Follow-up as needed.    3/11/2021  Patient requested aspiration.  Discussed with the patient very low possibility of obtaining any synovial fluid or crystals from the joint or tophi secondary to such a small volume of fluid.  Overall the toe is less swollen and painful today indicating some resolution.  I obtained informed consent to attempt aspiration as she requested.  We discussed potential risks and complications and alternative treatment options.  She wishes to proceed.  The toe was prepped with alcohol.  At the middle phalanx a small wheal of 1% lidocaine plain was applied.  I then utilized a 22-gauge needle to aspirate the left second digit DIPJ.  No visible fluid could be obtained with aspiration.  A sterile compression dressing was applied and recommended she keep this in place over the next 15 minutes.  Then may return to regular activities.    She requested I communicate this with  at [phone].  There was no answer at this number therefore I called [phone] and was eventually able to obtain appropriate communication to be able to send this message through [employer] as he was unavailable today.      Tyler Bennett DPM

## 2021-03-12 ENCOUNTER — MEDICAL CORRESPONDENCE (OUTPATIENT)
Dept: HEALTH INFORMATION MANAGEMENT | Facility: CLINIC | Age: 81
End: 2021-03-12

## 2021-03-16 DIAGNOSIS — N18.30 CHRONIC KIDNEY DISEASE, STAGE III (MODERATE) (H): Primary | ICD-10-CM

## 2021-03-18 ENCOUNTER — HOSPITAL ENCOUNTER (OUTPATIENT)
Dept: NUTRITION | Facility: CLINIC | Age: 81
Discharge: HOME | End: 2021-03-18
Payer: MEDICARE

## 2021-03-18 PROCEDURE — 97802 MEDICAL NUTRITION INDIV IN: CPT | Mod: GT,TEL

## 2021-03-18 NOTE — PROGRESS NOTES
"[facility]  Medical Nutrition Therapy    Visit Type: Initial Assessment    Sandra Petit, 80 year old referred by Ash Quiroz MD for MNT related to T2DM, gout     The patient has been notified of following:      \"This telephone visit will be conducted via a call between you and your physician/provider. We have found that certain health care needs can be provided without the need for a physical exam.  This service lets us provide the care you need with a short phone conversation.     Telephone visits are billed at different rates depending on your insurance coverage. During this emergency period, for some insurers they may be billed the same as an in-person visit.  Please reach out to your insurance provider with any questions.     If during the course of the call the physician/provider feels a telephone visit is not appropriate, you will not be charged for this service.\"     Patient has given verbal consent for Telephone visit?  Yes     What phone number would you like to be contacted at? [phone]       Nutrition Assessment:  Anthropometrics  Height:   03/11/21 1.499 m (4' 11\")      BMI:  36.98 kg/m^2   Weight Status:  Obesity Grade II BMI 35-39.9   Weight:   03/11/21 83.1 kg (183 lb 1.6 oz)      UBW:  Wt stable, pt has been at [facility] since about 2 yrs ago when pt lost        IBW: 43.2 kg (95 lbs)    IBW %:  192        Weight History:   Wt Readings from Last 20 Encounters:   03/11/21 83.1 kg (183 lb 1.6 oz)   02/26/21 83.6 kg (184 lb 3.2 oz)   02/04/21 83.9 kg (185 lb)   11/11/20 84.8 kg (187 lb)   08/07/20 83.9 kg (185 lb)   07/08/20 83 kg (183 lb)   01/29/20 82.6 kg (182 lb)   11/06/19 79.4 kg (175 lb)   07/31/19 77.7 kg (171 lb 6 oz)   05/29/19 74.8 kg (165 lb)   04/02/19 79.8 kg (176 lb)   03/12/19 78.1 kg (172 lb 2.9 oz)     Goal Weight: wt loss to more healthy wt (not focus of appointment)       Nutrition History    PMH:   Patient Active Problem List   Diagnosis     CKD (chronic " kidney disease) stage 3, GFR 30-59 ml/min     Hyperlipidemia LDL goal <100     Hypertension goal BP (blood pressure) < 140/90     History of total left knee replacement     Other specified hypothyroidism     Coronary artery disease involving native coronary artery without angina pectoris     Type 2 diabetes mellitus with diabetic nephropathy (H)     Type 2 diabetes mellitus with other circulatory complications (H)     Osteoarthritis of left thumb     Atrial fibrillation (H)- only after bypass     Morbid obesity (H)     -Gout flare up which has now stopped. 4 days ago pt started feeling some relief     Labs: reviewed  Hemoglobin A1C   Date Value Ref Range Status   11/11/2020 7.0 (H) 0 - 5.6 % Final     Glucose   Date Value Ref Range Status   12/02/2020 123 (H) 70 - 99 mg/dL Final     Urea Nitrogen   Date Value Ref Range Status   12/02/2020 38 (H) 7 - 30 mg/dL Final     Creatinine   Date Value Ref Range Status   12/02/2020 1.32 (H) 0.52 - 1.04 mg/dL Final     GFR Estimate   Date Value Ref Range Status   12/02/2020 38 (L) >60 mL/min/[1.73_m2] Final     Uric Acid   Date Value Ref Range Status   03/10/2021 7.1 (H) 2.6 - 6.0 mg/dL Final     Meds: reviewed  Current Outpatient Medications   Medication     amLODIPine (NORVASC)      cloNIDine (CATAPRES)      levothyroxine (SYNTHROID/LEVOTHROID)      losartan (COZAAR)     metFORMIN (GLUCOPHAGE-XR)      metoprolol succinate ER (TOPROL-XL)      nitroGLYcerin (NITROSTAT)      Probiotic Product (ACIDOPHILUS PROBIOTIC BLEND)      simvastatin (ZOCOR)        Food Record:   -~2-3 meals per day, 1-3 snacks per day  -Bfast: cheerios (doesn't drink milk), blueberries and 4 oz of cranberry 100% juice  -Lunch: scrambled eggs and turkey bosch or PB&J s/wich   -Dinner: trying to include veggies and reduce red meat, turkey meat loaf and barley vegetable soup in beef broth   -snacks: off the eaten path veggie crisps, crackers and cheese  -beverages: decaf tea or coffee, lots of water sometimes  adding flavor enhancer     Nutritional Details:   -GI concerns: none  -appetite: poor  -pace of eating: normal  -role of cooking: pt but dislikes    Physical Activity:  -limited related to discontinued PT due to knee pain and limited safe motion per pt report        ASSESSED MALNUTRITION STATUS  % Weight Loss:  None noted  % Intake:  Decreased intake does not meet criteria for malnutrition   Subcutaneous Fat Loss:  Unable to assess  Loss of Muscle Mass:  Unable to assess  Fluid Retention: Unable to assess  Malnutrition Diagnosis:  Unable to determine due to no NFPA      Nutrition Diagnosis:  Predicted suboptimal nutrient intake related to CKD3, DMT2, Gout as evidenced by nutrition recall, not having previous education on low purine foods, elevated uric acid, lack of appetite and no energy to prepare balanced healthy meals         Nutrition Intervention:  Nutrition Prescription Summary: consistent carbs, inclusion of protein, low purine nutrition therapy, eating every ~3 hours, eating a variety of food groups     Nutrition Education (Content):  -Educated pt on pathophysiology of diabetes and why following a diabetic diet is necessary  -Emphasized the importance of consuming consistent CHOs throughout the day   -Discussed fiber and it's relation with BG control and heart health  -Discussed healthy snack options and suggested pairing a protein w/a complex CHO  -Discussed label reading   -Reviewed carb counting   -Discussed benefit of physical activity/movement even a yoga or lauren chi based program if it is painful to do more intense movements  -Educated pt on Gout and how a low purine diet can support pt in not having flare ups in the future   -Educated pt on the benefit of continuing adequate fluid/water intake   -Discussed overall importance of pt focusing on self care which include fueling the body despite cooking for one       Provided the following handouts: General, Healthful Nutrition Therapy and Healthy Snacks-  Complex Carb & Protein Pairing and Low Purine Nutrition Therapy          Patient Engagement:   Assessed learning needs and learning preference: Yes.  Teaching Method(s) used: Booklet / Handout  Explanation    Nutrition Education (Application):  a)  Discussed current eating plans / recommended alternative food choices    b)  Patient verbalizes understanding of diet by agreeing to try to include protein throughout the day and eat variety of foods every 3~ hours      Anticipate fair compliance   Stage of Change:  preparation  Additional: pt was pleasant and engaged during session. Pt did report lack of energy and drive to eat and prepare foods which can make dietary changes more challenging. RD provided active listening during conversation with pt about recent history of heart attack and  passing and her life transitions. RD engaged with pt on focusing on self care and helping body to avoid gout flare ups, continue stable blood sugars, keep heart and kidneys healthy, and possibly even push metabolism to lose some weight       Nutrition Goals:   1) follow low purine diet strategies by eating out of green (go) and yellow (slow) categories with quantities listed for yellow foods. Avoiding red (no) foods  2) eat every 3 hours including a protein at each eating episode    Nutrition Follow Up / Monitoring:   Progress towards goals will be monitored and evaluated per protocol and Practice Guidelines, Food intake, Fluid/beverage intake, Weight, Food and Nutrition Knowledge/Skill and Biochemical data    Nutrition Recommendations:  Patient to follow-up with RD as needed  Patient has RD contact information to call/email if needed.      Start time: 1135  End time: 1205    Total Time Duration: 30 minutes       Bernie CAMACHO  Clinical Dietitian  [facility] office [phone]  on-call weekend pager [phone]

## 2021-03-25 DIAGNOSIS — E11.59 DIABETIC VASCULOPATHY (H): ICD-10-CM

## 2021-03-25 NOTE — TELEPHONE ENCOUNTER
Per pharmacy- they need a new RX because she is Medicare and new to [email or web address]. Please include diag code E11.59 -start date 4/4/16 and duration of use life.    Rachel Blanton MA on 3/25/2021 at 4:30 PM

## 2021-03-26 RX ORDER — LANCETS 30 GAUGE
1 EACH MISCELLANEOUS DAILY
Qty: 100 EACH | Refills: 5 | OUTPATIENT
Start: 2021-03-26

## 2021-03-26 NOTE — TELEPHONE ENCOUNTER
Refused as duplicate - see script 11/24/2020 dispense 100 with 5 refills good til 11/24/2021      Samantha Stafford RN  [facility]

## 2021-04-03 ENCOUNTER — MYC MEDICAL ADVICE (OUTPATIENT)
Dept: FAMILY MEDICINE | Facility: CLINIC | Age: 81
End: 2021-04-03

## 2021-04-03 DIAGNOSIS — E11.59 DIABETIC VASCULOPATHY (H): ICD-10-CM

## 2021-04-06 RX ORDER — LANCETS 30 GAUGE
1 EACH MISCELLANEOUS DAILY
Qty: 100 EACH | Refills: 5 | Status: SHIPPED | OUTPATIENT
Start: 2021-04-06 | End: 2021-08-03

## 2021-04-06 NOTE — TELEPHONE ENCOUNTER
Prescription approved per [facility] Refill Protocol.    Mahnaz Daugherty RN

## 2021-04-07 DIAGNOSIS — N18.30 CHRONIC KIDNEY DISEASE, STAGE III (MODERATE) (H): ICD-10-CM

## 2021-04-07 LAB — URATE SERPL-MCNC: 6.5 MG/DL (ref 2.6–6)

## 2021-04-07 PROCEDURE — 36415 COLL VENOUS BLD VENIPUNCTURE: CPT | Performed by: INTERNAL MEDICINE

## 2021-04-07 PROCEDURE — 84550 ASSAY OF BLOOD/URIC ACID: CPT | Performed by: INTERNAL MEDICINE

## 2021-04-08 ENCOUNTER — MEDICAL CORRESPONDENCE (OUTPATIENT)
Dept: HEALTH INFORMATION MANAGEMENT | Facility: CLINIC | Age: 81
End: 2021-04-08

## 2021-04-26 ENCOUNTER — OFFICE VISIT (OUTPATIENT)
Dept: INTERNAL MEDICINE | Facility: CLINIC | Age: 81
End: 2021-04-26
Payer: MEDICARE

## 2021-04-26 VITALS
OXYGEN SATURATION: 96 % | TEMPERATURE: 98 F | BODY MASS INDEX: 36.96 KG/M2 | HEART RATE: 68 BPM | DIASTOLIC BLOOD PRESSURE: 70 MMHG | RESPIRATION RATE: 16 BRPM | WEIGHT: 183 LBS | SYSTOLIC BLOOD PRESSURE: 136 MMHG

## 2021-04-26 DIAGNOSIS — I10 HYPERTENSION GOAL BP (BLOOD PRESSURE) < 140/90: Primary | ICD-10-CM

## 2021-04-26 DIAGNOSIS — E11.21 TYPE 2 DIABETES MELLITUS WITH DIABETIC NEPHROPATHY, WITHOUT LONG-TERM CURRENT USE OF INSULIN (H): ICD-10-CM

## 2021-04-26 DIAGNOSIS — N18.32 STAGE 3B CHRONIC KIDNEY DISEASE (H): ICD-10-CM

## 2021-04-26 DIAGNOSIS — E66.01 MORBID OBESITY (H): ICD-10-CM

## 2021-04-26 PROBLEM — I48.91 ATRIAL FIBRILLATION (H): Status: RESOLVED | Noted: 2019-04-10 | Resolved: 2021-04-26

## 2021-04-26 LAB
ALBUMIN SERPL-MCNC: 3.6 G/DL (ref 3.4–5)
ALP SERPL-CCNC: 126 U/L (ref 40–150)
ALT SERPL W P-5'-P-CCNC: 27 U/L (ref 0–50)
ANION GAP SERPL CALCULATED.3IONS-SCNC: 6 MMOL/L (ref 3–14)
AST SERPL W P-5'-P-CCNC: 14 U/L (ref 0–45)
BILIRUB SERPL-MCNC: 0.4 MG/DL (ref 0.2–1.3)
BUN SERPL-MCNC: 25 MG/DL (ref 7–30)
CALCIUM SERPL-MCNC: 9.4 MG/DL (ref 8.5–10.1)
CHLORIDE SERPL-SCNC: 107 MMOL/L (ref 94–109)
CO2 SERPL-SCNC: 27 MMOL/L (ref 20–32)
CREAT SERPL-MCNC: 1.26 MG/DL (ref 0.52–1.04)
GFR SERPL CREATININE-BSD FRML MDRD: 40 ML/MIN/{1.73_M2}
GLUCOSE SERPL-MCNC: 110 MG/DL (ref 70–99)
HBA1C MFR BLD: 7.3 % (ref 0–5.6)
POTASSIUM SERPL-SCNC: 4.2 MMOL/L (ref 3.4–5.3)
PROT SERPL-MCNC: 7.9 G/DL (ref 6.8–8.8)
SODIUM SERPL-SCNC: 140 MMOL/L (ref 133–144)
T4 FREE SERPL-MCNC: 1.08 NG/DL (ref 0.76–1.46)
TSH SERPL DL<=0.005 MIU/L-ACNC: 4.5 MU/L (ref 0.4–4)

## 2021-04-26 PROCEDURE — 36415 COLL VENOUS BLD VENIPUNCTURE: CPT | Performed by: INTERNAL MEDICINE

## 2021-04-26 PROCEDURE — 84443 ASSAY THYROID STIM HORMONE: CPT | Performed by: INTERNAL MEDICINE

## 2021-04-26 PROCEDURE — 99214 OFFICE O/P EST MOD 30 MIN: CPT | Performed by: INTERNAL MEDICINE

## 2021-04-26 PROCEDURE — 80053 COMPREHEN METABOLIC PANEL: CPT | Performed by: INTERNAL MEDICINE

## 2021-04-26 PROCEDURE — 83036 HEMOGLOBIN GLYCOSYLATED A1C: CPT | Performed by: INTERNAL MEDICINE

## 2021-04-26 PROCEDURE — 84439 ASSAY OF FREE THYROXINE: CPT | Performed by: INTERNAL MEDICINE

## 2021-04-26 RX ORDER — FAMOTIDINE 20 MG
TABLET ORAL EVERY OTHER DAY
COMMUNITY

## 2021-04-26 RX ORDER — CLONIDINE HYDROCHLORIDE 0.1 MG/1
0.1 TABLET ORAL 3 TIMES DAILY
Qty: 270 TABLET | Refills: 1 | Status: SHIPPED | OUTPATIENT
Start: 2021-04-26 | End: 2021-11-15

## 2021-04-26 RX ORDER — CLONIDINE HYDROCHLORIDE 0.1 MG/1
0.1 TABLET ORAL 2 TIMES DAILY
COMMUNITY
Start: 2021-04-26 | End: 2021-04-26

## 2021-04-26 RX ORDER — ALLOPURINOL 100 MG/1
100 TABLET ORAL
COMMUNITY

## 2021-04-26 RX ORDER — CLONIDINE HYDROCHLORIDE 0.1 MG/1
0.1 TABLET ORAL 2 TIMES DAILY
COMMUNITY
End: 2021-07-27

## 2021-04-26 ASSESSMENT — PAIN SCALES - GENERAL: PAINLEVEL: NO PAIN (0)

## 2021-04-26 NOTE — PROGRESS NOTES
Assessment & Plan     Hypertension goal BP (blood pressure) < 140/90  Blood pressure is her concern today.  Blood pressure runs from 1 40-1 60.  Never really down in the 120s.  She has no symptoms no chest pain no headaches no syncope.  She is on multiple medications including a low-dose beta-blocker but her heart rate is low in the 50s.  Losartan 100, amlodipine at 10.  Chronic kidney disease and gout so not on a diuretic.  She has been on clonidine 0.1 mg twice a day will increase it to 0.1 mg 3 times a day she will watch for any side effects of rash itching.  Hopefully exercising at the gym will help bring down her blood pressure as well.  She will also switch to amlodipine to take at night where it may be more effective.  - cloNIDine (CATAPRES) 0.1 MG tablet; Take 1 tablet (0.1 mg) by mouth 3 times daily  - Comprehensive metabolic panel    Stage 3b chronic kidney disease  CKD stage IIIb follows with nephrology.  Needs to keep her blood pressure down and we will try this with med changes    Type 2 diabetes mellitus with diabetic nephropathy, without long-term current use of insulin (H)  Diabetes A1c was checked this is due again will check today along with a thyroid as she is overdue for that.  - Hemoglobin A1c  - TSH with free T4 reflex    Morbid obesity (H)  Morbid obesity with a BMI of 37.  Continue to work on getting to the gym and hopefully this will help her exercise and weight loss                   No follow-ups on file.    Jason Niño MD  [facility] IZABELA Davila is a 81 year old who presents for the following health issues     HPI     Chief Complaint   Patient presents with     Hypertension     go over blood pressure readings     BP is up and down at times.  She sees Dr Tapia for cardiology at [facility], had MI and CABG March 2019. Sees Dr Clayton for ckd.     Not taking a diuretic at this time.  BP is checked twice a day 140-160 range over 60-80 range- morning and  afternoon.     Uric acid was 7.1 then 6.5, on allopurinol for this.      a1c was 7 and on metformin.     Tried clonidine 0.1 three times a day but had a rash and itching.   Past Medical History:   Diagnosis Date     Diabetic eye exam (H) 08/05/13     Endometriosis, site unspecified      Fever and other physiologic disturbances of temperature regulation 07/07/2005    Admit.  Discharged 07/13/2005.  Cause undetermined.     Heart disease      Myalgia and myositis, unspecified     fibromyalgia     Pure hypercholesterolemia      Unspecified essential hypertension      Unspecified hypothyroidism      Current Outpatient Medications   Medication     allopurinol (ZYLOPRIM) 100 MG tablet     amLODIPine (NORVASC) 10 MG tablet     aspirin 81 MG EC tablet     blood glucose (ONETOUCH VERIO IQ) test strip     cloNIDine (CATAPRES) 0.1 MG tablet     cloNIDine (CATAPRES) 0.1 MG tablet     Lancets (ONETOUCH DELICA PLUS [id number]) MISC     levothyroxine (SYNTHROID/LEVOTHROID) 50 MCG tablet     losartan (COZAAR) 50 MG tablet     metFORMIN (GLUCOPHAGE-XR) 500 MG 24 hr tablet     metoprolol succinate ER (TOPROL-XL) 25 MG 24 hr tablet     Probiotic Product (ACIDOPHILUS PROBIOTIC BLEND) CAPS     simvastatin (ZOCOR) 40 MG tablet     UNABLE TO FIND     Vitamin D, Cholecalciferol, 25 MCG (1000 UT) CAPS     nitroGLYcerin (NITROSTAT) 0.4 MG sublingual tablet     oxyCODONE (ROXICODONE) 5 MG tablet     Current Facility-Administered Medications   Medication     lidocaine 1 % injection 1 mL     Social History     Tobacco Use     Smoking status: Former Smoker     Smokeless tobacco: Never Used     Tobacco comment: quit  1987   Substance Use Topics     Alcohol use: No     Alcohol/week: 0.0 standard drinks     Drug use: No       Review of Systems   Constitutional, HEENT, cardiovascular, pulmonary, gi and gu systems are negative, except as otherwise noted.      Objective    /70   Pulse 68   Temp 98  F (36.7  C) (Temporal)   Resp 16   Wt 83 kg  (183 lb)   SpO2 96%   BMI 36.96 kg/m    Body mass index is 36.96 kg/m .  Physical Exam   No acute distress, difficulty walking due to her knees,  Heart is regular  Lungs are clear  Extremities have 1+ pitting edema.

## 2021-05-20 ENCOUNTER — MYC MEDICAL ADVICE (OUTPATIENT)
Dept: INTERNAL MEDICINE | Facility: CLINIC | Age: 81
End: 2021-05-20

## 2021-05-21 ENCOUNTER — MYC MEDICAL ADVICE (OUTPATIENT)
Dept: INTERNAL MEDICINE | Facility: CLINIC | Age: 81
End: 2021-05-21

## 2021-05-21 DIAGNOSIS — M10.9 ACUTE GOUT INVOLVING TOE, UNSPECIFIED CAUSE, UNSPECIFIED LATERALITY: Primary | ICD-10-CM

## 2021-05-21 RX ORDER — COLCHICINE 0.6 MG/1
0.6 TABLET ORAL DAILY PRN
Qty: 20 TABLET | Refills: 1 | Status: SHIPPED | OUTPATIENT
Start: 2021-05-21 | End: 2021-07-27

## 2021-06-01 DIAGNOSIS — E11.21 TYPE 2 DIABETES MELLITUS WITH DIABETIC NEPHROPATHY, WITHOUT LONG-TERM CURRENT USE OF INSULIN (H): ICD-10-CM

## 2021-06-01 DIAGNOSIS — E55.9 VITAMIN D DEFICIENCY: ICD-10-CM

## 2021-06-01 DIAGNOSIS — N18.30 CHRONIC KIDNEY DISEASE, STAGE III (MODERATE) (H): ICD-10-CM

## 2021-06-01 DIAGNOSIS — M10.9 GOUT, UNSPECIFIED: Primary | ICD-10-CM

## 2021-06-01 LAB
ALBUMIN SERPL-MCNC: 3.4 G/DL (ref 3.4–5)
ANION GAP SERPL CALCULATED.3IONS-SCNC: 8 MMOL/L (ref 3–14)
BUN SERPL-MCNC: 30 MG/DL (ref 7–30)
CALCIUM SERPL-MCNC: 9.1 MG/DL (ref 8.5–10.1)
CHLORIDE SERPL-SCNC: 109 MMOL/L (ref 94–109)
CO2 SERPL-SCNC: 24 MMOL/L (ref 20–32)
CREAT SERPL-MCNC: 1.31 MG/DL (ref 0.52–1.04)
CREAT UR-MCNC: 366 MG/DL
GFR SERPL CREATININE-BSD FRML MDRD: 38 ML/MIN/{1.73_M2}
GLUCOSE SERPL-MCNC: 207 MG/DL (ref 70–99)
HGB BLD-MCNC: 12.6 G/DL (ref 11.7–15.7)
PHOSPHATE SERPL-MCNC: 3.8 MG/DL (ref 2.5–4.5)
POTASSIUM SERPL-SCNC: 4.6 MMOL/L (ref 3.4–5.3)
PROT UR-MCNC: 0.4 G/L
PROT/CREAT 24H UR: 0.11 G/G CR (ref 0–0.2)
PTH-INTACT SERPL-MCNC: 60 PG/ML (ref 18–80)
SODIUM SERPL-SCNC: 141 MMOL/L (ref 133–144)
URATE SERPL-MCNC: 5.2 MG/DL (ref 2.6–6)

## 2021-06-01 PROCEDURE — 36415 COLL VENOUS BLD VENIPUNCTURE: CPT | Performed by: INTERNAL MEDICINE

## 2021-06-01 PROCEDURE — 84156 ASSAY OF PROTEIN URINE: CPT | Performed by: INTERNAL MEDICINE

## 2021-06-01 PROCEDURE — 85018 HEMOGLOBIN: CPT | Performed by: INTERNAL MEDICINE

## 2021-06-01 PROCEDURE — 83970 ASSAY OF PARATHORMONE: CPT | Performed by: INTERNAL MEDICINE

## 2021-06-01 PROCEDURE — 82306 VITAMIN D 25 HYDROXY: CPT | Performed by: INTERNAL MEDICINE

## 2021-06-01 PROCEDURE — 84550 ASSAY OF BLOOD/URIC ACID: CPT | Performed by: INTERNAL MEDICINE

## 2021-06-01 PROCEDURE — 80069 RENAL FUNCTION PANEL: CPT | Performed by: INTERNAL MEDICINE

## 2021-06-02 ENCOUNTER — APPOINTMENT (OUTPATIENT)
Dept: GENERAL RADIOLOGY | Facility: CLINIC | Age: 81
End: 2021-06-02
Attending: EMERGENCY MEDICINE
Payer: MEDICARE

## 2021-06-02 ENCOUNTER — HOSPITAL ENCOUNTER (EMERGENCY)
Facility: CLINIC | Age: 81
Discharge: HOME | End: 2021-06-02
Attending: EMERGENCY MEDICINE | Admitting: EMERGENCY MEDICINE
Payer: MEDICARE

## 2021-06-02 VITALS
RESPIRATION RATE: 16 BRPM | TEMPERATURE: 98.6 F | SYSTOLIC BLOOD PRESSURE: 159 MMHG | HEART RATE: 72 BPM | DIASTOLIC BLOOD PRESSURE: 69 MMHG | OXYGEN SATURATION: 99 %

## 2021-06-02 DIAGNOSIS — R07.9 CHEST PAIN, UNSPECIFIED TYPE: ICD-10-CM

## 2021-06-02 LAB
ANION GAP SERPL CALCULATED.3IONS-SCNC: 6 MMOL/L (ref 3–14)
BASOPHILS # BLD AUTO: 0.1 10E9/L (ref 0–0.2)
BASOPHILS NFR BLD AUTO: 0.7 %
BUN SERPL-MCNC: 33 MG/DL (ref 7–30)
CALCIUM SERPL-MCNC: 8.8 MG/DL (ref 8.5–10.1)
CHLORIDE SERPL-SCNC: 109 MMOL/L (ref 94–109)
CO2 SERPL-SCNC: 26 MMOL/L (ref 20–32)
CREAT SERPL-MCNC: 1.37 MG/DL (ref 0.52–1.04)
DEPRECATED CALCIDIOL+CALCIFEROL SERPL-MC: <77 UG/L (ref 20–75)
DIFFERENTIAL METHOD BLD: NORMAL
EOSINOPHIL # BLD AUTO: 0.4 10E9/L (ref 0–0.7)
EOSINOPHIL NFR BLD AUTO: 4.4 %
ERYTHROCYTE [DISTWIDTH] IN BLOOD BY AUTOMATED COUNT: 14.7 % (ref 10–15)
GFR SERPL CREATININE-BSD FRML MDRD: 36 ML/MIN/{1.73_M2}
GLUCOSE SERPL-MCNC: 106 MG/DL (ref 70–99)
HCT VFR BLD AUTO: 36.4 % (ref 35–47)
HGB BLD-MCNC: 11.8 G/DL (ref 11.7–15.7)
IMM GRANULOCYTES # BLD: 0.1 10E9/L (ref 0–0.4)
IMM GRANULOCYTES NFR BLD: 0.6 %
LYMPHOCYTES # BLD AUTO: 1.6 10E9/L (ref 0.8–5.3)
LYMPHOCYTES NFR BLD AUTO: 18.8 %
MCH RBC QN AUTO: 30.3 PG (ref 26.5–33)
MCHC RBC AUTO-ENTMCNC: 32.4 G/DL (ref 31.5–36.5)
MCV RBC AUTO: 93 FL (ref 78–100)
MONOCYTES # BLD AUTO: 0.8 10E9/L (ref 0–1.3)
MONOCYTES NFR BLD AUTO: 9.5 %
NEUTROPHILS # BLD AUTO: 5.4 10E9/L (ref 1.6–8.3)
NEUTROPHILS NFR BLD AUTO: 66 %
NRBC # BLD AUTO: 0 10*3/UL
NRBC BLD AUTO-RTO: 0 /100
PLATELET # BLD AUTO: 258 10E9/L (ref 150–450)
POTASSIUM SERPL-SCNC: 4.6 MMOL/L (ref 3.4–5.3)
RBC # BLD AUTO: 3.9 10E12/L (ref 3.8–5.2)
SODIUM SERPL-SCNC: 141 MMOL/L (ref 133–144)
TROPONIN I SERPL-MCNC: <0.015 UG/L (ref 0–0.04)
VITAMIN D2 SERPL-MCNC: <5 UG/L
VITAMIN D3 SERPL-MCNC: 72 UG/L
WBC # BLD AUTO: 8.2 10E9/L (ref 4–11)

## 2021-06-02 PROCEDURE — 99285 EMERGENCY DEPT VISIT HI MDM: CPT | Mod: 25 | Performed by: EMERGENCY MEDICINE

## 2021-06-02 PROCEDURE — 96374 THER/PROPH/DIAG INJ IV PUSH: CPT | Performed by: EMERGENCY MEDICINE

## 2021-06-02 PROCEDURE — 93010 ELECTROCARDIOGRAM REPORT: CPT | Performed by: EMERGENCY MEDICINE

## 2021-06-02 PROCEDURE — 36415 COLL VENOUS BLD VENIPUNCTURE: CPT | Performed by: EMERGENCY MEDICINE

## 2021-06-02 PROCEDURE — 85025 COMPLETE CBC W/AUTO DIFF WBC: CPT | Performed by: EMERGENCY MEDICINE

## 2021-06-02 PROCEDURE — 71045 X-RAY EXAM CHEST 1 VIEW: CPT

## 2021-06-02 PROCEDURE — 84484 ASSAY OF TROPONIN QUANT: CPT | Performed by: EMERGENCY MEDICINE

## 2021-06-02 PROCEDURE — 80048 BASIC METABOLIC PNL TOTAL CA: CPT | Performed by: EMERGENCY MEDICINE

## 2021-06-02 PROCEDURE — 93005 ELECTROCARDIOGRAM TRACING: CPT | Performed by: EMERGENCY MEDICINE

## 2021-06-02 RX ORDER — METFORMIN HCL 500 MG
500 TABLET, EXTENDED RELEASE 24 HR ORAL
Qty: 90 TABLET | Refills: 1 | Status: SHIPPED | OUTPATIENT
Start: 2021-06-02 | End: 2021-11-15

## 2021-06-02 NOTE — ED PROVIDER NOTES
"  History     Chief Complaint   Patient presents with     Chest Pain     Associated with shortness of breath. History of CABG     HPI  Sandra Petit is a 81 year old female who presents with chest pain.  This began yesterday morning.  She has had some intermittent sharp pain radiating down her left chest that felt like something tearing.  She has a history of coronary artery bypass grafting that was done in March 2019.  She was afraid she tore something in there.  She had recurrent pain again yesterday and today in the afternoon.  She has had some heaviness and feeling of shortness of air.  She did not take any nitroglycerin.  She does take baby aspirin once a day.  No recent fever or cough.    Allergies:  Allergies   Allergen Reactions     Penicillins Swelling     \"throat started swelling\"     Vitamin B Complex Hives     Vitamin B12 Hives     all vitamin B's     Clindamycin Hcl Rash       Problem List:    Patient Active Problem List    Diagnosis Date Noted     Morbid obesity (H) 11/11/2020     Priority: Medium     Osteoarthritis of left thumb 07/07/2017     Priority: Medium     Type 2 diabetes mellitus with other circulatory complications (H) 04/04/2016     Priority: Medium     Other specified hypothyroidism 10/02/2015     Priority: Medium     Coronary artery disease involving native coronary artery without angina pectoris 10/02/2015     Priority: Medium     Type 2 diabetes mellitus with diabetic nephropathy (H) 10/02/2015     Priority: Medium     History of total left knee replacement 04/29/2013     Priority: Medium     Hypertension goal BP (blood pressure) < 140/90 09/26/2011     Priority: Medium     Hyperlipidemia LDL goal <100 10/31/2010     Priority: Medium     CKD (chronic kidney disease) stage 3, GFR 30-59 ml/min 03/17/2009     Priority: Medium        Past Medical History:    Past Medical History:   Diagnosis Date     Diabetic eye exam (H) 08/05/13     Endometriosis, site unspecified      Fever and other " physiologic disturbances of temperature regulation 07/07/2005     Heart disease      Myalgia and myositis, unspecified      Pure hypercholesterolemia      Unspecified essential hypertension      Unspecified hypothyroidism        Past Surgical History:    Past Surgical History:   Procedure Laterality Date     ARTHROPLASTY KNEE  4/29/2013    Procedure: ARTHROPLASTY KNEE;  left total knee arthroplasty;  Surgeon: Teddy Grimes MD;  Location: [facility] OR     ARTHROPLASTY KNEE Right 8/27/2018    Procedure: ARTHROPLASTY KNEE;  right total knee arthroplasty;  Surgeon: Johnny Anaya MD;  Location: [facility] OR     C APPENDECTOMY       C EXPLOR HEART SURG WND W CP BYPASS Bilateral 03/20/2019     C OPEN CORONARY ENDARTERECTOMY  june 2005    Angioplasty w stenting     C TOTAL KNEE ARTHROPLASTY  4/29/13    Left     COMBINED CYSTOSCOPY, INSERT CATHETER URETER Bilateral 8/10/2015    Procedure: COMBINED CYSTOSCOPY, INSERT CATHETER URETER;  Surgeon: Johnnie Madera MD;  Location: [facility]     DILATION AND CURETTAGE, HYSTEROSCOPY DIAGNOSTIC, COMBINED N/A 11/6/2014    Procedure: COMBINED DILATION AND CURETTAGE, HYSTEROSCOPY DIAGNOSTIC;  Surgeon: Edward Pardo MD;  Location: [facility]     ESOPHAGOSCOPY, GASTROSCOPY, DUODENOSCOPY (EGD), COMBINED N/A 1/27/2015    Procedure: COMBINED ESOPHAGOSCOPY, GASTROSCOPY, DUODENOSCOPY (EGD), BIOPSY SINGLE OR MULTIPLE;  Surgeon: Carmelo Rosario MD;  Location: [facility] GI     HC REMOVAL GALLBLADDER      Cholecystectomy     HC REMOVE TONSILS/ADENOIDS,<13 Y/O      unsure of age     LAPAROSCOPIC HYSTERECTOMY TOTAL N/A 8/10/2015    Procedure: LAPAROSCOPIC HYSTERECTOMY TOTAL;  Surgeon: Edward Pardo MD;  Location: [facility]     LAPAROSCOPIC LYSIS ADHESIONS N/A 8/10/2015    Procedure: LAPAROSCOPIC LYSIS ADHESIONS;  Surgeon: Edward Pardo MD;  Location: [facility] OR     ZZC NONSPECIFIC PROCEDURE      Knee ligament surgery     ZZC NONSPECIFIC PROCEDURE      Kidney surgery for  mechanical obstruction       Family History:    Family History   Problem Relation Age of Onset     Heart Disease Mother          coronary     Heart Disease Father          coronary     Allergies Sister         unknown     Allergies Brother         unknown     Allergies Daughter         unknown     Allergies Son         unknown     Heart Disease Sister         by pass surg     Heart Disease Brother         on medication     Hypertension Sister      Hypertension Brother      Lipids Sister      Lipids Brother      Cerebrovascular Disease Brother      Obesity Sister      Diabetes Sister      Diabetes Sister      C.A.D. Brother         quad by pass     Neurologic Disorder Sister         parkinsons     Cancer Sister         skin cancer     Alcohol/Drug No family hx of      Alzheimer Disease No family hx of        Social History:  Marital Status:   [5]  Social History     Tobacco Use     Smoking status: Former Smoker     Smokeless tobacco: Never Used     Tobacco comment: quit     Substance Use Topics     Alcohol use: No     Alcohol/week: 0.0 standard drinks     Drug use: No        Medications:    allopurinol (ZYLOPRIM) 100 MG tablet  amLODIPine (NORVASC) 10 MG tablet  aspirin 81 MG EC tablet  blood glucose (ONETOUCH VERIO IQ) test strip  cloNIDine (CATAPRES) 0.1 MG tablet  cloNIDine (CATAPRES) 0.1 MG tablet  colchicine (COLCYRS) 0.6 MG tablet  Lancets (ONETOUCH DELICA PLUS [id number]) MISC  levothyroxine (SYNTHROID/LEVOTHROID) 50 MCG tablet  losartan (COZAAR) 50 MG tablet  metFORMIN (GLUCOPHAGE-XR) 500 MG 24 hr tablet  metoprolol succinate ER (TOPROL-XL) 25 MG 24 hr tablet  nitroGLYcerin (NITROSTAT) 0.4 MG sublingual tablet  oxyCODONE (ROXICODONE) 5 MG tablet  Probiotic Product (ACIDOPHILUS PROBIOTIC BLEND) CAPS  simvastatin (ZOCOR) 40 MG tablet  UNABLE TO FIND  Vitamin D, Cholecalciferol, 25 MCG (1000 UT) CAPS          Review of Systems  All other systems are reviewed and are negative    Physical Exam    BP: (!) 173/72  Pulse: 67  Temp: 98.6  F (37  C)  Resp: 24  SpO2: 99 %      Physical Exam  Vitals signs and nursing note reviewed.   Constitutional:       General: She is not in acute distress.     Appearance: She is well-developed. She is not diaphoretic.   HENT:      Head: Normocephalic and atraumatic.   Eyes:      General: No scleral icterus.     Pupils: Pupils are equal, round, and reactive to light.   Neck:      Musculoskeletal: Normal range of motion and neck supple.   Cardiovascular:      Rate and Rhythm: Normal rate and regular rhythm.      Heart sounds: Normal heart sounds. No murmur.   Pulmonary:      Effort: No respiratory distress.      Breath sounds: No stridor. No wheezing or rales.   Abdominal:      Palpations: Abdomen is soft.      Tenderness: There is no abdominal tenderness.   Musculoskeletal:         General: No tenderness.   Skin:     General: Skin is warm and dry.      Coloration: Skin is not pale.      Findings: No erythema or rash.   Neurological:      Mental Status: She is alert.         ED Course        Procedures           EKG: Normal sinus rhythm, normal axis.  Rate of 60 beats per minute.  No acute ST or T wave changes.  Interpreted by myself.      Critical Care time:  none               Results for orders placed or performed during the hospital encounter of 06/02/21 (from the past 24 hour(s))   Basic metabolic panel   Result Value Ref Range    Sodium 141 133 - 144 mmol/L    Potassium 4.6 3.4 - 5.3 mmol/L    Chloride 109 94 - 109 mmol/L    Carbon Dioxide 26 20 - 32 mmol/L    Anion Gap 6 3 - 14 mmol/L    Glucose 106 (H) 70 - 99 mg/dL    Urea Nitrogen 33 (H) 7 - 30 mg/dL    Creatinine 1.37 (H) 0.52 - 1.04 mg/dL    GFR Estimate 36 (L) >60 mL/min/[1.73_m2]    GFR Estimate If Black 42 (L) >60 mL/min/[1.73_m2]    Calcium 8.8 8.5 - 10.1 mg/dL   Troponin I   Result Value Ref Range    Troponin I ES <0.015 0.000 - 0.045 ug/L   CBC with platelets differential   Result Value Ref Range    WBC 8.2  4.0 - 11.0 10e9/L    RBC Count 3.90 3.8 - 5.2 10e12/L    Hemoglobin 11.8 11.7 - 15.7 g/dL    Hematocrit 36.4 35.0 - 47.0 %    MCV 93 78 - 100 fl    MCH 30.3 26.5 - 33.0 pg    MCHC 32.4 31.5 - 36.5 g/dL    RDW 14.7 10.0 - 15.0 %    Platelet Count 258 150 - 450 10e9/L    Diff Method Automated Method     % Neutrophils 66.0 %    % Lymphocytes 18.8 %    % Monocytes 9.5 %    % Eosinophils 4.4 %    % Basophils 0.7 %    % Immature Granulocytes 0.6 %    Nucleated RBCs 0 0 /100    Absolute Neutrophil 5.4 1.6 - 8.3 10e9/L    Absolute Lymphocytes 1.6 0.8 - 5.3 10e9/L    Absolute Monocytes 0.8 0.0 - 1.3 10e9/L    Absolute Eosinophils 0.4 0.0 - 0.7 10e9/L    Absolute Basophils 0.1 0.0 - 0.2 10e9/L    Abs Immature Granulocytes 0.1 0 - 0.4 10e9/L    Absolute Nucleated RBC 0.0    XR Chest Port 1 View    Narrative    CHEST ONE VIEW  6/2/2021 4:36 PM     HISTORY: Chest pain    COMPARISON: March 12, 2019      Impression    IMPRESSION: Stable elevated right hemidiaphragm. No acute infiltrates.     *Note: Due to a large number of results and/or encounters for the requested time period, some results have not been displayed. A complete set of results can be found in Results Review.       Medications - No data to display    Assessments & Plan (with Medical Decision Making)  81-year-old female with some atypical chest pain, sharp and intermittent stabbing on the left chest since yesterday.  Troponin, EKG and chest x-ray without acute finding.  Oxygen saturations 100%.  Heart rate of 60, making PE much less likely as well.  No signs of acute emergency medical condition.  Appears stable for discharge home.  Follow-up with your primary care in the next week.  Return anytime sooner to ED if condition worsens or any other concern.     I have reviewed the nursing notes.    I have reviewed the findings, diagnosis, plan and need for follow up with the patient.       New Prescriptions    No medications on file       Final diagnoses:   Chest pain,  unspecified type       6/2/2021   [facility] EMERGENCY DEPT     Beto Fernandes MD  06/02/21 1727

## 2021-07-27 ENCOUNTER — OFFICE VISIT (OUTPATIENT)
Dept: INTERNAL MEDICINE | Facility: CLINIC | Age: 81
End: 2021-07-27
Payer: MEDICARE

## 2021-07-27 VITALS
TEMPERATURE: 96.7 F | WEIGHT: 177 LBS | DIASTOLIC BLOOD PRESSURE: 58 MMHG | SYSTOLIC BLOOD PRESSURE: 126 MMHG | RESPIRATION RATE: 16 BRPM | HEART RATE: 66 BPM | BODY MASS INDEX: 35.75 KG/M2 | OXYGEN SATURATION: 98 %

## 2021-07-27 DIAGNOSIS — I10 HYPERTENSION GOAL BP (BLOOD PRESSURE) < 140/90: Primary | ICD-10-CM

## 2021-07-27 DIAGNOSIS — E66.01 MORBID OBESITY (H): ICD-10-CM

## 2021-07-27 DIAGNOSIS — E11.21 TYPE 2 DIABETES MELLITUS WITH DIABETIC NEPHROPATHY, WITHOUT LONG-TERM CURRENT USE OF INSULIN (H): ICD-10-CM

## 2021-07-27 DIAGNOSIS — N18.32 STAGE 3B CHRONIC KIDNEY DISEASE (H): ICD-10-CM

## 2021-07-27 LAB
CHOLEST SERPL-MCNC: 130 MG/DL
FASTING STATUS PATIENT QL REPORTED: NO
HBA1C MFR BLD: 7.4 % (ref 0–5.6)
HDLC SERPL-MCNC: 42 MG/DL
LDLC SERPL CALC-MCNC: 60 MG/DL
NONHDLC SERPL-MCNC: 88 MG/DL
TRIGL SERPL-MCNC: 138 MG/DL

## 2021-07-27 PROCEDURE — 83036 HEMOGLOBIN GLYCOSYLATED A1C: CPT | Performed by: INTERNAL MEDICINE

## 2021-07-27 PROCEDURE — 80061 LIPID PANEL: CPT | Performed by: INTERNAL MEDICINE

## 2021-07-27 PROCEDURE — 36415 COLL VENOUS BLD VENIPUNCTURE: CPT | Performed by: INTERNAL MEDICINE

## 2021-07-27 PROCEDURE — 99214 OFFICE O/P EST MOD 30 MIN: CPT | Performed by: INTERNAL MEDICINE

## 2021-07-27 NOTE — PROGRESS NOTES
Assessment & Plan     Hypertension goal BP (blood pressure) < 140/90  Hypertension is doing well today 126/58 she is on 5 medications tolerating them.  Her blood pressure readings at home go from 1 25-1 50 range.  No changes in medications today.  - Lipid panel reflex to direct LDL Non-fasting; Future    Stage 3b chronic kidney disease  Kidney disease she is seeing nephrology her creatinine is 1.37.  Stable continue her pills as she is doing including the ARB.    Type 2 diabetes mellitus with diabetic nephropathy, without long-term current use of insulin (H)  Type 2 diabetes on Metformin tolerating this we will check her A1c it was last 7.3, hopefully is a little bit better.  - Hemoglobin A1c; Future  - Lipid panel reflex to direct LDL Non-fasting; Future    Morbid obesity (H)  Obesity recommend weight loss BMI is 35.7.  Try to get as much exercise as she can tolerate weight loss would help her joint pains.      Review of prior external note(s) from - nephrology             No follow-ups on file.    Jason Niño MD  [facility] IZABELA Davila is a 81 year old who presents for the following health issues     HPI     Chief Complaint   Patient presents with     Hypertension     f/u     BP is doing well, having some irregular heartbeats on the monitor.  Resolved after an hour.  Last time was July 10th.     Stress from living in apartment, doesn't like that and is looking at [employer] to rent.      Taking 5 different medications for bp. Saw nephrology.    Glucose is good, once at 184. Normally 130-150 range.   On metformin.      Gout better with aspercream.      Past Medical History:   Diagnosis Date     Diabetic eye exam (H) 08/05/13     Endometriosis, site unspecified      Fever and other physiologic disturbances of temperature regulation 07/07/2005    Admit.  Discharged 07/13/2005.  Cause undetermined.     Heart disease      Myalgia and myositis, unspecified     fibromyalgia     [facility]- Pediatric Dermatology  Dr. Elaine Coon, Dr. Ghislaine Bautista, Dr. Linda Starkey, Erika Molina, PETERSON Rowley, Dr. Judy Goldstein & Dr. Junior Harvey       Non Urgent  Nurse Triage Line; [phone]- Anisha and Valentina MERA Care Coordinators      Marilee (/Complex ) [phone]      If you need a prescription refill, please contact your pharmacy. Refills are approved or denied by our Physicians during normal business hours, Monday through Fridays    Per office policy, refills will not be granted if you have not been seen within the past year (or sooner depending on your child's condition)      Scheduling Information:     Pediatric Appointment Scheduling and Call Center [phone]   Radiology Scheduling- [phone]     Sedation Unit Scheduling- [phone]    [place] Scheduling- [facility] [phone]; Pediatric Dermatology [phone]    Main  Services: [phone]   Kinyarwanda: [phone]   Israeli: [phone]   Hmong/Greek/Estonian: [phone]      Preadmission Nursing Department Fax Number: [phone] (Fax all pre-operative paperwork to this number)      For urgent matters arising during evenings, weekends, or holidays that cannot wait for normal business hours please call [phone] and ask for the Dermatology Resident On-Call to be paged.              hypercholesterolemia      Unspecified essential hypertension      Unspecified hypothyroidism      Current Outpatient Medications   Medication     allopurinol (ZYLOPRIM) 100 MG tablet     amLODIPine (NORVASC) 10 MG tablet     aspirin 81 MG EC tablet     blood glucose (ONETOUCH VERIO IQ) test strip     cloNIDine (CATAPRES) 0.1 MG tablet     Lancets (ONETOUCH DELICA PLUS [id number]) MISC     levothyroxine (SYNTHROID/LEVOTHROID) 50 MCG tablet     losartan (COZAAR) 50 MG tablet     metFORMIN (GLUCOPHAGE-XR) 500 MG 24 hr tablet     metoprolol succinate ER (TOPROL-XL) 25 MG 24 hr tablet     Probiotic Product (ACIDOPHILUS PROBIOTIC BLEND) CAPS     simvastatin (ZOCOR) 40 MG tablet     UNABLE TO FIND     Vitamin D, Cholecalciferol, 25 MCG (1000 UT) CAPS     nitroGLYcerin (NITROSTAT) 0.4 MG sublingual tablet     oxyCODONE (ROXICODONE) 5 MG tablet     Current Facility-Administered Medications   Medication     lidocaine 1 % injection 1 mL     Social History     Tobacco Use     Smoking status: Former Smoker     Smokeless tobacco: Never Used     Tobacco comment: quit  1987   Substance Use Topics     Alcohol use: No     Alcohol/week: 0.0 standard drinks     Drug use: No       Review of Systems   Constitutional, HEENT, cardiovascular, pulmonary, gi and gu systems are negative, except as otherwise noted.      Objective    /58   Pulse 66   Temp (!) 96.7  F (35.9  C) (Temporal)   Resp 16   Wt 80.3 kg (177 lb)   SpO2 98%   BMI 35.75 kg/m    Body mass index is 35.75 kg/m .  Physical Exam   No acute distress, obese  Heart is regular  Lungs are clear  Extremities have 1+ pitting edema a little more on the left than the right

## 2021-07-30 ENCOUNTER — MYC MEDICAL ADVICE (OUTPATIENT)
Dept: INTERNAL MEDICINE | Facility: CLINIC | Age: 81
End: 2021-07-30

## 2021-07-30 DIAGNOSIS — I10 ESSENTIAL HYPERTENSION, BENIGN: ICD-10-CM

## 2021-08-03 DIAGNOSIS — E11.59 DIABETIC VASCULOPATHY (H): ICD-10-CM

## 2021-08-03 RX ORDER — LOSARTAN POTASSIUM 50 MG/1
50 TABLET ORAL 2 TIMES DAILY
Qty: 90 TABLET | Refills: 3 | Status: SHIPPED | OUTPATIENT
Start: 2021-08-03 | End: 2022-01-07

## 2021-08-03 RX ORDER — BLOOD SUGAR DIAGNOSTIC
STRIP MISCELLANEOUS
Qty: 100 STRIP | Refills: 3 | Status: SHIPPED | OUTPATIENT
Start: 2021-08-03 | End: 2022-01-10

## 2021-08-03 RX ORDER — LANCETS 30 GAUGE
1 EACH MISCELLANEOUS DAILY
Qty: 100 EACH | Refills: 5 | Status: SHIPPED | OUTPATIENT
Start: 2021-08-03 | End: 2022-01-21

## 2021-08-29 ENCOUNTER — MYC MEDICAL ADVICE (OUTPATIENT)
Dept: INTERNAL MEDICINE | Facility: CLINIC | Age: 81
End: 2021-08-29

## 2021-09-26 ENCOUNTER — MYC MEDICAL ADVICE (OUTPATIENT)
Dept: INTERNAL MEDICINE | Facility: CLINIC | Age: 81
End: 2021-09-26

## 2021-09-26 DIAGNOSIS — E11.59 DIABETIC VASCULOPATHY (H): ICD-10-CM

## 2021-09-27 ENCOUNTER — IMMUNIZATION (OUTPATIENT)
Dept: FAMILY MEDICINE | Facility: CLINIC | Age: 81
End: 2021-09-27
Payer: MEDICARE

## 2021-09-27 DIAGNOSIS — Z23 NEED FOR PROPHYLACTIC VACCINATION AND INOCULATION AGAINST INFLUENZA: Primary | ICD-10-CM

## 2021-09-27 PROCEDURE — 99207 PR NO CHARGE NURSE ONLY: CPT

## 2021-09-27 PROCEDURE — 90662 IIV NO PRSV INCREASED AG IM: CPT

## 2021-09-27 PROCEDURE — G0008 ADMIN INFLUENZA VIRUS VAC: HCPCS

## 2021-09-27 NOTE — PROGRESS NOTES
Prior to immunization administration, verified patients identity using patient s name and date of birth. Please see Immunization Activity for additional information.     Screening Questionnaire for Adult Immunization    Are you sick today?   No   Do you have allergies to medications, food, a vaccine component or latex?   Yes   Have you ever had a serious reaction after receiving a vaccination?   No   Do you have a long-term health problem with heart, lung, kidney, or metabolic disease (e.g., diabetes), asthma, a blood disorder, no spleen, complement component deficiency, a cochlear implant, or a spinal fluid leak?  Are you on long-term aspirin therapy?   Yes   Do you have cancer, leukemia, HIV/AIDS, or any other immune system problem?   No   Do you have a parent, brother, or sister with an immune system problem?   No   In the past 3 months, have you taken medications that affect  your immune system, such as prednisone, other steroids, or anticancer drugs; drugs for the treatment of rheumatoid arthritis, Crohn s disease, or psoriasis; or have you had radiation treatments?   No   Have you had a seizure, or a brain or other nervous system problem?   No   During the past year, have you received a transfusion of blood or blood    products, or been given immune (gamma) globulin or antiviral drug?   No   For women: Are you pregnant or is there a chance you could become       pregnant during the next month?   No   Have you received any vaccinations in the past 4 weeks?   No     Immunization questionnaire was positive for at least one answer.  Notified Yes.        Per orders of Dr. Jason Niño, injection of HD Influenza given by Toma Landry CMA. Patient instructed to remain in clinic for 15 minutes afterwards, and to report any adverse reaction to me immediately.       Screening performed by Toma Landry CMA on 9/27/2021 at 10:32 AM.

## 2021-09-28 ENCOUNTER — MYC MEDICAL ADVICE (OUTPATIENT)
Dept: INTERNAL MEDICINE | Facility: CLINIC | Age: 81
End: 2021-09-28

## 2021-10-21 ENCOUNTER — TRANSFERRED RECORDS (OUTPATIENT)
Dept: HEALTH INFORMATION MANAGEMENT | Facility: CLINIC | Age: 81
End: 2021-10-21
Payer: MEDICARE

## 2021-10-24 ENCOUNTER — NURSE TRIAGE (OUTPATIENT)
Dept: NURSING | Facility: CLINIC | Age: 81
End: 2021-10-24

## 2021-10-24 NOTE — TELEPHONE ENCOUNTER
Sandra reports taking double dose of Synthroid 50 mcg. Took it at 4 AM, then another one at 6 AM.    States she feels fine, no dizziness, no nausea, not drowsy. Concerned about her BP readings:  /129  HR  61    At the time of call:  /69  TX 57    Hx of Heart attack and triple bypass. Has not taken her BP meds yet (losartan, metoprolol, amlodipine and clonidine). Advised to take BP meds per usual schedule and recheck BP 1 hour after taking BP meds. Call back if BP is still the same reading or with any new concerns. She verbalized understanding.    [facility] transferred her to Poison Control.    Angelina Mason RN/[place] Nurse Advisor        Reason for Disposition    [1] DOUBLE DOSE (an extra dose or lesser amount) of prescription drug AND [2] any symptoms (e.g., dizziness, nausea, pain, sleepiness)    Protocols used: MEDICATION QUESTION CALL-A-

## 2021-10-26 ENCOUNTER — HOSPITAL ENCOUNTER (OUTPATIENT)
Dept: MAMMOGRAPHY | Facility: CLINIC | Age: 81
Discharge: HOME | End: 2021-10-26
Attending: FAMILY MEDICINE | Admitting: FAMILY MEDICINE
Payer: MEDICARE

## 2021-10-26 DIAGNOSIS — Z12.31 VISIT FOR SCREENING MAMMOGRAM: ICD-10-CM

## 2021-10-26 PROCEDURE — 77063 BREAST TOMOSYNTHESIS BI: CPT

## 2021-10-31 NOTE — LETTER
3/11/2021         RE: Sandra Petit  [address]  [facility] [address]        Dear Colleague,    Thank you for referring your patient, Sandra Petit, to the [facility]. Please see a copy of my visit note below.    Chief Complaint   Patient presents with     RECHECK     gout Left 2nd toe, onset 2/26/2021; LOV 3/8/2021     Diabetes     type 2     Results     3/10/2021 labs uric acid       Weight management plan: Patient was referred to their PCP to discuss a diet and exercise plan.     Giovanni to follow up with Primary Care provider regarding elevated blood pressure.    HPI:  Sandra Petit is a 80 year old female who is seen in consultation at the request of Fadumo Navarro.    Pt presents for eval of: Pt states she has had a red lesion on 2nd toe on Left foot since 2/26/21. Pt. States that it is a 7 out of 10.  She notes from previous visit her symptoms have improved considerably but she is requesting the joint be aspirated.    DM Type 2     Pt is retired.    Review of Systems:  Patient denies fever, chills, rash, wound, stiffness, limping, numbness, weakness, heart burn, blood in stool, chest pain with activity, calf pain when walking, shortness of breath with activity, chronic cough, easy bleeding/bruising, swelling of ankles, excessive thirst, fatigue, depression, anxiety.  Patient admits only to symptoms noted in history.     Patient Active Problem List   Diagnosis     CKD (chronic kidney disease) stage 3, GFR 30-59 ml/min     Hyperlipidemia LDL goal <100     Hypertension goal BP (blood pressure) < 140/90     History of total left knee replacement     Other specified hypothyroidism     Coronary artery disease involving native coronary artery without angina pectoris     Type 2 diabetes mellitus with diabetic nephropathy (H)     Type 2 diabetes mellitus with other circulatory complications (H)     Osteoarthritis of left thumb     Atrial fibrillation (H)- only after bypass     Morbid  Pt is at CT   obesity (H)     PAST MEDICAL HISTORY:   Past Medical History:   Diagnosis Date     Diabetic eye exam (H) 08/05/13     Endometriosis, site unspecified      Fever and other physiologic disturbances of temperature regulation 07/07/2005    Admit.  Discharged 07/13/2005.  Cause undetermined.     Heart disease      Myalgia and myositis, unspecified     fibromyalgia     Pure hypercholesterolemia      Unspecified essential hypertension      Unspecified hypothyroidism      PAST SURGICAL HISTORY:   Past Surgical History:   Procedure Laterality Date     ARTHROPLASTY KNEE  4/29/2013    Procedure: ARTHROPLASTY KNEE;  left total knee arthroplasty;  Surgeon: Teddy Grimes MD;  [facility]: [facility]     ARTHROPLASTY KNEE Right 8/27/2018    Procedure: ARTHROPLASTY KNEE;  right total knee arthroplasty;  Surgeon: Johnny Anaya MD;  Location: [facility]     C APPENDECTOMY       C EXPLOR HEART SURG WND W CP BYPASS Bilateral 03/20/2019     C OPEN CORONARY ENDARTERECTOMY  june 2005    Angioplasty w stenting     C TOTAL KNEE ARTHROPLASTY  4/29/13    Left     COMBINED CYSTOSCOPY, INSERT CATHETER URETER Bilateral 8/10/2015    Procedure: COMBINED CYSTOSCOPY, INSERT CATHETER URETER;  Surgeon: Johnnie Madera MD;  Location[facility]     DILATION AND CURETTAGE, HYSTEROSCOPY DIAGNOSTIC, COMBINED N/A 11/6/2014    Procedure: COMBINED DILATION AND CURETTAGE, HYSTEROSCOPY DIAGNOSTIC;  Surgeon: Edward Pardo MD;  [facility]     ESOPHAGOSCOPY, GASTROSCOPY, DUODENOSCOPY (EGD), COMBINED N/A 1/27/2015    Procedure: COMBINED ESOPHAGOSCOPY, GASTROSCOPY, DUODENOSCOPY (EGD), BIOPSY SINGLE OR MULTIPLE;  Surgeon: Carmelo Rosario MD;  Location: [facility]     HC REMOVAL GALLBLADDER      Cholecystectomy     HC REMOVE TONSILS/ADENOIDS,<11 Y/O      unsure of age     LAPAROSCOPIC HYSTERECTOMY TOTAL N/A 8/10/2015    Procedure: LAPAROSCOPIC HYSTERECTOMY TOTAL;  Surgeon: Edward Pardo MD;  [facility]     LAPAROSCOPIC LYSIS  ADHESIONS N/A 8/10/2015    Procedure: LAPAROSCOPIC LYSIS ADHESIONS;  Surgeon: Edward Pardo MD;  Location: [facility]     ZZC NONSPECIFIC PROCEDURE      Knee ligament surgery     ZZC NONSPECIFIC PROCEDURE      Kidney surgery for mechanical obstruction     MEDICATIONS:   Current Outpatient Medications:      amLODIPine (NORVASC) 10 MG tablet, Take 1 tablet (10 mg) by mouth daily, Disp: 90 tablet, Rfl: 3     aspirin 81 MG EC tablet, Take 81 mg by mouth daily, Disp: , Rfl:      blood glucose (ONETOUCH VERIO IQ) test strip, USE 1 STRIP TO CHECK GLUCOSE ONCE DAILY, Disp: 100 strip, Rfl: 3     cloNIDine (CATAPRES) 0.2 MG tablet, Take 1 tablet (0.2 mg) by mouth 2 times daily (Patient taking differently: Take 0.1 mg by mouth 2 times daily Cardiologist recommended this new dosage), Disp: 180 tablet, Rfl: 1     Lancets (ONETOUCH DELICA PLUS [id number]) MISC, 1 lancet daily Use 1 to check glucose once daily, Disp: 100 each, Rfl: 5     levothyroxine (SYNTHROID/LEVOTHROID) 50 MCG tablet, Take 1 tablet (50 mcg) by mouth daily, Disp: 90 tablet, Rfl: 3     losartan (COZAAR) 50 MG tablet, Take 1 tablet (50 mg) by mouth 2 times daily, Disp: 90 tablet, Rfl: 3     metFORMIN (GLUCOPHAGE-XR) 500 MG 24 hr tablet, Take 1 tablet (500 mg) by mouth daily (with dinner), Disp: 90 tablet, Rfl: 1     metoprolol succinate ER (TOPROL-XL) 25 MG 24 hr tablet, Take half  a tab twice day, Disp: 90 tablet, Rfl: 3     oxyCODONE (ROXICODONE) 5 MG tablet, Take 1 tablet (5 mg) by mouth every 6 hours as needed for severe pain, Disp: 15 tablet, Rfl: 0     predniSONE (DELTASONE) 20 MG tablet, One tablet daily for 5 days, Disp: 5 tablet, Rfl: 0     Probiotic Product (ACIDOPHILUS PROBIOTIC BLEND) CAPS, Take 1 capsule by mouth 2 times daily, Disp: , Rfl:      simvastatin (ZOCOR) 40 MG tablet, Take 1 tablet (40 mg) by mouth At Bedtime, Disp: 90 tablet, Rfl: 3     UNABLE TO FIND, MEDICATION NAME: cindy, Disp: , Rfl:      nitroGLYcerin (NITROSTAT) 0.4 MG  "sublingual tablet, Place 1 tablet (0.4 mg) under the tongue every 5 minutes as needed for chest pain, Disp: 25 tablet, Rfl: 1  ALLERGIES:    Allergies   Allergen Reactions     Penicillins Swelling     \"throat started swelling\"     Vitamin B Complex Hives     Vitamin B12 Hives     all vitamin B's     Clindamycin Hcl Rash     SOCIAL HISTORY:   Social History     Socioeconomic History     Marital status:      Spouse name: Not on file     Number of children: Not on file     Years of education: Not on file     Highest education level: Not on file   Occupational History     Not on file   Social Needs     Financial resource strain: Not on file     Food insecurity     Worry: Not on file     Inability: Not on file     Transportation needs     Medical: Not on file     Non-medical: Not on file   Tobacco Use     Smoking status: Former Smoker     Smokeless tobacco: Never Used     Tobacco comment: quit  1987   Substance and Sexual Activity     Alcohol use: No     Alcohol/week: 0.0 standard drinks     Drug use: No     Sexual activity: Not Currently   Lifestyle     Physical activity     Days per week: Not on file     Minutes per session: Not on file     Stress: Not on file   Relationships     Social connections     Talks on phone: Not on file     Gets together: Not on file     Attends [employer] service: Not on file     Active member of club or organization: Not on file     Attends meetings of clubs or organizations: Not on file     Relationship status: Not on file     Intimate partner violence     Fear of current or ex partner: Not on file     Emotionally abused: Not on file     Physically abused: Not on file     Forced sexual activity: Not on file   Other Topics Concern      Service No     Blood Transfusions No     Caffeine Concern No     Occupational Exposure No     Hobby Hazards No     Sleep Concern Yes     Stress Concern Yes     Weight Concern Yes     Comment: over weight and unable to lose weight     Special " "Diet Yes     Comment: low fat, low sugar     Back Care No     Exercise No     Bike Helmet No     Comment: does not ride bike     Seat Belt Yes     Self-Exams Yes     Parent/sibling w/ CABG, MI or angioplasty before 65F 55M? Yes     Comment: sister 55yrs CABG & heart atttack   Social History Narrative     Not on file     FAMILY HISTORY:   Family History   Problem Relation Age of Onset     Heart Disease Mother          coronary     Heart Disease Father          coronary     Allergies Sister         unknown     Allergies Brother         unknown     Allergies Daughter         unknown     Allergies Son         unknown     Heart Disease Sister         by pass surg     Heart Disease Brother         on medication     Hypertension Sister      Hypertension Brother      Lipids Sister      Lipids Brother      Cerebrovascular Disease Brother      Obesity Sister      Diabetes Sister      Diabetes Sister      C.A.D. Brother         quad by pass     Neurologic Disorder Sister         parkinsons     Cancer Sister         skin cancer     Alcohol/Drug No family hx of      Alzheimer Disease No family hx of      EXAM:Vitals: BP (!) 150/56 (BP Location: Left arm, Patient Position: Sitting, Cuff Size: Adult Large)   Temp 96.4  F (35.8  C) (Temporal)   Ht 1.499 m (4' 11\")   Wt 83.1 kg (183 lb 1.6 oz)   BMI 36.98 kg/m    BMI= Body mass index is 36.98 kg/m .    General appearance: Patient is alert and fully cooperative with history & exam.  No sign of distress is noted during the visit.     Psychiatric: Affect is pleasant & appropriate.  Patient appears motivated to improve health.     Respiratory: Breathing is regular & unlabored while sitting.     HEENT: Hearing is intact to spoken word.  Speech is clear.  No gross evidence of visual impairment that would impact ambulation.     Vascular: DP 1/4 & PT 1/4 left & right.  CFT delayed with dependent rubor noted about the digits.  Diminished hair growth distal to mid tibia and no " hair about the foot and toes.  Temperature changes noted, warm to cool proximal to distal.  Hemosiderin pigmentation noted with multiple varicosities legs and feet bilateral. Generalized edema bilateral legs and feet.  Pt denies claudication history.     Neurologic: Normal plantar response bilateral.  Intact  protective threshold plus 10/10 applications of a 5.07 monofilament.  Pt admits no burning and paraesthesias about the feet and toes with palpation.     Dermatologic: Mildly diminished texture turgor tone about the integument.  Distal aspect of the left second toe DIPJ demonstrates erythema edema and discomfort even with light touch.  This is consistent with gout.  The 3 mm lesion is surrounded with about 2 or 3 mm of erythema.  Upon aspirating the joint no purulence is identified.  No obvious gouty tophi can be expressed from the wound.  Blood was not obtained through the needle nor was any joint fluid as there is such a tiny amount of joint fluid in the PIPJ of the small digit.  A scant amount of did occur after removing the needle. no tophi or purulence was identified.     Musculoskeletal: Patient is ambulatory without assistive device or brace.  There is semi reducible contracture of the lesser digits.    Hemoglobin A1C (%)   Date Value   11/11/2020 7.0 (H)   11/06/2019 7.0 (H)   02/27/2019 7.3 (H)   08/15/2018 6.8 (H)   04/13/2018 6.6 (H)   10/09/2017 6.5 (H)   02/20/2017 6.8 (H)   10/03/2016 7.3 (H)     Creatinine (mg/dL)   Date Value   12/02/2020 1.32 (H)   11/11/2020 1.42 (H)   07/09/2020 1.39 (H)   05/20/2020 1.32 (H)   11/06/2019 1.33 (H)   05/03/2019 1.58 (H)     Labs:       ASSESSMENT:     No diagnosis found.     PLAN:    3/8/2021  Has gout and chronic elevated uric acid and stage 3 renal disesase,   Nutrition consult for gout.     We discussed risk factors and preventive measures.    We discussed appropriate hygiene, shoe gear, daily foot exam, and reinforced management of weight, diet, activity  goals and HA1C goal for diabetic patients.    Dispensed written foot care instructions.    All questions were answered to their satisfaction.    RTC as needed with questions or concerns.    Discussed alternative treatment options regarding gout however at this time she does not want an injection and she does not want further medication as the steroid did not provide much help for her and she is concerned about her renal dysfunction.  She understands these medications are available if necessary.  She would like to attempt dietary changes first.  All questions answered.  Follow-up as needed.    3/11/2021  Patient requested aspiration.  Discussed with the patient very low possibility of obtaining any synovial fluid or crystals from the joint or tophi secondary to such a small volume of fluid.  Overall the toe is less swollen and painful today indicating some resolution.  I obtained informed consent to attempt aspiration as she requested.  We discussed potential risks and complications and alternative treatment options.  She wishes to proceed.  The toe was prepped with alcohol.  At the middle phalanx a small wheal of 1% lidocaine plain was applied.  I then utilized a 22-gauge needle to aspirate the left second digit DIPJ.  No visible fluid could be obtained with aspiration.  A sterile compression dressing was applied and recommended she keep this in place over the next 15 minutes.  Then may return to regular activities.    She requested I communicate this with  at [phone].  There was no answer at this number therefore I called [phone] and was eventually able to obtain appropriate communication to be able to send this message through [employer] as he was unavailable today.      Tyler Bennett DPM              Again, thank you for allowing me to participate in the care of your patient.        Sincerely,        Tyler Bennett DPM

## 2021-11-12 DIAGNOSIS — Z96.652 HISTORY OF TOTAL LEFT KNEE REPLACEMENT: ICD-10-CM

## 2021-11-12 RX ORDER — LEVOFLOXACIN 750 MG/1
TABLET, FILM COATED ORAL
Qty: 4 TABLET | Refills: 3 | Status: SHIPPED | OUTPATIENT
Start: 2021-11-12 | End: 2021-11-15

## 2021-11-12 ASSESSMENT — ENCOUNTER SYMPTOMS
SHORTNESS OF BREATH: 0
PALPITATIONS: 0
NAUSEA: 0
CHILLS: 1
SORE THROAT: 0
COUGH: 0
DIZZINESS: 0
PARESTHESIAS: 0
HEARTBURN: 0
HEMATURIA: 0
ARTHRALGIAS: 1
WEAKNESS: 0
CONSTIPATION: 0
ABDOMINAL PAIN: 0
BREAST MASS: 0
FEVER: 0
MYALGIAS: 0
HEADACHES: 0
EYE PAIN: 0
DIARRHEA: 0
HEMATOCHEZIA: 0
DYSURIA: 0

## 2021-11-12 ASSESSMENT — ACTIVITIES OF DAILY LIVING (ADL)
CURRENT_FUNCTION: NO ASSISTANCE NEEDED
CURRENT_FUNCTION: HOUSEWORK REQUIRES ASSISTANCE

## 2021-11-12 NOTE — TELEPHONE ENCOUNTER
Routing refill request to provider for review/approval because:  Drug not on the [facility] refill protocol     Mahnaz Daugherty RN

## 2021-11-12 NOTE — TELEPHONE ENCOUNTER
Reason for Call:  Medication or medication refill:    Do you use a [facility]?  Name of the pharmacy and phone number for the current request:  [facility] [place] - [phone]    Name of the medication requested: Levofloxacin    Other request: patient states she has an emergency dental procedure coming up and needs this medication before that procedure    Can we leave a detailed message on this number? YES    Phone number patient can be reached at: Home number on file [phone] (home)    Best Time: Any    Call taken on 11/12/2021 at 9:49 AM by Lorena Damon

## 2021-11-15 ENCOUNTER — OFFICE VISIT (OUTPATIENT)
Dept: INTERNAL MEDICINE | Facility: CLINIC | Age: 81
End: 2021-11-15
Payer: MEDICARE

## 2021-11-15 VITALS
BODY MASS INDEX: 35.35 KG/M2 | DIASTOLIC BLOOD PRESSURE: 66 MMHG | SYSTOLIC BLOOD PRESSURE: 138 MMHG | WEIGHT: 175 LBS | RESPIRATION RATE: 16 BRPM | OXYGEN SATURATION: 98 % | HEART RATE: 66 BPM | TEMPERATURE: 96.6 F

## 2021-11-15 DIAGNOSIS — E11.21 TYPE 2 DIABETES MELLITUS WITH DIABETIC NEPHROPATHY, WITHOUT LONG-TERM CURRENT USE OF INSULIN (H): ICD-10-CM

## 2021-11-15 DIAGNOSIS — Z96.652 HISTORY OF TOTAL LEFT KNEE REPLACEMENT: ICD-10-CM

## 2021-11-15 DIAGNOSIS — I10 ESSENTIAL HYPERTENSION, BENIGN: ICD-10-CM

## 2021-11-15 DIAGNOSIS — E03.8 OTHER SPECIFIED HYPOTHYROIDISM: ICD-10-CM

## 2021-11-15 DIAGNOSIS — Z95.1 S/P CABG (CORONARY ARTERY BYPASS GRAFT): ICD-10-CM

## 2021-11-15 DIAGNOSIS — E78.5 HYPERLIPIDEMIA LDL GOAL <100: ICD-10-CM

## 2021-11-15 DIAGNOSIS — E66.01 MORBID OBESITY (H): ICD-10-CM

## 2021-11-15 DIAGNOSIS — Z00.00 MEDICARE ANNUAL WELLNESS VISIT, SUBSEQUENT: Primary | ICD-10-CM

## 2021-11-15 DIAGNOSIS — I25.10 CORONARY ARTERY DISEASE INVOLVING NATIVE CORONARY ARTERY OF NATIVE HEART WITHOUT ANGINA PECTORIS: ICD-10-CM

## 2021-11-15 DIAGNOSIS — E03.9 HYPOTHYROIDISM, UNSPECIFIED TYPE: ICD-10-CM

## 2021-11-15 DIAGNOSIS — N18.32 STAGE 3B CHRONIC KIDNEY DISEASE (H): ICD-10-CM

## 2021-11-15 LAB
ALBUMIN SERPL-MCNC: 3.2 G/DL (ref 3.4–5)
ALP SERPL-CCNC: 111 U/L (ref 40–150)
ALT SERPL W P-5'-P-CCNC: 20 U/L (ref 0–50)
ANION GAP SERPL CALCULATED.3IONS-SCNC: 4 MMOL/L (ref 3–14)
AST SERPL W P-5'-P-CCNC: 14 U/L (ref 0–45)
BILIRUB SERPL-MCNC: 0.4 MG/DL (ref 0.2–1.3)
BUN SERPL-MCNC: 30 MG/DL (ref 7–30)
CALCIUM SERPL-MCNC: 8.8 MG/DL (ref 8.5–10.1)
CHLORIDE BLD-SCNC: 110 MMOL/L (ref 94–109)
CO2 SERPL-SCNC: 25 MMOL/L (ref 20–32)
CREAT SERPL-MCNC: 1.22 MG/DL (ref 0.52–1.04)
CREAT UR-MCNC: 114 MG/DL
GFR SERPL CREATININE-BSD FRML MDRD: 42 ML/MIN/1.73M2
GLUCOSE BLD-MCNC: 159 MG/DL (ref 70–99)
HBA1C MFR BLD: 7.3 % (ref 0–5.6)
MICROALBUMIN UR-MCNC: 80 MG/L
MICROALBUMIN/CREAT UR: 70.18 MG/G CR (ref 0–25)
POTASSIUM BLD-SCNC: 4.3 MMOL/L (ref 3.4–5.3)
PROT SERPL-MCNC: 6.9 G/DL (ref 6.8–8.8)
SODIUM SERPL-SCNC: 139 MMOL/L (ref 133–144)
T4 FREE SERPL-MCNC: 1.1 NG/DL (ref 0.76–1.46)
TSH SERPL DL<=0.005 MIU/L-ACNC: 4.2 MU/L (ref 0.4–4)

## 2021-11-15 PROCEDURE — 84443 ASSAY THYROID STIM HORMONE: CPT | Performed by: INTERNAL MEDICINE

## 2021-11-15 PROCEDURE — 36415 COLL VENOUS BLD VENIPUNCTURE: CPT | Performed by: INTERNAL MEDICINE

## 2021-11-15 PROCEDURE — 84439 ASSAY OF FREE THYROXINE: CPT | Performed by: INTERNAL MEDICINE

## 2021-11-15 PROCEDURE — G0439 PPPS, SUBSEQ VISIT: HCPCS | Performed by: INTERNAL MEDICINE

## 2021-11-15 PROCEDURE — 99214 OFFICE O/P EST MOD 30 MIN: CPT | Mod: 25 | Performed by: INTERNAL MEDICINE

## 2021-11-15 PROCEDURE — 80053 COMPREHEN METABOLIC PANEL: CPT | Performed by: INTERNAL MEDICINE

## 2021-11-15 PROCEDURE — 82043 UR ALBUMIN QUANTITATIVE: CPT | Performed by: INTERNAL MEDICINE

## 2021-11-15 PROCEDURE — 83036 HEMOGLOBIN GLYCOSYLATED A1C: CPT | Performed by: INTERNAL MEDICINE

## 2021-11-15 RX ORDER — METOPROLOL SUCCINATE 25 MG/1
TABLET, EXTENDED RELEASE ORAL
Qty: 90 TABLET | Refills: 3 | Status: SHIPPED | OUTPATIENT
Start: 2021-11-15 | End: 2022-11-21

## 2021-11-15 RX ORDER — LEVOFLOXACIN 750 MG/1
TABLET, FILM COATED ORAL
Qty: 4 TABLET | Refills: 3 | Status: SHIPPED | OUTPATIENT
Start: 2021-11-15 | End: 2022-07-01

## 2021-11-15 RX ORDER — METFORMIN HCL 500 MG
500 TABLET, EXTENDED RELEASE 24 HR ORAL
Qty: 90 TABLET | Refills: 3 | Status: SHIPPED | OUTPATIENT
Start: 2021-11-15 | End: 2022-11-07

## 2021-11-15 RX ORDER — LEVOTHYROXINE SODIUM 50 UG/1
50 TABLET ORAL DAILY
Qty: 90 TABLET | Refills: 3 | Status: SHIPPED | OUTPATIENT
Start: 2021-11-15 | End: 2022-11-21

## 2021-11-15 RX ORDER — SIMVASTATIN 40 MG
40 TABLET ORAL AT BEDTIME
Qty: 90 TABLET | Refills: 3 | Status: SHIPPED | OUTPATIENT
Start: 2021-11-15 | End: 2022-11-21

## 2021-11-15 RX ORDER — CLONIDINE HYDROCHLORIDE 0.1 MG/1
0.1 TABLET ORAL 3 TIMES DAILY
Qty: 270 TABLET | Refills: 3 | Status: SHIPPED | OUTPATIENT
Start: 2021-11-15 | End: 2022-03-30

## 2021-11-15 RX ORDER — AMLODIPINE BESYLATE 10 MG/1
10 TABLET ORAL DAILY
Qty: 90 TABLET | Refills: 3 | Status: SHIPPED | OUTPATIENT
Start: 2021-11-15 | End: 2022-11-16

## 2021-11-15 RX ORDER — NITROGLYCERIN 0.4 MG/1
0.4 TABLET SUBLINGUAL EVERY 5 MIN PRN
Qty: 25 TABLET | Refills: 1 | Status: SHIPPED | OUTPATIENT
Start: 2021-11-15 | End: 2022-11-07

## 2021-11-15 ASSESSMENT — ENCOUNTER SYMPTOMS
DIZZINESS: 0
CHILLS: 1
MYALGIAS: 0
PALPITATIONS: 0
SHORTNESS OF BREATH: 0
FEVER: 0
HEMATOCHEZIA: 0
CONSTIPATION: 0
BREAST MASS: 0
DIARRHEA: 0
DYSURIA: 0
ABDOMINAL PAIN: 0
NAUSEA: 0
COUGH: 0
ARTHRALGIAS: 1
PARESTHESIAS: 0
WEAKNESS: 0
SORE THROAT: 0
EYE PAIN: 0
HEMATURIA: 0
HEADACHES: 0
HEARTBURN: 0

## 2021-11-15 ASSESSMENT — ACTIVITIES OF DAILY LIVING (ADL)
CURRENT_FUNCTION: HOUSEWORK REQUIRES ASSISTANCE
CURRENT_FUNCTION: NO ASSISTANCE NEEDED

## 2021-11-15 NOTE — PROGRESS NOTES
"SUBJECTIVE:   Sandra Petit is a 81 year old female who presents for Preventive Visit.    Patient has been advised of split billing requirements and indicates understanding: Yes   Are you in the first 12 months of your Medicare coverage?  No    Healthy Habits:     In general, how would you rate your overall health?  Good    Frequency of exercise:  1 day/week    Duration of exercise:  Less than 15 minutes    Do you usually eat at least 4 servings of fruit and vegetables a day, include whole grains    & fiber and avoid regularly eating high fat or \"junk\" foods?  No    Taking medications regularly:  Yes    Medication side effects:  None    Ability to successfully perform activities of daily living:  Housework requires assistance and no assistance needed    Home Safety:  No safety concerns identified    Hearing Impairment:  No hearing concerns    In the past 6 months, have you been bothered by leaking of urine? Yes    In general, how would you rate your overall mental or emotional health?  Good      PHQ-2 Total Score: 0    Additional concerns today:  Yes    Tired all the time, better sleep if physically active. Still unpacking at [facility].    Needs dental prophylaxis, also needs clonidine.   BP has been up and down, 130-160 range at home.      Sugars have been high 130-160 range, checks twice a day, no lows, weight is coming down.     Not exercising.  Knees affect her walking ability, uses a cane.        Do you feel safe in your environment? Yes    Have you ever done Advance Care Planning? (For example, a Health Directive, POLST, or a discussion with a medical provider or your loved ones about your wishes): Yes, advance care planning is on file.       Fall risk  Fallen 2 or more times in the past year?: No  Any fall with injury in the past year?: No    Cognitive Screening   1) Repeat 3 items (Leader, Season, Table)    2) Clock draw: NORMAL  3) 3 item recall: Recalls 2 objects   Results: NORMAL clock, 1-2 " items recalled: COGNITIVE IMPAIRMENT LESS LIKELY    Mini-CogTM Copyright KELLY Joya. Licensed by the author for use in [facility]; reprinted with permission (ramón@[facility]). All rights reserved.      Do you have sleep apnea, excessive snoring or daytime drowsiness?: no    Reviewed and updated as needed this visit by clinical staff  Tobacco  Allergies  Meds             Reviewed and updated as needed this visit by Provider               Social History     Tobacco Use     Smoking status: Former Smoker     Smokeless tobacco: Never Used     Tobacco comment: quit  1987   Substance Use Topics     Alcohol use: No     Alcohol/week: 0.0 standard drinks     If you drink alcohol do you typically have >3 drinks per day or >7 drinks per week? No    Alcohol Use 11/12/2021   Prescreen: >3 drinks/day or >7 drinks/week? No   Prescreen: >3 drinks/day or >7 drinks/week? -     Current providers sharing in care for this patient include:   Patient Care Team:  Ash Quiroz MD as PCP - General (Family Practice)  Ash Quiroz MD as Assigned PCP  Dafne Auguste RD as Diabetes Educator (Dietitian, Registered)  Tyler Bennett DPM as Assigned Musculoskeletal Provider    The following health maintenance items are reviewed in Epic and correct as of today:  Health Maintenance Due   Topic Date Due     ANNUAL REVIEW OF  ORDERS  Never done     ZOSTER IMMUNIZATION (2 of 3) 11/20/2010     DIABETIC FOOT EXAM  07/18/2019     EYE EXAM  06/01/2021     FALL RISK ASSESSMENT  08/03/2021     MEDICARE ANNUAL WELLNESS VISIT  11/11/2021     Lab work is in process  Pneumonia Vaccine:up to date      Mammogram Screening - Patient over age 75, has elected to continue with screening.  Pertinent mammograms are reviewed under the imaging tab.    Review of Systems   Constitutional: Positive for chills. Negative for fever.   HENT: Positive for ear pain. Negative for congestion, hearing loss and sore throat.    Eyes: Negative for  "pain and visual disturbance.   Respiratory: Negative for cough and shortness of breath.    Cardiovascular: Positive for peripheral edema. Negative for chest pain and palpitations.   Gastrointestinal: Negative for abdominal pain, constipation, diarrhea, heartburn, hematochezia and nausea.   Breasts:  Negative for tenderness, breast mass and discharge.   Genitourinary: Negative for dysuria, genital sores, hematuria, pelvic pain, urgency, vaginal bleeding and vaginal discharge.   Musculoskeletal: Positive for arthralgias. Negative for myalgias.   Skin: Negative for rash.   Neurological: Negative for dizziness, weakness, headaches and paresthesias.   Psychiatric/Behavioral: Negative for mood changes.     OBJECTIVE:   /66   Pulse 66   Temp (!) 96.6  F (35.9  C) (Temporal)   Resp 16   Wt 79.4 kg (175 lb)   SpO2 98%   BMI 35.35 kg/m   Estimated body mass index is 35.35 kg/m  as calculated from the following:    Height as of 3/11/21: 1.499 m (4' 11\").    Weight as of this encounter: 79.4 kg (175 lb).  Physical Exam  GENERAL: healthy, alert and no distress  EYES: Eyes grossly normal to inspection, PERRL and conjunctivae and sclerae normal  HENT: ear canals and TM's normal, nose and mouth without ulcers or lesions  NECK: no adenopathy, no asymmetry, masses, or scars and thyroid normal to palpation  RESP: lungs clear to auscultation - no rales, rhonchi or wheezes  CV: regular rate and rhythm, normal S1 S2, no S3 or S4, no murmur, click or rub, no peripheral edema and peripheral pulses strong  ABDOMEN: soft, nontender, no hepatosplenomegaly, no masses and bowel sounds normal  MS: 1+ pitting edema  SKIN: no suspicious lesions or rashes  NEURO: Normal strength and tone, mentation intact and speech normal  PSYCH: mentation appears normal, affect normal/bright    ASSESSMENT / PLAN:       ICD-10-CM    1. Medicare annual wellness visit, subsequent  Z00.00    2. History of total left knee replacement  Z96.652 levofloxacin " (LEVAQUIN) 750 MG tablet   3. Essential hypertension, benign  I10 Comprehensive metabolic panel (BMP + Alb, Alk Phos, ALT, AST, Total. Bili, TP)     Albumin Random Urine Quantitative with Creat Ratio     amLODIPine (NORVASC) 10 MG tablet     metoprolol succinate ER (TOPROL-XL) 25 MG 24 hr tablet   4. Stage 3b chronic kidney disease (H)  N18.32    5. Type 2 diabetes mellitus with diabetic nephropathy, without long-term current use of insulin (H)  E11.21 Hemoglobin A1c     Comprehensive metabolic panel (BMP + Alb, Alk Phos, ALT, AST, Total. Bili, TP)     Albumin Random Urine Quantitative with Creat Ratio     metFORMIN (GLUCOPHAGE-XR) 500 MG 24 hr tablet   6. Morbid obesity (H)  E66.01    7. Coronary artery disease involving native coronary artery of native heart without angina pectoris  I25.10 Comprehensive metabolic panel (BMP + Alb, Alk Phos, ALT, AST, Total. Bili, TP)   8. Other specified hypothyroidism  E03.8 TSH with free T4 reflex   9. Hypothyroidism, unspecified type  E03.9 levothyroxine (SYNTHROID/LEVOTHROID) 50 MCG tablet   10. Hyperlipidemia LDL goal <100  E78.5 simvastatin (ZOCOR) 40 MG tablet     Patient here for Medicare wellness check, overall she is doing okay she lives at [facility] but would like to move because there are some younger men living there.  Diabetes is doing okay running a little higher may be from some dietary indiscretion we will check her A1c continue Metformin  Blood pressures controlled with 4 medications clonidine, losartan, amlodipine giving her some swelling, and metoprolol.  She is up and down but stable today    Chronic knee pain which have been replaced but still has issues with the right knee she is thinking about having surgery again but wants to wait.    Hypothyroidism we will check TSH and continue levothyroxine  Heart disease is stable she is not very active.  She is up-to-date on her immunizations, mammogram    Hyperlipidemia continue her simvastatin even though  "it interacts with amlodipine she wants to stay with those meds.        Patient has been advised of split billing requirements and indicates understanding: Yes  COUNSELING:  Reviewed preventive health counseling, as reflected in patient instructions       Regular exercise       Healthy diet/nutrition    Estimated body mass index is 35.35 kg/m  as calculated from the following:    Height as of 3/11/21: 1.499 m (4' 11\").    Weight as of this encounter: 79.4 kg (175 lb).    Weight management plan: Discussed healthy diet and exercise guidelines    She reports that she has quit smoking. She has never used smokeless tobacco.      Appropriate preventive services were discussed with this patient, including applicable screening as appropriate for cardiovascular disease, diabetes, osteopenia/osteoporosis, and glaucoma.  As appropriate for age/gender, discussed screening for colorectal cancer, prostate cancer, breast cancer, and cervical cancer. Checklist reviewing preventive services available has been given to the patient.    Reviewed patients plan of care and provided an AVS. The Basic Care Plan (routine screening as documented in Health Maintenance) for Sandra meets the Care Plan requirement. This Care Plan has been established and reviewed with the Patient.    Counseling Resources:  ATP IV Guidelines  Pooled Cohorts Equation Calculator  Breast Cancer Risk Calculator  Breast Cancer: Medication to Reduce Risk  FRAX Risk Assessment  ICSI Preventive Guidelines  Dietary Guidelines for Americans, 2010  [employer] MyPlate  ASA Prophylaxis  Lung CA Screening    Jason Niño MD  [facility]    Identified Health Risks:  "

## 2021-12-14 ENCOUNTER — TELEPHONE (OUTPATIENT)
Dept: INTERNAL MEDICINE | Facility: CLINIC | Age: 81
End: 2021-12-14
Payer: MEDICARE

## 2021-12-14 DIAGNOSIS — Z96.652 HISTORY OF TOTAL LEFT KNEE REPLACEMENT: Primary | ICD-10-CM

## 2021-12-14 RX ORDER — AZITHROMYCIN 250 MG/1
TABLET, FILM COATED ORAL
Qty: 4 TABLET | Refills: 0 | Status: SHIPPED | OUTPATIENT
Start: 2021-12-14 | End: 2022-03-30

## 2021-12-14 NOTE — TELEPHONE ENCOUNTER
Patient states she has been taking Levaquin prior to dental appts, but has been having a reaction, upper lip burns and is swollen. She would like a different medication called in, as she does have an upcoming dental appt  Please advise

## 2022-01-03 DIAGNOSIS — I10 ESSENTIAL HYPERTENSION, BENIGN: ICD-10-CM

## 2022-01-04 RX ORDER — LOSARTAN POTASSIUM 25 MG/1
TABLET ORAL
Qty: 360 TABLET | Refills: 0 | OUTPATIENT
Start: 2022-01-04

## 2022-01-06 DIAGNOSIS — E11.59 DIABETIC VASCULOPATHY (H): ICD-10-CM

## 2022-01-07 ENCOUNTER — MYC MEDICAL ADVICE (OUTPATIENT)
Dept: INTERNAL MEDICINE | Facility: CLINIC | Age: 82
End: 2022-01-07
Payer: MEDICARE

## 2022-01-07 DIAGNOSIS — I10 ESSENTIAL HYPERTENSION, BENIGN: ICD-10-CM

## 2022-01-07 RX ORDER — LOSARTAN POTASSIUM 50 MG/1
TABLET ORAL
Qty: 90 TABLET | Refills: 0 | Status: SHIPPED | OUTPATIENT
Start: 2022-01-07 | End: 2022-02-15

## 2022-01-07 NOTE — TELEPHONE ENCOUNTER
Routing refill request to provider for review/approval because:  Labs not current:  Creatinine.     Mahnaz Daugherty RN

## 2022-01-10 RX ORDER — BLOOD SUGAR DIAGNOSTIC
STRIP MISCELLANEOUS
Qty: 100 STRIP | Refills: 3 | Status: SHIPPED | OUTPATIENT
Start: 2022-01-10 | End: 2022-09-09

## 2022-01-10 RX ORDER — LOSARTAN POTASSIUM 50 MG/1
50 TABLET ORAL 2 TIMES DAILY
Qty: 90 TABLET | Refills: 3 | OUTPATIENT
Start: 2022-01-10

## 2022-01-10 NOTE — TELEPHONE ENCOUNTER
onetouch test strip  Prescription approved per [facility] Refill Protocol.    Radha Javier RN

## 2022-01-14 DIAGNOSIS — E11.59 DIABETIC VASCULOPATHY (H): ICD-10-CM

## 2022-01-17 RX ORDER — LANCETS 30 GAUGE
1 EACH MISCELLANEOUS DAILY
Qty: 100 EACH | Refills: 2 | OUTPATIENT
Start: 2022-01-17

## 2022-01-17 NOTE — TELEPHONE ENCOUNTER
Lancets  Denied, should have refills left at this pharmacy:  Sent 8/3/2021 for 100 each an 5 refills    Radha Javier RN

## 2022-01-21 DIAGNOSIS — E11.59 DIABETIC VASCULOPATHY (H): ICD-10-CM

## 2022-01-21 RX ORDER — LANCETS 30 GAUGE
1 EACH MISCELLANEOUS DAILY
Qty: 100 EACH | Refills: 5 | Status: SHIPPED | OUTPATIENT
Start: 2022-01-21 | End: 2023-03-30

## 2022-01-21 NOTE — TELEPHONE ENCOUNTER
Pharmacy calling, refill was denied however due to medicare rules need new prescription as you cant transfer prescription between pharmacies. Pt is completely out of lancets.

## 2022-01-24 ENCOUNTER — MYC MEDICAL ADVICE (OUTPATIENT)
Dept: INTERNAL MEDICINE | Facility: CLINIC | Age: 82
End: 2022-01-24
Payer: MEDICARE

## 2022-01-24 ENCOUNTER — TELEPHONE (OUTPATIENT)
Dept: INTERNAL MEDICINE | Facility: CLINIC | Age: 82
End: 2022-01-24
Payer: MEDICARE

## 2022-01-24 DIAGNOSIS — Z29.8 * * * SBE PROPHYLAXIS * * *: Primary | ICD-10-CM

## 2022-01-24 RX ORDER — AZITHROMYCIN 250 MG/1
TABLET, FILM COATED ORAL
Qty: 6 TABLET | Refills: 1 | Status: SHIPPED | OUTPATIENT
Start: 2022-01-24 | End: 2022-11-07

## 2022-02-12 DIAGNOSIS — I10 ESSENTIAL HYPERTENSION, BENIGN: ICD-10-CM

## 2022-02-15 RX ORDER — LOSARTAN POTASSIUM 50 MG/1
TABLET ORAL
Qty: 90 TABLET | Refills: 2 | Status: SHIPPED | OUTPATIENT
Start: 2022-02-15 | End: 2022-07-12

## 2022-02-15 NOTE — TELEPHONE ENCOUNTER
Medicare Wellness Visit  Plan for Preventive Care    A good way for you to stay healthy is to use preventive care.  Medicare covers many services that can help you stay healthy.* The goal of these services is to find any health problems as quickly as possible. Finding problems early can help make them easier to treat.  Your personal plan below lists the services you may need and when they are due.     Health Maintenance Summary     Shingles Vaccine (1 of 2)  Overdue since 10/31/1999    Breast Cancer Screening (Every 2 Years)  Ordered on 9/23/2020    Medicare Wellness Visit (Yearly)  Overdue since 5/20/2020    Influenza Vaccine (1)  Overdue since 9/1/2020    DTaP/Tdap/Td Vaccine (2 - Td)  Next due on 12/4/2023    Colorectal Cancer Screening-Colonoscopy (Every 10 Years)  Next due on 2/2/2025    Hepatitis C Screening   Completed    Osteoporosis Screening   Completed    Pneumococcal Vaccine 65+   Completed    Hepatitis B Vaccine   Aged Out    Meningococcal Vaccine   Aged Out    HPV Vaccine   Aged Out           Preventive Care for Women and Men    Heart Screenings (Cardiovascular):  · Blood tests are used to check your cholesterol, lipid and triglyceride levels. High levels can increase your risk for heart disease and stroke. High levels can be treated with medications, diet and exercise. Lowering your levels can help keep your heart and blood vessels healthy.  Your provider will order these tests if they are needed.    · An ultrasound is done to see if you have an abdominal aortic aneurysm (AAA).  This is an enlargement of one of the main blood vessels that delivers blood to the body.   In the [place], 9,000 deaths are caused by AAA.  You may not even know you have this problem and as many as 1 in 3 people will have a serious problem if it is not treated.  Early diagnosis allows for more effective treatment and cure.  If you have a family history of AAA or are a male age 65-75 who has smoked, you are at higher  Pending Prescriptions:                       Disp   Refills    losartan (COZAAR) 50 MG tablet [Pharmacy M*90 tab*2        Sig: TAKE 1 TABLET BY MOUTH TWICE A DAY      Routing refill request to provider for review/approval because:  Labs out of range:  LOPEZ Javier RN         risk of an AAA.  Your provider can order this test, if needed.    Colorectal Screening:  · There are many tests that are used to check for cancer of your colon and rectum. You and your provider should discuss what test is best for you and when to have it done.  Options include:  · Screening Colonoscopy: exam of the entire colon, seen through a flexible lighted tube.  · Flexible Sigmoidoscopy: exam of the last third (sigmoid portion) of the colon and rectum, seen through a flexible lighted tube.  · Cologuard DNA stool test: a sample of your stool is used to screen for cancer and unseen blood in your stool.  · Fecal Occult Blood Test: a sample of your stool is studied to find any unseen blood    Flu Shot:  · An immunization that helps to prevent influenza (the flu). You should get this every year. The best time to get the shot is in the fall.    Pneumococcal Shot:  • Vaccines are available that can help prevent pneumococcal disease, which is any type of infection caused by Streptococcus pneumoniae bacteria.   Their use can prevent some cases of pneumonia, meningitis, and sepsis. There are two types of pneumococcal vaccines:   o Conjugate vaccines (PCV-13 or Prevnar 13®) - helps protect against the 13 types of pneumococcal bacteria that are the most common causes of serious infections in children and adults.    o Polysaccharide vaccine (PPSV23 or [id number]®) - helps protect against 23 types of pneumococcal bacteria for patients who are recommended to get it.  These vaccines should be given at least 12 months apart.  A booster is usually not needed.     Hepatitis B Shot:  · An immunization that helps to protect people from getting Hepatitis B. Hepatitis B is a virus that spreads through contact with infected blood or body fluids. Many people with the virus do not have symptoms.  The virus can lead to serious problems, such as liver disease. Some people are at higher risk than others. Your doctor will tell you if you  need this shot.     Diabetes Screening:  · A test to measure sugar (glucose) in your blood is called a fasting blood sugar. Fasting means you cannot have food or drink for at least 8 hours before the test. This test can detect diabetes long before you may notice symptoms.    Glaucoma Screening:  · Glaucoma screening is performed by your eye doctor. The test measures the fluid pressure inside your eyes to determine if you have glaucoma.     Hepatitis C Screening:  · A blood test to see if you have the hepatitis C virus.  Hepatitis C attacks the liver and is a major cause of chronic liver disease.  Medicare will cover a single screening for all adults born between 1945 & 1965, or high risk patients (people who have injected illegal drugs or people who have had blood transfusions).  High risk patients who continue to inject illegal drugs can be screened for Hepatitis C every year.    Smoking and Tobacco-Use Cessation Counseling:  · Tobacco is the single greatest cause of disease and early death in our country today. Medication and counseling together can increase a person’s chance of quitting for good.   · Medicare covers two quitting attempts per year, with four counseling sessions per attempt (eight sessions in a 12 month period)    Preventive Screening tests for Women    Screening Mammograms and Breast Exams:  · An x-ray of your breasts to check for breast cancer before you or your doctor may be able to feel it.  If breast cancer is found early it can usually be treated with success.    Pelvic Exams and Pap Tests:  · An exam to check for cervical and vaginal cancer. A Pap test is a lab test in which cells are taken from your cervix and sent to the lab to look for signs of cervical cancer. If cancer of the cervix is found early, chances for a cure are good. Testing can generally end at age 65, or if a woman has a hysterectomy for a benign condition. Your provider may recommend more frequent testing if certain  abnormal results are found.    Bone Mass Measurements:  · A painless x-ray of your bone density to see if you are at risk for a broken bone. Bone density refers to the thickness of bones or how tightly the bone tissue is packed.    Preventive Screening tests for Men    Prostate Screening:  · Should you have a prostate cancer test (PSA)?  It is up to you to decide if you want a prostate cancer test. Talk to your clinician to find out if the test is right for you.  Things for you to consider and talk about should include:  · Benefits and harms of the test  · Your family history  · How your race/ethnicity may influence the test  · If the test may impact other medical conditions you have  · Your values on screenings and treatments    *Medicare pays for many preventive services to keep you healthy. For some of these services, you might have to pay a deductible, coinsurance, and / or copayment.  The amounts vary depending on the type of services you need and the kind of Medicare health plan you have.

## 2022-03-09 ENCOUNTER — TELEPHONE (OUTPATIENT)
Dept: INTERNAL MEDICINE | Facility: CLINIC | Age: 82
End: 2022-03-09
Payer: MEDICARE

## 2022-03-09 NOTE — TELEPHONE ENCOUNTER
Reason for call:  Patient reporting a symptom UPDATE ON BLOOD PRESSURES    Symptom or request: Blood pressures ar consistently running high, 160/70's - 181/70's this is in the morning    Duration (how long have symptoms been present): a few weeks    Have you been treated for this before? Yes, on several medications for her blood pressure    Additional comments: Patient stating she has also moved into an apartment which has tenants who cause anxiety also.     Phone Number patient can be reached at:  Home number on file [phone] (home)    Best Time:  anytime    Can we leave a detailed message on this number:  YES    Call taken on 3/9/2022 at 4:12 PM by Florence Lagos LPN

## 2022-03-15 DIAGNOSIS — I10 ESSENTIAL HYPERTENSION, BENIGN: ICD-10-CM

## 2022-03-17 RX ORDER — LOSARTAN POTASSIUM 50 MG/1
TABLET ORAL
Qty: 90 TABLET | Refills: 0 | OUTPATIENT
Start: 2022-03-17

## 2022-03-17 NOTE — TELEPHONE ENCOUNTER
Communicated with pharmacy to transfer refills over from [facility].   Radha Cerrato, BSN, RN

## 2022-03-30 ENCOUNTER — OFFICE VISIT (OUTPATIENT)
Dept: INTERNAL MEDICINE | Facility: CLINIC | Age: 82
End: 2022-03-30
Payer: MEDICARE

## 2022-03-30 VITALS
WEIGHT: 173 LBS | HEART RATE: 62 BPM | DIASTOLIC BLOOD PRESSURE: 60 MMHG | RESPIRATION RATE: 16 BRPM | HEIGHT: 59 IN | SYSTOLIC BLOOD PRESSURE: 138 MMHG | TEMPERATURE: 98.3 F | BODY MASS INDEX: 34.88 KG/M2 | OXYGEN SATURATION: 96 %

## 2022-03-30 DIAGNOSIS — I10 ESSENTIAL HYPERTENSION, BENIGN: Primary | ICD-10-CM

## 2022-03-30 DIAGNOSIS — E66.01 MORBID OBESITY (H): ICD-10-CM

## 2022-03-30 DIAGNOSIS — N18.32 STAGE 3B CHRONIC KIDNEY DISEASE (H): ICD-10-CM

## 2022-03-30 DIAGNOSIS — E11.21 TYPE 2 DIABETES MELLITUS WITH DIABETIC NEPHROPATHY, WITHOUT LONG-TERM CURRENT USE OF INSULIN (H): ICD-10-CM

## 2022-03-30 PROCEDURE — 99214 OFFICE O/P EST MOD 30 MIN: CPT | Performed by: INTERNAL MEDICINE

## 2022-03-30 RX ORDER — CLONIDINE HYDROCHLORIDE 0.1 MG/1
0.1 TABLET ORAL 4 TIMES DAILY
Qty: 360 TABLET | Refills: 3 | Status: SHIPPED | OUTPATIENT
Start: 2022-03-30 | End: 2023-04-05

## 2022-03-30 NOTE — PROGRESS NOTES
Assessment & Plan     Essential hypertension, benign  Blood pressure which has been elevated the last few months.  She is already on losartan, amlodipine at maximal doses.  Her pulse is in the 50s we cannot go up on her beta-blocker.  She is taking clonidine 0.1 mg 3 times a day will increase this to 4 times a day.  Follow her blood pressure at home and let me know how she is doing.  - cloNIDine (CATAPRES) 0.1 MG tablet; Take 1 tablet (0.1 mg) by mouth 4 times daily    Type 2 diabetes mellitus with diabetic nephropathy, without long-term current use of insulin (H)  Is been stable she will continue Metformin.  Labs were done in November and good.    Morbid obesity (H)  Obesity recommend continue to work on weight loss more activity.    Stage 3b chronic kidney disease (H)  Stage IIIb kidney disease is stable.  No changes needed.    Right big toe lesion it is just a hematoma under the nail should grow out no further treatment needed.                   No follow-ups on file.    Jason Niño MD  [facility] IZABELA Davila is a 82 year old who presents for the following health issues     History of Present Illness       Hypertension: She presents for follow up of hypertension.  She does check blood pressure  regularly outside of the clinic. Outside blood pressures have been over 140/90. She follows a low salt diet.     She eats 0-1 servings of fruits and vegetables daily.She consumes 0 sweetened beverage(s) daily.She exercises with enough effort to increase her heart rate 9 or less minutes per day.  She exercises with enough effort to increase her heart rate 3 or less days per week.   She is taking medications regularly.     Sold her house and moved to [facility] for the last year to two years. Stress with people that live by her.      BP has been high at home.  181 was the highest.  150-160/70 range.  Pulse is 50 range.  BP is high throughout the day.    Worried about a stroke,  "as her brother had a stroke and sister had two strokes.     Has losartan and amlodipine and clonidine and metoprolol low dose.      No lightheaded, no headaches, no chest pains.          Review of Systems         Objective    /64   Pulse 96   Temp 98.3  F (36.8  C) (Temporal)   Resp 16   Ht 1.499 m (4' 11\")   Wt 78.5 kg (173 lb)   SpO2 96%   BMI 34.94 kg/m    Body mass index is 34.94 kg/m .  Physical Exam   NAD,   BP recheck is higher and pulse is 62.      Left big toe nail is darkened the last month.     Cardiac-regular rate and rhythm  Lungs are clear            "

## 2022-04-13 ENCOUNTER — IMMUNIZATION (OUTPATIENT)
Dept: FAMILY MEDICINE | Facility: CLINIC | Age: 82
End: 2022-04-13
Payer: MEDICARE

## 2022-04-13 PROCEDURE — 0054A COVID-19,PF,PFIZER (12+ YRS): CPT

## 2022-04-13 PROCEDURE — 91305 COVID-19,PF,PFIZER (12+ YRS): CPT

## 2022-05-31 ENCOUNTER — NURSE TRIAGE (OUTPATIENT)
Dept: NURSING | Facility: CLINIC | Age: 82
End: 2022-05-31
Payer: MEDICARE

## 2022-06-01 NOTE — TELEPHONE ENCOUNTER
Nurse Triage SBAR    Is this a 2nd Level Triage? NO    Situation: Overdose of Metformin    Assessment: Pt accidentally took her Metformin twice tonight.  She took one tab just now (thinking it was her Simvastatin) and she previously took it at 4PM today as well.      She is asymptomatic at this time.      metFORMIN (GLUCOPHAGE-XR) 500 MG 24 hr tablet 90 tablet 3 11/15/2021  No   Sig - Route: Take 1 tablet (500 mg) by mouth daily (with dinner) - Oral       Protocol Recommended Disposition: Referred to other Agency    Call connected with Poison Control @ 10:35PM.       COVID 19 Nurse Triage Plan/Patient Instructions  Please be aware that novel coronavirus (COVID-19) may be circulating in the community. If you develop symptoms such as fever, cough, or SOB or if you have concerns about the presence of another infection including coronavirus (COVID-19), please contact your health care provider or visit [email or web address].     Disposition/Instructions  Thank you for taking steps to prevent the spread of this virus.  o Limit your contact with others.  o Wear a simple mask to cover your cough.  o Wash your hands well and often.    Resources    [facility]: About COVID-19: [email or web address]    CDC: What to Do If You're Sick: www.cdc.gov/coronavirus/2019-ncov/about/steps-when-sick.html    CDC: Ending Home Isolation: www.cdc.gov/coronavirus/2019-ncov/hcp/disposition-in-home-patients.html     CDC: Caring for Someone: www.cdc.gov/coronavirus/2019-ncov/if-you-are-sick/care-for-someone.html     [facility]: Interim Guidance for Hospital Discharge to Home: www.health.[email or web address].[place]/diseases/coronavirus/hcp/hospdischarge.pdf    [facility] clinical trials (COVID-19 research studies): [email or web address]/umn-clinical-trials     Below are the COVID-19 hotlines at the [place] Department of Health ([facility]). Interpreters are available.   o For health questions: Call 334-580-1322 or 1-939.658.3884 (7  a.m. to 7 p.m.)  o For questions about schools and childcare: Call [phone] or 1-379.801.6198 (7 a.m. to 7 p.m.)     Gavi Tolentino RN  [facility] Nurse Advisor      Reason for Disposition    [1] DOUBLE DOSE (an extra dose or lesser amount) of prescription drug AND [2] NO symptoms (Exception: a double dose of antibiotics)    Additional Information    Negative: Drug overdose and triager unable to answer question    Negative: Caller requesting information unrelated to medicine    Negative: Caller requesting a prescription for Strep throat and has a positive culture result    Negative: Rash while taking a medication or within 3 days of stopping it    Negative: Immunization reaction suspected    Negative: [1] Asthma and [2] having symptoms of asthma (cough, wheezing, etc.)    Negative: [1] Influenza symptoms AND [2] anti-viral med prescription request, such as Tamiflu    Negative: [1] Symptom of illness (e.g., headache, abdominal pain, earache, vomiting) AND [2] more than mild    Negative: MORE THAN A DOUBLE DOSE of a prescription or over-the-counter (OTC) drug    Negative: [1] DOUBLE DOSE (an extra dose or lesser amount) of over-the-counter (OTC) drug AND [2] any symptoms (e.g., dizziness, nausea, pain, sleepiness)    Negative: [1] DOUBLE DOSE (an extra dose or lesser amount) of prescription drug AND [2] any symptoms (e.g., dizziness, nausea, pain, sleepiness)    Negative: Took another person's prescription drug    Protocols used: MEDICATION QUESTION CALL-A-

## 2022-06-04 ENCOUNTER — HEALTH MAINTENANCE LETTER (OUTPATIENT)
Age: 82
End: 2022-06-04

## 2022-06-10 ENCOUNTER — LAB (OUTPATIENT)
Dept: LAB | Facility: CLINIC | Age: 82
End: 2022-06-10
Payer: MEDICARE

## 2022-06-10 DIAGNOSIS — N18.31 STAGE 3A CHRONIC KIDNEY DISEASE (H): ICD-10-CM

## 2022-06-10 DIAGNOSIS — E55.9 VITAMIN D DEFICIENCY: Primary | ICD-10-CM

## 2022-06-10 LAB
ALBUMIN SERPL-MCNC: 3.3 G/DL (ref 3.4–5)
ANION GAP SERPL CALCULATED.3IONS-SCNC: 6 MMOL/L (ref 3–14)
BUN SERPL-MCNC: 30 MG/DL (ref 7–30)
CALCIUM SERPL-MCNC: 8.6 MG/DL (ref 8.5–10.1)
CHLORIDE BLD-SCNC: 109 MMOL/L (ref 94–109)
CO2 SERPL-SCNC: 26 MMOL/L (ref 20–32)
CREAT SERPL-MCNC: 1.3 MG/DL (ref 0.52–1.04)
CREAT UR-MCNC: 199 MG/DL
GFR SERPL CREATININE-BSD FRML MDRD: 41 ML/MIN/1.73M2
GLUCOSE BLD-MCNC: 140 MG/DL (ref 70–99)
HGB BLD-MCNC: 12.3 G/DL (ref 11.7–15.7)
PHOSPHATE SERPL-MCNC: 3.6 MG/DL (ref 2.5–4.5)
POTASSIUM BLD-SCNC: 4.3 MMOL/L (ref 3.4–5.3)
PROT UR-MCNC: 0.36 G/L
PROT/CREAT 24H UR: 0.18 G/G CR (ref 0–0.2)
PTH-INTACT SERPL-MCNC: 62 PG/ML (ref 18–80)
SODIUM SERPL-SCNC: 141 MMOL/L (ref 133–144)

## 2022-06-10 PROCEDURE — 36415 COLL VENOUS BLD VENIPUNCTURE: CPT

## 2022-06-10 PROCEDURE — 84156 ASSAY OF PROTEIN URINE: CPT

## 2022-06-10 PROCEDURE — 80069 RENAL FUNCTION PANEL: CPT

## 2022-06-10 PROCEDURE — 83970 ASSAY OF PARATHORMONE: CPT

## 2022-06-10 PROCEDURE — 85018 HEMOGLOBIN: CPT

## 2022-06-10 PROCEDURE — 82306 VITAMIN D 25 HYDROXY: CPT

## 2022-06-15 LAB
DEPRECATED CALCIDIOL+CALCIFEROL SERPL-MC: <66 UG/L (ref 20–75)
VITAMIN D2 SERPL-MCNC: <5 UG/L
VITAMIN D3 SERPL-MCNC: 61 UG/L

## 2022-07-01 ENCOUNTER — OFFICE VISIT (OUTPATIENT)
Dept: INTERNAL MEDICINE | Facility: CLINIC | Age: 82
End: 2022-07-01
Payer: MEDICARE

## 2022-07-01 VITALS
SYSTOLIC BLOOD PRESSURE: 136 MMHG | WEIGHT: 177 LBS | HEIGHT: 59 IN | TEMPERATURE: 97 F | BODY MASS INDEX: 35.68 KG/M2 | HEART RATE: 62 BPM | RESPIRATION RATE: 18 BRPM | DIASTOLIC BLOOD PRESSURE: 82 MMHG | OXYGEN SATURATION: 97 %

## 2022-07-01 DIAGNOSIS — I25.10 CORONARY ARTERY DISEASE INVOLVING NATIVE CORONARY ARTERY OF NATIVE HEART WITHOUT ANGINA PECTORIS: ICD-10-CM

## 2022-07-01 DIAGNOSIS — F51.01 PRIMARY INSOMNIA: ICD-10-CM

## 2022-07-01 DIAGNOSIS — E66.01 MORBID OBESITY (H): ICD-10-CM

## 2022-07-01 DIAGNOSIS — I10 ESSENTIAL HYPERTENSION, BENIGN: Primary | ICD-10-CM

## 2022-07-01 DIAGNOSIS — E11.59 TYPE 2 DIABETES MELLITUS WITH OTHER CIRCULATORY COMPLICATIONS (H): ICD-10-CM

## 2022-07-01 DIAGNOSIS — N18.32 STAGE 3B CHRONIC KIDNEY DISEASE (H): ICD-10-CM

## 2022-07-01 LAB
CHOLEST SERPL-MCNC: 140 MG/DL
FASTING STATUS PATIENT QL REPORTED: YES
HBA1C MFR BLD: 7.1 % (ref 0–5.6)
HDLC SERPL-MCNC: 52 MG/DL
LDLC SERPL CALC-MCNC: 59 MG/DL
NONHDLC SERPL-MCNC: 88 MG/DL
TRIGL SERPL-MCNC: 143 MG/DL

## 2022-07-01 PROCEDURE — 99214 OFFICE O/P EST MOD 30 MIN: CPT | Performed by: INTERNAL MEDICINE

## 2022-07-01 PROCEDURE — 36415 COLL VENOUS BLD VENIPUNCTURE: CPT | Performed by: INTERNAL MEDICINE

## 2022-07-01 PROCEDURE — 83036 HEMOGLOBIN GLYCOSYLATED A1C: CPT | Performed by: INTERNAL MEDICINE

## 2022-07-01 PROCEDURE — 99207 PR FOOT EXAM NO CHARGE: CPT | Performed by: INTERNAL MEDICINE

## 2022-07-01 PROCEDURE — 80061 LIPID PANEL: CPT | Performed by: INTERNAL MEDICINE

## 2022-07-01 ASSESSMENT — PAIN SCALES - GENERAL: PAINLEVEL: MODERATE PAIN (5)

## 2022-07-01 NOTE — PROGRESS NOTES
"  Assessment & Plan   Problem List Items Addressed This Visit     CKD (chronic kidney disease) stage 3, GFR 30-59 ml/min (H)    Coronary artery disease involving native coronary artery without angina pectoris    Type 2 diabetes mellitus with other circulatory complications (H)    Relevant Orders    HEMOGLOBIN A1C    Lipid panel reflex to direct LDL Fasting    FOOT EXAM (Completed)    Morbid obesity (H)      Other Visit Diagnoses     Essential hypertension, benign    -  Primary         Diabetic patient with coronary disease, chronic kidney disease, obesity and blood pressure here for recheck.  She is doing relatively well having some fatigue but her hemoglobin was normal recently.  Saw nephrology and is doing fine with creatinine of 1.3.    Blood pressure is up-and-down at home running 1 30-1 60 but she is on 4 medications maxed out.  No changes at this time.  Today she is good at 136/82    Obesity recommended weight loss she will try to work on this trying portion control we did discuss trying one of the newer diabetic medications and she will check with her insurance.    Diabetes is okay with an A1c of 7 3 and sugars running in the 1 30-1 50 range we will probably have another A1c around that number.  Could add a medication such as Ozempic or Trulicity.    Some insomnia as well and we discussed sleep medications we will not use any sedative medications but could try trazodone in the future currently can use melatonin.       BMI:   Estimated body mass index is 35.75 kg/m  as calculated from the following:    Height as of this encounter: 1.499 m (4' 11\").    Weight as of this encounter: 80.3 kg (177 lb).           No follow-ups on file.    Jason Niño MD  [facility] IZABELA Davila is a 82 year old, presenting for the following health issues:  Diabetes      History of Present Illness       Reason for visit:  Hemoglobin, A1C, diabetic foot exam    She eats 0-1 servings of fruits and " "vegetables daily.She consumes 0 sweetened beverage(s) daily.She exercises with enough effort to increase her heart rate 9 or less minutes per day.  She exercises with enough effort to increase her heart rate 3 or less days per week.   She is taking medications regularly.       Diabetes Follow-up    How often are you checking your blood sugar? One time daily  What time of day are you checking your blood sugars (select all that apply)?  Before meals  Have you had any blood sugars above 200?  No  Have you had any blood sugars below 70?  No    What symptoms do you notice when your blood sugar is low?  None    What concerns do you have today about your diabetes? None     Do you have any of these symptoms? (Select all that apply)  Burning in feet    Have you had a diabetic eye exam in the last 12 months? No        BP Readings from Last 2 Encounters:   07/01/22 136/82   03/30/22 138/60     Hemoglobin A1C POCT (%)   Date Value   04/26/2021 7.3 (H)   11/11/2020 7.0 (H)     Hemoglobin A1C (%)   Date Value   11/15/2021 7.3 (H)   07/27/2021 7.4 (H)     LDL Cholesterol Calculated (mg/dL)   Date Value   07/27/2021 60   02/21/2020 70   04/13/2018 77     LDL Cholesterol Direct (mg/dL)   Date Value   11/06/2019 98           Here for recheck, weight is up 4 pounds.      BP is up and down at times, on multiple medications. Better if sleeps better.     Needs diabetic foot exam, hematoma growing out.     Swelling, needs more compression stockings.    Checking sugars daily and around 130-140 in the AM.      Review of Systems         Objective    /82 (BP Location: Left arm, Patient Position: Sitting, Cuff Size: Adult Large)   Pulse 62   Temp 97  F (36.1  C) (Temporal)   Resp 18   Ht 1.499 m (4' 11\")   Wt 80.3 kg (177 lb)   SpO2 97%   BMI 35.75 kg/m    Body mass index is 35.75 kg/m .  Physical Exam   GENERAL: healthy, alert and no distress  HENT: ear canals and TM's normal, nose and mouth without ulcers or lesions  NECK: no " adenopathy, no asymmetry, masses, or scars and thyroid normal to palpation  RESP: lungs clear to auscultation - no rales, rhonchi or wheezes  CV: regular rate and rhythm, normal S1 S2, no S3 or S4, no murmur, click or rub, no peripheral edema and peripheral pulses strong  MS: 1+ pitting edema   SKIN: no suspicious lesions or rashes  NEURO: Normal strength and tone, mentation intact and speech normal  PSYCH: mentation appears normal, affect normal/bright  Diabetic foot exam: normal DP and PT pulses, no trophic changes or ulcerative lesions and normal sensory exam  Left big toe has a hematoma that is growing out as the nail grows out                  .  ..

## 2022-07-08 ENCOUNTER — MYC MEDICAL ADVICE (OUTPATIENT)
Dept: INTERNAL MEDICINE | Facility: CLINIC | Age: 82
End: 2022-07-08

## 2022-07-08 DIAGNOSIS — E11.59 TYPE 2 DIABETES MELLITUS WITH OTHER CIRCULATORY COMPLICATIONS (H): Primary | ICD-10-CM

## 2022-07-10 RX ORDER — DAPAGLIFLOZIN 5 MG/1
5 TABLET, FILM COATED ORAL DAILY
Qty: 30 TABLET | Refills: 11 | Status: SHIPPED | OUTPATIENT
Start: 2022-07-10 | End: 2022-11-07

## 2022-07-11 ENCOUNTER — MYC MEDICAL ADVICE (OUTPATIENT)
Dept: INTERNAL MEDICINE | Facility: CLINIC | Age: 82
End: 2022-07-11

## 2022-07-11 DIAGNOSIS — I10 ESSENTIAL HYPERTENSION, BENIGN: ICD-10-CM

## 2022-07-11 DIAGNOSIS — E11.59 TYPE 2 DIABETES MELLITUS WITH OTHER CIRCULATORY COMPLICATIONS (H): Primary | ICD-10-CM

## 2022-07-12 RX ORDER — LOSARTAN POTASSIUM 50 MG/1
TABLET ORAL
Qty: 90 TABLET | Refills: 0 | Status: SHIPPED | OUTPATIENT
Start: 2022-07-12 | End: 2022-09-14

## 2022-07-12 NOTE — TELEPHONE ENCOUNTER
Pending Prescriptions:                       Disp   Refills    losartan (COZAAR) 50 MG tablet [Pharmacy M*90 tab*0        Sig: Take 1 tablet by mouth twice daily      Routing refill request to provider for review/approval because:  Labs out of range:  CREA    Creatinine   Date Value Ref Range Status   06/10/2022 1.30 (H) 0.52 - 1.04 mg/dL Final   06/02/2021 1.37 (H) 0.52 - 1.04 mg/dL Final     Corbin Tracy RN

## 2022-07-20 ENCOUNTER — MYC MEDICAL ADVICE (OUTPATIENT)
Dept: INTERNAL MEDICINE | Facility: CLINIC | Age: 82
End: 2022-07-20

## 2022-08-01 ENCOUNTER — MYC MEDICAL ADVICE (OUTPATIENT)
Dept: INTERNAL MEDICINE | Facility: CLINIC | Age: 82
End: 2022-08-01

## 2022-08-01 DIAGNOSIS — G47.00 INSOMNIA, UNSPECIFIED TYPE: Primary | ICD-10-CM

## 2022-08-01 RX ORDER — TRAZODONE HYDROCHLORIDE 50 MG/1
50 TABLET, FILM COATED ORAL AT BEDTIME
Qty: 90 TABLET | Refills: 1 | Status: SHIPPED | OUTPATIENT
Start: 2022-08-01 | End: 2023-01-27

## 2022-08-15 ENCOUNTER — APPOINTMENT (OUTPATIENT)
Dept: CT IMAGING | Facility: CLINIC | Age: 82
End: 2022-08-15
Attending: EMERGENCY MEDICINE
Payer: MEDICARE

## 2022-08-15 ENCOUNTER — NURSE TRIAGE (OUTPATIENT)
Dept: INTERNAL MEDICINE | Facility: CLINIC | Age: 82
End: 2022-08-15

## 2022-08-15 ENCOUNTER — MYC MEDICAL ADVICE (OUTPATIENT)
Dept: INTERNAL MEDICINE | Facility: CLINIC | Age: 82
End: 2022-08-15

## 2022-08-15 ENCOUNTER — HOSPITAL ENCOUNTER (EMERGENCY)
Facility: CLINIC | Age: 82
Discharge: HOME | End: 2022-08-15
Attending: EMERGENCY MEDICINE | Admitting: EMERGENCY MEDICINE
Payer: MEDICARE

## 2022-08-15 VITALS
HEART RATE: 59 BPM | TEMPERATURE: 97.9 F | WEIGHT: 178 LBS | DIASTOLIC BLOOD PRESSURE: 77 MMHG | BODY MASS INDEX: 35.95 KG/M2 | OXYGEN SATURATION: 96 % | SYSTOLIC BLOOD PRESSURE: 200 MMHG | RESPIRATION RATE: 19 BRPM

## 2022-08-15 DIAGNOSIS — R51.9 NONINTRACTABLE HEADACHE, UNSPECIFIED CHRONICITY PATTERN, UNSPECIFIED HEADACHE TYPE: ICD-10-CM

## 2022-08-15 LAB
ANION GAP SERPL CALCULATED.3IONS-SCNC: 4 MMOL/L (ref 3–14)
APTT PPP: 25 SECONDS (ref 22–38)
BASOPHILS # BLD AUTO: 0.1 10E3/UL (ref 0–0.2)
BASOPHILS NFR BLD AUTO: 1 %
BUN SERPL-MCNC: 26 MG/DL (ref 7–30)
CALCIUM SERPL-MCNC: 8.6 MG/DL (ref 8.5–10.1)
CHLORIDE BLD-SCNC: 110 MMOL/L (ref 94–109)
CO2 SERPL-SCNC: 27 MMOL/L (ref 20–32)
CREAT SERPL-MCNC: 1.23 MG/DL (ref 0.52–1.04)
EOSINOPHIL # BLD AUTO: 0.3 10E3/UL (ref 0–0.7)
EOSINOPHIL NFR BLD AUTO: 4 %
ERYTHROCYTE [DISTWIDTH] IN BLOOD BY AUTOMATED COUNT: 13.9 % (ref 10–15)
GFR SERPL CREATININE-BSD FRML MDRD: 44 ML/MIN/1.73M2
GLUCOSE BLD-MCNC: 111 MG/DL (ref 70–99)
GLUCOSE BLDC GLUCOMTR-MCNC: 114 MG/DL (ref 70–99)
HCT VFR BLD AUTO: 38.5 % (ref 35–47)
HGB BLD-MCNC: 12.4 G/DL (ref 11.7–15.7)
IMM GRANULOCYTES # BLD: 0.1 10E3/UL
IMM GRANULOCYTES NFR BLD: 1 %
INR PPP: 1 (ref 0.85–1.15)
LYMPHOCYTES # BLD AUTO: 1.8 10E3/UL (ref 0.8–5.3)
LYMPHOCYTES NFR BLD AUTO: 21 %
MCH RBC QN AUTO: 31 PG (ref 26.5–33)
MCHC RBC AUTO-ENTMCNC: 32.2 G/DL (ref 31.5–36.5)
MCV RBC AUTO: 96 FL (ref 78–100)
MONOCYTES # BLD AUTO: 0.8 10E3/UL (ref 0–1.3)
MONOCYTES NFR BLD AUTO: 10 %
NEUTROPHILS # BLD AUTO: 5.2 10E3/UL (ref 1.6–8.3)
NEUTROPHILS NFR BLD AUTO: 63 %
NRBC # BLD AUTO: 0 10E3/UL
NRBC BLD AUTO-RTO: 0 /100
PLATELET # BLD AUTO: 267 10E3/UL (ref 150–450)
POTASSIUM BLD-SCNC: 4.2 MMOL/L (ref 3.4–5.3)
RBC # BLD AUTO: 4 10E6/UL (ref 3.8–5.2)
SODIUM SERPL-SCNC: 141 MMOL/L (ref 133–144)
TROPONIN I SERPL HS-MCNC: 7 NG/L
WBC # BLD AUTO: 8.2 10E3/UL (ref 4–11)

## 2022-08-15 PROCEDURE — 36415 COLL VENOUS BLD VENIPUNCTURE: CPT | Performed by: EMERGENCY MEDICINE

## 2022-08-15 PROCEDURE — 70498 CT ANGIOGRAPHY NECK: CPT | Mod: MF

## 2022-08-15 PROCEDURE — 250N000011 HC RX IP 250 OP 636: Performed by: EMERGENCY MEDICINE

## 2022-08-15 PROCEDURE — 99284 EMERGENCY DEPT VISIT MOD MDM: CPT | Performed by: EMERGENCY MEDICINE

## 2022-08-15 PROCEDURE — 250N000009 HC RX 250: Performed by: EMERGENCY MEDICINE

## 2022-08-15 PROCEDURE — 80048 BASIC METABOLIC PNL TOTAL CA: CPT | Performed by: EMERGENCY MEDICINE

## 2022-08-15 PROCEDURE — G1010 CDSM STANSON: HCPCS

## 2022-08-15 PROCEDURE — 99285 EMERGENCY DEPT VISIT HI MDM: CPT | Mod: 25 | Performed by: EMERGENCY MEDICINE

## 2022-08-15 PROCEDURE — 96361 HYDRATE IV INFUSION ADD-ON: CPT | Performed by: EMERGENCY MEDICINE

## 2022-08-15 PROCEDURE — 85730 THROMBOPLASTIN TIME PARTIAL: CPT | Performed by: EMERGENCY MEDICINE

## 2022-08-15 PROCEDURE — 258N000003 HC RX IP 258 OP 636: Performed by: EMERGENCY MEDICINE

## 2022-08-15 PROCEDURE — 85025 COMPLETE CBC W/AUTO DIFF WBC: CPT | Performed by: EMERGENCY MEDICINE

## 2022-08-15 PROCEDURE — 85610 PROTHROMBIN TIME: CPT | Performed by: EMERGENCY MEDICINE

## 2022-08-15 PROCEDURE — 84484 ASSAY OF TROPONIN QUANT: CPT | Performed by: EMERGENCY MEDICINE

## 2022-08-15 PROCEDURE — 70496 CT ANGIOGRAPHY HEAD: CPT | Mod: MF

## 2022-08-15 PROCEDURE — 96374 THER/PROPH/DIAG INJ IV PUSH: CPT | Mod: 59 | Performed by: EMERGENCY MEDICINE

## 2022-08-15 RX ORDER — SODIUM CHLORIDE 9 MG/ML
INJECTION, SOLUTION INTRAVENOUS CONTINUOUS PRN
Status: DISCONTINUED | OUTPATIENT
Start: 2022-08-15 | End: 2022-08-15 | Stop reason: HOSPADM

## 2022-08-15 RX ORDER — IOPAMIDOL 755 MG/ML
500 INJECTION, SOLUTION INTRAVASCULAR ONCE
Status: COMPLETED | OUTPATIENT
Start: 2022-08-15 | End: 2022-08-15

## 2022-08-15 RX ADMIN — IOPAMIDOL 70 ML: 755 INJECTION, SOLUTION INTRAVENOUS at 16:28

## 2022-08-15 RX ADMIN — SODIUM CHLORIDE: 9 INJECTION, SOLUTION INTRAVENOUS at 17:03

## 2022-08-15 RX ADMIN — SODIUM CHLORIDE 100 ML: 9 INJECTION, SOLUTION INTRAVENOUS at 16:28

## 2022-08-15 RX ADMIN — PROCHLORPERAZINE EDISYLATE 5 MG: 5 INJECTION INTRAMUSCULAR; INTRAVENOUS at 16:53

## 2022-08-15 ASSESSMENT — ACTIVITIES OF DAILY LIVING (ADL): ADLS_ACUITY_SCORE: 37

## 2022-08-15 NOTE — TELEPHONE ENCOUNTER
"Patient stated she has had a history of a heart attack, and a family history of heart attacks and strokes.  She stated he sister was recently informed that she had a stroke and a \"few\" prior episodes of a stroke, without her knowledge or symptoms at the time of these episodes.  Patient stated she awoke last Wednesday morning with pain at the base of her skull and on her neck.  She stated she thought it was due to possibly sleeping in the wrong position and it would go away, or loosen up after some time.  She stated today she started getting a feeling on the top of her head \"feeling spongy with some pain\" intermittently with the constant pain at the base of her skull.  Patient verbalized that she is concerned with her history of heart issues and now her sister's stroke information that something just is not \"right\".  After telephone triage including patient's family history and personal history, advised patient to seek emergency care for further assessment per protocol.  Patient stated understanding.      Reason for Disposition    Patient sounds very sick or weak to the triager    Additional Information    Negative: Shock suspected (e.g., cold/pale/clammy skin, too weak to stand, low BP, rapid pulse)    Negative: Similar pain previously and it was from 'heart attack'    Negative: Similar pain previously from 'angina' and not relieved by nitroglycerin    Negative: Difficult to awaken or acting confused (e.g., disoriented, slurred speech)    Negative: Sounds like a life-threatening emergency to the triager    Negative: Followed an injury to neck (e.g., MVA, sports, impact or collision)    Negative: Chest pain    Negative: Lymph node in the neck is swollen or painful to the touch    Negative: Sore throat is main symptom    Negative: Difficulty breathing or unusual sweating (e.g., sweating without exertion)    Negative: Chest pain lasting longer than 5 minutes    Negative: Stiff neck (can't touch chin to chest) and has " "headache    Negative: Stiff neck (can't touch chin to chest) and fever    Negative: Weakness of an arm or hand    Negative: Problems with bowel or bladder control    Answer Assessment - Initial Assessment Questions  1. ONSET: \"When did the pain begin?\"       Last Wednesday morning when getting out of bed    2. LOCATION: \"Where does it hurt?\"       Base of the skull and neck meet    3. PATTERN \"Does the pain come and go, or has it been constant since it started?\"       Constant    4. SEVERITY: \"How bad is the pain?\"  (Scale 1-10; or mild, moderate, severe)    - NO PAIN (0): no pain or only slight stiffness     - MILD (1-3): doesn't interfere with normal activities     - MODERATE (4-7): interferes with normal activities or awakens from sleep     - SEVERE (8-10):  excruciating pain, unable to do any normal activities       9/10 - sometimes makes the top of head feel funny    5. RADIATION: \"Does the pain go anywhere else, shoot into your arms?\"      Top of head    6. CORD SYMPTOMS: \"Any weakness or numbness of the arms or legs?\"      No    7. CAUSE: \"What do you think is causing the neck pain?\"      Unknown    8. NECK OVERUSE: \"Any recent activities that involved turning or twisting the neck?\"      No    9. OTHER SYMPTOMS: \"Do you have any other symptoms?\" (e.g., headache, fever, chest pain, difficulty breathing, neck swelling)      Some swelling on left side of neck    10. PREGNANCY: \"Is there any chance you are pregnant?\" \"When was your last menstrual period?\"        NA    Protocols used: NECK PAIN OR [id number]    Radha Javier RN    "

## 2022-08-15 NOTE — ED TRIAGE NOTES
"Pt reports headache that started on Wednesday, today having pain at 10 with \"foggy sponge feeling in my head\", was told by clinic to be seen for possible stroke      "

## 2022-08-15 NOTE — ED PROVIDER NOTES
"  History     Chief Complaint   Patient presents with     Headache     HPI  Sadnra Petit is a 82 year old female who presents for evaluation of a headache.  This began 5 days ago.  Headache is predominantly in the base of the neck.  She then has had some radiation up to the back of her head that feels like a spongy sensation.  She has had no trauma.  No fever.  No acute neurological deficits.  Specifically speech, vision, arms and legs work well.  She has not taken anything for the headache today.  She took 2 Excedrin yesterday.  Headache is currently a 9 out of 10    Allergies:  Allergies   Allergen Reactions     Penicillins Swelling     \"throat started swelling\"     Vitamin B Complex Hives     Vitamin B12 Hives     all vitamin B's     Clindamycin Hcl Rash       Problem List:    Patient Active Problem List    Diagnosis Date Noted     Morbid obesity (H) 11/11/2020     Priority: Medium     Osteoarthritis of left thumb 07/07/2017     Priority: Medium     Type 2 diabetes mellitus with other circulatory complications (H) 04/04/2016     Priority: Medium     Other specified hypothyroidism 10/02/2015     Priority: Medium     Coronary artery disease involving native coronary artery without angina pectoris 10/02/2015     Priority: Medium     Type 2 diabetes mellitus with diabetic nephropathy (H) 10/02/2015     Priority: Medium     History of total left knee replacement 04/29/2013     Priority: Medium     Hypertension goal BP (blood pressure) < 140/90 09/26/2011     Priority: Medium     Hyperlipidemia LDL goal <100 10/31/2010     Priority: Medium     CKD (chronic kidney disease) stage 3, GFR 30-59 ml/min (H) 03/17/2009     Priority: Medium        Past Medical History:    Past Medical History:   Diagnosis Date     Diabetic eye exam (H) 08/05/13     Endometriosis, site unspecified      Fever and other physiologic disturbances of temperature regulation 07/07/2005     Heart disease      Myalgia and myositis, unspecified      Pure " hypercholesterolemia      Unspecified essential hypertension      Unspecified hypothyroidism        Past Surgical History:    Past Surgical History:   Procedure Laterality Date     ARTHROPLASTY KNEE  4/29/2013    Procedure: ARTHROPLASTY KNEE;  left total knee arthroplasty;  Surgeon: Teddy Grimes MD;  Location: [facility]     ARTHROPLASTY KNEE Right 8/27/2018    Procedure: ARTHROPLASTY KNEE;  right total knee arthroplasty;  Surgeon: Johnny Anaya MD;  Location: [facility]     COMBINED CYSTOSCOPY, INSERT CATHETER URETER Bilateral 8/10/2015    Procedure: COMBINED CYSTOSCOPY, INSERT CATHETER URETER;  Surgeon: Johnnie Madera MD;  Location: [facility]     DILATION AND CURETTAGE, HYSTEROSCOPY DIAGNOSTIC, COMBINED N/A 11/6/2014    Procedure: COMBINED DILATION AND CURETTAGE, HYSTEROSCOPY DIAGNOSTIC;  Surgeon: Edward Pardo MD;  Location[facility]     ESOPHAGOSCOPY, GASTROSCOPY, DUODENOSCOPY (EGD), COMBINED N/A 1/27/2015    Procedure: COMBINED ESOPHAGOSCOPY, GASTROSCOPY, DUODENOSCOPY (EGD), BIOPSY SINGLE OR MULTIPLE;  Surgeon: Carmelo Rosario MD;  Location: [facility]     HC REMOVAL GALLBLADDER      Cholecystectomy     HC REMOVE TONSILS/ADENOIDS,<11 Y/O      unsure of age     LAPAROSCOPIC HYSTERECTOMY TOTAL N/A 8/10/2015    Procedure: LAPAROSCOPIC HYSTERECTOMY TOTAL;  Surgeon: Edward Pardo MD;  [facility]     LAPAROSCOPIC LYSIS ADHESIONS N/A 8/10/2015    Procedure: LAPAROSCOPIC LYSIS ADHESIONS;  Surgeon: Edward Pardo MD;  Location: [facility]     ZZC APPENDECTOMY       [facility] EXPLOR HEART SURG WND W CP BYPASS Bilateral 03/20/2019     [facility] NONSPECIFIC PROCEDURE      Knee ligament surgery     [facility] NONSPECIFIC PROCEDURE      Kidney surgery for mechanical obstruction     [facility] OPEN CORONARY ENDARTERECTOMY  june 2005    Angioplasty w stenting     [facility] TOTAL KNEE ARTHROPLASTY  4/29/13    Left       Family History:    Family History   Problem Relation Age of Onset     Heart Disease Mother           coronary     Heart Disease Father          coronary     Allergies Sister         unknown     Allergies Brother         unknown     Allergies Daughter         unknown     Allergies Son         unknown     Heart Disease Sister         by pass surg     Heart Disease Brother         on medication     Hypertension Sister      Hypertension Brother      Lipids Sister      Lipids Brother      Cerebrovascular Disease Brother      Obesity Sister      Diabetes Sister      Diabetes Sister      C.A.D. Brother         quad by pass     Neurologic Disorder Sister         parkinsons     Cancer Sister         skin cancer     Alcohol/Drug No family hx of      Alzheimer Disease No family hx of        Social History:  Marital Status:   [5]  Social History     Tobacco Use     Smoking status: Former Smoker     Smokeless tobacco: Never Used     Tobacco comment: quit     Vaping Use     Vaping Use: Never used   Substance Use Topics     Alcohol use: No     Alcohol/week: 0.0 standard drinks     Drug use: No        Medications:    allopurinol (ZYLOPRIM) 100 MG tablet  amLODIPine (NORVASC) 10 MG tablet  aspirin 81 MG EC tablet  azithromycin (ZITHROMAX) 250 MG tablet  blood glucose (ONETOUCH VERIO IQ) test strip  cloNIDine (CATAPRES) 0.1 MG tablet  dapagliflozin (FARXIGA) 5 MG TABS tablet  empagliflozin (JARDIANCE) 10 MG TABS tablet  Lancets (ONETOUCH DELICA PLUS [id number]) MISC  levothyroxine (SYNTHROID/LEVOTHROID) 50 MCG tablet  losartan (COZAAR) 50 MG tablet  metFORMIN (GLUCOPHAGE-XR) 500 MG 24 hr tablet  metoprolol succinate ER (TOPROL-XL) 25 MG 24 hr tablet  nitroGLYcerin (NITROSTAT) 0.4 MG sublingual tablet  Probiotic Product (ACIDOPHILUS PROBIOTIC BLEND) CAPS  simvastatin (ZOCOR) 40 MG tablet  traZODone (DESYREL) 50 MG tablet  UNABLE TO FIND  Vitamin D, Cholecalciferol, 25 MCG (1000 UT) CAPS          Review of Systems  All other systems are reviewed and are negative    Physical Exam   BP: (!) 227/78  Pulse: 68  Temp:  97.9  F (36.6  C)  Resp: 18  Weight: 80.7 kg (178 lb)  SpO2: 98 %      Physical Exam  Vitals and nursing note reviewed.   Constitutional:       General: She is not in acute distress.     Appearance: She is well-developed. She is not diaphoretic.   HENT:      Head: Normocephalic and atraumatic.   Eyes:      General: No scleral icterus.     Pupils: Pupils are equal, round, and reactive to light.   Cardiovascular:      Rate and Rhythm: Normal rate and regular rhythm.      Heart sounds: Normal heart sounds. No murmur heard.  Pulmonary:      Effort: No respiratory distress.      Breath sounds: No stridor. No wheezing or rales.   Abdominal:      Palpations: Abdomen is soft.      Tenderness: There is no abdominal tenderness.   Musculoskeletal:         General: No tenderness.      Cervical back: Normal range of motion and neck supple.   Skin:     General: Skin is warm and dry.      Coloration: Skin is not pale.      Findings: No erythema or rash.   Neurological:      General: No focal deficit present.      Mental Status: She is alert.      Cranial Nerves: No cranial nerve deficit.      Motor: No weakness.   Psychiatric:         Mood and Affect: Mood normal.         ED Course                 Procedures              Critical Care time:  none               Results for orders placed or performed during the hospital encounter of 08/15/22 (from the past 24 hour(s))   CT Head w/o Contrast    Narrative    CT OF THE HEAD WITHOUT CONTRAST  8/15/2022 4:23 PM     COMPARISON: Head CT 12/20/2017.    HISTORY:  Headache.     TECHNIQUE:  Axial CT images of the head from the skull base to the  vertex were acquired without IV contrast. Dose reduction techniques  were used.     FINDINGS:   INTRACRANIAL CONTENTS: No intracranial hemorrhage, extraaxial  collection, or mass effect.  No CT evidence of acute infarct.   Patchy  hypoattenuation within the cerebral white matter most consistent with  moderate chronic microvascular ischemic change.  Small cortical  infarction within the left lateral occipital lobe, likely chronic.  Mild-moderate generalized parenchymal volume loss. No hydrocephalus.    VISUALIZED ORBITS/SINUSES/MASTOIDS: No significant orbital  abnormality.  No significant paranasal sinus mucosal disease. No  significant middle ear or mastoid effusion.    OSSEOUS STRUCTURES/SOFT TISSUES: No significant abnormality.      Impression    IMPRESSION:  1.  No acute intracranial abnormality.  2.  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  3.  Mild to moderate generalized volume loss and moderate chronic  microvascular ischemic change.    MAINE LOCKHART MD         SYSTEM ID:  [id number]   Glucose by meter   Result Value Ref Range    GLUCOSE BY METER POCT 114 (H) 70 - 99 mg/dL   CBC with Platelets & Differential    Narrative    The following orders were created for panel order CBC with Platelets & Differential.  Procedure                               Abnormality         Status                     ---------                               -----------         ------                     CBC with platelets and d...[[id number]]                      Final result                 Please view results for these tests on the individual orders.   Basic metabolic panel   Result Value Ref Range    Sodium 141 133 - 144 mmol/L    Potassium 4.2 3.4 - 5.3 mmol/L    Chloride 110 (H) 94 - 109 mmol/L    Carbon Dioxide (CO2) 27 20 - 32 mmol/L    Anion Gap 4 3 - 14 mmol/L    Urea Nitrogen 26 7 - 30 mg/dL    Creatinine 1.23 (H) 0.52 - 1.04 mg/dL    Calcium 8.6 8.5 - 10.1 mg/dL    Glucose 111 (H) 70 - 99 mg/dL    GFR Estimate 44 (L) >60 mL/min/1.73m2   INR   Result Value Ref Range    INR 1.00 0.85 - 1.15   Partial thromboplastin time   Result Value Ref Range    aPTT 25 22 - 38 Seconds   Troponin I   Result Value Ref Range    Troponin I High Sensitivity 7 <54 ng/L   CBC with platelets and differential   Result Value  Ref Range    WBC Count 8.2 4.0 - 11.0 10e3/uL    RBC Count 4.00 3.80 - 5.20 10e6/uL    Hemoglobin 12.4 11.7 - 15.7 g/dL    Hematocrit 38.5 35.0 - 47.0 %    MCV 96 78 - 100 fL    MCH 31.0 26.5 - 33.0 pg    MCHC 32.2 31.5 - 36.5 g/dL    RDW 13.9 10.0 - 15.0 %    Platelet Count 267 150 - 450 10e3/uL    % Neutrophils 63 %    % Lymphocytes 21 %    % Monocytes 10 %    % Eosinophils 4 %    % Basophils 1 %    % Immature Granulocytes 1 %    NRBCs per 100 WBC 0 <1 /100    Absolute Neutrophils 5.2 1.6 - 8.3 10e3/uL    Absolute Lymphocytes 1.8 0.8 - 5.3 10e3/uL    Absolute Monocytes 0.8 0.0 - 1.3 10e3/uL    Absolute Eosinophils 0.3 0.0 - 0.7 10e3/uL    Absolute Basophils 0.1 0.0 - 0.2 10e3/uL    Absolute Immature Granulocytes 0.1 <=0.4 10e3/uL    Absolute NRBCs 0.0 10e3/uL   CTA Head Neck with Contrast    Narrative    CT ANGIOGRAM OF THE HEAD AND NECK WITH CONTRAST  8/15/2022 4:44 PM     COMPARISON: Head CT 12/20/2017.    HISTORY: headache    TECHNIQUE:    Precontrast localizing scans were followed by CT angiography with an  injection of 70 mL Isovue-370 intravenous contrast material with scans  through the head and neck.  Images were transferred to a separate 3-D  workstation where multiplanar reformations and 3-D images were  created. Estimates of carotid stenoses are made relative to the distal  internal carotid artery diameters except as noted. Dose reduction  techniques were used.    FINDINGS:   HEAD CTA:  ANTERIOR CIRCULATION: Mild calcified plaque within the carotid  siphons. No large artery stenosis or occlusion. Fetal origin of  bilateral posterior cerebral arteries.    POSTERIOR CIRCULATION: No significant stenosis or occlusion. Balanced  vertebral arteries supply a normal basilar artery.    ANEURYSM/VASCULAR MALFORMATION: None.    NECK CTA:  RIGHT CAROTID: Calcified plaque at the carotid bifurcation results in  70% stenosis in the right ICA based on NASCET criteria.    LEFT CAROTID: Calcified plaque at carotid  bifurcation results in 25%  stenosis in the left ICA based on NASCET criteria.     VERTEBRAL ARTERIES: Balanced vertebral arteries are patent in the neck  and into the head.     AORTIC ARCH: Classic aortic arch anatomy with no significant stenosis  at the origin of the great vessels.      Impression    IMPRESSION:    HEAD CTA:  1.  Mild plaque within the carotid siphons. No large artery stenosis  or occlusion.   2.  No aneurysm or vascular malformation.    NECK CTA:  1.  Plaque at bilateral carotid bifurcations.  2.  70% stenosis right proximal internal carotid artery.  3.  25% stenosis left proximal internal carotid artery.  4.  No vertebral artery stenosis.    The results were communicated to Dr. Fernandes at 4:45 PM on 8/15/2022.        MAINE LOCKHART MD         SYSTEM ID:  [id number]     *Note: Due to a large number of results and/or encounters for the requested time period, some results have not been displayed. A complete set of results can be found in Results Review.       Medications   sodium chloride 0.9% infusion ( Intravenous New Bag 8/15/22 1703)   iopamidol (ISOVUE-370) solution 500 mL (70 mLs Intravenous Given 8/15/22 1628)   sodium chloride 0.9 % bag 100mL for CT scan flush use (100 mLs Intravenous Given 8/15/22 1628)   prochlorperazine (COMPAZINE) injection 5 mg (5 mg Intravenous Given 8/15/22 1653)       Assessments & Plan (with Medical Decision Making)  82-year-old female who presented for evaluation of a headache.  This began 5 days ago.  She was concerned about a stroke.  No neurological deficits.  No evidence for bleed or other acute stroke.  There was a remote infarct noted on CT.  There is some right internal carotid artery stenosis noted at this approximately 70% on CTA, but no evidence for need for acute intervention.  Stable for discharge home.  Headache down to a 4 out of 10 after single dose of 5 mg of Compazine.  Have recommended follow-up through her clinic in 1 week on all of this.   Return anytime sooner if condition worsens or other concerns     I have reviewed the nursing notes.    I have reviewed the findings, diagnosis, plan and need for follow up with the patient.       New Prescriptions    No medications on file       Final diagnoses:   Nonintractable headache, unspecified chronicity pattern, unspecified headache type       8/15/2022   [facility] EMERGENCY DEPT     Beto Fernandes MD  08/15/22 [id number]

## 2022-09-01 ENCOUNTER — MYC MEDICAL ADVICE (OUTPATIENT)
Dept: INTERNAL MEDICINE | Facility: CLINIC | Age: 82
End: 2022-09-01

## 2022-09-07 DIAGNOSIS — E11.59 DIABETIC VASCULOPATHY (H): ICD-10-CM

## 2022-09-08 ENCOUNTER — MYC MEDICAL ADVICE (OUTPATIENT)
Dept: INTERNAL MEDICINE | Facility: CLINIC | Age: 82
End: 2022-09-08

## 2022-09-09 RX ORDER — BLOOD SUGAR DIAGNOSTIC
STRIP MISCELLANEOUS
Qty: 100 STRIP | Refills: 3 | Status: SHIPPED | OUTPATIENT
Start: 2022-09-09 | End: 2023-03-24

## 2022-09-11 DIAGNOSIS — I10 ESSENTIAL HYPERTENSION, BENIGN: ICD-10-CM

## 2022-09-13 NOTE — TELEPHONE ENCOUNTER
Pending Prescriptions:                       Disp   Refills    losartan (COZAAR) 50 MG tablet [Pharmacy M*90 tab*0        Sig: Take 1 tablet by mouth twice daily      Routing refill request to provider for review/approval because:  Labs out of range:    BP Readings from Last 3 Encounters:   08/15/22 (!) 200/77   07/01/22 136/82   03/30/22 138/60     Creatinine   Date Value Ref Range Status   08/15/2022 1.23 (H) 0.52 - 1.04 mg/dL Final   06/02/2021 1.37 (H) 0.52 - 1.04 mg/dL Final   '    Gail Toney RN

## 2022-09-14 RX ORDER — LOSARTAN POTASSIUM 50 MG/1
TABLET ORAL
Qty: 90 TABLET | Refills: 0 | Status: SHIPPED | OUTPATIENT
Start: 2022-09-14 | End: 2022-10-06

## 2022-10-03 DIAGNOSIS — I10 ESSENTIAL HYPERTENSION, BENIGN: ICD-10-CM

## 2022-10-04 NOTE — TELEPHONE ENCOUNTER
Pending Prescriptions:                       Disp   Refills    losartan (COZAAR) 50 MG tablet [Pharmacy M*90 tab*0        Sig: Take 1 tablet by mouth twice daily      Routing refill request to provider for review/approval because:  Labs out of range:    BP Readings from Last 3 Encounters:   08/15/22 (!) 200/77   07/01/22 136/82   03/30/22 138/60     Creatinine   Date Value Ref Range Status   08/15/2022 1.23 (H) 0.52 - 1.04 mg/dL Final   06/02/2021 1.37 (H) 0.52 - 1.04 mg/dL Final         Gail Toney RN

## 2022-10-06 RX ORDER — LOSARTAN POTASSIUM 50 MG/1
TABLET ORAL
Qty: 90 TABLET | Refills: 0 | Status: SHIPPED | OUTPATIENT
Start: 2022-10-06 | End: 2022-11-21

## 2022-10-09 ENCOUNTER — HEALTH MAINTENANCE LETTER (OUTPATIENT)
Age: 82
End: 2022-10-09

## 2022-10-12 ENCOUNTER — IMMUNIZATION (OUTPATIENT)
Dept: FAMILY MEDICINE | Facility: CLINIC | Age: 82
End: 2022-10-12
Payer: MEDICARE

## 2022-10-12 PROCEDURE — 0124A COVID-19,PF,PFIZER BOOSTER BIVALENT: CPT

## 2022-10-12 PROCEDURE — 91312 COVID-19,PF,PFIZER BOOSTER BIVALENT: CPT

## 2022-10-12 PROCEDURE — 90662 IIV NO PRSV INCREASED AG IM: CPT

## 2022-10-12 PROCEDURE — G0008 ADMIN INFLUENZA VIRUS VAC: HCPCS | Mod: 59

## 2022-10-17 DIAGNOSIS — I10 ESSENTIAL HYPERTENSION, BENIGN: ICD-10-CM

## 2022-10-19 ENCOUNTER — MYC MEDICAL ADVICE (OUTPATIENT)
Dept: FAMILY MEDICINE | Facility: CLINIC | Age: 82
End: 2022-10-19

## 2022-10-19 RX ORDER — LOSARTAN POTASSIUM 50 MG/1
TABLET ORAL
Qty: 90 TABLET | Refills: 0 | OUTPATIENT
Start: 2022-10-19

## 2022-10-19 NOTE — TELEPHONE ENCOUNTER
Reason for Call:  Other call/message back    Detailed comments: Giovanni got a message today asking her to schedule an appointment to check her blood pressure. The next available appointment with Dr. Niño is on 11/16/22. Giovanni is wondering if this can be completed at her upcoming annual appointment that is scheduled for 11/21/22 or if she needs it to be a separate appointment? She is available via phone or [employer] messaging to let her know how to proceed.     Phone Number Patient can be reached at: Home number on file [phone] (home)    Best Time: any    Can we leave a detailed message on this number? YES    Call taken on 10/19/2022 at 1:19 PM by Gavi Rivas

## 2022-10-19 NOTE — TELEPHONE ENCOUNTER
"Requested Prescriptions   Pending Prescriptions Disp Refills    losartan (COZAAR) 50 MG tablet [Pharmacy Med Name: Losartan Potassium 50 MG Oral Tablet] 90 tablet 0     Sig: Take 1 tablet by mouth twice daily       Angiotensin-II Receptors Failed - 10/17/2022  5:30 AM        Failed - Last blood pressure under 140/90 in past 12 months     BP Readings from Last 3 Encounters:   08/15/22 (!) 200/77   07/01/22 136/82   03/30/22 138/60                 Failed - Normal serum creatinine on file in past 12 months     Recent Labs   Lab Test 08/15/22  1635   CR 1.23*       Ok to refill medication if creatinine is low          Passed - Recent (12 mo) or future (30 days) visit within the authorizing provider's specialty     Patient has had an office visit with the authorizing provider or a provider within the authorizing providers department within the previous 12 mos or has a future within next 30 days. See \"Patient Info\" tab in inbasket, or \"Choose Columns\" in Meds & Orders section of the refill encounter.              Passed - Medication is active on med list        Passed - Patient is age 18 or older        Passed - No active pregnancy on record        Passed - Normal serum potassium on file in past 12 months     Recent Labs   Lab Test 08/15/22  1635   POTASSIUM 4.2                    Passed - No positive pregnancy test in past 12 months               CARLOZ Avalos, RN          "

## 2022-10-27 ENCOUNTER — TELEPHONE (OUTPATIENT)
Dept: FAMILY MEDICINE | Facility: CLINIC | Age: 82
End: 2022-10-27

## 2022-10-27 NOTE — TELEPHONE ENCOUNTER
Patient calling wondering if it is ok to have mammogram tomorrow if she received covid and flu vaccines on 10/12. RN spoke with [facility] who confirmed this was ok to still have mammogram tomorrow.     GLADYS SmithN, RN

## 2022-10-28 ENCOUNTER — HOSPITAL ENCOUNTER (OUTPATIENT)
Dept: MAMMOGRAPHY | Facility: CLINIC | Age: 82
Discharge: HOME | End: 2022-10-28
Admitting: INTERNAL MEDICINE
Payer: MEDICARE

## 2022-10-28 DIAGNOSIS — Z12.31 VISIT FOR SCREENING MAMMOGRAM: ICD-10-CM

## 2022-10-28 PROCEDURE — 77067 SCR MAMMO BI INCL CAD: CPT

## 2022-10-28 NOTE — PROGRESS NOTES
Appropriate assistive devices provided during their visit. N/A (Yes, No, N/A) N/A (list device)    Exam table and/or cart  placed in the lowest position. YES (Yes, No, N/A)    Brakes on tables/carts/wheelchairs used at all times. YES (Yes, No, N/A)    Non slip footwear applied. N/A (Yes, No, NA)    Patient was accompanied by staff throughout visit. YES (Yes, No, N/A)    Equipment safety straps used. N/A (Yes, No, N/A)    Assist with toileting. N/A (Yes, No, N/A)

## 2022-10-31 ENCOUNTER — MYC MEDICAL ADVICE (OUTPATIENT)
Dept: INTERNAL MEDICINE | Facility: CLINIC | Age: 82
End: 2022-10-31

## 2022-10-31 ENCOUNTER — TELEPHONE (OUTPATIENT)
Dept: FAMILY MEDICINE | Facility: CLINIC | Age: 82
End: 2022-10-31

## 2022-10-31 ENCOUNTER — APPOINTMENT (OUTPATIENT)
Dept: CT IMAGING | Facility: CLINIC | Age: 82
End: 2022-10-31
Attending: FAMILY MEDICINE
Payer: MEDICARE

## 2022-10-31 ENCOUNTER — HOSPITAL ENCOUNTER (EMERGENCY)
Facility: CLINIC | Age: 82
Discharge: HOME | End: 2022-10-31
Attending: FAMILY MEDICINE | Admitting: FAMILY MEDICINE
Payer: MEDICARE

## 2022-10-31 VITALS
SYSTOLIC BLOOD PRESSURE: 130 MMHG | RESPIRATION RATE: 19 BRPM | OXYGEN SATURATION: 98 % | WEIGHT: 170 LBS | TEMPERATURE: 98.4 F | DIASTOLIC BLOOD PRESSURE: 57 MMHG | HEART RATE: 57 BPM | BODY MASS INDEX: 34.34 KG/M2

## 2022-10-31 DIAGNOSIS — R92.8 ABNORMAL MAMMOGRAM: Primary | ICD-10-CM

## 2022-10-31 DIAGNOSIS — R47.9 DIFFICULTY WITH SPEECH: ICD-10-CM

## 2022-10-31 DIAGNOSIS — R51.9 ACUTE NONINTRACTABLE HEADACHE, UNSPECIFIED HEADACHE TYPE: ICD-10-CM

## 2022-10-31 LAB
ALBUMIN SERPL-MCNC: 3.5 G/DL (ref 3.4–5)
ALP SERPL-CCNC: 121 U/L (ref 40–150)
ALT SERPL W P-5'-P-CCNC: 20 U/L (ref 0–50)
ANION GAP SERPL CALCULATED.3IONS-SCNC: 7 MMOL/L (ref 3–14)
APTT PPP: 25 SECONDS (ref 22–38)
AST SERPL W P-5'-P-CCNC: 14 U/L (ref 0–45)
BASOPHILS # BLD AUTO: 0.1 10E3/UL (ref 0–0.2)
BASOPHILS NFR BLD AUTO: 1 %
BILIRUB SERPL-MCNC: 0.4 MG/DL (ref 0.2–1.3)
BUN SERPL-MCNC: 27 MG/DL (ref 7–30)
CALCIUM SERPL-MCNC: 9.2 MG/DL (ref 8.5–10.1)
CHLORIDE BLD-SCNC: 107 MMOL/L (ref 94–109)
CO2 SERPL-SCNC: 25 MMOL/L (ref 20–32)
CREAT SERPL-MCNC: 1.23 MG/DL (ref 0.52–1.04)
EOSINOPHIL # BLD AUTO: 0.2 10E3/UL (ref 0–0.7)
EOSINOPHIL NFR BLD AUTO: 2 %
ERYTHROCYTE [DISTWIDTH] IN BLOOD BY AUTOMATED COUNT: 14 % (ref 10–15)
GFR SERPL CREATININE-BSD FRML MDRD: 44 ML/MIN/1.73M2
GLUCOSE BLD-MCNC: 193 MG/DL (ref 70–99)
GLUCOSE BLDC GLUCOMTR-MCNC: 177 MG/DL (ref 70–99)
HCT VFR BLD AUTO: 39.8 % (ref 35–47)
HGB BLD-MCNC: 12.8 G/DL (ref 11.7–15.7)
HOLD SPECIMEN: NORMAL
IMM GRANULOCYTES # BLD: 0.1 10E3/UL
IMM GRANULOCYTES NFR BLD: 1 %
INR PPP: 0.96 (ref 0.85–1.15)
LYMPHOCYTES # BLD AUTO: 1.1 10E3/UL (ref 0.8–5.3)
LYMPHOCYTES NFR BLD AUTO: 13 %
MCH RBC QN AUTO: 30.5 PG (ref 26.5–33)
MCHC RBC AUTO-ENTMCNC: 32.2 G/DL (ref 31.5–36.5)
MCV RBC AUTO: 95 FL (ref 78–100)
MONOCYTES # BLD AUTO: 0.5 10E3/UL (ref 0–1.3)
MONOCYTES NFR BLD AUTO: 6 %
NEUTROPHILS # BLD AUTO: 6.6 10E3/UL (ref 1.6–8.3)
NEUTROPHILS NFR BLD AUTO: 77 %
NRBC # BLD AUTO: 0 10E3/UL
NRBC BLD AUTO-RTO: 0 /100
PLATELET # BLD AUTO: 298 10E3/UL (ref 150–450)
POTASSIUM BLD-SCNC: 4.3 MMOL/L (ref 3.4–5.3)
PROT SERPL-MCNC: 7.3 G/DL (ref 6.8–8.8)
RBC # BLD AUTO: 4.19 10E6/UL (ref 3.8–5.2)
SODIUM SERPL-SCNC: 139 MMOL/L (ref 133–144)
TROPONIN I SERPL HS-MCNC: 6 NG/L
WBC # BLD AUTO: 8.5 10E3/UL (ref 4–11)

## 2022-10-31 PROCEDURE — 99284 EMERGENCY DEPT VISIT MOD MDM: CPT | Performed by: FAMILY MEDICINE

## 2022-10-31 PROCEDURE — 80053 COMPREHEN METABOLIC PANEL: CPT | Performed by: FAMILY MEDICINE

## 2022-10-31 PROCEDURE — 85730 THROMBOPLASTIN TIME PARTIAL: CPT | Performed by: FAMILY MEDICINE

## 2022-10-31 PROCEDURE — 99284 EMERGENCY DEPT VISIT MOD MDM: CPT | Mod: 25

## 2022-10-31 PROCEDURE — 36415 COLL VENOUS BLD VENIPUNCTURE: CPT | Performed by: FAMILY MEDICINE

## 2022-10-31 PROCEDURE — 85025 COMPLETE CBC W/AUTO DIFF WBC: CPT | Performed by: FAMILY MEDICINE

## 2022-10-31 PROCEDURE — 85610 PROTHROMBIN TIME: CPT | Performed by: FAMILY MEDICINE

## 2022-10-31 PROCEDURE — G1010 CDSM STANSON: HCPCS

## 2022-10-31 PROCEDURE — 84484 ASSAY OF TROPONIN QUANT: CPT | Performed by: FAMILY MEDICINE

## 2022-10-31 RX ORDER — IOPAMIDOL 755 MG/ML
500 INJECTION, SOLUTION INTRAVASCULAR ONCE
Status: DISCONTINUED | OUTPATIENT
Start: 2022-10-31 | End: 2022-10-31 | Stop reason: HOSPADM

## 2022-10-31 RX ORDER — DIAZEPAM 5 MG
5 TABLET ORAL
Qty: 2 TABLET | Refills: 0 | Status: SHIPPED | OUTPATIENT
Start: 2022-10-31 | End: 2022-11-07

## 2022-10-31 RX ORDER — SODIUM CHLORIDE 9 MG/ML
INJECTION, SOLUTION INTRAVENOUS CONTINUOUS PRN
Status: DISCONTINUED | OUTPATIENT
Start: 2022-10-31 | End: 2022-10-31 | Stop reason: HOSPADM

## 2022-10-31 ASSESSMENT — ACTIVITIES OF DAILY LIVING (ADL): ADLS_ACUITY_SCORE: 37

## 2022-10-31 NOTE — TELEPHONE ENCOUNTER
Please let her know I do not know why these were ordered under Dr. Quiroz's name.  I did order them under my name but either way I agree she should have the additional testing done if there is abnormalities then seen in the radiologist will talk to her about a possible biopsy.  Usually these are okay with the additional testing

## 2022-10-31 NOTE — ED PROVIDER NOTES
"                                                            [facility] ED Provider Note   Patient: Sandra Petit  MRN #:  [id number]  Date of Visit: October 31, 2022    CC:     Chief Complaint   Patient presents with     Stroke Symptoms     HPI:  Sandra Petit is a 82 year old female who presented to the emergency department by private car after she developed some word finding and speech difficulty that started around 9:30 AM.  Patient went to a meeting that was at her apartment, and following the meeting went back to her room.  She starts to have a \"spongy\" feeling to the top of her head, and speech difficulty with word finding.  She tried to call the nurse triage line, but could not answer any of the questions that were being asked.  Specifically she had difficulty finding the words.  She did not have any unilateral weakness or numbness, chest pain, trouble breathing, vision disturbance, or severe dizziness.  She developed some nausea, but no vomiting.  She did not have any recent injury or illness.  Patient has a history of diabetes, hypertension, hyperlipidemia and chronic kidney disease.  Patient is not sure how long the episode lasted but her speech has returned.  She became concerned because of the continued spongy feeling on the top of her head and the nausea.  She is able to walk on her own and only feels slightly dizzy.  She was here in the emergency department in August with a headache, and had some imaging studies at that time.  She states that she has had some memory problems since she had her heart attack.  Patient takes amlodipine, metoprolol, losartan, metformin, Zocor, and clonidine.  Her blood pressures have been in the range of 140-150 systolic, but slightly higher this morning at 160.    Problem List:  Patient Active Problem List    Diagnosis Date Noted     Morbid obesity (H) 11/11/2020     Priority: Medium     Osteoarthritis of left thumb 07/07/2017     Priority: Medium     Type 2 diabetes " "mellitus with other circulatory complications (H) 04/04/2016     Priority: Medium     Other specified hypothyroidism 10/02/2015     Priority: Medium     Coronary artery disease involving native coronary artery without angina pectoris 10/02/2015     Priority: Medium     Type 2 diabetes mellitus with diabetic nephropathy (H) 10/02/2015     Priority: Medium     History of total left knee replacement 04/29/2013     Priority: Medium     Hypertension goal BP (blood pressure) < 140/90 09/26/2011     Priority: Medium     Hyperlipidemia LDL goal <100 10/31/2010     Priority: Medium     CKD (chronic kidney disease) stage 3, GFR 30-59 ml/min (H) 03/17/2009     Priority: Medium       Past Medical History:   Diagnosis Date     Diabetic eye exam (H) 08/05/13     Endometriosis, site unspecified      Fever and other physiologic disturbances of temperature regulation 07/07/2005     Heart disease      Myalgia and myositis, unspecified      Pure hypercholesterolemia      Unspecified essential hypertension      Unspecified hypothyroidism        MEDS: diazepam (VALIUM) 5 MG tablet  allopurinol (ZYLOPRIM) 100 MG tablet  amLODIPine (NORVASC) 10 MG tablet  aspirin 81 MG EC tablet  azithromycin (ZITHROMAX) 250 MG tablet  blood glucose (ONETOUCH VERIO IQ) test strip  cloNIDine (CATAPRES) 0.1 MG tablet  dapagliflozin (FARXIGA) 5 MG TABS tablet  empagliflozin (JARDIANCE) 10 MG TABS tablet  Lancets (ONETOUCH DELICA PLUS [id number]) MISC  levothyroxine (SYNTHROID/LEVOTHROID) 50 MCG tablet  losartan (COZAAR) 50 MG tablet  metFORMIN (GLUCOPHAGE-XR) 500 MG 24 hr tablet  metoprolol succinate ER (TOPROL-XL) 25 MG 24 hr tablet  nitroGLYcerin (NITROSTAT) 0.4 MG sublingual tablet  Probiotic Product (ACIDOPHILUS PROBIOTIC BLEND) CAPS  simvastatin (ZOCOR) 40 MG tablet  traZODone (DESYREL) 50 MG tablet  UNABLE TO FIND  Vitamin D, Cholecalciferol, 25 MCG (1000 UT) CAPS        ALLERGIES:    Allergies   Allergen Reactions     Penicillins Swelling     \"throat " "started swelling\"     Vitamin B Complex Hives     Vitamin B12 Hives     all vitamin B's     Clindamycin Hcl Rash       Past Surgical History:   Procedure Laterality Date     ARTHROPLASTY KNEE  4/29/2013    Procedure: ARTHROPLASTY KNEE;  left total knee arthroplasty;  Surgeon: Teddy Grimes MD;  Location: [facility] OR     ARTHROPLASTY KNEE Right 8/27/2018    Procedure: ARTHROPLASTY KNEE;  right total knee arthroplasty;  Surgeon: Johnny Anaya MD;  Location: [facility]     COMBINED CYSTOSCOPY, INSERT CATHETER URETER Bilateral 8/10/2015    Procedure: COMBINED CYSTOSCOPY, INSERT CATHETER URETER;  Surgeon: Johnnie Madera MD;  Location: [facility]     DILATION AND CURETTAGE, HYSTEROSCOPY DIAGNOSTIC, COMBINED N/A 11/6/2014    Procedure: COMBINED DILATION AND CURETTAGE, HYSTEROSCOPY DIAGNOSTIC;  Surgeon: Edward Pardo MD;  Location: [facility]     ESOPHAGOSCOPY, GASTROSCOPY, DUODENOSCOPY (EGD), COMBINED N/A 1/27/2015    Procedure: COMBINED ESOPHAGOSCOPY, GASTROSCOPY, DUODENOSCOPY (EGD), BIOPSY SINGLE OR MULTIPLE;  Surgeon: Carmelo Rosario MD;  Location: [facility]     HC REMOVAL GALLBLADDER      Cholecystectomy     HC REMOVE TONSILS/ADENOIDS,<13 Y/O      unsure of age     LAPAROSCOPIC HYSTERECTOMY TOTAL N/A 8/10/2015    Procedure: LAPAROSCOPIC HYSTERECTOMY TOTAL;  Surgeon: Edward Pardo MD;  [facility]     LAPAROSCOPIC LYSIS ADHESIONS N/A 8/10/2015    Procedure: LAPAROSCOPIC LYSIS ADHESIONS;  Surgeon: Edward Pardo MD;  Location: [facility]     ZZC APPENDECTOMY       [facility] EXPLOR HEART SURG WND W CP BYPASS Bilateral 03/20/2019     [facility] NONSPECIFIC PROCEDURE      Knee ligament surgery     [facility] NONSPECIFIC PROCEDURE      Kidney surgery for mechanical obstruction     [facility] OPEN CORONARY ENDARTERECTOMY  june 2005    Angioplasty w stenting     [facility] TOTAL KNEE ARTHROPLASTY  4/29/13    Left       Social History     Tobacco Use     Smoking status: Former     Smokeless tobacco: Never     Tobacco " comments:     quit  1987   Vaping Use     Vaping Use: Never used   Substance Use Topics     Alcohol use: No     Alcohol/week: 0.0 standard drinks     Drug use: No         Review of Systems   Except as noted in HPI, all other systems were reviewed and are negative    Physical Exam     Vitals were reviewed  Patient Vitals for the past 12 hrs:   BP Temp Temp src Pulse Resp SpO2 Weight   10/31/22 1549 -- -- -- -- 19 -- --   10/31/22 1456 130/57 -- -- -- 18 98 % --   10/31/22 1426 129/57 -- -- -- -- 97 % --   10/31/22 1411 138/59 -- -- -- -- 97 % --   10/31/22 1341 (!) 143/57 -- -- 57 -- 98 % --   10/31/22 1317 (!) 140/63 98.4  F (36.9  C) Oral 60 18 99 % 77.1 kg (170 lb)     GENERAL APPEARANCE: Alert and oriented x3, GCS 15  FACE: normal facies; no asymmetry  EYES: Pupils are equal; extraocular muscles are intact  HENT: normal external exam; tongue protrudes in the midline  NECK: no adenopathy or asymmetry  RESP: normal respiratory effort; clear breath sounds bilaterally  CV: regular rate and rhythm; no significant murmurs, gallops or rubs  ABD: soft, obese, no tenderness; no rebound or guarding; bowel sounds are normal  MS: no gross deformities noted; normal muscle tone.  EXT: No calf tenderness or pitting edema  SKIN: no worrisome rash  NEURO: no facial droop; no focal deficits, speech is normal  PSYCH: normal mood and affect    [employer] Stroke Scale  Exam Interval: Baseline   Score    Level of consciousness: (0)   Alert, keenly responsive    LOC questions: (0)   Answers both questions correctly    LOC commands: (0)   Performs both tasks correctly    Best gaze: (0)   Normal    Visual: (0)   No visual loss    Facial palsy: (0)   Normal symmetrical movements    Motor arm (left): (0)   No drift    Motor arm (right): (0)   No drift    Motor leg (left): (0)   No drift    Motor leg (right): (0)   No drift    Limb ataxia: (0)   Absent    Sensory: (0)   Normal- no sensory loss    Best language: (0)    Normal- no aphasia    Dysarthria: (0)   Normal    Extinction and inattention: (0)   No abnormality        Total Score:  0           Available Lab/Imaging Results     Results for orders placed or performed during the hospital encounter of 10/31/22 (from the past 24 hour(s))   Glucose by meter   Result Value Ref Range    GLUCOSE BY METER POCT 177 (H) 70 - 99 mg/dL   [place] Draw    Narrative    The following orders were created for panel order [place] Draw.  Procedure                               Abnormality         Status                     ---------                               -----------         ------                     Extra Blue Top Tube[[id number]]                              Final result               Extra Green Top (Lithium...[[id number]]                      Final result               Extra Purple Top Tube[[id number]]                            Final result               Extra Green Top (Lithium...[[id number]]                      Final result                 Please view results for these tests on the individual orders.   CBC with Platelets & Differential    Narrative    The following orders were created for panel order CBC with Platelets & Differential.  Procedure                               Abnormality         Status                     ---------                               -----------         ------                     CBC with platelets and d...[[id number]]                      Final result                 Please view results for these tests on the individual orders.   INR   Result Value Ref Range    INR 0.96 0.85 - 1.15   Partial thromboplastin time   Result Value Ref Range    aPTT 25 22 - 38 Seconds   Troponin I   Result Value Ref Range    Troponin I High Sensitivity 6 <54 ng/L   Comprehensive metabolic panel   Result Value Ref Range    Sodium 139 133 - 144 mmol/L    Potassium 4.3 3.4 - 5.3 mmol/L    Chloride 107 94 - 109 mmol/L    Carbon Dioxide (CO2) 25 20 - 32 mmol/L    Anion Gap 7 3 - 14  mmol/L    Urea Nitrogen 27 7 - 30 mg/dL    Creatinine 1.23 (H) 0.52 - 1.04 mg/dL    Calcium 9.2 8.5 - 10.1 mg/dL    Glucose 193 (H) 70 - 99 mg/dL    Alkaline Phosphatase 121 40 - 150 U/L    AST 14 0 - 45 U/L    ALT 20 0 - 50 U/L    Protein Total 7.3 6.8 - 8.8 g/dL    Albumin 3.5 3.4 - 5.0 g/dL    Bilirubin Total 0.4 0.2 - 1.3 mg/dL    GFR Estimate 44 (L) >60 mL/min/1.73m2   Extra Blue Top Tube   Result Value Ref Range    Hold Specimen JIC    Extra Green Top (Lithium Heparin) Tube   Result Value Ref Range    Hold Specimen JIC    Extra Purple Top Tube   Result Value Ref Range    Hold Specimen JIC    Extra Green Top (Lithium Heparin) ON ICE   Result Value Ref Range    Hold Specimen hold    CBC with platelets and differential   Result Value Ref Range    WBC Count 8.5 4.0 - 11.0 10e3/uL    RBC Count 4.19 3.80 - 5.20 10e6/uL    Hemoglobin 12.8 11.7 - 15.7 g/dL    Hematocrit 39.8 35.0 - 47.0 %    MCV 95 78 - 100 fL    MCH 30.5 26.5 - 33.0 pg    MCHC 32.2 31.5 - 36.5 g/dL    RDW 14.0 10.0 - 15.0 %    Platelet Count 298 150 - 450 10e3/uL    % Neutrophils 77 %    % Lymphocytes 13 %    % Monocytes 6 %    % Eosinophils 2 %    % Basophils 1 %    % Immature Granulocytes 1 %    NRBCs per 100 WBC 0 <1 /100    Absolute Neutrophils 6.6 1.6 - 8.3 10e3/uL    Absolute Lymphocytes 1.1 0.8 - 5.3 10e3/uL    Absolute Monocytes 0.5 0.0 - 1.3 10e3/uL    Absolute Eosinophils 0.2 0.0 - 0.7 10e3/uL    Absolute Basophils 0.1 0.0 - 0.2 10e3/uL    Absolute Immature Granulocytes 0.1 <=0.4 10e3/uL    Absolute NRBCs 0.0 10e3/uL   CT Head w/o Contrast    Narrative    CT SCAN OF THE HEAD WITHOUT CONTRAST   10/31/2022 1:35 PM     HISTORY: Headache. Neurologic deficit, non-traumatic. None of the  following: Altered mental status, language deficit, sensory loss,  visual symptoms, or weakness.    TECHNIQUE:  Axial images of the head and coronal reformations without  IV contrast material. Radiation dose for this scan was reduced using  automated exposure  "control, adjustment of the mA and/or kV according  to patient size, or iterative reconstruction technique.    COMPARISON: Head CT 8/15/2022    FINDINGS:  No evidence of hemorrhage. 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 Volume loss and patchy/confluent areas of  white matter hypoattenuation which likely represent chronic small  vessel ischemic change. No acute osseous abnormality.      Impression    IMPRESSION:     1. No acute intracranial abnormality.  2. Chronic changes as detailed.    SUSANNA KAPOOR MD         SYSTEM ID:  [id number]     *Note: Due to a large number of results and/or encounters for the requested time period, some results have not been displayed. A complete set of results can be found in Results Review.              Impression     Final diagnoses:   Difficulty with speech   Acute nonintractable headache         ED Course & Medical Decision Making   Sandra Petit is a 82 year old female who presented to the emergency department with an episode of speech difficulty that started around 9:30 AM.  Following an apartment meeting that she attended, patient went back to her place, and had difficulty finding the words, associated with a \"spongy\" feeling to the top of the head.  She called the nurse triage line, and could not answer any of the questions that were asked.  She developed some nausea, and presented to the emergency department for further evaluation.  Patient reports that the episode may have lasted 5 to 10 minutes but was unsure.  By the time she arrived in the emergency department, her symptoms had resolved except for some mild nausea symptoms and continued \"spongy\" feeling to the top of the head, but not pain..  She has never had anything like this before.  She denied any dizziness, weakness, and was still able to ambulate normally.  She has hypertension which has been difficult to control in the past but her blood pressures have been running between 140 and 150 " systolic.  Vital signs were normal with temperature of 98.4, initial blood pressure of 140/63 with subsequent blood pressure 130/57, normal heart rate, respiratory rate and oxygen saturation.  Patient has a normal neurologic exam.  NIH score was 0.  CT scan of the head without contrast reveals no acute intracranial abnormality.  There were some chronic changes with small old infarct involving the left occipital lobe.  Volume loss and patchy confluent areas of white matter hypoattenuation likely representing chronic small vessel ischemic changes.  We did not proceed with CTA due to her poor kidney function.  Her CTA of the head and neck from 10 weeks ago revealed mild plaque within the carotid siphons.  No large artery stenosis or occlusion.  No aneurysm or vascular abnormality.  Neck CTA revealed 70% stenosis right proximal internal carotid artery, 25% stenosis left proximal internal carotid artery.  We were going to proceed with MRI imaging of the brain, but the patient reports that she is claustrophobic, and since she is back to normal wanted to return home.  We offered to give her sedation for her MRI scan, but she preferred to have an outpatient open MRI scan.  An order for outpatient MRI scan with and without contrast was ordered.  Patient will make arrangements to complete the study.  She was told to return to the emergency department if she has any recurrent symptoms.  I will try to message her primary care provider regarding referral to vascular surgery regarding her 70% stenosis of the right proximal internal carotid artery.  She may be a candidate for intervention given that her symptoms sound strokelike.  Continue aspirin and tight control of her blood pressure.  The patient's son was present during our discharge conversation regarding outpatient MRI scan.        Written after-visit summary and instructions were given at the time of discharge.      Discharge Instructions:   We did not find a worrisome  cause for your symptoms based on your noncontrast CT scan and blood work.  Since you are claustrophobic I would like to have you follow-up for an open sided MRI scan of the brain.  Take Valium 5 mg half an hour before you are scheduled for your outpatient MRI scan.  Follow-up with Dr. Niño within the next week.  Return to the emergency department if you develop new, recurrent or worsening symptoms.       Disclaimer: This note consists of words and symbols derived from keyboarding and dictation using voice recognition software.  As a result, there may be errors that have gone undetected.  Please consider this when interpreting information found in this note.       Alicia Lopez MD  10/31/22 [id number]

## 2022-10-31 NOTE — TELEPHONE ENCOUNTER
Patient calling about mammogram results and is confused why there was another provider on the order except Dr. Niño. She said she has been seeing Dr. Niño and was upset he was removed as PCP on her account. RN added Dr. Niño back as PCP and patient would like him to review results.     GLADYS SmithN, RN

## 2022-10-31 NOTE — DISCHARGE INSTRUCTIONS
We did not find a worrisome cause for your symptoms based on your noncontrast CT scan and blood work.  Since you are claustrophobic I would like to have you follow-up for an open sided MRI scan of the brain.  Take Valium 5 mg half an hour before you are scheduled for your outpatient MRI scan.  Follow-up with Dr. Niño within the next week.  Return to the emergency department if you develop new, recurrent or worsening symptoms.

## 2022-10-31 NOTE — ED TRIAGE NOTES
"Pt presents with concern for trouble finding words and answering questions pertaining to herself this morning when on the phone with the clinic. Pt states last known well was at 0945 this morning. Pt states she couldn't answer her phone number or address. Pt states \"my head feels spungy.\" Dr Lopez to room, stroke eval called. Blood glucose 177      "

## 2022-11-01 ENCOUNTER — MYC MEDICAL ADVICE (OUTPATIENT)
Dept: INTERNAL MEDICINE | Facility: CLINIC | Age: 82
End: 2022-11-01

## 2022-11-01 DIAGNOSIS — R47.9 DIFFICULTY WITH SPEECH: Primary | ICD-10-CM

## 2022-11-01 DIAGNOSIS — R51.9 ACUTE NONINTRACTABLE HEADACHE, UNSPECIFIED HEADACHE TYPE: ICD-10-CM

## 2022-11-01 NOTE — TELEPHONE ENCOUNTER
Giovanni had the ED MRI order faxed to [facility] open sided but after looking at our MRI machine has decided to have her MRI done here in [place].  Order placed and pended. Please sign order if appropriate and route back to me..

## 2022-11-01 NOTE — TELEPHONE ENCOUNTER
RN has patient on phone.  Informed MRI order was signed and transferred her over to radiology to schedule.  Closing this encounter.  CARLOZ Avalos, RN

## 2022-11-03 ENCOUNTER — HOSPITAL ENCOUNTER (OUTPATIENT)
Dept: MRI IMAGING | Facility: CLINIC | Age: 82
Discharge: HOME | End: 2022-11-03
Attending: INTERNAL MEDICINE | Admitting: INTERNAL MEDICINE
Payer: MEDICARE

## 2022-11-03 DIAGNOSIS — R47.9 DIFFICULTY WITH SPEECH: ICD-10-CM

## 2022-11-03 DIAGNOSIS — R51.9 ACUTE NONINTRACTABLE HEADACHE, UNSPECIFIED HEADACHE TYPE: ICD-10-CM

## 2022-11-03 PROCEDURE — A9585 GADOBUTROL INJECTION: HCPCS | Performed by: INTERNAL MEDICINE

## 2022-11-03 PROCEDURE — 255N000002 HC RX 255 OP 636: Performed by: INTERNAL MEDICINE

## 2022-11-03 PROCEDURE — G1010 CDSM STANSON: HCPCS

## 2022-11-03 RX ORDER — GADOBUTROL 604.72 MG/ML
7.5 INJECTION INTRAVENOUS ONCE
Status: COMPLETED | OUTPATIENT
Start: 2022-11-03 | End: 2022-11-03

## 2022-11-03 RX ADMIN — GADOBUTROL 7.5 ML: 604.72 INJECTION INTRAVENOUS at 11:01

## 2022-11-04 ENCOUNTER — MYC MEDICAL ADVICE (OUTPATIENT)
Dept: INTERNAL MEDICINE | Facility: CLINIC | Age: 82
End: 2022-11-04

## 2022-11-04 DIAGNOSIS — E11.21 TYPE 2 DIABETES MELLITUS WITH DIABETIC NEPHROPATHY, WITHOUT LONG-TERM CURRENT USE OF INSULIN (H): ICD-10-CM

## 2022-11-06 ENCOUNTER — TELEPHONE (OUTPATIENT)
Dept: INTERNAL MEDICINE | Facility: CLINIC | Age: 82
End: 2022-11-06

## 2022-11-06 NOTE — TELEPHONE ENCOUNTER
Reason for Call:  Same Day Appointment, Requested Provider: F/U was requested by PCP after her CT and MRI was done.    PCP: Jason Niño    Reason for visit: F/U after CT and MRI - now has pain in  Back of neck and into skull, her BP readings are also down in the 60's and would like to discuss this with her PCP    Duration of symptoms: 1 week     Have you been treated for this in the past? Yes    Additional comments:  N/A    Can we leave a detailed message on this number? YES    Phone number patient can be reached at: Cell number on file:    Telephone Information:   Mobile [phone]       Best Time: Anytime    Call taken on 11/6/2022 at 10:31 AM by KJ JIMENEZ

## 2022-11-07 ENCOUNTER — MYC MEDICAL ADVICE (OUTPATIENT)
Dept: INTERNAL MEDICINE | Facility: CLINIC | Age: 82
End: 2022-11-07

## 2022-11-07 ENCOUNTER — OFFICE VISIT (OUTPATIENT)
Dept: INTERNAL MEDICINE | Facility: CLINIC | Age: 82
End: 2022-11-07
Payer: MEDICARE

## 2022-11-07 VITALS
DIASTOLIC BLOOD PRESSURE: 62 MMHG | BODY MASS INDEX: 35.14 KG/M2 | RESPIRATION RATE: 16 BRPM | OXYGEN SATURATION: 98 % | WEIGHT: 174 LBS | HEART RATE: 58 BPM | TEMPERATURE: 97.8 F | SYSTOLIC BLOOD PRESSURE: 144 MMHG

## 2022-11-07 DIAGNOSIS — E03.8 OTHER SPECIFIED HYPOTHYROIDISM: ICD-10-CM

## 2022-11-07 DIAGNOSIS — S46.812S TRAPEZIUS STRAIN, LEFT, SEQUELA: ICD-10-CM

## 2022-11-07 DIAGNOSIS — I10 ESSENTIAL HYPERTENSION, BENIGN: ICD-10-CM

## 2022-11-07 DIAGNOSIS — E11.59 TYPE 2 DIABETES MELLITUS WITH OTHER CIRCULATORY COMPLICATIONS (H): ICD-10-CM

## 2022-11-07 DIAGNOSIS — I65.21 CAROTID ATHEROSCLEROSIS, RIGHT: Primary | ICD-10-CM

## 2022-11-07 PROCEDURE — 99214 OFFICE O/P EST MOD 30 MIN: CPT | Performed by: INTERNAL MEDICINE

## 2022-11-07 RX ORDER — METFORMIN HCL 500 MG
TABLET, EXTENDED RELEASE 24 HR ORAL
Qty: 180 TABLET | Refills: 0 | Status: SHIPPED | OUTPATIENT
Start: 2022-11-07 | End: 2022-11-21

## 2022-11-07 ASSESSMENT — PAIN SCALES - GENERAL: PAINLEVEL: WORST PAIN (10)

## 2022-11-07 NOTE — TELEPHONE ENCOUNTER
Patient returned call and stated yes the appointment for today will work and informed to arrive at 2:40 pm for check in. Florence Lagos LPN

## 2022-11-07 NOTE — TELEPHONE ENCOUNTER
Prescription approved per [facility] Refill Protocol.  Cha Bonilla RN on 11/7/2022 at 12:37 PM

## 2022-11-07 NOTE — PROGRESS NOTES
"  Assessment & Plan     Type 2 diabetes mellitus with other circulatory complications (H)  Doing ok, continue metformin     Other specified hypothyroidism  On levothyroxine, no changes,     Carotid atherosclerosis, right  Seen in ER this summer, no treatment now having atypical symptoms, dizziness, lightheaded. Will refer to vascular due to symptoms and 70% stenosis, will refer to [facility] as she had cabg there and location.   - Vascular Surgery Referral; Future    Trapezius strain, left, sequela  Pain on the neck and shoulder, needs to use heat, topical voltaren gel     Essential hypertension, benign  Running high but stressed, with carotid will not treat more aggressively now.                BMI:   Estimated body mass index is 35.14 kg/m  as calculated from the following:    Height as of 7/1/22: 1.499 m (4' 11\").    Weight as of this encounter: 78.9 kg (174 lb).         No follow-ups on file.    Jason Niño MD  [facility] IZABELA Davila is a 82 year old, presenting for the following health issues:  Pain (Neck/head pain)      History of Present Illness       Reason for visit:  Pain  Symptom onset:  3-7 days ago    She eats 2-3 servings of fruits and vegetables daily.She consumes 0 sweetened beverage(s) daily.She exercises with enough effort to increase her heart rate 9 or less minutes per day.  She exercises with enough effort to increase her heart rate 3 or less days per week.   She is taking medications regularly.     Had odd sensation and spongy head, was ok on ER work up.   Mri of the brain done,    CTA in August showed 70% blockage in right carotid. No NSAID due to CKD.    Left side neck pain to the shoulder, tried hot packs, massage.  No help.      4 bp pills, misses clonidine sometimes.        Past Medical History:   Diagnosis Date     Diabetic eye exam (H) 08/05/13     Endometriosis, site unspecified      Fever and other physiologic disturbances of temperature regulation " 07/07/2005    Admit.  Discharged 07/13/2005.  Cause undetermined.     Heart disease      Myalgia and myositis, unspecified     fibromyalgia     Pure hypercholesterolemia      Unspecified essential hypertension      Unspecified hypothyroidism      Current Outpatient Medications   Medication     allopurinol (ZYLOPRIM) 100 MG tablet     amLODIPine (NORVASC) 10 MG tablet     aspirin 81 MG EC tablet     blood glucose (ONETOUCH VERIO IQ) test strip     cloNIDine (CATAPRES) 0.1 MG tablet     Lancets (ONETOUCH DELICA PLUS [id number]) MISC     levothyroxine (SYNTHROID/LEVOTHROID) 50 MCG tablet     losartan (COZAAR) 50 MG tablet     metFORMIN (GLUCOPHAGE XR) 500 MG 24 hr tablet     metoprolol succinate ER (TOPROL-XL) 25 MG 24 hr tablet     Probiotic Product (ACIDOPHILUS PROBIOTIC BLEND) CAPS     simvastatin (ZOCOR) 40 MG tablet     traZODone (DESYREL) 50 MG tablet     Vitamin D, Cholecalciferol, 25 MCG (1000 UT) CAPS     UNABLE TO FIND     Current Facility-Administered Medications   Medication     lidocaine 1 % injection 1 mL     Social History     Tobacco Use     Smoking status: Former     Smokeless tobacco: Never     Tobacco comments:     quit  1987   Vaping Use     Vaping Use: Never used   Substance Use Topics     Alcohol use: No     Alcohol/week: 0.0 standard drinks     Drug use: No             Review of Systems         Objective    BP (!) 144/62   Pulse 58   Temp 97.8  F (36.6  C) (Temporal)   Resp 16   Wt 78.9 kg (174 lb)   SpO2 98%   BMI 35.14 kg/m    There is no height or weight on file to calculate BMI.  Physical Exam   Nad,  Recheck bp 150/60  Left trapezius tenderness to palpation

## 2022-11-08 ENCOUNTER — HOSPITAL ENCOUNTER (EMERGENCY)
Facility: CLINIC | Age: 82
Discharge: HOME | End: 2022-11-08
Attending: PHYSICIAN ASSISTANT | Admitting: PHYSICIAN ASSISTANT
Payer: MEDICARE

## 2022-11-08 ENCOUNTER — APPOINTMENT (OUTPATIENT)
Dept: CT IMAGING | Facility: CLINIC | Age: 82
End: 2022-11-08
Attending: PHYSICIAN ASSISTANT
Payer: MEDICARE

## 2022-11-08 ENCOUNTER — MYC MEDICAL ADVICE (OUTPATIENT)
Dept: INTERNAL MEDICINE | Facility: CLINIC | Age: 82
End: 2022-11-08

## 2022-11-08 ENCOUNTER — NURSE TRIAGE (OUTPATIENT)
Dept: INTERNAL MEDICINE | Facility: CLINIC | Age: 82
End: 2022-11-08

## 2022-11-08 VITALS
DIASTOLIC BLOOD PRESSURE: 77 MMHG | RESPIRATION RATE: 20 BRPM | SYSTOLIC BLOOD PRESSURE: 179 MMHG | WEIGHT: 174 LBS | HEART RATE: 52 BPM | OXYGEN SATURATION: 98 % | BODY MASS INDEX: 35.14 KG/M2 | TEMPERATURE: 97.9 F

## 2022-11-08 DIAGNOSIS — R55 PRE-SYNCOPE: ICD-10-CM

## 2022-11-08 LAB
ALBUMIN SERPL-MCNC: 3.4 G/DL (ref 3.4–5)
ALP SERPL-CCNC: 120 U/L (ref 40–150)
ALT SERPL W P-5'-P-CCNC: 21 U/L (ref 0–50)
ANION GAP SERPL CALCULATED.3IONS-SCNC: 7 MMOL/L (ref 3–14)
AST SERPL W P-5'-P-CCNC: 17 U/L (ref 0–45)
BASOPHILS # BLD AUTO: 0.1 10E3/UL (ref 0–0.2)
BASOPHILS NFR BLD AUTO: 1 %
BILIRUB SERPL-MCNC: 0.3 MG/DL (ref 0.2–1.3)
BUN SERPL-MCNC: 29 MG/DL (ref 7–30)
CALCIUM SERPL-MCNC: 9.2 MG/DL (ref 8.5–10.1)
CHLORIDE BLD-SCNC: 109 MMOL/L (ref 94–109)
CO2 SERPL-SCNC: 23 MMOL/L (ref 20–32)
CREAT SERPL-MCNC: 1.28 MG/DL (ref 0.52–1.04)
D DIMER PPP FEU-MCNC: 0.97 UG/ML FEU (ref 0–0.5)
EOSINOPHIL # BLD AUTO: 0.3 10E3/UL (ref 0–0.7)
EOSINOPHIL NFR BLD AUTO: 3 %
ERYTHROCYTE [DISTWIDTH] IN BLOOD BY AUTOMATED COUNT: 14 % (ref 10–15)
FLUAV RNA SPEC QL NAA+PROBE: NEGATIVE
FLUBV RNA RESP QL NAA+PROBE: NEGATIVE
GFR SERPL CREATININE-BSD FRML MDRD: 42 ML/MIN/1.73M2
GLUCOSE BLD-MCNC: 189 MG/DL (ref 70–99)
HCT VFR BLD AUTO: 38.1 % (ref 35–47)
HGB BLD-MCNC: 12.6 G/DL (ref 11.7–15.7)
HOLD SPECIMEN: NORMAL
IMM GRANULOCYTES # BLD: 0.1 10E3/UL
IMM GRANULOCYTES NFR BLD: 1 %
LYMPHOCYTES # BLD AUTO: 1.2 10E3/UL (ref 0.8–5.3)
LYMPHOCYTES NFR BLD AUTO: 14 %
MAGNESIUM SERPL-MCNC: 2.1 MG/DL (ref 1.6–2.3)
MCH RBC QN AUTO: 31.4 PG (ref 26.5–33)
MCHC RBC AUTO-ENTMCNC: 33.1 G/DL (ref 31.5–36.5)
MCV RBC AUTO: 95 FL (ref 78–100)
MONOCYTES # BLD AUTO: 0.5 10E3/UL (ref 0–1.3)
MONOCYTES NFR BLD AUTO: 6 %
NEUTROPHILS # BLD AUTO: 6.3 10E3/UL (ref 1.6–8.3)
NEUTROPHILS NFR BLD AUTO: 75 %
NRBC # BLD AUTO: 0 10E3/UL
NRBC BLD AUTO-RTO: 0 /100
PLATELET # BLD AUTO: 278 10E3/UL (ref 150–450)
POTASSIUM BLD-SCNC: 4.3 MMOL/L (ref 3.4–5.3)
PROT SERPL-MCNC: 7 G/DL (ref 6.8–8.8)
RBC # BLD AUTO: 4.01 10E6/UL (ref 3.8–5.2)
RSV RNA SPEC NAA+PROBE: NEGATIVE
SARS-COV-2 RNA RESP QL NAA+PROBE: NEGATIVE
SODIUM SERPL-SCNC: 139 MMOL/L (ref 133–144)
T4 FREE SERPL-MCNC: 1.04 NG/DL (ref 0.76–1.46)
TROPONIN I SERPL HS-MCNC: 6 NG/L
TSH SERPL DL<=0.005 MIU/L-ACNC: 4.12 MU/L (ref 0.4–4)
WBC # BLD AUTO: 8.4 10E3/UL (ref 4–11)

## 2022-11-08 PROCEDURE — 99285 EMERGENCY DEPT VISIT HI MDM: CPT | Mod: CS | Performed by: PHYSICIAN ASSISTANT

## 2022-11-08 PROCEDURE — 250N000013 HC RX MED GY IP 250 OP 250 PS 637: Performed by: PHYSICIAN ASSISTANT

## 2022-11-08 PROCEDURE — 84443 ASSAY THYROID STIM HORMONE: CPT | Performed by: PHYSICIAN ASSISTANT

## 2022-11-08 PROCEDURE — 99285 EMERGENCY DEPT VISIT HI MDM: CPT | Mod: CS,25 | Performed by: PHYSICIAN ASSISTANT

## 2022-11-08 PROCEDURE — 87637 SARSCOV2&INF A&B&RSV AMP PRB: CPT | Performed by: PHYSICIAN ASSISTANT

## 2022-11-08 PROCEDURE — 36415 COLL VENOUS BLD VENIPUNCTURE: CPT | Performed by: PHYSICIAN ASSISTANT

## 2022-11-08 PROCEDURE — 84484 ASSAY OF TROPONIN QUANT: CPT | Performed by: PHYSICIAN ASSISTANT

## 2022-11-08 PROCEDURE — 84439 ASSAY OF FREE THYROXINE: CPT | Performed by: PHYSICIAN ASSISTANT

## 2022-11-08 PROCEDURE — 85379 FIBRIN DEGRADATION QUANT: CPT | Performed by: PHYSICIAN ASSISTANT

## 2022-11-08 PROCEDURE — 96361 HYDRATE IV INFUSION ADD-ON: CPT | Performed by: PHYSICIAN ASSISTANT

## 2022-11-08 PROCEDURE — 93005 ELECTROCARDIOGRAM TRACING: CPT | Performed by: PHYSICIAN ASSISTANT

## 2022-11-08 PROCEDURE — 258N000003 HC RX IP 258 OP 636: Performed by: PHYSICIAN ASSISTANT

## 2022-11-08 PROCEDURE — 83735 ASSAY OF MAGNESIUM: CPT | Performed by: PHYSICIAN ASSISTANT

## 2022-11-08 PROCEDURE — 93010 ELECTROCARDIOGRAM REPORT: CPT | Performed by: PHYSICIAN ASSISTANT

## 2022-11-08 PROCEDURE — 85025 COMPLETE CBC W/AUTO DIFF WBC: CPT | Performed by: PHYSICIAN ASSISTANT

## 2022-11-08 PROCEDURE — 250N000011 HC RX IP 250 OP 636: Performed by: PHYSICIAN ASSISTANT

## 2022-11-08 PROCEDURE — G1010 CDSM STANSON: HCPCS

## 2022-11-08 PROCEDURE — 96360 HYDRATION IV INFUSION INIT: CPT | Mod: 59 | Performed by: PHYSICIAN ASSISTANT

## 2022-11-08 PROCEDURE — C9803 HOPD COVID-19 SPEC COLLECT: HCPCS | Performed by: PHYSICIAN ASSISTANT

## 2022-11-08 PROCEDURE — 250N000009 HC RX 250: Performed by: PHYSICIAN ASSISTANT

## 2022-11-08 PROCEDURE — 80053 COMPREHEN METABOLIC PANEL: CPT | Performed by: PHYSICIAN ASSISTANT

## 2022-11-08 RX ORDER — SODIUM CHLORIDE 9 MG/ML
INJECTION, SOLUTION INTRAVENOUS CONTINUOUS
Status: DISCONTINUED | OUTPATIENT
Start: 2022-11-08 | End: 2022-11-08 | Stop reason: HOSPADM

## 2022-11-08 RX ORDER — CLONIDINE HYDROCHLORIDE 0.1 MG/1
0.1 TABLET ORAL ONCE
Status: COMPLETED | OUTPATIENT
Start: 2022-11-08 | End: 2022-11-08

## 2022-11-08 RX ORDER — IOPAMIDOL 755 MG/ML
500 INJECTION, SOLUTION INTRAVASCULAR ONCE
Status: COMPLETED | OUTPATIENT
Start: 2022-11-08 | End: 2022-11-08

## 2022-11-08 RX ADMIN — CLONIDINE HYDROCHLORIDE 0.1 MG: 0.1 TABLET ORAL at 17:24

## 2022-11-08 RX ADMIN — SODIUM CHLORIDE 500 ML: 9 INJECTION, SOLUTION INTRAVENOUS at 16:30

## 2022-11-08 RX ADMIN — SODIUM CHLORIDE 70 ML: 9 INJECTION, SOLUTION INTRAVENOUS at 16:22

## 2022-11-08 RX ADMIN — IOPAMIDOL 60 ML: 755 INJECTION, SOLUTION INTRAVENOUS at 16:20

## 2022-11-08 RX ADMIN — SODIUM CHLORIDE: 9 INJECTION, SOLUTION INTRAVENOUS at 17:25

## 2022-11-08 ASSESSMENT — ACTIVITIES OF DAILY LIVING (ADL)
ADLS_ACUITY_SCORE: 37
ADLS_ACUITY_SCORE: 37

## 2022-11-08 NOTE — ED PROVIDER NOTES
"  History     Chief Complaint   Patient presents with     Shortness of Breath     HPI  Sandra Petit is a 82 year old female who presents for evaluation of an episode that occurred about 2 hours prior while shopping at [employer].  She was leaning over a cart walking along when suddenly she felt like she was going to pass out.  She states that everything went dark.  Apparently she did not fall to the ground.  She was next to another customer and reached out for support.  She apparently came to at that point was able to be escorted to a seat at the pharmacy.  She did this on her own power.  She did not have any preceding symptoms.  There is no involuntary movements, or stool/urine incontinence.  She actually recovered quite well and was able to drive herself home.  She states that she feels okay at this point.  She was reported to be quite pale after the incident per bystanders.  States that she has had ongoing superior head discomfort that \"feels spongy \"for the past 2 weeks and rates the pain 8 on a scale of 10.  Also notes tension and discomfort in her left trapezius area.  No recent fall or injury.  She was in the ED and had a CT and MRI for evaluation.  She states that the left trapezius area discomfort radiates up into her skull area.  She was prescribed Voltaren by her PCP and was at the pharmacy to pick that up.  When walking into the ED, she noticed that she was dyspneic on exertion.  This improved with rest.  She has not had this issue before.  No chest pain or palpitations.  She denies feeling any skipped beats or palpitations prior to onset of her symptoms at [facility].  Denies a prior arrhythmia issue.  Reports taking all of her antihypertensive medications today.  No recent lower extremity edema or calf pain.  No prior history of DVT or PE.  Denies any recent fever, chills, nausea, vomiting, abdominal pain, dysuria, frequency, urgency, or flank pain.        Allergies:  Allergies   Allergen Reactions     " "Penicillins Swelling     \"throat started swelling\"     Vitamin B Complex Hives     Vitamin B12 Hives     all vitamin B's     Clindamycin Hcl Rash       Problem List:    Patient Active Problem List    Diagnosis Date Noted     Morbid obesity (H) 11/11/2020     Priority: Medium     Osteoarthritis of left thumb 07/07/2017     Priority: Medium     Type 2 diabetes mellitus with other circulatory complications (H) 04/04/2016     Priority: Medium     Other specified hypothyroidism 10/02/2015     Priority: Medium     Coronary artery disease involving native coronary artery without angina pectoris 10/02/2015     Priority: Medium     Type 2 diabetes mellitus with diabetic nephropathy (H) 10/02/2015     Priority: Medium     History of total left knee replacement 04/29/2013     Priority: Medium     Hypertension goal BP (blood pressure) < 140/90 09/26/2011     Priority: Medium     Hyperlipidemia LDL goal <100 10/31/2010     Priority: Medium     CKD (chronic kidney disease) stage 3, GFR 30-59 ml/min (H) 03/17/2009     Priority: Medium        Past Medical History:    Past Medical History:   Diagnosis Date     Diabetic eye exam (H) 08/05/13     Endometriosis, site unspecified      Fever and other physiologic disturbances of temperature regulation 07/07/2005     Heart disease      Myalgia and myositis, unspecified      Pure hypercholesterolemia      Unspecified essential hypertension      Unspecified hypothyroidism        Past Surgical History:    Past Surgical History:   Procedure Laterality Date     ARTHROPLASTY KNEE  4/29/2013    Procedure: ARTHROPLASTY KNEE;  left total knee arthroplasty;  Surgeon: Teddy Grimes MD;  Location: [facility]     ARTHROPLASTY KNEE Right 8/27/2018    Procedure: ARTHROPLASTY KNEE;  right total knee arthroplasty;  Surgeon: Johnny Anaya MD;  Location[facility]     COMBINED CYSTOSCOPY, INSERT CATHETER URETER Bilateral 8/10/2015    Procedure: COMBINED CYSTOSCOPY, INSERT CATHETER URETER;  Surgeon: " Johnnie Madera MD;  Location: [facility]     DILATION AND CURETTAGE, HYSTEROSCOPY DIAGNOSTIC, COMBINED N/A 2014    Procedure: COMBINED DILATION AND CURETTAGE, HYSTEROSCOPY DIAGNOSTIC;  Surgeon: Edward Pardo MD;  Location: [facility]     ESOPHAGOSCOPY, GASTROSCOPY, DUODENOSCOPY (EGD), COMBINED N/A 2015    Procedure: COMBINED ESOPHAGOSCOPY, GASTROSCOPY, DUODENOSCOPY (EGD), BIOPSY SINGLE OR MULTIPLE;  Surgeon: Carmelo Rosario MD;  Location: [facility]     HC REMOVAL GALLBLADDER      Cholecystectomy     HC REMOVE TONSILS/ADENOIDS,<11 Y/O      unsure of age     LAPAROSCOPIC HYSTERECTOMY TOTAL N/A 8/10/2015    Procedure: LAPAROSCOPIC HYSTERECTOMY TOTAL;  Surgeon: Edward Pardo MD;  [facility]     LAPAROSCOPIC LYSIS ADHESIONS N/A 8/10/2015    Procedure: LAPAROSCOPIC LYSIS ADHESIONS;  Surgeon: Edward Pardo MD;  Location: [facility]     ZZC APPENDECTOMY       Z EXPLOR HEART SURG WND W CP BYPASS Bilateral 2019     Z NONSPECIFIC PROCEDURE      Knee ligament surgery     [facility] NONSPECIFIC PROCEDURE      Kidney surgery for mechanical obstruction     Z OPEN CORONARY ENDARTERECTOMY  2005    Angioplasty w stenting     Z TOTAL KNEE ARTHROPLASTY  13    Left       Family History:    Family History   Problem Relation Age of Onset     Heart Disease Mother          coronary     Heart Disease Father          coronary     Allergies Sister         unknown     Allergies Brother         unknown     Allergies Daughter         unknown     Allergies Son         unknown     Heart Disease Sister         by pass surg     Heart Disease Brother         on medication     Hypertension Sister      Hypertension Brother      Lipids Sister      Lipids Brother      Cerebrovascular Disease Brother      Obesity Sister      Diabetes Sister      Diabetes Sister      C.A.D. Brother         quad by pass     Neurologic Disorder Sister         parkinsons     Cancer Sister         skin  cancer     Alcohol/Drug No family hx of      Alzheimer Disease No family hx of        Social History:  Marital Status:   [5]  Social History     Tobacco Use     Smoking status: Former     Smokeless tobacco: Never     Tobacco comments:     quit  1987   Vaping Use     Vaping Use: Never used   Substance Use Topics     Alcohol use: No     Alcohol/week: 0.0 standard drinks     Drug use: No        Medications:    allopurinol (ZYLOPRIM) 100 MG tablet  amLODIPine (NORVASC) 10 MG tablet  aspirin 81 MG EC tablet  blood glucose (ONETOUCH VERIO IQ) test strip  cloNIDine (CATAPRES) 0.1 MG tablet  levothyroxine (SYNTHROID/LEVOTHROID) 50 MCG tablet  losartan (COZAAR) 50 MG tablet  metFORMIN (GLUCOPHAGE XR) 500 MG 24 hr tablet  metoprolol succinate ER (TOPROL-XL) 25 MG 24 hr tablet  Probiotic Product (ACIDOPHILUS PROBIOTIC BLEND) CAPS  simvastatin (ZOCOR) 40 MG tablet  traZODone (DESYREL) 50 MG tablet  Vitamin D, Cholecalciferol, 25 MCG (1000 UT) CAPS  Lancets (ONETOUCH DELICA PLUS [id number]) MISC  UNABLE TO FIND          Review of Systems   All other systems reviewed and are negative.      Physical Exam   BP: (!) 193/73  Pulse: 69  Temp: 97.9  F (36.6  C)  Resp: 18  Weight: 78.9 kg (174 lb)  SpO2: 100 %      Physical Exam  Vitals and nursing note reviewed.   Constitutional:       General: She is not in acute distress.     Appearance: She is obese. She is not ill-appearing, toxic-appearing or diaphoretic.   HENT:      Head: Normocephalic and atraumatic.      Right Ear: External ear normal.      Left Ear: External ear normal.      Nose: Nose normal.      Mouth/Throat:      Mouth: Oropharynx is clear and moist. Mucous membranes are moist.      Pharynx: No pharyngeal swelling or oropharyngeal exudate.   Eyes:      General: No scleral icterus.        Right eye: No discharge.         Left eye: No discharge.      Extraocular Movements: EOM normal.      Conjunctiva/sclera: Conjunctivae normal.      Pupils: Pupils are equal, round,  and reactive to light.   Neck:      Thyroid: No thyromegaly.      Vascular: No hepatojugular reflux or JVD.   Cardiovascular:      Rate and Rhythm: Normal rate and regular rhythm.      Heart sounds: Normal heart sounds. No murmur heard.  Pulmonary:      Effort: Pulmonary effort is normal. No tachypnea or respiratory distress.      Breath sounds: Normal breath sounds. No decreased breath sounds, wheezing, rhonchi or rales.   Chest:      Chest wall: No mass, tenderness or edema.   Abdominal:      General: Bowel sounds are normal. There is no distension.      Palpations: Abdomen is soft. There is no mass.      Tenderness: There is no abdominal tenderness. There is no guarding or rebound.   Musculoskeletal:         General: No tenderness, deformity or edema. Normal range of motion.      Cervical back: Normal range of motion and neck supple.      Right lower leg: No tenderness. No edema.      Left lower leg: No tenderness. No edema.      Comments: Muscle spasm and tenderness of the body of the mid trapezius muscle which reproduces her head and neck pain.   Lymphadenopathy:      Cervical: No cervical adenopathy.   Skin:     General: Skin is warm and dry.      Capillary Refill: Capillary refill takes less than 2 seconds.      Coloration: Skin is not pale.      Findings: No erythema or rash.   Neurological:      General: No focal deficit present.      Mental Status: She is alert and oriented to person, place, and time.      Cranial Nerves: No cranial nerve deficit.      Comments: Neuro:  Cranial nerves II-XII grossly intact.  Romberg is steady.  Gait WNL's.  Cerebellar testing is WNL's.  Sensation is intact to light touch throughout.  Bicep, brachioradialis, patellar, and achilles DTR's 2/4 without clonus.  No neurological abnormality identified.    Psychiatric:         Mood and Affect: Mood and affect normal.         Behavior: Behavior normal.         Thought Content: Thought content normal.         ED Course          5:18  PM -I reviewed the results with the patient.  No significant abnormality to this point.  She reports that her pulse rate has generally between 48-54 ever since she had coronary artery bypass surgery.  This is normal for her.  I am still and concerned about an arrhythmia that caused her presyncopal episode.  Regardless, we will rule out COVID-19 as a potential contributing factor given her dyspnea on exertion.  We will continue to monitor her heart rate during this time.  Her blood pressure is in the 170-190 systolic range.  She is due for her clonidine regular dose, and this will be provided.           Procedures              EKG Interpretation:      Interpreted by Hector Ballesteros PA-C  Symptoms at time of EKG: none   Rhythm: Sinus bradycardia rate of 56.  No block.  Rate: 56  Axis: normal  Ectopy: none  Conduction: normal  ST Segments/ T Waves: No ST-T wave changes  Q Waves: none  Comparison to prior: No old EKG available    Clinical Impression: Sinus bradycardia at a rate of 56 without evidence for block.          Critical Care time:  none               Results for orders placed or performed during the hospital encounter of 11/08/22 (from the past 24 hour(s))   [place] Draw    Narrative    The following orders were created for panel order [place] Draw.  Procedure                               Abnormality         Status                     ---------                               -----------         ------                     Extra Blue Top Tube[[id number]]                              Final result               Extra Green Top (Lithium...[[id number]]                      Final result               Extra Purple Top Tube[[id number]]                            Final result                 Please view results for these tests on the individual orders.   Extra Blue Top Tube   Result Value Ref Range    Hold Specimen JIC    Extra Green Top (Lithium Heparin) Tube   Result Value Ref Range    Hold Specimen JIC    Extra  Purple Top Tube   Result Value Ref Range    Hold Specimen [facility]    CBC with platelets differential    Narrative    The following orders were created for panel order CBC with platelets differential.  Procedure                               Abnormality         Status                     ---------                               -----------         ------                     CBC with platelets and d...[[id number]]                      Final result                 Please view results for these tests on the individual orders.   D dimer quantitative   Result Value Ref Range    D-Dimer Quantitative 0.97 (H) 0.00 - 0.50 ug/mL FEU    Narrative    This D-dimer assay is intended for use in conjunction with a clinical pretest probability assessment model to exclude pulmonary embolism (PE) and deep venous thrombosis (DVT) in outpatients suspected of PE or DVT. The cut-off value is 0.50 ug/mL FEU.   Comprehensive metabolic panel   Result Value Ref Range    Sodium 139 133 - 144 mmol/L    Potassium 4.3 3.4 - 5.3 mmol/L    Chloride 109 94 - 109 mmol/L    Carbon Dioxide (CO2) 23 20 - 32 mmol/L    Anion Gap 7 3 - 14 mmol/L    Urea Nitrogen 29 7 - 30 mg/dL    Creatinine 1.28 (H) 0.52 - 1.04 mg/dL    Calcium 9.2 8.5 - 10.1 mg/dL    Glucose 189 (H) 70 - 99 mg/dL    Alkaline Phosphatase 120 40 - 150 U/L    AST 17 0 - 45 U/L    ALT 21 0 - 50 U/L    Protein Total 7.0 6.8 - 8.8 g/dL    Albumin 3.4 3.4 - 5.0 g/dL    Bilirubin Total 0.3 0.2 - 1.3 mg/dL    GFR Estimate 42 (L) >60 mL/min/1.73m2   Troponin I   Result Value Ref Range    Troponin I High Sensitivity 6 <54 ng/L   Magnesium   Result Value Ref Range    Magnesium 2.1 1.6 - 2.3 mg/dL   TSH with free T4 reflex   Result Value Ref Range    TSH 4.12 (H) 0.40 - 4.00 mU/L   CBC with platelets and differential   Result Value Ref Range    WBC Count 8.4 4.0 - 11.0 10e3/uL    RBC Count 4.01 3.80 - 5.20 10e6/uL    Hemoglobin 12.6 11.7 - 15.7 g/dL    Hematocrit 38.1 35.0 - 47.0 %    MCV 95 78 - 100 fL     MCH 31.4 26.5 - 33.0 pg    MCHC 33.1 31.5 - 36.5 g/dL    RDW 14.0 10.0 - 15.0 %    Platelet Count 278 150 - 450 10e3/uL    % Neutrophils 75 %    % Lymphocytes 14 %    % Monocytes 6 %    % Eosinophils 3 %    % Basophils 1 %    % Immature Granulocytes 1 %    NRBCs per 100 WBC 0 <1 /100    Absolute Neutrophils 6.3 1.6 - 8.3 10e3/uL    Absolute Lymphocytes 1.2 0.8 - 5.3 10e3/uL    Absolute Monocytes 0.5 0.0 - 1.3 10e3/uL    Absolute Eosinophils 0.3 0.0 - 0.7 10e3/uL    Absolute Basophils 0.1 0.0 - 0.2 10e3/uL    Absolute Immature Granulocytes 0.1 <=0.4 10e3/uL    Absolute NRBCs 0.0 10e3/uL   T4 free   Result Value Ref Range    Free T4 1.04 0.76 - 1.46 ng/dL   CT Chest Pulmonary Embolism w Contrast    Narrative    EXAM: CT CHEST PULMONARY EMBOLISM W CONTRAST  LOCATION: [facility]  DATE/TIME: 11/8/2022 4:33 PM    INDICATION: Dyspnea, elevated D-dimer.  COMPARISON: 03/06/2019.  TECHNIQUE: CT chest pulmonary angiogram during arterial phase injection of IV contrast. Multiplanar reformats and MIP reconstructions were performed. Dose reduction techniques were used.   CONTRAST: 61 mL Isovue 370.    FINDINGS:  ANGIOGRAM CHEST: Pulmonary arteries are normal caliber and negative for pulmonary emboli. Thoracic aorta is negative for dissection. No CT evidence of right heart strain.    LUNGS AND PLEURA: There is atelectasis in the right lung base adjacent to an elevated right hemidiaphragm, similar to previous. The lungs are otherwise clear.    MEDIASTINUM/AXILLAE: Postop CABG. No lymphadenopathy.    CORONARY ARTERY CALCIFICATION: Previous intervention (stents or CABG).    UPPER ABDOMEN: Normal.    MUSCULOSKELETAL: Normal.      Impression    IMPRESSION:  1.  Negative CT pulmonary angiogram. No evidence of pulmonary embolus.  2.  Stable postoperative changes from prior CABG.   Symptomatic; Unknown Influenza A/B & SARS-CoV2 (COVID-19) Virus PCR Multiplex Nose    Specimen: Nose; Swab   Result Value Ref  Range    Influenza A PCR Negative Negative    Influenza B PCR Negative Negative    RSV PCR Negative Negative    SARS CoV2 PCR Negative Negative    Narrative    Testing was performed using the Xpert Xpress CoV2/Flu/RSV Assay on the [employer] GeneXpert Instrument. This test should be ordered for the detection of SARS-CoV-2 and influenza viruses in individuals who meet clinical and/or epidemiological criteria. Test performance is unknown in asymptomatic patients. This test is for in vitro diagnostic use under the FDA EUA for laboratories certified under CLIA to perform high or moderate complexity testing. This test has not been FDA cleared or approved. A negative result does not rule out the presence of PCR inhibitors in the specimen or target RNA in concentration below the limit of detection for the assay. If only one viral target is positive but coinfection with multiple targets is suspected, the sample should be re-tested with another FDA cleared, approved, or authorized test, if coinfection would change clinical management. This test was validated by the [facility]. These laboratories are certified under the Clinical Laboratory Improvement Amendments of 1988 (CLIA-88) as qualified to perform high complexity laboratory testing.     *Note: Due to a large number of results and/or encounters for the requested time period, some results have not been displayed. A complete set of results can be found in Results Review.       Medications   0.9% sodium chloride BOLUS (0 mLs Intravenous Stopped 11/8/22 1724)     Followed by   sodium chloride 0.9% infusion ( Intravenous New Bag 11/8/22 1725)   iopamidol (ISOVUE-370) solution 500 mL (60 mLs Intravenous Given 11/8/22 1620)   sodium chloride 0.9 % bag 100mL for CT scan flush use (70 mLs Intravenous Given 11/8/22 1622)   cloNIDine (CATAPRES) tablet 0.1 mg (0.1 mg Oral Given 11/8/22 1724)       Assessments & Plan (with Medical Decision Making)  Pre-syncope     82  year old female presents for evaluation of a presyncopal episode that occurred when she was leaning over a shopping cart at [facility].  She felt things go black.  She did not fall to the ground and was able to support her self up next to another person who guided her to a chair to sit down.  She was pale after the incident.  She recovered fully and states that she was able to drive home.  Has had an ongoing headache with left trapezius area discomfort that radiates up into the head for the past couple weeks.  Had a full work-up for this.  Saw her primary.  Her primary symptom pharmacy for Voltaren to treat topically.  On presentation to ED, she had dyspnea on exertion when walking in which is a new symptom for her.  See HPI above for details.  On exam blood pressure 160/64, temperature 97.9, pulse 50, respiration 22, oxygen saturation 97% on room air.  Patient is in no acute distress.  Exam with no acute cardiopulmonary abnormality.  Neurologically intact.  No abdominal tenderness.  No extremity edema or calf tenderness.  Negative Homans' sign.  No sign of trauma.  She does have some tenderness in the left trapezius muscle area with muscle spasm which reproduces her neck and head pain.  EKG performed with sinus bradycardia at a rate of 56.  No block.  Patient reports that she has had chronic bradycardia ever since her CABG typically in the 50-54 range.  IV was established.  Laboratory levels with a minor elevation of her D-dimer at 0.97.  CT PE study without pulmonary embolus or acute abnormality.  Lung fields are clear.  Comprehensive metabolic panel with unchanged renal function.  She does have CKD.  We did give her a 500 mL IV fluid bolus after her CT for contrast flush.  Elevated glucose in a diabetic patient at 189.  LFTs normal.  Magnesium and CBC stable.  TSH with very minor elevation which is chronic at 4.12.  No acute change from baseline.  Free T4 normal at 1.04.  COVID, RSV, and influenza swab negative.     Patient remained asymptomatic throughout her ED stay.  No arrhythmia identified other than her baseline sinus bradycardia.  She was given one of her regular antihypertensive medication in the form of clonidine here in the ED.  Blood pressures were in the 180 systolic range.  I am concerned about potential cardiac arrhythmia.  She is stable, and does not necessarily need inpatient evaluation for this.  Hopefully cardiology can get her set up with a Zio patch right away tomorrow.  Order was placed.  Patient received her cardiology care at a different hospital, but really does not want to travel there anymore.  Her last cardiology consult was 3 years ago.  Therefore, I placed a referral to get set up locally here with our group.  Return to the ED or call 911 if having repeat symptoms.  Push clear fluids.  Patient was in agreement with this plan was suitable for discharge.       I have reviewed the nursing notes.    I have reviewed the findings, diagnosis, plan and need for follow up with the patient.       New Prescriptions    No medications on file       Final diagnoses:   Pre-syncope     Disclaimer: This note consists of symbols derived from keyboarding, dictation and/or voice recognition software. As a result, there may be errors in the script that have gone undetected. Please consider this when interpreting information found in this chart.      11/8/2022   [facility] EMERGENCY DEPT     Hector Ballesteros PA-C  11/08/22 [id number]

## 2022-11-08 NOTE — ED TRIAGE NOTES
She was at [facility] and had acute on set of shortness of breath and almost passed out.

## 2022-11-08 NOTE — TELEPHONE ENCOUNTER
This could be related to her carotid stenosis.  If she continues to have dizziness and her head feels weird and she shortness of breath she should go to the emergency room.  There is nothing we can do in clinic she may need some IV fluids or evaluation with a another image of her brain to make sure there was not a stroke

## 2022-11-08 NOTE — TELEPHONE ENCOUNTER
"Patient reports that her BG this morning was 146 before breakfast.  She ate around 8am and then went to [facility] around 10am.    While at [facility] she reports that she was holding on to her cart (standing) when she started to black out. Everything went black, she did not fall, she came back and had someone help her sit down and take her blood pressure.  He BP was 142/72 and P 54.    She reports the following symptoms:  - \"head feels weird\"  - back of her head hurts  - eyes hurt  - dizzy  - SOB    Per protocol recommended that patient go to ED for evaluation.  RN reviewed red flag symptoms with patient and when to see emergency care. Patient agreed and understood.     Patient would like to get Dr. Niño's review on her situation prior to making any decisions.  Please advise.      Reason for Disposition    Still feels dizzy or lightheaded    Difficulty breathing    History of heart problems or congestive heart failure    Additional Information    Negative: Has diabetes (diabetes mellitus) and fainting from low blood sugar (i.e., < 70 mg/dL or 3.9 mmol/L)    Negative: Seizure suspected (e.g., muscle jerking or shaking followed by confusion)    Negative: Heat exhaustion suspected (i.e., dehydration from heat exposure)    Protocols used: [id number]      "

## 2022-11-08 NOTE — TELEPHONE ENCOUNTER
black out  11/8/2022 12:09 PM Reply    To: [facility] MESSAGES      From: Giovanni Petit      Created: 11/8/2022 12:09 PM        *-*-*This message has not been handled.*-*-*    I went to [facility] to  a few things and all o f a sudden I started to black out.  There was a woman right next to me and then another one came and walked me over to the blood pressure jordan.  It was 176/61.   After a  while I started to feel better and drove myself home.   MY whole head hurts and I just don't feel right.   Could this be because of the 70% blood flow?   I am by myself and a little afraid if it happens again.

## 2022-11-08 NOTE — TELEPHONE ENCOUNTER
Informed patient of Dr. Niño's response below.    Patient plans to call her son and daughter in law to see about getting a ride to the ED for evaluation.

## 2022-11-09 ENCOUNTER — MYC MEDICAL ADVICE (OUTPATIENT)
Dept: INTERNAL MEDICINE | Facility: CLINIC | Age: 82
End: 2022-11-09

## 2022-11-09 NOTE — DISCHARGE INSTRUCTIONS
It was a pleasure working with you today!  Thankfully, your work-up did not show anything emergent today.  I am concerned about your heart rhythm though.  Therefore, we need to place a heart monitor to see if we catch any bad rhythms over a 2-week period of time.  Please call the cardiology department right away tomorrow morning to get lined up with having this heart monitor placed.  Please return to the emergency department if you get any repeat symptoms.  Make sure that you are drinking 8+ glasses of water daily to stay hydrated.

## 2022-11-10 ENCOUNTER — HOSPITAL ENCOUNTER (OUTPATIENT)
Dept: CARDIOLOGY | Facility: CLINIC | Age: 82
Discharge: HOME | End: 2022-11-10
Attending: PHYSICIAN ASSISTANT | Admitting: PHYSICIAN ASSISTANT
Payer: MEDICARE

## 2022-11-10 DIAGNOSIS — R55 PRE-SYNCOPE: ICD-10-CM

## 2022-11-10 PROCEDURE — 93246 EXT ECG>7D<15D RECORDING: CPT

## 2022-11-14 ENCOUNTER — MYC MEDICAL ADVICE (OUTPATIENT)
Dept: INTERNAL MEDICINE | Facility: CLINIC | Age: 82
End: 2022-11-14

## 2022-11-14 DIAGNOSIS — I10 ESSENTIAL HYPERTENSION, BENIGN: ICD-10-CM

## 2022-11-14 ASSESSMENT — ENCOUNTER SYMPTOMS
DIZZINESS: 0
WEAKNESS: 0
HEMATOCHEZIA: 0
DIARRHEA: 0
FREQUENCY: 0
HEADACHES: 0
PALPITATIONS: 0
COUGH: 0
SHORTNESS OF BREATH: 1
ABDOMINAL PAIN: 0
FEVER: 0
JOINT SWELLING: 1
NAUSEA: 0
ARTHRALGIAS: 1
BREAST MASS: 0
EYE PAIN: 0
PARESTHESIAS: 0
SORE THROAT: 0
CONSTIPATION: 0
DYSURIA: 0
CHILLS: 1
HEMATURIA: 0
MYALGIAS: 0
NERVOUS/ANXIOUS: 0
HEARTBURN: 0

## 2022-11-14 ASSESSMENT — ACTIVITIES OF DAILY LIVING (ADL): CURRENT_FUNCTION: NO ASSISTANCE NEEDED

## 2022-11-15 NOTE — TELEPHONE ENCOUNTER
"Requested Prescriptions   Pending Prescriptions Disp Refills    amLODIPine (NORVASC) 10 MG tablet [Pharmacy Med Name: amLODIPine Besylate 10 MG Oral Tablet] 270 tablet 0     Sig: Take 1 tablet by mouth once daily       Calcium Channel Blockers Protocol  Failed - 11/14/2022  4:11 PM        Failed - Blood pressure under 140/90 in past 12 months     BP Readings from Last 3 Encounters:   11/08/22 (!) 179/77   11/07/22 (!) 144/62   10/31/22 130/57                 Failed - Normal serum creatinine on file in past 12 months     Recent Labs   Lab Test 11/08/22  1437   CR 1.28*       Ok to refill medication if creatinine is low          Passed - Recent (12 mo) or future (30 days) visit within the authorizing provider's specialty     Patient has had an office visit with the authorizing provider or a provider within the authorizing providers department within the previous 12 mos or has a future within next 30 days. See \"Patient Info\" tab in inbasket, or \"Choose Columns\" in Meds & Orders section of the refill encounter.              Passed - Medication is active on med list        Passed - Patient is age 18 or older        Passed - No active pregnancy on record        Passed - No positive pregnancy test in past 12 months               GLADYS AvalosN, RN          "

## 2022-11-16 ENCOUNTER — HOSPITAL ENCOUNTER (OUTPATIENT)
Dept: ULTRASOUND IMAGING | Facility: CLINIC | Age: 82
Discharge: HOME | End: 2022-11-16
Attending: FAMILY MEDICINE
Payer: MEDICARE

## 2022-11-16 ENCOUNTER — HOSPITAL ENCOUNTER (OUTPATIENT)
Dept: MAMMOGRAPHY | Facility: CLINIC | Age: 82
Discharge: HOME | End: 2022-11-16
Attending: FAMILY MEDICINE
Payer: MEDICARE

## 2022-11-16 DIAGNOSIS — R92.8 ABNORMAL MAMMOGRAM: ICD-10-CM

## 2022-11-16 PROCEDURE — 76642 ULTRASOUND BREAST LIMITED: CPT | Mod: RT

## 2022-11-16 PROCEDURE — 77061 BREAST TOMOSYNTHESIS UNI: CPT | Mod: RT

## 2022-11-16 RX ORDER — AMLODIPINE BESYLATE 10 MG/1
10 TABLET ORAL
Qty: 90 TABLET | Refills: 3 | Status: SHIPPED | OUTPATIENT
Start: 2022-11-16 | End: 2023-11-13

## 2022-11-21 ENCOUNTER — MYC MEDICAL ADVICE (OUTPATIENT)
Dept: INTERNAL MEDICINE | Facility: CLINIC | Age: 82
End: 2022-11-21

## 2022-11-21 ENCOUNTER — OFFICE VISIT (OUTPATIENT)
Dept: SURGERY | Facility: CLINIC | Age: 82
End: 2022-11-21
Payer: MEDICARE

## 2022-11-21 ENCOUNTER — OFFICE VISIT (OUTPATIENT)
Dept: INTERNAL MEDICINE | Facility: CLINIC | Age: 82
End: 2022-11-21
Payer: MEDICARE

## 2022-11-21 VITALS
BODY MASS INDEX: 35.48 KG/M2 | WEIGHT: 176 LBS | SYSTOLIC BLOOD PRESSURE: 128 MMHG | DIASTOLIC BLOOD PRESSURE: 66 MMHG | OXYGEN SATURATION: 97 % | HEART RATE: 60 BPM | TEMPERATURE: 97.6 F | RESPIRATION RATE: 16 BRPM | HEIGHT: 59 IN

## 2022-11-21 VITALS
HEIGHT: 59 IN | DIASTOLIC BLOOD PRESSURE: 66 MMHG | WEIGHT: 176 LBS | SYSTOLIC BLOOD PRESSURE: 128 MMHG | TEMPERATURE: 97.6 F | BODY MASS INDEX: 35.48 KG/M2

## 2022-11-21 DIAGNOSIS — N18.32 STAGE 3B CHRONIC KIDNEY DISEASE (H): ICD-10-CM

## 2022-11-21 DIAGNOSIS — I65.21 CAROTID ATHEROSCLEROSIS, RIGHT: ICD-10-CM

## 2022-11-21 DIAGNOSIS — E78.5 HYPERLIPIDEMIA LDL GOAL <100: ICD-10-CM

## 2022-11-21 DIAGNOSIS — E11.21 TYPE 2 DIABETES MELLITUS WITH DIABETIC NEPHROPATHY, WITHOUT LONG-TERM CURRENT USE OF INSULIN (H): ICD-10-CM

## 2022-11-21 DIAGNOSIS — I10 ESSENTIAL HYPERTENSION, BENIGN: ICD-10-CM

## 2022-11-21 DIAGNOSIS — E03.9 HYPOTHYROIDISM, UNSPECIFIED TYPE: ICD-10-CM

## 2022-11-21 DIAGNOSIS — Z00.00 MEDICARE ANNUAL WELLNESS VISIT, SUBSEQUENT: Primary | ICD-10-CM

## 2022-11-21 DIAGNOSIS — I65.21 CAROTID STENOSIS, ASYMPTOMATIC, RIGHT: Primary | ICD-10-CM

## 2022-11-21 LAB — HBA1C MFR BLD: 7.4 % (ref 0–5.6)

## 2022-11-21 PROCEDURE — G0439 PPPS, SUBSEQ VISIT: HCPCS | Performed by: INTERNAL MEDICINE

## 2022-11-21 PROCEDURE — 99204 OFFICE O/P NEW MOD 45 MIN: CPT | Performed by: SPECIALIST

## 2022-11-21 PROCEDURE — 36415 COLL VENOUS BLD VENIPUNCTURE: CPT | Performed by: INTERNAL MEDICINE

## 2022-11-21 PROCEDURE — 83036 HEMOGLOBIN GLYCOSYLATED A1C: CPT | Performed by: INTERNAL MEDICINE

## 2022-11-21 PROCEDURE — 99214 OFFICE O/P EST MOD 30 MIN: CPT | Mod: 25 | Performed by: INTERNAL MEDICINE

## 2022-11-21 RX ORDER — METOPROLOL SUCCINATE 25 MG/1
12.5 TABLET, EXTENDED RELEASE ORAL 2 TIMES DAILY
Qty: 90 TABLET | Refills: 3 | Status: SHIPPED | OUTPATIENT
Start: 2022-11-21 | End: 2022-12-09 | Stop reason: SINTOL

## 2022-11-21 RX ORDER — SEMAGLUTIDE 1.34 MG/ML
0.25 INJECTION, SOLUTION SUBCUTANEOUS
Qty: 2 ML | Refills: 3 | Status: SHIPPED | OUTPATIENT
Start: 2022-11-21 | End: 2022-11-22

## 2022-11-21 RX ORDER — METFORMIN HCL 500 MG
TABLET, EXTENDED RELEASE 24 HR ORAL
Qty: 180 TABLET | Refills: 3 | Status: SHIPPED | OUTPATIENT
Start: 2022-11-21 | End: 2023-11-24

## 2022-11-21 RX ORDER — LEVOTHYROXINE SODIUM 50 UG/1
50 TABLET ORAL DAILY
Qty: 90 TABLET | Refills: 3 | Status: SHIPPED | OUTPATIENT
Start: 2022-11-21 | End: 2023-09-11

## 2022-11-21 RX ORDER — SIMVASTATIN 40 MG
40 TABLET ORAL AT BEDTIME
Qty: 90 TABLET | Refills: 3 | Status: SHIPPED | OUTPATIENT
Start: 2022-11-21 | End: 2023-11-24

## 2022-11-21 RX ORDER — LOSARTAN POTASSIUM 50 MG/1
50 TABLET ORAL 2 TIMES DAILY
Qty: 90 TABLET | Refills: 3 | Status: SHIPPED | OUTPATIENT
Start: 2022-11-21 | End: 2023-06-06

## 2022-11-21 ASSESSMENT — ENCOUNTER SYMPTOMS
ABDOMINAL PAIN: 0
FEVER: 0
CHILLS: 1
PARESTHESIAS: 0
JOINT SWELLING: 1
MYALGIAS: 0
HEMATURIA: 0
HEADACHES: 0
NERVOUS/ANXIOUS: 0
ARTHRALGIAS: 1
HEARTBURN: 0
PALPITATIONS: 0
EYE PAIN: 0
DYSURIA: 0
CONSTIPATION: 0
WEAKNESS: 0
SORE THROAT: 0
BREAST MASS: 0
COUGH: 0
SHORTNESS OF BREATH: 1
DIARRHEA: 0
FREQUENCY: 0
NAUSEA: 0
DIZZINESS: 0
HEMATOCHEZIA: 0

## 2022-11-21 ASSESSMENT — PAIN SCALES - GENERAL: PAINLEVEL: NO PAIN (0)

## 2022-11-21 ASSESSMENT — ACTIVITIES OF DAILY LIVING (ADL): CURRENT_FUNCTION: NO ASSISTANCE NEEDED

## 2022-11-21 NOTE — PROGRESS NOTES
Consult requested by Dr. Niño    Reason for consultation: Right carotid stenosis    HPI: Is an 82-year-old lady back in August felt lightheaded and like she passed out while walking around with her cart at [facility] [place].  It only lasted a few seconds and she woke up.  She denied any headache, weakness or vision changes.  She came to the ER and a CT angio of the head neck was done in the ER that revealed a 70% right carotid artery stenosis for which she is now referred.  She denies any prior stroke, TIA, amaurosis fugax or family history of aneurysms.  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  There were no acute changes on the right side she is currently on an 81 mg aspirin.  She is diabetic but does not smoke.  She now presents for evaluation of her right carotid stenosis    Past Medical History:   Diagnosis Date     Diabetic eye exam (H) 08/05/13     Endometriosis, site unspecified      Fever and other physiologic disturbances of temperature regulation 07/07/2005    Admit.  Discharged 07/13/2005.  Cause undetermined.     Heart disease      Myalgia and myositis, unspecified     fibromyalgia     Pure hypercholesterolemia      Unspecified essential hypertension      Unspecified hypothyroidism      Past Surgical History:   Procedure Laterality Date     ARTHROPLASTY KNEE  4/29/2013    Procedure: ARTHROPLASTY KNEE;  left total knee arthroplasty;  Surgeon: Teddy Grimes MD;  [facility]     ARTHROPLASTY KNEE Right 8/27/2018    Procedure: ARTHROPLASTY KNEE;  right total knee arthroplasty;  Surgeon: Johnny Anaya MD;  [facility]     COMBINED CYSTOSCOPY, INSERT CATHETER URETER Bilateral 8/10/2015    Procedure: COMBINED CYSTOSCOPY, INSERT CATHETER URETER;  Surgeon: Johnnie Madera MD;  [facility]     DILATION AND CURETTAGE, HYSTEROSCOPY DIAGNOSTIC, COMBINED N/A 11/6/2014    Procedure: COMBINED DILATION AND CURETTAGE, HYSTEROSCOPY DIAGNOSTIC;   "Surgeon: Edward Pardo MD;  [facility]     ESOPHAGOSCOPY, GASTROSCOPY, DUODENOSCOPY (EGD), COMBINED N/A 1/27/2015    Procedure: COMBINED ESOPHAGOSCOPY, GASTROSCOPY, DUODENOSCOPY (EGD), BIOPSY SINGLE OR MULTIPLE;  Surgeon: Carmelo Rosario MD;  [facility]     HC REMOVAL GALLBLADDER      Cholecystectomy     HC REMOVE TONSILS/ADENOIDS,<11 Y/O      unsure of age     LAPAROSCOPIC HYSTERECTOMY TOTAL N/A 8/10/2015    Procedure: LAPAROSCOPIC HYSTERECTOMY TOTAL;  Surgeon: Edward Pardo MD;  [facility]     LAPAROSCOPIC LYSIS ADHESIONS N/A 8/10/2015    Procedure: LAPAROSCOPIC LYSIS ADHESIONS;  Surgeon: Edward Pardo MD;  [facility]     ZZC APPENDECTOMY       [facility] [place] EXPLOR HEART SURG WND W CP BYPASS Bilateral 03/20/2019     [facility] [place] NONSPECIFIC PROCEDURE      Knee ligament surgery     [facility] [place] NONSPECIFIC PROCEDURE      Kidney surgery for mechanical obstruction     [facility] [place] OPEN CORONARY ENDARTERECTOMY  june 2005    Angioplasty w stenting     [facility] [place] TOTAL KNEE ARTHROPLASTY  4/29/13    Left     Current Outpatient Medications   Medication     allopurinol (ZYLOPRIM) 100 MG tablet     amLODIPine (NORVASC) 10 MG tablet     aspirin 81 MG EC tablet     blood glucose (ONETOUCH VERIO IQ) test strip     cloNIDine (CATAPRES) 0.1 MG tablet     Lancets (ONETOUCH DELICA PLUS [id number]) MISC     levothyroxine (SYNTHROID/LEVOTHROID) 50 MCG tablet     losartan (COZAAR) 50 MG tablet     metFORMIN (GLUCOPHAGE XR) 500 MG 24 hr tablet     metoprolol succinate ER (TOPROL XL) 25 MG 24 hr tablet     Probiotic Product (ACIDOPHILUS PROBIOTIC BLEND) CAPS     semaglutide (OZEMPIC, 0.25 OR 0.5 MG/DOSE,) 2 MG/1.5ML SOPN pen     simvastatin (ZOCOR) 40 MG tablet     traZODone (DESYREL) 50 MG tablet     Vitamin D, Cholecalciferol, 25 MCG (1000 UT) CAPS     Current Facility-Administered Medications   Medication     lidocaine 1 % injection 1 mL        Allergies   Allergen Reactions     Penicillins Swelling     \"throat " "started swelling\"     Vitamin B Complex Hives     Vitamin B12 Hives     all vitamin B's     Clindamycin Hcl Rash     Social History     Socioeconomic History     Marital status:      Spouse name: Not on file     Number of children: Not on file     Years of education: Not on file     Highest education level: Not on file   Occupational History     Not on file   Tobacco Use     Smoking status: Former     Smokeless tobacco: Never     Tobacco comments:     quit     Vaping Use     Vaping Use: Never used   Substance and Sexual Activity     Alcohol use: No     Alcohol/week: 0.0 standard drinks     Drug use: No     Sexual activity: Not Currently   Other Topics Concern      Service No     Blood Transfusions No     Caffeine Concern No     Occupational Exposure No     Hobby Hazards No     Sleep Concern Yes     Stress Concern Yes     Weight Concern Yes     Comment: over weight and unable to lose weight     Special Diet Yes     Comment: low fat, low sugar     Back Care No     Exercise No     Bike Helmet No     Comment: does not ride bike     Seat Belt Yes     Self-Exams Yes     Parent/sibling w/ CABG, MI or angioplasty before 65F 55M? Yes     Comment: sister 55yrs CABG & heart atttack   Social History Narrative     Not on file     Social Determinants of Health     Financial Resource Strain: Not on file   Food Insecurity: Not on file   Transportation Needs: Not on file   Physical Activity: Not on file   Stress: Not on file   Social Connections: Not on file   Intimate Partner Violence: Not on file   Housing Stability: Not on file     Family History   Problem Relation Age of Onset     Heart Disease Mother          coronary     Heart Disease Father          coronary     Allergies Sister         unknown     Allergies Brother         unknown     Allergies Daughter         unknown     Allergies Son         unknown     Heart Disease Sister         by pass surg     Heart Disease Brother         on medication     " Hypertension Sister      Hypertension Brother      Lipids Sister      Lipids Brother      Cerebrovascular Disease Brother      Obesity Sister      Diabetes Sister      Diabetes Sister      C.A.D. Brother         quad by pass     Neurologic Disorder Sister         parkinsons     Cancer Sister         skin cancer     Alcohol/Drug No family hx of      Alzheimer Disease No family hx of       ROS: 10 point ROS neg other than the symptoms noted above in the HPI.    PE:  B/P: 128/66, T: 97.6, P: Data Unavailable, R: Data Unavailable  General: well developed, well nourished WF who appears their stated age  HEENT: NC/AT, EOMI, (-)icterus, (-)injection  Neck: Supple, No JVD, right carotid bruit  Chest: CTA  Heart: S1, S2, (-)m/r/g  Abd: Soft, non tender, non distended, non tender, no masses, no bruit  Ext; Warm, no edema  Psych: AAOx3  Neuro: No focal deficits    CTA -   CT ANGIOGRAM OF THE HEAD AND NECK WITH CONTRAST  8/15/2022 4:44 PM      COMPARISON: Head CT 12/20/2017.     HISTORY: headache     TECHNIQUE:    Precontrast localizing scans were followed by CT angiography with an  injection of 70 mL Isovue-370 intravenous contrast material with scans  through the head and neck.  Images were transferred to a separate 3-D  workstation where multiplanar reformations and 3-D images were  created. Estimates of carotid stenoses are made relative to the distal  internal carotid artery diameters except as noted. Dose reduction  techniques were used.     FINDINGS:   HEAD CTA:  ANTERIOR CIRCULATION: Mild calcified plaque within the carotid  siphons. No large artery stenosis or occlusion. Fetal origin of  bilateral posterior cerebral arteries.     POSTERIOR CIRCULATION: No significant stenosis or occlusion. Balanced  vertebral arteries supply a normal basilar artery.     ANEURYSM/VASCULAR MALFORMATION: None.     NECK CTA:  RIGHT CAROTID: Calcified plaque at the carotid bifurcation results in  70% stenosis in the right ICA based on  NASCET criteria.     LEFT CAROTID: Calcified plaque at carotid bifurcation results in 25%  stenosis in the left ICA based on NASCET criteria.     VERTEBRAL ARTERIES: Balanced vertebral arteries are patent in the neck  and into the head.     AORTIC ARCH: Classic aortic arch anatomy with no significant stenosis  at the origin of the great vessels.                                                                      IMPRESSION:     HEAD CTA:  1.  Mild plaque within the carotid siphons. No large artery stenosis  or occlusion.   2.  No aneurysm or vascular malformation.     NECK CTA:  1.  Plaque at bilateral carotid bifurcations.  2.  70% stenosis right proximal internal carotid artery.  3.  25% stenosis left proximal internal carotid artery.  4.  No vertebral artery stenosis.     The results were communicated to Dr. Fernandes at 4:45 PM on 8/15/2022.           MAINE LOCKHART MD     Impression/plan:  This is an 82-year-old lady with a 70% asymptomatic right carotid stenosis based on CT angio.  I discussed these findings and carotid symptoms with the patient she expressed understanding. She has not yet reached the threshold for intervention of 80% for asymptomatic disease.  I did discuss the role of antiplatelet therapy and she expressed understanding.  I discussed the options of Plavix versus full-strength aspirin.  The patient has taken full-strength aspirin in the past and preferred to do that.  The plan at this time is to place her on full-strength aspirin and have her do a repeat ultrasound in about 6 months.  She does understand she will need to be followed serially.  She knows to return to the ER should carotid symptoms occur.  These were discussed in detail with the patient.  She will follow with me after ultrasound in 6 months.    Edward Pennington MD,FACS

## 2022-11-21 NOTE — LETTER
11/21/2022         RE: Sandra Petit  [address]  [facility] [address]        Dear Colleague,    Thank you for referring your patient, Sandra Petit, to the [facility]. Please see a copy of my visit note below.    Consult requested by Dr. Niño    Reason for consultation: Right carotid stenosis    HPI: Is an 82-year-old lady back in August felt lightheaded and like she passed out while walking around with her cart at [facility].  It only lasted a few seconds and she woke up.  She denied any headache, weakness or vision changes.  She came to the ER and a CT angio of the head neck was done in the ER that revealed a 70% right carotid artery stenosis for which she is now referred.  She denies any prior stroke, TIA, amaurosis fugax or family history of aneurysms.  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  There were no acute changes on the right side she is currently on an 81 mg aspirin.  She is diabetic but does not smoke.  She now presents for evaluation of her right carotid stenosis    Past Medical History:   Diagnosis Date     Diabetic eye exam (H) 08/05/13     Endometriosis, site unspecified      Fever and other physiologic disturbances of temperature regulation 07/07/2005    Admit.  Discharged 07/13/2005.  Cause undetermined.     Heart disease      Myalgia and myositis, unspecified     fibromyalgia     Pure hypercholesterolemia      Unspecified essential hypertension      Unspecified hypothyroidism      Past Surgical History:   Procedure Laterality Date     ARTHROPLASTY KNEE  4/29/2013    Procedure: ARTHROPLASTY KNEE;  left total knee arthroplasty;  Surgeon: Teddy Grimes MD;  [facility]     ARTHROPLASTY KNEE Right 8/27/2018    Procedure: ARTHROPLASTY KNEE;  right total knee arthroplasty;  Surgeon: Johnny Anaya MD;  [facility]     COMBINED CYSTOSCOPY, INSERT CATHETER URETER Bilateral 8/10/2015     Procedure: COMBINED CYSTOSCOPY, INSERT CATHETER URETER;  Surgeon: Johnnie Madera MD;  [facility]     DILATION AND CURETTAGE, HYSTEROSCOPY DIAGNOSTIC, COMBINED N/A 11/6/2014    Procedure: COMBINED DILATION AND CURETTAGE, HYSTEROSCOPY DIAGNOSTIC;  Surgeon: Edward Pardo MD;  [facility]     ESOPHAGOSCOPY, GASTROSCOPY, DUODENOSCOPY (EGD), COMBINED N/A 1/27/2015    Procedure: COMBINED ESOPHAGOSCOPY, GASTROSCOPY, DUODENOSCOPY (EGD), BIOPSY SINGLE OR MULTIPLE;  Surgeon: Carmelo Rosario MD;  [facility]     HC REMOVAL GALLBLADDER      Cholecystectomy     HC REMOVE TONSILS/ADENOIDS,<13 Y/O      unsure of age     LAPAROSCOPIC HYSTERECTOMY TOTAL N/A 8/10/2015    Procedure: LAPAROSCOPIC HYSTERECTOMY TOTAL;  Surgeon: Edward Pardo MD;  [facility]     LAPAROSCOPIC LYSIS ADHESIONS N/A 8/10/2015    Procedure: LAPAROSCOPIC LYSIS ADHESIONS;  Surgeon: Edward Pardo MD;  [facility]     ZZC APPENDECTOMY       [facility] EXPLOR HEART SURG WND W CP BYPASS Bilateral 03/20/2019     [facility] NONSPECIFIC PROCEDURE      Knee ligament surgery     [facility] NONSPECIFIC PROCEDURE      Kidney surgery for mechanical obstruction     [facility] OPEN CORONARY ENDARTERECTOMY  june 2005    Angioplasty w stenting     [facility] TOTAL KNEE ARTHROPLASTY  4/29/13    Left     Current Outpatient Medications   Medication     allopurinol (ZYLOPRIM) 100 MG tablet     amLODIPine (NORVASC) 10 MG tablet     aspirin 81 MG EC tablet     blood glucose (ONETOUCH VERIO IQ) test strip     cloNIDine (CATAPRES) 0.1 MG tablet     Lancets (ONETOUCH DELICA PLUS [id number]) MISC     levothyroxine (SYNTHROID/LEVOTHROID) 50 MCG tablet     losartan (COZAAR) 50 MG tablet     metFORMIN (GLUCOPHAGE XR) 500 MG 24 hr tablet     metoprolol succinate ER (TOPROL XL) 25 MG 24 hr tablet     Probiotic Product (ACIDOPHILUS PROBIOTIC BLEND) CAPS     semaglutide (OZEMPIC, 0.25 OR 0.5 MG/DOSE,) 2 MG/1.5ML SOPN pen     simvastatin (ZOCOR) 40 MG tablet  "    traZODone (DESYREL) 50 MG tablet     Vitamin D, Cholecalciferol, 25 MCG (1000 UT) CAPS     Current Facility-Administered Medications   Medication     lidocaine 1 % injection 1 mL        Allergies   Allergen Reactions     Penicillins Swelling     \"throat started swelling\"     Vitamin B Complex Hives     Vitamin B12 Hives     all vitamin B's     Clindamycin Hcl Rash     Social History     Socioeconomic History     Marital status:      Spouse name: Not on file     Number of children: Not on file     Years of education: Not on file     Highest education level: Not on file   Occupational History     Not on file   Tobacco Use     Smoking status: Former     Smokeless tobacco: Never     Tobacco comments:     quit     Vaping Use     Vaping Use: Never used   Substance and Sexual Activity     Alcohol use: No     Alcohol/week: 0.0 standard drinks     Drug use: No     Sexual activity: Not Currently   Other Topics Concern      Service No     Blood Transfusions No     Caffeine Concern No     Occupational Exposure No     Hobby Hazards No     Sleep Concern Yes     Stress Concern Yes     Weight Concern Yes     Comment: over weight and unable to lose weight     Special Diet Yes     Comment: low fat, low sugar     Back Care No     Exercise No     Bike Helmet No     Comment: does not ride bike     Seat Belt Yes     Self-Exams Yes     Parent/sibling w/ CABG, MI or angioplasty before 65F 55M? Yes     Comment: sister 55yrs CABG & heart atttack   Social History Narrative     Not on file     Social Determinants of Health     Financial Resource Strain: Not on file   Food Insecurity: Not on file   Transportation Needs: Not on file   Physical Activity: Not on file   Stress: Not on file   Social Connections: Not on file   Intimate Partner Violence: Not on file   Housing Stability: Not on file     Family History   Problem Relation Age of Onset     Heart Disease Mother          coronary     Heart Disease Father         "  coronary     Allergies Sister         unknown     Allergies Brother         unknown     Allergies Daughter         unknown     Allergies Son         unknown     Heart Disease Sister         by pass surg     Heart Disease Brother         on medication     Hypertension Sister      Hypertension Brother      Lipids Sister      Lipids Brother      Cerebrovascular Disease Brother      Obesity Sister      Diabetes Sister      Diabetes Sister      C.A.D. Brother         quad by pass     Neurologic Disorder Sister         parkinsons     Cancer Sister         skin cancer     Alcohol/Drug No family hx of      Alzheimer Disease No family hx of       ROS: 10 point ROS neg other than the symptoms noted above in the HPI.    PE:  B/P: 128/66, T: 97.6, P: Data Unavailable, R: Data Unavailable  General: well developed, well nourished WF who appears their stated age  HEENT: NC/AT, EOMI, (-)icterus, (-)injection  Neck: Supple, No JVD, right carotid bruit  Chest: CTA  Heart: S1, S2, (-)m/r/g  Abd: Soft, non tender, non distended, non tender, no masses, no bruit  Ext; Warm, no edema  Psych: AAOx3  Neuro: No focal deficits    CTA -   CT ANGIOGRAM OF THE HEAD AND NECK WITH CONTRAST  8/15/2022 4:44 PM      COMPARISON: Head CT 2017.     HISTORY: headache     TECHNIQUE:    Precontrast localizing scans were followed by CT angiography with an  injection of 70 mL Isovue-370 intravenous contrast material with scans  through the head and neck.  Images were transferred to a separate 3-D  workstation where multiplanar reformations and 3-D images were  created. Estimates of carotid stenoses are made relative to the distal  internal carotid artery diameters except as noted. Dose reduction  techniques were used.     FINDINGS:   HEAD CTA:  ANTERIOR CIRCULATION: Mild calcified plaque within the carotid  siphons. No large artery stenosis or occlusion. Fetal origin of  bilateral posterior cerebral arteries.     POSTERIOR CIRCULATION: No  significant stenosis or occlusion. Balanced  vertebral arteries supply a normal basilar artery.     ANEURYSM/VASCULAR MALFORMATION: None.     NECK CTA:  RIGHT CAROTID: Calcified plaque at the carotid bifurcation results in  70% stenosis in the right ICA based on NASCET criteria.     LEFT CAROTID: Calcified plaque at carotid bifurcation results in 25%  stenosis in the left ICA based on NASCET criteria.     VERTEBRAL ARTERIES: Balanced vertebral arteries are patent in the neck  and into the head.     AORTIC ARCH: Classic aortic arch anatomy with no significant stenosis  at the origin of the great vessels.                                                                      IMPRESSION:     HEAD CTA:  1.  Mild plaque within the carotid siphons. No large artery stenosis  or occlusion.   2.  No aneurysm or vascular malformation.     NECK CTA:  1.  Plaque at bilateral carotid bifurcations.  2.  70% stenosis right proximal internal carotid artery.  3.  25% stenosis left proximal internal carotid artery.  4.  No vertebral artery stenosis.     The results were communicated to Dr. Fernandes at 4:45 PM on 8/15/2022.           MAINE LOCKHART MD     Impression/plan:  This is an 82-year-old lady with a 70% asymptomatic right carotid stenosis based on CT angio.  I discussed these findings and carotid symptoms with the patient she expressed understanding. She has not yet reached the threshold for intervention of 80% for asymptomatic disease.  I did discuss the role of antiplatelet therapy and she expressed understanding.  I discussed the options of Plavix versus full-strength aspirin.  The patient has taken full-strength aspirin in the past and preferred to do that.  The plan at this time is to place her on full-strength aspirin and have her do a repeat ultrasound in about 6 months.  She does understand she will need to be followed serially.  She knows to return to the ER should carotid symptoms occur.  These were discussed in  detail with the patient.  She will follow with me after ultrasound in 6 months.    Edward Pennington MD,FACS      Again, thank you for allowing me to participate in the care of your patient.        Sincerely,        Edward Pennington MD

## 2022-11-21 NOTE — PROGRESS NOTES
"SUBJECTIVE:   Giovanni is a 82 year old who presents for Preventive Visit.    Patient has been advised of split billing requirements and indicates understanding: Yes  Are you in the first 12 months of your Medicare coverage?  No    Healthy Habits:     In general, how would you rate your overall health?  Good    Frequency of exercise:  1 day/week    Do you usually eat at least 4 servings of fruit and vegetables a day, include whole grains    & fiber and avoid regularly eating high fat or \"junk\" foods?  No    Medication side effects:  None    Ability to successfully perform activities of daily living:  No assistance needed    Home Safety:  No safety concerns identified    Hearing Impairment:  No hearing concerns    In the past 6 months, have you been bothered by leaking of urine?  No    In general, how would you rate your overall mental or emotional health?  Excellent      PHQ-2 Total Score: 0      Has had a few episodes of passing out, has right carotid stenosis and will see Dr Pennington today.     Wearing a ziopatch. Set up to see cardiology in January.      Taking metformin, glucose has been up to 158 in the morning.  Never was able to be on jardiance or farxiga due to cost.          Have you ever done Advance Care Planning? (For example, a Health Directive, POLST, or a discussion with a medical provider or your loved ones about your wishes): Yes, advance care planning is on file.    Fall risk  Fallen 2 or more times in the past year?: No  Any fall with injury in the past year?: No    Cognitive Screening   1) Repeat 3 items (Leader, Season, Table)    2) Clock draw: NORMAL  3) 3 item recall: Recalls 3 objects  Results: 3 items recalled: COGNITIVE IMPAIRMENT LESS LIKELY    Mini-CogTM Copyright KELLY Joya. Licensed by the author for use in [employer]; reprinted with permission (ramón@.[facility]). All rights reserved.      Do you have sleep apnea, excessive snoring or daytime drowsiness?: no    Reviewed and updated as " needed this visit by clinical staff    Allergies  Meds              Reviewed and updated as needed this visit by Provider                 Social History     Tobacco Use     Smoking status: Former     Smokeless tobacco: Never     Tobacco comments:     quit  1987   Substance Use Topics     Alcohol use: No     Alcohol/week: 0.0 standard drinks     Alcohol Use 11/14/2022   Prescreen: >3 drinks/day or >7 drinks/week? No   Prescreen: >3 drinks/day or >7 drinks/week? -     Current providers sharing in care for this patient include:   Patient Care Team:  Jason Niño MD as PCP - General (Internal Medicine)  Dafne Auguste RD as Diabetes Educator (Dietitian, Registered)  Jason Niño MD as Assigned PCP    The following health maintenance items are reviewed in Epic and correct as of today:  Health Maintenance   Topic Date Due     EYE EXAM  06/01/2021     MEDICARE ANNUAL WELLNESS VISIT  11/15/2022     A1C  01/01/2023     ANNUAL REVIEW OF HM ORDERS  03/30/2023     MICROALBUMIN  06/10/2023     LIPID  07/01/2023     DIABETIC FOOT EXAM  07/01/2023     BMP  11/08/2023     TSH W/FREE T4 REFLEX  11/08/2023     HEMOGLOBIN  11/08/2023     FALL RISK ASSESSMENT  11/21/2023     DTAP/TDAP/TD IMMUNIZATION (4 - Td or Tdap) 04/27/2026     ADVANCE CARE PLANNING  11/15/2026     DEXA  10/17/2032     PHQ-2 (once per calendar year)  Completed     INFLUENZA VACCINE  Completed     Pneumococcal Vaccine: 65+ Years  Completed     URINALYSIS  Completed     COVID-19 Vaccine  Completed     IPV IMMUNIZATION  Aged Out     MENINGITIS IMMUNIZATION  Aged Out     MAMMO SCREENING  Discontinued     ZOSTER IMMUNIZATION  Discontinued     Lab work is in process  Pneumonia Vaccine: up to date    Mammogram Screening - Patient over age 75, has elected to continue with screening.  Pertinent mammograms are reviewed under the imaging tab.    Review of Systems   Constitutional: Positive for chills. Negative for fever.   HENT: Negative for congestion, ear pain,  "hearing loss and sore throat.    Eyes: Negative for pain and visual disturbance.   Respiratory: Positive for shortness of breath. Negative for cough.    Cardiovascular: Positive for peripheral edema. Negative for chest pain and palpitations.   Gastrointestinal: Negative for abdominal pain, constipation, diarrhea, heartburn, hematochezia and nausea.   Breasts:  Negative for tenderness, breast mass and discharge.   Genitourinary: Negative for dysuria, frequency, genital sores, hematuria, pelvic pain, urgency, vaginal bleeding and vaginal discharge.   Musculoskeletal: Positive for arthralgias and joint swelling. Negative for myalgias.   Skin: Negative for rash.   Neurological: Negative for dizziness, weakness, headaches and paresthesias.   Psychiatric/Behavioral: Negative for mood changes. The patient is not nervous/anxious.        OBJECTIVE:   /66   Pulse 60   Temp 97.6  F (36.4  C) (Temporal)   Resp 16   Ht 1.499 m (4' 11\")   Wt 79.8 kg (176 lb)   SpO2 97%   BMI 35.55 kg/m   Estimated body mass index is 35.55 kg/m  as calculated from the following:    Height as of this encounter: 1.499 m (4' 11\").    Weight as of this encounter: 79.8 kg (176 lb).  Physical Exam  GENERAL: healthy, alert and no distress  EYES: Eyes grossly normal to inspection, PERRL and conjunctivae and sclerae normal  HENT: ear canals and TM's normal, nose and mouth without ulcers or lesions  NECK: no adenopathy, no asymmetry, masses, or scars and thyroid normal to palpation  RESP: lungs clear to auscultation - no rales, rhonchi or wheezes  CV: regular rate and rhythm, normal S1 S2, no S3 or S4, no murmur, click or rub, no peripheral edema and peripheral pulses strong  ABDOMEN: soft, nontender, no hepatosplenomegaly, no masses and bowel sounds normal  MS: no gross musculoskeletal defects noted, no edema  SKIN: no suspicious lesions or rashes  NEURO: Normal strength and tone, mentation intact and speech normal  PSYCH: mentation appears " normal, affect normal/bright      ASSESSMENT / PLAN:       ICD-10-CM    1. Medicare annual wellness visit, subsequent  Z00.00       2. Type 2 diabetes mellitus with diabetic nephropathy, without long-term current use of insulin (H)  E11.21 semaglutide (OZEMPIC, 0.25 OR 0.5 MG/DOSE,) 2 MG/1.5ML SOPN pen     metFORMIN (GLUCOPHAGE XR) 500 MG 24 hr tablet     Hemoglobin A1c     Hemoglobin A1c      3. Hypothyroidism, unspecified type  E03.9 levothyroxine (SYNTHROID/LEVOTHROID) 50 MCG tablet      4. Essential hypertension, benign  I10 metoprolol succinate ER (TOPROL XL) 25 MG 24 hr tablet     losartan (COZAAR) 50 MG tablet      5. Hyperlipidemia LDL goal <100  E78.5 simvastatin (ZOCOR) 40 MG tablet      6. Carotid atherosclerosis, right  I65.21           Is here for Medicare wellness check.  She is overall doing okay  Immunizations are up-to-date  Memory is good.    Diabetes she is on metformin, blood sugars are running higher she is due for an A1c and that is drawn today we will try to get her on Ozempic see if insurance will cover this.  Did not cover Farxiga or Jardiance.    Hypothyroidism is okay on levothyroxine side, TSH was normal in the ER recently.    Syncope she does have known right carotid atherosclerosis and she is seeing vascular surgery later today I think she may need to have this corrected as its over 70% and she has had 2 syncopal episodes.  She also has a Zio patch for syncope and that is pending.    Blood pressure is nicely controlled continue her medications    Chronic kidney disease sees nephrology, stable she also has gout and has been on allopurinol, I would continue that since nephrology ordered it.          Patient has been advised of split billing requirements and indicates understanding: Yes    COUNSELING:  Reviewed preventive health counseling, as reflected in patient instructions       Regular exercise       Healthy diet/nutrition      BMI:   Estimated body mass index is 35.55 kg/m  as  "calculated from the following:    Height as of this encounter: 1.499 m (4' 11\").    Weight as of this encounter: 79.8 kg (176 lb).   Weight management plan: Discussed healthy diet and exercise guidelines      She reports that she has quit smoking. She has never used smokeless tobacco.      Appropriate preventive services were discussed with this patient, including applicable screening as appropriate for cardiovascular disease, diabetes, osteopenia/osteoporosis, and glaucoma.  As appropriate for age/gender, discussed screening for colorectal cancer, prostate cancer, breast cancer, and cervical cancer. Checklist reviewing preventive services available has been given to the patient.    Reviewed patients plan of care and provided an AVS. The Basic Care Plan (routine screening as documented in Health Maintenance) for Sandra meets the Care Plan requirement. This Care Plan has been established and reviewed with the Patient.      Jason Niño MD  [facility] [place]    Identified Health Risks:  "

## 2022-11-22 ENCOUNTER — MYC MEDICAL ADVICE (OUTPATIENT)
Dept: INTERNAL MEDICINE | Facility: CLINIC | Age: 82
End: 2022-11-22

## 2022-12-09 ENCOUNTER — HOSPITAL ENCOUNTER (EMERGENCY)
Facility: CLINIC | Age: 82
Discharge: HOME | End: 2022-12-09
Attending: FAMILY MEDICINE | Admitting: FAMILY MEDICINE
Payer: MEDICARE

## 2022-12-09 ENCOUNTER — APPOINTMENT (OUTPATIENT)
Dept: GENERAL RADIOLOGY | Facility: CLINIC | Age: 82
End: 2022-12-09
Attending: FAMILY MEDICINE
Payer: MEDICARE

## 2022-12-09 ENCOUNTER — TELEPHONE (OUTPATIENT)
Dept: INTERNAL MEDICINE | Facility: CLINIC | Age: 82
End: 2022-12-09

## 2022-12-09 VITALS
BODY MASS INDEX: 35.55 KG/M2 | OXYGEN SATURATION: 98 % | HEART RATE: 52 BPM | WEIGHT: 176 LBS | SYSTOLIC BLOOD PRESSURE: 172 MMHG | RESPIRATION RATE: 13 BRPM | TEMPERATURE: 97.8 F | DIASTOLIC BLOOD PRESSURE: 85 MMHG

## 2022-12-09 DIAGNOSIS — R55 NEAR SYNCOPE: ICD-10-CM

## 2022-12-09 DIAGNOSIS — R00.1 BRADYCARDIA: ICD-10-CM

## 2022-12-09 LAB
ANION GAP SERPL CALCULATED.3IONS-SCNC: 9 MMOL/L (ref 3–14)
BASOPHILS # BLD AUTO: 0.1 10E3/UL (ref 0–0.2)
BASOPHILS NFR BLD AUTO: 1 %
BUN SERPL-MCNC: 35 MG/DL (ref 7–30)
CALCIUM SERPL-MCNC: 8.9 MG/DL (ref 8.5–10.1)
CHLORIDE BLD-SCNC: 108 MMOL/L (ref 94–109)
CO2 SERPL-SCNC: 24 MMOL/L (ref 20–32)
CREAT SERPL-MCNC: 1.36 MG/DL (ref 0.52–1.04)
EOSINOPHIL # BLD AUTO: 0.3 10E3/UL (ref 0–0.7)
EOSINOPHIL NFR BLD AUTO: 4 %
ERYTHROCYTE [DISTWIDTH] IN BLOOD BY AUTOMATED COUNT: 14.6 % (ref 10–15)
GFR SERPL CREATININE-BSD FRML MDRD: 39 ML/MIN/1.73M2
GLUCOSE BLD-MCNC: 235 MG/DL (ref 70–99)
HCT VFR BLD AUTO: 36.3 % (ref 35–47)
HGB BLD-MCNC: 11.7 G/DL (ref 11.7–15.7)
IMM GRANULOCYTES # BLD: 0.1 10E3/UL
IMM GRANULOCYTES NFR BLD: 1 %
INR PPP: 0.93 (ref 0.85–1.15)
LYMPHOCYTES # BLD AUTO: 1.7 10E3/UL (ref 0.8–5.3)
LYMPHOCYTES NFR BLD AUTO: 18 %
MCH RBC QN AUTO: 30.5 PG (ref 26.5–33)
MCHC RBC AUTO-ENTMCNC: 32.2 G/DL (ref 31.5–36.5)
MCV RBC AUTO: 95 FL (ref 78–100)
MONOCYTES # BLD AUTO: 0.8 10E3/UL (ref 0–1.3)
MONOCYTES NFR BLD AUTO: 9 %
NEUTROPHILS # BLD AUTO: 6.4 10E3/UL (ref 1.6–8.3)
NEUTROPHILS NFR BLD AUTO: 67 %
NRBC # BLD AUTO: 0 10E3/UL
NRBC BLD AUTO-RTO: 0 /100
NT-PROBNP SERPL-MCNC: 787 PG/ML (ref 0–1800)
PLATELET # BLD AUTO: 269 10E3/UL (ref 150–450)
POTASSIUM BLD-SCNC: 4.1 MMOL/L (ref 3.4–5.3)
RBC # BLD AUTO: 3.83 10E6/UL (ref 3.8–5.2)
SODIUM SERPL-SCNC: 141 MMOL/L (ref 133–144)
T4 FREE SERPL-MCNC: 0.96 NG/DL (ref 0.76–1.46)
TROPONIN I SERPL HS-MCNC: 13 NG/L
TSH SERPL DL<=0.005 MIU/L-ACNC: 5.59 MU/L (ref 0.4–4)
WBC # BLD AUTO: 9.3 10E3/UL (ref 4–11)

## 2022-12-09 PROCEDURE — 99285 EMERGENCY DEPT VISIT HI MDM: CPT | Mod: 25 | Performed by: FAMILY MEDICINE

## 2022-12-09 PROCEDURE — 93005 ELECTROCARDIOGRAM TRACING: CPT | Performed by: FAMILY MEDICINE

## 2022-12-09 PROCEDURE — 84484 ASSAY OF TROPONIN QUANT: CPT | Performed by: FAMILY MEDICINE

## 2022-12-09 PROCEDURE — 84439 ASSAY OF FREE THYROXINE: CPT | Performed by: FAMILY MEDICINE

## 2022-12-09 PROCEDURE — 71045 X-RAY EXAM CHEST 1 VIEW: CPT

## 2022-12-09 PROCEDURE — 93010 ELECTROCARDIOGRAM REPORT: CPT | Performed by: FAMILY MEDICINE

## 2022-12-09 PROCEDURE — 85041 AUTOMATED RBC COUNT: CPT | Performed by: FAMILY MEDICINE

## 2022-12-09 PROCEDURE — 85610 PROTHROMBIN TIME: CPT | Performed by: FAMILY MEDICINE

## 2022-12-09 PROCEDURE — 36415 COLL VENOUS BLD VENIPUNCTURE: CPT | Performed by: FAMILY MEDICINE

## 2022-12-09 PROCEDURE — 83880 ASSAY OF NATRIURETIC PEPTIDE: CPT | Performed by: FAMILY MEDICINE

## 2022-12-09 PROCEDURE — 82310 ASSAY OF CALCIUM: CPT | Performed by: FAMILY MEDICINE

## 2022-12-09 PROCEDURE — 84443 ASSAY THYROID STIM HORMONE: CPT | Performed by: FAMILY MEDICINE

## 2022-12-09 ASSESSMENT — ENCOUNTER SYMPTOMS
CONSTITUTIONAL NEGATIVE: 1
LIGHT-HEADEDNESS: 1
NAUSEA: 1
FEVER: 0
DIAPHORESIS: 0
FATIGUE: 0
PALPITATIONS: 0
ABDOMINAL PAIN: 0
EYES NEGATIVE: 1
CHILLS: 0
RESPIRATORY NEGATIVE: 1
PSYCHIATRIC NEGATIVE: 1
APPETITE CHANGE: 0
VOMITING: 0
MUSCULOSKELETAL NEGATIVE: 1

## 2022-12-09 ASSESSMENT — ACTIVITIES OF DAILY LIVING (ADL): ADLS_ACUITY_SCORE: 37

## 2022-12-09 NOTE — TELEPHONE ENCOUNTER
Patient returned call and had another episode today of blacking out at the bank. She is very concerned and would like someone to please review her Zio patch results.    Angela Pearson,GLADYSN, RN

## 2022-12-09 NOTE — TELEPHONE ENCOUNTER
I reviewed her Zio patch and it shows a pause of 4 seconds, 3 pauses of greater than 3 seconds and a bradycardic episode down to 27.  I think she needs to go into the emergency room and be evaluated she may need a pacemaker for these pauses as she is symptomatic.    The patient agrees and she will go to the emergency room with her friend today.

## 2022-12-09 NOTE — TELEPHONE ENCOUNTER
"Patient calling because she is requesting to know what results from Zio Patch were. RN was unsure about this. She stated she had an \"episode\" today where she felt like she was going to pass out and she blacked out. It took about a minute for it to go away and then patient was fine. She states this is the same thing that was happening.     Patient states she now sees Dr. Cano and is also scheduled to see cardiology on 1/4/23.     Can provider explain results of Zio patch or should patient wait until she sees cardiology?    Radha Cerrato, GLADYSN, RN     "

## 2022-12-10 NOTE — ED NOTES
IV started blood sent to lab. Xray with pt. Pt placed on cardiac monitor and VS monitoring. Call light in reach.

## 2022-12-10 NOTE — ED TRIAGE NOTES
Pt here with irregular heart rate, leticia down to 27 bpm.  Sent by Dr Niño because of Zio patch readings         Triage Assessment     Row Name 12/09/22 [id number]       Triage Assessment (Adult)    Airway WDL WDL       Respiratory WDL    Respiratory WDL WDL

## 2022-12-10 NOTE — DISCHARGE INSTRUCTIONS
I spoke with Dr. Hull, cardiologist at [facility], about your condition. He wants you to stop the metoprolol medication as this is the likely reason for the slow heart that is likely causing the near fainting episodes.    The cardiologist wanted you to make an appointment with the [facility] cardiologist clinic to see the EP - electrophysiologist cardiologist specialist. Call [phone] to schedule that appointment. Tell them you were seen in the Emergency room and are having near fainting episodes.

## 2022-12-10 NOTE — ED PROVIDER NOTES
History     Chief Complaint   Patient presents with     Irregular Heart Beat     HPI  Sandra Petit is a 82 year old female who presented to the emergency room today with her friend secondary to concerns of episodes of blacking out.  Patient states that she had an episode several weeks ago and had been seen in this emergency room.  She states that she had another episode today when she was at the bank this morning in which she felt like she might pass out.  She was able to get to a chair and sat for a few minutes and then her symptoms passed and she has not had any symptoms since.  She states that that was the first episode she has had for the last 2 weeks but has had several symptoms prior that were similar.  Patient stated that she called her clinic today because of the episode and wondered the results of the Zio patch evaluation she had done last month that she had not heard results on as yet.  She states that she was called this evening by her doctor, Dr. Jason Niño, who suggested she come to the emergency room because of an abnormal Zio patch evaluation.  She denies any current symptoms at the time of exam.  Asked if she has had any shortness of breath episodes and she denies.  She denies any chest pain episodes.  She does admit to several episodes where she is felt somewhat nauseated typically in the early morning but no nausea symptoms today.  I asked if she has had any prior similar episodes prior to this last 2 months and she states that she does not think so.  She states that her family has a history for needing pacemakers.  She states her brother received a pacemaker and an internal defibrillator after he had cardiac resuscitation performed on him.  She denies any recent fall or injury associated with these episodes.  She states that she has had cardigist group.    Patient recently seen by vascular speac bypass surgery in the past done at the [facility] 03/13/2019.  She follows with the [facility]  [facility] cardi[facility] secondary to evaluation and plan monitoring of some stenosis of her carotid arteries.  She was started on aspirin therapy.    She was seen in this ER 11/8/22 for a near syncopal episode. This led to a ZIO patch XT evaluation for the period of 11/10/22-11/24/22.        I reviewed the following clinic phone nurse/physician notes:    December 9, 2022  Jason Niño MD     PL     5:56 PM  Note  I reviewed her Zio patch and it shows a pause of 4 seconds, 3 pauses of greater than 3 seconds and a bradycardic episode down to 27.  I think she needs to go into the emergency room and be evaluated she may need a pacemaker for these pauses as she is symptomatic.     The patient agrees and she will go to the emergency room with her friend today.             TP    4:02 PM  Angela Pearson RN routed this conversation to Jason Niño MD Praske, Toshie, RN     TP     4:02 PM  Note  Patient returned call and had another episode today of blacking out at the bank. She is very concerned and would like someone to please review her Zio patch results.     CARLOZ Bains, RN                CASSIE    10:54 AM  Radha Cerrato RN routed this conversation to Tyson Cano MD Christian, Jennifer L, RN     CASSIE    10:54 AM  Note  Patient calling because she is requesting to know what results from Zio Patch were. RN was unsure about this. She stated she had an \"episode\" today where she felt like she was going to pass out and she blacked out. It took about a minute for it to go away and then patient was fine. She states this is the same thing that was happening.      Patient states she now sees Dr. Cano and is also scheduled to see cardiology on 1/4/23.      Can provider explain results of Zio patch or should patient wait until she sees cardiology?     CARLOZ Smith, RN            ZIO XT report from 11/10-11/24/22 monitoring:        Allergies:  Allergies   Allergen Reactions     " "Penicillins Swelling     \"throat started swelling\"     Vitamin B Complex Hives     Vitamin B12 Hives     all vitamin B's     Clindamycin Hcl Rash       Problem List:    Patient Active Problem List    Diagnosis Date Noted     Morbid obesity (H) 11/11/2020     Priority: Medium     Osteoarthritis of left thumb 07/07/2017     Priority: Medium     Type 2 diabetes mellitus with other circulatory complications (H) 04/04/2016     Priority: Medium     Other specified hypothyroidism 10/02/2015     Priority: Medium     Coronary artery disease involving native coronary artery without angina pectoris 10/02/2015     Priority: Medium     Type 2 diabetes mellitus with diabetic nephropathy (H) 10/02/2015     Priority: Medium     History of total left knee replacement 04/29/2013     Priority: Medium     Hypertension goal BP (blood pressure) < 140/90 09/26/2011     Priority: Medium     Hyperlipidemia LDL goal <100 10/31/2010     Priority: Medium     CKD (chronic kidney disease) stage 3, GFR 30-59 ml/min (H) 03/17/2009     Priority: Medium        Past Medical History:    Past Medical History:   Diagnosis Date     Diabetic eye exam (H) 08/05/13     Endometriosis, site unspecified      Fever and other physiologic disturbances of temperature regulation 07/07/2005     Heart disease      Myalgia and myositis, unspecified      Pure hypercholesterolemia      Unspecified essential hypertension      Unspecified hypothyroidism        Past Surgical History:    Past Surgical History:   Procedure Laterality Date     ARTHROPLASTY KNEE  4/29/2013    Procedure: ARTHROPLASTY KNEE;  left total knee arthroplasty;  Surgeon: Teddy Grimes MD;  Location: [facility]     ARTHROPLASTY KNEE Right 8/27/2018    Procedure: ARTHROPLASTY KNEE;  right total knee arthroplasty;  Surgeon: Johnny Anaya MD;  Location: [facility]     COMBINED CYSTOSCOPY, INSERT CATHETER URETER Bilateral 8/10/2015    Procedure: COMBINED CYSTOSCOPY, INSERT CATHETER URETER;  Surgeon: " Johnnie Madera MD;  Location: [facility]     DILATION AND CURETTAGE, HYSTEROSCOPY DIAGNOSTIC, COMBINED N/A 2014    Procedure: COMBINED DILATION AND CURETTAGE, HYSTEROSCOPY DIAGNOSTIC;  Surgeon: Edward Pardo MD;  Location[facility]     ESOPHAGOSCOPY, GASTROSCOPY, DUODENOSCOPY (EGD), COMBINED N/A 2015    Procedure: COMBINED ESOPHAGOSCOPY, GASTROSCOPY, DUODENOSCOPY (EGD), BIOPSY SINGLE OR MULTIPLE;  Surgeon: Carmelo Rosario MD;  Location: [facility] GI     HC REMOVAL GALLBLADDER      Cholecystectomy     HC REMOVE TONSILS/ADENOIDS,<13 Y/O      unsure of age     LAPAROSCOPIC HYSTERECTOMY TOTAL N/A 8/10/2015    Procedure: LAPAROSCOPIC HYSTERECTOMY TOTAL;  Surgeon: Edward Pardo MD;  [facility]     LAPAROSCOPIC LYSIS ADHESIONS N/A 8/10/2015    Procedure: LAPAROSCOPIC LYSIS ADHESIONS;  Surgeon: Edward Pardo MD;  Location: [facility] OR     ZZC APPENDECTOMY       Z EXPLOR HEART SURG WND W CP BYPASS Bilateral 2019     Z NONSPECIFIC PROCEDURE      Knee ligament surgery     [facility] NONSPECIFIC PROCEDURE      Kidney surgery for mechanical obstruction     Z OPEN CORONARY ENDARTERECTOMY  2005    Angioplasty w stenting     Z TOTAL KNEE ARTHROPLASTY  14    Left       Family History:    Family History   Problem Relation Age of Onset     Heart Disease Mother          coronary     Heart Disease Father          coronary     Allergies Sister         unknown     Allergies Brother         unknown     Allergies Daughter         unknown     Allergies Son         unknown     Heart Disease Sister         by pass surg     Heart Disease Brother         on medication     Hypertension Sister      Hypertension Brother      Lipids Sister      Lipids Brother      Cerebrovascular Disease Brother      Obesity Sister      Diabetes Sister      Diabetes Sister      C.A.D. Brother         quad by pass     Neurologic Disorder Sister         parkinsons     Cancer Sister         skin  cancer     Alcohol/Drug No family hx of      Alzheimer Disease No family hx of        Social History:  Marital Status:   [5]  Social History     Tobacco Use     Smoking status: Former     Smokeless tobacco: Never     Tobacco comments:     quit  1987   Vaping Use     Vaping Use: Never used   Substance Use Topics     Alcohol use: No     Alcohol/week: 0.0 standard drinks     Drug use: No        Medications:    allopurinol (ZYLOPRIM) 100 MG tablet  amLODIPine (NORVASC) 10 MG tablet  aspirin (ASA) 325 MG EC tablet  blood glucose (ONETOUCH VERIO IQ) test strip  cloNIDine (CATAPRES) 0.1 MG tablet  levothyroxine (SYNTHROID/LEVOTHROID) 50 MCG tablet  losartan (COZAAR) 50 MG tablet  metFORMIN (GLUCOPHAGE XR) 500 MG 24 hr tablet  Probiotic Product (ACIDOPHILUS PROBIOTIC BLEND) CAPS  simvastatin (ZOCOR) 40 MG tablet  traZODone (DESYREL) 50 MG tablet  Vitamin D, Cholecalciferol, 25 MCG (1000 UT) CAPS  Lancets (ONETOUCH DELICA PLUS [id number]) MISC        Review of Systems   Constitutional: Negative.  Negative for appetite change, chills, diaphoresis, fatigue and fever.   HENT: Negative.    Eyes: Negative.    Respiratory: Negative.    Cardiovascular: Negative for chest pain, palpitations and leg swelling.   Gastrointestinal: Positive for nausea. Negative for abdominal pain and vomiting.   Genitourinary: Negative.    Musculoskeletal: Negative.    Skin: Negative.    Neurological: Positive for syncope (Patient describes several near syncopal episodes over the last 2 months with the last one occurring this morning while at the bank.) and light-headedness.   Psychiatric/Behavioral: Negative.    All other systems reviewed and are negative.      Physical Exam   BP: (!) 175/70  Pulse: 63  Temp: 97.8  F (36.6  C)  Resp: 16  Weight: 79.8 kg (176 lb)  SpO2: 100 %      Physical Exam  Vitals and nursing note reviewed. Exam conducted with a chaperone present.   Constitutional:       General: She is not in acute distress.     Appearance:  She is not ill-appearing, toxic-appearing or diaphoretic.   HENT:      Head: Normocephalic and atraumatic.      Nose: Nose normal.      Mouth/Throat:      Mouth: Mucous membranes are moist.   Eyes:      Extraocular Movements: Extraocular movements intact.      Conjunctiva/sclera: Conjunctivae normal.      Pupils: Pupils are equal, round, and reactive to light.   Cardiovascular:      Rate and Rhythm: Bradycardia present.      Pulses: Normal pulses.      Heart sounds:     No friction rub. No gallop.   Pulmonary:      Effort: Pulmonary effort is normal. No respiratory distress.   Abdominal:      General: Abdomen is protuberant.      Palpations: Abdomen is soft.      Tenderness: There is no abdominal tenderness. There is no guarding or rebound.   Musculoskeletal:         General: No swelling, deformity or signs of injury.      Cervical back: Normal range of motion and neck supple.      Right lower leg: No edema.      Left lower leg: No edema.   Skin:     Capillary Refill: Capillary refill takes less than 2 seconds.      Findings: No rash.   Neurological:      Mental Status: She is alert and oriented to person, place, and time.   Psychiatric:         Mood and Affect: Mood normal.         Behavior: Behavior normal.         Thought Content: Thought content normal.         ED Course                 Procedures              EKG Interpretation:      Interpreted by Teddy Menon DO  Time reviewed: 7:15 PM  Symptoms at time of EKG: None   Rhythm: sinus bradycardia  Rate: 50-60  Axis: Normal  Ectopy: none  Conduction: normal  ST Segments/ T Waves: No ST-T wave changes and No acute ischemic changes  Q Waves: none  Comparison to prior: Unchanged from 11/8/22    Clinical Impression: sinus bradycardia      Critical Care time:  none            Results for orders placed or performed during the hospital encounter of 12/09/22 (from the past 24 hour(s))   CBC with platelets differential    Narrative    The following orders were  created for panel order CBC with platelets differential.  Procedure                               Abnormality         Status                     ---------                               -----------         ------                     CBC with platelets and d...[[id number]]                      Final result                 Please view results for these tests on the individual orders.   INR   Result Value Ref Range    INR 0.93 0.85 - 1.15   Troponin I   Result Value Ref Range    Troponin I High Sensitivity 13 <54 ng/L   Basic metabolic panel   Result Value Ref Range    Sodium 141 133 - 144 mmol/L    Potassium 4.1 3.4 - 5.3 mmol/L    Chloride 108 94 - 109 mmol/L    Carbon Dioxide (CO2) 24 20 - 32 mmol/L    Anion Gap 9 3 - 14 mmol/L    Urea Nitrogen 35 (H) 7 - 30 mg/dL    Creatinine 1.36 (H) 0.52 - 1.04 mg/dL    Calcium 8.9 8.5 - 10.1 mg/dL    Glucose 235 (H) 70 - 99 mg/dL    GFR Estimate 39 (L) >60 mL/min/1.73m2   TSH with free T4 reflex   Result Value Ref Range    TSH 5.59 (H) 0.40 - 4.00 mU/L   Nt probnp inpatient (BNP)   Result Value Ref Range    N terminal Pro BNP Inpatient 787 0 - 1,800 pg/mL   CBC with platelets and differential   Result Value Ref Range    WBC Count 9.3 4.0 - 11.0 10e3/uL    RBC Count 3.83 3.80 - 5.20 10e6/uL    Hemoglobin 11.7 11.7 - 15.7 g/dL    Hematocrit 36.3 35.0 - 47.0 %    MCV 95 78 - 100 fL    MCH 30.5 26.5 - 33.0 pg    MCHC 32.2 31.5 - 36.5 g/dL    RDW 14.6 10.0 - 15.0 %    Platelet Count 269 150 - 450 10e3/uL    % Neutrophils 67 %    % Lymphocytes 18 %    % Monocytes 9 %    % Eosinophils 4 %    % Basophils 1 %    % Immature Granulocytes 1 %    NRBCs per 100 WBC 0 <1 /100    Absolute Neutrophils 6.4 1.6 - 8.3 10e3/uL    Absolute Lymphocytes 1.7 0.8 - 5.3 10e3/uL    Absolute Monocytes 0.8 0.0 - 1.3 10e3/uL    Absolute Eosinophils 0.3 0.0 - 0.7 10e3/uL    Absolute Basophils 0.1 0.0 - 0.2 10e3/uL    Absolute Immature Granulocytes 0.1 <=0.4 10e3/uL    Absolute NRBCs 0.0 10e3/uL   T4 free    Result Value Ref Range    Free T4 0.96 0.76 - 1.46 ng/dL   XR Chest Port 1 View    Narrative    EXAM: XR CHEST PORT 1 VIEW  LOCATION: [facility]  DATE/TIME: 12/9/2022 7:35 PM    INDICATION: Syncopal episodes.  COMPARISON: 03/12/2019.      Impression    IMPRESSION: External defibrillator patches obscure the left lung base. Within this limitation, there is no definitive airspace consolidation. Unchanged elevation of the right hemidiaphragm. No pleural effusion or pneumothorax.    Unchanged mild cardiomegaly. Interval median sternotomy and coronary artery bypass grafting.     *Note: Due to a large number of results and/or encounters for the requested time period, some results have not been displayed. A complete set of results can be found in Results Review.           Assessments & Plan (with Medical Decision Making)  82-year-old female to the ER secondary to concerns about episodes of near fainting her abdomen happening on and off for the last month or 2.  She had another episode occurring this morning at the bank where she had a sit down when she felt like she might faint.  Symptoms resolved shortly and she was able to resume her day without additional symptoms today.  She received a call from her internal medicine physician stating that her Zio patch suggested that she is having episodes of bradycardia.  Fortunately, the patient's exam today was unremarkable for abnormality other than at baseline bradycardia with rate in the upper 50s.  Screening EKG and blood test performed as noted above.  I spoke with cardiology at [facility].  Patient receives her cardiology care through [facility] she had a coronary bypass done there in 2019.  Dr. Hull, cardiology, was consulted and after review of her history and exam findings today recommended stopping her metoprolol and to follow-up as an outpatient in the clinic with the EP specialist in [place].  Patient was given written  instructions discussing bradycardia as well as syncope.  I have asked her to read and follow the instructions and to return to the ER should she have increase or worsening of symptoms.      I have reviewed the nursing notes.    I have reviewed the findings, diagnosis, plan and need for follow up with the patient.            I verbally discussed the findings of the evaluation today in the ER. I have verbally discussed with Sandra the suggested treatment(s) as described in the discharge instructions and handouts.      I have verbally suggested she follow-up in her clinic or return to the ER for increased symptoms. See the follow-up recommendations documented  in the after visit summary in this visit's EPIC chart.      Disclaimer: This note consists of words and symbols derived from keyboarding and dictation using voice recognition software.  As a result, there may be errors that have gone undetected.  Please consider this when interpreting information found in this note.      Final diagnoses:   Near syncope   Bradycardia       12/9/2022   [facility] EMERGENCY DEPT     Teddy Menon, DO  12/09/22 [id number]

## 2022-12-12 ENCOUNTER — TELEPHONE (OUTPATIENT)
Dept: INTERNAL MEDICINE | Facility: CLINIC | Age: 82
End: 2022-12-12

## 2022-12-12 NOTE — TELEPHONE ENCOUNTER
ANURADHA Omalley from [facility] stating that the Zio Patch that patient worse showed intermittent A-Fib.  The MD there would like patient to start on a blood thinner such as Elliquis.     Lyssa states that they are worried that patient has a coronary blockage.  They are working on getting patient in for an echo and pacemaker.    Message handled by Nurse Triage with [facility] - provider name: Dr. Niño.  Dr. Niño would like patient to continue taking Aspirin 325mg daily.  He does not see that the Zio Patch has any signs of A-Fib and is not going to start any other blood thinner at this time.    Spoke to patient and told her that due to her symptoms of chest pressure and SOB that she should go to ED right away.  Recommended that she go to [facility] ED where they have a cardiac unit vs [place] ED were we do not.  Patient states that her chest pressure and SOB are no long present.  She states that she will go to [facility] ED should her symptoms return.    Reminded patient of:  RN reviewed red flag symptoms with patient and when to seek emergency care.   Patient agreed and verbalized understanding.

## 2022-12-12 NOTE — TELEPHONE ENCOUNTER
Reason for Call:  Other call back    Detailed comments: Heart center requesting EKG result and zio patch    Phone Number Patient can be reached at:   [phone]    FAX: [phone]    Best Time: ANYTIME    Can we leave a detailed message on this number? YES    Call taken on 12/12/2022 at 9:04 AM by Pedrito Johnson

## 2022-12-12 NOTE — TELEPHONE ENCOUNTER
I do not know which blood thinner they want her on or why they want her on a blood thinner before procedure for a pacemaker. I would just stay on the aspirin.

## 2022-12-12 NOTE — TELEPHONE ENCOUNTER
[facility] [[email or web address] called patient today and they are working on getting her set up for a pace maker.    They told her they want to get her started on a blood thinner.  Since they do not have her records and have not seen her, they asked that she reach out to her PCP to see about started on a blood thinner now.  Would you be willing to start this?  If so, patient is wondering if she should continue taking her aspirin 325mg daily?    Patient reports that she is currently having heaviness in her chest that comes and goes and SOB.  She said it is not like she normally has, it is more anxiety related.  She reports that she has not blacked out since stopping the Metoprolol last week sometime.    RN reviewed red flag symptoms with patient and when to seek emergency care.   Patient agreed and verbalized understanding.    Patient plans to keep her appointment with Dr. Cano on 12/27/22.

## 2022-12-12 NOTE — TELEPHONE ENCOUNTER
Informed patient of Dr. Niño's response below.    Patient will continue current medication regimen including aspirin 325mg daily.

## 2022-12-27 ENCOUNTER — TELEPHONE (OUTPATIENT)
Dept: FAMILY MEDICINE | Facility: CLINIC | Age: 82
End: 2022-12-27

## 2022-12-27 NOTE — TELEPHONE ENCOUNTER
Patient was schedule with Dr. Cano today to discuss and go over results. Patient also wanted to talk about her knee pain and meet you in person. Is there another day you are willing to work in patient.     Patient wanted you to know she gets her pacemaker put in today so she will not be able to drive or be seen in person for 1 week. Your schedule is still on hold then.     Mita Mcconnell MA

## 2022-12-30 ENCOUNTER — NURSE TRIAGE (OUTPATIENT)
Dept: INTERNAL MEDICINE | Facility: CLINIC | Age: 82
End: 2022-12-30

## 2022-12-30 NOTE — TELEPHONE ENCOUNTER
Nurse Triage SBAR    Is this a 2nd Level Triage? NO    Situation: patient calling in with new cold symptom's. Patient had pacemaker placed on 12/27 at [facility]. Denies redness around incision site. Patient reports yesterday she started to have a dry cough. Has a runny nose, clear secretions. Does feel slightly nasally congested. Patient did have low grade temp today 99.1. patient was up about every 2 hours last night coughing. Denies shortness of breath or difficulty breathing. Denies sinus pain or earache. Denies sore throat. Covid test this morning was negative.     Background: cough since yesterday, dry. Runny nose.      Assessment: Advised home care per protocol. We did review carolann care advice. Advised to call the pharmacist to discuss which cough medications are ok to take with history. Also advised to call back or be seen in Urgent Care over the weekend if symptom's worsen, especially if fever goes >100.0.  Did recommend calling [facility] to update them how she is feeling given recent surgery. She verbalized understanding of all and is agreeable. She will go in to  over the weekend if symptom's worsen.     Protocol Recommended Disposition:   Home Care    Recommendation:   Home Care Advice given per protocol. Will monitor and go to  if any symptom's worsen.      Home Care advice per protocol.     Does the patient meet one of the following criteria for ADS visit consideration? No    Reason for Disposition    Colds with no complications    Additional Information    Negative: SEVERE difficulty breathing (e.g., struggling for each breath, speaks in single words)    Negative: Very weak (can't stand)    Negative: Sounds like a life-threatening emergency to the triager    Negative: Difficulty breathing and not from stuffy nose (e.g., not relieved by cleaning out the nose)    Negative: Runny nose is caused by pollen or other allergies    Negative: Cough is main  symptom    Negative: Sore throat is main symptom    Negative: Patient sounds very sick or weak to the triager    Negative: Fever > 103 F (39.4 C)    Negative: Fever > 101 F (38.3 C) and over 60 years of age    Negative: Fever > 100.0 F (37.8 C) and has diabetes mellitus or a weak immune system (e.g., HIV positive, cancer chemotherapy, organ transplant, splenectomy, chronic steroids)    Negative: Fever > 100.0 F (37.8 C) and bedridden (e.g., nursing home patient, stroke, chronic illness, recovering from surgery)    Negative: Fever present > 3 days (72 hours)    Negative: Fever returns after gone for over 24 hours and symptoms worse or not improved    Negative: Sinus pain (not just congestion) and fever    Negative: Earache    Negative: Sinus congestion (pressure, fullness) present > 10 days    Negative: Nasal discharge present > 10 days    Negative: Using nasal washes and pain medicine > 24 hours and sinus pain (lower forehead, cheekbone, or eye) persists    Negative: Patient wants to be seen    Negative: Sore throat present > 5 days    Protocols used: COMMON COLD-A-OH

## 2023-01-03 NOTE — TELEPHONE ENCOUNTER
Giovanni tested positive for COVID today. Mild symptoms. Do you want to do a virtual appointment or schedule one week out?

## 2023-01-03 NOTE — TELEPHONE ENCOUNTER
Patient just called and was told to ask provider if there is anything she can take for her covid symptoms so it does not turn into pneumonia?     Please call patient back on home number on file.

## 2023-01-16 ENCOUNTER — NURSE TRIAGE (OUTPATIENT)
Dept: INTERNAL MEDICINE | Facility: CLINIC | Age: 83
End: 2023-01-16

## 2023-01-16 NOTE — TELEPHONE ENCOUNTER
Patient reports that for the last half hour she has had constant chest pressure.  She states that she is SOB, her chest feels funny, and she states she is having irregular heartbeats/palpitations.    She has been checking her BP constantly and states it keeps going higher and higher.  Recommended that patient stop  her BP at this time as she is anxious and that may be the cause of her BP going up.  She was checking her pulse often and stated that she could feel it beating fast and then stop and then beat fast again.  Instructed patient to take deep breaths and relax as much as possible.  Patient had a pace maker check earlier today and all checked out fine.    911 was called by RN and patient was transported to [facility] in [place].  Patient asked that RN contact her son Tucker and let him know; he was contacted and informed.    Protocol states: Call  Now    Reason for Disposition    SEVERE chest pain    Heart beating irregularly or very rapidly    Difficulty breathing    Chest pain lasting longer than 5 minutes and ANY of the following:* Over 44 years old* Over 30 years old and at least one cardiac risk factor (e.g., diabetes mellitus, high blood pressure, high cholesterol, smoker, or strong family history of heart disease)* History of heart disease (i.e., angina, heart attack, heart failure, bypass surgery, takes nitroglycerin)* Pain is crushing, pressure-like, or heavy    Additional Information    Negative: SEVERE difficulty breathing (e.g., struggling for each breath, speaks in single words)    Negative: Passed out (i.e., fainted, collapsed and was not responding)    Negative: Difficult to awaken or acting confused (e.g., disoriented, slurred speech)    Negative: Shock suspected (e.g., cold/pale/clammy skin, too weak to stand, low BP, rapid pulse)    Negative: Heart beating < 50 beats per minute OR > 140 beats per minute    Negative: Visible sweat on face or sweat dripping down face    Negative:  Sounds like a life-threatening emergency to the triager    Negative: Followed an injury to chest    Protocols used: CHEST PAIN-A-OH

## 2023-01-27 DIAGNOSIS — G47.00 INSOMNIA, UNSPECIFIED TYPE: ICD-10-CM

## 2023-01-27 RX ORDER — TRAZODONE HYDROCHLORIDE 50 MG/1
TABLET, FILM COATED ORAL
Qty: 90 TABLET | Refills: 1 | Status: SHIPPED | OUTPATIENT
Start: 2023-01-27 | End: 2023-11-24

## 2023-01-27 NOTE — TELEPHONE ENCOUNTER
Prescription approved per [facility] Refill Protocol.  Genna Lechuga RN on 1/27/2023 at 4:10 PM

## 2023-02-02 ENCOUNTER — TELEPHONE (OUTPATIENT)
Dept: INTERNAL MEDICINE | Facility: CLINIC | Age: 83
End: 2023-02-02
Payer: MEDICARE

## 2023-02-02 DIAGNOSIS — Z29.8 * * * SBE PROPHYLAXIS * * *: Primary | ICD-10-CM

## 2023-02-02 RX ORDER — AZITHROMYCIN 250 MG/1
TABLET, FILM COATED ORAL
Qty: 6 TABLET | Refills: 0 | Status: SHIPPED | OUTPATIENT
Start: 2023-02-02 | End: 2024-05-07

## 2023-02-02 RX ORDER — AZITHROMYCIN 250 MG/1
TABLET, FILM COATED ORAL ONCE
Status: CANCELLED | OUTPATIENT
Start: 2023-02-02 | End: 2023-02-02

## 2023-02-02 NOTE — TELEPHONE ENCOUNTER
azithromycin (ZITHROMAX) 250 MG tablet -     she takes 2 tablets one hour before her dental appointments    She has TWO appointments this year so she would like 4 tablets total.    Pharmacy - [facility]/

## 2023-02-15 ENCOUNTER — TELEPHONE (OUTPATIENT)
Dept: INTERNAL MEDICINE | Facility: CLINIC | Age: 83
End: 2023-02-15
Payer: MEDICARE

## 2023-02-15 NOTE — TELEPHONE ENCOUNTER
Patient call to schedule an urgent appointment with Dr. Cano for dizzy spell. Recommend patient to speak to red [facility] nurse although patient refused. Please call patient if possible to be seen sooner. Thank you.

## 2023-02-16 ENCOUNTER — OFFICE VISIT (OUTPATIENT)
Dept: FAMILY MEDICINE | Facility: CLINIC | Age: 83
End: 2023-02-16
Payer: MEDICARE

## 2023-02-16 VITALS
HEART RATE: 88 BPM | OXYGEN SATURATION: 98 % | WEIGHT: 175.31 LBS | SYSTOLIC BLOOD PRESSURE: 147 MMHG | TEMPERATURE: 98.7 F | BODY MASS INDEX: 35.41 KG/M2 | DIASTOLIC BLOOD PRESSURE: 72 MMHG

## 2023-02-16 DIAGNOSIS — I25.10 CORONARY ARTERY DISEASE INVOLVING NATIVE CORONARY ARTERY OF NATIVE HEART WITHOUT ANGINA PECTORIS: ICD-10-CM

## 2023-02-16 DIAGNOSIS — N18.32 STAGE 3B CHRONIC KIDNEY DISEASE (H): ICD-10-CM

## 2023-02-16 DIAGNOSIS — R42 LIGHTHEADEDNESS: Primary | ICD-10-CM

## 2023-02-16 DIAGNOSIS — E03.8 OTHER SPECIFIED HYPOTHYROIDISM: ICD-10-CM

## 2023-02-16 DIAGNOSIS — E66.01 MORBID OBESITY (H): ICD-10-CM

## 2023-02-16 DIAGNOSIS — I10 HYPERTENSION GOAL BP (BLOOD PRESSURE) < 140/90: ICD-10-CM

## 2023-02-16 DIAGNOSIS — E11.59 TYPE 2 DIABETES MELLITUS WITH OTHER CIRCULATORY COMPLICATIONS (H): ICD-10-CM

## 2023-02-16 LAB
ALBUMIN SERPL BCG-MCNC: 4.2 G/DL (ref 3.5–5.2)
ALP SERPL-CCNC: 140 U/L (ref 35–104)
ALT SERPL W P-5'-P-CCNC: 25 U/L (ref 10–35)
ANION GAP SERPL CALCULATED.3IONS-SCNC: 13 MMOL/L (ref 7–15)
AST SERPL W P-5'-P-CCNC: 21 U/L (ref 10–35)
BILIRUB SERPL-MCNC: 0.3 MG/DL
BUN SERPL-MCNC: 25.4 MG/DL (ref 8–23)
CALCIUM SERPL-MCNC: 9.4 MG/DL (ref 8.8–10.2)
CHLORIDE SERPL-SCNC: 104 MMOL/L (ref 98–107)
CREAT SERPL-MCNC: 1.36 MG/DL (ref 0.51–0.95)
DEPRECATED HCO3 PLAS-SCNC: 24 MMOL/L (ref 22–29)
ERYTHROCYTE [DISTWIDTH] IN BLOOD BY AUTOMATED COUNT: 14.8 % (ref 10–15)
GFR SERPL CREATININE-BSD FRML MDRD: 39 ML/MIN/1.73M2
GLUCOSE SERPL-MCNC: 130 MG/DL (ref 70–99)
HBA1C MFR BLD: 7 %
HCT VFR BLD AUTO: 40.6 % (ref 35–47)
HGB BLD-MCNC: 12.7 G/DL (ref 11.7–15.7)
MCH RBC QN AUTO: 30.5 PG (ref 26.5–33)
MCHC RBC AUTO-ENTMCNC: 31.3 G/DL (ref 31.5–36.5)
MCV RBC AUTO: 98 FL (ref 78–100)
PLATELET # BLD AUTO: 329 10E3/UL (ref 150–450)
POTASSIUM SERPL-SCNC: 4.3 MMOL/L (ref 3.4–5.3)
PROT SERPL-MCNC: 7.7 G/DL (ref 6.4–8.3)
RBC # BLD AUTO: 4.16 10E6/UL (ref 3.8–5.2)
SODIUM SERPL-SCNC: 141 MMOL/L (ref 136–145)
T4 FREE SERPL-MCNC: 1.22 NG/DL (ref 0.9–1.7)
TSH SERPL DL<=0.005 MIU/L-ACNC: 4.5 UIU/ML (ref 0.3–4.2)
WBC # BLD AUTO: 8.8 10E3/UL (ref 4–11)

## 2023-02-16 PROCEDURE — 36415 COLL VENOUS BLD VENIPUNCTURE: CPT | Performed by: STUDENT IN AN ORGANIZED HEALTH CARE EDUCATION/TRAINING PROGRAM

## 2023-02-16 PROCEDURE — 85027 COMPLETE CBC AUTOMATED: CPT | Performed by: STUDENT IN AN ORGANIZED HEALTH CARE EDUCATION/TRAINING PROGRAM

## 2023-02-16 PROCEDURE — 84439 ASSAY OF FREE THYROXINE: CPT | Performed by: STUDENT IN AN ORGANIZED HEALTH CARE EDUCATION/TRAINING PROGRAM

## 2023-02-16 PROCEDURE — 80053 COMPREHEN METABOLIC PANEL: CPT | Performed by: STUDENT IN AN ORGANIZED HEALTH CARE EDUCATION/TRAINING PROGRAM

## 2023-02-16 PROCEDURE — 83036 HEMOGLOBIN GLYCOSYLATED A1C: CPT | Performed by: STUDENT IN AN ORGANIZED HEALTH CARE EDUCATION/TRAINING PROGRAM

## 2023-02-16 PROCEDURE — 84443 ASSAY THYROID STIM HORMONE: CPT | Performed by: STUDENT IN AN ORGANIZED HEALTH CARE EDUCATION/TRAINING PROGRAM

## 2023-02-16 PROCEDURE — 99214 OFFICE O/P EST MOD 30 MIN: CPT | Performed by: STUDENT IN AN ORGANIZED HEALTH CARE EDUCATION/TRAINING PROGRAM

## 2023-02-16 NOTE — PROGRESS NOTES
Assessment & Plan     Lightheadedness  Patient with brief lightheadedness with change in position.  No other concerning symptoms.  Normal orthostatics today.  No longer on metoprolol and does have pacemaker in place.  No arrhythmias noted by cardiology clinic on interrogation.  She has no other chest pain or respiratory symptoms.  No palpitations.  Laboratory evaluation today largely unchanged with normal exam.  No concern for acute arrhythmia, coronary disease, heart failure or CVA.  Encourage regular hydration and continued monitoring of symptoms at home.  Be seen if new or worsening symptoms arise.    Type 2 diabetes mellitus with other circulatory complications (H)  - Comprehensive metabolic panel (BMP + Alb, Alk Phos, ALT, AST, Total. Bili, TP)  - CBC with platelets  - TSH with free T4 reflex  - Hemoglobin A1c  - Comprehensive metabolic panel (BMP + Alb, Alk Phos, ALT, AST, Total. Bili, TP)  - CBC with platelets  - TSH with free T4 reflex  - Hemoglobin A1c  - T4 free    Coronary artery disease involving native coronary artery of native heart without angina pectoris  Patient following with cardiology and has simvastatin losartan aspirin.    Stage 3b chronic kidney disease (H)  Following with nephrology.  No recent changes.    Hypertension goal BP (blood pressure) < 140/90  Home blood pressures reviewed and stable.  Continue losartan and amlodipine for now.    Morbid obesity (H)    Other specified hypothyroidism    Tyson Cano MD  [facility] IZABELA Davila is a 82 year old, presenting for the following health issues:  Dizziness      History of Present Illness       Reason for visit:  Black out spells    She eats 0-1 servings of fruits and vegetables daily.She consumes 0 sweetened beverage(s) daily.She exercises with enough effort to increase her heart rate 9 or less minutes per day.  She exercises with enough effort to increase her heart rate 3 or less days per week.   She  is taking medications regularly.     Patient noted lack of smell prior to pacer placement and discontinuing of beta-blocker with episode of bradycardia this fall.  Has done well with pacer but does note a few episodes of lightheadedness with change in position.  Specifically on 2 different occasions.  Had another episode yesterday when sitting up from her chair so she wanted to make sure nothing more worrisome was going on.  Notes kind of having blurry vision/lightheadedness for a few brief seconds and then resolved.  No syncopal episodes.  No vertigo.  No focal deficits.  She did contact cardiology clinic at [facility] and pacemaker interrogation showing no arrhythmia.  She otherwise feels well with no other recent illness.  She is staying hydrated.  No other vision changes.      Review of Systems   Constitutional, HEENT, cardiovascular, pulmonary, gi and gu systems are negative, except as otherwise noted.      Objective    BP (!) 147/72   Pulse 88   Temp 98.7  F (37.1  C) (Temporal)   Wt 79.5 kg (175 lb 5 oz)   SpO2 98%   BMI 35.41 kg/m    Body mass index is 35.41 kg/m .  Physical Exam   GENERAL: healthy, alert and no distress  EYES: Eyes grossly normal to inspection, PERRL and conjunctivae and sclerae normal  HENT: ear canals and TM's normal, nose and mouth without ulcers or lesions  NECK: no adenopathy, no asymmetry, masses, or scars and thyroid normal to palpation  RESP: lungs clear to auscultation - no rales, rhonchi or wheezes  CV: regular rate and rhythm, normal S1 S2, no murmur, click or rub, no peripheral edema and peripheral pulses strong  ABDOMEN: soft, nontender, no hepatosplenomegaly, no masses and bowel sounds normal  MS: no gross musculoskeletal defects noted, no edema  SKIN: no suspicious lesions or rashes  NEURO: Normal strength and tone, mentation intact and speech normal  PSYCH: mentation appears normal, affect normal/bright

## 2023-03-15 ENCOUNTER — NURSE TRIAGE (OUTPATIENT)
Dept: FAMILY MEDICINE | Facility: CLINIC | Age: 83
End: 2023-03-15
Payer: MEDICARE

## 2023-03-15 NOTE — TELEPHONE ENCOUNTER
Patient calling requesting a sooner appointment than 4/19.     Patient is having L elbow pain mainly when trying to straighten it. This has been going on about 3-4 weeks. Denies any specific injury. No swelling, fever, or weakness/numbness. Patient states she can straighten arm it just hurts when she does. Pain is usually around 6/10 but has been higher than 6 at times. Has tried Tylenol some, but only at night and states it does help.     Per protocol, patient should be seen within 3 days. RN reviewed red flag symptoms with patient and when to see emergency care. Patient agreed and understood.     Is there a time you can see patient sooner than 4/19?    CARLOZ Smith, RN       Reason for Disposition    MILD pain (e.g., does not interfere with normal activities) and present > 7 days    MODERATE pain (e.g., interferes with normal activities) and present > 3 days    Additional Information    Negative: Shock suspected (e.g., cold/pale/clammy skin, too weak to stand, low BP, rapid pulse)    Negative: Similar pain previously and it was from 'heart attack'    Negative: Similar pain previously and it was from 'angina', and not relieved by nitroglycerin    Negative: Sounds like a life-threatening emergency to the triager    Negative: Chest pain    Negative: Followed an elbow injury    Negative: Wound looks infected    Negative: Difficulty breathing or unusual sweating (e.g., sweating without exertion)    Negative: Age > 40 and associated chest or jaw pain, and pain lasting > 5 minutes    Negative: SEVERE pain (e.g., excruciating, unable to do any normal activities)    Negative: Weakness (i.e., loss of strength) in hand or fingers  (Exception: Not truly weak; hand feels weak because of pain.)    Negative: Swollen joint    Negative: Fluid-filled sack located directly over point of elbow    Negative: Looks like a boil, infected sore, deep ulcer, or other infected rash (spreading redness, pus)    Negative: Painful  rash with multiple small blisters grouped together (i.e., dermatomal distribution or 'band' or 'stripe')    Negative: Numbness (i.e., loss of sensation) in hand or fingers    Negative: Can't move joint normally (bend and straighten completely)    Negative: Red area or streak and fever    Negative: Swollen joint and fever    Negative: Entire arm is swollen    Negative: Patient sounds very sick or weak to the triager    Protocols used: ELBOW PAIN-A-OH

## 2023-03-22 ENCOUNTER — OFFICE VISIT (OUTPATIENT)
Dept: FAMILY MEDICINE | Facility: CLINIC | Age: 83
End: 2023-03-22
Payer: MEDICARE

## 2023-03-22 VITALS
SYSTOLIC BLOOD PRESSURE: 136 MMHG | HEIGHT: 59 IN | HEART RATE: 72 BPM | TEMPERATURE: 96.3 F | BODY MASS INDEX: 35.36 KG/M2 | RESPIRATION RATE: 20 BRPM | DIASTOLIC BLOOD PRESSURE: 78 MMHG | WEIGHT: 175.4 LBS | OXYGEN SATURATION: 96 %

## 2023-03-22 DIAGNOSIS — M77.12 LATERAL EPICONDYLITIS OF LEFT ELBOW: Primary | ICD-10-CM

## 2023-03-22 PROCEDURE — 99213 OFFICE O/P EST LOW 20 MIN: CPT | Performed by: STUDENT IN AN ORGANIZED HEALTH CARE EDUCATION/TRAINING PROGRAM

## 2023-03-22 ASSESSMENT — PAIN SCALES - GENERAL: PAINLEVEL: SEVERE PAIN (6)

## 2023-03-22 NOTE — PROGRESS NOTES
"  Assessment & Plan     Lateral epicondylitis of left elbow  Patient with significant lateral epicondyle tenderness without joint swelling or involvement.  Consistent with lateral epicondylitis.  Plan for patient to use topical Voltaren and start home exercises that were discussed today.  Would avoid oral anti-inflammatories due to her kidney disease. Can also use support band if she finds this helpful.  Alternative treatment options including PT and injection were also discussed.  She will let me know if things not improving and would have her follow-up with therapy.      Tyson Cano MD  [facility]    Andreia Davila is a 83 year old, presenting for the following health issues:  Pain (Left elbow pain)  No flowsheet data found.  Pain    History of Present Illness       Reason for visit:  Left elbow pain  Symptom onset:  More than a month  Symptoms include:  Use of the arm  Symptom intensity:  Severe  Symptom progression:  Staying the same  Had these symptoms before:  No  What makes it worse:  Using the arm  What makes it better:  Extra strength tylenol    She eats 2-3 servings of fruits and vegetables daily.She consumes 0 sweetened beverage(s) daily.She exercises with enough effort to increase her heart rate 9 or less minutes per day.  She exercises with enough effort to increase her heart rate 3 or less days per week.   She is taking medications regularly.     Symptoms going on for the last month and seem to be worsening.  Notes trouble when she is reading.  No new repetitive motions that she is doing.  Did have pacemaker placed in December but no new changes.    Review of Systems   Constitutional, HEENT, cardiovascular, pulmonary, gi and gu systems are negative, except as otherwise noted.      Objective    /78 (BP Location: Left arm, Patient Position: Chair)   Pulse 72   Temp (!) 96.3  F (35.7  C) (Temporal)   Resp 20   Ht 1.505 m (4' 11.25\")   Wt 79.6 kg (175 lb 6.4 " oz)   SpO2 96%   BMI 35.13 kg/m    Body mass index is 35.13 kg/m .  Physical Exam   GENERAL: healthy, alert and no distress  EYES: Eyes grossly normal to inspection, PERRL and conjunctivae and sclerae normal  MS: no gross musculoskeletal defects noted, no edema, range of motion of the elbow intact, mild medial but significant lateral epicondyle tenderness that goes down the forearm.  No joint line tenderness or olecranon tenderness.  No bursitis noted.  Pain with extension at the wrist but not flexion.  Negative Finkelstein's  SKIN: no suspicious lesions or rashes  NEURO: Normal strength and tone, mentation intact and speech normal  PSYCH: mentation appears normal, affect normal/bright

## 2023-03-24 DIAGNOSIS — E11.59 DIABETIC VASCULOPATHY (H): ICD-10-CM

## 2023-03-24 RX ORDER — BLOOD SUGAR DIAGNOSTIC
STRIP MISCELLANEOUS
Qty: 100 STRIP | Refills: 3 | Status: SHIPPED | OUTPATIENT
Start: 2023-03-24 | End: 2024-06-24

## 2023-03-28 DIAGNOSIS — E11.59 DIABETIC VASCULOPATHY (H): ICD-10-CM

## 2023-03-29 ENCOUNTER — TELEPHONE (OUTPATIENT)
Dept: FAMILY MEDICINE | Facility: CLINIC | Age: 83
End: 2023-03-29

## 2023-03-30 RX ORDER — LANCETS 30 GAUGE
EACH MISCELLANEOUS
Qty: 100 EACH | Refills: 3 | Status: SHIPPED | OUTPATIENT
Start: 2023-03-30 | End: 2024-06-24

## 2023-03-30 NOTE — TELEPHONE ENCOUNTER
Prescription approved per [facility] Refill Protocol.  Genna Lechuga RN on 3/30/2023 at 1:01 PM

## 2023-04-05 DIAGNOSIS — I10 ESSENTIAL HYPERTENSION, BENIGN: ICD-10-CM

## 2023-04-05 RX ORDER — CLONIDINE HYDROCHLORIDE 0.1 MG/1
0.1 TABLET ORAL 4 TIMES DAILY
Qty: 360 TABLET | Refills: 1 | Status: SHIPPED | OUTPATIENT
Start: 2023-04-05 | End: 2023-10-05

## 2023-04-05 NOTE — TELEPHONE ENCOUNTER
"Pending Prescriptions:                       Disp   Refills    cloNIDine (CATAPRES) 0.1 MG tablet [Pharma*360 ta*0        Sig: Take 1 tablet by mouth 4 times daily    Routing refill request to provider for review/approval because:  Labs not current:  creatintine    Requested Prescriptions   Pending Prescriptions Disp Refills    cloNIDine (CATAPRES) 0.1 MG tablet [Pharmacy Med Name: cloNIDine HCl 0.1 MG Oral Tablet] 360 tablet 0     Sig: Take 1 tablet by mouth 4 times daily       Central Acting Antiadrenergic Agents Failed - 4/5/2023  9:21 AM        Failed - Normal serum creatinine on file within past 12 months     Recent Labs   Lab Test 02/16/23  1353   CR 1.36*       Ok to refill medication if creatinine is low          Passed - Blood pressure under 140/90 in past 12 months     BP Readings from Last 3 Encounters:   03/22/23 136/78   02/16/23 (!) 147/72 12/09/22 (!) 172/85                 Passed - Patient is 6 years of age or older        Passed - Recent (12 mo) or future (30 days) visit within the authorizing provider's specialty     Patient has had an office visit with the authorizing provider or a provider within the authorizing providers department within the previous 12 mos or has a future within next 30 days. See \"Patient Info\" tab in inbasket, or \"Choose Columns\" in Meds & Orders section of the refill encounter.              Passed - Medication is active on med list        Passed - Patient not pregnant        Passed - No positive pregnancy test on file in past 12 months       Antiadrenergic Antihypertensives Failed - 4/5/2023  9:21 AM        Failed - Normal serum creatinine on file in past 12 months     Recent Labs   Lab Test 02/16/23  1353   CR 1.36*       Ok to refill medication if creatinine is low          Passed - Blood pressure less than 140/90 in past 6 months     BP Readings from Last 3 Encounters:   03/22/23 136/78   02/16/23 (!) 147/72 12/09/22 (!) 172/85                 Passed - Medication is " "active on med list        Passed - Patient is age 18 or older        Passed - No active pregnancy on record        Passed - No positive pregnancy test within past 12 months        Passed - Recent (6 mo) or future (30 days) visit within the authorizing provider's specialty     Patient had office visit in the last 6 months or has a visit in the next 30 days with authorizing provider or within the authorizing provider's specialty.  See \"Patient Info\" tab in inbasket, or \"Choose Columns\" in Meds & Orders section of the refill encounter.                 "

## 2023-04-19 ENCOUNTER — OFFICE VISIT (OUTPATIENT)
Dept: FAMILY MEDICINE | Facility: CLINIC | Age: 83
End: 2023-04-19
Payer: MEDICARE

## 2023-04-19 VITALS
HEIGHT: 59 IN | HEART RATE: 76 BPM | WEIGHT: 173.7 LBS | OXYGEN SATURATION: 97 % | TEMPERATURE: 97.7 F | SYSTOLIC BLOOD PRESSURE: 122 MMHG | DIASTOLIC BLOOD PRESSURE: 78 MMHG | BODY MASS INDEX: 35.02 KG/M2 | RESPIRATION RATE: 18 BRPM

## 2023-04-19 DIAGNOSIS — R60.0 LOWER EXTREMITY EDEMA: ICD-10-CM

## 2023-04-19 DIAGNOSIS — M77.12 LATERAL EPICONDYLITIS OF LEFT ELBOW: Primary | ICD-10-CM

## 2023-04-19 DIAGNOSIS — E11.59 TYPE 2 DIABETES MELLITUS WITH OTHER CIRCULATORY COMPLICATIONS (H): ICD-10-CM

## 2023-04-19 DIAGNOSIS — E66.01 MORBID OBESITY (H): ICD-10-CM

## 2023-04-19 DIAGNOSIS — I10 HYPERTENSION GOAL BP (BLOOD PRESSURE) < 140/90: ICD-10-CM

## 2023-04-19 DIAGNOSIS — E03.8 OTHER SPECIFIED HYPOTHYROIDISM: ICD-10-CM

## 2023-04-19 DIAGNOSIS — N18.31 STAGE 3A CHRONIC KIDNEY DISEASE (H): ICD-10-CM

## 2023-04-19 PROCEDURE — 99213 OFFICE O/P EST LOW 20 MIN: CPT | Performed by: STUDENT IN AN ORGANIZED HEALTH CARE EDUCATION/TRAINING PROGRAM

## 2023-04-19 ASSESSMENT — PAIN SCALES - GENERAL: PAINLEVEL: MODERATE PAIN (5)

## 2023-04-19 NOTE — PROGRESS NOTES
Assessment & Plan     Lateral epicondylitis of left elbow  Treatment options discussed today.  Patient will continue with home exercises given her improvement and avoid overuse.  Follow-up if things not continuing to improve.  Can continue with brace.  Topical Voltaren as needed.    Type 2 diabetes mellitus with other circulatory complications (H)  Recent A1c stable. Continue to focus on lifestyle changes.     Stage 3a chronic kidney disease (H)    Hypertension goal BP (blood pressure) < 140/90  Stable    Morbid obesity (H)    Other specified hypothyroidism  Stable    Lower extremity edema  No acute changes now.  Discussed limiting salt intake and continue with her compression stockings.  Follow-up if new or worsening symptoms.  Signs of DVT discussed.    Tyson Cano MD  [facility] IZABELA Davila is a 83 year old, presenting for the following health issues:  Elbow Pain (Left elbow pain recheck/) and Bleeding/Bruising (Bruising on left leg)        4/19/2023     2:40 PM   Additional Questions   Roomed by Caroline GIRALDO LPN     Elbow Pain    History of Present Illness       Reason for visit:  Tennis elbow & headache    She eats 0-1 servings of fruits and vegetables daily.She consumes 0 sweetened beverage(s) daily.She exercises with enough effort to increase her heart rate 9 or less minutes per day.  She exercises with enough effort to increase her heart rate 3 or less days per week.   She is taking medications regularly.     Patient following up regarding her tennis elbow.  She has had significant improvement but did iron yesterday and irritated things.  Has been sore since.  Does note few episodes over the last year of tingling in her hand that last for a brief second and is gone.  No focal deficits.  No significant headache.  No pain currently.  No vision changes.  Is not with change in position and generally when she is sitting.  Has happened roughly once a month for quite some  "time.  She does note some discomfort into her left shin but no other swelling or calf tenderness.      Review of Systems   Constitutional, HEENT, cardiovascular, pulmonary, gi and gu systems are negative, except as otherwise noted.      Objective    /78 (BP Location: Left arm, Patient Position: Chair)   Pulse 76   Temp 97.7  F (36.5  C) (Temporal)   Resp 18   Ht 1.499 m (4' 11\")   Wt 78.8 kg (173 lb 11.2 oz)   SpO2 97%   BMI 35.08 kg/m    Body mass index is 35.08 kg/m .  Physical Exam   GENERAL: healthy, alert and no distress  EYES: Eyes grossly normal to inspection, PERRL and conjunctivae and sclerae normal  HENT: ear canals and TM's normal, nose and mouth without ulcers or lesions  NECK: no adenopathy, no asymmetry, masses, or scars and thyroid normal to palpation  RESP: lungs clear to auscultation - no rales, rhonchi or wheezes  CV: regular rate and rhythm, normal S1 S2, no S3 or S4, no murmur, click or rub  ABDOMEN: soft, nontender, no hepatosplenomegaly, no masses and bowel sounds normal  MS: no gross musculoskeletal defects noted, trace edema, negative Chung's with mild shin tenderness on the left to palpation.  SKIN: no suspicious lesions or rashes  NEURO: Normal strength and tone, cranial nerves II through XII intact, mentation intact and speech normal  PSYCH: mentation appears normal, affect normal/bright          "

## 2023-05-03 DIAGNOSIS — I25.10 CORONARY ARTERY DISEASE: Primary | ICD-10-CM

## 2023-05-11 ENCOUNTER — HOSPITAL ENCOUNTER (OUTPATIENT)
Dept: ULTRASOUND IMAGING | Facility: CLINIC | Age: 83
Discharge: HOME | End: 2023-05-11
Attending: SPECIALIST | Admitting: SPECIALIST
Payer: MEDICARE

## 2023-05-11 DIAGNOSIS — I65.21 CAROTID ATHEROSCLEROSIS, RIGHT: ICD-10-CM

## 2023-05-11 DIAGNOSIS — I65.21 CAROTID STENOSIS, ASYMPTOMATIC, RIGHT: ICD-10-CM

## 2023-05-11 PROCEDURE — 93880 EXTRACRANIAL BILAT STUDY: CPT

## 2023-05-12 ENCOUNTER — TELEPHONE (OUTPATIENT)
Dept: SURGERY | Facility: CLINIC | Age: 83
End: 2023-05-12

## 2023-05-12 NOTE — TELEPHONE ENCOUNTER
FYI - Status Update    Who is Calling: patient    Update: Patient is calling in regards to her ultrasound that she had done. She wants to know why it is listed as DR. Edward Pennington and not her cardiologist DR.Stephan Johnson at [facility] in [place]. She states that she has never seen  and that she wants  to get the results.    Does caller want a call/response back: Yes     Could we send this information to you in [employer] or would you prefer to receive a phone call?:   Patient would prefer a phone call   Okay to leave a detailed message?: Yes at Home number on file [phone] (home)

## 2023-05-12 NOTE — TELEPHONE ENCOUNTER
I called pt and informed her that she did see Dr. Pennington on 11/21/22, she does not remember seeing Dr. Pennington and wanted the order to be under him and to go to him. She said she canceled the order from Dr. Pennington and wanted his name off it. I informed her that I can't take his name off cause they used his order for it. I told her [facility] can and should be able to see her results and if not to call radiology to have them send the result so Dr. Johnson at [facility]. I informed her I will send this message to  so he doesn't have to follow up with her about the results.  Meeta Martines MA

## 2023-05-29 NOTE — NURSING NOTE
"Chief Complaint   Patient presents with     Knee Pain       Initial /78 (BP Location: Left arm, Patient Position: Chair, Cuff Size: Adult Regular)  Pulse 72  Temp 97.1  F (36.2  C) (Temporal)  Resp 20  Wt 164 lb 11.2 oz (74.7 kg)  BMI 32.82 kg/m2 Estimated body mass index is 32.82 kg/(m^2) as calculated from the following:    Height as of 9/28/17: 4' 11.4\" (1.509 m).    Weight as of this encounter: 164 lb 11.2 oz (74.7 kg).  Medication Reconciliation: complete  " Medications/Other specify

## 2023-06-04 DIAGNOSIS — I10 ESSENTIAL HYPERTENSION, BENIGN: ICD-10-CM

## 2023-06-06 RX ORDER — LOSARTAN POTASSIUM 50 MG/1
TABLET ORAL
Qty: 90 TABLET | Refills: 0 | Status: SHIPPED | OUTPATIENT
Start: 2023-06-06 | End: 2023-10-05

## 2023-06-10 ENCOUNTER — NURSE TRIAGE (OUTPATIENT)
Dept: NURSING | Facility: CLINIC | Age: 83
End: 2023-06-10
Payer: MEDICARE

## 2023-06-10 NOTE — TELEPHONE ENCOUNTER
Giovanni accidentally took her Amlodipine and simvastatin early today, she usually takes at HS and is wondering if she can still take her  Metformin. Advised she can still take Metformin, and is okay that she took her daily medication early as long as it is not more than daily.  Sharmin Betts RN on 6/10/2023 at 6:02 PM      Reason for Disposition    Caller has medicine question only, adult not sick, AND triager answers question    Protocols used: MEDICATION QUESTION CALL-A-

## 2023-06-12 ENCOUNTER — LAB (OUTPATIENT)
Dept: LAB | Facility: CLINIC | Age: 83
End: 2023-06-12
Payer: MEDICARE

## 2023-06-12 DIAGNOSIS — I25.10 CORONARY ARTERY DISEASE: ICD-10-CM

## 2023-06-12 LAB
ANION GAP SERPL CALCULATED.3IONS-SCNC: 13 MMOL/L (ref 7–15)
BUN SERPL-MCNC: 40.4 MG/DL (ref 8–23)
CALCIUM SERPL-MCNC: 9.6 MG/DL (ref 8.8–10.2)
CHLORIDE SERPL-SCNC: 102 MMOL/L (ref 98–107)
CREAT SERPL-MCNC: 1.5 MG/DL (ref 0.51–0.95)
DEPRECATED HCO3 PLAS-SCNC: 23 MMOL/L (ref 22–29)
GFR SERPL CREATININE-BSD FRML MDRD: 34 ML/MIN/1.73M2
GLUCOSE SERPL-MCNC: 144 MG/DL (ref 70–99)
POTASSIUM SERPL-SCNC: 4.6 MMOL/L (ref 3.4–5.3)
SODIUM SERPL-SCNC: 138 MMOL/L (ref 136–145)

## 2023-06-12 PROCEDURE — 36415 COLL VENOUS BLD VENIPUNCTURE: CPT

## 2023-06-12 PROCEDURE — 80048 BASIC METABOLIC PNL TOTAL CA: CPT

## 2023-06-19 ENCOUNTER — TELEPHONE (OUTPATIENT)
Dept: FAMILY MEDICINE | Facility: CLINIC | Age: 83
End: 2023-06-19
Payer: MEDICARE

## 2023-06-19 NOTE — TELEPHONE ENCOUNTER
Test Results        Who ordered the test: Dr. Johnson     Type of test: Lab    Date of test: 06/12/23    Where was the test performed:  [facility]     What are your questions/concerns?: She is needing the lab work she had done at [facility] faxed over to Dr. Johnson at  Cardiology.     Could we send this information to you in [employer] or would you prefer to receive a phone call?:   Patient would prefer a phone call   Okay to leave a detailed message?: Yes at Cell number on file:    Telephone Information:   Mobile [phone]

## 2023-07-04 ENCOUNTER — MYC MEDICAL ADVICE (OUTPATIENT)
Dept: FAMILY MEDICINE | Facility: CLINIC | Age: 83
End: 2023-07-04
Payer: MEDICARE

## 2023-07-04 DIAGNOSIS — I25.10 CORONARY ARTERY DISEASE INVOLVING NATIVE CORONARY ARTERY OF NATIVE HEART WITHOUT ANGINA PECTORIS: Primary | ICD-10-CM

## 2023-07-05 DIAGNOSIS — Z95.1 S/P CABG (CORONARY ARTERY BYPASS GRAFT): ICD-10-CM

## 2023-07-05 RX ORDER — NITROGLYCERIN 0.4 MG/1
0.4 TABLET SUBLINGUAL EVERY 5 MIN PRN
Qty: 25 TABLET | Refills: 1 | Status: CANCELLED | OUTPATIENT
Start: 2023-07-05

## 2023-07-05 NOTE — TELEPHONE ENCOUNTER
Nitroglycerin refill request  Routing refill request to provider for review/approval because:  Medication is reported/historical; has not been filled since 2021;  Routing to PCP for refill needs per patient request.  Last OV: 4/19/23  Britney Mercado RN

## 2023-07-11 RX ORDER — NITROGLYCERIN 0.4 MG/1
TABLET SUBLINGUAL
Qty: 25 TABLET | Refills: 6 | Status: SHIPPED | OUTPATIENT
Start: 2023-07-11

## 2023-07-12 ENCOUNTER — LAB (OUTPATIENT)
Dept: LAB | Facility: CLINIC | Age: 83
End: 2023-07-12
Payer: MEDICARE

## 2023-07-12 DIAGNOSIS — I25.10 CORONARY ARTERY DISEASE: ICD-10-CM

## 2023-07-12 DIAGNOSIS — E55.9 VITAMIN D DEFICIENCY: ICD-10-CM

## 2023-07-12 DIAGNOSIS — N18.32 CHRONIC KIDNEY DISEASE (CKD) STAGE G3B/A1, MODERATELY DECREASED GLOMERULAR FILTRATION RATE (GFR) BETWEEN 30-44 ML/MIN/1.73 SQUARE METER AND ALBUMINURIA CREATININE RATIO LESS THAN 30 MG/G (H): Primary | ICD-10-CM

## 2023-07-12 LAB
ALBUMIN MFR UR ELPH: 10 MG/DL
ANION GAP SERPL CALCULATED.3IONS-SCNC: 14 MMOL/L (ref 7–15)
BUN SERPL-MCNC: 43.7 MG/DL (ref 8–23)
CALCIUM SERPL-MCNC: 9.5 MG/DL (ref 8.8–10.2)
CHLORIDE SERPL-SCNC: 103 MMOL/L (ref 98–107)
CREAT SERPL-MCNC: 1.57 MG/DL (ref 0.51–0.95)
CREAT UR-MCNC: 126.2 MG/DL
DEPRECATED HCO3 PLAS-SCNC: 23 MMOL/L (ref 22–29)
GFR SERPL CREATININE-BSD FRML MDRD: 33 ML/MIN/1.73M2
GLUCOSE SERPL-MCNC: 196 MG/DL (ref 70–99)
HGB BLD-MCNC: 12.7 G/DL (ref 11.7–15.7)
PHOSPHATE SERPL-MCNC: 3.7 MG/DL (ref 2.5–4.5)
POTASSIUM SERPL-SCNC: 4.2 MMOL/L (ref 3.4–5.3)
PROT/CREAT 24H UR: 0.08 MG/MG CR (ref 0–0.2)
PTH-INTACT SERPL-MCNC: 39 PG/ML (ref 15–65)
SODIUM SERPL-SCNC: 140 MMOL/L (ref 136–145)

## 2023-07-12 PROCEDURE — 84100 ASSAY OF PHOSPHORUS: CPT

## 2023-07-12 PROCEDURE — 80048 BASIC METABOLIC PNL TOTAL CA: CPT

## 2023-07-12 PROCEDURE — 83970 ASSAY OF PARATHORMONE: CPT

## 2023-07-12 PROCEDURE — 84156 ASSAY OF PROTEIN URINE: CPT

## 2023-07-12 PROCEDURE — 85018 HEMOGLOBIN: CPT

## 2023-07-12 PROCEDURE — 82306 VITAMIN D 25 HYDROXY: CPT

## 2023-07-12 PROCEDURE — 36415 COLL VENOUS BLD VENIPUNCTURE: CPT

## 2023-07-16 LAB
DEPRECATED CALCIDIOL+CALCIFEROL SERPL-MC: <61 UG/L (ref 20–75)
VITAMIN D2 SERPL-MCNC: <5 UG/L
VITAMIN D3 SERPL-MCNC: 56 UG/L

## 2023-08-03 NOTE — TELEPHONE ENCOUNTER
See mychart message from patient. Reviewed patient's chart, it looks like her last fill of 0.1mg clonidine was in 2008. You have been prescribing 0.2mg for a while now. Please advise what is best for patient.  Evelyn Soares, CMA    Hemigard Postcare Instructions: The HEMIGARD strips are to remain completely dry for at least 5-7 days.

## 2023-08-14 ENCOUNTER — NURSE TRIAGE (OUTPATIENT)
Dept: FAMILY MEDICINE | Facility: CLINIC | Age: 83
End: 2023-08-14

## 2023-08-14 ENCOUNTER — LAB (OUTPATIENT)
Dept: LAB | Facility: CLINIC | Age: 83
End: 2023-08-14
Payer: MEDICARE

## 2023-08-14 DIAGNOSIS — N18.32 STAGE 3B CHRONIC KIDNEY DISEASE (H): Primary | ICD-10-CM

## 2023-08-14 LAB
ANION GAP SERPL CALCULATED.3IONS-SCNC: 14 MMOL/L (ref 7–15)
BUN SERPL-MCNC: 33 MG/DL (ref 8–23)
CALCIUM SERPL-MCNC: 9.4 MG/DL (ref 8.8–10.2)
CHLORIDE SERPL-SCNC: 105 MMOL/L (ref 98–107)
CREAT SERPL-MCNC: 1.49 MG/DL (ref 0.51–0.95)
DEPRECATED HCO3 PLAS-SCNC: 22 MMOL/L (ref 22–29)
GFR SERPL CREATININE-BSD FRML MDRD: 35 ML/MIN/1.73M2
GLUCOSE SERPL-MCNC: 130 MG/DL (ref 70–99)
POTASSIUM SERPL-SCNC: 4.4 MMOL/L (ref 3.4–5.3)
SODIUM SERPL-SCNC: 141 MMOL/L (ref 136–145)

## 2023-08-14 PROCEDURE — 36415 COLL VENOUS BLD VENIPUNCTURE: CPT

## 2023-08-14 PROCEDURE — 80048 BASIC METABOLIC PNL TOTAL CA: CPT

## 2023-08-14 NOTE — TELEPHONE ENCOUNTER
PCP is not here today, Can anyone see her? Or do we just send to ED?    Angela Pearson,GLADYSN, RN

## 2023-08-14 NOTE — TELEPHONE ENCOUNTER
"Patient will go to  as she feels this is more in her lungs and is coughing up a lot of yellow phlegm and having a lot of lower \"breast and lung pain.\"  GLADYS BainsN, RN    "

## 2023-08-19 ENCOUNTER — HEALTH MAINTENANCE LETTER (OUTPATIENT)
Age: 83
End: 2023-08-19

## 2023-09-06 DIAGNOSIS — E03.9 HYPOTHYROIDISM, UNSPECIFIED TYPE: ICD-10-CM

## 2023-09-11 RX ORDER — LEVOTHYROXINE SODIUM 50 UG/1
50 TABLET ORAL DAILY
Qty: 90 TABLET | Refills: 0 | Status: SHIPPED | OUTPATIENT
Start: 2023-09-11 | End: 2024-03-25

## 2023-10-05 ENCOUNTER — NURSE TRIAGE (OUTPATIENT)
Dept: NURSING | Facility: CLINIC | Age: 83
End: 2023-10-05

## 2023-10-05 ENCOUNTER — OFFICE VISIT (OUTPATIENT)
Dept: FAMILY MEDICINE | Facility: CLINIC | Age: 83
End: 2023-10-05
Payer: MEDICARE

## 2023-10-05 VITALS
DIASTOLIC BLOOD PRESSURE: 60 MMHG | HEIGHT: 59 IN | TEMPERATURE: 97.3 F | OXYGEN SATURATION: 98 % | RESPIRATION RATE: 18 BRPM | WEIGHT: 175.25 LBS | SYSTOLIC BLOOD PRESSURE: 120 MMHG | BODY MASS INDEX: 35.33 KG/M2 | HEART RATE: 90 BPM

## 2023-10-05 DIAGNOSIS — Z13.9 ENCOUNTER FOR SCREENING INVOLVING SOCIAL DETERMINANTS OF HEALTH (SDOH): ICD-10-CM

## 2023-10-05 DIAGNOSIS — N18.31 STAGE 3A CHRONIC KIDNEY DISEASE (H): ICD-10-CM

## 2023-10-05 DIAGNOSIS — I25.10 CORONARY ARTERY DISEASE INVOLVING NATIVE CORONARY ARTERY OF NATIVE HEART WITHOUT ANGINA PECTORIS: ICD-10-CM

## 2023-10-05 DIAGNOSIS — E11.59 TYPE 2 DIABETES MELLITUS WITH OTHER CIRCULATORY COMPLICATIONS (H): ICD-10-CM

## 2023-10-05 DIAGNOSIS — H11.32 SUBCONJUNCTIVAL BLEED, LEFT: Primary | ICD-10-CM

## 2023-10-05 LAB
CHOLEST SERPL-MCNC: 152 MG/DL
CREAT UR-MCNC: 97.1 MG/DL
ERYTHROCYTE [DISTWIDTH] IN BLOOD BY AUTOMATED COUNT: 13.9 % (ref 10–15)
HBA1C MFR BLD: 7 %
HCT VFR BLD AUTO: 40.6 % (ref 35–47)
HDLC SERPL-MCNC: 47 MG/DL
HGB BLD-MCNC: 13.2 G/DL (ref 11.7–15.7)
LDLC SERPL CALC-MCNC: 67 MG/DL
MCH RBC QN AUTO: 31.1 PG (ref 26.5–33)
MCHC RBC AUTO-ENTMCNC: 32.5 G/DL (ref 31.5–36.5)
MCV RBC AUTO: 96 FL (ref 78–100)
MICROALBUMIN UR-MCNC: 58.1 MG/L
MICROALBUMIN/CREAT UR: 59.84 MG/G CR (ref 0–25)
NONHDLC SERPL-MCNC: 105 MG/DL
PLATELET # BLD AUTO: 314 10E3/UL (ref 150–450)
RBC # BLD AUTO: 4.25 10E6/UL (ref 3.8–5.2)
TRIGL SERPL-MCNC: 192 MG/DL
WBC # BLD AUTO: 9.9 10E3/UL (ref 4–11)

## 2023-10-05 PROCEDURE — 99207 PR FOOT EXAM NO CHARGE: CPT | Performed by: STUDENT IN AN ORGANIZED HEALTH CARE EDUCATION/TRAINING PROGRAM

## 2023-10-05 PROCEDURE — 85027 COMPLETE CBC AUTOMATED: CPT | Performed by: STUDENT IN AN ORGANIZED HEALTH CARE EDUCATION/TRAINING PROGRAM

## 2023-10-05 PROCEDURE — 82043 UR ALBUMIN QUANTITATIVE: CPT | Performed by: STUDENT IN AN ORGANIZED HEALTH CARE EDUCATION/TRAINING PROGRAM

## 2023-10-05 PROCEDURE — 99214 OFFICE O/P EST MOD 30 MIN: CPT | Performed by: STUDENT IN AN ORGANIZED HEALTH CARE EDUCATION/TRAINING PROGRAM

## 2023-10-05 PROCEDURE — 82570 ASSAY OF URINE CREATININE: CPT | Performed by: STUDENT IN AN ORGANIZED HEALTH CARE EDUCATION/TRAINING PROGRAM

## 2023-10-05 PROCEDURE — 80061 LIPID PANEL: CPT | Performed by: STUDENT IN AN ORGANIZED HEALTH CARE EDUCATION/TRAINING PROGRAM

## 2023-10-05 PROCEDURE — 36415 COLL VENOUS BLD VENIPUNCTURE: CPT | Performed by: STUDENT IN AN ORGANIZED HEALTH CARE EDUCATION/TRAINING PROGRAM

## 2023-10-05 PROCEDURE — 83036 HEMOGLOBIN GLYCOSYLATED A1C: CPT | Performed by: STUDENT IN AN ORGANIZED HEALTH CARE EDUCATION/TRAINING PROGRAM

## 2023-10-05 RX ORDER — HYDROCHLOROTHIAZIDE 12.5 MG/1
12.5 TABLET ORAL DAILY
COMMUNITY
End: 2023-11-24

## 2023-10-05 ASSESSMENT — ENCOUNTER SYMPTOMS: EYE PAIN: 1

## 2023-10-05 NOTE — PROGRESS NOTES
"  Assessment & Plan     Subconjunctival bleed, left  Patient has no pain around or in the eye.  No visual disturbances.  Patient is not currently on any blood thinners.  Blood pressure and diabetes are reported to be well controlled.  Patient has recently seen ophthalmology in August. Exam consistent with subconjunctival hemorrhage. She was coughing more recently the last month but resolved. No trauma or red flag symptoms. Reassurance given today and plan to follow up with new or worsening symptoms or things not resolving.     Stage 3a chronic kidney disease (H)  - Albumin Random Urine Quantitative with Creat Ratio  - CBC with platelets  - Albumin Random Urine Quantitative with Creat Ratio  - CBC with platelets    Coronary artery disease involving native coronary artery of native heart without angina pectoris  Patient has history of stent placement and bypass.  Patient has no complaints of chest pain with or without exertion.  Currently is taking a statin and has access to nitroglycerin at home.  Patient is not on any anticoagulation.  Repeated labs as listed below.  - Lipid panel reflex to direct LDL Non-fasting  - Lipid panel reflex to direct LDL Non-fasting    Type 2 diabetes mellitus with other circulatory complications (H)  Patient is currently controlled on metformin.  Diabetic foot exam was normal with no ulceration, neuropathy, weakness or paresthesia.  A1c repeated today pending review.   - HEMOGLOBIN A1C  - FOOT EXAM  - HEMOGLOBIN A1C       BMI:   Estimated body mass index is 35.4 kg/m  as calculated from the following:    Height as of this encounter: 1.499 m (4' 11\").    Weight as of this encounter: 79.5 kg (175 lb 4 oz).   Weight management plan: Discussed healthy diet and exercise guidelines    Tyson Cano MD  [facility] IZABELA Davila is a 83 year old, presenting for the following health issues: Eye Problem (Blood in eye).  Patient reports waking up in the middle " "of the night last night due to pressure of her left eye.  She thought nothing of it initially and went back to bed.  Patient has had subconjunctival hemorrhages earlier this week and have cleared.  Patient has no history or other associated symptoms at this time.        10/5/2023    11:21 AM   Additional Questions   Roomed by Cecilia SHAFER MA       History of Present Illness       Reason for visit:  Blood in left eye  Symptom onset:  3-7 days ago  Symptoms include:  Blood in left eye  Symptom intensity:  Mild  Symptom progression:  Worsening  Had these symptoms before:  No  What makes it worse:  No  What makes it better:  No    She eats 2-3 servings of fruits and vegetables daily.She consumes 0 sweetened beverage(s) daily.She exercises with enough effort to increase her heart rate 9 or less minutes per day.  She exercises with enough effort to increase her heart rate 4 days per week.   She is taking medications regularly.        Review of Systems   Eyes:  Positive for pain.      Constitutional, HEENT, cardiovascular, pulmonary, gi and gu systems are negative, except as otherwise noted.        Objective    /60   Pulse 90   Temp 97.3  F (36.3  C) (Temporal)   Resp 18   Ht 1.499 m (4' 11\")   Wt 79.5 kg (175 lb 4 oz)   SpO2 98%   BMI 35.40 kg/m    Body mass index is 35.4 kg/m .  Physical Exam   GENERAL: healthy, alert and no distress  EYES: Eyes grossly normal to inspection, PERRLA. Subconjunctival hemorrhage noted on lateral half of patient's left eye.  HENT: ear canals and TM's normal, nose and mouth without ulcers or lesions  NECK: no adenopathy, no asymmetry, masses, or scars and thyroid normal to palpation  RESP: lungs clear to auscultation - no rales, rhonchi or wheezes  CV: regular rate and rhythm, normal S1 S2, no murmur, click or rub, slight peripheral edema noted on patients left, peripheral pulses strong  MS: no gross musculoskeletal defects noted, no edema, diabetic foot exam is normal  SKIN: no " suspicious lesions or rashes  NEURO: mentation intact and speech normal  PSYCH: mentation appears normal, affect normal/bright    KURT Tomlin  10/5/2023    Provider Disclosure:  I agree with above History, Review of Systems, Physical exam and Plan.  I have reviewed the content of the documentation and have edited it as needed. I have personally performed the services documented here and the documentation accurately represents those services and the decisions I have made.      Tyson Cano MD

## 2023-10-05 NOTE — COMMUNITY RESOURCES LIST (ENGLISH)
10/05/2023   [facility] [employer]  N/A  For questions about this resource list or additional care needs, please contact your primary care clinic or care manager.  Phone: [phone]   Email: N/A   Address: [address] [place] [id number]   Hours: N/A        Financial Stability       Rent and mortgage payment assistance  1  [employer] [facility] [place] (CAER) - Emergency Assistance Distance: 17.11 miles      In-Person   [address] [place] [address]  Language: English, Swedish  Hours: Mon 10:00 AM - 1:30 PM Appt. Only, Wed 10:00 AM - 1:30 PM Appt. Only, Thu 4:30 PM - 6:30 PM Appt. Only, Fri 10:00 AM - 1:30 PM Appt. Only  Fees: Free   Phone: (363) 833-4091 Email: info@[employer] Website: http://www.[employer].org          Important Numbers & Websites       Emergency Services   911  [facility] Services   311  Poison Control   (444) 143-1821  Suicide Prevention Lifeline   (122) 232-9571 (TALK)  Child Abuse Hotline   (644) 225-5702 (4-A-Child)  Sexual Assault Hotline   (133) 704-2101 (HOPE)  National Runaway Safeline   (503) 950-7575 (RUNAWAY)  All-Options Talkline   (402) 659-1045  Substance Abuse Referral   (319) 957-4540 (HELP)

## 2023-10-05 NOTE — COMMUNITY RESOURCES LIST (ENGLISH)
10/05/2023   [facility] [employer]  N/A  For questions about this resource list or additional care needs, please contact your primary care clinic or care manager.  Phone: [phone]   Email: N/A   Address: [address] [place] [id number]   Hours: N/A        Financial Stability       Rent and mortgage payment assistance  1  [employer] [place] (CAER) - Emergency Assistance Distance: 17.11 miles      In-Person   [address] [facility] [place] [address]  Language: English, Luxembourgish  Hours: Mon 10:00 AM - 1:30 PM Appt. Only, Wed 10:00 AM - 1:30 PM Appt. Only, Thu 4:30 PM - 6:30 PM Appt. Only, Fri 10:00 AM - 1:30 PM Appt. Only  Fees: Free   Phone: (935) 867-6327 Email: info@[employer] Website: http://www.[employer].org          Important Numbers & Websites       Emergency Services   911  [facility] Services   311  Poison Control   (639) 565-4317  Suicide Prevention Lifeline   (964) 462-9691 (TALK)  Child Abuse Hotline   (396) 671-8853 (4-A-Child)  Sexual Assault Hotline   (501) 697-9405 (HOPE)  National Runaway Safeline   (191) 410-3152 (RUNAWAY)  All-Options Talkline   (766) 665-9929  Substance Abuse Referral   (613) 517-8945 (HELP)

## 2023-10-05 NOTE — TELEPHONE ENCOUNTER
"Triage Call:     Pt calling to report that she had a \"blood clot\" in the white of her left eye last Friday and last night it was almost gone  This morning she woke up at 5:30am and left eye felt like \"pressure\" and it is \"bloody\" in the corner again  When she blinkd it felt dry    Using moisture eye drops    Pt would like to be seen today     Disposition: See in office today. Pt was given care advice and is aware of the nearby [facility] location. Pt was transferred to scheduling to make an appt    Reason for Disposition   Patient wants to be seen    Additional Information   Negative: Followed an eye injury   Negative: Eye pain from chemical in the eye   Negative: Eye pain from foreign body in eye   Negative: Has sinus pain or pressure   Negative: SEVERE eye pain   Negative: Complete loss of vision in one or both eyes   Negative: Eyelids are very swollen (shut or almost) and fever   Negative: Eyelid (outer) is very red and fever   Negative: Foreign body sensation ('feels like something is in there') and irrigation didn't help   Negative: Vomiting   Negative: Ulcer or sore seen on the cornea (clear center part of the eye)   Negative: Recent eye surgery and increasing eye pain   Negative: Blurred vision and new or worsening   Negative: Patient sounds very sick or weak to the triager   Negative: MODERATE eye pain or discomfort (e.g., interferes with normal activities or awakens from sleep; more than mild)   Negative: Looking at light causes MODERATE to SEVERE eye pain (i.e., photophobia)   Negative: Eyelids are very swollen (shut or almost) and no fever   Negative: Painful rash near eye and multiple small blisters grouped together   Negative: Pain followed bright light exposure from welding   Negative: Yellow or green pus occurs   Negative: Eye pain present > 24 hours    Protocols used: Eye Pain and Other Symptoms-A-OH  Chacha Hall RN  [facility] Nurse Advisor 7:38 AM 10/5/2023  "

## 2023-10-30 ENCOUNTER — TELEPHONE (OUTPATIENT)
Dept: FAMILY MEDICINE | Facility: CLINIC | Age: 83
End: 2023-10-30
Payer: MEDICARE

## 2023-10-30 NOTE — TELEPHONE ENCOUNTER
Patient returned call. Did call the care team for the patient and per care team they were calling her to inform that her primary has been out of the office and does not return till Wednesday. Informed he does not have anything available and that she should see Dr. Quiroz on the 2 nd as scheduled. Patient voiced understanding of this and would see Gabriella on Thursday. Florence Lagos LPN

## 2023-10-30 NOTE — TELEPHONE ENCOUNTER
Reason for Call:  Appointment Request    Patient requesting this type of appt:  left side of the back of patient's neck extending to her skull pain     Requested provider: Tyson Cano    Reason patient unable to be scheduled: Not within requested timeframe    When does patient want to be seen/preferred time: 1-2 days    Comments: Pain has gotten better and then gotten worse. Patient states she has had to take trazodone to sleep the last 2 nights. Patient was informed her PCP is out until 11/1/23 so she schedule an appointment with another provider for Thursday 11/2/23 but she would still prefer to see Dr Cano sooner.     Could we send this information to you in [employer] or would you prefer to receive a phone call?:   Patient would prefer a phone call   Okay to leave a detailed message?: Yes at Cell number on file:    Telephone Information:   Mobile [phone]       Call taken on 10/30/2023 at 7:15 AM by Jama Oconnell

## 2023-11-02 ENCOUNTER — OFFICE VISIT (OUTPATIENT)
Dept: FAMILY MEDICINE | Facility: CLINIC | Age: 83
End: 2023-11-02
Payer: MEDICARE

## 2023-11-02 VITALS
DIASTOLIC BLOOD PRESSURE: 60 MMHG | HEART RATE: 91 BPM | SYSTOLIC BLOOD PRESSURE: 110 MMHG | BODY MASS INDEX: 35.68 KG/M2 | HEIGHT: 59 IN | WEIGHT: 177 LBS | TEMPERATURE: 97.6 F | RESPIRATION RATE: 14 BRPM | OXYGEN SATURATION: 99 %

## 2023-11-02 DIAGNOSIS — Z23 NEED FOR PROPHYLACTIC VACCINATION AGAINST HEPATITIS B VIRUS: ICD-10-CM

## 2023-11-02 DIAGNOSIS — M54.2 CERVICALGIA: Primary | ICD-10-CM

## 2023-11-02 DIAGNOSIS — Z29.11 NEED FOR VACCINATION AGAINST RESPIRATORY SYNCYTIAL VIRUS: ICD-10-CM

## 2023-11-02 PROCEDURE — 99213 OFFICE O/P EST LOW 20 MIN: CPT | Performed by: FAMILY MEDICINE

## 2023-11-02 RX ORDER — RESPIRATORY SYNCYTIAL VIRUS VACCINE 120MCG/0.5
0.5 KIT INTRAMUSCULAR ONCE
Qty: 1 EACH | Refills: 0 | Status: CANCELLED | OUTPATIENT
Start: 2023-11-02 | End: 2023-11-02

## 2023-11-02 RX ORDER — TRAMADOL HYDROCHLORIDE 50 MG/1
50-100 TABLET ORAL EVERY 6 HOURS PRN
Qty: 15 TABLET | Refills: 0 | Status: SHIPPED | OUTPATIENT
Start: 2023-11-02 | End: 2023-11-05

## 2023-11-02 ASSESSMENT — PAIN SCALES - GENERAL: PAINLEVEL: EXTREME PAIN (8)

## 2023-11-02 NOTE — PROGRESS NOTES
Assessment & Plan       ICD-10-CM    1. Cervicalgia  M54.2 traMADol (ULTRAM) 50 MG tablet      2. Need for prophylactic vaccination against hepatitis B virus  Z23       3. Need for vaccination against respiratory syncytial virus  Z29.11          Myofascial pain.  Likely from overuse.  Advised rest.  Pain control with tramadol.  Cannot do NSAIDs.  If it does not resolve over the next week or so, call back for physical therapy.  Brief discussion also about her weight.  She is frustrated with minimal weight loss.  I advised a low carbohydrate approach, aiming for 45 g of carb per day.  She seemed eager to look into this.  Did not have luck with weight watchers.      No follow-ups on file.    Ash Quiroz MD  [facility]      Andreia Davila is a 83 year old, presenting for the following health issues:  Neck Pain      11/2/2023     8:53 AM   Additional Questions   Roomed by Triston Hawley CMA       History of Present Illness       Reason for visit:  Neck pain  Symptom onset:  3-7 days ago  Symptoms include:  Neck pain at times severe  Symptom intensity:  Severe  Symptom progression:  Staying the same  Had these symptoms before:  No  What makes it worse:  Turning my head  What makes it better:  No    She eats 2-3 servings of fruits and vegetables daily.She consumes 0 sweetened beverage(s) daily.She exercises with enough effort to increase her heart rate 10 to 19 minutes per day.  She exercises with enough effort to increase her heart rate 5 days per week.   She is taking medications regularly.       Several days of mostly L, now R neck pain.  No upper extremity loss of strength.  No radiation.  No fall or injury.  Has been moving things off of the top shelves hoping to downsize.           Review of Systems   Constitutional, HEENT, cardiovascular, pulmonary, gi and gu systems are negative, except as otherwise noted.      Objective    /60 (BP Location: Right arm, Patient Position: Chair,  "Cuff Size: Adult Large)   Pulse 91   Temp 97.6  F (36.4  C) (Temporal)   Resp 14   Ht 1.499 m (4' 11\")   Wt 80.3 kg (177 lb)   SpO2 99%   BMI 35.75 kg/m    Body mass index is 35.75 kg/m .  Physical Exam   Well-appearing.  Elderly.  In good spirits today.  Alert and oriented.  She has reduced rotational range of motion.  Secondary to pain.  She has tenderness in her left greater than right paraspinous musculature.  No spasm appreciated.  No midline tenderness.  No step-offs.  Negative Spurling's.  Good upper extremity strength with normal bulk and tone.                      "

## 2023-11-09 DIAGNOSIS — I10 ESSENTIAL HYPERTENSION, BENIGN: ICD-10-CM

## 2023-11-13 RX ORDER — AMLODIPINE BESYLATE 10 MG/1
10 TABLET ORAL
Qty: 90 TABLET | Refills: 0 | Status: SHIPPED | OUTPATIENT
Start: 2023-11-13 | End: 2023-11-24

## 2023-11-17 ENCOUNTER — HOSPITAL ENCOUNTER (OUTPATIENT)
Dept: MAMMOGRAPHY | Facility: CLINIC | Age: 83
Discharge: HOME | End: 2023-11-17
Attending: STUDENT IN AN ORGANIZED HEALTH CARE EDUCATION/TRAINING PROGRAM | Admitting: STUDENT IN AN ORGANIZED HEALTH CARE EDUCATION/TRAINING PROGRAM
Payer: MEDICARE

## 2023-11-17 DIAGNOSIS — Z12.31 VISIT FOR SCREENING MAMMOGRAM: ICD-10-CM

## 2023-11-17 PROCEDURE — 77067 SCR MAMMO BI INCL CAD: CPT

## 2023-11-17 ASSESSMENT — ENCOUNTER SYMPTOMS
NAUSEA: 0
FEVER: 0
CHILLS: 0
PARESTHESIAS: 0
BREAST MASS: 0
DYSURIA: 0
PALPITATIONS: 0
EYE PAIN: 0
CONSTIPATION: 0
SHORTNESS OF BREATH: 0
MYALGIAS: 1
SORE THROAT: 0
HEMATOCHEZIA: 0
HEADACHES: 0
HEARTBURN: 0
DIZZINESS: 0
COUGH: 0
HEMATURIA: 0
DIARRHEA: 0
FREQUENCY: 0
ARTHRALGIAS: 1
ABDOMINAL PAIN: 0
NERVOUS/ANXIOUS: 0
WEAKNESS: 0
JOINT SWELLING: 1

## 2023-11-17 ASSESSMENT — ACTIVITIES OF DAILY LIVING (ADL): CURRENT_FUNCTION: NO ASSISTANCE NEEDED

## 2023-11-24 ENCOUNTER — OFFICE VISIT (OUTPATIENT)
Dept: FAMILY MEDICINE | Facility: CLINIC | Age: 83
End: 2023-11-24
Payer: MEDICARE

## 2023-11-24 VITALS
HEART RATE: 86 BPM | OXYGEN SATURATION: 98 % | RESPIRATION RATE: 12 BRPM | HEIGHT: 59 IN | BODY MASS INDEX: 35.08 KG/M2 | DIASTOLIC BLOOD PRESSURE: 60 MMHG | SYSTOLIC BLOOD PRESSURE: 122 MMHG | WEIGHT: 174 LBS | TEMPERATURE: 97.9 F

## 2023-11-24 DIAGNOSIS — N18.31 STAGE 3A CHRONIC KIDNEY DISEASE (H): ICD-10-CM

## 2023-11-24 DIAGNOSIS — E11.21 TYPE 2 DIABETES MELLITUS WITH DIABETIC NEPHROPATHY, WITHOUT LONG-TERM CURRENT USE OF INSULIN (H): ICD-10-CM

## 2023-11-24 DIAGNOSIS — M10.9 GOUT OF LEFT FOOT, UNSPECIFIED CAUSE, UNSPECIFIED CHRONICITY: ICD-10-CM

## 2023-11-24 DIAGNOSIS — L82.0 INFLAMED SEBORRHEIC KERATOSIS: ICD-10-CM

## 2023-11-24 DIAGNOSIS — E03.8 OTHER SPECIFIED HYPOTHYROIDISM: ICD-10-CM

## 2023-11-24 DIAGNOSIS — I10 ESSENTIAL HYPERTENSION, BENIGN: ICD-10-CM

## 2023-11-24 DIAGNOSIS — I25.10 CORONARY ARTERY DISEASE INVOLVING NATIVE CORONARY ARTERY OF NATIVE HEART WITHOUT ANGINA PECTORIS: ICD-10-CM

## 2023-11-24 DIAGNOSIS — I10 HYPERTENSION GOAL BP (BLOOD PRESSURE) < 140/90: ICD-10-CM

## 2023-11-24 DIAGNOSIS — E78.5 HYPERLIPIDEMIA LDL GOAL <100: ICD-10-CM

## 2023-11-24 DIAGNOSIS — Z00.00 ENCOUNTER FOR MEDICARE ANNUAL WELLNESS EXAM: Primary | ICD-10-CM

## 2023-11-24 DIAGNOSIS — G47.00 INSOMNIA, UNSPECIFIED TYPE: ICD-10-CM

## 2023-11-24 PROCEDURE — 99214 OFFICE O/P EST MOD 30 MIN: CPT | Mod: 25 | Performed by: STUDENT IN AN ORGANIZED HEALTH CARE EDUCATION/TRAINING PROGRAM

## 2023-11-24 PROCEDURE — G0439 PPPS, SUBSEQ VISIT: HCPCS | Performed by: STUDENT IN AN ORGANIZED HEALTH CARE EDUCATION/TRAINING PROGRAM

## 2023-11-24 PROCEDURE — 17110 DESTRUCTION B9 LES UP TO 14: CPT | Performed by: STUDENT IN AN ORGANIZED HEALTH CARE EDUCATION/TRAINING PROGRAM

## 2023-11-24 RX ORDER — HYDROCHLOROTHIAZIDE 12.5 MG/1
12.5 TABLET ORAL DAILY
Qty: 90 TABLET | Refills: 3 | Status: SHIPPED | OUTPATIENT
Start: 2023-11-24

## 2023-11-24 RX ORDER — METFORMIN HCL 500 MG
TABLET, EXTENDED RELEASE 24 HR ORAL
Qty: 180 TABLET | Refills: 3 | Status: SHIPPED | OUTPATIENT
Start: 2023-11-24

## 2023-11-24 RX ORDER — SIMVASTATIN 40 MG
40 TABLET ORAL AT BEDTIME
Qty: 90 TABLET | Refills: 3 | Status: SHIPPED | OUTPATIENT
Start: 2023-11-24

## 2023-11-24 RX ORDER — TRAZODONE HYDROCHLORIDE 50 MG/1
100 TABLET, FILM COATED ORAL AT BEDTIME
Qty: 90 TABLET | Refills: 1 | Status: SHIPPED | OUTPATIENT
Start: 2023-11-24

## 2023-11-24 RX ORDER — AMLODIPINE BESYLATE 10 MG/1
10 TABLET ORAL
Qty: 90 TABLET | Refills: 3 | Status: SHIPPED | OUTPATIENT
Start: 2023-11-24

## 2023-11-24 ASSESSMENT — ENCOUNTER SYMPTOMS
JOINT SWELLING: 1
NAUSEA: 0
NERVOUS/ANXIOUS: 0
CONSTIPATION: 0
HEMATOCHEZIA: 0
FEVER: 0
PALPITATIONS: 0
CHILLS: 0
EYE PAIN: 0
HEADACHES: 0
PARESTHESIAS: 0
ARTHRALGIAS: 1
HEMATURIA: 0
DIARRHEA: 0
COUGH: 0
FREQUENCY: 0
MYALGIAS: 1
BREAST MASS: 0
WEAKNESS: 0
SHORTNESS OF BREATH: 0
ABDOMINAL PAIN: 0
SORE THROAT: 0
DYSURIA: 0
HEARTBURN: 0
DIZZINESS: 0

## 2023-11-24 ASSESSMENT — ACTIVITIES OF DAILY LIVING (ADL): CURRENT_FUNCTION: NO ASSISTANCE NEEDED

## 2023-11-24 NOTE — PROGRESS NOTES
"SUBJECTIVE:   Giovanni is a 83 year old, presenting for the following:  Physical        11/24/2023     9:02 AM   Additional Questions   Roomed by Perri khan       Are you in the first 12 months of your Medicare coverage?  No    Healthy Habits:     In general, how would you rate your overall health?  Good    Frequency of exercise:  4-5 days/week    Duration of exercise:  15-30 minutes    Do you usually eat at least 4 servings of fruit and vegetables a day, include whole grains    & fiber and avoid regularly eating high fat or \"junk\" foods?  No    Taking medications regularly:  Yes    Medication side effects:  Not applicable    Ability to successfully perform activities of daily living:  No assistance needed    Home Safety:  No safety concerns identified    Hearing Impairment:  No hearing concerns    In the past 6 months, have you been bothered by leaking of urine?  No    In general, how would you rate your overall mental or emotional health?  Excellent    Additional concerns today:  Yes          Have you ever done Advance Care Planning? (For example, a Health Directive, POLST, or a discussion with a medical provider or your loved ones about your wishes): Yes, advance care planning is on file.       Fall risk  Fallen 2 or more times in the past year?: No  Any fall with injury in the past year?: No    Cognitive Screening   1) Repeat 3 items (Leader, Season, Table)    2) Clock draw: Normal  3) 3 item recall: Recalls 3 objects  Results: 3 items recalled: COGNITIVE IMPAIRMENT LESS LIKELY    Mini-CogTM Copyright S [employer]. Licensed by the author for use in [facility]; reprinted with permission (ramón@.[facility]). All rights reserved.      Do you have sleep apnea, excessive snoring or daytime drowsiness? : no    Reviewed and updated as needed this visit by clinical staff   Tobacco  Allergies  Meds              Reviewed and updated as needed this visit by Provider     Meds             Social History     Tobacco Use    " Smoking status: Former    Smokeless tobacco: Never    Tobacco comments:     quit  1987   Substance Use Topics    Alcohol use: No     Alcohol/week: 0.0 standard drinks of alcohol             11/17/2023     8:47 AM   Alcohol Use   Prescreen: >3 drinks/day or >7 drinks/week? No     Do you have a current opioid prescription? No  Do you use any other controlled substances or medications that are not prescribed by a provider? None            Patient Care Team:  Tyson Cano MD as PCP - General (Family Medicine)  Dafne Auguste RD as Diabetes Educator (Dietitian, Registered)  Edward Pennington MD as Assigned Heart and Vascular Provider  Tyson Cano MD as Assigned PCP    The following health maintenance items are reviewed in Epic and correct as of today:  Health Maintenance   Topic Date Due    TSH W/FREE T4 REFLEX  02/16/2024    A1C  04/05/2024    EYE EXAM  08/07/2024    BMP  08/14/2024    LIPID  10/05/2024    MICROALBUMIN  10/05/2024    DIABETIC FOOT EXAM  10/05/2024    ANNUAL REVIEW OF HM ORDERS  10/05/2024    HEMOGLOBIN  10/05/2024    MEDICARE ANNUAL WELLNESS VISIT  11/24/2024    FALL RISK ASSESSMENT  11/24/2024    DTAP/TDAP/TD IMMUNIZATION (2 - Td or Tdap) 04/27/2026    ADVANCE CARE PLANNING  11/24/2028    DEXA  10/17/2032    PHQ-2 (once per calendar year)  Completed    INFLUENZA VACCINE  Completed    Pneumococcal Vaccine: 65+ Years  Completed    URINALYSIS  Completed    RSV VACCINE (Pregnancy & 60+)  Completed    COVID-19 Vaccine  Completed    IPV IMMUNIZATION  Aged Out    HPV IMMUNIZATION  Aged Out    MENINGITIS IMMUNIZATION  Aged Out    RSV MONOCLONAL ANTIBODY  Aged Out    MAMMO SCREENING  Discontinued    ZOSTER IMMUNIZATION  Discontinued     Lab work is in process  History of abnormal Pap smear: Status post benign hysterectomy. Health Maintenance and Surgical History updated.  Last 3 Pap and HPV Results:       10/1/2014    12:00 AM 8/9/2006    12:00 AM 9/23/2005    12:00 AM   PAP / HPV   PAP  "(Historical) NIL  NIL  NIL        Mammogram Screening - Patient over age 75, has elected to continue with screening.  Pertinent mammograms are reviewed under the imaging tab.    Review of Systems   Constitutional:  Negative for chills and fever.   HENT:  Negative for congestion, ear pain, hearing loss and sore throat.    Eyes:  Negative for pain and visual disturbance.   Respiratory:  Negative for cough and shortness of breath.    Cardiovascular:  Positive for peripheral edema. Negative for chest pain and palpitations.   Gastrointestinal:  Negative for abdominal pain, constipation, diarrhea, heartburn, hematochezia and nausea.   Breasts:  Negative for breast mass and discharge.   Genitourinary:  Negative for dysuria, frequency, genital sores, hematuria, pelvic pain, urgency, vaginal bleeding and vaginal discharge.   Musculoskeletal:  Positive for arthralgias, joint swelling and myalgias.   Skin:  Negative for rash.   Neurological:  Negative for dizziness, weakness, headaches and paresthesias.   Psychiatric/Behavioral:  Negative for mood changes. The patient is not nervous/anxious.        OBJECTIVE:   /60   Pulse 86   Temp 97.9  F (36.6  C)   Resp 12   Ht 1.499 m (4' 11\")   Wt 78.9 kg (174 lb)   SpO2 98%   BMI 35.14 kg/m   Estimated body mass index is 35.14 kg/m  as calculated from the following:    Height as of this encounter: 1.499 m (4' 11\").    Weight as of this encounter: 78.9 kg (174 lb).  Physical Exam  GENERAL: healthy, alert and no distress  EYES: Eyes grossly normal to inspection, PERRL and conjunctivae and sclerae normal  HENT: ear canals and TM's normal, nose and mouth without ulcers or lesions  NECK: no adenopathy, no asymmetry, masses, or scars and thyroid normal to palpation  RESP: lungs clear to auscultation - no rales, rhonchi or wheezes  CV: regular rate and rhythm, normal S1 S2, no S3 or S4, no murmur, click or rub, trace peripheral edema and peripheral pulses strong  ABDOMEN: soft, " nontender, no hepatosplenomegaly, no masses and bowel sounds normal  MS: no gross musculoskeletal defects noted  SKIN: left calf with raised stuck on papule with excoriation  NEURO: Normal strength and tone, mentation intact and speech normal  PSYCH: mentation appears normal, affect normal/bright      ASSESSMENT / PLAN:   Giovanni was seen today for physical.    Diagnoses and all orders for this visit:    Encounter for Medicare annual wellness exam    Stage 3a chronic kidney disease (H)  Type 2 diabetes mellitus with diabetic nephropathy, without long-term current use of insulin (H)  Recent A1c stable and on aspirin and statin  -     metFORMIN (GLUCOPHAGE XR) 500 MG 24 hr tablet; TAKE 1 TABLET BY MOUTH ONCE DAILY WITH SUPPER Strength: 500 mg    Hyperlipidemia LDL goal <100  -     simvastatin (ZOCOR) 40 MG tablet; Take 1 tablet (40 mg) by mouth at bedtime    Insomnia, unspecified type  Discussed sleep hygiene and improving her sleep environment.  We will increase trazodone to 100 mg  -     traZODone (DESYREL) 50 MG tablet; Take 2 tablets (100 mg) by mouth at bedtime    Essential hypertension, benign  Stable  -     amLODIPine (NORVASC) 10 MG tablet; Take 1 tablet (10 mg) by mouth daily (with dinner)    Coronary artery disease involving native coronary artery of native heart without angina pectoris  Patient on aspirin and statin    Other specified hypothyroidism  -     TSH with free T4 reflex; Future    Inflamed seborrheic keratosis  Irritated area that is bothersome and appears to be SK.  We discussed options and will plan for cryotherapy today.  If not resolving would plan for biopsy.  -     DESTRUCT BENIGN LESION, UP TO 14    Gout of left foot, unspecified cause, unspecified chronicity  Tolerating allopurinol without side effects.  Did discuss gout which she has not had flares of as well as benefits of allopurinol and lowering uric acid level if chronic kidney disease.  Plan to continue now.  -     Uric acid;  Future    Other orders  -     PRIMARY CARE FOLLOW-UP SCHEDULING; Future        Patient has been advised of split billing requirements and indicates understanding: Yes      COUNSELING:  Reviewed preventive health counseling, as reflected in patient instructions       Regular exercise       Healthy diet/nutrition       Vision screening       Hearing screening       Dental care       Bladder control       Fall risk prevention       Immunizations       Alcohol Use        Osteoporosis prevention/bone health       Consider lung cancer screening for ages 55-80 years (77 for Medicare) and 20 pack-year smoking history       Colon cancer screening       Hepatitis C screening       HIV screening for high risk patient        She reports that she has quit smoking. She has never used smokeless tobacco.      Appropriate preventive services were discussed with this patient, including applicable screening as appropriate for fall prevention, nutrition, physical activity, Tobacco-use cessation, weight loss and cognition.  Checklist reviewing preventive services available has been given to the patient.    Reviewed patients plan of care and provided an AVS. The Basic Care Plan (routine screening as documented in Health Maintenance) for Sandra meets the Care Plan requirement. This Care Plan has been established and reviewed with the Patient.          Tyson Cano MD  [facility]  Cryotherapy procedure note: After verbal consent and discussion of risks and benefits including but no limited to dyspigmentation/scar, blister, and pain, 1 was(were) treated with 1-2mm freeze border for 3 cycles with liquid nitrogen. Post cryotherapy instructions were provided. Plan for patient to return to clinic in 2 weeks if still present.      Identified Health Risks:  I have reviewed Opioid Use Disorder and Substance Use Disorder risk factors and made any needed referrals. The patient was counseled and encouraged to consider  modifying their diet and eating habits. She was provided with information on recommended healthy diet options.

## 2023-11-24 NOTE — PATIENT INSTRUCTIONS
Patient Education   Personalized Prevention Plan  You are due for the preventive services outlined below.  Your care team is available to assist you in scheduling these services.  If you have already completed any of these items, please share that information with your care team to update in your medical record.  There are no preventive care reminders to display for this patient.    Learning About Dietary Guidelines  What are the Dietary Guidelines for Americans?     Dietary Guidelines for Americans provide tips for eating well and staying healthy. This helps reduce the risk for long-term (chronic) diseases.  These guidelines recommend that you:  Eat and drink the right amount for you. The U.S. government's food guide is called MyPlate. It can help you make your own well-balanced eating plan.  Try to balance your eating with your activity. This helps you stay at a healthy weight.  Drink alcohol in moderation, if at all.  Limit foods high in salt, saturated fat, trans fat, and added sugar.  These guidelines are from the U.S. Department of Agriculture and the U.S. Department of Health and Human Services. They are updated every 5 years.  What is MyPlate?  MyPlate is the U.S. government's food guide. It can help you make your own well-balanced eating plan. A balanced eating plan means that you eat enough, but not too much, and that your food gives you the nutrients you need to stay healthy.  MyPlate focuses on eating plenty of whole grains, fruits, and vegetables, and on limiting fat and sugar. It is available online at www.ChooseMyPlate.gov.  How can you get started?  If you're trying to eat healthier, you can slowly change your eating habits over time. You don't have to make big changes all at once. Start by adding one or two healthy foods to your meals each day.  Grains  Choose whole-grain breads and cereals and whole-wheat pasta and whole-grain crackers.  Vegetables  Eat a variety of vegetables every day. They have  "lots of nutrients and are part of a heart-healthy diet.  Fruits  Eat a variety of fruits every day. Fruits contain lots of nutrients. Choose fresh fruit instead of fruit juice.  Protein foods  Choose fish and lean poultry more often. Eat red meat and fried meats less often. Dried beans, tofu, and nuts are also good sources of protein.  Dairy  Choose low-fat or fat-free products from this food group. If you have problems digesting milk, try eating cheese or yogurt instead.  Fats and oils  Limit fats and oils if you're trying to cut calories. Choose healthy fats when you cook. These include canola oil and olive oil.  Where can you learn more?  Go to https://www.[employer].net/patiented  Enter D676 in the search box to learn more about \"Learning About Dietary Guidelines.\"  Current as of: February 28, 2023               Content Version: 13.8    5380-8135 [employer].   Care instructions adapted under license by your healthcare professional. If you have questions about a medical condition or this instruction, always ask your healthcare professional. [employer], [employer] disclaims any warranty or liability for your use of this information.         "

## 2023-11-30 ENCOUNTER — MYC MEDICAL ADVICE (OUTPATIENT)
Dept: FAMILY MEDICINE | Facility: CLINIC | Age: 83
End: 2023-11-30
Payer: MEDICARE

## 2023-12-01 ENCOUNTER — OFFICE VISIT (OUTPATIENT)
Dept: FAMILY MEDICINE | Facility: CLINIC | Age: 83
End: 2023-12-01
Payer: MEDICARE

## 2023-12-01 VITALS
SYSTOLIC BLOOD PRESSURE: 126 MMHG | TEMPERATURE: 97.5 F | OXYGEN SATURATION: 98 % | RESPIRATION RATE: 18 BRPM | HEIGHT: 59 IN | DIASTOLIC BLOOD PRESSURE: 56 MMHG | HEART RATE: 89 BPM | WEIGHT: 174.6 LBS | BODY MASS INDEX: 35.2 KG/M2

## 2023-12-01 DIAGNOSIS — L98.9 SKIN LESION: ICD-10-CM

## 2023-12-01 DIAGNOSIS — L08.9 SKIN INFECTION: Primary | ICD-10-CM

## 2023-12-01 PROCEDURE — 99213 OFFICE O/P EST LOW 20 MIN: CPT | Mod: 24 | Performed by: STUDENT IN AN ORGANIZED HEALTH CARE EDUCATION/TRAINING PROGRAM

## 2023-12-01 RX ORDER — DOXYCYCLINE 100 MG/1
100 CAPSULE ORAL 2 TIMES DAILY
Qty: 14 CAPSULE | Refills: 0 | Status: SHIPPED | OUTPATIENT
Start: 2023-12-01 | End: 2023-12-08

## 2023-12-01 ASSESSMENT — PAIN SCALES - GENERAL: PAINLEVEL: MILD PAIN (3)

## 2023-12-01 NOTE — PROGRESS NOTES
"  Assessment & Plan   Problem List Items Addressed This Visit    None  Visit Diagnoses       Skin infection    -  Primary    Relevant Medications    doxycycline hyclate (VIBRAMYCIN) 100 MG capsule    Skin lesion               Given discomfort and growing surrounding erythema we will treat with course of antibiotics now.  Plan for doxycycline given her allergies.  We did discuss continued observation but given changes in drainage we will do antibiotics at this time.  I do have some suspicion for underlying skin cancer and if things not improving would have her obtain biopsy resolving I would have her obtain biopsy.  Possible side effects of doxycycline discussed.  Follow-up sooner with new or worsening issues.    Tyson Cano MD  [facility] IZABELA Davila is a 83 year old, presenting for the following health issues:  RECHECK and Wound Check (L leg )        12/1/2023     1:34 PM   Additional Questions   Roomed by Bianca       History of Present Illness       Reason for visit:  Growth on left leg    She eats 2-3 servings of fruits and vegetables daily.She consumes 0 sweetened beverage(s) daily.She exercises with enough effort to increase her heart rate 10 to 19 minutes per day.  She exercises with enough effort to increase her heart rate 5 days per week.   She is taking medications regularly.     Patient with cryotherapy on suspected inflamed SK last week.  Notes this did peel off but has changed with surrounding area redness and no drainage yesterday.  Painful and surrounding skin.  She had been itching and scratching prior.  Uncertain of how long this has been there.      Review of Systems   Constitutional, HEENT, cardiovascular, pulmonary, gi and gu systems are negative, except as otherwise noted.      Objective    /56 (Cuff Size: Adult Regular)   Pulse 89   Temp 97.5  F (36.4  C) (Temporal)   Resp 18   Ht 1.499 m (4' 11\")   Wt 79.2 kg (174 lb 9.6 oz)   SpO2 98%   " BMI 35.26 kg/m    Body mass index is 35.26 kg/m .  Physical Exam   GENERAL: healthy, alert and no distress  EYES: Eyes grossly normal to inspection, PERRL and conjunctivae and sclerae normal  RESP: Breathing comfortably  MS: no gross musculoskeletal defects noted, no edema  SKIN: See picture  PSYCH: mentation appears normal, affect normal/bright

## 2023-12-09 ENCOUNTER — NURSE TRIAGE (OUTPATIENT)
Dept: NURSING | Facility: CLINIC | Age: 83
End: 2023-12-09
Payer: MEDICARE

## 2023-12-09 ENCOUNTER — MYC MEDICAL ADVICE (OUTPATIENT)
Dept: FAMILY MEDICINE | Facility: CLINIC | Age: 83
End: 2023-12-09
Payer: MEDICARE

## 2023-12-09 NOTE — TELEPHONE ENCOUNTER
"Sandra has a \"Sore\" on her Lt Leg that she is currently being treated for.   She wants to know how she can send a picture of it via [employer] to her PCP, Dr. Cano.    She has MyChart only on her Computer - Not on her Cell phone.  She states that she is Not able to send email from her phone.    There is another adult with her, discussing this with her.    Advised  - Send a photo of the Lesion to her email  - After receiving the email on her computer, save the photo to her computer - Note where the photo is located on computer  - In MyChart, start a msg to PCP and look for option to attach a file/photo  - Locate  photo & attach to msg  - Send    She states she will work on trying to do this. If not successful, she reports that she will call the clinic on Mon.    Megha Hill, RN  [facility]      Reason for Disposition   General information question, no triage required and triager able to answer question    Protocols used: Information Only Call - No Triage-A-AH    "

## 2023-12-11 ENCOUNTER — OFFICE VISIT (OUTPATIENT)
Dept: FAMILY MEDICINE | Facility: CLINIC | Age: 83
End: 2023-12-11
Payer: MEDICARE

## 2023-12-11 ENCOUNTER — NURSE TRIAGE (OUTPATIENT)
Dept: FAMILY MEDICINE | Facility: CLINIC | Age: 83
End: 2023-12-11

## 2023-12-11 VITALS
HEART RATE: 86 BPM | RESPIRATION RATE: 16 BRPM | BODY MASS INDEX: 35.55 KG/M2 | OXYGEN SATURATION: 97 % | WEIGHT: 176 LBS | SYSTOLIC BLOOD PRESSURE: 138 MMHG | DIASTOLIC BLOOD PRESSURE: 66 MMHG | TEMPERATURE: 97.7 F

## 2023-12-11 DIAGNOSIS — L98.9 SKIN LESION: Primary | ICD-10-CM

## 2023-12-11 PROCEDURE — 99213 OFFICE O/P EST LOW 20 MIN: CPT | Performed by: NURSE PRACTITIONER

## 2023-12-11 NOTE — TELEPHONE ENCOUNTER
Nurse Triage SBAR    Is this a 2nd Level Triage? YES, LICENSED PRACTITIONER REVIEW IS REQUIRED    Situation: patient was seen on 12/1/23 for a infected sore to left leg. She was put on Doxycycline and finished her last dose on Friday. She noticed Saturday that the sore started draining again and there continued to be redness and warmth to the area. (Picture is in MyChart) No fevers at this time.     Background: Recent sore to L leg treated with antibiotics    Assessment: Patient needs to be seen to follow up for possible ongoing infection not cleared by antibiotic.     Protocol Recommended Disposition:   See in Office Today    Recommendation: Appointment was scheduled per protocol. PCP is out of office so she was scheduled with a same day provider.         Does the patient meet one of the following criteria for ADS visit consideration? No      CARLOZ Bains, RN        Reason for Disposition   Spreading redness around the boil and no fever    Additional Information   Negative: Widespread rash and bright red, sunburn-like and too weak to stand   Negative: Sounds like a life-threatening emergency to the triager   Negative: Painful lump or swelling at opening to anus (rectum)   Negative: Painful lump or swelling at opening to vagina (on labia)   Negative: Painful lump or swelling on scrotum   Negative: Doesn't match the SYMPTOMS of a boil   Negative: Widespread red rash   Negative: Black (necrotic), dark purple, or blisters develop in area of wound   Negative: Patient sounds very sick or weak to the triager   Negative: SEVERE pain (e.g., excruciating)   Negative: Red streak from area of infection   Negative: Fever > 100.4 F (38.0 C)   Negative: Boil > 2 inches across (> 5 cm; larger than a golf ball or ping pong ball)   Negative: Boil > 1/2 inch across (> 12 mm; larger than a marble) and center is soft or pus colored    Protocols used: Boil (Skin Abscess)-A-OH

## 2023-12-11 NOTE — PROGRESS NOTES
Assessment & Plan     Skin lesion    Unchanged skin lesion despite treatment with Doxycycline. She has been following with her PCP in regards to this and was actually hoping to following up with him today. We discussed options for biopsy which was mentioned by her PCP in her last visit note. She would prefer that he perform this. She will be scheduled the day after tomorrow for further evaluation with him. Until then, we discussed wound care, keeping the area clean and dry. Cover with band-aid. I would not recommend using topical antibiotic ointment, Neosporin or Bacitracin at this point. Worrisome symptoms reviewed, she will follow-up sooner with any new or worsening symptoms.     I explained my diagnostic considerations and recommendations to the patient, who voiced understanding and agreement with the assessment and treatment plan. All questions were answered to patient's apparent satisfaction. We discussed potential side effects of any prescribed or recommended therapies, as well as expectations for response to treatments and importance of lifestyle measures that may improve symptoms. Patient was advised to contact our office if there are new symptoms or no improvement or worsening of conditions or symptoms.                   Mikaela Woodson NP  [facility]   Giovanni is a 83 year old, presenting for the following health issues:  Derm Problem      12/11/2023     3:40 PM   Additional Questions   Roomed by Toma Landry       History of Present Illness       Reason for visit:  Growth on left leg    She eats 2-3 servings of fruits and vegetables daily.She consumes 0 sweetened beverage(s) daily.She exercises with enough effort to increase her heart rate 10 to 19 minutes per day.  She exercises with enough effort to increase her heart rate 5 days per week.   She is taking medications regularly.     Giovanni presents today for follow-up of a lesion on her medial right lower leg. She has  been following with her PCP in relation to this, most recently seen on 12/1/23 for concerns of possible infection over an area that was treated with Cryotherapy a week prior. She has completed her course of Doxycycline, she has not noticed any change in the appearance of the lesion while taking the antibiotic. She denies any new or worsening symptoms, redness, swelling, drainage or discharge. She denies any fever, chills, nausea, vomiting, chest pain or difficulty breathing.     Patient Active Problem List   Diagnosis    CKD (chronic kidney disease) stage 3, GFR 30-59 ml/min (H)    Hyperlipidemia LDL goal <100    Hypertension goal BP (blood pressure) < 140/90    History of total left knee replacement    Other specified hypothyroidism    Coronary artery disease involving native coronary artery without angina pectoris    Type 2 diabetes mellitus with diabetic nephropathy (H)    Type 2 diabetes mellitus with other circulatory complications (H)    Osteoarthritis of left thumb    Morbid obesity (H)     Current Outpatient Medications   Medication    allopurinol (ZYLOPRIM) 100 MG tablet    amLODIPine (NORVASC) 10 MG tablet    aspirin (ASA) 325 MG EC tablet    blood glucose (ONETOUCH VERIO IQ) test strip    hydrochlorothiazide (HYDRODIURIL) 12.5 MG tablet    Lancets (ONETOUCH DELICA PLUS [id number]) MISC    levothyroxine (SYNTHROID/LEVOTHROID) 50 MCG tablet    losartan (COZAAR) 25 MG tablet    metFORMIN (GLUCOPHAGE XR) 500 MG 24 hr tablet    nitroGLYcerin (NITROSTAT) 0.4 MG sublingual tablet    Probiotic Product (ACIDOPHILUS PROBIOTIC BLEND) CAPS    simvastatin (ZOCOR) 40 MG tablet    traZODone (DESYREL) 50 MG tablet    Vitamin D, Cholecalciferol, 25 MCG (1000 UT) CAPS    azithromycin (ZITHROMAX) 250 MG tablet     Current Facility-Administered Medications   Medication    lidocaine 1 % injection 1 mL                 Review of Systems   Constitutional, HEENT, cardiovascular, pulmonary, gi and gu systems are negative, except as  otherwise noted.      Objective    /66   Pulse 86   Temp 97.7  F (36.5  C) (Temporal)   Resp 16   Wt 79.8 kg (176 lb)   SpO2 97%   BMI 35.55 kg/m    Body mass index is 35.55 kg/m .  Physical Exam  Vitals reviewed.   Constitutional:       General: She is not in acute distress.     Appearance: Normal appearance.   Eyes:      Extraocular Movements: Extraocular movements intact.      Conjunctiva/sclera: Conjunctivae normal.   Pulmonary:      Effort: Pulmonary effort is normal.   Skin:     General: Skin is warm and dry.      Comments: 8-9mm ulcerated circular lesion on inner left lower leg. Pink in color, minimal surrounding erythema. Non-tender, no surrounding edema. Minimal serous drainage from wound bed.    Neurological:      Mental Status: She is alert and oriented to person, place, and time. Mental status is at baseline.   Psychiatric:         Mood and Affect: Mood normal.         Behavior: Behavior normal.

## 2023-12-13 ENCOUNTER — OFFICE VISIT (OUTPATIENT)
Dept: FAMILY MEDICINE | Facility: CLINIC | Age: 83
End: 2023-12-13
Payer: MEDICARE

## 2023-12-13 VITALS
WEIGHT: 175 LBS | TEMPERATURE: 96.8 F | BODY MASS INDEX: 35.35 KG/M2 | OXYGEN SATURATION: 98 % | DIASTOLIC BLOOD PRESSURE: 66 MMHG | RESPIRATION RATE: 16 BRPM | HEART RATE: 98 BPM | SYSTOLIC BLOOD PRESSURE: 144 MMHG

## 2023-12-13 DIAGNOSIS — I25.10 CORONARY ARTERY DISEASE INVOLVING NATIVE CORONARY ARTERY OF NATIVE HEART WITHOUT ANGINA PECTORIS: ICD-10-CM

## 2023-12-13 DIAGNOSIS — E11.21 TYPE 2 DIABETES MELLITUS WITH DIABETIC NEPHROPATHY, WITHOUT LONG-TERM CURRENT USE OF INSULIN (H): ICD-10-CM

## 2023-12-13 DIAGNOSIS — I10 HYPERTENSION GOAL BP (BLOOD PRESSURE) < 140/90: ICD-10-CM

## 2023-12-13 DIAGNOSIS — E03.8 OTHER SPECIFIED HYPOTHYROIDISM: ICD-10-CM

## 2023-12-13 DIAGNOSIS — M10.9 GOUT OF LEFT FOOT, UNSPECIFIED CAUSE, UNSPECIFIED CHRONICITY: ICD-10-CM

## 2023-12-13 DIAGNOSIS — N18.31 STAGE 3A CHRONIC KIDNEY DISEASE (H): Primary | ICD-10-CM

## 2023-12-13 LAB
ANION GAP SERPL CALCULATED.3IONS-SCNC: 15 MMOL/L (ref 7–15)
BUN SERPL-MCNC: 30.2 MG/DL (ref 8–23)
CALCIUM SERPL-MCNC: 9.5 MG/DL (ref 8.8–10.2)
CHLORIDE SERPL-SCNC: 104 MMOL/L (ref 98–107)
CREAT SERPL-MCNC: 1.41 MG/DL (ref 0.51–0.95)
DEPRECATED HCO3 PLAS-SCNC: 22 MMOL/L (ref 22–29)
EGFRCR SERPLBLD CKD-EPI 2021: 37 ML/MIN/1.73M2
GLUCOSE SERPL-MCNC: 107 MG/DL (ref 70–99)
POTASSIUM SERPL-SCNC: 4.6 MMOL/L (ref 3.4–5.3)
SODIUM SERPL-SCNC: 141 MMOL/L (ref 135–145)
TSH SERPL DL<=0.005 MIU/L-ACNC: 2.31 UIU/ML (ref 0.3–4.2)
URATE SERPL-MCNC: 5.5 MG/DL (ref 2.4–5.7)

## 2023-12-13 PROCEDURE — 80048 BASIC METABOLIC PNL TOTAL CA: CPT | Performed by: STUDENT IN AN ORGANIZED HEALTH CARE EDUCATION/TRAINING PROGRAM

## 2023-12-13 PROCEDURE — 84550 ASSAY OF BLOOD/URIC ACID: CPT | Performed by: STUDENT IN AN ORGANIZED HEALTH CARE EDUCATION/TRAINING PROGRAM

## 2023-12-13 PROCEDURE — 84443 ASSAY THYROID STIM HORMONE: CPT | Performed by: STUDENT IN AN ORGANIZED HEALTH CARE EDUCATION/TRAINING PROGRAM

## 2023-12-13 PROCEDURE — 36415 COLL VENOUS BLD VENIPUNCTURE: CPT | Performed by: STUDENT IN AN ORGANIZED HEALTH CARE EDUCATION/TRAINING PROGRAM

## 2023-12-13 PROCEDURE — 99214 OFFICE O/P EST MOD 30 MIN: CPT | Performed by: STUDENT IN AN ORGANIZED HEALTH CARE EDUCATION/TRAINING PROGRAM

## 2023-12-13 NOTE — PROGRESS NOTES
Assessment & Plan   Problem List Items Addressed This Visit          Endocrine    Other specified hypothyroidism    Type 2 diabetes mellitus with diabetic nephropathy (H)       Circulatory    Hypertension goal BP (blood pressure) < 140/90    Relevant Orders    Basic metabolic panel  (Ca, Cl, CO2, Creat, Gluc, K, Na, BUN) (Completed)    Coronary artery disease involving native coronary artery without angina pectoris       Urinary    CKD (chronic kidney disease) stage 3, GFR 30-59 ml/min (H) - Primary     Other Visit Diagnoses       Gout of left foot, unspecified cause, unspecified chronicity               Will repeat TSH and kidney function today.  Patient actually seen on Monday by another provider.  Did not think much change since being treated with antibiotics but actually notes this to be scabbed over in diameter seems smaller now upon repeat evaluation.  I suspect slow healing in setting of her lower extremity edema and diabetes.  Does not have evidence of continued infection now or underlying abscess.  Still some concern for skin cancer such as basal cell with response to cryotherapy but given improvement I would not opt for biopsy now and patient is in agreement.  This would require excisional biopsy and would worry about this healing as well.  Right now we will plan to continue to monitor without further antibiotics.  No bandage now as this has scabbed over.  She will message me in 1 week with picture let me know sooner if new or worsening symptoms arise.    Tyson Cano MD  [facility]    Andreia Davila is a 83 year old, presenting for the following health issues:  Derm Problem (Follow up)      12/13/2023    10:20 AM   Additional Questions   Roomed by Toma Landry       History of Present Illness       Reason for visit:  Growth on left leg    She eats 2-3 servings of fruits and vegetables daily.She consumes 0 sweetened beverage(s) daily.She exercises with enough effort  to increase her heart rate 10 to 19 minutes per day.  She exercises with enough effort to increase her heart rate 5 days per week.   She is taking medications regularly.       Patient following up on skin lesion after cryotherapy and suspected infection.  Did have drainage over the weekend and was seen by another provider on Monday.  Patient preferred to wait until this visit      Review of Systems   Constitutional, HEENT, cardiovascular, pulmonary, gi and gu systems are negative, except as otherwise noted.      Objective    BP (!) 144/66   Pulse 98   Temp 96.8  F (36  C) (Temporal)   Resp 16   Wt 79.4 kg (175 lb)   SpO2 98%   BMI 35.35 kg/m    Body mass index is 35.35 kg/m .  Physical Exam   GENERAL: healthy, alert and no distress  EYES: Eyes grossly normal to inspection, PERRL and conjunctivae and sclerae normal  RESP: Breathing comfortably on room air  MS: no gross musculoskeletal defects noted, no edema  SKIN: See photo, no surrounding erythema drainage or areas of induration, good granulation tissue with scab now over ulceration that measures 7 mm  NEURO: Normal strength and tone, mentation intact and speech normal  PSYCH: mentation appears normal, affect normal/bright

## 2024-01-02 ENCOUNTER — NURSE TRIAGE (OUTPATIENT)
Dept: FAMILY MEDICINE | Facility: CLINIC | Age: 84
End: 2024-01-02
Payer: MEDICARE

## 2024-01-02 NOTE — TELEPHONE ENCOUNTER
"Nurse Triage SBAR    Is this a 2nd Level Triage? YES, LICENSED PRACTITIONER REVIEW IS REQUIRED    Situation: Patient reports her sore to her R medial lower leg scab came off and revealed a \"yellow bubble.\" She reports it is now draining and red around area. There is no streaking to it but the area is very warm to touch. She also reports her temp is 99.1 and she usually runs about 97.    She states she feels fine, the area does not really bother her but she has concerns that it is infected.     Background: Ongoing concerns with a sore on her leg    Assessment: Patient should be evaluated, she would like to see  if possible tomorrow to have him viwe it.    Protocol Recommended Disposition:   See in Office Today    Recommendation: Can patient be seen tomorrow?     Routed to provider    Does the patient meet one of the following criteria for ADS visit consideration? No        CARLOZ Bains, RN          Giovanni HAYWARD [employer] (supporting Tyson Cano MD)19 hours ago (7:26 PM)       The scab came off and under it was a yellow bubble.  It broke open and has been draining ever since.  It looks pretty infected and inflammation around it is pretty red.  I would like an appointment with you to see what can be done about this.  Thank you.       Reason for Disposition   Looks infected (e.g., spreading redness, red streak, pus)    Additional Information   Negative: Patient sounds very sick or weak to the triager   Negative: Fever and bump is tender to touch    Protocols used: Skin Lesion - Moles or Growths-A-OH    "

## 2024-01-03 ENCOUNTER — TELEPHONE (OUTPATIENT)
Dept: FAMILY MEDICINE | Facility: CLINIC | Age: 84
End: 2024-01-03
Payer: MEDICARE

## 2024-01-03 NOTE — TELEPHONE ENCOUNTER
Patient is calling and  stated she was informed that she would hear from the clinic yesterday after a triage telephone call was completed.  Patient stated she has not heard back from the clinic yet.  Patient verbalized PCP has directed her in the past to send a My Chart message and he would be able to contact her back to help with scheduling for more urgent concerns.  Patient stated she sent a My Chart message on 1/1/2024 and after triage call yesterday was hoping to hear back from PCP by now.    Patient is asking for a return call.  Patient call is in PCP Nurse triage encounters at this time.    Radha Javier, ANURADHA

## 2024-01-04 ENCOUNTER — OFFICE VISIT (OUTPATIENT)
Dept: FAMILY MEDICINE | Facility: CLINIC | Age: 84
End: 2024-01-04
Payer: MEDICARE

## 2024-01-04 VITALS
DIASTOLIC BLOOD PRESSURE: 60 MMHG | WEIGHT: 170.1 LBS | HEIGHT: 59 IN | TEMPERATURE: 96.2 F | OXYGEN SATURATION: 97 % | SYSTOLIC BLOOD PRESSURE: 122 MMHG | BODY MASS INDEX: 34.29 KG/M2 | HEART RATE: 72 BPM | RESPIRATION RATE: 16 BRPM

## 2024-01-04 DIAGNOSIS — I25.10 CORONARY ARTERY DISEASE INVOLVING NATIVE CORONARY ARTERY OF NATIVE HEART WITHOUT ANGINA PECTORIS: ICD-10-CM

## 2024-01-04 DIAGNOSIS — L98.9 NON-HEALING SKIN LESION: Primary | ICD-10-CM

## 2024-01-04 DIAGNOSIS — E11.21 TYPE 2 DIABETES MELLITUS WITH DIABETIC NEPHROPATHY, WITHOUT LONG-TERM CURRENT USE OF INSULIN (H): ICD-10-CM

## 2024-01-04 DIAGNOSIS — I10 HYPERTENSION GOAL BP (BLOOD PRESSURE) < 140/90: ICD-10-CM

## 2024-01-04 DIAGNOSIS — E66.01 MORBID OBESITY (H): ICD-10-CM

## 2024-01-04 DIAGNOSIS — N18.31 STAGE 3A CHRONIC KIDNEY DISEASE (H): ICD-10-CM

## 2024-01-04 PROCEDURE — 99213 OFFICE O/P EST LOW 20 MIN: CPT | Performed by: STUDENT IN AN ORGANIZED HEALTH CARE EDUCATION/TRAINING PROGRAM

## 2024-01-04 ASSESSMENT — PAIN SCALES - GENERAL: PAINLEVEL: NO PAIN (0)

## 2024-01-04 NOTE — PROGRESS NOTES
Assessment & Plan   Problem List Items Addressed This Visit          Digestive    Morbid obesity (H)       Endocrine    Type 2 diabetes mellitus with diabetic nephropathy (H)       Circulatory    Hypertension goal BP (blood pressure) < 140/90    Coronary artery disease involving native coronary artery without angina pectoris       Urinary    CKD (chronic kidney disease) stage 3, GFR 30-59 ml/min (H)     Other Visit Diagnoses       Non-healing skin lesion    -  Primary           Patient with slow healing lesion after cryotherapy.  Suspect her edema and diabetes playing a role as well as removal of granulation tissue.  No evidence of infection today and actually filled and compared to previous visit, no underlying induration or evidence of abscess present.  We did again discuss biopsy with concern for underlying skin cancer contributing to nonhealing lesion but this would likely require excisional biopsy and high stress area.  Options discussed with patient now and at this time given improvement we decided to continue to observe to see if this heals by secondary intention.  She will continue to monitor and let me know if new symptoms develop or things not improving.    Tyson Cano MD  [facility] IZABELA Davila is a 83 year old, presenting for the following health issues:  Wounds (Check wound on left leg)        1/4/2024    10:46 AM   Additional Questions   Roomed by Caroline YATES         1/4/2024    10:46 AM   Patient Reported Additional Medications   Patient reports taking the following new medications aspirin 81 mg daily       History of Present Illness       Reason for visit:  Sore on leg    She eats 2-3 servings of fruits and vegetables daily.She consumes 0 sweetened beverage(s) daily.She exercises with enough effort to increase her heart rate 20 to 29 minutes per day.  She exercises with enough effort to increase her heart rate 5 days per week.   She is taking medications  "regularly.     Patient with ongoing sore on her leg that has been very slow to heal since previous cryotherapy.  Did have scab removed recently and had irritation and drainage at the site.  She thought there was more redness around this but has resolved now.  Did not have any drainage.  Has been wearing compression stockings due to edema and thinks removing this rub scab.  Last A1c stable.  No other new growths or rashes noted.      Review of Systems   Constitutional, HEENT, cardiovascular, pulmonary, gi and gu systems are negative, except as otherwise noted.      Objective    /60 (BP Location: Right arm, Patient Position: Chair)   Pulse 72   Temp (!) 96.2  F (35.7  C) (Temporal)   Resp 16   Ht 1.505 m (4' 11.25\")   Wt 77.2 kg (170 lb 1.6 oz)   SpO2 97%   BMI 34.07 kg/m    Body mass index is 34.07 kg/m .  Physical Exam   GENERAL: healthy, alert and no distress  EYES: Eyes grossly normal to inspection, PERRL and conjunctivae and sclerae normal  RESP: lungs clear to auscultation - no rales, rhonchi or wheezes  CV: regular rate and rhythm, normal S1 S2, trace peripheral edema   MS: no gross musculoskeletal defects noted, no edema  SKIN: See picture  NEURO: Normal strength and tone, mentation intact and speech normal  PSYCH: mentation appears normal, affect normal/bright                "

## 2024-01-05 NOTE — TELEPHONE ENCOUNTER
This message was asked to be watched by other RN staff prior to this RN leaving the clinic.  Genna Lechuga RN stated she would monitor this message in triage for this dyad.  Upon return to work on 1/5/2024, this RN does not see an encounter for following up on last message noted below, but does note that patient was seen yesterday, 1/4/2024 in the office with Dr. Jerome MD.      Will close this encounter at this time.    Radha Javier RN

## 2024-02-29 ENCOUNTER — OFFICE VISIT (OUTPATIENT)
Dept: FAMILY MEDICINE | Facility: CLINIC | Age: 84
End: 2024-02-29
Payer: MEDICARE

## 2024-02-29 ENCOUNTER — NURSE TRIAGE (OUTPATIENT)
Dept: FAMILY MEDICINE | Facility: CLINIC | Age: 84
End: 2024-02-29

## 2024-02-29 ENCOUNTER — HOSPITAL ENCOUNTER (OUTPATIENT)
Dept: ULTRASOUND IMAGING | Facility: CLINIC | Age: 84
Discharge: HOME | End: 2024-02-29
Attending: NURSE PRACTITIONER | Admitting: NURSE PRACTITIONER
Payer: MEDICARE

## 2024-02-29 ENCOUNTER — MYC MEDICAL ADVICE (OUTPATIENT)
Dept: FAMILY MEDICINE | Facility: CLINIC | Age: 84
End: 2024-02-29

## 2024-02-29 VITALS
TEMPERATURE: 98.5 F | DIASTOLIC BLOOD PRESSURE: 56 MMHG | WEIGHT: 173.6 LBS | HEART RATE: 89 BPM | OXYGEN SATURATION: 97 % | HEIGHT: 59 IN | BODY MASS INDEX: 35 KG/M2 | RESPIRATION RATE: 18 BRPM | SYSTOLIC BLOOD PRESSURE: 122 MMHG

## 2024-02-29 DIAGNOSIS — M25.561 ACUTE PAIN OF RIGHT KNEE: Primary | ICD-10-CM

## 2024-02-29 DIAGNOSIS — M79.661 PAIN AND SWELLING OF LOWER LEG, RIGHT: ICD-10-CM

## 2024-02-29 DIAGNOSIS — Z96.651 S/P TKR (TOTAL KNEE REPLACEMENT), RIGHT: ICD-10-CM

## 2024-02-29 DIAGNOSIS — M79.89 PAIN AND SWELLING OF LOWER LEG, RIGHT: ICD-10-CM

## 2024-02-29 PROCEDURE — 99214 OFFICE O/P EST MOD 30 MIN: CPT | Performed by: NURSE PRACTITIONER

## 2024-02-29 PROCEDURE — 93971 EXTREMITY STUDY: CPT | Mod: RT

## 2024-02-29 NOTE — TELEPHONE ENCOUNTER
Patient sent in MyChart. See MyChart pasted below.     Nurse Triage SBAR    Is this a 2nd Level Triage? YES, LICENSED PRACTITIONER REVIEW IS REQUIRED    Situation: Patient has right leg pain from her knee to ankle that started about 1 week ago.     Background: Patient states she has a history of an unsuccessful knee replacement in the right leg. She states after surgery the knee cap was loose, and it still has not been fixed yet.     Assessment: Patient states starting about 1 week ago she has had right leg pain from her knee to her ankle. She states it has come on gradually. She states the pain this morning was 10/10, and it is still about that, and she is having troubles with walking. She states there is a lump or possible swelling right under the knee cap. She denies any other symptoms such as redness, fever, chest pain, SOB, back pain, rash, numbness, weakness.     Protocol Recommended Disposition:   Go To ED/UCC Now (Or To Office With PCP Approval)    Recommendation: Per protocol patient should go to ED or UC now, or to office with PCP approval. Writer huddled with PCP, and PCP states it would be appropriate for patient to see same day provider today. Called patient back and scheduled her for an appointment this afternoon with same day provider. Advised patient to call back or seek urgent/emergency care for any new or worsening symptoms. Patient verbalized understanding and had no further questions.     CARLOZ Duran, RN           Giovanni Petit  P [facility] Triage (supporting Tyson Cano MD)2 hours ago (7:00 AM)     I have been having a lot of pain in my right leg during the night.  When I got  up this morning it was even difficult to walk.  Reason for Disposition   Thigh or calf pain in only one leg and present > 1 hour    Additional Information   Negative: Looks like a broken bone or dislocated joint (e.g., crooked or deformed)   Negative: Sounds like a life-threatening emergency to the triager    "Negative: Followed a hip injury   Negative: Followed a knee injury   Negative: Followed an ankle or foot injury   Negative: Back pain radiating (shooting) into leg(s)   Negative: Foot pain is main symptom   Negative: Ankle pain is main symptom   Negative: Knee pain is main symptom   Negative: Leg swelling is main symptom   Negative: Chest pain   Negative: Difficulty breathing   Negative: Entire foot is cool or blue in comparison to other side   Negative: Unable to walk   Negative: Fever and red area (or area very tender to touch)   Negative: Swollen joint and fever    Answer Assessment - Initial Assessment Questions  1. ONSET: \"When did the pain start?\"       Approx 1 week ago   2. LOCATION: \"Where is the pain located?\"       Right knee into calf, down to ankle   3. PAIN: \"How bad is the pain?\"    (Scale 1-10; or mild, moderate, severe)    -  MILD (1-3): doesn't interfere with normal activities     -  MODERATE (4-7): interferes with normal activities (e.g., work or school) or awakens from sleep, limping     -  SEVERE (8-10): excruciating pain, unable to do any normal activities, unable to walk      10/10 this morning   4. WORK OR EXERCISE: \"Has there been any recent work or exercise that involved this part of the body?\"       No  5. CAUSE: \"What do you think is causing the leg pain?\"      Failed knee replacement, not sure   6. OTHER SYMPTOMS: \"Do you have any other symptoms?\" (e.g., chest pain, back pain, breathing difficulty, swelling, rash, fever, numbness, weakness)      No    7. PREGNANCY: \"Is there any chance you are pregnant?\" \"When was your last menstrual period?\"      N/A    Protocols used: Leg Pain-A-OH    "

## 2024-02-29 NOTE — PROGRESS NOTES
"  Assessment & Plan     Acute pain of right knee    S/P TKR (total knee replacement), right    Pain and swelling of lower leg, right  - US Lower Extremity Venous Duplex Right; Future    Giovanni presents to clinic today with concerns of right knee and leg pain for the past week. Given past medical history and risk factors, ultrasound of the right lower leg was performed today which was negative for any acute findings. Her pain is mostly concentrated to the right knee itself. We discussed options for pain management and further evaluation from Orthopedics. She is concerned to return to Orthopedics as she did not have good outcomes with her previous two knee replacements and is concerned that surgery would be her only option. She has previously followed with [facility] however after her TKA decided to be seen at [facility] with Dr. Tom instead. She is most interested in returning to [facility] for further evaluation. Referral was placed. We did discuss options with [facility] to be seen in their same-day/walk-in clinic \"[facility]\". We also discussed options for an x-ray to be completed today, however if she went to [facility] this would not be available to them without CD or other method of sending imaging. She would like to discuss this further with her son before deciding what she would like to do. I did offer options for pain control however she states she will take some Tylenol tonight. Reviewed need for urgent evaluation with any new or worsening symptoms. She verbalized understanding and will follow-up as needed.     I explained my diagnostic considerations and recommendations to the patient, who voiced understanding and agreement with the assessment and treatment plan. All questions were answered to patient's apparent satisfaction. We discussed potential side effects of any prescribed or recommended therapies, as well as expectations for response to treatments and importance " of lifestyle measures that may improve symptoms. Patient was advised to contact our office if there are new symptoms or no improvement or worsening of conditions or symptoms.    Greater than 31minutes were spent today with more than 50% dedicated to counseling and coordination of care of the above mentioned problems.                    Subjective   Giovanni is a 83 year old, presenting for the following health issues:  Musculoskeletal Problem      2/29/2024     4:43 PM   Additional Questions   Roomed by Bianca     History of Present Illness       Reason for visit:  Pain in right leg    She eats 2-3 servings of fruits and vegetables daily.She consumes 0 sweetened beverage(s) daily.She exercises with enough effort to increase her heart rate 20 to 29 minutes per day.  She exercises with enough effort to increase her heart rate 5 days per week.   She is taking medications regularly.     Giovanni presents today with concerns of pain in her right lower leg. She believes her pain started about a week ago and has been slowly progressing since that time. The pain starts just above the right knee, is worst at the lower aspect of the right knee and extends into the right lower leg and calf.  Her symptoms are worst at night and improve throughout the day. Her pain is most noticeable during the night and upon first wakening in the morning. She does sleep on her right side and tries to have a pillow between her knees. She has noticed some swelling develop just under the knee as well. She has not been taking any OTC medications. She denies any injury or trauma, she denies any redness, numbness or tingling. She has a history of right knee replacement, she was scheduled for revision back in 2020 but with the pandemic she did not have this done. This was to be completed with [facility].     Patient Active Problem List   Diagnosis    CKD (chronic kidney disease) stage 3, GFR 30-59 ml/min (H)    Hyperlipidemia LDL goal <100     "Hypertension goal BP (blood pressure) < 140/90    History of total left knee replacement    Other specified hypothyroidism    Coronary artery disease involving native coronary artery without angina pectoris    Type 2 diabetes mellitus with diabetic nephropathy (H)    Type 2 diabetes mellitus with other circulatory complications (H)    Osteoarthritis of left thumb    Morbid obesity (H)     Current Outpatient Medications   Medication    allopurinol (ZYLOPRIM) 100 MG tablet    amLODIPine (NORVASC) 10 MG tablet    azithromycin (ZITHROMAX) 250 MG tablet    blood glucose (ONETOUCH VERIO IQ) test strip    hydrochlorothiazide (HYDRODIURIL) 12.5 MG tablet    Lancets (ONETOUCH DELICA PLUS GOINEE65S) MISC    levothyroxine (SYNTHROID/LEVOTHROID) 50 MCG tablet    losartan (COZAAR) 25 MG tablet    metFORMIN (GLUCOPHAGE XR) 500 MG 24 hr tablet    multivitamin CF FORMULA (CHOICEFUL) capsule    nitroGLYcerin (NITROSTAT) 0.4 MG sublingual tablet    Probiotic Product (ACIDOPHILUS PROBIOTIC BLEND) CAPS    simvastatin (ZOCOR) 40 MG tablet    traZODone (DESYREL) 50 MG tablet    Vitamin D, Cholecalciferol, 25 MCG (1000 UT) CAPS    aspirin (ASA) 325 MG EC tablet     Current Facility-Administered Medications   Medication    lidocaine 1 % injection 1 mL       Review of Systems  Constitutional, HEENT, cardiovascular, pulmonary, gi and gu systems are negative, except as otherwise noted.      Objective    /56 (Cuff Size: Adult Regular)   Pulse 89   Temp 98.5  F (36.9  C) (Temporal)   Resp 18   Ht 1.499 m (4' 11\")   Wt 78.7 kg (173 lb 9.6 oz)   SpO2 97%   BMI 35.06 kg/m    Body mass index is 35.06 kg/m .  Physical Exam  Vitals reviewed.   Constitutional:       General: She is not in acute distress.     Appearance: Normal appearance.   HENT:      Head: Normocephalic and atraumatic.   Cardiovascular:      Rate and Rhythm: Normal rate and regular rhythm.      Heart sounds: Normal heart sounds.   Pulmonary:      Effort: Pulmonary effort " is normal.      Breath sounds: Normal breath sounds.   Musculoskeletal:      Right knee: Swelling, bony tenderness and crepitus present. No effusion, erythema or ecchymosis. Decreased range of motion. Tenderness present over the medial joint line, lateral joint line, MCL, LCL and patellar tendon. Normal alignment, normal meniscus and normal patellar mobility.      Instability Tests: Anterior drawer test negative. Posterior drawer test negative. Anterior Lachman test negative.   Skin:     General: Skin is warm and dry.      Capillary Refill: Capillary refill takes less than 2 seconds.   Neurological:      Mental Status: She is alert and oriented to person, place, and time. Mental status is at baseline.   Psychiatric:         Mood and Affect: Mood normal.         Behavior: Behavior normal.            US Lower Extremity Venous Duplex Right    Result Date: 2/29/2024  EXAM: US LOWER EXTREMITY VENOUS DUPLEX RIGHT LOCATION: [facility] DATE: 2/29/2024 INDICATION:  Pain and swelling of lower leg, right, Pain and swelling of lower leg, right COMPARISON: None. TECHNIQUE: Venous Duplex ultrasound of the right lower extremity with and without compression, augmentation and duplex. Color flow and spectral Doppler with waveform analysis performed. FINDINGS: Exam includes the common femoral, femoral, popliteal, and contralateral common femoral veins as well as segmentally visualized deep calf veins and greater saphenous vein. RIGHT: No deep vein thrombosis. No superficial thrombophlebitis. No popliteal cyst.     IMPRESSION: No deep venous thrombosis in the right lower extremity. Preliminary findings reported to the ordering provider by the sonographer at 5:28 PM 2/29/2024         Signed Electronically by: Mikaela Woodson NP

## 2024-03-23 DIAGNOSIS — E03.9 HYPOTHYROIDISM, UNSPECIFIED TYPE: ICD-10-CM

## 2024-03-25 RX ORDER — LEVOTHYROXINE SODIUM 50 UG/1
50 TABLET ORAL DAILY
Qty: 90 TABLET | Refills: 0 | Status: SHIPPED | OUTPATIENT
Start: 2024-03-25 | End: 2024-06-19

## 2024-04-02 ENCOUNTER — NURSE TRIAGE (OUTPATIENT)
Dept: FAMILY MEDICINE | Facility: CLINIC | Age: 84
End: 2024-04-02

## 2024-04-02 ENCOUNTER — OFFICE VISIT (OUTPATIENT)
Dept: FAMILY MEDICINE | Facility: CLINIC | Age: 84
End: 2024-04-02
Payer: MEDICARE

## 2024-04-02 ENCOUNTER — MYC MEDICAL ADVICE (OUTPATIENT)
Dept: FAMILY MEDICINE | Facility: CLINIC | Age: 84
End: 2024-04-02
Payer: MEDICARE

## 2024-04-02 VITALS
OXYGEN SATURATION: 99 % | RESPIRATION RATE: 12 BRPM | HEART RATE: 92 BPM | BODY MASS INDEX: 34.21 KG/M2 | WEIGHT: 169.7 LBS | TEMPERATURE: 98.4 F | HEIGHT: 59 IN | DIASTOLIC BLOOD PRESSURE: 60 MMHG | SYSTOLIC BLOOD PRESSURE: 128 MMHG

## 2024-04-02 DIAGNOSIS — Z29.8 * * * SBE PROPHYLAXIS * * *: ICD-10-CM

## 2024-04-02 DIAGNOSIS — J06.9 VIRAL URI: Primary | ICD-10-CM

## 2024-04-02 DIAGNOSIS — E11.59 TYPE 2 DIABETES MELLITUS WITH OTHER CIRCULATORY COMPLICATIONS (H): ICD-10-CM

## 2024-04-02 LAB — HBA1C MFR BLD: 6.9 %

## 2024-04-02 PROCEDURE — 99214 OFFICE O/P EST MOD 30 MIN: CPT | Performed by: STUDENT IN AN ORGANIZED HEALTH CARE EDUCATION/TRAINING PROGRAM

## 2024-04-02 PROCEDURE — 36415 COLL VENOUS BLD VENIPUNCTURE: CPT | Performed by: STUDENT IN AN ORGANIZED HEALTH CARE EDUCATION/TRAINING PROGRAM

## 2024-04-02 PROCEDURE — 83036 HEMOGLOBIN GLYCOSYLATED A1C: CPT | Performed by: STUDENT IN AN ORGANIZED HEALTH CARE EDUCATION/TRAINING PROGRAM

## 2024-04-02 ASSESSMENT — PAIN SCALES - GENERAL: PAINLEVEL: NO PAIN (0)

## 2024-04-02 NOTE — TELEPHONE ENCOUNTER
Huddled with Dr. Cano face to face. He states he can see patient in his 2:20 PM approval spot today.    Appointment made, called patient to update her. Advised patient to return call or seek urgent/emergency care for new or worsening symptoms. She verbalized understanding and had no further questions.     Kathia Prajapati, GLADYSN, RN

## 2024-04-02 NOTE — PROGRESS NOTES
"  Assessment & Plan   Problem List Items Addressed This Visit          Endocrine    Type 2 diabetes mellitus with other circulatory complications (H)    Relevant Orders    HEMOGLOBIN A1C (Completed)     Other Visit Diagnoses       Viral URI    -  Primary           Likely viral upper respiratory infection irritating her sinuses now with some eustachian tube dysfunction.  Plan to continue with symptomatic care for now.  We did discuss further signs of bacterial sinusitis and she will let me know if worsening or persistent symptoms.  Always for to potential developing allergies given her watery eye and sneezing less likely given her history.  If concerns moving forward we can do trial of Flonase or antihistamine.  Follow-up as needed.  Repeat A1c today stable with metformin and diet.     BMI  Estimated body mass index is 34.28 kg/m  as calculated from the following:    Height as of this encounter: 1.499 m (4' 11\").    Weight as of this encounter: 77 kg (169 lb 11.2 oz).   Weight management plan: Discussed healthy diet and exercise guidelines        Andreia Davila is a 84 year old, presenting for the following health issues:  Sinus Problem        4/2/2024     1:53 PM   Additional Questions   Roomed by Triston Hawley CMA     History of Present Illness       Reason for visit:  Sinus problem  Symptom onset:  1-3 days ago  Symptoms include:  Runny nose, ear congestion  Symptom intensity:  Moderate  Symptom progression:  Worsening  Had these symptoms before:  No  What makes it worse:  No  What makes it better:  No    She eats 2-3 servings of fruits and vegetables daily.She consumes 0 sweetened beverage(s) daily.She exercises with enough effort to increase her heart rate 10 to 19 minutes per day.  She exercises with enough effort to increase her heart rate 5 days per week.   She is taking medications regularly.     Patient with lots of sick contacts in her living facility but no specific bone she is aware of.  Notes 2 days of " "congestion and left-sided facial fullness.  Ears do seem somewhat plugged without pain.  No fevers or headache.  No sore throat.  Some clear drainage from her nose as well as left eye without pain or changes in vision.  No GI symptoms.  Minimal cough.  No history of allergies she is aware of but has noted sneezing more over the last several weeks.  Did note some discomfort into her head weeks ago that was inconsistent and lasted for short time without jaw claudication.  No other focal deficits.      Review of Systems  Constitutional, HEENT, cardiovascular, pulmonary, GI, , musculoskeletal, neuro, skin, endocrine and psych systems are negative, except as otherwise noted.      Objective    /60 (BP Location: Right arm, Patient Position: Chair, Cuff Size: Adult Large)   Pulse 92   Temp 98.4  F (36.9  C) (Temporal)   Resp 12   Ht 1.499 m (4' 11\")   Wt 77 kg (169 lb 11.2 oz)   SpO2 99%   BMI 34.28 kg/m    Body mass index is 34.28 kg/m .  Physical Exam   GENERAL: alert and no distress  EYES: Eyes grossly normal to inspection, PERRL and conjunctivae and sclerae normal  HENT: ear canals and TM's normal with mild retraction bilaterally, nose and mouth without ulcers or lesions, mild maxillary tenderness to palpation  NECK: no adenopathy, no asymmetry, masses, or scars  RESP: lungs clear to auscultation - no rales, rhonchi or wheezes  CV: regular rate and rhythm, normal S1 S2 no murmur, click or rub, no peripheral edema  ABDOMEN: soft, nontender, no hepatosplenomegaly, no masses and bowel sounds normal  MS: no gross musculoskeletal defects noted, no edema  SKIN: no suspicious lesions or rashes  NEURO: Normal strength and tone, mentation intact and speech normal  PSYCH: mentation appears normal, affect normal/bright          Signed Electronically by: Tyson Cano MD    "

## 2024-04-02 NOTE — TELEPHONE ENCOUNTER
Nurse Triage SBAR    Is this a 2nd Level Triage? YES, LICENSED PRACTITIONER REVIEW IS REQUIRED    Situation: Patient sent MyChart regarding runny nose. See MyChart pasted below.    Background: N/A    Assessment: Patient states she started having a runny nose yesterday. She states it has been constant since it started. She states so far it has been clear nasal discharge. She states this morning she noticed her ears feel congested, and when she puts her finger in her ear canal she states it is wet. She has not noticed the color of the drainage coming from her ears. She denies any other symptoms such as fever, cough, increased chest pain from baseline, SOB, headache.     Protocol Recommended Disposition:   See in Office Today    Recommendation: Per protocol, patient should be seen in office today. Patient requested an appointment with Dr. Cano or Dr. Quiroz as she has seen them before and would like to see them due to her history of heart problems. Advised patient that Dr. Cano has one spot for this afternoon at 2:20 PM, that I need to get approval for and will return her call.      GLADYS DuranN, RN      Giovanni [facility]AY[facility] (supporting Tyson Cano MD)2 minutes ago (8:21 AM)     I do not have a temperature nor am I coughing, just a runny nose problem.  Is there anything over the counter I can take safely with all the medications I am on or do I need an appointment.   Reason for Disposition   Pus or cloudy discharge from ear canal    Additional Information   Negative: Ear pain is main symptom   Negative: Hearing loss (complete or partial) is main symptom   Negative: Earwax is main concern   Negative: Has nasal allergies and they are acting up   Negative: Earache lasts > 1 hour    Protocols used: Ear - Congestion-A-OH

## 2024-04-22 NOTE — TELEPHONE ENCOUNTER
Called  She will start lotramin and cb if no imp in a few dd or if worsening  
Reason for Call:  Other Medication to treat an abdominal yeast infection     Detailed comments: Sandra has a yeast infection on her lower abdominal scar, she said a long time ago Dr Wray told her to put something on the scar to treat this, she can't remember if it's a prescription or an OTC medication, she would like Dr Wray to call her to let her know what she can use. Sandra is aware Dr Wray won't be in until later this afternoon.    Phone Number Patient can be reached at: Home number on file [phone] (home)    Best Time:     Can we leave a detailed message on this number? YES    Call taken on 12/5/2018 at 10:24 AM by Ирина Adames      
The patient is a 32y Female complaining of toothache.

## 2024-05-03 ENCOUNTER — MYC MEDICAL ADVICE (OUTPATIENT)
Dept: FAMILY MEDICINE | Facility: CLINIC | Age: 84
End: 2024-05-03
Payer: MEDICARE

## 2024-05-03 DIAGNOSIS — Z96.652 HISTORY OF TOTAL LEFT KNEE REPLACEMENT: Primary | ICD-10-CM

## 2024-05-03 DIAGNOSIS — Z29.8 * * * SBE PROPHYLAXIS * * *: ICD-10-CM

## 2024-05-03 RX ORDER — AZITHROMYCIN 250 MG/1
TABLET, FILM COATED ORAL
Qty: 6 TABLET | Refills: 0 | Status: CANCELLED | OUTPATIENT
Start: 2024-05-03

## 2024-05-03 NOTE — TELEPHONE ENCOUNTER
Patient requesting antibiotic pre dental visit.    Has had previous.     Last seen 4/2/2024    Rekha Jimenez RN  [facility] - Registered Nurse  Clinic Triage [facility]   May 3, 2024

## 2024-05-07 RX ORDER — AZITHROMYCIN 250 MG/1
TABLET, FILM COATED ORAL
Qty: 6 TABLET | Refills: 0 | Status: SHIPPED | OUTPATIENT
Start: 2024-05-07

## 2024-05-14 ENCOUNTER — OFFICE VISIT (OUTPATIENT)
Dept: FAMILY MEDICINE | Facility: CLINIC | Age: 84
End: 2024-05-14
Payer: MEDICARE

## 2024-05-14 VITALS
SYSTOLIC BLOOD PRESSURE: 132 MMHG | BODY MASS INDEX: 34.01 KG/M2 | DIASTOLIC BLOOD PRESSURE: 72 MMHG | WEIGHT: 173.25 LBS | RESPIRATION RATE: 18 BRPM | HEART RATE: 79 BPM | TEMPERATURE: 97.6 F | OXYGEN SATURATION: 97 % | HEIGHT: 60 IN

## 2024-05-14 DIAGNOSIS — M54.50 ACUTE LEFT-SIDED LOW BACK PAIN WITHOUT SCIATICA: Primary | ICD-10-CM

## 2024-05-14 PROBLEM — E66.01 MORBID OBESITY (H): Status: RESOLVED | Noted: 2020-11-11 | Resolved: 2024-05-14

## 2024-05-14 PROCEDURE — 99213 OFFICE O/P EST LOW 20 MIN: CPT | Performed by: FAMILY MEDICINE

## 2024-05-14 ASSESSMENT — PAIN SCALES - GENERAL: PAINLEVEL: WORST PAIN (10)

## 2024-05-14 NOTE — PROGRESS NOTES
Assessment & Plan     ,ASSESSMENT/ORDERS:    ICD-10-CM    1. Acute left-sided low back pain without sciatica  M54.50         PLAN:   Discussed pain is most likely muscular in nature. Recommended physical therapy and scheduling Tylenol 1000 mg every 8 hours for pain management.          Patient was seen and examined by myself and Christopher Moreira MD. The note was then documented by me.   Na Maloney, MS3    I was present with the medical student who participated in the service and in the documentation of the note. I have verified the history and personally performed the physical exam and medical decision making. I agree with the assessment and plan of care as documented in the note.    Christopher Moreira MD           Subjective   Giovanni is a 84 year old, presenting for the following health issues:  Back Injury (Hit a wall in the middle of falling. Pt is unsure why pt fell.)        5/14/2024    11:06 AM   Additional Questions   Roomed by Siena     History of Present Illness       Back Pain:  She presents for follow up of back pain. Patient's back pain is a new problem.    Original cause of back pain: a near-fall  First noticed back pain: in the last week  Patient feels back pain: constantlyLocation of back pain:  Left lower back  Description of back pain: dull ache  Back pain spreads: nowhere    Since patient first noticed back pain, pain is: gradually worsening  Does back pain interfere with her job:  Not applicable  On a scale of 1-10 (10 being the worst), patient describes pain as:  10  What makes back pain worse: other   Acupuncture: not tried  Acetaminophen: not helpful  Activity or exercise: not tried  Chiropractor:  Not tried  Cold: not tried  Heat: not tried  Massage: not tried  Muscle relaxants: not tried  NSAIDS: not tried  Opioids: not tried  Physical Therapy: not tried  Rest: not tried  Steroid Injection: not tried  Stretching: not tried  Surgery: not tried  TENS unit: not tried  Topical pain  "relievers: not tried  Other healthcare providers patient is seeing for back pain: None    She eats 2-3 servings of fruits and vegetables daily.She consumes 0 sweetened beverage(s) daily.She exercises with enough effort to increase her heart rate 9 or less minutes per day.  She exercises with enough effort to increase her heart rate 3 or less days per week.   She is taking medications regularly.       Giovanni is a 84 year old woman and had a near fall on Friday (5/10/24) and now has left sided back pain. She notes that she was bring in her groceries when she lost her footing and fell back on the corner of the wall. She denies falling to the ground or hitting her head. She denies any dizziness or heart palpitations before the fall. Back pain started right away. She reports that it worsened yesterday during a drive to the dentist. Has been taking extra strength tylenol and using heat without relief. Walking is painful. Has not noticed any trauma on the skin. Has not been in to be seen for this condition previously.    She also notes that there was a growth in the area where the pain is located, was told it was benign and believes she may have hit is directly on as well.     Denies loss of bladder or bowel control, no bloody urine, no numbness or tingling.    Reports walks with cane due to bilateral knee replacements.      Review of Systems  Constitutional, HEENT, cardiovascular, pulmonary, gi and gu systems are negative, except as otherwise noted.      Objective    /72   Pulse 79   Temp 97.6  F (36.4  C) (Temporal)   Resp 18   Ht 1.52 m (4' 11.84\")   Wt 78.6 kg (173 lb 4 oz)   SpO2 97%   BMI 34.01 kg/m    Body mass index is 34.01 kg/m .  Physical Exam   GENERAL: alert and no distress  RESP: lungs clear to auscultation - no rales, rhonchi or wheezes  CV: regular rate and rhythm, normal S1 S2, no S3 or S4, no murmur, click or rub, no peripheral edema  MS: no visible deformity or bruising, spine exam shows no " vertebral tenderness.  Palpable lipoma and muscle tension of the left lower back, lower extremity strength intact and  equal bilaterally, negative straight leg raise, patellar reflexes 2+ bilaterally, labored gait        Signed Electronically by: Christopher Moreira MD

## 2024-05-14 NOTE — PATIENT INSTRUCTIONS
Okay for acetaminophen 1000 mg every 8 hours (max 3000 mg/day)  Ice and heat as tolerated for back pain

## 2024-05-30 ENCOUNTER — TELEPHONE (OUTPATIENT)
Dept: ORTHOPEDICS | Facility: CLINIC | Age: 84
End: 2024-05-30
Payer: MEDICARE

## 2024-05-30 NOTE — TELEPHONE ENCOUNTER
Other: pt has an appt 06/06 r knee pain DOS 2015 w/ Dr. Corenll, requested to see Dr. Wesley only, wants to consult for cortisone injection / self / medicare / mri, xr on file. Can care team review and eval if provider ok to see?      [email or web address] or would you prefer to receive a phone call?:   Patient would prefer a phone call   Okay to leave a detailed message?: Yes at Home number on file [phone] (home)

## 2024-05-30 NOTE — TELEPHONE ENCOUNTER
Patient is not appropriate to be seen by sports medicine for knee injections since she has had both knees replaced. Please call patient and get her scheduled with one of the multiple orthopedic surgeons that go to [place]. Thank you. Gail Dsouza ATC

## 2024-06-06 NOTE — TELEPHONE ENCOUNTER
Spoke with patient. She notes that she is having pain around her bilateral patellae. Bilateral TKA. Notes that she has seen 2 different surgeons in [place] and is looking to not see an orthopedic surgeon in [place]. Had been seeing orthopedics in [place] and will be calling to reschedule with them. Gail Dsouza, ATC

## 2024-06-06 NOTE — TELEPHONE ENCOUNTER
[facility] Call [facility]    Phone Message    May a detailed message be left on voicemail: yes     Reason for Call: Other: Patient called back and said that her neighbor had an injection done with Dr. Wesley that's why she requested him. She refused to schedule with an orthopedic surgeons. No further questions at this time.     Action Taken: Sports Medicine    Travel Screening: Not Applicable     Date of Service:

## 2024-06-19 DIAGNOSIS — E03.9 HYPOTHYROIDISM, UNSPECIFIED TYPE: ICD-10-CM

## 2024-06-19 RX ORDER — LEVOTHYROXINE SODIUM 50 UG/1
50 TABLET ORAL DAILY
Qty: 90 TABLET | Refills: 1 | Status: SHIPPED | OUTPATIENT
Start: 2024-06-19

## 2024-06-23 ENCOUNTER — NURSE TRIAGE (OUTPATIENT)
Dept: NURSING | Facility: CLINIC | Age: 84
End: 2024-06-23
Payer: MEDICARE

## 2024-06-23 ENCOUNTER — APPOINTMENT (OUTPATIENT)
Dept: CT IMAGING | Facility: CLINIC | Age: 84
End: 2024-06-23
Attending: FAMILY MEDICINE
Payer: MEDICARE

## 2024-06-23 ENCOUNTER — HOSPITAL ENCOUNTER (EMERGENCY)
Facility: CLINIC | Age: 84
Discharge: HOME | End: 2024-06-23
Attending: FAMILY MEDICINE | Admitting: FAMILY MEDICINE
Payer: MEDICARE

## 2024-06-23 VITALS
HEART RATE: 71 BPM | SYSTOLIC BLOOD PRESSURE: 179 MMHG | OXYGEN SATURATION: 97 % | DIASTOLIC BLOOD PRESSURE: 62 MMHG | WEIGHT: 169 LBS | RESPIRATION RATE: 18 BRPM | TEMPERATURE: 98.4 F | BODY MASS INDEX: 33.18 KG/M2
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
## 2024-06-23 LAB
ALBUMIN SERPL BCG-MCNC: 4.3 G/DL (ref 3.5–5.2)
ALP SERPL-CCNC: 120 U/L (ref 40–150)
ALT SERPL W P-5'-P-CCNC: 15 U/L (ref 0–50)
ANION GAP SERPL CALCULATED.3IONS-SCNC: 12 MMOL/L (ref 7–15)
AST SERPL W P-5'-P-CCNC: 16 U/L (ref 0–45)
BASOPHILS # BLD AUTO: 0.1 10E3/UL (ref 0–0.2)
BASOPHILS NFR BLD AUTO: 1 %
BILIRUB SERPL-MCNC: 0.2 MG/DL
BUN SERPL-MCNC: 38 MG/DL (ref 8–23)
CALCIUM SERPL-MCNC: 9.8 MG/DL (ref 8.8–10.2)
CHLORIDE SERPL-SCNC: 100 MMOL/L (ref 98–107)
CREAT SERPL-MCNC: 1.69 MG/DL (ref 0.51–0.95)
DEPRECATED HCO3 PLAS-SCNC: 27 MMOL/L (ref 22–29)
EGFRCR SERPLBLD CKD-EPI 2021: 30 ML/MIN/1.73M2
EOSINOPHIL # BLD AUTO: 0.5 10E3/UL (ref 0–0.7)
EOSINOPHIL NFR BLD AUTO: 5 %
ERYTHROCYTE [DISTWIDTH] IN BLOOD BY AUTOMATED COUNT: 14.5 % (ref 10–15)
GLUCOSE SERPL-MCNC: 146 MG/DL (ref 70–99)
HCT VFR BLD AUTO: 37.9 % (ref 35–47)
HGB BLD-MCNC: 12.5 G/DL (ref 11.7–15.7)
IMM GRANULOCYTES # BLD: 0 10E3/UL
IMM GRANULOCYTES NFR BLD: 0 %
LYMPHOCYTES # BLD AUTO: 2.1 10E3/UL (ref 0.8–5.3)
LYMPHOCYTES NFR BLD AUTO: 23 %
MCH RBC QN AUTO: 30.8 PG (ref 26.5–33)
MCHC RBC AUTO-ENTMCNC: 33 G/DL (ref 31.5–36.5)
MCV RBC AUTO: 93 FL (ref 78–100)
MONOCYTES # BLD AUTO: 0.9 10E3/UL (ref 0–1.3)
MONOCYTES NFR BLD AUTO: 9 %
NEUTROPHILS # BLD AUTO: 5.9 10E3/UL (ref 1.6–8.3)
NEUTROPHILS NFR BLD AUTO: 62 %
NRBC # BLD AUTO: 0 10E3/UL
NRBC BLD AUTO-RTO: 0 /100
PLATELET # BLD AUTO: 275 10E3/UL (ref 150–450)
POTASSIUM SERPL-SCNC: 4 MMOL/L (ref 3.4–5.3)
PROT SERPL-MCNC: 7.4 G/DL (ref 6.4–8.3)
RBC # BLD AUTO: 4.06 10E6/UL (ref 3.8–5.2)
SODIUM SERPL-SCNC: 139 MMOL/L (ref 135–145)
TROPONIN T SERPL HS-MCNC: 19 NG/L
WBC # BLD AUTO: 9.5 10E3/UL (ref 4–11)

## 2024-06-23 PROCEDURE — 93005 ELECTROCARDIOGRAM TRACING: CPT | Performed by: FAMILY MEDICINE

## 2024-06-23 PROCEDURE — 36415 COLL VENOUS BLD VENIPUNCTURE: CPT | Performed by: FAMILY MEDICINE

## 2024-06-23 PROCEDURE — G1010 CDSM STANSON: HCPCS

## 2024-06-23 PROCEDURE — 84484 ASSAY OF TROPONIN QUANT: CPT | Performed by: FAMILY MEDICINE

## 2024-06-23 PROCEDURE — 93010 ELECTROCARDIOGRAM REPORT: CPT | Performed by: FAMILY MEDICINE

## 2024-06-23 PROCEDURE — 80053 COMPREHEN METABOLIC PANEL: CPT | Performed by: FAMILY MEDICINE

## 2024-06-23 PROCEDURE — 70450 CT HEAD/BRAIN W/O DYE: CPT | Mod: ME

## 2024-06-23 PROCEDURE — 99284 EMERGENCY DEPT VISIT MOD MDM: CPT | Performed by: FAMILY MEDICINE

## 2024-06-23 PROCEDURE — 99285 EMERGENCY DEPT VISIT HI MDM: CPT | Mod: 25 | Performed by: FAMILY MEDICINE

## 2024-06-23 PROCEDURE — 85025 COMPLETE CBC W/AUTO DIFF WBC: CPT | Performed by: FAMILY MEDICINE

## 2024-06-23 ASSESSMENT — ACTIVITIES OF DAILY LIVING (ADL)
ADLS_ACUITY_SCORE: 40
ADLS_ACUITY_SCORE: 40

## 2024-06-23 ASSESSMENT — COLUMBIA-SUICIDE SEVERITY RATING SCALE - C-SSRS
2. HAVE YOU ACTUALLY HAD ANY THOUGHTS OF KILLING YOURSELF IN THE PAST MONTH?: NO
6. HAVE YOU EVER DONE ANYTHING, STARTED TO DO ANYTHING, OR PREPARED TO DO ANYTHING TO END YOUR LIFE?: NO
1. IN THE PAST MONTH, HAVE YOU WISHED YOU WERE DEAD OR WISHED YOU COULD GO TO SLEEP AND NOT WAKE UP?: NO

## 2024-06-24 ENCOUNTER — PATIENT OUTREACH (OUTPATIENT)
Dept: CARE COORDINATION | Facility: CLINIC | Age: 84
End: 2024-06-24
Payer: MEDICARE

## 2024-06-24 ENCOUNTER — MYC MEDICAL ADVICE (OUTPATIENT)
Dept: FAMILY MEDICINE | Facility: CLINIC | Age: 84
End: 2024-06-24
Payer: MEDICARE

## 2024-06-24 DIAGNOSIS — E11.59 DIABETIC VASCULOPATHY (H): ICD-10-CM

## 2024-06-24 RX ORDER — LANCETS 30 GAUGE
EACH MISCELLANEOUS
Qty: 100 EACH | Refills: 3 | Status: SHIPPED | OUTPATIENT
Start: 2024-06-24

## 2024-06-24 RX ORDER — BLOOD SUGAR DIAGNOSTIC
STRIP MISCELLANEOUS
Qty: 100 STRIP | Refills: 0 | Status: SHIPPED | OUTPATIENT
Start: 2024-06-24 | End: 2024-06-27

## 2024-06-24 NOTE — PROGRESS NOTES
[facility]  Community Health Worker Initial Outreach    CHW Initial Information Gathering:  Referral Source: ED Follow-Up  CHW Additional Questions  If ED/Hospital discharge, follow-up appointment scheduled as recommended?: Yes  MyChart active?: Yes    Pt states she thinks she left her AVS paperwork in the ED. CHW offered to review it and informed patient it is also on her MyChart, to which she states she has everything from MyChart so we did not review her AVS during this call.     Patient accepts CC: No, patient declined at this time. Patient will be sent Care Coordination introduction letter for future reference.       Lisa Hernandes  Community Health Worker  Connected Care Resource [place], [facility]    *Connected Care Resource Team does NOT follow patient ongoing. Referrals are identified based on internal discharge reports and the outreach is to ensure patient has an understanding of their discharge instructions.

## 2024-06-24 NOTE — TELEPHONE ENCOUNTER
"Pt reports she fell \"around 6 pm, in my living room, kind of remember turning, next thing on left side on floor\". Pt reports after the fall \"walked a little bit, just sitting in chair, just feel different\". Pt reports she has increased difficulty breathing, new onset back pain. Pt reports she is not sure she fainted but sounds like fainting to Writer as pt is vague about incident and does not seem to remember what caused her to fall.     Writer advised pt to hang up and dial 911 now per protocol.     Pt verbalizes understanding and agrees to plan.       Reason for Disposition   Fainted (passed out)   Difficulty breathing    Additional Information   Negative: Major injury from dangerous force (e.g., fall > 10 feet or 3 meters)   Negative: [1] Major bleeding (e.g., actively dripping or spurting) AND [2] can't be stopped   Negative: Shock suspected (e.g., cold/pale/clammy skin, too weak to stand)   Negative: Difficult to awaken or acting confused (e.g., disoriented, slurred speech)   Negative: [1] SEVERE weakness (i.e., unable to walk or barely able to walk, requires support) AND [2] new-onset or worsening   Negative: [1] Can't stand (bear weight) or walk AND [2] new-onset after fall   Negative: Sounds like a life-threatening emergency to the triager   Negative: Still unconscious   Negative: Difficult to awaken or acting confused (e.g., disoriented, slurred speech)   Negative: Shock suspected (e.g., cold/pale/clammy skin, too weak to stand, low BP, rapid pulse)    Protocols used: Falls and [id number], [id number]    "

## 2024-06-24 NOTE — ED PROVIDER NOTES
[facility] ED Provider Note   Patient: Sandra Petit  MRN #:  [id number]  Date of Visit: June 23, 2024    CC:     Chief Complaint   Patient presents with    Fall     HPI:  Sandra Petit is a 84 year old female who presented to the emergency department by ambulance after she possibly lost her balance and fell.  She states that she was standing by her couch in the living room at around 6 PM.  She was turning, and suddenly fell on her left side.  She found herself on the floor with her left arm folded underneath.  She has a life alert that should have triggered but she was eventually able to pull herself to her knees and then was able to walk.  She did not think that she broke anything.  She has been experiencing some balance issues recently, and was going to make an appointment in the clinic.  She does not recall any symptoms prior to her falling.  She has not at all sure whether she blacked out or lost consciousness.  She still has mild headache, and some neck pain.  She denies any premonitory symptoms of chest pain, palpitations, shortness of breath or any near syncopal symptoms.  After she found herself on the floor, she was not diaphoretic and has not had any nausea or vomiting.  Patient was concerned that she may have had a stroke since almost all of her siblings have had strokes.  She has a history of coronary artery disease, status post CABG in 2019.  She had a pacemaker placed in 2022.  Patient denies any grogginess, loss of control of bowel or bladder, or other suspicion for seizure.    Problem List:  Patient Active Problem List    Diagnosis Date Noted    Osteoarthritis of left thumb 07/07/2017     Priority: Medium    Type 2 diabetes mellitus with other circulatory complications (H) 04/04/2016     Priority: Medium    Other specified hypothyroidism 10/02/2015     Priority: Medium    Coronary artery disease involving native coronary artery  "without angina pectoris 10/02/2015     Priority: Medium    Type 2 diabetes mellitus with diabetic nephropathy (H) 10/02/2015     Priority: Medium    History of total left knee replacement 04/29/2013     Priority: Medium    Hypertension goal BP (blood pressure) < 140/90 09/26/2011     Priority: Medium    Hyperlipidemia LDL goal <100 10/31/2010     Priority: Medium    CKD (chronic kidney disease) stage 3, GFR 30-59 ml/min (H) 03/17/2009     Priority: Medium       Past Medical History:   Diagnosis Date    Diabetic eye exam (H) 08/05/13    Endometriosis, site unspecified     Fever and other physiologic disturbances of temperature regulation 07/07/2005    Heart disease     Myalgia and myositis, unspecified     Pure hypercholesterolemia     Unspecified essential hypertension     Unspecified hypothyroidism        MEDS: allopurinol (ZYLOPRIM) 100 MG tablet  amLODIPine (NORVASC) 10 MG tablet  aspirin (ASA) 325 MG EC tablet  azithromycin (ZITHROMAX) 250 MG tablet  blood glucose (ONETOUCH VERIO IQ) test strip  hydrochlorothiazide (HYDRODIURIL) 12.5 MG tablet  Lancets (ONETOUCH DELICA PLUS [id number]) MISC  levothyroxine (SYNTHROID/LEVOTHROID) 50 MCG tablet  losartan (COZAAR) 25 MG tablet  metFORMIN (GLUCOPHAGE XR) 500 MG 24 hr tablet  multivitamin CF FORMULA (CHOICEFUL) capsule  nitroGLYcerin (NITROSTAT) 0.4 MG sublingual tablet  Probiotic Product (ACIDOPHILUS PROBIOTIC BLEND) CAPS  simvastatin (ZOCOR) 40 MG tablet  traZODone (DESYREL) 50 MG tablet  Vitamin D, Cholecalciferol, 25 MCG (1000 UT) CAPS        ALLERGIES:    Allergies   Allergen Reactions    Penicillins Swelling     \"throat started swelling\"    Vitamin B Complex Hives    Vitamin B12 Hives     all vitamin B's    Clindamycin Hcl Rash       Past Surgical History:   Procedure Laterality Date    ARTHROPLASTY KNEE  4/29/2013    Procedure: ARTHROPLASTY KNEE;  left total knee arthroplasty;  Surgeon: Teddy Grimes MD;  [facility]    ARTHROPLASTY KNEE Right " 8/27/2018    Procedure: ARTHROPLASTY KNEE;  right total knee arthroplasty;  Surgeon: Johnny Anaya MD;  [facility]    COMBINED CYSTOSCOPY, INSERT CATHETER URETER Bilateral 8/10/2015    Procedure: COMBINED CYSTOSCOPY, INSERT CATHETER URETER;  Surgeon: Johnnie Madera MD;  [facility]    DILATION AND CURETTAGE, HYSTEROSCOPY DIAGNOSTIC, COMBINED N/A 11/6/2014    Procedure: COMBINED DILATION AND CURETTAGE, HYSTEROSCOPY DIAGNOSTIC;  Surgeon: Edward Pardo MD;  [facility]    ESOPHAGOSCOPY, GASTROSCOPY, DUODENOSCOPY (EGD), COMBINED N/A 1/27/2015    Procedure: COMBINED ESOPHAGOSCOPY, GASTROSCOPY, DUODENOSCOPY (EGD), BIOPSY SINGLE OR MULTIPLE;  Surgeon: Carmelo Rosario MD;  Location: [facility]    HC REMOVAL GALLBLADDER      Cholecystectomy    HC REMOVE TONSILS/ADENOIDS,<13 Y/O      unsure of age    LAPAROSCOPIC HYSTERECTOMY TOTAL N/A 8/10/2015    Procedure: LAPAROSCOPIC HYSTERECTOMY TOTAL;  Surgeon: Edward Pardo MD;  [facility]    LAPAROSCOPIC LYSIS ADHESIONS N/A 8/10/2015    Procedure: LAPAROSCOPIC LYSIS ADHESIONS;  Surgeon: Edward Pardo MD;  [facility]: [facility]    ZZC APPENDECTOMY      [facility] EXPLOR HEART SURG WND W CP BYPASS Bilateral 03/20/2019    [facility] NONSPECIFIC PROCEDURE      Knee ligament surgery    [facility] NONSPECIFIC PROCEDURE      Kidney surgery for mechanical obstruction    Z OPEN CORONARY ENDARTERECTOMY  june 2005    Angioplasty w stenting    Z TOTAL KNEE ARTHROPLASTY  4/29/13    Left       Social History     Tobacco Use    Smoking status: Former    Smokeless tobacco: Never    Tobacco comments:     quit  1987   Vaping Use    Vaping status: Never Used   Substance Use Topics    Alcohol use: No     Alcohol/week: 0.0 standard drinks of alcohol    Drug use: No         Review of Systems   Except as noted in HPI, all other systems were reviewed and are negative    Physical Exam   Vitals were reviewed  Patient Vitals for the past 12 hrs:   BP Temp Temp  src Pulse Resp SpO2 Weight   06/23/24 2107 (!) 179/62 98.4  F (36.9  C) Oral 71 18 97 % 76.7 kg (169 lb)     GENERAL APPEARANCE: Alert and oriented x 3, no acute distress, GCS 15  HEAD: Traumatic  FACE: normal facies  EYES: Pupils are equal and reactive to light: Extraocular muscles are intact  HENT: normal external exam  NECK: no adenopathy or asymmetry; no carotid bruits, some tenderness along the lower cervical spine and upper thoracic region.  RESP: normal respiratory effort; clear breath sounds bilaterally  CV: regular rate and rhythm; no significant murmurs, gallops or rubs  ABD: soft, obese, no tenderness; no rebound or guarding; bowel sounds are normal  MS: no gross deformities noted; normal muscle tone.  EXT: Some generalized tenderness to the left elbow but no deformity and normal range of motion in the shoulder and elbows.  Scars over both knees from previous knee replacements.  Normal range of motion in the lower extremities including hips.  SKIN: no worrisome rash  NEURO: no facial droop; no focal deficits, speech is normal        Available Lab/Imaging Results     Results for orders placed or performed during the hospital encounter of 06/23/24 (from the past 24 hour(s))   CBC with platelets differential    Narrative    The following orders were created for panel order CBC with platelets differential.  Procedure                               Abnormality         Status                     ---------                               -----------         ------                     CBC with platelets and d...[[id number]]                      Final result                 Please view results for these tests on the individual orders.   Comprehensive metabolic panel   Result Value Ref Range    Sodium 139 135 - 145 mmol/L    Potassium 4.0 3.4 - 5.3 mmol/L    Carbon Dioxide (CO2) 27 22 - 29 mmol/L    Anion Gap 12 7 - 15 mmol/L    Urea Nitrogen 38.0 (H) 8.0 - 23.0 mg/dL    Creatinine 1.69 (H) 0.51 - 0.95 mg/dL    GFR  Estimate 30 (L) >60 mL/min/1.73m2    Calcium 9.8 8.8 - 10.2 mg/dL    Chloride 100 98 - 107 mmol/L    Glucose 146 (H) 70 - 99 mg/dL    Alkaline Phosphatase 120 40 - 150 U/L    AST 16 0 - 45 U/L    ALT 15 0 - 50 U/L    Protein Total 7.4 6.4 - 8.3 g/dL    Albumin 4.3 3.5 - 5.2 g/dL    Bilirubin Total 0.2 <=1.2 mg/dL   Troponin T, High Sensitivity   Result Value Ref Range    Troponin T, High Sensitivity 19 (H) <=14 ng/L   CBC with platelets and differential   Result Value Ref Range    WBC Count 9.5 4.0 - 11.0 10e3/uL    RBC Count 4.06 3.80 - 5.20 10e6/uL    Hemoglobin 12.5 11.7 - 15.7 g/dL    Hematocrit 37.9 35.0 - 47.0 %    MCV 93 78 - 100 fL    MCH 30.8 26.5 - 33.0 pg    MCHC 33.0 31.5 - 36.5 g/dL    RDW 14.5 10.0 - 15.0 %    Platelet Count 275 150 - 450 10e3/uL    % Neutrophils 62 %    % Lymphocytes 23 %    % Monocytes 9 %    % Eosinophils 5 %    % Basophils 1 %    % Immature Granulocytes 0 %    NRBCs per 100 WBC 0 <1 /100    Absolute Neutrophils 5.9 1.6 - 8.3 10e3/uL    Absolute Lymphocytes 2.1 0.8 - 5.3 10e3/uL    Absolute Monocytes 0.9 0.0 - 1.3 10e3/uL    Absolute Eosinophils 0.5 0.0 - 0.7 10e3/uL    Absolute Basophils 0.1 0.0 - 0.2 10e3/uL    Absolute Immature Granulocytes 0.0 <=0.4 10e3/uL    Absolute NRBCs 0.0 10e3/uL   Head CT w/o contrast    Narrative    EXAM: CT HEAD W/O CONTRAST, CT CERVICAL SPINE W/O CONTRAST  LOCATION: [facility]  DATE: 6/23/2024    INDICATION: Fall; head injury.  COMPARISON: Brain MRI 11/3/2022, CTA head and neck 8/15/2022.   TECHNIQUE:   1) Routine CT Head without IV contrast. Multiplanar reformats. Dose reduction techniques were used.  2) Routine CT Cervical Spine without IV contrast. Multiplanar reformats. Dose reduction techniques were used.    FINDINGS:   HEAD CT:   INTRACRANIAL CONTENTS: No intracranial hemorrhage, extraaxial collection, or mass effect. No CT evidence of acute infarct. Moderate volume loss and presumed chronic small vessel ischemia  are stable.     VISUALIZED ORBITS/SINUSES/MASTOIDS: No intraorbital abnormality. No paranasal sinus mucosal disease. No middle ear or mastoid effusion.    BONES/SOFT TISSUES: No acute abnormality.    CERVICAL SPINE CT:   VERTEBRA: Alignment is normal apart from slight degenerative anterolisthesis C4 on C5. No acute cervical spine fracture or posttraumatic subluxation. Mild to moderate loss of disc space height C5-C6. Mild multilevel facet arthropathy.    CANAL/FORAMINA: No canal narrowing. Moderate left C5-C6 degenerative foraminal narrowing.    PARASPINAL: No extraspinal abnormality. Visualized lung fields are clear.      Impression    IMPRESSION:  HEAD CT:  1.  No acute intracranial abnormality or significant change compared to the prior study.    CERVICAL SPINE CT:  1.  No acute cervical spine fracture.   CT Cervical Spine w/o Contrast    Narrative    EXAM: CT HEAD W/O CONTRAST, CT CERVICAL SPINE W/O CONTRAST  LOCATION: [facility]  DATE: 6/23/2024    INDICATION: Fall; head injury.  COMPARISON: Brain MRI 11/3/2022, CTA head and neck 8/15/2022.   TECHNIQUE:   1) Routine CT Head without IV contrast. Multiplanar reformats. Dose reduction techniques were used.  2) Routine CT Cervical Spine without IV contrast. Multiplanar reformats. Dose reduction techniques were used.    FINDINGS:   HEAD CT:   INTRACRANIAL CONTENTS: No intracranial hemorrhage, extraaxial collection, or mass effect. No CT evidence of acute infarct. Moderate volume loss and presumed chronic small vessel ischemia are stable.     VISUALIZED ORBITS/SINUSES/MASTOIDS: No intraorbital abnormality. No paranasal sinus mucosal disease. No middle ear or mastoid effusion.    BONES/SOFT TISSUES: No acute abnormality.    CERVICAL SPINE CT:   VERTEBRA: Alignment is normal apart from slight degenerative anterolisthesis C4 on C5. No acute cervical spine fracture or posttraumatic subluxation. Mild to moderate loss of disc space height  "C5-C6. Mild multilevel facet arthropathy.    CANAL/FORAMINA: No canal narrowing. Moderate left C5-C6 degenerative foraminal narrowing.    PARASPINAL: No extraspinal abnormality. Visualized lung fields are clear.      Impression    IMPRESSION:  HEAD CT:  1.  No acute intracranial abnormality or significant change compared to the prior study.    CERVICAL SPINE CT:  1.  No acute cervical spine fracture.     *Note: Due to a large number of results and/or encounters for the requested time period, some results have not been displayed. A complete set of results can be found in Results Review.     EKG reviewed by me: sinus rhythm with HR 83. Normal [place], QRS and QT intervals. Nonspecific T wave abnormality. No acute ischemic changes. No significant change compared with previous EKG Dec 2022.           Impression     Final diagnoses:   Fall vs syncope         ED Course & Medical Decision Making   Sandra Petit is a 84 year old female who presented to the emergency department by ambulance after she was turning, and found herself laying on the floor.  She has been experiencing some \"balance\" issues, is not sure at all whether she lost consciousness, but does recall falling to the left side.  She found herself on the floor with her arm folded underneath her.  She was able to get herself up eventually to a chair and was able to walk around.  She called 911 because of concern for possible stroke.  She fell around 6 PM, and took a while before she called 911.  Patient has no other stroke symptoms.  She does have some head and neck pain from her fall.  She is not on chronic anticoagulation.    Vital signs reveal a temp of 98.4, blood pressure 179/62, heart rate of 71, respiration 18, 97% oxygen saturation.  Patient is alert and oriented.  No acute neurological findings to suggest stroke.  Heart and lung exam were normal.  Patient's symptoms suggest possible syncope versus fall.  Given that she is having some headache and neck pain, we " will proceed with CT imaging of the head and neck.    Patient's workup reveals aNormal CBC.  Comprehensive metabolic panel reveals normal electrolytes, glucose of 146, normal liver enzymes.  Creatinine level is 1.69.  Previous creatinine levels are in the range of 1.4-1.57.  Troponin is only minimally elevated at 19.  CT scan of the head and cervical spine reveals no acute cervical spine fracture.  No acute intracranial abnormality.      Patient is currently asymptomatic, I suspect that her symptoms may have been due to a fall and that her sense of imbalance may have triggered this event.  It does not sound syncopal as she did not have any other symptoms before or after.  Patient was reassured that her symptoms are not likely due to stroke.  She will follow-up in the clinic for evaluation of her balance issues.  Avoid climbing heights or turning suddenly.  Patient expressed understanding and agreement.  Return to the ED at any time if she has recurrence or she develops new symptoms.        Written after-visit summary and instructions were given at the time of discharge.    Follow up Plan:   Tyson Cano MD  [address] [place] [address]  [phone]    In 1 week      [facility] Emergency Dept  [address] [place] [id number] [phone]    If symptoms worsen or your symptoms recur      Discharge Instructions:   We did not find a worrisome cause for your fall or fainting spell.  The CT scan of your head and cervical spine were reassuring.  Your symptoms are not likely due to a stroke.  Follow-up in the clinic for evaluation of your balance issues.  Avoid climbing any heights or turning suddenly.  Return to the emergency department at any time if you develop new or worsening symptoms.       Disclaimer: This note consists of words and symbols derived from keyboarding and dictation using voice recognition software.  As a result, there may be errors that have gone  undetected.  Please consider this when interpreting information found in this note.       Alicia Lopez MD  06/23/24 [id number]       Alicia Lopez MD  07/16/24 [id number]

## 2024-06-24 NOTE — LETTER
Sandra Petit  [address]   [place] [address]    Dear Sandra Petit,      I am a team member within the [facility] with [facility]. I recently tried to reach you to ensure you were doing well following a recent visit within our health system. I also wanted to take this chance to introduce Clinic Care Coordination.     Below is a description of Clinic Care Coordination and how this team can further assist you:       The Clinic Care Coordination team is made up of a Registered Nurse, , Financial Resource Worker, and a Community Health Worker who understand and can help navigate the health care system. The goal of clinic care coordination is to help you manage your health, improve access to care, and achieve optimal health outcomes. They work alongside your provider to assist you in determining your health and social needs, obtain health care and community resources, and provide you with necessary information and education. Clinic Care Coordination can work with you through any barriers and develop a care plan that helps coordinate and strengthen the relationship between you and your care team.    If you wish to connect with the Clinic Care Coordination Team, please let your [facility] or Clinic Care Team know and they can place a referral. The Clinic Care Coordination team will then reach out by phone to further support you.    We are focused on providing you with the highest-quality healthcare experience possible.    Sincerely,   Your care team with [facility]

## 2024-06-24 NOTE — DISCHARGE INSTRUCTIONS
We did not find a worrisome cause for your fall or fainting spell.  The CT scan of your head and cervical spine were reassuring.  Your symptoms are not likely due to a stroke.  Follow-up in the clinic for evaluation of your balance issues.  Avoid climbing any heights or turning suddenly.  Return to the emergency department at any time if you develop new or worsening symptoms.

## 2024-06-24 NOTE — ED TRIAGE NOTES
Patient arrives via EMS after falling at home at 1800, c/o back pain and headache. Did hit her head, not on blood thinners, no LOC. Patient reports that she has been feeling off balanced for a few weeks now but tonight is the first time she fell.      Triage Assessment (Adult)       Row Name 06/23/24 1073          Triage Assessment    Airway WDL WDL        Respiratory WDL    Respiratory WDL WDL        Skin Circulation/Temperature WDL    Skin Circulation/Temperature WDL WDL

## 2024-06-26 DIAGNOSIS — I10 HYPERTENSION GOAL BP (BLOOD PRESSURE) < 140/90: ICD-10-CM

## 2024-06-26 RX ORDER — LOSARTAN POTASSIUM 25 MG/1
25 TABLET ORAL 2 TIMES DAILY
Qty: 180 TABLET | Refills: 0 | Status: SHIPPED | OUTPATIENT
Start: 2024-06-26 | End: 2024-09-27

## 2024-06-27 DIAGNOSIS — E11.59 TYPE 2 DIABETES MELLITUS WITH OTHER CIRCULATORY COMPLICATIONS (H): Primary | ICD-10-CM

## 2024-06-27 RX ORDER — BLOOD SUGAR DIAGNOSTIC
STRIP MISCELLANEOUS
Qty: 100 STRIP | Refills: 0 | Status: SHIPPED | OUTPATIENT
Start: 2024-06-27 | End: 2024-10-03

## 2024-07-11 ENCOUNTER — OFFICE VISIT (OUTPATIENT)
Dept: FAMILY MEDICINE | Facility: CLINIC | Age: 84
End: 2024-07-11
Payer: MEDICARE

## 2024-07-11 VITALS
SYSTOLIC BLOOD PRESSURE: 132 MMHG | RESPIRATION RATE: 16 BRPM | DIASTOLIC BLOOD PRESSURE: 54 MMHG | TEMPERATURE: 97.2 F | OXYGEN SATURATION: 97 % | WEIGHT: 171 LBS | HEIGHT: 59 IN | HEART RATE: 92 BPM | BODY MASS INDEX: 34.47 KG/M2

## 2024-07-11 DIAGNOSIS — Z96.651 S/P TKR (TOTAL KNEE REPLACEMENT), RIGHT: ICD-10-CM

## 2024-07-11 DIAGNOSIS — M54.50 ACUTE LEFT-SIDED LOW BACK PAIN WITHOUT SCIATICA: ICD-10-CM
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
## 2024-07-11 DIAGNOSIS — N18.31 STAGE 3A CHRONIC KIDNEY DISEASE (H): ICD-10-CM

## 2024-07-11 DIAGNOSIS — Z96.652 HISTORY OF TOTAL LEFT KNEE REPLACEMENT: ICD-10-CM

## 2024-07-11 DIAGNOSIS — I10 HYPERTENSION GOAL BP (BLOOD PRESSURE) < 140/90: ICD-10-CM

## 2024-07-11 DIAGNOSIS — Z95.1 S/P CABG (CORONARY ARTERY BYPASS GRAFT): ICD-10-CM

## 2024-07-11 DIAGNOSIS — I25.10 CORONARY ARTERY DISEASE INVOLVING NATIVE CORONARY ARTERY OF NATIVE HEART WITHOUT ANGINA PECTORIS: ICD-10-CM

## 2024-07-11 DIAGNOSIS — E11.59 TYPE 2 DIABETES MELLITUS WITH OTHER CIRCULATORY COMPLICATIONS (H): ICD-10-CM

## 2024-07-11 PROCEDURE — G2211 COMPLEX E/M VISIT ADD ON: HCPCS | Performed by: STUDENT IN AN ORGANIZED HEALTH CARE EDUCATION/TRAINING PROGRAM

## 2024-07-11 PROCEDURE — 99214 OFFICE O/P EST MOD 30 MIN: CPT | Performed by: STUDENT IN AN ORGANIZED HEALTH CARE EDUCATION/TRAINING PROGRAM

## 2024-07-11 ASSESSMENT — PAIN SCALES - GENERAL: PAINLEVEL: NO PAIN (0)

## 2024-07-11 NOTE — PROGRESS NOTES
Assessment & Plan   Problem List Items Addressed This Visit          Endocrine    Type 2 diabetes mellitus with other circulatory complications (H)       Circulatory    Hypertension goal BP (blood pressure) < 140/90    Coronary artery disease involving native coronary artery without angina pectoris       Urinary    CKD (chronic kidney disease) stage 3, GFR 30-59 ml/min (H)       Other    History of total left knee replacement     Other Visit Diagnoses       Fall, subsequent encounter    -  Primary    Acute left-sided low back pain without sciatica        S/P CABG (coronary artery bypass graft)        S/P TKR (total knee replacement), right               Patient's fall thought to be balance related and JUANITA normal laboratory evaluation at that time.  She did not have any presyncopal symptoms but she does question if she blacked out.  Patient with pacemaker and recent reading reported as normal.  Patient has had some unsteadiness and uses a cane.  Back pain improved but still some decrease sensation in left great toe without weakness.  Potential impingement in her lower back related and do recommend she follow-up with physical therapy that was previously recommended.  Would also be good to focus on gait and balance training.  I think less likely syncope but differential including hypotension, hypoglycemia, vasovagal, seizure, stroke and arhythmia discussed.  Known carotid artery stenosis and may need repeat ultrasound moving forward.  No postictal or presyncopal symptoms.  Pacer check reassuring but recommend she follow-up with cardiology. She feels comfortable with cane and does have lifeline.  Otherwise she feels wonderfully now.  Continue to monitor home blood pressures with medication change.  Follow-up with cardiology and with me in 3 to 6 months.  Sooner if new or worsening issues arise.    The longitudinal plan of care for the diagnosis(es)/condition(s) as documented were addressed during this visit. Due to  "the added complexity in care, I will continue to support Giovanni in the subsequent management and with ongoing continuity of care.      MED REC REQUIRED  Post Medication Reconciliation Status:  Discharge medications reconciled, continue medications without change        Subjective   Giovanni is a 84 year old, presenting for the following health issues:  Hospital F/U        7/11/2024     9:48 AM   Additional Questions   Roomed by Amber JORGE     [facility]     ED/UC Followup:    Facility:  [facility]  Date of visit: 06/23/2024  Reason for visit: Fall  Current Status: Improving sometimes and the same sometimes, knee is sore at night    Patient with fall and concern for blackout but notes falling to 1 side.  Thinks she hit her head with negative CT in the ER.  felt well afterwards.  Was aware she lost balance somewhat prior to fall.  Did not feel dizzy or lightheaded prior to this.  No significant some pain into her knee but otherwise she did not have concern for significant injury.  Has pacemaker and it was interrogated since without abnormalities.  No history of seizure.  No focal deficits reported.  Some decrease sensation to her left toe over the last year and did have left lower back pain that has since improved. No vision changes but does have follow-up with eye doctor upcoming.  No other changes prior to fall without exertional symptoms or chest pain.      Review of Systems  Constitutional, HEENT, cardiovascular, pulmonary, GI, , musculoskeletal, neuro, skin, endocrine and psych systems are negative, except as otherwise noted.      Objective    /54   Pulse 92   Temp 97.2  F (36.2  C) (Temporal)   Resp 16   Ht 1.499 m (4' 11\")   Wt 77.6 kg (171 lb)   SpO2 97%   BMI 34.54 kg/m    Body mass index is 34.54 kg/m .  Physical Exam   GENERAL: alert and no distress  EYES: Eyes grossly normal to inspection, PERRL and conjunctivae and sclerae normal  HENT: ear canals and TM's normal, nose and mouth without " ulcers or lesions  NECK: no adenopathy, no asymmetry, masses, or scars  RESP: lungs clear to auscultation - no rales, rhonchi or wheezes  CV: regular rate and rhythm, normal S1 S2, peripheral edema  ABDOMEN: soft, nontender, no hepatosplenomegaly, no masses and bowel sounds normal  MS: no gross musculoskeletal defects noted, no edema  SKIN: no suspicious lesions or rashes  NEURO: no focal deficits, sensation of  great toe reported to be decreased but still intact, mentation intact and speech normal  PSYCH: mentation appears normal, affect normal/bright            Signed Electronically by: Tyson Cano MD

## 2024-07-19 DIAGNOSIS — E55.9 VITAMIN D DEFICIENCY: ICD-10-CM

## 2024-07-19 DIAGNOSIS — N18.32 STAGE 3B CHRONIC KIDNEY DISEASE (H): Primary | ICD-10-CM

## 2024-07-22 ENCOUNTER — THERAPY VISIT (OUTPATIENT)
Dept: PHYSICAL THERAPY | Facility: CLINIC | Age: 84
End: 2024-07-22
Attending: FAMILY MEDICINE
Payer: MEDICARE

## 2024-07-22 ENCOUNTER — MEDICAL CORRESPONDENCE (OUTPATIENT)
Dept: HEALTH INFORMATION MANAGEMENT | Facility: CLINIC | Age: 84
End: 2024-07-22

## 2024-07-22 DIAGNOSIS — M54.50 ACUTE LEFT-SIDED LOW BACK PAIN WITHOUT SCIATICA: ICD-10-CM

## 2024-07-22 PROCEDURE — 97110 THERAPEUTIC EXERCISES: CPT | Mod: GP | Performed by: PHYSICAL THERAPIST

## 2024-07-22 PROCEDURE — 97161 PT EVAL LOW COMPLEX 20 MIN: CPT | Mod: GP | Performed by: PHYSICAL THERAPIST

## 2024-07-22 NOTE — PROGRESS NOTES
PHYSICAL THERAPY EVALUATION  Type of Visit: Evaluation       Fall Risk Screen:  Fall screen completed by: PT  Have you fallen 2 or more times in the past year?: Yes  Have you fallen and had an injury in the past year?: Yes  Is patient a fall risk?: Yes  Fall screen comments: Gait belt on and SBA during balance activities.    Subjective   Pt reports having a fall in May and landed on her L side and hurt her low back.  Pt's back pain cleared up mainly, some remaining when vacuuming, doing laundry and grocery shopping.  Pt notes having another fall in June due to a  blacking out .  Pt having cardiology follow up to monitor her heart.  Pt reports general weakness in knees contribute some to her falls.          Presenting condition or subjective complaint: low back pain  Date of onset: 05/10/24    Relevant medical history: Arthritis; Chest pain   Dates & types of surgery: many before March 19, 2019-a heart attack and triple bypass and pacemaker December 28, 2022    Prior diagnostic imaging/testing results:       Prior therapy history for the same diagnosis, illness or injury: No        Living Environment  Social support: Alone   Type of home: Apartment/condo   Stairs to enter the home: No       Ramp: No   Stairs inside the home: No       Help at home: None  Equipment owned: Straight Cane; Lift chair     Employment:      Hobbies/Interests: None at the present time.    Patient goals for therapy: clean house and bend over without additional pain    Pain assessment: Pain present     Objective      Cognitive Status Examination  Orientation: Oriented to person, place and time   Level of Consciousness: Alert  Follows Commands and Answers Questions: 100% of the time  Personal Safety and Judgement: Intact  Memory: Intact    OBSERVATION: Pleasant female.  INTEGUMENTARY: Intact  POSTURE: WNL  PALPATION: B knees anterior and inferior aspect.    RANGE OF MOTION:  Limited knee ROM B, approximately L: 0-110, R: 0-95.    STRENGTH:  R LE  3+/5, L 4/5.      BED MOBILITY: WNL    TRANSFERS: WNL    WHEELCHAIR MOBILITY: none.    GAIT:   Level of Darwin: WNL  Assistive Device(s): Cane (single end)  Gait Deviations: Antalgic  Gait Distance: 150  Stairs: not assessed.    BALANCE:  semi tandem appropriately difficulty, NBOS EC x 15 seconds before LOB.       SENSATION: LE Sensation WNL    REFLEXES: WNL  COORDINATION: WNL  MUSCLE TONE: WNL      Assessment & Plan   CLINICAL IMPRESSIONS  Medical Diagnosis: Acute left sided low back pain without sciatica.    Treatment Diagnosis: Acute left sided low back pain without sciatica.   Impression/Assessment: Patient is a 84 year old female with balance and low back pain complaints.  The following significant findings have been identified: Pain, Decreased ROM/flexibility, Decreased joint mobility, Decreased strength, Impaired balance, Decreased proprioception, Impaired gait, Impaired muscle performance, and Decreased activity tolerance. These impairments interfere with their ability to perform recreational activities, household chores, household mobility, and community mobility as compared to previous level of function.     Clinical Decision Making (Complexity):  Clinical Presentation: Stable/Uncomplicated  Clinical Presentation Rationale: based on medical and personal factors listed in PT evaluation  Clinical Decision Making (Complexity): Low complexity    PLAN OF CARE  Treatment Interventions:  Modalities: Cryotherapy  Interventions: Gait Training, Manual Therapy, Neuromuscular Re-education, Therapeutic Activity, Therapeutic Exercise    Long Term Goals     PT Goal 1  Goal Identifier: HEP  Goal Description: Pt will be independent with HEP in order to improve LE strength and proprioception.  Target Date: 09/02/24      Frequency of Treatment: 1x/week  Duration of Treatment: 6 weeks    Recommended Referrals to Other Professionals: none.  Education Assessment:   Learner/Method: Patient  Education Comments: HEP    Risks  and benefits of evaluation/treatment have been explained.   Patient/Family/caregiver agrees with Plan of Care.     Evaluation Time:     PT Eval, Low Complexity Minutes (73213): 20     Signing Clinician: LUBNA Loera [facility]                                                                                   OUTPATIENT PHYSICAL THERAPY      PLAN OF TREATMENT FOR OUTPATIENT REHABILITATION   Patient's Last Name, First Name, Sandra Garcia YOB: 1940   Provider's Name   [facility]   Medical Record No.  [id number]     Onset Date: 05/10/24  Start of Care Date: 07/22/24     Medical Diagnosis:  Acute left sided low back pain without sciatica.      PT Treatment Diagnosis:  Acute left sided low back pain without sciatica. Plan of Treatment  Frequency/Duration: 1x/week/ 6 weeks    Certification date from 07/22/24 to 09/02/24         See note for plan of treatment details and functional goals     Minesh Landry, LUBNA                         I CERTIFY THE NEED FOR THESE SERVICES FURNISHED UNDER        THIS PLAN OF TREATMENT AND WHILE UNDER MY CARE .             Physician Signature               Date    X_____________________________________________________                  Referring Provider:  Christopher Moreira    Initial Assessment  See Epic Evaluation- Start of Care Date: 07/22/24

## 2024-07-29 ENCOUNTER — TELEPHONE (OUTPATIENT)
Dept: CARDIOLOGY | Facility: CLINIC | Age: 84
End: 2024-07-29
Payer: MEDICARE

## 2024-07-29 DIAGNOSIS — I25.10 CORONARY ARTERY DISEASE INVOLVING NATIVE CORONARY ARTERY OF NATIVE HEART WITHOUT ANGINA PECTORIS: ICD-10-CM

## 2024-07-29 DIAGNOSIS — I51.89 OTHER ILL-DEFINED HEART DISEASES: ICD-10-CM

## 2024-07-29 DIAGNOSIS — R55 SYNCOPE: Primary | ICD-10-CM

## 2024-07-29 NOTE — TELEPHONE ENCOUNTER
Referral received from Meek Hernandez MD for pt. To completed a Lexiscan stress test    Diagnosis: syncope, coronary artery disease involving native coronary artery of native heart without angina pectoris, other ill - defined heart diseases and other forms of angina pectoris.     Order stated not a 2 day.     Please fax results back to [phone]    Pt. Contact information is correct in pt. Chart.

## 2024-07-30 ENCOUNTER — LAB (OUTPATIENT)
Dept: LAB | Facility: CLINIC | Age: 84
End: 2024-07-30
Payer: MEDICARE

## 2024-07-30 ENCOUNTER — HOSPITAL ENCOUNTER (OUTPATIENT)
Dept: ULTRASOUND IMAGING | Facility: CLINIC | Age: 84
Discharge: HOME | End: 2024-07-30
Attending: INTERNAL MEDICINE | Admitting: INTERNAL MEDICINE
Payer: MEDICARE

## 2024-07-30 DIAGNOSIS — R55 SYNCOPE, UNSPECIFIED SYNCOPE TYPE: ICD-10-CM

## 2024-07-30 DIAGNOSIS — N18.32 STAGE 3B CHRONIC KIDNEY DISEASE (H): ICD-10-CM

## 2024-07-30 DIAGNOSIS — I51.89 OTHER ILL-DEFINED HEART DISEASES: ICD-10-CM

## 2024-07-30 DIAGNOSIS — E55.9 VITAMIN D DEFICIENCY: ICD-10-CM

## 2024-07-30 LAB
ANION GAP SERPL CALCULATED.3IONS-SCNC: 13 MMOL/L (ref 7–15)
BUN SERPL-MCNC: 33.3 MG/DL (ref 8–23)
CALCIUM SERPL-MCNC: 9.3 MG/DL (ref 8.8–10.4)
CHLORIDE SERPL-SCNC: 105 MMOL/L (ref 98–107)
CREAT SERPL-MCNC: 1.45 MG/DL (ref 0.51–0.95)
EGFRCR SERPLBLD CKD-EPI 2021: 36 ML/MIN/1.73M2
GLUCOSE SERPL-MCNC: 165 MG/DL (ref 70–99)
HCO3 SERPL-SCNC: 23 MMOL/L (ref 22–29)
HGB BLD-MCNC: 12.2 G/DL (ref 11.7–15.7)
PHOSPHATE SERPL-MCNC: 3.2 MG/DL (ref 2.5–4.5)
POTASSIUM SERPL-SCNC: 4.1 MMOL/L (ref 3.4–5.3)
PTH-INTACT SERPL-MCNC: 43 PG/ML (ref 15–65)
SODIUM SERPL-SCNC: 141 MMOL/L (ref 135–145)

## 2024-07-30 PROCEDURE — 80048 BASIC METABOLIC PNL TOTAL CA: CPT

## 2024-07-30 PROCEDURE — 36415 COLL VENOUS BLD VENIPUNCTURE: CPT

## 2024-07-30 PROCEDURE — 93880 EXTRACRANIAL BILAT STUDY: CPT

## 2024-07-30 PROCEDURE — 83970 ASSAY OF PARATHORMONE: CPT

## 2024-07-30 PROCEDURE — 85018 HEMOGLOBIN: CPT

## 2024-07-30 PROCEDURE — 82306 VITAMIN D 25 HYDROXY: CPT

## 2024-07-30 PROCEDURE — 84100 ASSAY OF PHOSPHORUS: CPT

## 2024-07-31 ENCOUNTER — TELEPHONE (OUTPATIENT)
Dept: CARDIOLOGY | Facility: CLINIC | Age: 84
End: 2024-07-31
Payer: MEDICARE

## 2024-07-31 ENCOUNTER — MYC MEDICAL ADVICE (OUTPATIENT)
Dept: FAMILY MEDICINE | Facility: CLINIC | Age: 84
End: 2024-07-31
Payer: MEDICARE

## 2024-07-31 DIAGNOSIS — I25.10 CORONARY ARTERY DISEASE INVOLVING NATIVE CORONARY ARTERY OF NATIVE HEART WITHOUT ANGINA PECTORIS: ICD-10-CM

## 2024-07-31 DIAGNOSIS — I51.89 OTHER ILL-DEFINED HEART DISEASES: ICD-10-CM

## 2024-07-31 DIAGNOSIS — R55 PRE-SYNCOPE: ICD-10-CM

## 2024-07-31 DIAGNOSIS — R55 SYNCOPE: Primary | ICD-10-CM

## 2024-07-31 NOTE — TELEPHONE ENCOUNTER
Received outside orders from [facility] for Echocardiogram signed by Dr. Meek Barillas Syncope, coronary artery disease, ill-defined heart diseases, other froms of angina pectoris

## 2024-08-01 ENCOUNTER — HOSPITAL ENCOUNTER (OUTPATIENT)
Dept: NUCLEAR MEDICINE | Facility: CLINIC | Age: 84
Setting detail: NUCLEAR MEDICINE
Discharge: HOME | End: 2024-08-01
Attending: STUDENT IN AN ORGANIZED HEALTH CARE EDUCATION/TRAINING PROGRAM
Payer: MEDICARE

## 2024-08-01 ENCOUNTER — HOSPITAL ENCOUNTER (OUTPATIENT)
Dept: CARDIOLOGY | Facility: CLINIC | Age: 84
Discharge: HOME | End: 2024-08-01
Attending: STUDENT IN AN ORGANIZED HEALTH CARE EDUCATION/TRAINING PROGRAM
Payer: MEDICARE

## 2024-08-01 ENCOUNTER — HOSPITAL ENCOUNTER (OUTPATIENT)
Dept: CARDIOLOGY | Facility: CLINIC | Age: 84
Discharge: HOME | End: 2024-08-01
Payer: MEDICARE

## 2024-08-01 DIAGNOSIS — I51.89 OTHER ILL-DEFINED HEART DISEASES: ICD-10-CM

## 2024-08-01 DIAGNOSIS — R55 SYNCOPE: ICD-10-CM

## 2024-08-01 DIAGNOSIS — R55 PRE-SYNCOPE: ICD-10-CM

## 2024-08-01 DIAGNOSIS — I25.10 CORONARY ARTERY DISEASE INVOLVING NATIVE CORONARY ARTERY OF NATIVE HEART WITHOUT ANGINA PECTORIS: ICD-10-CM

## 2024-08-01 LAB
CV STRESS MAX HR HE: 78
LVEF ECHO: NORMAL
NUC STRESS EJECTION FRACTION: 68 %
RATE PRESSURE PRODUCT: NORMAL
STRESS ECHO BASELINE DIASTOLIC HE: 74
STRESS ECHO BASELINE HR: 70 BPM
STRESS ECHO BASELINE SYSTOLIC BP: 166
STRESS ECHO CALCULATED PERCENT HR: 57 %
STRESS ECHO LAST STRESS DIASTOLIC BP: 66
STRESS ECHO LAST STRESS SYSTOLIC BP: 155
STRESS ECHO TARGET HR: 137

## 2024-08-01 PROCEDURE — 93017 CV STRESS TEST TRACING ONLY: CPT

## 2024-08-01 PROCEDURE — 93016 CV STRESS TEST SUPVJ ONLY: CPT | Performed by: INTERNAL MEDICINE

## 2024-08-01 PROCEDURE — 93018 CV STRESS TEST I&R ONLY: CPT | Performed by: INTERNAL MEDICINE

## 2024-08-01 PROCEDURE — A9502 TC99M TETROFOSMIN: HCPCS | Performed by: STUDENT IN AN ORGANIZED HEALTH CARE EDUCATION/TRAINING PROGRAM

## 2024-08-01 PROCEDURE — 93306 TTE W/DOPPLER COMPLETE: CPT | Mod: 26 | Performed by: INTERNAL MEDICINE

## 2024-08-01 PROCEDURE — G1010 CDSM STANSON: HCPCS | Performed by: INTERNAL MEDICINE

## 2024-08-01 PROCEDURE — 78452 HT MUSCLE IMAGE SPECT MULT: CPT | Mod: 26 | Performed by: INTERNAL MEDICINE

## 2024-08-01 PROCEDURE — 250N000011 HC RX IP 250 OP 636: Performed by: STUDENT IN AN ORGANIZED HEALTH CARE EDUCATION/TRAINING PROGRAM

## 2024-08-01 PROCEDURE — 93306 TTE W/DOPPLER COMPLETE: CPT

## 2024-08-01 PROCEDURE — 343N000001 HC RX 343: Performed by: STUDENT IN AN ORGANIZED HEALTH CARE EDUCATION/TRAINING PROGRAM

## 2024-08-01 PROCEDURE — 78452 HT MUSCLE IMAGE SPECT MULT: CPT | Mod: MF

## 2024-08-01 RX ORDER — REGADENOSON 0.08 MG/ML
0.4 INJECTION, SOLUTION INTRAVENOUS ONCE
Status: COMPLETED | OUTPATIENT
Start: 2024-08-01 | End: 2024-08-01

## 2024-08-01 RX ADMIN — TETROFOSMIN 30.2 MILLICURIE: 1.38 INJECTION, POWDER, LYOPHILIZED, FOR SOLUTION INTRAVENOUS at 13:32

## 2024-08-01 RX ADMIN — REGADENOSON 0.4 MG: 0.08 INJECTION, SOLUTION INTRAVENOUS at 13:41

## 2024-08-01 RX ADMIN — TETROFOSMIN 10.7 MILLICURIE: 1.38 INJECTION, POWDER, LYOPHILIZED, FOR SOLUTION INTRAVENOUS at 12:07

## 2024-08-04 LAB
DEPRECATED CALCIDIOL+CALCIFEROL SERPL-MC: <71 UG/L (ref 20–75)
VITAMIN D2 SERPL-MCNC: <5 UG/L
VITAMIN D3 SERPL-MCNC: 66 UG/L

## 2024-08-06 NOTE — TELEPHONE ENCOUNTER
Reviewed stress test and carotid US results with Dr. Johnson.    Lexiscan results show small area of nontransmural infarction in the anterior and apical segments of the left ventricle. LVEF 68%. Dr. Johnson recommends definitive angiogram.    Carotid US results show >70% stenosis of right ICA and <50% stenosis of left ICA with significant stenosis int he left external carotid artery. Per Dr. Johnson - would like to refer to Dr. Addis Tamez for consideration of carotid artery stenting.    No answer at time of call. Detailed VM left with call back number. Cath request placed at this time. Awaiting holter monitor results and echocardiogram results.  Josefina Granados RN at 3:09 PM, 8/5/2024    Electronically signed by Josefina Granados RN at 08/05/2024 3:13 PM CDT

## 2024-08-14 DIAGNOSIS — N18.32 CHRONIC KIDNEY DISEASE (CKD) STAGE G3B/A1, MODERATELY DECREASED GLOMERULAR FILTRATION RATE (GFR) BETWEEN 30-44 ML/MIN/1.73 SQUARE METER AND ALBUMINURIA CREATININE RATIO LESS THAN 30 MG/G (H): Primary | ICD-10-CM

## 2024-09-27 ENCOUNTER — OFFICE VISIT (OUTPATIENT)
Dept: FAMILY MEDICINE | Facility: CLINIC | Age: 84
End: 2024-09-27
Payer: MEDICARE

## 2024-09-27 ENCOUNTER — NURSE TRIAGE (OUTPATIENT)
Dept: FAMILY MEDICINE | Facility: CLINIC | Age: 84
End: 2024-09-27

## 2024-09-27 VITALS
HEIGHT: 59 IN | SYSTOLIC BLOOD PRESSURE: 126 MMHG | DIASTOLIC BLOOD PRESSURE: 68 MMHG | WEIGHT: 170.8 LBS | TEMPERATURE: 96.9 F | RESPIRATION RATE: 16 BRPM | BODY MASS INDEX: 34.43 KG/M2 | OXYGEN SATURATION: 98 % | HEART RATE: 88 BPM

## 2024-09-27 DIAGNOSIS — E11.59 TYPE 2 DIABETES MELLITUS WITH OTHER CIRCULATORY COMPLICATIONS (H): ICD-10-CM

## 2024-09-27 DIAGNOSIS — N18.31 STAGE 3A CHRONIC KIDNEY DISEASE (H): ICD-10-CM

## 2024-09-27 DIAGNOSIS — I10 HYPERTENSION GOAL BP (BLOOD PRESSURE) < 140/90: ICD-10-CM

## 2024-09-27 DIAGNOSIS — I25.10 CORONARY ARTERY DISEASE INVOLVING NATIVE CORONARY ARTERY OF NATIVE HEART WITHOUT ANGINA PECTORIS: ICD-10-CM

## 2024-09-27 DIAGNOSIS — R00.2 PALPITATIONS: Primary | ICD-10-CM

## 2024-09-27 PROCEDURE — 93000 ELECTROCARDIOGRAM COMPLETE: CPT | Performed by: STUDENT IN AN ORGANIZED HEALTH CARE EDUCATION/TRAINING PROGRAM

## 2024-09-27 PROCEDURE — 99214 OFFICE O/P EST MOD 30 MIN: CPT | Performed by: STUDENT IN AN ORGANIZED HEALTH CARE EDUCATION/TRAINING PROGRAM

## 2024-09-27 PROCEDURE — G2211 COMPLEX E/M VISIT ADD ON: HCPCS | Performed by: STUDENT IN AN ORGANIZED HEALTH CARE EDUCATION/TRAINING PROGRAM

## 2024-09-27 RX ORDER — LOSARTAN POTASSIUM 25 MG/1
25 TABLET ORAL 2 TIMES DAILY
Qty: 180 TABLET | Refills: 0 | Status: SHIPPED | OUTPATIENT
Start: 2024-09-27 | End: 2024-09-27

## 2024-09-27 RX ORDER — LOSARTAN POTASSIUM 25 MG/1
25 TABLET ORAL 2 TIMES DAILY
Qty: 180 TABLET | Refills: 4 | Status: SHIPPED | OUTPATIENT
Start: 2024-09-27

## 2024-09-27 ASSESSMENT — PAIN SCALES - GENERAL: PAINLEVEL: NO PAIN (0)

## 2024-09-27 NOTE — PROGRESS NOTES
"  Assessment & Plan   Problem List Items Addressed This Visit          Endocrine    Type 2 diabetes mellitus with other circulatory complications (H)    Relevant Orders    HEMOGLOBIN A1C       Circulatory    Hypertension goal BP (blood pressure) < 140/90    Relevant Medications    losartan (COZAAR) 25 MG tablet    Other Relevant Orders    Basic metabolic panel  (Ca, Cl, CO2, Creat, Gluc, K, Na, BUN)    CBC with platelets    Coronary artery disease involving native coronary artery without angina pectoris    Relevant Orders    Lipid panel reflex to direct LDL Non-fasting       Urinary    CKD (chronic kidney disease) stage 3, GFR 30-59 ml/min (H)     Other Visit Diagnoses       Palpitations    -  Primary    Relevant Orders    EKG 12-lead complete w/read - Clinics           Normal exam today and I suspect irregularly noted on her blood pressure cuff related to PVC.  We did discuss causes of these today and she is not noting symptoms.  Also noted that intermittent EKG does not rule out irregular rhythm in the past and would need Holter monitor.  Something to consider but with no symptoms I think follow-up with cardiology for cath and consider holter if symptoms. She understands to be seen in ER if new or changing symptoms with further signs of coronary syndrome reviewed. Will repeat labs today.     The longitudinal plan of care for the diagnosis(es)/condition(s) as documented were addressed during this visit. Due to the added complexity in care, I will continue to support Giovanni in the subsequent management and with ongoing continuity of care.       BMI  Estimated body mass index is 34.5 kg/m  as calculated from the following:    Height as of this encounter: 1.499 m (4' 11\").    Weight as of this encounter: 77.5 kg (170 lb 12.8 oz).   Weight management plan: Discussed healthy diet and exercise guidelines        Subjective   Giovanni is a 84 year old, presenting for the following health issues:  Irregular Heart Beat        " "9/27/2024     2:19 PM   Additional Questions   Roomed by Jairo     History of Present Illness       Reason for visit:  Irregular heartbeat  Symptom onset:  Today   She is taking medications regularly.     Patient here today with reported irregular heartbeats at home.  Did not notice any significantly but felt slightly off in the morning.  No chest pain or shortness of breath or other respiratory symptoms.  No fevers.  Did not have any lightheaded episodes or vertigo.  Mostly concerned due to her machine noting irregular rhythm with concern for A-fib.  She is planning to undergo angiogram on 10/11 through cardiology at [facility].  Related to abnormal stress test.  Echo with preserved EF and mild mitral and tricuspid regurgitation.  She otherwise notes no changes today.  She is feeling normal this afternoon.    EKG: Normal sinus rhythm with PVC present    Review of Systems  Constitutional, HEENT, cardiovascular, pulmonary, GI, , musculoskeletal, neuro, skin, endocrine and psych systems are negative, except as otherwise noted.      Objective    /68 (BP Location: Right arm, Patient Position: Sitting, Cuff Size: Adult Regular)   Pulse 88   Temp 96.9  F (36.1  C) (Temporal)   Resp 16   Ht 1.499 m (4' 11\")   Wt 77.5 kg (170 lb 12.8 oz)   SpO2 98%   BMI 34.50 kg/m    Body mass index is 34.5 kg/m .  Physical Exam   GENERAL: alert and no distress  EYES: Eyes grossly normal to inspection, PERRL and conjunctivae and sclerae normal  HENT: nose and mouth without ulcers or lesions  NECK: no adenopathy, no asymmetry, masses, or scars  RESP: lungs clear to auscultation - no rales, rhonchi or wheezes  CV: regular rate and rhythm, normal S1 S2,  no peripheral edema  ABDOMEN: soft, nontender, no hepatosplenomegaly, no masses and bowel sounds normal  MS: no gross musculoskeletal defects noted, no edema  SKIN: no suspicious lesions or rashes  NEURO: mentation intact and speech normal  PSYCH: mentation appears normal, " affect normal/bright          Signed Electronically by: Tyson Cano MD

## 2024-09-27 NOTE — TELEPHONE ENCOUNTER
"Nurse Triage SBAR    Is this a 2nd Level Triage? YES, LICENSED PRACTITIONER REVIEW IS REQUIRED    Situation: Patient calling reporting an irregular heart beat since 7:30 AM today.     Background: Patient has history of Bypass in 2019, and PM placement in 2023, also scheduled for angiogram 10/11. Patient took BP as usual this morning and noticed the machine reported an irregular heart beat. In addition, patient is also able to detect irregular beat in her radial pulse and feels the fluttering about every 3-4 beats in chest. Her BP reading was 132/60 and heart rate is ranging from mid 50's to mid 70's because of the irregularity of the beat. She denies lightheadedness, dizziness, difficulty breathing, chest pains, or any other symptoms. Patient only reports \"feeling funny\" because of the fluttering in chest. Patient reports that this does happen, but usually goes away in 10 minutes.   Patient instructed to call 911 if she develops chest pain, dizziness, or difficulty breathing.     Assessment: with symptoms and cardiac history patient should be evaluated today. No other red flag symptoms.     Protocol Recommended Disposition:   See in Office Today    Recommendation: No appointments with PCP, routing to PCP and RN staff     Routed to provider    Does the patient meet one of the following criteria for ADS visit consideration? 16+ years old, with an [facility] PCP     TIP  Providers, please consider if this condition is appropriate for management at one of our Acute and Diagnostic Services sites.     If patient is a good candidate, please use dotphrase <dot>triageresponse and select Refer to ADS to document.    Reason for Disposition   History of heart disease (i.e., heart attack, bypass surgery, angina, angioplasty)  (Exception: Brief heartbeat symptoms that went away and now feels well.)    Additional Information   Negative: Passed out (i.e., lost consciousness, collapsed and was not responding)   Negative: Shock suspected " "(e.g., cold/pale/clammy skin, too weak to stand, low BP, rapid pulse)   Negative: Difficult to awaken or acting confused (e.g., disoriented, slurred speech)   Negative: Visible sweat on face or sweat dripping down face   Negative: Unable to walk, or can only walk with assistance (e.g., requires support)   Negative: Received SHOCK from implantable cardiac defibrillator and has persisting symptoms (i.e., palpitations, lightheadedness)   Negative: Dizziness, lightheadedness, or weakness and heart beating very rapidly (e.g., > 140 / minute)   Negative: Dizziness, lightheadedness, or weakness and heart beating very slowly (e.g., < 50 / minute)   Negative: Sounds like a life-threatening emergency to the triager   Negative: Chest pain   Negative: Difficulty breathing   Negative: Dizziness, lightheadedness, or weakness   Negative: Heart beating very rapidly (e.g., > 140 / minute) and present now  (Exception: During exercise.)   Negative: Heart beating very slowly (e.g., < 50 / minute)  (Exception: Athlete and heart rate normal for caller.)   Negative: New or worsened shortness of breath with activity (dyspnea on exertion)   Negative: Patient sounds very sick or weak to the triager   Negative: Wearing a 'Holter monitor' or 'cardiac event monitor'   Negative: Received SHOCK from implantable cardiac defibrillator (and now feels well)    Answer Assessment - Initial Assessment Questions  1. DESCRIPTION: \"Please describe your heart rate or heartbeat that you are having\" (e.g., fast/slow, regular/irregular, skipped or extra beats, \"palpitations\")      BP machine is saying irregular heart beat, and feels it. Patient feels her radial pulse is abnormal   2. ONSET: \"When did it start?\" (Minutes, hours or days)       7:30 AM  3. DURATION: \"How long does it last\" (e.g., seconds, minutes, hours)      Still going on  4. PATTERN \"Does it come and go, or has it been constant since it started?\"  \"Does it get worse with exertion?\"   \"Are you " "feeling it now?\"      Constant   5. TAP: \"Using your hand, can you tap out what you are feeling on a chair or table in front of you, so that I can hear?\" (Note: not all patients can do this)        Changing every 3 or 4th beat  6. HEART RATE: \"Can you tell me your heart rate?\" \"How many beats in 15 seconds?\"  (Note: not all patients can do this)        Heart rate on machine said 60, but is irregular  7. RECURRENT SYMPTOM: \"Have you ever had this before?\" If Yes, ask: \"When was the last time?\" and \"What happened that time?\"       Has had this happen before, but usually goes away after 10 minutes  8. CAUSE: \"What do you think is causing the palpitations?\"      Unsure   9. CARDIAC HISTORY: \"Do you have any history of heart disease?\" (e.g., heart attack, angina, bypass surgery, angioplasty, arrhythmia)       Bypass in 3/19/2019, pacemaker 12/28/2023 for A-fib   10. OTHER SYMPTOMS: \"Do you have any other symptoms?\" (e.g., dizziness, chest pain, sweating, difficulty breathing)        \"Feels funny because she can feel heart beat\"  11. PREGNANCY: \"Is there any chance you are pregnant?\" \"When was your last menstrual period?\"        no    Protocols used: Heart Rate and Heartbeat [id number]    "

## 2024-09-27 NOTE — TELEPHONE ENCOUNTER
Pt calling regarding phone call from earlier today. Pt awaiting call back. Pt states that her symptoms have not worsened or improved.     Informed pt that information will be re-routed to her clinic. If she does not hear back within an hour or if symptoms worsen to go to Urgent care or the ED for evaluation.     Priscilla Key RN  [facility]

## 2024-10-03 DIAGNOSIS — E11.59 TYPE 2 DIABETES MELLITUS WITH OTHER CIRCULATORY COMPLICATIONS (H): ICD-10-CM

## 2024-10-04 RX ORDER — BLOOD SUGAR DIAGNOSTIC
STRIP MISCELLANEOUS
Qty: 100 STRIP | Refills: 0 | Status: SHIPPED | OUTPATIENT
Start: 2024-10-04

## 2024-10-12 ENCOUNTER — HEALTH MAINTENANCE LETTER (OUTPATIENT)
Age: 84
End: 2024-10-12

## 2024-10-19 ENCOUNTER — MYC MEDICAL ADVICE (OUTPATIENT)
Dept: FAMILY MEDICINE | Facility: CLINIC | Age: 84
End: 2024-10-19
Payer: MEDICARE

## 2024-10-25 ENCOUNTER — LAB (OUTPATIENT)
Dept: LAB | Facility: CLINIC | Age: 84
End: 2024-10-25
Payer: MEDICARE

## 2024-10-25 DIAGNOSIS — N18.30 CKD (CHRONIC KIDNEY DISEASE) STAGE 3, GFR 30-59 ML/MIN (H): Primary | ICD-10-CM

## 2024-10-25 DIAGNOSIS — N18.32 CHRONIC KIDNEY DISEASE (CKD) STAGE G3B/A1, MODERATELY DECREASED GLOMERULAR FILTRATION RATE (GFR) BETWEEN 30-44 ML/MIN/1.73 SQUARE METER AND ALBUMINURIA CREATININE RATIO LESS THAN 30 MG/G (H): ICD-10-CM

## 2024-10-25 LAB
ANION GAP SERPL CALCULATED.3IONS-SCNC: 12 MMOL/L (ref 7–15)
BUN SERPL-MCNC: 39 MG/DL (ref 8–23)
CALCIUM SERPL-MCNC: 9.5 MG/DL (ref 8.8–10.4)
CHLORIDE SERPL-SCNC: 100 MMOL/L (ref 98–107)
CREAT SERPL-MCNC: 1.56 MG/DL (ref 0.51–0.95)
CREAT UR-MCNC: 176.4 MG/DL
EGFRCR SERPLBLD CKD-EPI 2021: 33 ML/MIN/1.73M2
GLUCOSE SERPL-MCNC: 164 MG/DL (ref 70–99)
HCO3 SERPL-SCNC: 25 MMOL/L (ref 22–29)
MICROALBUMIN UR-MCNC: 53.1 MG/L
MICROALBUMIN/CREAT UR: 30.1 MG/G CR (ref 0–25)
POTASSIUM SERPL-SCNC: 4.4 MMOL/L (ref 3.4–5.3)
SODIUM SERPL-SCNC: 137 MMOL/L (ref 135–145)

## 2024-10-25 PROCEDURE — 82043 UR ALBUMIN QUANTITATIVE: CPT

## 2024-10-25 PROCEDURE — 82570 ASSAY OF URINE CREATININE: CPT

## 2024-10-25 PROCEDURE — 36415 COLL VENOUS BLD VENIPUNCTURE: CPT

## 2024-10-25 PROCEDURE — 80048 BASIC METABOLIC PNL TOTAL CA: CPT

## 2024-10-28 ENCOUNTER — MYC MEDICAL ADVICE (OUTPATIENT)
Dept: FAMILY MEDICINE | Facility: CLINIC | Age: 84
End: 2024-10-28
Payer: MEDICARE

## 2024-10-28 DIAGNOSIS — L98.9 SKIN LESION: Primary | ICD-10-CM

## 2024-11-19 ENCOUNTER — HOSPITAL ENCOUNTER (OUTPATIENT)
Dept: MAMMOGRAPHY | Facility: CLINIC | Age: 84
Discharge: HOME | End: 2024-11-19
Payer: MEDICARE

## 2024-11-19 DIAGNOSIS — Z12.31 VISIT FOR SCREENING MAMMOGRAM: ICD-10-CM

## 2024-11-19 PROCEDURE — 77063 BREAST TOMOSYNTHESIS BI: CPT

## 2024-11-27 DIAGNOSIS — E11.59 TYPE 2 DIABETES MELLITUS WITH OTHER CIRCULATORY COMPLICATIONS (H): ICD-10-CM

## 2024-11-27 RX ORDER — BLOOD SUGAR DIAGNOSTIC
STRIP MISCELLANEOUS
Qty: 100 STRIP | Refills: 0 | Status: SHIPPED | OUTPATIENT
Start: 2024-11-27

## 2024-12-03 ENCOUNTER — OFFICE VISIT (OUTPATIENT)
Dept: FAMILY MEDICINE | Facility: CLINIC | Age: 84
End: 2024-12-03
Attending: STUDENT IN AN ORGANIZED HEALTH CARE EDUCATION/TRAINING PROGRAM
Payer: MEDICARE

## 2024-12-03 VITALS
BODY MASS INDEX: 33.73 KG/M2 | HEART RATE: 95 BPM | HEIGHT: 60 IN | WEIGHT: 171.8 LBS | RESPIRATION RATE: 23 BRPM | SYSTOLIC BLOOD PRESSURE: 136 MMHG | OXYGEN SATURATION: 98 % | DIASTOLIC BLOOD PRESSURE: 78 MMHG | TEMPERATURE: 97.7 F

## 2024-12-03 DIAGNOSIS — E11.59 TYPE 2 DIABETES MELLITUS WITH OTHER CIRCULATORY COMPLICATIONS (H): ICD-10-CM

## 2024-12-03 DIAGNOSIS — G47.00 INSOMNIA, UNSPECIFIED TYPE: ICD-10-CM

## 2024-12-03 DIAGNOSIS — E78.5 HYPERLIPIDEMIA LDL GOAL <100: ICD-10-CM

## 2024-12-03 DIAGNOSIS — Z00.00 ENCOUNTER FOR MEDICARE ANNUAL WELLNESS EXAM: Primary | ICD-10-CM

## 2024-12-03 DIAGNOSIS — M18.12 OSTEOARTHRITIS OF LEFT THUMB: ICD-10-CM

## 2024-12-03 DIAGNOSIS — I25.10 CORONARY ARTERY DISEASE INVOLVING NATIVE CORONARY ARTERY OF NATIVE HEART WITHOUT ANGINA PECTORIS: ICD-10-CM

## 2024-12-03 DIAGNOSIS — Z96.652 HISTORY OF TOTAL LEFT KNEE REPLACEMENT: ICD-10-CM

## 2024-12-03 DIAGNOSIS — I10 HYPERTENSION GOAL BP (BLOOD PRESSURE) < 140/90: ICD-10-CM

## 2024-12-03 DIAGNOSIS — N18.31 STAGE 3A CHRONIC KIDNEY DISEASE (H): ICD-10-CM

## 2024-12-03 DIAGNOSIS — E11.21 TYPE 2 DIABETES MELLITUS WITH DIABETIC NEPHROPATHY, WITHOUT LONG-TERM CURRENT USE OF INSULIN (H): ICD-10-CM

## 2024-12-03 DIAGNOSIS — E03.8 OTHER SPECIFIED HYPOTHYROIDISM: ICD-10-CM

## 2024-12-03 DIAGNOSIS — I10 ESSENTIAL HYPERTENSION, BENIGN: ICD-10-CM

## 2024-12-03 LAB
ANION GAP SERPL CALCULATED.3IONS-SCNC: 14 MMOL/L (ref 7–15)
BUN SERPL-MCNC: 28.9 MG/DL (ref 8–23)
CALCIUM SERPL-MCNC: 9.4 MG/DL (ref 8.8–10.4)
CHLORIDE SERPL-SCNC: 100 MMOL/L (ref 98–107)
CHOLEST SERPL-MCNC: 158 MG/DL
CREAT SERPL-MCNC: 1.54 MG/DL (ref 0.51–0.95)
EGFRCR SERPLBLD CKD-EPI 2021: 33 ML/MIN/1.73M2
ERYTHROCYTE [DISTWIDTH] IN BLOOD BY AUTOMATED COUNT: 14.6 % (ref 10–15)
EST. AVERAGE GLUCOSE BLD GHB EST-MCNC: 143 MG/DL
FASTING STATUS PATIENT QL REPORTED: YES
FASTING STATUS PATIENT QL REPORTED: YES
GLUCOSE SERPL-MCNC: 131 MG/DL (ref 70–99)
HBA1C MFR BLD: 6.6 %
HCO3 SERPL-SCNC: 23 MMOL/L (ref 22–29)
HCT VFR BLD AUTO: 38.9 % (ref 35–47)
HDLC SERPL-MCNC: 49 MG/DL
HGB BLD-MCNC: 12.8 G/DL (ref 11.7–15.7)
LDLC SERPL CALC-MCNC: 73 MG/DL
MCH RBC QN AUTO: 31.1 PG (ref 26.5–33)
MCHC RBC AUTO-ENTMCNC: 32.9 G/DL (ref 31.5–36.5)
MCV RBC AUTO: 94 FL (ref 78–100)
NONHDLC SERPL-MCNC: 109 MG/DL
PLATELET # BLD AUTO: 298 10E3/UL (ref 150–450)
POTASSIUM SERPL-SCNC: 4.4 MMOL/L (ref 3.4–5.3)
RBC # BLD AUTO: 4.12 10E6/UL (ref 3.8–5.2)
SODIUM SERPL-SCNC: 137 MMOL/L (ref 135–145)
T4 FREE SERPL-MCNC: 1.3 NG/DL (ref 0.9–1.7)
TRIGL SERPL-MCNC: 178 MG/DL
TSH SERPL DL<=0.005 MIU/L-ACNC: 5.06 UIU/ML (ref 0.3–4.2)
WBC # BLD AUTO: 8.9 10E3/UL (ref 4–11)

## 2024-12-03 PROCEDURE — 85027 COMPLETE CBC AUTOMATED: CPT | Performed by: STUDENT IN AN ORGANIZED HEALTH CARE EDUCATION/TRAINING PROGRAM

## 2024-12-03 PROCEDURE — 80061 LIPID PANEL: CPT | Performed by: STUDENT IN AN ORGANIZED HEALTH CARE EDUCATION/TRAINING PROGRAM

## 2024-12-03 PROCEDURE — 36415 COLL VENOUS BLD VENIPUNCTURE: CPT | Performed by: STUDENT IN AN ORGANIZED HEALTH CARE EDUCATION/TRAINING PROGRAM

## 2024-12-03 PROCEDURE — 83036 HEMOGLOBIN GLYCOSYLATED A1C: CPT | Performed by: STUDENT IN AN ORGANIZED HEALTH CARE EDUCATION/TRAINING PROGRAM

## 2024-12-03 PROCEDURE — 80048 BASIC METABOLIC PNL TOTAL CA: CPT | Performed by: STUDENT IN AN ORGANIZED HEALTH CARE EDUCATION/TRAINING PROGRAM

## 2024-12-03 PROCEDURE — G0439 PPPS, SUBSEQ VISIT: HCPCS | Performed by: STUDENT IN AN ORGANIZED HEALTH CARE EDUCATION/TRAINING PROGRAM

## 2024-12-03 PROCEDURE — 84439 ASSAY OF FREE THYROXINE: CPT | Performed by: STUDENT IN AN ORGANIZED HEALTH CARE EDUCATION/TRAINING PROGRAM

## 2024-12-03 PROCEDURE — 84443 ASSAY THYROID STIM HORMONE: CPT | Performed by: STUDENT IN AN ORGANIZED HEALTH CARE EDUCATION/TRAINING PROGRAM

## 2024-12-03 RX ORDER — NITROGLYCERIN 0.4 MG/1
TABLET SUBLINGUAL
Qty: 25 TABLET | Refills: 6 | Status: SHIPPED | OUTPATIENT
Start: 2024-12-03

## 2024-12-03 RX ORDER — AMLODIPINE BESYLATE 10 MG/1
10 TABLET ORAL
Qty: 90 TABLET | Refills: 4 | Status: SHIPPED | OUTPATIENT
Start: 2024-12-03

## 2024-12-03 RX ORDER — SIMVASTATIN 40 MG
40 TABLET ORAL AT BEDTIME
Qty: 90 TABLET | Refills: 4 | Status: SHIPPED | OUTPATIENT
Start: 2024-12-03

## 2024-12-03 RX ORDER — METFORMIN HYDROCHLORIDE 500 MG/1
TABLET, EXTENDED RELEASE ORAL
Qty: 180 TABLET | Refills: 3 | Status: SHIPPED | OUTPATIENT
Start: 2024-12-03

## 2024-12-03 RX ORDER — HYDROCHLOROTHIAZIDE 12.5 MG/1
12.5 TABLET ORAL DAILY
Qty: 90 TABLET | Refills: 4 | Status: SHIPPED | OUTPATIENT
Start: 2024-12-03

## 2024-12-03 RX ORDER — LOSARTAN POTASSIUM 25 MG/1
25 TABLET ORAL 2 TIMES DAILY
Qty: 180 TABLET | Refills: 4 | Status: SHIPPED | OUTPATIENT
Start: 2024-12-03

## 2024-12-03 RX ORDER — BLOOD SUGAR DIAGNOSTIC
STRIP MISCELLANEOUS
Qty: 100 STRIP | Refills: 5 | Status: SHIPPED | OUTPATIENT
Start: 2024-12-03

## 2024-12-03 RX ORDER — LEVOTHYROXINE SODIUM 50 UG/1
50 TABLET ORAL DAILY
Qty: 90 TABLET | Refills: 4 | Status: SHIPPED | OUTPATIENT
Start: 2024-12-03

## 2024-12-03 ASSESSMENT — PAIN SCALES - GENERAL: PAINLEVEL_OUTOF10: MODERATE PAIN (5)

## 2024-12-03 NOTE — PROGRESS NOTES
Preventive Care Visit  [facility]  Tyson Cano MD, Family Medicine  Dec 3, 2024      Assessment & Plan   Problem List Items Addressed This Visit          Endocrine    Hyperlipidemia LDL goal <100    Relevant Medications    simvastatin (ZOCOR) 40 MG tablet    Other specified hypothyroidism    Relevant Medications    levothyroxine (SYNTHROID/LEVOTHROID) 50 MCG tablet    Other Relevant Orders    TSH WITH FREE T4 REFLEX    Type 2 diabetes mellitus with diabetic nephropathy (H)    Relevant Medications    blood glucose (ONETOUCH VERIO IQ) test strip    levothyroxine (SYNTHROID/LEVOTHROID) 50 MCG tablet    metFORMIN (GLUCOPHAGE XR) 500 MG 24 hr tablet    Type 2 diabetes mellitus with other circulatory complications (H)    Relevant Medications    blood glucose (ONETOUCH VERIO IQ) test strip    levothyroxine (SYNTHROID/LEVOTHROID) 50 MCG tablet    metFORMIN (GLUCOPHAGE XR) 500 MG 24 hr tablet    Other Relevant Orders    FOOT EXAM (Completed)    Lipid panel reflex to direct LDL Fasting    Hemoglobin A1c       Circulatory    Hypertension goal BP (blood pressure) < 140/90    Relevant Medications    hydroCHLOROthiazide 12.5 MG tablet    losartan (COZAAR) 25 MG tablet    Coronary artery disease involving native coronary artery without angina pectoris    Relevant Medications    nitroGLYcerin (NITROSTAT) 0.4 MG sublingual tablet       Musculoskeletal and Integumentary    Osteoarthritis of left thumb       Urinary    CKD (chronic kidney disease) stage 3, GFR 30-59 ml/min (H)       Other    History of total left knee replacement     Other Visit Diagnoses       Encounter for Medicare annual wellness exam    -  Primary    Essential hypertension, benign        Relevant Medications    amLODIPine (NORVASC) 10 MG tablet    losartan (COZAAR) 25 MG tablet    Insomnia, unspecified type               Age-appropriate screening and immunization reviewed.  We did discuss dietary modifications as well as  her home blood pressures today.  Continue medications without changes now and repeat labs today.  She continues to follow with nephrology and cardiology.  Continuing on medical management for her coronary artery disease and carotid artery stenosis. Follow up with cardiology and vascular surgery. She is on arb and statin. She will continue to use her can but I did encourage having walker available given her mobility and fall risk. Face to face today for this if she would like one moving forward as I think this would be beneficial. Other fall risk reviewed. Follow up in 6 months for diabetes if things remain stable.     Patient has been advised of split billing requirements and indicates understanding: Yes       Counseling  Appropriate preventive services were addressed with this patient via screening, questionnaire, or discussion as appropriate for fall prevention, nutrition, physical activity, Tobacco-use cessation, social engagement, weight loss and cognition.  Checklist reviewing preventive services available has been given to the patient.  Reviewed patient's diet, addressing concerns and/or questions.   Discussed possible causes of fatigue.         Andreia Davila is a 84 year old, presenting for the following:  Physical        12/3/2024     9:22 AM   Additional Questions   Roomed by Oren MONCADA    Health Care Directive  Patient has a Health Care Directive on file  Advance care planning document is on file and is current.      11/28/2024   General Health   How would you rate your overall physical health? Good   Feel stress (tense, anxious, or unable to sleep) Only a little      (!) STRESS CONCERN      11/28/2024   Nutrition   Diet: Low salt    Low fat/cholesterol    Diabetic       Multiple values from one day are sorted in reverse-chronological order         11/17/2023   Exercise   Frequency of exercise: 4-5 days/week            11/28/2024   Social Factors   Worry food won't last until get money to buy  more No    No   Food not last or not have enough money for food? No    No   Do you have housing? (Housing is defined as stable permanent housing and does not include staying ouside in a car, in a tent, in an abandoned building, in an overnight shelter, or couch-surfing.) Yes    Yes   Are you worried about losing your housing? No    No   Lack of transportation? No    No   Unable to get utilities (heat,electricity)? No    No       Multiple values from one day are sorted in reverse-chronological order         12/3/2024   Fall Risk   Gait Speed Test (Document in seconds) 4.72   Gait Speed Test Interpretation Less than or equal to 5.00 seconds - PASS               11/28/2024   Activities of Daily Living- Home Safety   Needs help with the following daily activites None of the above   Safety concerns in the home None of the above            11/28/2024   Dental   Dentist two times every year? Yes            11/28/2024   Hearing Screening   Hearing concerns? None of the above            11/28/2024   Driving Risk Screening   Patient/family members have concerns about driving No            11/28/2024   General Alertness/Fatigue Screening   Have you been more tired than usual lately? (!) YES            11/28/2024   Urinary Incontinence Screening   Bothered by leaking urine in past 6 months No            11/28/2024   TB Screening   Were you born outside of the [place]? No            Today's PHQ-2 Score:       12/2/2024    12:09 PM   PHQ-2 ( 1999 [employer])   Q1: Little interest or pleasure in doing things 0    Q2: Feeling down, depressed or hopeless 0    PHQ-2 Score 0    Q1: Little interest or pleasure in doing things Not at all   Q2: Feeling down, depressed or hopeless Not at all   PHQ-2 Score 0       Patient-reported           11/28/2024   Substance Use   Alcohol more than 3/day or more than 7/wk Not Applicable   Do you have a current opioid prescription? No   How severe/bad is pain from 1 to 10? 5/10   Do you use any other substances  recreationally? No        Social History     Tobacco Use    Smoking status: Former    Smokeless tobacco: Never    Tobacco comments:     quit     Vaping Use    Vaping status: Never Used   Substance Use Topics    Alcohol use: No     Alcohol/week: 0.0 standard drinks of alcohol    Drug use: No           2024   LAST FHS-7 RESULTS   1st degree relative breast or ovarian cancer No   Any relative bilateral breast cancer No   Any male have breast cancer No   Any ONE woman have BOTH breast AND ovarian cancer No   Any woman with breast cancer before 50yrs No   2 or more relatives with breast AND/OR ovarian cancer No   2 or more relatives with breast AND/OR bowel cancer No           Mammogram Screening - Mammogram every 1-2 years updated in Health Maintenance based on mutual decision making      Fracture Risk Assessment Tool  Link to Frax Calculator  Use the information below to complete the Frax calculator  : 1940  Sex: female  Weight (kg): 77.9 kg (actual weight)  Height (cm): 151.5 cm  Previous Fragility Fracture:  No  History of parent with fractured hip:  No  Current Smoking:  No  Patient has been on glucocorticoids for more than 3 months (5mg/day or more): No  Rheumatoid Arthritis on Problem List:  No  Secondary Osteoporosis on Problem List:  No  Consumes 3 or more units of alcohol per day: No  Femoral Neck BMD (g/cm2)            Reviewed and updated as needed this visit by Provider                    Past Medical History:   Diagnosis Date    Diabetic eye exam (H) 14    Endometriosis, site unspecified     Fever and other physiologic disturbances of temperature regulation 2005    Admit.  Discharged 2005.  Cause undetermined.    Heart disease     Myalgia and myositis, unspecified     fibromyalgia    Pure hypercholesterolemia     Unspecified essential hypertension     Unspecified hypothyroidism      Past Surgical History:   Procedure Laterality Date    ARTHROPLASTY KNEE  2013     Procedure: ARTHROPLASTY KNEE;  left total knee arthroplasty;  Surgeon: Teddy Grimes MD;  Location: [facility]    ARTHROPLASTY KNEE Right 8/27/2018    Procedure: ARTHROPLASTY KNEE;  right total knee arthroplasty;  Surgeon: Johnny Anaya MD;  Location: [facility]    COMBINED CYSTOSCOPY, INSERT CATHETER URETER Bilateral 8/10/2015    Procedure: COMBINED CYSTOSCOPY, INSERT CATHETER URETER;  Surgeon: Johnnie Madera MD;  Location: [facility]    DILATION AND CURETTAGE, HYSTEROSCOPY DIAGNOSTIC, COMBINED N/A 11/6/2014    Procedure: COMBINED DILATION AND CURETTAGE, HYSTEROSCOPY DIAGNOSTIC;  Surgeon: Edward Pardo MD;  Location[facility]    ESOPHAGOSCOPY, GASTROSCOPY, DUODENOSCOPY (EGD), COMBINED N/A 1/27/2015    Procedure: COMBINED ESOPHAGOSCOPY, GASTROSCOPY, DUODENOSCOPY (EGD), BIOPSY SINGLE OR MULTIPLE;  Surgeon: Carmelo Rosario MD;  Location: [facility] GI    HC REMOVAL GALLBLADDER      Cholecystectomy    HC REMOVE TONSILS/ADENOIDS,<13 Y/O      unsure of age    LAPAROSCOPIC HYSTERECTOMY TOTAL N/A 8/10/2015    Procedure: LAPAROSCOPIC HYSTERECTOMY TOTAL;  Surgeon: Edward Pardo MD;  [facility]    LAPAROSCOPIC LYSIS ADHESIONS N/A 8/10/2015    Procedure: LAPAROSCOPIC LYSIS ADHESIONS;  Surgeon: Edward Pardo MD;  Location: [facility] OR    ZZC APPENDECTOMY      [facility] EXPLOR HEART SURG WND W CP BYPASS Bilateral 03/20/2019    [facility] NONSPECIFIC PROCEDURE      Knee ligament surgery    [facility] NONSPECIFIC PROCEDURE      Kidney surgery for mechanical obstruction    [facility] OPEN CORONARY ENDARTERECTOMY  june 2005    Angioplasty w stenting    [facility] TOTAL KNEE ARTHROPLASTY  4/29/13    Left     OB History   No obstetric history on file.     Current providers sharing in care for this patient include:  Patient Care Team:  Tyson Cano MD as PCP - General (Family Medicine)  Dafne Auguste RD as Diabetes Educator (Dietitian, Registered)  Tyson Cano MD as Assigned PCP    The following [facility]  "maintenance items are reviewed in Epic and correct as of today:  Health Maintenance   Topic Date Due    A1C  10/02/2024    LIPID  10/05/2024    TSH W/FREE T4 REFLEX  12/13/2024    EYE EXAM  08/08/2025    ANNUAL REVIEW OF HM ORDERS  09/27/2025    BMP  10/25/2025    MICROALBUMIN  10/25/2025    MEDICARE ANNUAL WELLNESS VISIT  12/03/2025    DIABETIC FOOT EXAM  12/03/2025    FALL RISK ASSESSMENT  12/03/2025    HEMOGLOBIN  12/03/2025    DTAP/TDAP/TD IMMUNIZATION (2 - Td or Tdap) 04/27/2026    ADVANCE CARE PLANNING  12/03/2029    DEXA  10/17/2032    PHQ-2 (once per calendar year)  Completed    INFLUENZA VACCINE  Completed    Pneumococcal Vaccine: 65+ Years  Completed    URINALYSIS  Completed    RSV VACCINE  Completed    COVID-19 Vaccine  Completed    HPV IMMUNIZATION  Aged Out    MENINGITIS IMMUNIZATION  Aged Out    RSV MONOCLONAL ANTIBODY  Aged Out    MAMMO SCREENING  Discontinued    ZOSTER IMMUNIZATION  Discontinued         Review of Systems  Constitutional, HEENT, cardiovascular, pulmonary, GI, , musculoskeletal, neuro, skin, endocrine and psych systems are negative, except as otherwise noted.     Objective    Exam  /78   Pulse 95   Temp 97.7  F (36.5  C) (Temporal)   Resp 23   Ht 1.515 m (4' 11.65\")   Wt 77.9 kg (171 lb 12.8 oz)   SpO2 98%   BMI 33.95 kg/m     Estimated body mass index is 33.95 kg/m  as calculated from the following:    Height as of this encounter: 1.515 m (4' 11.65\").    Weight as of this encounter: 77.9 kg (171 lb 12.8 oz).    Physical Exam  GENERAL: alert and no distress  EYES: Eyes grossly normal to inspection, PERRL and conjunctivae and sclerae normal  HENT: ear canals and TM's normal, nose and mouth without ulcers or lesions  NECK: no adenopathy, no asymmetry, masses, or scars  RESP: lungs clear to auscultation - no rales, rhonchi or wheezes  CV: regular rate and rhythm, normal S1 S2, no peripheral edema  ABDOMEN: soft, nontender, no hepatosplenomegaly, no masses and bowel sounds " normal  MS: no gross musculoskeletal defects noted, no edema, sensation intact on monofilament of both feet.   SKIN: no suspicious lesions or rashes  NEURO: Normal strength and tone, mentation intact and speech normal  PSYCH: mentation appears normal, affect normal/bright         12/3/2024   Mini Cog   Clock Draw Score 2 Normal   3 Item Recall 1 object recalled   Mini Cog Total Score 3                 Signed Electronically by: Tyson Cano MD

## 2024-12-03 NOTE — PATIENT INSTRUCTIONS
Patient Education   Preventive Care Advice   This is general advice given by our system to help you stay healthy. However, your care team may have specific advice just for you. Please talk to your care team about your preventive care needs.  Nutrition  Eat 5 or more servings of fruits and vegetables each day.  Try wheat bread, brown rice and whole grain pasta (instead of white bread, rice, and pasta).  Get enough calcium and vitamin D. Check the label on foods and aim for 100% of the RDA (recommended daily allowance).  Lifestyle  Exercise at least 150 minutes each week  (30 minutes a day, 5 days a week).  Do muscle strengthening activities 2 days a week. These help control your weight and prevent disease.  No smoking.  Wear sunscreen to prevent skin cancer.  Have a dental exam and cleaning every 6 months.  Yearly exams  See your health care team every year to talk about:  Any changes in your health.  Any medicines your care team has prescribed.  Preventive care, family planning, and ways to prevent chronic diseases.  Shots (vaccines)   HPV shots (up to age 26), if you've never had them before.  Hepatitis B shots (up to age 59), if you've never had them before.  COVID-19 shot: Get this shot when it's due.  Flu shot: Get a flu shot every year.  Tetanus shot: Get a tetanus shot every 10 years.  Pneumococcal, hepatitis A, and RSV shots: Ask your care team if you need these based on your risk.  Shingles shot (for age 50 and up)  General health tests  Diabetes screening:  Starting at age 35, Get screened for diabetes at least every 3 years.  If you are younger than age 35, ask your care team if you should be screened for diabetes.  Cholesterol test: At age 39, start having a cholesterol test every 5 years, or more often if advised.  Bone density scan (DEXA): At age 50, ask your care team if you should have this scan for osteoporosis (brittle bones).  Hepatitis C: Get tested at least once in your life.  STIs (sexually  transmitted infections)  Before age 24: Ask your care team if you should be screened for STIs.  After age 24: Get screened for STIs if you're at risk. You are at risk for STIs (including HIV) if:  You are sexually active with more than one person.  You don't use condoms every time.  You or a partner was diagnosed with a sexually transmitted infection.  If you are at risk for HIV, ask about PrEP medicine to prevent HIV.  Get tested for HIV at least once in your life, whether you are at risk for HIV or not.  Cancer screening tests  Cervical cancer screening: If you have a cervix, begin getting regular cervical cancer screening tests starting at age 21.  Breast cancer scan (mammogram): If you've ever had breasts, begin having regular mammograms starting at age 40. This is a scan to check for breast cancer.  Colon cancer screening: It is important to start screening for colon cancer at age 45.  Have a colonoscopy test every 10 years (or more often if you're at risk) Or, ask your provider about stool tests like a FIT test every year or Cologuard test every 3 years.  To learn more about your testing options, visit:   .  For help making a decision, visit:   https://bit.ly/nv63964.  Prostate cancer screening test: If you have a prostate, ask your care team if a prostate cancer screening test (PSA) at age 55 is right for you.  Lung cancer screening: If you are a current or former smoker ages 50 to 80, ask your care team if ongoing lung cancer screenings are right for you.  For informational purposes only. Not to replace the advice of your health care provider. Copyright   2023 [facility]. All rights reserved. Clinically reviewed by the [facility] Transitions Program. Retewi 554886 - REV 01/24.  Preventing Falls: Care Instructions  Injuries and health problems such as trouble walking or poor eyesight can increase your risk of falling. So can some medicines. But there are things you can do to help  "prevent falls. You can exercise to get stronger. You can also arrange your home to make it safer.    Talk to your doctor about the medicines you take. Ask if any of them increase the risk of falls and whether they can be changed or stopped.   Try to exercise regularly. It can help improve your strength and balance. This can help lower your risk of falling.         Practice fall safety and prevention.   Wear low-heeled shoes that fit well and give your feet good support. Talk to your doctor if you have foot problems that make this hard.  Carry a cellphone or wear a medical alert device that you can use to call for help.  Use stepladders instead of chairs to reach high objects. Don't climb if you're at risk for falls. Ask for help, if needed.  Wear the correct eyeglasses, if you need them.        Make your home safer.   Remove rugs, cords, clutter, and furniture from walkways.  Keep your house well lit. Use night-lights in hallways and bathrooms.  Install and use sturdy handrails on stairways.  Wear nonskid footwear, even inside. Don't walk barefoot or in socks without shoes.        Be safe outside.   Use handrails, curb cuts, and ramps whenever possible.  Keep your hands free by using a shoulder bag or backpack.  Try to walk in well-lit areas. Watch out for uneven ground, changes in pavement, and debris.  Be careful in the winter. Walk on the grass or gravel when sidewalks are slippery. Use de-icer on steps and walkways. Add non-slip devices to shoes.    Put grab bars and nonskid mats in your shower or tub and near the toilet. Try to use a shower chair or bath bench when bathing.   Get into a tub or shower by putting in your weaker leg first. Get out with your strong side first. Have a phone or medical alert device in the bathroom with you.   Where can you learn more?  Go to https://www.[employer]wise.net/patiented  Enter G117 in the search box to learn more about \"Preventing Falls: Care Instructions.\"  Current as of: " July 17, 2023  Content Version: 14.2 2024 [facility] [employer].   Care instructions adapted under license by your healthcare professional. If you have questions about a medical condition or this instruction, always ask your healthcare professional. [employer] disclaims any warranty or liability for your use of this information.    Learning About Sleeping Well  What does sleeping well mean?     Sleeping well means getting enough sleep to feel good and stay healthy. How much sleep is enough varies among people.  The number of hours you sleep and how you feel when you wake up are both important. If you do not feel refreshed, you probably need more sleep. Another sign of not getting enough sleep is feeling tired during the day.  Experts recommend that adults get at least 7 or more hours of sleep per day. Children and older adults need more sleep.  Why is getting enough sleep important?  Getting enough quality sleep is a basic part of good health. When your sleep suffers, your physical health, mood, and your thoughts can suffer too. You may find yourself feeling more grumpy or stressed. Not getting enough sleep also can lead to serious problems, including injury, accidents, anxiety, and depression.  What might cause poor sleeping?  Many things can cause sleep problems, including:  Changes to your sleep schedule.  Stress. Stress can be caused by fear about a single event, such as giving a speech. Or you may have ongoing stress, such as worry about work or school.  Depression, anxiety, and other mental or emotional conditions.  Changes in your sleep habits or surroundings. This includes changes that happen where you sleep, such as noise, light, or sleeping in a different bed. It also includes changes in your sleep pattern, such as having jet lag or working a late shift.  Health problems, such as pain, breathing problems, and restless legs syndrome.  Lack of regular exercise.  Using alcohol, nicotine, or  "caffeine before bed.  How can you help yourself?  Here are some tips that may help you sleep more soundly and wake up feeling more refreshed.  Your sleeping area   Use your bedroom only for sleeping and sex. A bit of light reading may help you fall asleep. But if it doesn't, do your reading elsewhere in the house. Try not to use your TV, computer, smartphone, or tablet while you are in bed.  Be sure your bed is big enough to stretch out comfortably, especially if you have a sleep partner.  Keep your bedroom quiet, dark, and cool. Use curtains, blinds, or a sleep mask to block out light. To block out noise, use earplugs, soothing music, or a \"white noise\" machine.  Your evening and bedtime routine   Create a relaxing bedtime routine. You might want to take a warm shower or bath, or listen to soothing music.  Go to bed at the same time every night. And get up at the same time every morning, even if you feel tired.  What to avoid   Limit caffeine (coffee, tea, caffeinated sodas) during the day, and don't have any for at least 6 hours before bedtime.  Avoid drinking alcohol before bedtime. Alcohol can cause you to wake up more often during the night.  Try not to smoke or use tobacco, especially in the evening. Nicotine can keep you awake.  Limit naps during the day, especially close to bedtime.  Avoid lying in bed awake for too long. If you can't fall asleep or if you wake up in the middle of the night and can't get back to sleep within about 20 minutes, get out of bed and go to another room until you feel sleepy.  Avoid taking medicine right before bed that may keep you awake or make you feel hyper or energized. Your doctor can tell you if your medicine may do this and if you can take it earlier in the day.  If you can't sleep   Imagine yourself in a peaceful, pleasant scene. Focus on the details and feelings of being in a place that is relaxing.  Get up and do a quiet or boring activity until you feel sleepy.  Avoid " "drinking any liquids before going to bed to help prevent waking up often to use the bathroom.  Where can you learn more?  Go to https://www.[employer].net/patiented  Enter J942 in the search box to learn more about \"Learning About Sleeping Well.\"  Current as of: July 10, 2023  Content Version: 14.2 2024 [facility] [employer]   Care instructions adapted under license by your healthcare professional. If you have questions about a medical condition or this instruction, always ask your healthcare professional. Healthwise, Incorporated disclaims any warranty or liability for your use of this information.       "

## 2025-01-02 ENCOUNTER — MEDICAL CORRESPONDENCE (OUTPATIENT)
Dept: HEALTH INFORMATION MANAGEMENT | Facility: CLINIC | Age: 85
End: 2025-01-02
Payer: MEDICARE

## 2025-01-02 ENCOUNTER — ANTICOAGULATION THERAPY VISIT (OUTPATIENT)
Dept: ANTICOAGULATION | Facility: CLINIC | Age: 85
End: 2025-01-02
Payer: MEDICARE

## 2025-01-02 NOTE — PROGRESS NOTES
"ANTICOAGULATION  MANAGEMENT: NEW REFERRAL      SUBJECTIVE/OBJECTIVE     Sandra Petit, a 84 year old female  is newly referred to [facility].    Anticoagulation:    Previously on warfarin: Yes,  2013.  Approximate previous dose: 2.5 mg daily for TKA   Warfarin initiation date (approximate): will start 2.5 mg tomorrow once she picks up the RX , will take in the evening.    Indication(s): Atrial Fibrillation   Goal Range:  2.0-3.0   Anticoagulation Bridge/Overlap: No   Referring provider:  from [facility], new referral sent to PCP to sign did talk with [facility], rogelio Franklin to start with 2.5 mg daily and no bridging needed     General Dietary/Social Hx:    Typical vitamin K intake: low; variable     Other dietary considerations:  glucerna PRN       Social History:   Social History     Tobacco Use    Smoking status: Former    Smokeless tobacco: Never    Tobacco comments:     quit  1987   Vaping Use    Vaping status: Never Used   Substance Use Topics    Alcohol use: No     Alcohol/week: 0.0 standard drinks of alcohol    Drug use: No       In the past 2 weeks, patient estimates taking medications as instructed % of time: 100%    Results:        No results for input(s): \"INR\", \"[id number]\", \"F2\", \"ALMWH\" in the last 168 hours.    Wt Readings from Last 2 Encounters:   12/03/24 77.9 kg (171 lb 12.8 oz)   09/27/24 77.5 kg (170 lb 12.8 oz)      Estimated body mass index is 33.95 kg/m  as calculated from the following:    Height as of 12/3/24: 1.515 m (4' 11.65\").    Weight as of 12/3/24: 77.9 kg (171 lb 12.8 oz).  Lab Results   Component Value Date    AST 16 06/23/2024    ALT 15 06/23/2024    ALBUMIN 4.3 06/23/2024     Lab Results   Component Value Date    CR 1.54 (H) 12/03/2024     Estimated Creatinine Clearance: 34 mL/min (A) (based on SCr of 1.54 mg/dL (H)).    ASSESSMENT     Goal INR 2-3, standard for indication(s) above  Establishing initial warfarin maintenance dose (on warfarin < " 30 days)   Factors that may increase sensitivity to warfarin: Kidney Disease  Factors that may reduce sensitivity to warfarin: No factors  Potential significant drug interactions with home medications: None noted  Starting warfarin dose is appropriate for patient's anticipated sensitivity to warfarin    PLAN     Dosing Instructions: Continue your current warfarin dose with INR in 4 days   will start with 2.5 mg daily     Summary  As of 1/2/2025      Full warfarin instructions:  1/2: Hold; Otherwise 2.5 mg every day   Next INR check:  1/6/2025               Education provided:   Please call back if any changes to your diet, medications or how you've been taking warfarin  Taking warfarin: purpose of warfarin and how it works, take warfarin at same time each day; preferably in the evening, prescribed tablet strength and color, importance of following [facility] instructions vs instructions on the prescription bottle, and Importance of taking warfarin as instructed  Goal range and lab monitoring: goal range and significance of current result, Importance of therapeutic range, Importance of following up at instructed interval, and frequency of lab work when starting warfarin and importance of following up when instructed (extends after stability established)  Dietary considerations: importance of consistent vitamin K intake, impact of vitamin K foods on INR, Impact of protein intake on INR , and importance of notifying [facility] to changes in diet  Symptom monitoring: monitoring for bleeding signs and symptoms, monitoring for clotting signs and symptoms, monitoring for stroke signs and symptoms, when to seek medical attention/emergency care, and if you hit your head or have a bad fall seek emergency care  Written instructions provided  Contact [phone] with any changes, questions or concerns.     Education still needed:   Contact [phone] with any changes, questions or concerns.       Telephone call with Giovanni who verbalizes  understanding and agrees to plan    Lab visit scheduled    Standing orders placed in Epic: Point of Care INR (Lab 5000)    Plan made with [facility] Pharmacist Jennifer Montana, RN  Anticoagulation Clinic  1/2/2025                   Log Out.      [employer] CareNotes®     :  [facility]  	                       SCHIZOPHRENIA - AfterCare(R) Instructions(ER/ED)           Schizophrenia    WHAT YOU NEED TO KNOW:    Schizophrenia is a chronic mental disorder that affects your emotions, thinking, and behavior. Your brain has a hard time knowing what is real.    DISCHARGE INSTRUCTIONS:    Call your local emergency number (911 in the [place]) if   •You think about killing yourself or someone else.      •You have a rash, swelling, or trouble breathing after you take your medicine.      When should I call my doctor?   •You feel that you are having symptoms of schizophrenia.      •You are not able to sleep well, or are sleeping more than usual.      •You cannot eat or are eating more than usual.      •You have questions or concerns about your condition or care.      Medicines: Do not stop taking your medicine even if you feel better.  •Antipsychotics: These help decrease psychotic symptoms and severe agitation. You may need antiparkinson medicine to control muscle stiffness, twitches, and restlessness caused by antipsychotic medicines.      •Take your medicine as directed. Contact your healthcare provider if you think your medicine is not helping or if you have side effects. Tell him of her if you are allergic to any medicine. Keep a list of the medicines, vitamins, and herbs you take. Include the amounts, and when and why you take them. Bring the list or the pill bottles to follow-up visits. Carry your medicine list with you in case of an emergency.      Follow up with your psychiatrist as directed: Write down your questions so you remember to ask them during your visits.    Manage your symptoms:   •Do not stop taking your medicines. Tell your healthcare provider or psychiatrist if you have any problems with or questions about your medicines.      •Do not stop your therapies. It is normal to have doubts about or to feel discomfort with your therapy. Tell your healthcare provider or psychiatrist if you are not comfortable or have questions about your therapies.      •Get regular sleep.: Try to get 6 to 8 hours of sleep each night. Tell your healthcare provider or psychiatrist if you are not able to sleep, or if you are sleeping too much.      •Do not drink alcohol. Alcohol interacts with medicine used to treat schizophrenia.         © Copyright [employer] 2022           back to top                          © Copyright [employer] 2022         Follow up with your primary medical doctor in 1-2 days

## 2025-01-03 PROBLEM — I48.91 ATRIAL FIBRILLATION, UNSPECIFIED TYPE (H): Status: ACTIVE | Noted: 2025-01-03

## 2025-01-04 ENCOUNTER — APPOINTMENT (OUTPATIENT)
Dept: CT IMAGING | Facility: CLINIC | Age: 85
End: 2025-01-04
Attending: EMERGENCY MEDICINE
Payer: MEDICARE

## 2025-01-04 ENCOUNTER — HOSPITAL ENCOUNTER (EMERGENCY)
Facility: CLINIC | Age: 85
Discharge: HOME | End: 2025-01-04
Attending: EMERGENCY MEDICINE
Payer: MEDICARE

## 2025-01-04 ENCOUNTER — APPOINTMENT (OUTPATIENT)
Dept: GENERAL RADIOLOGY | Facility: CLINIC | Age: 85
End: 2025-01-04
Attending: EMERGENCY MEDICINE
Payer: MEDICARE

## 2025-01-04 VITALS
HEIGHT: 60 IN | TEMPERATURE: 97.9 F | SYSTOLIC BLOOD PRESSURE: 156 MMHG | BODY MASS INDEX: 33.18 KG/M2 | OXYGEN SATURATION: 98 % | HEART RATE: 71 BPM | WEIGHT: 169 LBS | DIASTOLIC BLOOD PRESSURE: 62 MMHG | RESPIRATION RATE: 12 BRPM
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
## 2025-01-04 DIAGNOSIS — S00.83XA FOREHEAD CONTUSION, INITIAL ENCOUNTER: ICD-10-CM
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
## 2025-01-04 DIAGNOSIS — S80.01XA CONTUSION OF RIGHT KNEE, INITIAL ENCOUNTER: ICD-10-CM

## 2025-01-04 LAB
ALBUMIN SERPL BCG-MCNC: 3.9 G/DL (ref 3.5–5.2)
ALP SERPL-CCNC: 115 U/L (ref 40–150)
ALT SERPL W P-5'-P-CCNC: 14 U/L (ref 0–50)
ANION GAP SERPL CALCULATED.3IONS-SCNC: 14 MMOL/L (ref 7–15)
AST SERPL W P-5'-P-CCNC: 19 U/L (ref 0–45)
ATRIAL RATE - MUSE: 65 BPM
BASOPHILS # BLD AUTO: 0.1 10E3/UL (ref 0–0.2)
BASOPHILS NFR BLD AUTO: 1 %
BILIRUB SERPL-MCNC: 0.2 MG/DL
BUN SERPL-MCNC: 30.3 MG/DL (ref 8–23)
CALCIUM SERPL-MCNC: 9.2 MG/DL (ref 8.8–10.4)
CHLORIDE SERPL-SCNC: 102 MMOL/L (ref 98–107)
CREAT SERPL-MCNC: 1.39 MG/DL (ref 0.51–0.95)
DIASTOLIC BLOOD PRESSURE - MUSE: NORMAL MMHG
EGFRCR SERPLBLD CKD-EPI 2021: 37 ML/MIN/1.73M2
EOSINOPHIL # BLD AUTO: 0.4 10E3/UL (ref 0–0.7)
EOSINOPHIL NFR BLD AUTO: 6 %
ERYTHROCYTE [DISTWIDTH] IN BLOOD BY AUTOMATED COUNT: 14.6 % (ref 10–15)
GLUCOSE SERPL-MCNC: 151 MG/DL (ref 70–99)
HCO3 SERPL-SCNC: 21 MMOL/L (ref 22–29)
HCT VFR BLD AUTO: 36.9 % (ref 35–47)
HGB BLD-MCNC: 12.2 G/DL (ref 11.7–15.7)
IMM GRANULOCYTES # BLD: 0 10E3/UL
IMM GRANULOCYTES NFR BLD: 0 %
INR PPP: 1.03 (ref 0.85–1.15)
INTERPRETATION ECG - MUSE: NORMAL
LYMPHOCYTES # BLD AUTO: 1.3 10E3/UL (ref 0.8–5.3)
LYMPHOCYTES NFR BLD AUTO: 17 %
MCH RBC QN AUTO: 30.6 PG (ref 26.5–33)
MCHC RBC AUTO-ENTMCNC: 33.1 G/DL (ref 31.5–36.5)
MCV RBC AUTO: 93 FL (ref 78–100)
MONOCYTES # BLD AUTO: 0.6 10E3/UL (ref 0–1.3)
MONOCYTES NFR BLD AUTO: 8 %
NEUTROPHILS # BLD AUTO: 5.2 10E3/UL (ref 1.6–8.3)
NEUTROPHILS NFR BLD AUTO: 68 %
NRBC # BLD AUTO: 0 10E3/UL
NRBC BLD AUTO-RTO: 0 /100
P AXIS - MUSE: 44 DEGREES
PLATELET # BLD AUTO: 257 10E3/UL (ref 150–450)
POTASSIUM SERPL-SCNC: 4.1 MMOL/L (ref 3.4–5.3)
PR INTERVAL - MUSE: 136 MS
PROT SERPL-MCNC: 6.6 G/DL (ref 6.4–8.3)
QRS DURATION - MUSE: 82 MS
QT - MUSE: 438 MS
QTC - MUSE: 455 MS
R AXIS - MUSE: 74 DEGREES
RBC # BLD AUTO: 3.99 10E6/UL (ref 3.8–5.2)
SODIUM SERPL-SCNC: 137 MMOL/L (ref 135–145)
SYSTOLIC BLOOD PRESSURE - MUSE: NORMAL MMHG
T AXIS - MUSE: 72 DEGREES
TROPONIN T SERPL HS-MCNC: 21 NG/L
VENTRICULAR RATE- MUSE: 65 BPM
WBC # BLD AUTO: 7.7 10E3/UL (ref 4–11)

## 2025-01-04 PROCEDURE — 70450 CT HEAD/BRAIN W/O DYE: CPT

## 2025-01-04 PROCEDURE — 84484 ASSAY OF TROPONIN QUANT: CPT | Performed by: EMERGENCY MEDICINE

## 2025-01-04 PROCEDURE — 85018 HEMOGLOBIN: CPT | Performed by: EMERGENCY MEDICINE

## 2025-01-04 PROCEDURE — 93010 ELECTROCARDIOGRAM REPORT: CPT | Performed by: EMERGENCY MEDICINE

## 2025-01-04 PROCEDURE — 82040 ASSAY OF SERUM ALBUMIN: CPT | Performed by: EMERGENCY MEDICINE

## 2025-01-04 PROCEDURE — 85610 PROTHROMBIN TIME: CPT | Performed by: EMERGENCY MEDICINE

## 2025-01-04 PROCEDURE — 93296 REM INTERROG EVL PM/IDS: CPT | Performed by: EMERGENCY MEDICINE

## 2025-01-04 PROCEDURE — 99285 EMERGENCY DEPT VISIT HI MDM: CPT | Mod: 25 | Performed by: EMERGENCY MEDICINE

## 2025-01-04 PROCEDURE — 85004 AUTOMATED DIFF WBC COUNT: CPT | Performed by: EMERGENCY MEDICINE

## 2025-01-04 PROCEDURE — 84520 ASSAY OF UREA NITROGEN: CPT | Performed by: EMERGENCY MEDICINE

## 2025-01-04 PROCEDURE — 36415 COLL VENOUS BLD VENIPUNCTURE: CPT | Performed by: EMERGENCY MEDICINE

## 2025-01-04 PROCEDURE — 73562 X-RAY EXAM OF KNEE 3: CPT | Mod: RT

## 2025-01-04 PROCEDURE — 250N000013 HC RX MED GY IP 250 OP 250 PS 637: Performed by: EMERGENCY MEDICINE

## 2025-01-04 PROCEDURE — 93005 ELECTROCARDIOGRAM TRACING: CPT | Performed by: EMERGENCY MEDICINE

## 2025-01-04 RX ORDER — LOSARTAN POTASSIUM 25 MG/1
25 TABLET ORAL ONCE
Status: COMPLETED | OUTPATIENT
Start: 2025-01-04 | End: 2025-01-04

## 2025-01-04 RX ADMIN — LOSARTAN POTASSIUM 25 MG: 25 TABLET, FILM COATED ORAL at 11:01

## 2025-01-04 ASSESSMENT — ACTIVITIES OF DAILY LIVING (ADL)
ADLS_ACUITY_SCORE: 44

## 2025-01-04 NOTE — DISCHARGE INSTRUCTIONS
Your head CT was normal.    Your pacemaker did not show any abnormal findings.    Your labs are around baseline.    You can take Tylenol if needed for pain.  Ice over areas of swelling and pain.    Please use a walker for ambulation.    Follow-up in clinic as needed.  Return for worsening, changes or concerns.    I hope you heal quickly!!

## 2025-01-04 NOTE — ED TRIAGE NOTES
Pt arrives by EMS after a fall.  Pt fell around 0715 while walking to the bathroom.  She hit her head on the wall, no LOC.  Pt was started on a blood thinner, first dose was yesterday.  Trauma eval called.

## 2025-01-04 NOTE — ED NOTES
Ambulated pt in the room with a walker, steady on feet.  She stated her right knee felt a little weak.  Pt states she is willing to use a walker at home.  Notified Dr. Stubbs

## 2025-01-05 ENCOUNTER — MYC MEDICAL ADVICE (OUTPATIENT)
Dept: FAMILY MEDICINE | Facility: CLINIC | Age: 85
End: 2025-01-05
Payer: MEDICARE

## 2025-01-05 NOTE — ED PROVIDER NOTES
"  History     Chief Complaint   Patient presents with    Fall     HPI  History per patient, medical records    This is an 84-year-old female, history of CKD, hypertension, hyperlipidemia, coronary artery disease, type 2 diabetes, pacemaker with new diagnosis of atrial fibrillation, just started on Coumadin yesterday, presenting after a fall.  Patient states that she got up to use the bathroom but as she turned she fell.  She remembers falling on her knees and then hitting the side of her head on the wall.  No specific loss of consciousness but she is not sure if she might have \"passed out\" or if she just lost her balance.  She denies any headache.  She notes that her right knee hurts.  She has history of knee replacements.  Patient brought in by EMS.  She denies any neck pain, chest pain, back pain, upper extremity pain, abdominal pain.  She took her hydrochlorothiazide this morning but did not take her losartan yet.    Allergies:  Allergies   Allergen Reactions    Penicillins Swelling     \"throat started swelling\"    Vitamin B Complex Hives    Vitamin B12 Hives     all vitamin B's    Clindamycin Hcl Rash       Problem List:    Patient Active Problem List    Diagnosis Date Noted    Atrial fibrillation, unspecified type (H) 01/03/2025     Priority: Medium    Acute left-sided low back pain without sciatica 07/22/2024     Priority: Medium    Osteoarthritis of left thumb 07/07/2017     Priority: Medium    Type 2 diabetes mellitus with other circulatory complications (H) 04/04/2016     Priority: Medium    Other specified hypothyroidism 10/02/2015     Priority: Medium    Coronary artery disease involving native coronary artery without angina pectoris 10/02/2015     Priority: Medium    Type 2 diabetes mellitus with diabetic nephropathy (H) 10/02/2015     Priority: Medium    History of total left knee replacement 04/29/2013     Priority: Medium    Hypertension goal BP (blood pressure) < 140/90 09/26/2011     Priority: " Medium    Hyperlipidemia LDL goal <100 10/31/2010     Priority: Medium    CKD (chronic kidney disease) stage 3, GFR 30-59 ml/min (H) 03/17/2009     Priority: Medium        Past Medical History:    Past Medical History:   Diagnosis Date    Diabetic eye exam (H) 08/05/13    Endometriosis, site unspecified     Fever and other physiologic disturbances of temperature regulation 07/07/2005    Heart disease     Myalgia and myositis, unspecified     Pure hypercholesterolemia     Unspecified essential hypertension     Unspecified hypothyroidism        Past Surgical History:    Past Surgical History:   Procedure Laterality Date    ARTHROPLASTY KNEE  4/29/2013    Procedure: ARTHROPLASTY KNEE;  left total knee arthroplasty;  Surgeon: Teddy Grimes MD;  Location: [facility] OR    ARTHROPLASTY KNEE Right 8/27/2018    Procedure: ARTHROPLASTY KNEE;  right total knee arthroplasty;  Surgeon: Johnny Anaya MD;  Location: [facility] OR    COMBINED CYSTOSCOPY, INSERT CATHETER URETER Bilateral 8/10/2015    Procedure: COMBINED CYSTOSCOPY, INSERT CATHETER URETER;  Surgeon: Johnnie Madera MD;  Location: [facility] OR    DILATION AND CURETTAGE, HYSTEROSCOPY DIAGNOSTIC, COMBINED N/A 11/6/2014    Procedure: COMBINED DILATION AND CURETTAGE, HYSTEROSCOPY DIAGNOSTIC;  Surgeon: Edward Pardo MD;  Location: [facility] OR    ESOPHAGOSCOPY, GASTROSCOPY, DUODENOSCOPY (EGD), COMBINED N/A 1/27/2015    Procedure: COMBINED ESOPHAGOSCOPY, GASTROSCOPY, DUODENOSCOPY (EGD), BIOPSY SINGLE OR MULTIPLE;  Surgeon: Carmelo Rosario MD;  Location: [facility] GI    HC REMOVAL GALLBLADDER      Cholecystectomy    HC REMOVE TONSILS/ADENOIDS,<11 Y/O      unsure of age    LAPAROSCOPIC HYSTERECTOMY TOTAL N/A 8/10/2015    Procedure: LAPAROSCOPIC HYSTERECTOMY TOTAL;  Surgeon: Edward Pardo MD;  Location: [facility]    LAPAROSCOPIC LYSIS ADHESIONS N/A 8/10/2015    Procedure: LAPAROSCOPIC LYSIS ADHESIONS;  Surgeon: Edward Pardo MD;  Location: [facility] OR     [facility] APPENDECTOMY      [facility] EXPLOR HEART SURG WND W CP BYPASS Bilateral 2019    [facility] NONSPECIFIC PROCEDURE      Knee ligament surgery    [facility] NONSPECIFIC PROCEDURE      Kidney surgery for mechanical obstruction    [facility] OPEN CORONARY ENDARTERECTOMY  2005    Angioplasty w stenting    [facility] TOTAL KNEE ARTHROPLASTY  14    Left       Family History:    Family History   Problem Relation Age of Onset    Heart Disease Mother          coronary    Heart Disease Father          coronary    Leukemia Sister     Allergies Sister         unknown    Heart Disease Sister         by pass surg    Hypertension Sister     Lipids Sister     Obesity Sister     Diabetes Sister     Diabetes Sister     Neurologic Disorder Sister         parkinsons    Cancer Sister         skin cancer    Allergies Brother         unknown    Heart Disease Brother         on medication    Hypertension Brother     Lipids Brother     Cerebrovascular Disease Brother     C.A.D. Brother         quad by pass    Allergies Daughter         unknown    Allergies Son         unknown    Alcohol/Drug No family hx of     Alzheimer Disease No family hx of        Social History:  Marital Status:   [5]  Social History     Tobacco Use    Smoking status: Former    Smokeless tobacco: Never    Tobacco comments:     quit     Vaping Use    Vaping status: Never Used   Substance Use Topics    Alcohol use: No     Alcohol/week: 0.0 standard drinks of alcohol    Drug use: No        Medications:    allopurinol (ZYLOPRIM) 100 MG tablet  amLODIPine (NORVASC) 10 MG tablet  aspirin (ASA) 325 MG EC tablet  azithromycin (ZITHROMAX) 250 MG tablet  blood glucose (ONETOUCH VERIO IQ) test strip  hydroCHLOROthiazide 12.5 MG tablet  Lancets (ONETOUCH DELICA PLUS [id number]) MISC  levothyroxine (SYNTHROID/LEVOTHROID) 50 MCG tablet  losartan (COZAAR) 25 MG tablet  metFORMIN (GLUCOPHAGE XR) 500 MG 24 hr tablet  multivitamin CF FORMULA (CHOICEFUL) capsule  nitroGLYcerin  "(NITROSTAT) 0.4 MG sublingual tablet  Probiotic Product (ACIDOPHILUS PROBIOTIC BLEND) CAPS  simvastatin (ZOCOR) 40 MG tablet  traZODone (DESYREL) 50 MG tablet  Vitamin D, Cholecalciferol, 25 MCG (1000 UT) CAPS          Review of Systems  All other ROS reviewed and are negative or non-contributory except as stated in HPI.     Physical Exam   BP: (!) 195/111  Pulse: 94  Temp: 97.9  F (36.6  C)  Resp: 20  Height: 151.1 cm (4' 11.5\")  Weight: 76.7 kg (169 lb)  SpO2: 99 %      Physical Exam  Vitals and nursing note reviewed.   HENT:      Head:      Comments: Superficial contusion to the right forehead     Right Ear: Tympanic membrane and ear canal normal.      Left Ear: Tympanic membrane and ear canal normal.      Nose: No congestion.      Mouth/Throat:      Mouth: Mucous membranes are moist.      Pharynx: Oropharynx is clear.   Eyes:      General: No scleral icterus.     Extraocular Movements: Extraocular movements intact.      Conjunctiva/sclera: Conjunctivae normal.   Cardiovascular:      Rate and Rhythm: Normal rate and regular rhythm.      Pulses: Normal pulses.      Heart sounds: Normal heart sounds.      Comments: Pacemaker  Pulmonary:      Effort: Pulmonary effort is normal.      Breath sounds: Normal breath sounds.   Chest:      Chest wall: No tenderness.   Abdominal:      Palpations: Abdomen is soft.      Tenderness: There is no abdominal tenderness.   Musculoskeletal:      Cervical back: Normal range of motion and neck supple. No tenderness.      Comments: Superficial contusion bilateral knees, right greater than left.  TKA scars.  Patient notes pain with trying to bend her right knee   Skin:     General: Skin is warm and dry.   Neurological:      General: No focal deficit present.      Mental Status: She is alert. Mental status is at baseline.   Psychiatric:         Mood and Affect: Mood normal.         Behavior: Behavior normal.         ED Course (with Medical Decision Making)    Pt seen and examined by me.  " RN and EPIC notes reviewed.      Patient with fall.  It sounds like it may have been mechanical but there is also a chance and may have been a near syncopal event.  She was newly started on Coumadin and did hit her head.  I am going to do a head CT.  He does not have any other obvious significant injuries except for right knee pain.  Plan to x-ray.  I am also going to interrogate her pacemaker to see if there was any event surrounding the fall.    EKG was done.  This shows normal sinus rhythm, rate of 65.  I do not see pacer spikes.  There is no ectopy.  No ST segment abnormalities.  Reviewed by myself at 8:26 AM    Pacemaker did not show any abnormal events.  Head CT negative  No acute abnormal findings on the right knee x-ray.  INR is currently normal/subtherapeutic.    Patient's blood pressure was up so I gave her the morning Cozaar.    We did get the patient up with a walker.  She has a little difficulty bearing weight on the right but feels she would be able to manage at home.  Her son went home to get a walker for her to use.  Recommend rest, ice or heat over painful areas.  Okay to take Tylenol if needed for pain.  Follow-up in clinic for any further symptoms.  Return at anytime for worsening, changes or concerns.        Procedures      Results for orders placed or performed during the hospital encounter of 01/04/25 (from the past 24 hours)   EKG 12-lead, tracing only   Result Value Ref Range    Systolic Blood Pressure  mmHg    Diastolic Blood Pressure  mmHg    Ventricular Rate 65 BPM    Atrial Rate 65 BPM    MN Interval 136 ms    QRS Duration 82 ms     ms    QTc 455 ms    P Axis 44 degrees    R AXIS 74 degrees    T Axis 72 degrees    Interpretation ECG       Sinus rhythm  Normal ECG  When compared with ECG of 01-Aug-2024 13:32, (unconfirmed)  Sinus rhythm has replaced Junctional rhythm  Criteria for Septal infarct are no longer Present  Confirmed by SEE ED PROVIDER NOTE FOR, ECG INTERPRETATION ([id number]),   Dileep English ([id number]) on 1/4/2025 10:37:58 PM     INR   Result Value Ref Range    INR 1.03 0.85 - 1.15   CBC with platelets differential    Narrative    The following orders were created for panel order CBC with platelets differential.  Procedure                               Abnormality         Status                     ---------                               -----------         ------                     CBC with platelets and d...[[id number]]                      Final result                 Please view results for these tests on the individual orders.   Troponin T, High Sensitivity   Result Value Ref Range    Troponin T, High Sensitivity 21 (H) <=14 ng/L   Comprehensive metabolic panel   Result Value Ref Range    Sodium 137 135 - 145 mmol/L    Potassium 4.1 3.4 - 5.3 mmol/L    Carbon Dioxide (CO2) 21 (L) 22 - 29 mmol/L    Anion Gap 14 7 - 15 mmol/L    Urea Nitrogen 30.3 (H) 8.0 - 23.0 mg/dL    Creatinine 1.39 (H) 0.51 - 0.95 mg/dL    GFR Estimate 37 (L) >60 mL/min/1.73m2    Calcium 9.2 8.8 - 10.4 mg/dL    Chloride 102 98 - 107 mmol/L    Glucose 151 (H) 70 - 99 mg/dL    Alkaline Phosphatase 115 40 - 150 U/L    AST 19 0 - 45 U/L    ALT 14 0 - 50 U/L    Protein Total 6.6 6.4 - 8.3 g/dL    Albumin 3.9 3.5 - 5.2 g/dL    Bilirubin Total 0.2 <=1.2 mg/dL   CBC with platelets and differential   Result Value Ref Range    WBC Count 7.7 4.0 - 11.0 10e3/uL    RBC Count 3.99 3.80 - 5.20 10e6/uL    Hemoglobin 12.2 11.7 - 15.7 g/dL    Hematocrit 36.9 35.0 - 47.0 %    MCV 93 78 - 100 fL    MCH 30.6 26.5 - 33.0 pg    MCHC 33.1 31.5 - 36.5 g/dL    RDW 14.6 10.0 - 15.0 %    Platelet Count 257 150 - 450 10e3/uL    % Neutrophils 68 %    % Lymphocytes 17 %    % Monocytes 8 %    % Eosinophils 6 %    % Basophils 1 %    % Immature Granulocytes 0 %    NRBCs per 100 WBC 0 <1 /100    Absolute Neutrophils 5.2 1.6 - 8.3 10e3/uL    Absolute Lymphocytes 1.3 0.8 - 5.3 10e3/uL    Absolute Monocytes 0.6 0.0 - 1.3 10e3/uL    Absolute  Eosinophils 0.4 0.0 - 0.7 10e3/uL    Absolute Basophils 0.1 0.0 - 0.2 10e3/uL    Absolute Immature Granulocytes 0.0 <=0.4 10e3/uL    Absolute NRBCs 0.0 10e3/uL   XR Knee Right 3 Views    Narrative    EXAM: XR KNEE RIGHT 3 VIEWS  LOCATION: [facility]  DATE: 1/4/2025    INDICATION: fall; pain  COMPARISON: None.      Impression    IMPRESSION: Status post right total knee arthroplasty. No hardware complication. No acute fracture or malalignment. Trace joint effusion. Atherosclerosis.   Head CT w/o contrast    Narrative    EXAM: CT HEAD W/O CONTRAST  LOCATION: [facility]  DATE: 1/4/2025    INDICATION: Head injury.  COMPARISON: CT head without contrast 6/23/2024.  TECHNIQUE: Routine CT Head without IV contrast. Multiplanar reformats. Dose reduction techniques were used.    FINDINGS:  INTRACRANIAL CONTENTS: No intracranial hemorrhage, extraaxial collection, or mass effect.  No CT evidence of acute infarct. Mild to moderate presumed chronic small vessel ischemic changes. Moderate generalized volume loss. No hydrocephalus.     VISUALIZED ORBITS/SINUSES/MASTOIDS: Prior bilateral cataract surgery. Visualized portions of the orbits are otherwise unremarkable. No paranasal sinus mucosal disease. No middle ear or mastoid effusion.    BONES/SOFT TISSUES: No acute abnormality.      Impression    IMPRESSION:  1.  No CT evidence for acute intracranial process.  2.  Brain atrophy and presumed chronic microvascular ischemic changes as above.     *Note: Due to a large number of results and/or encounters for the requested time period, some results have not been displayed. A complete set of results can be found in Results Review.       Medications   losartan (COZAAR) tablet 25 mg (25 mg Oral $Given 1/4/25 1101)       Assessments & Plan     I have reviewed the findings, diagnosis, plan and need for follow up with the patient.    Discharge Medication List as of 1/4/2025  1:05  PM          Final diagnoses:   Fall at home, initial encounter   Forehead contusion, initial encounter   Contusion of right knee, initial encounter     Disposition: Patient discharged home in stable condition.  Plan as above.  Return for concerns.     Note: Chart documentation done in part with Dragon Voice Recognition software. Although reviewed after completion, some word and grammatical errors may remain.     1/4/2025   [facility] EMERGENCY DEPT       Etelvina Stubbs MD  01/05/25 0253

## 2025-01-06 ENCOUNTER — TELEPHONE (OUTPATIENT)
Dept: FAMILY MEDICINE | Facility: CLINIC | Age: 85
End: 2025-01-06

## 2025-01-06 ENCOUNTER — DOCUMENTATION ONLY (OUTPATIENT)
Dept: ANTICOAGULATION | Facility: CLINIC | Age: 85
End: 2025-01-06

## 2025-01-06 ENCOUNTER — LAB (OUTPATIENT)
Dept: LAB | Facility: CLINIC | Age: 85
End: 2025-01-06
Payer: MEDICARE

## 2025-01-06 ENCOUNTER — ANTICOAGULATION THERAPY VISIT (OUTPATIENT)
Dept: ANTICOAGULATION | Facility: CLINIC | Age: 85
End: 2025-01-06

## 2025-01-06 DIAGNOSIS — Z96.652 HISTORY OF TOTAL LEFT KNEE REPLACEMENT: Primary | ICD-10-CM

## 2025-01-06 DIAGNOSIS — I48.91 ATRIAL FIBRILLATION, UNSPECIFIED TYPE (H): Primary | ICD-10-CM

## 2025-01-06 LAB — INR BLD: 1 (ref 0.9–1.1)

## 2025-01-06 PROCEDURE — 85610 PROTHROMBIN TIME: CPT

## 2025-01-06 PROCEDURE — 36416 COLLJ CAPILLARY BLOOD SPEC: CPT

## 2025-01-06 NOTE — TELEPHONE ENCOUNTER
Patient had a fall, was seen in the ED and is needing to have a walker.    She said that you had talked about this at her last visit.    Order is pended.    Carolin Tamez XRO/

## 2025-01-06 NOTE — PROGRESS NOTES
ANTICOAGULATION  MANAGEMENT: Discharge Review    Sandra Petit chart reviewed for anticoagulation continuity of care    Emergency room visit on 1/4 for fall, forehead contusion.    Discharge disposition: Home    Results:    Recent labs: (last 7 days)     01/04/25  0835   INR 1.03     Anticoagulation inpatient management:     not applicable     Anticoagulation discharge instructions:     Warfarin dosing: home regimen continued   Bridging: No   INR goal change: No      Medication changes affecting anticoagulation: No    Additional factors affecting anticoagulation: No     PLAN     No adjustment to anticoagulation plan needed Has INR scheduled today.    Patient not contacted    No adjustment to Anticoagulation Calendar was required    Holly Jenkins RN  1/6/2025  Anticoagulation Clinic  [facility] for routing messages: joycelyn [facility] patient phone line: [phone]

## 2025-01-08 ENCOUNTER — LAB (OUTPATIENT)
Dept: LAB | Facility: CLINIC | Age: 85
End: 2025-01-08
Payer: MEDICARE

## 2025-01-08 ENCOUNTER — ANTICOAGULATION THERAPY VISIT (OUTPATIENT)
Dept: ANTICOAGULATION | Facility: CLINIC | Age: 85
End: 2025-01-08

## 2025-01-08 DIAGNOSIS — I48.91 ATRIAL FIBRILLATION, UNSPECIFIED TYPE (H): ICD-10-CM

## 2025-01-08 DIAGNOSIS — I48.91 ATRIAL FIBRILLATION, UNSPECIFIED TYPE (H): Primary | ICD-10-CM

## 2025-01-08 LAB — INR BLD: 1.3 (ref 0.9–1.1)

## 2025-01-08 PROCEDURE — 36416 COLLJ CAPILLARY BLOOD SPEC: CPT

## 2025-01-08 PROCEDURE — 85610 PROTHROMBIN TIME: CPT

## 2025-01-08 NOTE — PROGRESS NOTES
ANTICOAGULATION MANAGEMENT     Sandra Petit 84 year old female is on warfarin with subtherapeutic INR result. (Goal INR 2.0-3.0)    Recent labs: (last 7 days)     01/08/25  0952   INR 1.3*       ASSESSMENT     Warfarin Lab Questionnaire    Warfarin Doses Last 7 Days      1/7/2025    12:21 PM   Dose in Tablet or Mg   TAB or MG? milligram (mg)     Pt Rptd Dose SUNDAY MONDAY TUESDAY 1/7/2025  12:21 PM 2.5 5 5         1/7/2025   Warfarin Lab Questionnaire   Missed doses within past 14 days? No   Changes in diet or alcohol within past 14 days? No   Medication changes since last result? No   Injuries or illness since last result? No   New shortness of breath, severe headaches or sudden changes in vision since last result? No   Abnormal bleeding since last result? No   Upcoming surgery, procedure? No     Previous result: Subtherapeutic, trending appropriately following 40+% dose increase per new start protocol 2 days ago, full effect of dose increase likely not yet realized.   Additional findings: New start on day 6 of warfarin       PLAN     Recommended plan for no diet, medication or health factor changes affecting INR     Dosing Instructions: Continue your current warfarin dose but moved 5mg days to be more spread out over the week (previously set at Mon/Tue/Fri), this will effectively act as a boost dose today with next INR in 2-5 days. Giovanni has an OV scheduled on Tue 1/14/25, did not feel comfortable dosing her out that far so gave her the option to recheck on Friday or Monday and she elected to check Friday 1/10/24.        Summary  As of 1/8/2025      Full warfarin instructions:  5 mg every Mon, Wed, Fri; 2.5 mg all other days   Next INR check:  1/13/2025               Telephone call with Giovanni who verbalizes understanding and agrees to plan    Lab visit scheduled    Education provided: Contact [phone] with any changes, questions or concerns.     Plan made per [facility] anticoagulation protocol    Jasmin Michelle,  RN  1/8/2025  Anticoagulation Clinic  [facility] for routing messages: joycelyn GURROLA  [facility] patient phone line: [phone]        _______________________________________________________________________     Anticoagulation Episode Summary       Current INR goal:  2.0-3.0   TTR:  --   Target end date:  Indefinite   Send INR reminders to:  ANDREIA GURROLA    Indications    Atrial fibrillation  unspecified type (H) [I48.91]             Comments:  --             Anticoagulation Care Providers       Provider Role Specialty Phone number    Tyson Cano MD [facility] [phone]

## 2025-01-14 ENCOUNTER — ANTICOAGULATION THERAPY VISIT (OUTPATIENT)
Dept: ANTICOAGULATION | Facility: CLINIC | Age: 85
End: 2025-01-14

## 2025-01-14 ENCOUNTER — LAB (OUTPATIENT)
Dept: LAB | Facility: CLINIC | Age: 85
End: 2025-01-14
Payer: MEDICARE

## 2025-01-14 ENCOUNTER — OFFICE VISIT (OUTPATIENT)
Dept: FAMILY MEDICINE | Facility: CLINIC | Age: 85
End: 2025-01-14
Payer: MEDICARE

## 2025-01-14 VITALS
HEART RATE: 82 BPM | OXYGEN SATURATION: 98 % | BODY MASS INDEX: 34.18 KG/M2 | TEMPERATURE: 96.9 F | WEIGHT: 174.1 LBS | RESPIRATION RATE: 12 BRPM | HEIGHT: 60 IN | SYSTOLIC BLOOD PRESSURE: 140 MMHG | DIASTOLIC BLOOD PRESSURE: 65 MMHG

## 2025-01-14 DIAGNOSIS — I25.10 CORONARY ARTERY DISEASE INVOLVING NATIVE CORONARY ARTERY OF NATIVE HEART WITHOUT ANGINA PECTORIS: ICD-10-CM

## 2025-01-14 DIAGNOSIS — I48.91 ATRIAL FIBRILLATION, UNSPECIFIED TYPE (H): ICD-10-CM

## 2025-01-14 DIAGNOSIS — E03.8 OTHER SPECIFIED HYPOTHYROIDISM: ICD-10-CM

## 2025-01-14 DIAGNOSIS — E11.59 TYPE 2 DIABETES MELLITUS WITH OTHER CIRCULATORY COMPLICATIONS (H): ICD-10-CM

## 2025-01-14 DIAGNOSIS — Z96.652 HISTORY OF TOTAL LEFT KNEE REPLACEMENT: ICD-10-CM

## 2025-01-14 DIAGNOSIS — E78.5 HYPERLIPIDEMIA LDL GOAL <100: ICD-10-CM

## 2025-01-14 DIAGNOSIS — N18.32 CHRONIC KIDNEY DISEASE, STAGE 3B (H): ICD-10-CM
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
## 2025-01-14 DIAGNOSIS — I49.5 TACHY-BRADY SYNDROME (H): ICD-10-CM

## 2025-01-14 DIAGNOSIS — I48.91 ATRIAL FIBRILLATION, UNSPECIFIED TYPE (H): Primary | ICD-10-CM

## 2025-01-14 LAB — INR BLD: 3 (ref 0.9–1.1)

## 2025-01-14 PROCEDURE — 85610 PROTHROMBIN TIME: CPT

## 2025-01-14 PROCEDURE — 36416 COLLJ CAPILLARY BLOOD SPEC: CPT

## 2025-01-14 ASSESSMENT — PAIN SCALES - GENERAL: PAINLEVEL_OUTOF10: SEVERE PAIN (6)

## 2025-01-14 NOTE — PROGRESS NOTES
ANTICOAGULATION MANAGEMENT     Sandra Petit 84 year old female is on warfarin with therapeutic INR result. (Goal INR 2.0-3.0)    Recent labs: (last 7 days)     01/14/25  1339   INR 3.0*       ASSESSMENT     Warfarin Lab Questionnaire    Warfarin Doses Last 7 Days      1/13/2025     3:38 PM   Dose in Tablet or Mg   TAB or MG? milligram (mg)     Pt Rptd Dose SUNDAY WED THURS FRIDAY SATURDAY 1/13/2025   3:38 PM 2.5 5 2.5 5 5         1/13/2025   Warfarin Lab Questionnaire   Missed doses within past 14 days? No   Changes in diet or alcohol within past 14 days? No   Medication changes since last result? No   Injuries or illness since last result? No   New shortness of breath, severe headaches or sudden changes in vision since last result? No   Abnormal bleeding since last result? Yes   If yes, please explain: slight nose bleed--stopped quickly, small amount of blood noted, did talk about OTC saline nasal spray if problems continue    Upcoming surgery, procedure? No     Previous result: Subtherapeutic  Additional findings: None day 12 of new start.        PLAN     Recommended plan for no diet, medication or health factor changes affecting INR     Dosing Instructions: Continue your current warfarin dose with next INR in 3 days       Summary  As of 1/14/2025      Full warfarin instructions:  2.5 mg every Sun, Tue, Thu; 5 mg all other days   Next INR check:  1/17/2025               Telephone call with Giovanni who verbalizes understanding and agrees to plan    Lab visit scheduled    Education provided: Contact [phone] with any changes, questions or concerns.     Plan made per [facility] anticoagulation protocol    Leela Montana RN  1/14/2025  Anticoagulation Clinic  [facility] for routing messages: joycelyn GURROLA  [facility] patient phone line: [phone]        _______________________________________________________________________     Anticoagulation Episode Summary       Current INR goal:  2.0-3.0   TTR:  100.0% (2 d)    Target end date:  Indefinite   Send INR reminders to:  ANDREIA GURROLA    Indications    Atrial fibrillation  unspecified type (H) [I48.91]             Comments:  --             Anticoagulation Care Providers       Provider Role Specialty Phone number    Tyson Cano MD [facility] Medicine [phone]

## 2025-01-14 NOTE — PROGRESS NOTES
Assessment & Plan   Problem List Items Addressed This Visit          Endocrine    Hyperlipidemia LDL goal <100    Other specified hypothyroidism    Type 2 diabetes mellitus with other circulatory complications (H)       Circulatory    Coronary artery disease involving native coronary artery without angina pectoris    Atrial fibrillation, unspecified type (H)       Other    History of total left knee replacement     Other Visit Diagnoses       Fall, subsequent encounter    -  Primary    Tachy-leticia syndrome (H)        Chronic kidney disease, stage 3b (H)               Dangers of fall while on anticoagulation discussed with patient today.  Importance of using her walker regularly discussed.  I do question syncopal episode versus mechanical contributed to the fall given previous episode was similar with changing position.  Has not had low blood pressures. No abnormal events on pacemaker reported in ER note. Head CT negative. No changes with cardiology recently after cath and vascular did not want to pursue further intervention at this time regarding her carotids. I think unlikely to be contributing but if recurrent syncopal episodes she should follow up with vascular. Orthostatics normal today and negative owen Hallpike. Importance of slow transition when changing positions and using the walker discussed. She should remain well hydrated. Follow up with me in two months but sooner if new or worsening issues arise.     The longitudinal plan of care for the diagnosis(es)/condition(s) as documented were addressed during this visit. Due to the added complexity in care, I will continue to support Giovanni in the subsequent management and with ongoing continuity of care.       MED REC REQUIRED  Post Medication Reconciliation Status:  Discharge medications reconciled and changed, see notes/orders        Subjective   Giovanni is a 84 year old, presenting for the following health issues:  ER F/U        1/14/2025     1:48 PM   Additional  "Questions   Roomed by Jairo         1/14/2025     1:48 PM   Patient Reported Additional Medications   Patient reports taking the following new medications COUMADIN (warfarin ANTICOAGULANT) 2.5 MG Tabs     HPI       ED/UC Followup:    Facility: [facility] Emergency Dept   Date of visit: 01/04/2025  Reason for visit: Fall  Current Status: still experiencing knee pain    Patient with syncopal episodes with changing position and getting out of bed.  Sounds like this was unclear to be mechanical and she did feel lightheaded with changing positions with the fall.  Has not had any recurrent episodes since.  Did have history of similar episode in the past with fall.  Was also not using her walker or cane at that time.  No chest pain or palpitations.  No breathing difficulties.  No fevers or recent illness.  Still has difficulty with her knee post replacement.  Blood pressure has not been low.  Small scrape on her right forehead that has healed.  Did have higher blood pressure in the ER but she did not take her medicine prior to being seen.  She does have pacemaker in place with intermittent episodes of A-fib in the past.  She did switch to warfarin and has tolerated well.      Review of Systems  Constitutional, HEENT, cardiovascular, pulmonary, GI, , musculoskeletal, neuro, skin, endocrine and psych systems are negative, except as otherwise noted.      Objective    BP (!) 140/65 (BP Location: Right arm, Patient Position: Sitting, Cuff Size: Adult Regular)   Pulse 82   Temp 96.9  F (36.1  C) (Temporal)   Resp 12   Ht 1.53 m (5' 0.24\")   Wt 79 kg (174 lb 1.6 oz)   SpO2 98%   BMI 33.74 kg/m    Body mass index is 33.74 kg/m .  Physical Exam   GENERAL: alert and no distress  EYES: Eyes grossly normal to inspection, PERRL and conjunctivae and sclerae normal  HENT: ear canals and TM's normal, nose and mouth without ulcers or lesions  NECK: no adenopathy, no asymmetry, masses, or scars  RESP: lungs clear " to auscultation - no rales, rhonchi or wheezes  CV: regular rate and rhythm, normal S1 S2,  no peripheral edema  ABDOMEN: soft, nontender, no hepatosplenomegaly, no masses and bowel sounds normal  MS: no gross musculoskeletal defects noted, no edema  SKIN: no suspicious lesions or rashes  NEURO: Normal strength and tone, cranial nerves II through XII intact, negative Lasha-Hallpike, no focal deficits, mentation intact and speech normal  PSYCH: mentation appears normal, affect normal/bright            Signed Electronically by: Tyson Cano MD

## 2025-01-20 ENCOUNTER — TELEPHONE (OUTPATIENT)
Dept: FAMILY MEDICINE | Facility: CLINIC | Age: 85
End: 2025-01-20

## 2025-01-20 NOTE — TELEPHONE ENCOUNTER
Patient Returning Call    Reason for call:  needs more med info    Information relayed to patient:  no    Patient has additional questions:  Yes    What are your questions/concerns:  needs a callback    Could we send this information to you in [place] or would you prefer to receive a phone call?:   Patient would prefer a phone call   Okay to leave a detailed message?: Yes at Home number on file [phone] (home)

## 2025-01-20 NOTE — TELEPHONE ENCOUNTER
2:41 PM    Writer spoke with the patient. Her car would not start today so she rescheduled her INR for 1/21/25. She will take 5 mg warfarin tonight.     Loraine Berger RN, BSN, PHN  Anticoagulation Clinic   [phone]

## 2025-01-21 ENCOUNTER — ANTICOAGULATION THERAPY VISIT (OUTPATIENT)
Dept: ANTICOAGULATION | Facility: CLINIC | Age: 85
End: 2025-01-21

## 2025-01-21 ENCOUNTER — LAB (OUTPATIENT)
Dept: LAB | Facility: CLINIC | Age: 85
End: 2025-01-21
Payer: MEDICARE

## 2025-01-21 DIAGNOSIS — I48.91 ATRIAL FIBRILLATION, UNSPECIFIED TYPE (H): Primary | ICD-10-CM

## 2025-01-21 DIAGNOSIS — I48.91 ATRIAL FIBRILLATION, UNSPECIFIED TYPE (H): ICD-10-CM

## 2025-01-21 LAB — INR BLD: 4.1 (ref 0.9–1.1)

## 2025-01-21 PROCEDURE — 36416 COLLJ CAPILLARY BLOOD SPEC: CPT

## 2025-01-21 PROCEDURE — 85610 PROTHROMBIN TIME: CPT

## 2025-01-21 NOTE — PROGRESS NOTES
ANTICOAGULATION MANAGEMENT     Sandra Petit 84 year old female is on warfarin with therapeutic INR result. (Goal INR 2.0-3.0)    Recent labs: (last 7 days)     01/21/25  1100   INR 4.1*       ASSESSMENT     Warfarin Lab Questionnaire    Warfarin Doses Last 7 Days      1/20/2025    12:39 PM   Dose in Tablet or Mg   TAB or MG? milligram (mg)     Pt Rptd Dose SUNDAY MONDAY TUESDAY WED THURS FRIDAY SATURDAY 1/20/2025  12:39 PM 2.5 5 2.5 5 2.5 2.5 5         1/20/2025   Warfarin Lab Questionnaire   Missed doses within past 14 days? No   Changes in diet or alcohol within past 14 days? No   Medication changes since last result? No   Injuries or illness since last result? No   New shortness of breath, severe headaches or sudden changes in vision since last result? No   Abnormal bleeding since last result? No   Upcoming surgery, procedure? No     Previous result: Supratherapeutic  Additional findings: None       PLAN     Recommended plan for no diet, medication or health factor changes affecting INR     Dosing Instructions: hold dose then decrease your warfarin dose (10% change) with next INR in 10 days       Summary  As of 1/21/2025      Full warfarin instructions:  1/21: Hold; Otherwise 5 mg every Sun, Mon, Thu; 2.5 mg all other days   Next INR check:  1/31/2025               Telephone call with Giovanni who verbalizes understanding and agrees to plan    Lab visit scheduled    Education provided: Symptom monitoring: monitoring for bleeding signs and symptoms    Plan made per [facility] anticoagulation protocol    Jessica Liu RN  1/21/2025  Anticoagulation Clinic  [facility] for routing messages: joycelyn GURROLA  [facility] patient phone line: [phone]        _______________________________________________________________________     Anticoagulation Episode Summary       Current INR goal:  2.0-3.0   TTR:  27.2% (1.3 wk)   Target end date:  Indefinite   Send INR reminders to:  ANDREIA GURROLA    Indications    Atrial  fibrillation  unspecified type (H) [I48.91]             Comments:  --             Anticoagulation Care Providers       Provider Role Specialty Phone number    Tyson Cano MD [facility] [phone]

## 2025-01-23 ENCOUNTER — TELEPHONE (OUTPATIENT)
Dept: FAMILY MEDICINE | Facility: CLINIC | Age: 85
End: 2025-01-23
Payer: MEDICARE

## 2025-01-23 NOTE — TELEPHONE ENCOUNTER
FYI - Status Update    Who is Calling: patient    Update: since yesterday has had 2 bloody nose one at 130 am was hard to stop feels another coming while on the phone,    Does caller want a call/response back: Yes     Could we send this information to you in [place] or would you prefer to receive a phone call?:   Patient would prefer a phone call   Okay to leave a detailed message?: Yes at Home number on file [phone] (home)

## 2025-01-23 NOTE — TELEPHONE ENCOUNTER
I spoke with Giovanni and she does not have a current nose bleed but did have one overnight that she had a hard time stopping. I did advise if she does get another one to hold pressure and use a cold compress. I also advised her she may want to gently apply some Vaseline to her inner nose due to the cold/dry weather possibly playing a factor in this. I reassured her that we did lower her weekly dose of warfarin so this should help keep her blood more therapeutic. If she does get another one and is unable to get it to stop I did advise UC or the ED for some nasal packing. Patient verbalized understanding and agrees with plan of care. Pt had no further questions or concerns at this time.     Marianna Rosario, RN, BSN  [facility] Anticoagulation Team

## 2025-02-04 ENCOUNTER — ANTICOAGULATION THERAPY VISIT (OUTPATIENT)
Dept: ANTICOAGULATION | Facility: CLINIC | Age: 85
End: 2025-02-04

## 2025-02-04 ENCOUNTER — LAB (OUTPATIENT)
Dept: LAB | Facility: CLINIC | Age: 85
End: 2025-02-04
Payer: MEDICARE

## 2025-02-04 DIAGNOSIS — I48.91 ATRIAL FIBRILLATION, UNSPECIFIED TYPE (H): Primary | ICD-10-CM

## 2025-02-04 DIAGNOSIS — I48.91 ATRIAL FIBRILLATION, UNSPECIFIED TYPE (H): ICD-10-CM

## 2025-02-04 LAB — INR BLD: 1.7 (ref 0.9–1.1)

## 2025-02-04 PROCEDURE — 85610 PROTHROMBIN TIME: CPT

## 2025-02-04 PROCEDURE — 36416 COLLJ CAPILLARY BLOOD SPEC: CPT

## 2025-02-04 NOTE — PROGRESS NOTES
ANTICOAGULATION MANAGEMENT     Sandra Petit 84 year old female is on warfarin with subtherapeutic INR result. (Goal INR 2.0-3.0)    Recent labs: (last 7 days)     02/04/25  0951   INR 1.7*       ASSESSMENT     Warfarin Lab Questionnaire    Warfarin Doses Last 7 Days      2/3/2025    10:44 AM   Dose in Tablet or Mg   TAB or MG? milligram (mg)     Pt Rptd Dose NABILA MONDAY TUESDAY WED THURS FRIDAY SATURDAY   2/3/2025  10:44 AM 2.5 2.5 2.5 2.5 2.5 5 5         2/3/2025   Warfarin Lab Questionnaire   Missed doses within past 14 days? No   Changes in diet or alcohol within past 14 days? No   Medication changes since last result? Yes   Please list: changed to 5mg Friday (2/1 )and 5 Saturday 2/2)   Injuries or illness since last result? No   New shortness of breath, severe headaches or sudden changes in vision since last result? No   Abnormal bleeding since last result? No   Upcoming surgery, procedure? No   Best number to call with results? [phone]     Previous result: Subtherapeutic  Additional findings: None       PLAN     Unable to reach Giovanni today.    Left message to take a booster dose of warfarin,  5 mg tonight. Request call back for assessment.  MCM sent.     Follow up required to confirm warfarin dose taken and assess for changes    Loraine GIRALDO, RN  2/4/2025  Anticoagulation Clinic  [facility] for routing messages: joycelyn BOSWELL [facility] patient phone line: [phone]

## 2025-02-04 NOTE — PROGRESS NOTES
ANTICOAGULATION MANAGEMENT     Sandra Petit 84 year old female is on warfarin with subtherapeutic INR result. (Goal INR 2.0-3.0)    Recent labs: (last 7 days)     02/04/25  0951   INR 1.7*       ASSESSMENT     Warfarin Lab Questionnaire    Warfarin Doses Last 7 Days      2/3/2025    10:44 AM   Dose in Tablet or Mg   TAB or MG? milligram (mg)     Pt Rptd Dose NABILA MONDAY TUESDAY WED THURS FRIDAY SATURDAY   2/3/2025  10:44 AM 2.5 2.5 2.5 2.5 2.5 5 5         2/3/2025   Warfarin Lab Questionnaire   Missed doses within past 14 days? No   Changes in diet or alcohol within past 14 days? No   Medication changes since last result? Yes   Please list: changed to 5mg Friday (2/1 )and 5 Saturday 2/2)   Injuries or illness since last result? No   New shortness of breath, severe headaches or sudden changes in vision since last result? No   Abnormal bleeding since last result? No   Upcoming surgery, procedure? No   Best number to call with results? [phone]     Previous result: Subtherapeutic  Additional findings:  Patient was too take 5 mg on Monday but she only took 2.5 mg.  I will have her take the 5 mg tonight.        PLAN     Recommended plan for temporary change(s) affecting INR     Dosing Instructions: Continue your current warfarin dose with next INR in 3 days       Summary  As of 2/4/2025      Full warfarin instructions:  2/4: 5 mg; Otherwise 2.5 mg every Sun, Tue, Thu; 5 mg all other days   Next INR check:  2/7/2025               Telephone call with Giovanni who verbalizes understanding and agrees to plan    Lab visit scheduled    Education provided: Contact [phone] with any changes, questions or concerns.     Plan made per [facility] anticoagulation protocol    Loraine GIRALDO RN  2/4/2025  Anticoagulation Clinic  [facility] for routing messages: joycelyn GURROLA  [facility] patient phone line: [phone]        _______________________________________________________________________     Anticoagulation Episode Summary        Current INR goal:  2.0-3.0   TTR:  25.2% (3.3 wk)   Target end date:  Indefinite   Send INR reminders to:  ANDREIA GURROLA    Indications    Atrial fibrillation  unspecified type (H) [I48.91]             Comments:  --             Anticoagulation Care Providers       Provider Role Specialty Phone number    Tyson Cano MD [facility] [phone]

## 2025-02-05 DIAGNOSIS — I48.0 PAROXYSMAL ATRIAL FIBRILLATION (H): Primary | ICD-10-CM

## 2025-02-15 ENCOUNTER — NURSE TRIAGE (OUTPATIENT)
Dept: NURSING | Facility: CLINIC | Age: 85
End: 2025-02-15
Payer: MEDICARE

## 2025-02-15 NOTE — TELEPHONE ENCOUNTER
INR yesterday: only had 3 pills left. The pharmacy: [facility] in [place] has not called to tell me that they have the refill.    Warfarin 5mg ordered 2/14/2025.  Order noted in chart, verified receipt of order @ pharmacy, They will notify patient when it is ready to .     Aline Vale RN Triage Nurse Advisor 9:29 AM 2/15/2025    Reason for Disposition   Caller with prescription and triager answers question    Additional Information   Negative: New-onset or worsening symptoms, see that guideline (e.g., diarrhea, runny nose, sore throat)   Negative: Medicine question not related to refill or renewal   Negative: Caller (e.g., patient or pharmacist) requesting information about a new medicine   Negative: Caller requesting information unrelated to medicine   Negative: [1] Prescription refill request for ESSENTIAL medicine (i.e., likelihood of harm to patient if not taken) AND [2] triager unable to refill per department policy   Negative: [1] Prescription not at pharmacy AND [2] was prescribed by PCP recently  (Exception: Triager has access to EMR and prescription is recorded there. Go to Home Care and confirm for pharmacy.)   Negative: [1] Pharmacy calling with prescription questions AND [2] triager unable to answer question   Negative: Prescription request for new medicine (not a refill)   Negative: Caller requesting a CONTROLLED substance prescription refill (e.g., narcotics, ADHD medicines)   Negative: [1] Prescription refill request for NON-ESSENTIAL medicine (i.e., no harm to patient if med not taken) AND [2] triager unable to refill per department policy   Negative: [1] Caller has NON-URGENT medicine question about med that PCP prescribed AND [2] triager unable to answer question   Negative: [1] Prescription prescribed recently is not at pharmacy AND [2] triager has access to patient's EMR AND [3] prescription is recorded in the EMR   Negative: [1] Caller requesting a prescription renewal (no refills  left), no triage required, AND [2] triager able to renew prescription per department policy   Negative: Patient has refills remaining on their prescription    Protocols used: Medication Refill and Renewal Call-A-AH

## 2025-03-07 ENCOUNTER — MYC MEDICAL ADVICE (OUTPATIENT)
Dept: FAMILY MEDICINE | Facility: CLINIC | Age: 85
End: 2025-03-07
Payer: MEDICARE

## 2025-03-07 NOTE — TELEPHONE ENCOUNTER
Patient states she has a raised lump that has been bright red for some time.    She states there is twinges.    No fever.    She states the redness is increasing, only painful to the touch if she presses on it.    She is wondering if provider can remove the stitch?  She states she has shown this to her pcp.    Genna Lechuga RN on 3/7/2025 at 1:37 PM

## 2025-03-12 ENCOUNTER — MYC MEDICAL ADVICE (OUTPATIENT)
Dept: FAMILY MEDICINE | Facility: CLINIC | Age: 85
End: 2025-03-12
Payer: MEDICARE

## 2025-03-12 NOTE — TELEPHONE ENCOUNTER
Depends if I can see it or not. Maybe better to address with surgeon. Should be seen if worsening symptoms.

## 2025-03-13 NOTE — TELEPHONE ENCOUNTER
RN Triage    Patient Contact    Attempt # 1    Was call answered?  No.  Left message on voicemail with information to call me back. Sent Smish.

## 2025-03-13 NOTE — TELEPHONE ENCOUNTER
Patient calling back. RN discussed with patient. She noted that she saw cardiology a month ago and they said this was an internal stitch coming out from procedure back in 2019. They told her it may need to get removed if bothering her. Patient feels this is now infected as the area is swollen with redness.    RN discussed with patient that this would be most appropriate to connect back with surgeon or cardiology to look at for removal as this is not something usually done with Dr. Cano. Patient was going to call them. RN advised patient to call back if unable to get in with specialty and we can look at appointments with a provider to evaluate.     Radha Cerrato, GLADYSN, RN

## 2025-03-13 NOTE — TELEPHONE ENCOUNTER
RN Triage    Patient Contact    Attempt # 1    Was call answered?  No.  Left message on voicemail with information to call me back.

## 2025-03-14 NOTE — TELEPHONE ENCOUNTER
"RN called an spoke with patient, she reports that this is approximately 1/2 inch from top of the incision. Approximately pencil-eraser sized. Reports that she brought it up in a cardiology follow up appointment and cardiologist advised that it looked like had a stitch that \"was trying to come out\" and she could see her provider to have removed if it continued to bother her. Reports that the bump is reddened, more irritating than painful. 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 Denies any drainage, worsening redness or edema. Denies any other symptoms or fever. Would like PCP to evaluate would prefer not to go back to [facility] for this.     See duplicate encounter 3/13 discussion with ANURADHA MATTHEWS.     Assisted with scheduling with PCP. Will route to PCP to review. Advised patient that we will call her back if PCP recommends different follow up.     Mar 19, 2025 11:00 AM  (Arrive by 10:40 AM)  Provider Visit with Tyson Cano MD  [facility] ([facility] ) [phone]     Jennifer Fierro, ANURADHA, BSN    "

## 2025-03-19 ENCOUNTER — OFFICE VISIT (OUTPATIENT)
Dept: FAMILY MEDICINE | Facility: CLINIC | Age: 85
End: 2025-03-19
Payer: MEDICARE

## 2025-03-19 VITALS
DIASTOLIC BLOOD PRESSURE: 60 MMHG | RESPIRATION RATE: 12 BRPM | HEART RATE: 80 BPM | TEMPERATURE: 97.2 F | BODY MASS INDEX: 34.94 KG/M2 | OXYGEN SATURATION: 98 % | SYSTOLIC BLOOD PRESSURE: 122 MMHG | WEIGHT: 173.3 LBS | HEIGHT: 59 IN

## 2025-03-19 DIAGNOSIS — N18.31 STAGE 3A CHRONIC KIDNEY DISEASE (H): ICD-10-CM

## 2025-03-19 DIAGNOSIS — I10 HYPERTENSION GOAL BP (BLOOD PRESSURE) < 140/90: ICD-10-CM

## 2025-03-19 DIAGNOSIS — I48.91 ATRIAL FIBRILLATION, UNSPECIFIED TYPE (H): ICD-10-CM

## 2025-03-19 DIAGNOSIS — E11.21 TYPE 2 DIABETES MELLITUS WITH DIABETIC NEPHROPATHY, WITHOUT LONG-TERM CURRENT USE OF INSULIN (H): ICD-10-CM

## 2025-03-19 DIAGNOSIS — Z96.652 HISTORY OF TOTAL LEFT KNEE REPLACEMENT: ICD-10-CM

## 2025-03-19 DIAGNOSIS — L91.0 KELOID SCAR: Primary | ICD-10-CM

## 2025-03-19 DIAGNOSIS — I25.10 CORONARY ARTERY DISEASE INVOLVING NATIVE CORONARY ARTERY OF NATIVE HEART WITHOUT ANGINA PECTORIS: ICD-10-CM

## 2025-03-19 DIAGNOSIS — E78.5 HYPERLIPIDEMIA LDL GOAL <100: ICD-10-CM

## 2025-03-19 DIAGNOSIS — Z79.01 CHRONIC ANTICOAGULATION: ICD-10-CM

## 2025-03-19 DIAGNOSIS — E03.8 OTHER SPECIFIED HYPOTHYROIDISM: ICD-10-CM

## 2025-03-19 ASSESSMENT — PAIN SCALES - GENERAL: PAINLEVEL_OUTOF10: SEVERE PAIN (8)

## 2025-03-19 NOTE — PROGRESS NOTES
"  Assessment & Plan   Problem List Items Addressed This Visit          Endocrine    Hyperlipidemia LDL goal <100    Other specified hypothyroidism    Type 2 diabetes mellitus with diabetic nephropathy (H)    Relevant Orders    **Hemoglobin A1c FUTURE 3mo       Circulatory    Hypertension goal BP (blood pressure) < 140/90    Coronary artery disease involving native coronary artery without angina pectoris    Atrial fibrillation, unspecified type (H)       Urinary    CKD (chronic kidney disease) stage 3, GFR 30-59 ml/min (H)       Other    History of total left knee replacement    Chronic anticoagulation     Other Visit Diagnoses       Keloid scar    -  Primary            Continues to follow with cardiology and uncertain of watchman moving forward.  She is on chronic anticoagulation without issues now.  Diabetes has been well-controlled without metformin which she is tolerating well.  Repeat A1c in 3 months.  No other signs of infection or foreign bodies that are easily identified.  Consistent with keloid scarring likely irritating with how she is sleeping.  We did discuss manipulation as well as vitamin E.  Could also consider excision of scar moving forward but she understands this would require new sutures to be placed.  It is not very bothersome at this time and she will continue to monitor.    The longitudinal plan of care for the diagnosis(es)/condition(s) as documented were addressed during this visit. Due to the added complexity in care, I will continue to support Giovanni in the subsequent management and with ongoing continuity of care.      BMI  Estimated body mass index is 35 kg/m  as calculated from the following:    Height as of this encounter: 1.499 m (4' 11\").    Weight as of this encounter: 78.6 kg (173 lb 4.8 oz).   Weight management plan: Discussed healthy diet and exercise guidelines        Subjective   Giovanni is a 84 year old, presenting for the following health issues:  Derm Problem (Check skin on her " "chest )        3/19/2025    10:34 AM   Additional Questions   Roomed by Caroline YATES         3/19/2025    10:34 AM   Patient Reported Additional Medications   Patient reports taking the following new medications eye drops     History of Present Illness       Reason for visit:  Removal of enflamation on incision.   She is taking medications regularly.      Patient with concerns regarding old scar and wonders if a stitch in place.  Last had surgery back in 2019 after her bypass.  Had otherwise healed well but she notes being more irritated with laying on her chest more recently.  No discharge.  No fevers and she generally feels well without other concerns.  Wonders if stitch in place.  Unable to view if staples or stitches used during the procedure or when they came out.  Had not given her issues until recent months and minimally bothersome.     Review of Systems  Constitutional, neuro, ENT, endocrine, pulmonary, cardiac, gastrointestinal, genitourinary, musculoskeletal, integument and psychiatric systems are negative, except as otherwise noted.      Objective    /60   Pulse 80   Temp 97.2  F (36.2  C) (Temporal)   Resp 12   Ht 1.499 m (4' 11\")   Wt 78.6 kg (173 lb 4.8 oz)   SpO2 98%   BMI 35.00 kg/m    Body mass index is 35 kg/m .  Physical Exam   GENERAL: alert and no distress  EYES: Eyes grossly normal to inspection, PERRL and conjunctivae and sclerae normal  HENT:  nose and mouth without ulcers or lesions  NECK: no adenopathy, no asymmetry, masses, or scars  RESP: lungs clear to auscultation - no rales, rhonchi or wheezes  CV: regular rate and rhythm, normal S1 S2, no peripheral edema  MS: no gross musculoskeletal defects noted, no edema  SKIN: no suspicious lesions or rashes, slightly uncomfortable keloid scar on chest without surrounding erythema or discharge or area of fluctuance  NEURO: mentation intact and speech normal  PSYCH: mentation appears normal, affect normal/bright            Signed " Electronically by: Tyson Cano MD

## 2025-03-22 ENCOUNTER — HEALTH MAINTENANCE LETTER (OUTPATIENT)
Age: 85
End: 2025-03-22

## 2025-03-24 ENCOUNTER — LAB (OUTPATIENT)
Dept: LAB | Facility: CLINIC | Age: 85
End: 2025-03-24
Payer: MEDICARE

## 2025-03-24 ENCOUNTER — ANTICOAGULATION THERAPY VISIT (OUTPATIENT)
Dept: ANTICOAGULATION | Facility: CLINIC | Age: 85
End: 2025-03-24

## 2025-03-24 DIAGNOSIS — I48.91 ATRIAL FIBRILLATION, UNSPECIFIED TYPE (H): Primary | ICD-10-CM

## 2025-03-24 DIAGNOSIS — I48.91 ATRIAL FIBRILLATION, UNSPECIFIED TYPE (H): ICD-10-CM

## 2025-03-24 LAB — INR BLD: 2.2 (ref 0.9–1.1)

## 2025-03-24 PROCEDURE — 36416 COLLJ CAPILLARY BLOOD SPEC: CPT

## 2025-03-24 PROCEDURE — 85610 PROTHROMBIN TIME: CPT

## 2025-03-24 NOTE — PROGRESS NOTES
ANTICOAGULATION MANAGEMENT     Sandra Petit 85 year old female is on warfarin with therapeutic INR result. (Goal INR 2.0-3.0)    Recent labs: (last 7 days)     03/24/25  0954   INR 2.2*       ASSESSMENT     Warfarin Lab Questionnaire    Warfarin Doses Last 7 Days      3/23/2025    10:22 AM   Dose in Tablet or Mg   TAB or MG? milligram (mg)     Pt Rptd Dose NABILA MONDAY TUESDAY WED THURS FRIDAY SATURDAY   3/23/2025  10:22 AM 2.5 5 2.5 5 2.5 5 2.5         3/23/2025   Warfarin Lab Questionnaire   Missed doses within past 14 days? No   Changes in diet or alcohol within past 14 days? No   Medication changes since last result? No   Injuries or illness since last result? No   New shortness of breath, severe headaches or sudden changes in vision since last result? No   Abnormal bleeding since last result? No   Upcoming surgery, procedure? Yes   Please explain, date scheduled? I have no date.  I am contemplating  a Watchman surgery.   Best number to call with results? [phone]     Previous result: Subtherapeutic  Additional findings: None       PLAN     Recommended plan for no diet, medication or health factor changes affecting INR     Dosing Instructions: Continue your current warfarin dose with next INR in 2 weeks       Summary  As of 3/24/2025      Full warfarin instructions:  5 mg every Mon, Wed, Fri; 2.5 mg all other days   Next INR check:  4/7/2025               Telephone call with Giovanni who verbalizes understanding and agrees to plan and who agrees to plan and repeated back plan correctly    Lab visit scheduled    Education provided: Importance of notifying anticoagulation clinic for: upcoming surgeries and procedures 2 weeks in advance    Plan made per [facility] anticoagulation protocol    Holly Jenkins, RN  3/24/2025  Anticoagulation Clinic  [facility] for routing messages: joycelyn GURROLA  [facility] patient phone line: [phone]        _______________________________________________________________________      Anticoagulation Episode Summary       Current INR goal:  2.0-3.0   TTR:  30.0% (2.4 mo)   Target end date:  Indefinite   Send INR reminders to:  ANDREIA GURROLA    Indications    Atrial fibrillation  unspecified type (H) [I48.91]             Comments:  --             Anticoagulation Care Providers       Provider Role Specialty Phone number    Tyson Cano MD Referring [facility] Medicine [phone]

## 2025-04-07 ENCOUNTER — LAB (OUTPATIENT)
Dept: LAB | Facility: CLINIC | Age: 85
End: 2025-04-07
Payer: MEDICARE

## 2025-04-07 ENCOUNTER — TELEPHONE (OUTPATIENT)
Dept: FAMILY MEDICINE | Facility: CLINIC | Age: 85
End: 2025-04-07

## 2025-04-07 ENCOUNTER — ANTICOAGULATION THERAPY VISIT (OUTPATIENT)
Dept: ANTICOAGULATION | Facility: CLINIC | Age: 85
End: 2025-04-07

## 2025-04-07 DIAGNOSIS — I48.91 ATRIAL FIBRILLATION, UNSPECIFIED TYPE (H): ICD-10-CM

## 2025-04-07 DIAGNOSIS — I48.91 ATRIAL FIBRILLATION, UNSPECIFIED TYPE (H): Primary | ICD-10-CM

## 2025-04-07 LAB — INR BLD: 2.5 (ref 0.9–1.1)

## 2025-04-07 PROCEDURE — 36416 COLLJ CAPILLARY BLOOD SPEC: CPT

## 2025-04-07 PROCEDURE — 85610 PROTHROMBIN TIME: CPT

## 2025-04-07 NOTE — TELEPHONE ENCOUNTER
General Call      Reason for Call:  RETURN CALL FROM INR NURSE, PLEASE CALL PT BACK TO DISCUSS DOSING FURTHER.    What are your questions or concerns:  SEE ABOVE    Date of last appointment with provider: 4/7/25    Could we send this information to you in [place] or would you prefer to receive a phone call?:   Patient would prefer a phone call   Okay to leave a detailed message?: No at Home number on file [phone] (home)

## 2025-04-07 NOTE — TELEPHONE ENCOUNTER
Please see [facility] encounter from today.    Leela Montana RN  [facility] Anticoagulation [facility]

## 2025-04-07 NOTE — PROGRESS NOTES
ANTICOAGULATION MANAGEMENT     Sandra Petit 85 year old female is on warfarin with therapeutic INR result. (Goal INR 2.0-3.0)    Recent labs: (last 7 days)     04/07/25  1000   INR 2.5*       ASSESSMENT     Warfarin Lab Questionnaire    Warfarin Doses Last 7 Days      4/6/2025     2:09 PM   Dose in Tablet or Mg   TAB or MG? milligram (mg)     Pt Rptd Dose SUNDAY MONDAY TUESDAY WED THURS FRIDAY SATURDAY 4/6/2025   2:09 PM 2.5 5 2.5 5 2.55 5 2.5         4/6/2025   Warfarin Lab Questionnaire   Missed doses within past 14 days? No   Changes in diet or alcohol within past 14 days? No   Medication changes since last result? No   Injuries or illness since last result? No   New shortness of breath, severe headaches or sudden changes in vision since last result? No   Abnormal bleeding since last result? No   Upcoming surgery, procedure? No   Best number to call with results? [phone]     Previous result: Therapeutic last visit; previously outside of goal range  Additional findings: None  Cancelled her watchman procedure for now      PLAN     Recommended plan for no diet, medication or health factor changes affecting INR     Dosing Instructions: Continue your current warfarin dose with next INR in 3 weeks       Summary  As of 4/7/2025      Full warfarin instructions:  5 mg every Mon, Wed, Fri; 2.5 mg all other days   Next INR check:  4/28/2025               Detailed voice message left for Giovanni with dosing instructions and follow up date.     Contact [phone] to schedule and with any changes, questions or concerns.     Education provided: Contact [phone] with any changes, questions or concerns.     Plan made per [facility] anticoagulation protocol    Leela Montana, RN  4/7/2025  Anticoagulation Clinic  [facility] for routing messages: joycelyn GURROLA  [facility] patient phone line: [phone]        _______________________________________________________________________     Anticoagulation Episode Summary        Current INR goal:  2.0-3.0   TTR:  41.5% (2.8 mo)   Target end date:  Indefinite   Send INR reminders to:  ANDREIA GURROLA    Indications    Atrial fibrillation  unspecified type (H) [I48.91]             Comments:  --             Anticoagulation Care Providers       Provider Role Specialty Phone number    Tyson Cano MD [facility] Medicine [phone]

## 2025-04-23 ENCOUNTER — NURSE TRIAGE (OUTPATIENT)
Dept: FAMILY MEDICINE | Facility: CLINIC | Age: 85
End: 2025-04-23
Payer: MEDICARE

## 2025-04-23 NOTE — TELEPHONE ENCOUNTER
Patient was called and schedule an appointment with same day provider.     Appointments in Next Year      Apr 24, 2025 11:30 AM  (Arrive by 11:10 AM)  Provider Visit with Brigitte Porter PA-C  [facility] ([facility] ) [phone]           Varun Lara RN  [facility] Triage [facility]  April 23, 2025
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
## 2025-04-23 NOTE — TELEPHONE ENCOUNTER
Reason for Call:  Appointment Request    Patient requesting this type of appt:  Office visit to have her knee looked at. She is unable to bend it.    Requested provider: Tyson Cano    Reason patient unable to be scheduled: Not within requested timeframe    When does patient want to be seen/preferred time: 3-7 days    Comments: Please call to schedule appt    Could we send this information to you in [facility] or would you prefer to receive a phone call?:   Patient would prefer a phone call   Okay to leave a detailed message?: Yes at Home number on file [phone] (home)    Call taken on 4/23/2025 at 1:49 PM by Radha Bell

## 2025-04-24 ENCOUNTER — HOSPITAL ENCOUNTER (OUTPATIENT)
Dept: GENERAL RADIOLOGY | Facility: CLINIC | Age: 85
Discharge: HOME | End: 2025-04-24
Payer: MEDICARE

## 2025-04-24 ENCOUNTER — OFFICE VISIT (OUTPATIENT)
Dept: FAMILY MEDICINE | Facility: CLINIC | Age: 85
End: 2025-04-24
Payer: MEDICARE

## 2025-04-24 VITALS
HEIGHT: 59 IN | BODY MASS INDEX: 35.64 KG/M2 | DIASTOLIC BLOOD PRESSURE: 79 MMHG | SYSTOLIC BLOOD PRESSURE: 142 MMHG | WEIGHT: 176.8 LBS | TEMPERATURE: 97.6 F | OXYGEN SATURATION: 99 % | RESPIRATION RATE: 14 BRPM | HEART RATE: 90 BPM

## 2025-04-24 DIAGNOSIS — G89.29 CHRONIC PAIN OF RIGHT KNEE: Primary | ICD-10-CM

## 2025-04-24 DIAGNOSIS — M25.561 CHRONIC PAIN OF RIGHT KNEE: ICD-10-CM

## 2025-04-24 DIAGNOSIS — M25.561 CHRONIC PAIN OF RIGHT KNEE: Primary | ICD-10-CM

## 2025-04-24 DIAGNOSIS — G89.29 CHRONIC PAIN OF RIGHT KNEE: ICD-10-CM

## 2025-04-24 DIAGNOSIS — M25.551 HIP PAIN, RIGHT: ICD-10-CM

## 2025-04-24 PROCEDURE — 73560 X-RAY EXAM OF KNEE 1 OR 2: CPT | Mod: RT

## 2025-04-24 PROCEDURE — 73502 X-RAY EXAM HIP UNI 2-3 VIEWS: CPT

## 2025-04-24 ASSESSMENT — PAIN SCALES - GENERAL: PAINLEVEL_OUTOF10: SEVERE PAIN (10)

## 2025-04-24 ASSESSMENT — ENCOUNTER SYMPTOMS: HIP PAIN: 1
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
## 2025-04-24 NOTE — PATIENT INSTRUCTIONS
ASSESSMENT & PLAN    Right Knee and Hip Pain  - Right knee and hip pain following a fall on April 23, 2025. Possible sprain or strain. Concern for potential hardware shift in the knee due to previous knee replacement.  - Obtain X-ray to assess for fractures or hardware issues. If no fracture, recommend gentle stretching, heat application, and pain management with Tylenol and lidocaine patches. Follow-up via call or [facility] message with X-ray results. Referral to orthopedics if fracture is detected.    Pain Management  - Limited options for pain management due to heart and kidney conditions. Previous adverse reaction to Voltaren gel.  - Recommend use of lidocaine patches for pain relief. Avoid ibuprofen due to heart and kidney concerns.    Xrays today    Follow up with physical therapy, patient declined     Lidocaine patches for 12 hours on, 12 hours off     Avoid ibuprofen      Diclofenac gel 4 times a day scheduled     Heat pack for 10 minutes followed by exercises 4-5 times a day     Follow up with any new, worsening, or persistent symptoms     Go to the ER in the case of an emergency

## 2025-04-28 ENCOUNTER — LAB (OUTPATIENT)
Dept: LAB | Facility: CLINIC | Age: 85
End: 2025-04-28
Payer: MEDICARE

## 2025-04-28 ENCOUNTER — ANTICOAGULATION THERAPY VISIT (OUTPATIENT)
Dept: ANTICOAGULATION | Facility: CLINIC | Age: 85
End: 2025-04-28

## 2025-04-28 DIAGNOSIS — I48.91 ATRIAL FIBRILLATION, UNSPECIFIED TYPE (H): ICD-10-CM

## 2025-04-28 DIAGNOSIS — I48.91 ATRIAL FIBRILLATION, UNSPECIFIED TYPE (H): Primary | ICD-10-CM

## 2025-04-28 LAB — INR BLD: 3.6 (ref 0.9–1.1)

## 2025-04-28 PROCEDURE — 36416 COLLJ CAPILLARY BLOOD SPEC: CPT

## 2025-04-28 PROCEDURE — 85610 PROTHROMBIN TIME: CPT
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
## 2025-04-29 ENCOUNTER — OFFICE VISIT (OUTPATIENT)
Dept: FAMILY MEDICINE | Facility: CLINIC | Age: 85
End: 2025-04-29
Payer: MEDICARE

## 2025-04-29 VITALS
SYSTOLIC BLOOD PRESSURE: 138 MMHG | HEIGHT: 59 IN | OXYGEN SATURATION: 96 % | HEART RATE: 76 BPM | DIASTOLIC BLOOD PRESSURE: 80 MMHG | WEIGHT: 174.3 LBS | BODY MASS INDEX: 35.14 KG/M2 | TEMPERATURE: 96.8 F | RESPIRATION RATE: 20 BRPM

## 2025-04-29 DIAGNOSIS — N18.31 STAGE 3A CHRONIC KIDNEY DISEASE (H): ICD-10-CM

## 2025-04-29 DIAGNOSIS — E11.21 TYPE 2 DIABETES MELLITUS WITH DIABETIC NEPHROPATHY, WITHOUT LONG-TERM CURRENT USE OF INSULIN (H): ICD-10-CM

## 2025-04-29 DIAGNOSIS — M25.561 ACUTE PAIN OF RIGHT KNEE: ICD-10-CM

## 2025-04-29 DIAGNOSIS — M54.50 ACUTE LEFT-SIDED LOW BACK PAIN WITHOUT SCIATICA: Primary | ICD-10-CM

## 2025-04-29 DIAGNOSIS — E66.812 CLASS 2 SEVERE OBESITY WITH BODY MASS INDEX (BMI) OF 35 TO 39.9 WITH SERIOUS COMORBIDITY (H): ICD-10-CM

## 2025-04-29 DIAGNOSIS — Z96.652 HISTORY OF TOTAL LEFT KNEE REPLACEMENT: ICD-10-CM

## 2025-04-29 DIAGNOSIS — I48.91 ATRIAL FIBRILLATION, UNSPECIFIED TYPE (H): ICD-10-CM

## 2025-04-29 DIAGNOSIS — I25.10 CORONARY ARTERY DISEASE INVOLVING NATIVE CORONARY ARTERY OF NATIVE HEART WITHOUT ANGINA PECTORIS: ICD-10-CM

## 2025-04-29 DIAGNOSIS — E66.01 CLASS 2 SEVERE OBESITY WITH BODY MASS INDEX (BMI) OF 35 TO 39.9 WITH SERIOUS COMORBIDITY (H): ICD-10-CM

## 2025-04-29 PROCEDURE — 1125F AMNT PAIN NOTED PAIN PRSNT: CPT | Performed by: STUDENT IN AN ORGANIZED HEALTH CARE EDUCATION/TRAINING PROGRAM

## 2025-04-29 PROCEDURE — 3079F DIAST BP 80-89 MM HG: CPT | Performed by: STUDENT IN AN ORGANIZED HEALTH CARE EDUCATION/TRAINING PROGRAM

## 2025-04-29 PROCEDURE — 3075F SYST BP GE 130 - 139MM HG: CPT | Performed by: STUDENT IN AN ORGANIZED HEALTH CARE EDUCATION/TRAINING PROGRAM

## 2025-04-29 PROCEDURE — 99213 OFFICE O/P EST LOW 20 MIN: CPT | Performed by: STUDENT IN AN ORGANIZED HEALTH CARE EDUCATION/TRAINING PROGRAM

## 2025-04-29 PROCEDURE — G2211 COMPLEX E/M VISIT ADD ON: HCPCS | Performed by: STUDENT IN AN ORGANIZED HEALTH CARE EDUCATION/TRAINING PROGRAM

## 2025-04-29 ASSESSMENT — PAIN SCALES - GENERAL: PAINLEVEL_OUTOF10: SEVERE PAIN (10)
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
## 2025-05-12 ENCOUNTER — ANTICOAGULATION THERAPY VISIT (OUTPATIENT)
Dept: ANTICOAGULATION | Facility: CLINIC | Age: 85
End: 2025-05-12

## 2025-05-12 ENCOUNTER — LAB (OUTPATIENT)
Dept: LAB | Facility: CLINIC | Age: 85
End: 2025-05-12
Payer: MEDICARE

## 2025-05-12 DIAGNOSIS — I48.91 ATRIAL FIBRILLATION, UNSPECIFIED TYPE (H): Primary | ICD-10-CM

## 2025-05-12 DIAGNOSIS — I48.91 ATRIAL FIBRILLATION, UNSPECIFIED TYPE (H): ICD-10-CM

## 2025-05-12 LAB — INR BLD: 2.9 (ref 0.9–1.1)

## 2025-05-12 PROCEDURE — 36416 COLLJ CAPILLARY BLOOD SPEC: CPT

## 2025-05-12 PROCEDURE — 85610 PROTHROMBIN TIME: CPT

## 2025-05-12 NOTE — PROGRESS NOTES
ANTICOAGULATION MANAGEMENT     Sandra Petit 85 year old female is on warfarin with therapeutic INR result. (Goal INR 2.0-3.0)    Recent labs: (last 7 days)     05/12/25  1002   INR 2.9*       ASSESSMENT     Warfarin Lab Questionnaire    Warfarin Doses Last 7 Days      5/11/2025    11:10 AM   Dose in Tablet or Mg   TAB or MG? milligram (mg)     Pt Rptd Dose SUNDAY MONDAY TUESDAY WED THURS FRIDAY SATURDAY 5/11/2025  11:10 AM 2.5 2.5 2.5 5 2.5 5 2.5         5/11/2025   Warfarin Lab Questionnaire   Missed doses within past 14 days? No   Changes in diet or alcohol within past 14 days? No   Medication changes since last result? Yes   Please list: I was taking 2 extra tylenol for several days due to pain.   Injuries or illness since last result? No   New shortness of breath, severe headaches or sudden changes in vision since last result? No   Abnormal bleeding since last result? No   Upcoming surgery, procedure? No   Best number to call with results? [phone]     Previous result: Supratherapeutic  Additional findings: None       PLAN     Recommended plan for no diet, medication or health factor changes affecting INR     Dosing Instructions: Continue your current warfarin dose with next INR in 3 weeks       Summary  As of 5/12/2025      Full warfarin instructions:  5 mg every Mon, Fri; 2.5 mg all other days   Next INR check:  6/2/2025               Telephone call with Giovanni who verbalizes understanding and agrees to plan    Lab visit scheduled    Education provided: Contact [phone] with any changes, questions or concerns.     Plan made per [facility] anticoagulation protocol    Loraine GIRALDO RN  5/12/2025  Anticoagulation Clinic  [employer] for routing messages: joycelyn GURROLA  [facility] patient phone line: [phone]        _______________________________________________________________________     Anticoagulation Episode Summary       Current INR goal:  2.0-3.0   TTR:  39.0% (4 mo)   Target end date:  Indefinite   Send INR  reminders to:  [facility]    Indications    Atrial fibrillation  unspecified type (H) [I48.91]             Comments:  --             Anticoagulation Care Providers       Provider Role Specialty Phone number    Tyson Cano MD Referring Family Medicine [phone]

## 2025-05-13 ENCOUNTER — OFFICE VISIT (OUTPATIENT)
Dept: FAMILY MEDICINE | Facility: CLINIC | Age: 85
End: 2025-05-13
Payer: MEDICARE

## 2025-05-13 ENCOUNTER — TELEPHONE (OUTPATIENT)
Dept: FAMILY MEDICINE | Facility: CLINIC | Age: 85
End: 2025-05-13

## 2025-05-13 VITALS
WEIGHT: 172.8 LBS | DIASTOLIC BLOOD PRESSURE: 76 MMHG | OXYGEN SATURATION: 98 % | SYSTOLIC BLOOD PRESSURE: 137 MMHG | BODY MASS INDEX: 34.84 KG/M2 | TEMPERATURE: 97.1 F | HEIGHT: 59 IN | RESPIRATION RATE: 18 BRPM | HEART RATE: 92 BPM

## 2025-05-13 DIAGNOSIS — H92.21 BLOOD IN RIGHT EAR CANAL: Primary | ICD-10-CM

## 2025-05-13 DIAGNOSIS — R05.3 CHRONIC COUGH: ICD-10-CM

## 2025-05-13 PROCEDURE — 3078F DIAST BP <80 MM HG: CPT

## 2025-05-13 PROCEDURE — 1126F AMNT PAIN NOTED NONE PRSNT: CPT

## 2025-05-13 PROCEDURE — 3075F SYST BP GE 130 - 139MM HG: CPT

## 2025-05-13 PROCEDURE — 99213 OFFICE O/P EST LOW 20 MIN: CPT

## 2025-05-13 ASSESSMENT — PAIN SCALES - GENERAL: PAINLEVEL_OUTOF10: NO PAIN (0)

## 2025-05-13 NOTE — TELEPHONE ENCOUNTER
"  Reason for Call:  Appointment Request    Patient requesting this type of appt:  Office visit     Requested provider: Tyson Cano or any provider that is available     Reason patient unable to be scheduled: Not within requested timeframe    When does patient want to be seen/preferred time: Same day     Comments: pt states right ear has dry blood in it,and that ear \"feels funny\" started today         Could we send this information to you in [employer] or would you prefer to receive a phone call?:   Patient would prefer a phone call   Okay to leave a detailed message?: Yes at Home number on file [phone] (home)    Call taken on 5/13/2025 at 8:41 AM by Madison Hdz  "

## 2025-05-13 NOTE — PROGRESS NOTES
Assessment & Plan     Blood in right ear canal    Chronic cough      ASSESSMENT & PLAN    Ear Issue  Presence of a scab with dried blood in the ear canal, likely due to a small cut. Eardrum appears normal, and there is no current infection.  Advise against using Q-tips to prevent further irritation or infection. Monitor for signs of infection such as pain, pus, or fluid discharge. If infection occurs, antibiotics will be provided. Suggest using earplugs lightly on the outside during showers to prevent water from entering the ear.    Cough  Persistent dry cough potentially related to environmental factors, such as possible black mold exposure in the apartment. Lungs sound clear with no signs of pneumonia.  Monitor the cough. Consider reporting the potential mold issue to apartment management. Offer of cough medicine was declined by the patient .          Subjective   Giovanni is a 85 year old, presenting for the following health issues:  Ear Problem      5/13/2025    12:44 PM   Additional Questions   Roomed by Shaila         5/13/2025    12:44 PM   Patient Reported Additional Medications   Patient reports taking the following new medications none     Ear Problem    History of Present Illness       Reason for visit:  Dried blood in right ear  Symptom onset:  Today  Symptoms include:  Just a strange sound in my ear and all the dried blood.  Symptom intensity:  Mild  Symptom progression:  Staying the same  What makes it worse:  No  What makes it better:  No   She is taking medications regularly.      JENNIFER Davila, an 85-year-old female, reported discovering dry blood in her ear this morning while preparing to take a shower. She used multiple Q-tips to clean her ear, which resulted in a sensation of fullness and the feeling that something was lodged inside. She expressed concern about the presence of a scab and the possibility of having scraped her ear canal, although she is unsure how it occurred.    Additionally, Giovanni  "mentioned experiencing a persistent dry cough since moving into her current apartment. She has not had any fevers or chills but did cough up phlegm this morning. She is concerned about potential black mold in her apartment, as she has heard from others that there might be mold issues due to a previous water leak that was not properly dried before being sealed. Despite these concerns, she has not seen any visible mold in her apartment and noted the absence of mosquitoes or other insects.    Giovanni also mentioned that she typically does not sleep on her left side but did so last night, which led her to wonder if something might have crawled into her ear during the night. She has not observed any spiders or wood ticks in her apartment.                  Objective    /76 (BP Location: Right arm, Patient Position: Sitting, Cuff Size: Adult Large)   Pulse 92   Temp 97.1  F (36.2  C) (Temporal)   Resp 18   Ht 1.486 m (4' 10.5\")   Wt 78.4 kg (172 lb 12.8 oz)   SpO2 98%   BMI 35.50 kg/m    Body mass index is 35.5 kg/m .  Physical Exam     OBJECTIVE    Physical exam:    - Ears: Presence of scab with dried blood in the ear canal; eardrum appears normal.    - Lungs: Clear upon auscultation, no crackles detected.  Lab on 05/12/2025   Component Date Value Ref Range Status    INR 05/12/2025 2.9 (H)  0.9 - 1.1 Final     No results found for any visits on 05/13/25.  No results found.        Signed Electronically by: Brigitte Porter PA-C    "

## 2025-05-13 NOTE — PATIENT INSTRUCTIONS
ASSESSMENT & PLAN    Ear Issue  Presence of a scab with dried blood in the ear canal, likely due to a small cut. Eardrum appears normal, and there is no current infection.  Advise against using Q-tips to prevent further irritation or infection. Monitor for signs of infection such as pain, pus, or fluid discharge. If infection occurs, antibiotics will be provided. Suggest using earplugs lightly on the outside during showers to prevent water from entering the ear.    Cough  Persistent dry cough potentially related to environmental factors, such as possible black mold exposure in the apartment. Lungs sound clear with no signs of pneumonia.  Monitor the cough. Consider reporting the potential mold issue to apartment management. Offer of cough medicine was declined by the patient .

## 2025-05-22 ENCOUNTER — MYC MEDICAL ADVICE (OUTPATIENT)
Dept: FAMILY MEDICINE | Facility: CLINIC | Age: 85
End: 2025-05-22
Payer: MEDICARE

## 2025-05-22 ENCOUNTER — TELEPHONE (OUTPATIENT)
Dept: OTOLARYNGOLOGY | Facility: CLINIC | Age: 85
End: 2025-05-22
Payer: MEDICARE

## 2025-05-22 NOTE — TELEPHONE ENCOUNTER
"Per visit with Brigitte Porter PA-C on 5/13/25:     \"Presence of a scab with dried blood in the ear canal, likely due to a small cut. Eardrum appears normal, and there is no current infection.  Advise against using Q-tips to prevent further irritation or infection. Monitor for signs of infection such as pain, pus, or fluid discharge. If infection occurs, antibiotics will be provided. Suggest using earplugs lightly on the outside during showers to prevent water from entering the ear.\"    Attempted to call patient to schedule, no answer. When she returns call, please assist her in scheduling an appointment with Dr. Ballesteros. Ok to use ASHLEY slot in June or July (please do not schedule on 5/29). No audiology needed.     Marlee Felix RN   [facility]          "

## 2025-05-22 NOTE — TELEPHONE ENCOUNTER
[facility] Call Center    Phone Message    May a detailed message be left on voicemail: yes     Reason for Call: Appointment Intake    Referring Provider Name:     SANJEEV FERNANDES     Diagnosis and/or Symptoms: Blood in Ear Canal unspecified Laterally.    DX not in scheduling protocols. Please review and contact the patient with scheduling! Thank you!!    Action Taken: Message routed to:  Other: ENT    Travel Screening: Not Applicable     Date of Service:

## 2025-05-28 NOTE — PROGRESS NOTES
ENT Consultation    Sandra Petit who is a 85 year old female seen in consultation at the request of Self .      History of Present Illness - Sandra Petit is a 85 year old female blood in ear   Presented with a bloody scab that came out of the right ear.  Patient is currently on Coumadin.  She does have dry itchy ears.  She does occasionally itch them with a Q-tip.  She does occasionally feel almost like little cysts under the skin.  There has been no drainage from the ears.  Hearing has not changed.  There has been no further bleeding from the ears.        BP Readings from Last 1 Encounters:   05/29/25 130/78       BP noted to be well controlled today in office.     Sandra IS NOT a smoker/uses chewing tobacco.    Past Medical History -   Past Medical History:   Diagnosis Date    Arthritis     not sure of date    Congestive heart failure (H) 3/8/19 triple by pass    Coronary artery disease 2005    angioplasty June, 2005    Diabetes (H) 2014    Diabetic eye exam (H) 08/05/2013    Endometriosis, site unspecified     Fever and other physiologic disturbances of temperature regulation 07/07/2005    Admit.  Discharged 07/13/2005.  Cause undetermined.    Heart disease     Myalgia and myositis, unspecified     fibromyalgia    Pure hypercholesterolemia     Unspecified essential hypertension     Unspecified hypothyroidism        Current Medications -   Current Outpatient Medications:     allopurinol (ZYLOPRIM) 100 MG tablet, Take 100 mg by mouth daily (with dinner), Disp: , Rfl:     amLODIPine (NORVASC) 10 MG tablet, Take 1 tablet (10 mg) by mouth daily (with dinner)., Disp: 90 tablet, Rfl: 4    azithromycin (ZITHROMAX) 250 MG tablet, 2 tablets an hour before dental procedure. (Patient not taking: Reported on 5/29/2025), Disp: 6 tablet, Rfl: 0    blood glucose (ONETOUCH VERIO IQ) test strip, USE 1 STRIP TO CHECK GLUCOSE ONCE DAILY, Disp: 100 strip, Rfl: 5    hydroCHLOROthiazide 12.5 MG tablet, Take 1 tablet (12.5 mg) by mouth  "daily., Disp: 90 tablet, Rfl: 4    Lancets (ONETOUCH DELICA PLUS [id number]) MISC, USE 1  LANCET TO CHECK GLUCOSE ONCE DAILY, Disp: 100 each, Rfl: 3    levothyroxine (SYNTHROID/LEVOTHROID) 50 MCG tablet, Take 1 tablet (50 mcg) by mouth daily., Disp: 90 tablet, Rfl: 4    losartan (COZAAR) 25 MG tablet, Take 1 tablet (25 mg) by mouth 2 times daily., Disp: 180 tablet, Rfl: 4    metFORMIN (GLUCOPHAGE XR) 500 MG 24 hr tablet, TAKE 1 TABLET BY MOUTH ONCE DAILY WITH SUPPER Strength: 500 mg, Disp: 180 tablet, Rfl: 3    multivitamin CF FORMULA (CHOICEFUL) capsule, Take 1 capsule by mouth daily, Disp: , Rfl:     nitroGLYcerin (NITROSTAT) 0.4 MG sublingual tablet, For chest pain place 1 tablet under the tongue every 5 minutes for 3 doses. If symptoms persist 5 minutes after 1st dose call 911. (Patient not taking: Reported on 5/29/2025), Disp: 25 tablet, Rfl: 6    Probiotic Product (ACIDOPHILUS PROBIOTIC BLEND) CAPS, Take 1 capsule by mouth daily (with dinner)., Disp: , Rfl:     simvastatin (ZOCOR) 40 MG tablet, Take 1 tablet (40 mg) by mouth at bedtime., Disp: 90 tablet, Rfl: 4    Vitamin D, Cholecalciferol, 25 MCG (1000 UT) CAPS, Take by mouth every other day, Disp: , Rfl:     warfarin ANTICOAGULANT (COUMADIN) 5 MG tablet, Take 1 tablet (5 mg) Mon, Wed, Fri, and 1/2 tablet (2.5 mg) all other days or as directed by coumadin clinic, Disp: 60 tablet, Rfl: 1    Current Facility-Administered Medications:     lidocaine 1 % injection 1 mL, 1 mL, INTRA-ARTICULAR, Once, Tyler Bennett DPM    Allergies -   Allergies   Allergen Reactions    Penicillins Swelling     \"throat started swelling\"    Vitamin B Complex Hives    Vitamin B12 Hives     all vitamin B's    Clindamycin Hcl Rash       Social History -   Social History     Socioeconomic History    Marital status:    Tobacco Use    Smoking status: Former     Passive exposure: Past    Smokeless tobacco: Never    Tobacco comments:     quit  1985   Vaping Use    Vaping status: " Never Used   Substance and Sexual Activity    Alcohol use: No     Alcohol/week: 0.0 standard drinks of alcohol    Drug use: No    Sexual activity: Not Currently   Other Topics Concern     Service No    Blood Transfusions No    Caffeine Concern No    Occupational Exposure No    Hobby Hazards No    Sleep Concern Yes    Stress Concern Yes    Weight Concern Yes     Comment: over weight and unable to lose weight    Special Diet Yes     Comment: low fat, low sugar    Back Care No    Exercise No    Bike Helmet No     Comment: does not ride bike    Seat Belt Yes    Self-Exams Yes    Parent/sibling w/ CABG, MI or angioplasty before 65F 55M? Yes     Comment: sister 55yrs CABG & heart atttack     Social Drivers of Health     Financial Resource Strain: Low Risk  (11/28/2024)    Financial Resource Strain     Within the past 12 months, have you or your family members you live with been unable to get utilities (heat, electricity) when it was really needed?: No   Food Insecurity: Low Risk  (11/28/2024)    Food Insecurity     Within the past 12 months, did you worry that your food would run out before you got money to buy more?: No     Within the past 12 months, did the food you bought just not last and you didn t have money to get more?: No   Transportation Needs: Low Risk  (11/28/2024)    Transportation Needs     Within the past 12 months, has lack of transportation kept you from medical appointments, getting your medicines, non-medical meetings or appointments, work, or from getting things that you need?: No   Stress: No Stress Concern Present (11/28/2024)    [employer] - Occupational Stress Questionnaire     Feeling of Stress : Only a little   Interpersonal Safety: Unknown (3/3/2025)    [facility] from [facility]    Intimate Partner Violence     Are you in a relationship where you are physically hurt, threatened and/or made to feel afraid?: Unable to assess   Housing  Stability: Low Risk  (2024)    Housing Stability     Do you have housing? : Yes     Are you worried about losing your housing?: No       Family History -   Family History   Problem Relation Age of Onset    Heart Disease Mother          coronary    Coronary Artery Disease Mother         & mother    Heart Disease Father          coronary    Leukemia Sister     Allergies Sister         unknown    Heart Disease Sister         by pass surg    Hypertension Sister     Lipids Sister     Obesity Sister     Diabetes Sister     Diabetes Sister     Neurologic Disorder Sister         parkinsons    Cancer Sister         skin cancer    Allergies Brother         unknown    Heart Disease Brother         on medication    Hypertension Brother     Lipids Brother     C.A.D. Brother         quad by pass    Cerebrovascular Disease Brother     Allergies Daughter         unknown    Allergies Son         unknown    Diabetes Sister     Hypertension Sister     Hyperlipidemia Sister     Thyroid Disease Sister     Asthma Sister     Obesity Sister     Hypertension Brother     Hyperlipidemia Brother     Depression/Anxiety Other     Diabetes Sister     Hypertension Sister     Hyperlipidemia Sister     Obesity Sister     Hypertension Brother     Hyperlipidemia Brother     Other Cancer Brother         kidney - environment at work    Asthma Sister     Thyroid Disease Niece     Thyroid Disease Niece     Hypertension Brother     Hyperlipidemia Brother     Obesity Sister     Alcohol/Drug No family hx of     Alzheimer Disease No family hx of     Breast Cancer No family hx of     Prostate Cancer No family hx of     Anesthesia Reaction No family hx of     Osteoporosis No family hx of     Cancer - colorectal No family hx of     Ovarian Cancer No family hx of     Chemical Addiction No family hx of     Known Genetic Syndrome No family hx of        Review of Systems - As per HPI and PMHx, otherwise review of system review of the head and neck  "negative. Otherwise 10+ review of system is negative    Physical Exam  /78   Temp 98.6  F (37  C) (Temporal)   Ht 1.486 m (4' 10.5\")   Wt 79.8 kg (176 lb)   BMI 36.16 kg/m    BMI: Body mass index is 36.16 kg/m .    General - The patient is well nourished and well developed, and appears to have good nutritional status.  Alert and oriented to person and place, answers questions and cooperates with examination appropriately.    SKIN - No suspicious lesions or rashes.  Respiration - No respiratory distress.  Head and Face - Normocephalic and atraumatic, with no gross asymmetry noted of the contour of the facial features.  The facial nerve is intact, with strong symmetric movements.    Voice and Breathing - The patient was breathing comfortably without the use of accessory muscles. The patients voice was clear and strong, and had appropriate pitch and quality.    Ears - Bilateral pinna and EACs with normal appearing overlying skin.  There appears to be no cerumen in either ear canal.  Skin is quite dry.  Tiny speck of dried blood is noted in the proximal canal on the right side.  No other pathology appreciated.  Tympanic membrane intact with good mobility on pneumatic otoscopy bilaterally. Bony landmarks of the ossicular chain are normal. The tympanic membranes are normal in appearance. No retraction, perforation, or masses.  No fluid or purulence was seen in the external canal or the middle ear.     Eyes - Extraocular movements intact.  Sclera were not icteric or injected, conjunctiva were pink and moist.      Neck - Normal midline excursion of the laryngotracheal complex during swallowing.  Full range of motion on passive movement.  Palpation of the occipital, submental, submandibular, internal jugular chain, and supraclavicular nodes did not demonstrate any abnormal lymph nodes or masses.  The carotid pulse was palpable bilaterally.  Palpation of the thyroid was soft and smooth, with no nodules or goiter " appreciated.  The trachea was mobile and midline.      Neuro - Nonfocal neuro exam is normal, CN 2 through 12 intact, normal gait and muscle tone.          A/P - Sandra Petit is a 85 year old female patient with the dry ears no cerumen produced.  At this point I believe that perhaps some itching and possible aggressive cleaning on the right side may have provoked some bleeding especially that patient is on Coumadin.  At this point we advised patient to use baby oil at least 3 times a week apply in the ear canal that should help itching dryness of the skin prevent bleeding.  Patient will return as needed.      Jeffery Ballesteros MD

## 2025-05-29 ENCOUNTER — OFFICE VISIT (OUTPATIENT)
Dept: OTOLARYNGOLOGY | Facility: CLINIC | Age: 85
End: 2025-05-29
Payer: MEDICARE

## 2025-05-29 VITALS
WEIGHT: 176 LBS | TEMPERATURE: 98.6 F | BODY MASS INDEX: 35.48 KG/M2 | HEIGHT: 59 IN | DIASTOLIC BLOOD PRESSURE: 78 MMHG | SYSTOLIC BLOOD PRESSURE: 130 MMHG

## 2025-05-29 DIAGNOSIS — H92.20 BLOOD IN EAR CANAL, UNSPECIFIED LATERALITY: ICD-10-CM

## 2025-05-29 ASSESSMENT — PAIN SCALES - GENERAL: PAINLEVEL_OUTOF10: MILD PAIN (1)

## 2025-05-29 NOTE — LETTER
5/29/2025      Sandra Petit  [address]  [facility] [address]      Dear Colleague,    Thank you for referring your patient, Sandra Petit, to the [facility]. Please see a copy of my visit note below.    ENT Consultation    Sandra Petit who is a 85 year old female seen in consultation at the request of Self .      History of Present Illness - Sandra Petit is a 85 year old female blood in ear   Presented with a bloody scab that came out of the right ear.  Patient is currently on Coumadin.  She does have dry itchy ears.  She does occasionally itch them with a Q-tip.  She does occasionally feel almost like little cysts under the skin.  There has been no drainage from the ears.  Hearing has not changed.  There has been no further bleeding from the ears.        BP Readings from Last 1 Encounters:   05/29/25 130/78       BP noted to be well controlled today in office.     Sandra IS NOT a smoker/uses chewing tobacco.    Past Medical History -   Past Medical History:   Diagnosis Date     Arthritis     not sure of date     Congestive heart failure (H) 3/8/19 triple by pass     Coronary artery disease 2005    angioplasty June, 2005     Diabetes (H) 2014     Diabetic eye exam (H) 08/05/2013     Endometriosis, site unspecified      Fever and other physiologic disturbances of temperature regulation 07/07/2005    Admit.  Discharged 07/13/2005.  Cause undetermined.     Heart disease      Myalgia and myositis, unspecified     fibromyalgia     Pure hypercholesterolemia      Unspecified essential hypertension      Unspecified hypothyroidism        Current Medications -   Current Outpatient Medications:      allopurinol (ZYLOPRIM) 100 MG tablet, Take 100 mg by mouth daily (with dinner), Disp: , Rfl:      amLODIPine (NORVASC) 10 MG tablet, Take 1 tablet (10 mg) by mouth daily (with dinner)., Disp: 90 tablet, Rfl: 4     azithromycin (ZITHROMAX) 250 MG tablet, 2 tablets an hour before dental procedure.  "(Patient not taking: Reported on 5/29/2025), Disp: 6 tablet, Rfl: 0     blood glucose (ONETOUCH VERIO IQ) test strip, USE 1 STRIP TO CHECK GLUCOSE ONCE DAILY, Disp: 100 strip, Rfl: 5     hydroCHLOROthiazide 12.5 MG tablet, Take 1 tablet (12.5 mg) by mouth daily., Disp: 90 tablet, Rfl: 4     Lancets (ONETOUCH DELICA PLUS TFEEGF81E) MISC, USE 1  LANCET TO CHECK GLUCOSE ONCE DAILY, Disp: 100 each, Rfl: 3     levothyroxine (SYNTHROID/LEVOTHROID) 50 MCG tablet, Take 1 tablet (50 mcg) by mouth daily., Disp: 90 tablet, Rfl: 4     losartan (COZAAR) 25 MG tablet, Take 1 tablet (25 mg) by mouth 2 times daily., Disp: 180 tablet, Rfl: 4     metFORMIN (GLUCOPHAGE XR) 500 MG 24 hr tablet, TAKE 1 TABLET BY MOUTH ONCE DAILY WITH SUPPER Strength: 500 mg, Disp: 180 tablet, Rfl: 3     multivitamin CF FORMULA (CHOICEFUL) capsule, Take 1 capsule by mouth daily, Disp: , Rfl:      nitroGLYcerin (NITROSTAT) 0.4 MG sublingual tablet, For chest pain place 1 tablet under the tongue every 5 minutes for 3 doses. If symptoms persist 5 minutes after 1st dose call 911. (Patient not taking: Reported on 5/29/2025), Disp: 25 tablet, Rfl: 6     Probiotic Product (ACIDOPHILUS PROBIOTIC BLEND) CAPS, Take 1 capsule by mouth daily (with dinner)., Disp: , Rfl:      simvastatin (ZOCOR) 40 MG tablet, Take 1 tablet (40 mg) by mouth at bedtime., Disp: 90 tablet, Rfl: 4     Vitamin D, Cholecalciferol, 25 MCG (1000 UT) CAPS, Take by mouth every other day, Disp: , Rfl:      warfarin ANTICOAGULANT (COUMADIN) 5 MG tablet, Take 1 tablet (5 mg) Mon, Wed, Fri, and 1/2 tablet (2.5 mg) all other days or as directed by coumadin clinic, Disp: 60 tablet, Rfl: 1    Current Facility-Administered Medications:      lidocaine 1 % injection 1 mL, 1 mL, INTRA-ARTICULAR, Once, Tyler Bennett, FLEX    Allergies -   Allergies   Allergen Reactions     Penicillins Swelling     \"throat started swelling\"     Vitamin B Complex Hives     Vitamin B12 Hives     all vitamin B's     " Clindamycin Hcl Rash       Social History -   Social History     Socioeconomic History     Marital status:    Tobacco Use     Smoking status: Former     Passive exposure: Past     Smokeless tobacco: Never     Tobacco comments:     quit  1985   Vaping Use     Vaping status: Never Used   Substance and Sexual Activity     Alcohol use: No     Alcohol/week: 0.0 standard drinks of alcohol     Drug use: No     Sexual activity: Not Currently   Other Topics Concern      Service No     Blood Transfusions No     Caffeine Concern No     Occupational Exposure No     Hobby Hazards No     Sleep Concern Yes     Stress Concern Yes     Weight Concern Yes     Comment: over weight and unable to lose weight     Special Diet Yes     Comment: low fat, low sugar     Back Care No     Exercise No     Bike Helmet No     Comment: does not ride bike     Seat Belt Yes     Self-Exams Yes     Parent/sibling w/ CABG, MI or angioplasty before 65F 55M? Yes     Comment: sister 55yrs CABG & heart atttack     Social Drivers of Health     Financial Resource Strain: Low Risk  (11/28/2024)    Financial Resource Strain      Within the past 12 months, have you or your family members you live with been unable to get utilities (heat, electricity) when it was really needed?: No   Food Insecurity: Low Risk  (11/28/2024)    Food Insecurity      Within the past 12 months, did you worry that your food would run out before you got money to buy more?: No      Within the past 12 months, did the food you bought just not last and you didn t have money to get more?: No   Transportation Needs: Low Risk  (11/28/2024)    Transportation Needs      Within the past 12 months, has lack of transportation kept you from medical appointments, getting your medicines, non-medical meetings or appointments, work, or from getting things that you need?: No   Stress: No Stress Concern Present (11/28/2024)    [employer] - Occupational Stress  Questionnaire      Feeling of Stress : Only a little   Interpersonal Safety: Unknown (3/3/2025)    [facility] from [facility]    Intimate Partner Violence      Are you in a relationship where you are physically hurt, threatened and/or made to feel afraid?: Unable to assess   Housing Stability: Low Risk  (2024)    Housing Stability      Do you have housing? : Yes      Are you worried about losing your housing?: No       Family History -   Family History   Problem Relation Age of Onset     Heart Disease Mother          coronary     Coronary Artery Disease Mother         & mother     Heart Disease Father          coronary     Leukemia Sister      Allergies Sister         unknown     Heart Disease Sister         by pass surg     Hypertension Sister      Lipids Sister      Obesity Sister      Diabetes Sister      Diabetes Sister      Neurologic Disorder Sister         parkinsons     Cancer Sister         skin cancer     Allergies Brother         unknown     Heart Disease Brother         on medication     Hypertension Brother      Lipids Brother      C.A.D. Brother         quad by pass     Cerebrovascular Disease Brother      Allergies Daughter         unknown     Allergies Son         unknown     Diabetes Sister      Hypertension Sister      Hyperlipidemia Sister      Thyroid Disease Sister      Asthma Sister      Obesity Sister      Hypertension Brother      Hyperlipidemia Brother      Depression/Anxiety Other      Diabetes Sister      Hypertension Sister      Hyperlipidemia Sister      Obesity Sister      Hypertension Brother      Hyperlipidemia Brother      Other Cancer Brother         kidney - environment at work     Asthma Sister      Thyroid Disease Niece      Thyroid Disease Niece      Hypertension Brother      Hyperlipidemia Brother      Obesity Sister      Alcohol/Drug No family hx of      Alzheimer Disease No family hx of      Breast Cancer No family hx of      Prostate Cancer  "No family hx of      Anesthesia Reaction No family hx of      Osteoporosis No family hx of      Cancer - colorectal No family hx of      Ovarian Cancer No family hx of      Chemical Addiction No family hx of      Known Genetic Syndrome No family hx of        Review of Systems - As per HPI and PMHx, otherwise review of system review of the head and neck negative. Otherwise 10+ review of system is negative    Physical Exam  /78   Temp 98.6  F (37  C) (Temporal)   Ht 1.486 m (4' 10.5\")   Wt 79.8 kg (176 lb)   BMI 36.16 kg/m    BMI: Body mass index is 36.16 kg/m .    General - The patient is well nourished and well developed, and appears to have good nutritional status.  Alert and oriented to person and place, answers questions and cooperates with examination appropriately.    SKIN - No suspicious lesions or rashes.  Respiration - No respiratory distress.  Head and Face - Normocephalic and atraumatic, with no gross asymmetry noted of the contour of the facial features.  The facial nerve is intact, with strong symmetric movements.    Voice and Breathing - The patient was breathing comfortably without the use of accessory muscles. The patients voice was clear and strong, and had appropriate pitch and quality.    Ears - Bilateral pinna and EACs with normal appearing overlying skin.  There appears to be no cerumen in either ear canal.  Skin is quite dry.  Tiny speck of dried blood is noted in the proximal canal on the right side.  No other pathology appreciated.  Tympanic membrane intact with good mobility on pneumatic otoscopy bilaterally. Bony landmarks of the ossicular chain are normal. The tympanic membranes are normal in appearance. No retraction, perforation, or masses.  No fluid or purulence was seen in the external canal or the middle ear.     Eyes - Extraocular movements intact.  Sclera were not icteric or injected, conjunctiva were pink and moist.      Neck - Normal midline excursion of the " laryngotracheal complex during swallowing.  Full range of motion on passive movement.  Palpation of the occipital, submental, submandibular, internal jugular chain, and supraclavicular nodes did not demonstrate any abnormal lymph nodes or masses.  The carotid pulse was palpable bilaterally.  Palpation of the thyroid was soft and smooth, with no nodules or goiter appreciated.  The trachea was mobile and midline.      Neuro - Nonfocal neuro exam is normal, CN 2 through 12 intact, normal gait and muscle tone.          A/P - Sandra Petit is a 85 year old female patient with the dry ears no cerumen produced.  At this point I believe that perhaps some itching and possible aggressive cleaning on the right side may have provoked some bleeding especially that patient is on Coumadin.  At this point we advised patient to use baby oil at least 3 times a week apply in the ear canal that should help itching dryness of the skin prevent bleeding.  Patient will return as needed.      Jeffery Ballesteros MD      Again, thank you for allowing me to participate in the care of your patient.        Sincerely,        Jeffery Ballesteros MD, MD    Electronically signed

## 2025-06-03 ENCOUNTER — RESULTS FOLLOW-UP (OUTPATIENT)
Dept: ANTICOAGULATION | Facility: CLINIC | Age: 85
End: 2025-06-03

## 2025-06-03 ENCOUNTER — LAB (OUTPATIENT)
Dept: LAB | Facility: CLINIC | Age: 85
End: 2025-06-03
Payer: MEDICARE

## 2025-06-03 ENCOUNTER — ANTICOAGULATION THERAPY VISIT (OUTPATIENT)
Dept: ANTICOAGULATION | Facility: CLINIC | Age: 85
End: 2025-06-03

## 2025-06-03 DIAGNOSIS — I48.91 ATRIAL FIBRILLATION, UNSPECIFIED TYPE (H): ICD-10-CM

## 2025-06-03 DIAGNOSIS — I48.91 ATRIAL FIBRILLATION, UNSPECIFIED TYPE (H): Primary | ICD-10-CM

## 2025-06-03 LAB — INR BLD: 2.2 (ref 0.9–1.1)

## 2025-06-03 PROCEDURE — 85610 PROTHROMBIN TIME: CPT

## 2025-06-03 PROCEDURE — 36415 COLL VENOUS BLD VENIPUNCTURE: CPT

## 2025-06-03 NOTE — PROGRESS NOTES
ANTICOAGULATION MANAGEMENT     Sandra Petit 85 year old female is on warfarin with therapeutic INR result. (Goal INR 2.0-3.0)    Recent labs: (last 7 days)     06/03/25  1010   INR 2.2*       ASSESSMENT     Warfarin Lab Questionnaire    Warfarin Doses Last 7 Days      6/2/2025     1:10 PM   Dose in Tablet or Mg   TAB or MG? milligram (mg)     Pt Rptd Dose SUNDAY MONDAY TUESDAY WED THURS FRIDAY SATURDAY 6/2/2025   1:10 PM 2.5 2.5 2.5 5 2.5 5 2.5         6/2/2025   Warfarin Lab Questionnaire   Missed doses within past 14 days? No   Changes in diet or alcohol within past 14 days? No   Medication changes since last result? No   Injuries or illness since last result? No   New shortness of breath, severe headaches or sudden changes in vision since last result? No   Abnormal bleeding since last result? No   Upcoming surgery, procedure? No   Best number to call with results? [phone]     Previous result: Therapeutic last visit; previously outside of goal range  Additional findings: was taking 5 MG Wed/Fri, will switch back to 5 MG Mon/Fri        PLAN     Recommended plan for no diet, medication or health factor changes affecting INR     Dosing Instructions: Continue your current warfarin dose with next INR in 4 weeks       Summary  As of 6/3/2025      Full warfarin instructions:  5 mg every Mon, Fri; 2.5 mg all other days   Next INR check:  7/1/2025               Telephone call with Giovanni who verbalizes understanding and agrees to plan    Lab visit scheduled    Education provided: Contact [phone] with any changes, questions or concerns.     Plan made per [facility] anticoagulation protocol    Leela Montana, RN  6/3/2025  Anticoagulation Clinic  [employer] for routing messages: joycelyn BOSWELL [facility]  [facility] patient phone line: [phone]        _______________________________________________________________________     Anticoagulation Episode Summary       Current INR goal:  2.0-3.0   TTR:  48.4% (4.7 mo)   Target end  date:  Indefinite   Send INR reminders to:  ANDREIA Walnut    Indications    Atrial fibrillation  unspecified type (H) [I48.91]             Comments:  --             Anticoagulation Care Providers       Provider Role Specialty Phone number    Tyson Cano MD [facility] Medicine [phone]

## 2025-06-14 ENCOUNTER — HEALTH MAINTENANCE LETTER (OUTPATIENT)
Age: 85
End: 2025-06-14

## 2025-06-19 ENCOUNTER — LAB (OUTPATIENT)
Dept: LAB | Facility: CLINIC | Age: 85
End: 2025-06-19
Payer: MEDICARE

## 2025-06-19 DIAGNOSIS — E11.21 TYPE 2 DIABETES MELLITUS WITH DIABETIC NEPHROPATHY, WITHOUT LONG-TERM CURRENT USE OF INSULIN (H): ICD-10-CM

## 2025-06-19 LAB
EST. AVERAGE GLUCOSE BLD GHB EST-MCNC: 151 MG/DL
HBA1C MFR BLD: 6.9 %

## 2025-06-20 ENCOUNTER — RESULTS FOLLOW-UP (OUTPATIENT)
Dept: FAMILY MEDICINE | Facility: CLINIC | Age: 85
End: 2025-06-20

## 2025-06-26 ENCOUNTER — TELEPHONE (OUTPATIENT)
Dept: FAMILY MEDICINE | Facility: CLINIC | Age: 85
End: 2025-06-26
Payer: MEDICARE

## 2025-06-26 NOTE — TELEPHONE ENCOUNTER
Reason for Call:  Appointment Request    Patient requesting this type of appt: Chronic Diease Management/Medication/Follow-Up    Requested provider: Tyson Cano    Reason patient unable to be scheduled: Not within requested timeframe    When does patient want to be seen/preferred time: ASAP    Comments: knuckles on right hand ring finger pain , wants an appt sooner first opening showing in August     Could we send this information to you in [place] or would you prefer to receive a phone call?:   Patient would prefer a phone call   Okay to leave a detailed message?: Yes at Home number on file [phone] (home)    Call taken on 6/26/2025 at 10:41 AM by Zaira De La Garza

## 2025-07-01 ENCOUNTER — LAB (OUTPATIENT)
Dept: LAB | Facility: CLINIC | Age: 85
End: 2025-07-01
Payer: MEDICARE

## 2025-07-01 ENCOUNTER — ANTICOAGULATION THERAPY VISIT (OUTPATIENT)
Dept: ANTICOAGULATION | Facility: CLINIC | Age: 85
End: 2025-07-01

## 2025-07-01 ENCOUNTER — RESULTS FOLLOW-UP (OUTPATIENT)
Dept: ANTICOAGULATION | Facility: CLINIC | Age: 85
End: 2025-07-01

## 2025-07-01 ENCOUNTER — OFFICE VISIT (OUTPATIENT)
Dept: FAMILY MEDICINE | Facility: CLINIC | Age: 85
End: 2025-07-01
Payer: MEDICARE

## 2025-07-01 VITALS
TEMPERATURE: 97.5 F | RESPIRATION RATE: 18 BRPM | OXYGEN SATURATION: 98 % | DIASTOLIC BLOOD PRESSURE: 60 MMHG | HEART RATE: 68 BPM | WEIGHT: 174.4 LBS | BODY MASS INDEX: 35.16 KG/M2 | SYSTOLIC BLOOD PRESSURE: 128 MMHG | HEIGHT: 59 IN

## 2025-07-01 DIAGNOSIS — M18.12 OSTEOARTHRITIS OF LEFT THUMB: ICD-10-CM

## 2025-07-01 DIAGNOSIS — N18.30 CHRONIC KIDNEY DISEASE, STAGE III (MODERATE) (H): Primary | ICD-10-CM

## 2025-07-01 DIAGNOSIS — E78.5 HYPERLIPIDEMIA LDL GOAL <100: ICD-10-CM

## 2025-07-01 DIAGNOSIS — I48.91 ATRIAL FIBRILLATION, UNSPECIFIED TYPE (H): Primary | ICD-10-CM

## 2025-07-01 DIAGNOSIS — E11.59 TYPE 2 DIABETES MELLITUS WITH OTHER CIRCULATORY COMPLICATIONS (H): ICD-10-CM

## 2025-07-01 DIAGNOSIS — I10 HYPERTENSION GOAL BP (BLOOD PRESSURE) < 140/90: ICD-10-CM

## 2025-07-01 DIAGNOSIS — E55.9 AVITAMINOSIS D: ICD-10-CM

## 2025-07-01 DIAGNOSIS — I48.91 ATRIAL FIBRILLATION, UNSPECIFIED TYPE (H): ICD-10-CM

## 2025-07-01 DIAGNOSIS — I25.10 CORONARY ARTERY DISEASE INVOLVING NATIVE CORONARY ARTERY OF NATIVE HEART WITHOUT ANGINA PECTORIS: ICD-10-CM

## 2025-07-01 DIAGNOSIS — N18.31 STAGE 3A CHRONIC KIDNEY DISEASE (H): ICD-10-CM

## 2025-07-01 DIAGNOSIS — M1A.09X0 CHRONIC GOUT OF MULTIPLE SITES, UNSPECIFIED CAUSE: ICD-10-CM

## 2025-07-01 DIAGNOSIS — M79.644 PAIN OF FINGER OF RIGHT HAND: Primary | ICD-10-CM

## 2025-07-01 DIAGNOSIS — Z79.01 CHRONIC ANTICOAGULATION: ICD-10-CM

## 2025-07-01 DIAGNOSIS — M10.9 GOUT, UNSPECIFIED: ICD-10-CM

## 2025-07-01 LAB
ANION GAP SERPL CALCULATED.3IONS-SCNC: 13 MMOL/L (ref 7–15)
BUN SERPL-MCNC: 34 MG/DL (ref 8–23)
CALCIUM SERPL-MCNC: 9.7 MG/DL (ref 8.8–10.4)
CHLORIDE SERPL-SCNC: 102 MMOL/L (ref 98–107)
CREAT SERPL-MCNC: 1.57 MG/DL (ref 0.51–0.95)
EGFRCR SERPLBLD CKD-EPI 2021: 32 ML/MIN/1.73M2
GLUCOSE SERPL-MCNC: 101 MG/DL (ref 70–99)
HCO3 SERPL-SCNC: 24 MMOL/L (ref 22–29)
INR BLD: 2.5 (ref 0.9–1.1)
POTASSIUM SERPL-SCNC: 4.2 MMOL/L (ref 3.4–5.3)
SODIUM SERPL-SCNC: 139 MMOL/L (ref 135–145)
TSH SERPL DL<=0.005 MIU/L-ACNC: 3.62 UIU/ML (ref 0.3–4.2)
URATE SERPL-MCNC: 6.2 MG/DL (ref 2.4–5.7)

## 2025-07-01 PROCEDURE — 3078F DIAST BP <80 MM HG: CPT | Performed by: STUDENT IN AN ORGANIZED HEALTH CARE EDUCATION/TRAINING PROGRAM

## 2025-07-01 PROCEDURE — 99214 OFFICE O/P EST MOD 30 MIN: CPT | Performed by: STUDENT IN AN ORGANIZED HEALTH CARE EDUCATION/TRAINING PROGRAM

## 2025-07-01 PROCEDURE — G2211 COMPLEX E/M VISIT ADD ON: HCPCS | Performed by: STUDENT IN AN ORGANIZED HEALTH CARE EDUCATION/TRAINING PROGRAM

## 2025-07-01 PROCEDURE — 36416 COLLJ CAPILLARY BLOOD SPEC: CPT

## 2025-07-01 PROCEDURE — 1125F AMNT PAIN NOTED PAIN PRSNT: CPT | Performed by: STUDENT IN AN ORGANIZED HEALTH CARE EDUCATION/TRAINING PROGRAM

## 2025-07-01 PROCEDURE — 3074F SYST BP LT 130 MM HG: CPT | Performed by: STUDENT IN AN ORGANIZED HEALTH CARE EDUCATION/TRAINING PROGRAM

## 2025-07-01 PROCEDURE — 80048 BASIC METABOLIC PNL TOTAL CA: CPT | Performed by: STUDENT IN AN ORGANIZED HEALTH CARE EDUCATION/TRAINING PROGRAM

## 2025-07-01 PROCEDURE — 85610 PROTHROMBIN TIME: CPT

## 2025-07-01 PROCEDURE — 84443 ASSAY THYROID STIM HORMONE: CPT | Performed by: STUDENT IN AN ORGANIZED HEALTH CARE EDUCATION/TRAINING PROGRAM

## 2025-07-01 PROCEDURE — 36415 COLL VENOUS BLD VENIPUNCTURE: CPT | Performed by: STUDENT IN AN ORGANIZED HEALTH CARE EDUCATION/TRAINING PROGRAM

## 2025-07-01 PROCEDURE — 84550 ASSAY OF BLOOD/URIC ACID: CPT | Performed by: STUDENT IN AN ORGANIZED HEALTH CARE EDUCATION/TRAINING PROGRAM

## 2025-07-01 RX ORDER — PREDNISONE 20 MG/1
20 TABLET ORAL DAILY
Qty: 5 TABLET | Refills: 0 | Status: SHIPPED | OUTPATIENT
Start: 2025-07-01 | End: 2025-07-06

## 2025-07-01 ASSESSMENT — PAIN SCALES - GENERAL: PAINLEVEL_OUTOF10: SEVERE PAIN (8)

## 2025-07-01 NOTE — PROGRESS NOTES
ANTICOAGULATION MANAGEMENT     Sandra Petit 85 year old female is on warfarin with therapeutic INR result. (Goal INR 2.0-3.0)    Recent labs: (last 7 days)     07/01/25  0941   INR 2.5*       ASSESSMENT     Warfarin Lab Questionnaire    Warfarin Doses Last 7 Days      6/30/2025     3:10 PM   Dose in Tablet or Mg   TAB or MG? milligram (mg)     Pt Rptd Dose SUNDAY MONDAY TUESDAY WED THURS FRIDAY SATURDAY 6/30/2025   3:10 PM 2.5 5 2.5 2.5 2.5 5 2.5         6/30/2025   Warfarin Lab Questionnaire   Missed doses within past 14 days? No   Changes in diet or alcohol within past 14 days? No   Medication changes since last result? No   Injuries or illness since last result? No   New shortness of breath, severe headaches or sudden changes in vision since last result? No   Abnormal bleeding since last result? No   Upcoming surgery, procedure? No   Best number to call with results? [phone]     Previous result: Therapeutic last 2(+) visits  Additional findings: states that she does have diarrhea some days has been eating increased nuts and feels it all started when she increased her intake.  She will cut back and see how things go   Was seen by PCP today and has had finger pain.  Labs done and she will update the ACC if any medication changes.        PLAN     Recommended plan for temporary change(s) affecting INR     Dosing Instructions: Continue your current warfarin dose with next INR in 4 weeks       Summary  As of 7/1/2025      Full warfarin instructions:  5 mg every Mon, Fri; 2.5 mg all other days   Next INR check:  7/29/2025               Telephone call with Giovanni who verbalizes understanding and agrees to plan    Lab visit scheduled    Education provided: Contact [phone] with any changes, questions or concerns.     Plan made per [facility] anticoagulation protocol    Leela Montana, RN  7/1/2025  Anticoagulation Clinic  [place] for routing messages: joycelyn GURROLA  [facility] patient phone line:  [phone]        _______________________________________________________________________     Anticoagulation Episode Summary       Current INR goal:  2.0-3.0   TTR:  56.9% (5.7 mo)   Target end date:  Indefinite   Send INR reminders to:  ANDREIA GURROLA    Indications    Atrial fibrillation  unspecified type (H) [I48.91]             Comments:  --             Anticoagulation Care Providers       Provider Role Specialty Phone number    Tyson Cano MD Referring Family Medicine [phone]

## 2025-07-01 NOTE — PROGRESS NOTES
Assessment & Plan   Problem List Items Addressed This Visit          Cardiovascular/Peripheral Vascular    Hypertension goal BP (blood pressure) < 140/90    Relevant Orders    Basic metabolic panel  (Ca, Cl, CO2, Creat, Gluc, K, Na, BUN) (Completed)    Coronary artery disease involving native coronary artery without angina pectoris    Atrial fibrillation, unspecified type (H)    Chronic anticoagulation       Endocrine    Hyperlipidemia LDL goal <100    Type 2 diabetes mellitus with other circulatory complications (H)    Relevant Medications    predniSONE (DELTASONE) 20 MG tablet    Other Relevant Orders    TSH with free T4 reflex (Completed)       Genitourinary/Renal    CKD (chronic kidney disease) stage 3, GFR 30-59 ml/min (H)    Relevant Orders    Uric acid (Completed)       Orthopedic/Musculoskeletal    Osteoarthritis of left thumb    Relevant Medications    predniSONE (DELTASONE) 20 MG tablet     Other Visit Diagnoses         Pain of finger of right hand    -  Primary    Relevant Orders    XR Hand Right G/E 3 Views      Chronic gout of multiple sites, unspecified cause        Relevant Medications    predniSONE (DELTASONE) 20 MG tablet           A1c stable.  Question of flare of osteoarthritis versus gout involvement.  Plan for x-ray. Uric acid slightly elevated. Will plan to increase allopurinol and short prednisone course vs long taper given her diabetes. Risk of recurrence discussed. Other options in the setting of her CKD reviewed. Let me know if things not improving or worsening issues arise. She will make anticoagulation clinic aware of medication changes. Stop hydrochlorothiazide and monitor home pressures with increased risk of gout. May need to increase losartan to 50 mg if things elevating greater than 130/90.     The longitudinal plan of care for the diagnosis(es)/condition(s) as documented were addressed during this visit. Due to the added complexity in care, I will continue to support Giovanni in the  "subsequent management and with ongoing continuity of care.      Subjective   Giovanni is a 85 year old, presenting for the following health issues:  Finger (Unable bend finger on right hand)        7/1/2025    10:00 AM   Additional Questions   Roomed by Caroline YATES     History of Present Illness       Reason for visit:  Pain and stiffness in ring finger,left hand  Symptoms include:  Pain, swollen and very difficult and painful to bend finger.  Symptom progression:  Worsening  Had these symptoms before:  No  What makes it worse:  Using my right hand when finger is swollen and hard to bend.  What makes it better:  No   She is taking medications regularly.        Pain into finger last month that has waxed and waned.  More bothersome in the last few weeks into her PIP joint and does not extend down the finger.  She does note it feels warmer and swollen at times but does come and go. Known arthritis in to her thumb. Hx of gout but no flares for a long time. On allopurinol and in the setting of her CKD. Otherwise feels well but notes some loose stool after higher warfarin dose. No injury to finger. Taking medication as prescribed.       Review of Systems  Constitutional, neuro, ENT, endocrine, pulmonary, cardiac, gastrointestinal, genitourinary, musculoskeletal, integument and psychiatric systems are negative, except as otherwise noted.      Objective    /60   Pulse 68   Temp 97.5  F (36.4  C) (Temporal)   Resp 18   Ht 1.499 m (4' 11\")   Wt 79.1 kg (174 lb 6.4 oz)   SpO2 98%   BMI 35.22 kg/m    Body mass index is 35.22 kg/m .  Physical Exam   GENERAL: alert and no distress  EYES: Eyes grossly normal to inspection, PERRL and conjunctivae and sclerae normal  HENT: ear canals and TM's normal, nose and mouth without ulcers or lesions  NECK: no adenopathy, no asymmetry, masses, or scars  RESP: lungs clear to auscultation - no rales, rhonchi or wheezes  CV: regular rate and rhythm, no peripheral edema  ABDOMEN: soft, " nontender, no hepatosplenomegaly, no masses and bowel sounds normal  MS: no gross musculoskeletal defects noted, PIP tenderness with mild swelling, no warmth, limited flexion in 4th digit  SKIN: no suspicious lesions or rashes  NEURO: Normal strength and tone, mentation intact and speech normal  PSYCH: mentation appears normal, affect normal/bright            Signed Electronically by: Tyson Cano MD

## 2025-07-02 ENCOUNTER — RESULTS FOLLOW-UP (OUTPATIENT)
Dept: FAMILY MEDICINE | Facility: CLINIC | Age: 85
End: 2025-07-02

## 2025-07-03 ENCOUNTER — HOSPITAL ENCOUNTER (OUTPATIENT)
Dept: GENERAL RADIOLOGY | Facility: CLINIC | Age: 85
Discharge: HOME | End: 2025-07-03
Attending: STUDENT IN AN ORGANIZED HEALTH CARE EDUCATION/TRAINING PROGRAM
Payer: MEDICARE

## 2025-07-03 DIAGNOSIS — M79.644 PAIN OF FINGER OF RIGHT HAND: ICD-10-CM

## 2025-07-03 PROCEDURE — 73130 X-RAY EXAM OF HAND: CPT | Mod: RT

## 2025-07-17 ENCOUNTER — TELEPHONE (OUTPATIENT)
Dept: FAMILY MEDICINE | Facility: CLINIC | Age: 85
End: 2025-07-17
Payer: MEDICARE

## 2025-07-17 NOTE — TELEPHONE ENCOUNTER
Reason for Call:  Appointment Request    Patient requesting this type of appt:  right hand arthritis and wants to see Dr Cano a  Clinic 1st week of August.    Provider is booked till 3rd week of August.    Please contact patient. Thank you.    Requested provider: Tyson Cano    Reason patient unable to be scheduled: Needs to be scheduled by clinic    When does patient want to be seen/preferred time: 1st week of August    Comments: nqa    Could we send this information to you in [employer] or would you prefer to receive a phone call?:   Patient would prefer a phone call   Okay to leave a detailed message?: Yes at Home number on file [phone] (home)    Call taken on 7/17/2025 at 9:15 AM by Milagro Simpson

## 2025-07-22 ENCOUNTER — LAB (OUTPATIENT)
Dept: LAB | Facility: CLINIC | Age: 85
End: 2025-07-22
Payer: MEDICARE

## 2025-07-22 DIAGNOSIS — M19.90 ARTHRITIS: ICD-10-CM

## 2025-07-22 DIAGNOSIS — M1A.09X0 CHRONIC GOUT OF MULTIPLE SITES, UNSPECIFIED CAUSE: Primary | ICD-10-CM

## 2025-07-22 LAB
CRP SERPL-MCNC: 8.26 MG/L
ERYTHROCYTE [DISTWIDTH] IN BLOOD BY AUTOMATED COUNT: 15.6 % (ref 10–15)
HCT VFR BLD AUTO: 35.8 % (ref 35–47)
HGB BLD-MCNC: 11.9 G/DL (ref 11.7–15.7)
MCH RBC QN AUTO: 30.9 PG (ref 26.5–33)
MCHC RBC AUTO-ENTMCNC: 33.2 G/DL (ref 31.5–36.5)
MCV RBC AUTO: 93 FL (ref 78–100)
PLATELET # BLD AUTO: 290 10E3/UL (ref 150–450)
RBC # BLD AUTO: 3.85 10E6/UL (ref 3.8–5.2)
RHEUMATOID FACT SERPL-ACNC: <10 IU/ML
WBC # BLD AUTO: 6.7 10E3/UL (ref 4–11)

## 2025-07-22 PROCEDURE — 86140 C-REACTIVE PROTEIN: CPT

## 2025-07-22 PROCEDURE — 86200 CCP ANTIBODY: CPT

## 2025-07-22 PROCEDURE — 86431 RHEUMATOID FACTOR QUANT: CPT

## 2025-07-22 PROCEDURE — 85027 COMPLETE CBC AUTOMATED: CPT | Mod: GZ

## 2025-07-22 PROCEDURE — 36415 COLL VENOUS BLD VENIPUNCTURE: CPT

## 2025-07-22 NOTE — TELEPHONE ENCOUNTER
Medication Question or Refill    Contacts       Contact Date/Time Type Contact Phone/Fax    07/22/2025 10:39 AM CDT Phone (Incoming) Giovanni Petit (Self) [phone] (H)     Ok to leave a detailed voicemail            What medication are you calling about (include dose and sig)?: allopurinol (ZYLOPRIM) 100 MG tablet    Preferred Pharmacy:   [address]  [address]  [facility] [address]  Phone: [phone] Fax: [phone]      Controlled Substance Agreement on file:   CSA -- Patient Level:    CSA: None found at the patient level.       Who prescribed the medication?:     Do you need a refill? Yes    When did you use the medication last? 7/22/25    Patient offered an appointment? No    Do you have any questions or concerns?  No      Could we send this information to you in [facility] or would you prefer to receive a phone call?:   Patient would prefer a phone call   Okay to leave a detailed message?: Yes at Cell number on file:    Telephone Information:   Mobile [phone]

## 2025-07-23 LAB — CCP AB SER IA-ACNC: 0.7 U/ML

## 2025-07-24 RX ORDER — ALLOPURINOL 100 MG/1
200 TABLET ORAL
Qty: 180 TABLET | Refills: 3 | Status: SHIPPED | OUTPATIENT
Start: 2025-07-24

## 2025-07-27 ENCOUNTER — MYC MEDICAL ADVICE (OUTPATIENT)
Dept: FAMILY MEDICINE | Facility: CLINIC | Age: 85
End: 2025-07-27
Payer: MEDICARE

## 2025-07-28 ENCOUNTER — MYC MEDICAL ADVICE (OUTPATIENT)
Dept: FAMILY MEDICINE | Facility: CLINIC | Age: 85
End: 2025-07-28
Payer: MEDICARE

## 2025-07-28 NOTE — TELEPHONE ENCOUNTER
This has been addressed, patient thinks it is other medication, see other note.    Carolin Tamez XRO/

## 2025-07-29 ENCOUNTER — LAB (OUTPATIENT)
Dept: LAB | Facility: CLINIC | Age: 85
End: 2025-07-29
Payer: MEDICARE

## 2025-07-29 ENCOUNTER — ANTICOAGULATION THERAPY VISIT (OUTPATIENT)
Dept: ANTICOAGULATION | Facility: CLINIC | Age: 85
End: 2025-07-29

## 2025-07-29 DIAGNOSIS — I48.91 ATRIAL FIBRILLATION, UNSPECIFIED TYPE (H): ICD-10-CM

## 2025-07-29 DIAGNOSIS — I48.91 ATRIAL FIBRILLATION, UNSPECIFIED TYPE (H): Primary | ICD-10-CM

## 2025-07-29 LAB — INR BLD: 1.5 (ref 0.9–1.1)

## 2025-07-29 PROCEDURE — 36416 COLLJ CAPILLARY BLOOD SPEC: CPT

## 2025-07-29 PROCEDURE — 85610 PROTHROMBIN TIME: CPT

## 2025-07-29 NOTE — PROGRESS NOTES
ANTICOAGULATION MANAGEMENT     Sandra Petit 85 year old female is on warfarin with subtherapeutic INR result. (Goal INR 2.0-3.0)    Recent labs: (last 7 days)     07/29/25  0949   INR 1.5*       ASSESSMENT     Warfarin Lab Questionnaire    Warfarin Doses Last 7 Days      7/29/2025     9:03 AM   Dose in Tablet or Mg   TAB or MG? milligram (mg)     Pt Rptd Dose SUNDAY MONDAY TUESDAY WED THURS FRIDAY SATURDAY 7/29/2025   9:03 AM 2.5 2.5 2.5 2.5 2.5 5 2.5         7/29/2025   Warfarin Lab Questionnaire   Missed doses within past 14 days? No   Changes in diet or alcohol within past 14 days? No   Medication changes since last result? No   Injuries or illness since last result? No   New shortness of breath, severe headaches or sudden changes in vision since last result? No   Abnormal bleeding since last result? No   Upcoming surgery, procedure? No   Best number to call with results? [phone]     Previous result: Therapeutic last 2(+) visits  Additional findings: taking less warfarin and missed 2 days last week       PLAN     Recommended plan for temporary change(s) affecting INR     Dosing Instructions: booster dose then continue your current warfarin dose with next INR in 2 weeks       Summary  As of 7/29/2025      Full warfarin instructions:  7/29: 5 mg; Otherwise 5 mg every Mon, Fri; 2.5 mg all other days   Next INR check:  8/12/2025               Telephone call with Giovanni who verbalizes understanding and agrees to plan    Lab visit scheduled    Education provided: Please call back if any changes to your diet, medications or how you've been taking warfarin    Plan made per [facility] anticoagulation protocol    Jessica Liu RN  7/29/2025  Anticoagulation Clinic  [employer] for routing messages: joycelyn GURROLA  [facility] patient phone line: [phone]        _______________________________________________________________________     Anticoagulation Episode Summary       Current INR goal:  2.0-3.0   TTR:  55.9% (6.6  mo)   Target end date:  Indefinite   Send INR reminders to:  ANDREIA GURROLA    Indications    Atrial fibrillation  unspecified type (H) [I48.91]             Comments:  --             Anticoagulation Care Providers       Provider Role Specialty Phone number    Tyson Cano MD Referring Family Medicine [phone]

## 2025-08-05 ENCOUNTER — OFFICE VISIT (OUTPATIENT)
Dept: FAMILY MEDICINE | Facility: CLINIC | Age: 85
End: 2025-08-05
Payer: MEDICARE

## 2025-08-05 VITALS
HEART RATE: 92 BPM | SYSTOLIC BLOOD PRESSURE: 122 MMHG | HEIGHT: 60 IN | RESPIRATION RATE: 16 BRPM | OXYGEN SATURATION: 97 % | WEIGHT: 173.9 LBS | BODY MASS INDEX: 34.14 KG/M2 | DIASTOLIC BLOOD PRESSURE: 58 MMHG | TEMPERATURE: 97.5 F

## 2025-08-05 DIAGNOSIS — I25.10 CORONARY ARTERY DISEASE INVOLVING NATIVE CORONARY ARTERY OF NATIVE HEART WITHOUT ANGINA PECTORIS: ICD-10-CM

## 2025-08-05 DIAGNOSIS — Z95.1 S/P TRIPLE VESSEL BYPASS: ICD-10-CM

## 2025-08-05 DIAGNOSIS — Z79.01 CHRONIC ANTICOAGULATION: ICD-10-CM

## 2025-08-05 DIAGNOSIS — N18.31 STAGE 3A CHRONIC KIDNEY DISEASE (H): ICD-10-CM

## 2025-08-05 DIAGNOSIS — M1A.09X0 CHRONIC GOUT OF MULTIPLE SITES, UNSPECIFIED CAUSE: ICD-10-CM

## 2025-08-05 DIAGNOSIS — M19.90 ARTHRITIS: ICD-10-CM

## 2025-08-05 DIAGNOSIS — E11.59 TYPE 2 DIABETES MELLITUS WITH OTHER CIRCULATORY COMPLICATIONS (H): Primary | ICD-10-CM

## 2025-08-05 DIAGNOSIS — I10 HYPERTENSION GOAL BP (BLOOD PRESSURE) < 140/90: ICD-10-CM

## 2025-08-05 PROBLEM — I65.23 BILATERAL CAROTID ARTERY STENOSIS: Status: ACTIVE | Noted: 2025-02-05

## 2025-08-05 PROCEDURE — G2211 COMPLEX E/M VISIT ADD ON: HCPCS | Performed by: STUDENT IN AN ORGANIZED HEALTH CARE EDUCATION/TRAINING PROGRAM

## 2025-08-05 PROCEDURE — 1125F AMNT PAIN NOTED PAIN PRSNT: CPT | Performed by: STUDENT IN AN ORGANIZED HEALTH CARE EDUCATION/TRAINING PROGRAM

## 2025-08-05 PROCEDURE — 3074F SYST BP LT 130 MM HG: CPT | Performed by: STUDENT IN AN ORGANIZED HEALTH CARE EDUCATION/TRAINING PROGRAM

## 2025-08-05 PROCEDURE — 99214 OFFICE O/P EST MOD 30 MIN: CPT | Performed by: STUDENT IN AN ORGANIZED HEALTH CARE EDUCATION/TRAINING PROGRAM

## 2025-08-05 PROCEDURE — 3078F DIAST BP <80 MM HG: CPT | Performed by: STUDENT IN AN ORGANIZED HEALTH CARE EDUCATION/TRAINING PROGRAM

## 2025-08-05 ASSESSMENT — PAIN SCALES - GENERAL: PAINLEVEL_OUTOF10: MODERATE PAIN (5)

## 2025-08-10 DIAGNOSIS — I48.91 ATRIAL FIBRILLATION, UNSPECIFIED TYPE (H): ICD-10-CM

## 2025-08-11 RX ORDER — WARFARIN SODIUM 5 MG/1
TABLET ORAL
Qty: 60 TABLET | Refills: 1 | Status: SHIPPED | OUTPATIENT
Start: 2025-08-11

## 2025-08-12 ENCOUNTER — LAB (OUTPATIENT)
Dept: LAB | Facility: CLINIC | Age: 85
End: 2025-08-12
Payer: MEDICARE

## 2025-08-12 ENCOUNTER — ANTICOAGULATION THERAPY VISIT (OUTPATIENT)
Dept: ANTICOAGULATION | Facility: CLINIC | Age: 85
End: 2025-08-12

## 2025-08-12 ENCOUNTER — RESULTS FOLLOW-UP (OUTPATIENT)
Dept: ANTICOAGULATION | Facility: CLINIC | Age: 85
End: 2025-08-12

## 2025-08-12 DIAGNOSIS — E55.9 VITAMIN D DEFICIENCY: ICD-10-CM

## 2025-08-12 DIAGNOSIS — I48.91 ATRIAL FIBRILLATION, UNSPECIFIED TYPE (H): Primary | ICD-10-CM

## 2025-08-12 DIAGNOSIS — I48.91 ATRIAL FIBRILLATION, UNSPECIFIED TYPE (H): ICD-10-CM

## 2025-08-12 DIAGNOSIS — N18.30 CHRONIC KIDNEY DISEASE, STAGE III (MODERATE) (H): Primary | ICD-10-CM

## 2025-08-12 DIAGNOSIS — M10.9 GOUT, UNSPECIFIED: ICD-10-CM

## 2025-08-12 LAB — INR BLD: 2 (ref 0.9–1.1)

## 2025-08-12 PROCEDURE — 36416 COLLJ CAPILLARY BLOOD SPEC: CPT

## 2025-08-12 PROCEDURE — 85610 PROTHROMBIN TIME: CPT

## 2025-08-20 ENCOUNTER — LAB (OUTPATIENT)
Dept: LAB | Facility: CLINIC | Age: 85
End: 2025-08-20
Payer: MEDICARE

## 2025-08-20 DIAGNOSIS — E55.9 VITAMIN D DEFICIENCY: ICD-10-CM

## 2025-08-20 DIAGNOSIS — N18.30 CHRONIC KIDNEY DISEASE, STAGE III (MODERATE) (H): ICD-10-CM

## 2025-08-20 DIAGNOSIS — M10.9 GOUT, UNSPECIFIED: ICD-10-CM

## 2025-08-20 DIAGNOSIS — E55.9 AVITAMINOSIS D: ICD-10-CM

## 2025-08-20 LAB
ALBUMIN MFR UR ELPH: 16.7 MG/DL
ANION GAP SERPL CALCULATED.3IONS-SCNC: 14 MMOL/L (ref 7–15)
BUN SERPL-MCNC: 37.8 MG/DL (ref 8–23)
CALCIUM SERPL-MCNC: 9.9 MG/DL (ref 8.8–10.4)
CHLORIDE SERPL-SCNC: 104 MMOL/L (ref 98–107)
CREAT SERPL-MCNC: 1.5 MG/DL (ref 0.51–0.95)
CREAT UR-MCNC: 170.8 MG/DL
CREAT UR-MCNC: 170.8 MG/DL
EGFRCR SERPLBLD CKD-EPI 2021: 34 ML/MIN/1.73M2
GLUCOSE SERPL-MCNC: 164 MG/DL (ref 70–99)
HCO3 SERPL-SCNC: 22 MMOL/L (ref 22–29)
HGB BLD-MCNC: 12.4 G/DL (ref 11.7–15.7)
MAGNESIUM SERPL-MCNC: 1.9 MG/DL (ref 1.7–2.3)
MCV RBC AUTO: 93.5 FL (ref 78–100)
MICROALBUMIN UR-MCNC: 43 MG/L
MICROALBUMIN/CREAT UR: 25.18 MG/G CR (ref 0–25)
PHOSPHATE SERPL-MCNC: 3.7 MG/DL (ref 2.5–4.5)
POTASSIUM SERPL-SCNC: 4.3 MMOL/L (ref 3.4–5.3)
PROT/CREAT 24H UR: 0.1 MG/MG CR (ref 0–0.2)
PTH-INTACT SERPL-MCNC: 23 PG/ML (ref 15–65)
SODIUM SERPL-SCNC: 140 MMOL/L (ref 135–145)
URATE SERPL-MCNC: 4.2 MG/DL (ref 2.4–5.7)

## 2025-08-20 PROCEDURE — 36415 COLL VENOUS BLD VENIPUNCTURE: CPT

## 2025-08-20 PROCEDURE — 83970 ASSAY OF PARATHORMONE: CPT

## 2025-08-20 PROCEDURE — 84550 ASSAY OF BLOOD/URIC ACID: CPT

## 2025-08-20 PROCEDURE — 82043 UR ALBUMIN QUANTITATIVE: CPT

## 2025-08-20 PROCEDURE — 82570 ASSAY OF URINE CREATININE: CPT

## 2025-08-20 PROCEDURE — 84156 ASSAY OF PROTEIN URINE: CPT

## 2025-08-20 PROCEDURE — 83735 ASSAY OF MAGNESIUM: CPT

## 2025-08-20 PROCEDURE — 80048 BASIC METABOLIC PNL TOTAL CA: CPT

## 2025-08-20 PROCEDURE — 84100 ASSAY OF PHOSPHORUS: CPT

## 2025-08-20 PROCEDURE — 85018 HEMOGLOBIN: CPT

## 2025-08-23 LAB
DEPRECATED CALCIDIOL+CALCIFEROL SERPL-MC: <76 UG/L (ref 20–75)
VITAMIN D2 SERPL-MCNC: <5 UG/L
VITAMIN D3 SERPL-MCNC: 71 UG/L

## 2025-08-29 ENCOUNTER — MYC MEDICAL ADVICE (OUTPATIENT)
Dept: FAMILY MEDICINE | Facility: CLINIC | Age: 85
End: 2025-08-29
Payer: MEDICARE

## 2025-09-02 DIAGNOSIS — M1A.09X0 CHRONIC GOUT OF MULTIPLE SITES, UNSPECIFIED CAUSE: ICD-10-CM

## 2025-09-03 RX ORDER — ALLOPURINOL 100 MG/1
200 TABLET ORAL
Qty: 180 TABLET | Refills: 3 | OUTPATIENT
Start: 2025-09-03

## (undated) DEVICE — BNDG ELASTIC 6" DBL LENGTH UNSTERILE 6611-16

## (undated) DEVICE — NDL ECLIPSE 18GA 1.5"

## (undated) DEVICE — CATH TRAY FOLEY 16FR DRAINAGE BAG STATLOCK 899916

## (undated) DEVICE — PACK TOTAL JOINT STD LATEX

## (undated) DEVICE — SYR 30ML LL W/O NDL

## (undated) DEVICE — GLOVE PROTEXIS W/NEU-THERA 8.0  2D73TE80

## (undated) DEVICE — DRSG XEROFORM 1X8"

## (undated) DEVICE — PREP CHLORAPREP 26ML TINTED ORANGE  260815

## (undated) DEVICE — SOL NACL 0.9% IRRIG 1000ML BOTTLE 07138-09

## (undated) DEVICE — BONE CLEANING TIP INTERPULSE  0210-010-000

## (undated) DEVICE — SUCTION IRR SYSTEM W/O TIP INTERPULSE HANDPIECE 0210-100-000

## (undated) DEVICE — BNDG ESMARK 6" STERILE

## (undated) DEVICE — HOOD FLYTE 0408-800-000

## (undated) DEVICE — SU VICRYL 2-0 CT-2 27" UND J269H

## (undated) DEVICE — DRAPE EXTREMITY W/ARMBOARD 29405

## (undated) DEVICE — NDL SPINAL 18GA 3.5" 405184

## (undated) DEVICE — GLOVE PROTEXIS W/NEU-THERA 6.5  2D73TE65

## (undated) DEVICE — BONE CEMENT MIXEVAC III HI VAC KIT  0206-015-000

## (undated) DEVICE — SUCTION TIP YANKAUER ORTHO SUPER SUCKER EFS-111

## (undated) DEVICE — SU VICRYL 0 CP-1 27" UND J267H

## (undated) DEVICE — SU ETHIBOND 2 V-37 4X30" MX69G

## (undated) DEVICE — GLOVE ESTEEM BLUE W/NEU-THERA 8.5  2D73PB85

## (undated) DEVICE — BLADE SAW OSCILLATING LINVATEC 5071-547

## (undated) DEVICE — STOCKING SLEEVE VASOPRESS COMPRESSION CALF MED VP501M

## (undated) DEVICE — GLOVE PROTEXIS BLUE W/NEU-THERA 8.0  2D73EB80

## (undated) DEVICE — STPL SKIN 35W APPOSE 8886803712

## (undated) DEVICE — SOL WATER IRRIG 1000ML BOTTLE 07139-09

## (undated) DEVICE — CAST PADDING 6" WEBRIL UNSTERILE 3489

## (undated) DEVICE — SOL NACL 0.9% IRRIG 3000ML BAG 07972-08

## (undated) DEVICE — CAST PADDING 6" WEBRIL STERILE

## (undated) RX ORDER — LIDOCAINE HYDROCHLORIDE 10 MG/ML
INJECTION, SOLUTION EPIDURAL; INFILTRATION; INTRACAUDAL; PERINEURAL
Status: DISPENSED
Start: 2018-08-27

## (undated) RX ORDER — PHENYLEPHRINE HCL IN 0.9% NACL 1 MG/10 ML
SYRINGE (ML) INTRAVENOUS
Status: DISPENSED
Start: 2018-08-27

## (undated) RX ORDER — BACITRACIN 50000 [IU]/1
INJECTION, POWDER, FOR SOLUTION INTRAMUSCULAR
Status: DISPENSED
Start: 2018-08-27

## (undated) RX ORDER — PROPOFOL 10 MG/ML
INJECTION, EMULSION INTRAVENOUS
Status: DISPENSED
Start: 2018-08-27

## (undated) RX ORDER — EPHEDRINE SULFATE 50 MG/ML
INJECTION, SOLUTION INTRAMUSCULAR; INTRAVENOUS; SUBCUTANEOUS
Status: DISPENSED
Start: 2018-08-27